# Patient Record
Sex: FEMALE | Race: WHITE | NOT HISPANIC OR LATINO | Employment: OTHER | URBAN - METROPOLITAN AREA
[De-identification: names, ages, dates, MRNs, and addresses within clinical notes are randomized per-mention and may not be internally consistent; named-entity substitution may affect disease eponyms.]

---

## 2017-01-03 ENCOUNTER — OFFICE VISIT (OUTPATIENT)
Dept: INTERNAL MEDICINE | Facility: CLINIC | Age: 82
End: 2017-01-03

## 2017-01-03 VITALS
HEIGHT: 60 IN | DIASTOLIC BLOOD PRESSURE: 60 MMHG | WEIGHT: 138 LBS | SYSTOLIC BLOOD PRESSURE: 110 MMHG | HEART RATE: 68 BPM | BODY MASS INDEX: 27.09 KG/M2

## 2017-01-03 DIAGNOSIS — E53.8 VITAMIN B12 DEFICIENCY: ICD-10-CM

## 2017-01-03 DIAGNOSIS — R25.1 TREMOR: ICD-10-CM

## 2017-01-03 DIAGNOSIS — E03.8 OTHER SPECIFIED HYPOTHYROIDISM: ICD-10-CM

## 2017-01-03 DIAGNOSIS — I10 ESSENTIAL HYPERTENSION: Primary | ICD-10-CM

## 2017-01-03 DIAGNOSIS — J30.1 SEASONAL ALLERGIC RHINITIS DUE TO POLLEN: ICD-10-CM

## 2017-01-03 DIAGNOSIS — E78.49 OTHER HYPERLIPIDEMIA: ICD-10-CM

## 2017-01-03 DIAGNOSIS — G62.9 NEUROPATHY: ICD-10-CM

## 2017-01-03 DIAGNOSIS — K30 NUD (NONULCER DYSPEPSIA): ICD-10-CM

## 2017-01-03 PROCEDURE — 99214 OFFICE O/P EST MOD 30 MIN: CPT | Performed by: INTERNAL MEDICINE

## 2017-01-03 NOTE — PROGRESS NOTES
Patient is a 83 y.o. female who is here for recent ER visit.  Chief Complaint   Patient presents with   • Diverticulitis         HPI:  Here for f/u.  Was recently in ER 12/26 for left abdominal pains,  Found to have diverticulitis.  Was rxd antibiotics. Some loose BM.  No fever or chills.  Abdominal pains are improved.  No BRBPR.      History:    Patient Active Problem List   Diagnosis   • Allergic rhinitis   • Hyperlipidemia   • Hypertension   • Hypocalcemia   • Hypothyroidism   • Neuropathy   • NUD (nonulcer dyspepsia)   • Painful knee   • Pernicious anemia   • SOB (shortness of breath)   • Tremor   • Warts   • Vitamin B12 deficiency   • Spondylolisthesis of cervical region   • Graves' ophthalmopathy   • Anemia   • Contact dermatitis       Past Medical History   Diagnosis Date   • Anemia    • Cataract    • Difficulty breathing      Chronic   • Graves' ophthalmopathy    • Hoarseness    • Spondylolisthesis of cervical region    • Vitamin B12 deficiency        Past Surgical History   Procedure Laterality Date   • Cholecystectomy     • Total knee arthroplasty Left 09/29/2014     Dr Colon   • Cervical laminectomy     • Other surgical history Right      Neuroplasty Median Nerve at Carpal Tunnel   • Thyroid surgery         Current Outpatient Prescriptions on File Prior to Visit   Medication Sig   • ciprofloxacin (CIPRO) 500 MG tablet 1 tablet po BID x 10 days   • dicyclomine (BENTYL) 10 MG capsule 1 po TID PRN abdominal pain   • esomeprazole (NexIUM) 40 MG capsule TAKE ONE CAPSULE BY MOUTH DAILY   • HYDROcodone-acetaminophen (NORCO) 7.5-325 MG per tablet    • levocetirizine (XYZAL) 5 MG tablet Take 1 tablet by mouth daily as needed.   • levothyroxine (SYNTHROID, LEVOTHROID) 75 MCG tablet Take 1 tablet by mouth Daily.   • lisinopril (PRINIVIL,ZESTRIL) 5 MG tablet TAKE ONE TABLET BY MOUTH DAILY AS DIRECTED   • LYRICA 200 MG capsule 3 (three) times a day.   • metroNIDAZOLE (FLAGYL) 500 MG tablet Take 1 tablet by  mouth 3 (Three) Times a Day.   • ondansetron (ZOFRAN) 4 MG tablet Take 1 tablet by mouth Every 8 (Eight) Hours As Needed for nausea or vomiting.   • propranolol (INDERAL) 40 MG tablet 2 (two) times a day.   • triamcinolone (KENALOG) 0.1 % cream Apply  topically 2 (two) times a day. Till healed     No current facility-administered medications on file prior to visit.        Family History   Problem Relation Age of Onset   • Hypertension Mother    • Other Father      cardiac disorder   • Diabetes Father    • Hypertension Sister        Social History     Social History   • Marital status:      Spouse name: N/A   • Number of children: N/A   • Years of education: N/A     Occupational History   • Not on file.     Social History Main Topics   • Smoking status: Never Smoker   • Smokeless tobacco: Not on file   • Alcohol use Not on file   • Drug use: No   • Sexual activity: Not on file     Other Topics Concern   • Not on file     Social History Narrative         ROS:    Review of Systems   Constitutional: Positive for fatigue. Negative for chills, diaphoresis, fever and unexpected weight change.   HENT: Negative for congestion, ear pain, hearing loss, nosebleeds, postnasal drip, sinus pressure and sore throat.    Eyes: Negative for pain, discharge and itching.   Respiratory: Negative for cough, chest tightness, shortness of breath and wheezing.    Cardiovascular: Negative for chest pain, palpitations and leg swelling.   Gastrointestinal: Positive for abdominal pain. Negative for abdominal distention, blood in stool, constipation, diarrhea, nausea and vomiting.   Endocrine: Negative for polydipsia and polyuria.   Genitourinary: Negative for difficulty urinating, dysuria, frequency and hematuria.   Musculoskeletal: Positive for arthralgias and back pain. Negative for gait problem, joint swelling and myalgias.   Skin: Positive for rash. Negative for wound.   Neurological: Positive for tremors. Negative for dizziness,  "syncope, weakness and headaches.   Psychiatric/Behavioral: Negative for dysphoric mood and sleep disturbance. The patient is not nervous/anxious.        Visit Vitals   • /60   • Pulse 68   • Ht 60\" (152.4 cm)   • Wt 138 lb (62.6 kg)   • BMI 26.95 kg/m2       Physical Exam:    Physical Exam   Constitutional: She is oriented to person, place, and time. She appears well-developed and well-nourished.   HENT:   Head: Normocephalic and atraumatic.   Right Ear: External ear normal.   Left Ear: External ear normal.   Mouth/Throat: Oropharynx is clear and moist.   Eyes: Conjunctivae and EOM are normal.   Neck: Normal range of motion. Neck supple.   Cardiovascular: Normal rate, regular rhythm and normal heart sounds.    Pulmonary/Chest: Effort normal and breath sounds normal.   Abdominal: Soft. Bowel sounds are normal.   Musculoskeletal: Normal range of motion. She exhibits edema (trace).   Lymphadenopathy:     She has no cervical adenopathy.   Neurological: She is alert and oriented to person, place, and time.   Skin: Skin is warm and dry.   Psychiatric: She has a normal mood and affect. Her behavior is normal. Thought content normal.       Procedure:      Discussion/Summary:  htn-decrease the lisinopril to 1/2  rhinitis-flonase and xyzal continuation  tremor-cont propranolol  hypothyroid- recheck on rtc  neuropathy-cont lyrica at bigger dose  djd-cont prn lortab  b12 def-cont b12 , recheck noted  hyperlipidemia-labs on rtc  Contact dermatitis-steroid rx prn  Diverticulitis-cont antibiotics, advised citrucel qd  high risk meds-labs noted      labs noted and dw patient, reviewed ER      Current Outpatient Prescriptions:   •  ciprofloxacin (CIPRO) 500 MG tablet, 1 tablet po BID x 10 days, Disp: 20 tablet, Rfl: 0  •  dicyclomine (BENTYL) 10 MG capsule, 1 po TID PRN abdominal pain, Disp: 21 capsule, Rfl: 0  •  esomeprazole (NexIUM) 40 MG capsule, TAKE ONE CAPSULE BY MOUTH DAILY, Disp: 30 capsule, Rfl: 3  •  " HYDROcodone-acetaminophen (NORCO) 7.5-325 MG per tablet, , Disp: , Rfl:   •  levocetirizine (XYZAL) 5 MG tablet, Take 1 tablet by mouth daily as needed., Disp: , Rfl:   •  levothyroxine (SYNTHROID, LEVOTHROID) 75 MCG tablet, Take 1 tablet by mouth Daily., Disp: 30 tablet, Rfl: 2  •  lisinopril (PRINIVIL,ZESTRIL) 5 MG tablet, TAKE ONE TABLET BY MOUTH DAILY AS DIRECTED, Disp: 90 tablet, Rfl: 1  •  LYRICA 200 MG capsule, 3 (three) times a day., Disp: , Rfl:   •  metroNIDAZOLE (FLAGYL) 500 MG tablet, Take 1 tablet by mouth 3 (Three) Times a Day., Disp: 30 tablet, Rfl: 0  •  ondansetron (ZOFRAN) 4 MG tablet, Take 1 tablet by mouth Every 8 (Eight) Hours As Needed for nausea or vomiting., Disp: 30 tablet, Rfl: 0  •  propranolol (INDERAL) 40 MG tablet, 2 (two) times a day., Disp: , Rfl:   •  triamcinolone (KENALOG) 0.1 % cream, Apply  topically 2 (two) times a day. Till healed, Disp: 45 g, Rfl: 1        Zoila was seen today for diverticulitis.    Diagnoses and all orders for this visit:    Essential hypertension    Seasonal allergic rhinitis due to pollen    NUD (nonulcer dyspepsia)    Vitamin B12 deficiency    Other specified hypothyroidism    Neuropathy    Other hyperlipidemia    Tremor

## 2017-01-03 NOTE — MR AVS SNAPSHOT
Zoila Nava   1/3/2017 2:00 PM   Office Visit    Dept Phone:  [phone]   Encounter #:  [id number]    Provider:  Arnoldo Oneil MD   Department:  [facility] PRIMARY [facility]                Your Full Care Plan              Your Updated Medication List          This list is accurate as of: 1/3/17  2:50 PM.  Always use your most recent med list.                ciprofloxacin 500 MG tablet   Commonly known as:  CIPRO   1 tablet po BID x 10 days       dicyclomine 10 MG capsule   Commonly known as:  BENTYL   1 po TID PRN abdominal pain       esomeprazole 40 MG capsule   Commonly known as:  nexIUM   TAKE ONE CAPSULE BY MOUTH DAILY       HYDROcodone-acetaminophen 7.5-325 MG per tablet   Commonly known as:  NORCO       levocetirizine 5 MG tablet   Commonly known as:  XYZAL       levothyroxine 75 MCG tablet   Commonly known as:  SYNTHROID, LEVOTHROID   Take 1 tablet by mouth Daily.       lisinopril 5 MG tablet   Commonly known as:  PRINIVIL,ZESTRIL   TAKE ONE TABLET BY MOUTH DAILY AS DIRECTED       LYRICA 200 MG capsule   Generic drug:  pregabalin       metroNIDAZOLE 500 MG tablet   Commonly known as:  FLAGYL   Take 1 tablet by mouth 3 (Three) Times a Day.       ondansetron 4 MG tablet   Commonly known as:  ZOFRAN   Take 1 tablet by mouth Every 8 (Eight) Hours As Needed for nausea or vomiting.       propranolol 40 MG tablet   Commonly known as:  INDERAL       triamcinolone 0.1 % cream   Commonly known as:  KENALOG   Apply  topically 2 (two) times a day. Till healed               You Were Diagnosed With        Codes Comments    Essential hypertension    -  Primary ICD-10-CM: I10  ICD-9-CM: 401.9     Seasonal allergic rhinitis due to pollen     ICD-10-CM: J30.1  ICD-9-CM: 477.0     NUD (nonulcer dyspepsia)     ICD-10-CM: K30  ICD-9-CM: 536.8     Vitamin B12 deficiency     ICD-10-CM: E53.8  ICD-9-CM: 266.2     Other specified hypothyroidism     ICD-10-CM: E03.8  ICD-9-CM: 244.8   "   Neuropathy     ICD-10-CM: G62.9  ICD-9-CM: 355.9     Other hyperlipidemia     ICD-10-CM: E78.4  ICD-9-CM: 272.4     Tremor     ICD-10-CM: R25.1  ICD-9-CM: 781.0       Instructions     None    Patient Instructions History      Upcoming Appointments     Visit Type Date Time Department    FOLLOW UP 1/3/2017  2:00 PM [facility]  [facility]    [facility]      MyChart Signup     Our records indicate that your [facility] [employer] account has been deactivated. If you would like to reactivate your account, please email [email or web address] or call [phone] to talk to our [employer] staff.             Other Info from Your Visit           Your Appointments     Mar 22, 2017  1:00 PM EDT   Follow Up with Arnoldo Oneil MD   [facility] (--)    [facility] [place] [address] [address] [phone]           Arrive 15 minutes prior to appointment.              Allergies     Penicillins        Reason for Visit     Diverticulitis           Vital Signs     Blood Pressure Pulse Height Weight Body Mass Index Smoking Status    110/60 68 60\" (152.4 cm) 138 lb (62.6 kg) 26.95 kg/m2 Never Smoker      Problems and Diagnoses Noted     Nasal inflammation due to allergen    High cholesterol or triglycerides    High blood pressure    Underactive thyroid    Neuropathy    NUD (nonulcer dyspepsia)    Tremor    Vitamin B12 deficiency        "

## 2017-03-21 DIAGNOSIS — E03.8 OTHER SPECIFIED HYPOTHYROIDISM: ICD-10-CM

## 2017-03-21 RX ORDER — LEVOTHYROXINE SODIUM 0.07 MG/1
TABLET ORAL
Qty: 30 TABLET | Refills: 5 | Status: SHIPPED | OUTPATIENT
Start: 2017-03-21 | End: 2017-09-14 | Stop reason: SDUPTHER

## 2017-03-22 ENCOUNTER — OFFICE VISIT (OUTPATIENT)
Dept: INTERNAL MEDICINE | Facility: CLINIC | Age: 82
End: 2017-03-22

## 2017-03-22 VITALS
WEIGHT: 137 LBS | SYSTOLIC BLOOD PRESSURE: 126 MMHG | HEIGHT: 60 IN | DIASTOLIC BLOOD PRESSURE: 70 MMHG | BODY MASS INDEX: 26.9 KG/M2 | HEART RATE: 64 BPM

## 2017-03-22 DIAGNOSIS — J30.1 SEASONAL ALLERGIC RHINITIS DUE TO POLLEN: ICD-10-CM

## 2017-03-22 DIAGNOSIS — E78.49 OTHER HYPERLIPIDEMIA: Primary | ICD-10-CM

## 2017-03-22 DIAGNOSIS — G62.9 NEUROPATHY: ICD-10-CM

## 2017-03-22 DIAGNOSIS — K30 NUD (NONULCER DYSPEPSIA): ICD-10-CM

## 2017-03-22 DIAGNOSIS — E53.8 VITAMIN B12 DEFICIENCY: ICD-10-CM

## 2017-03-22 DIAGNOSIS — R41.3 MEMORY IMPAIRMENT OF GRADUAL ONSET: ICD-10-CM

## 2017-03-22 DIAGNOSIS — E03.8 OTHER SPECIFIED HYPOTHYROIDISM: ICD-10-CM

## 2017-03-22 DIAGNOSIS — R25.1 TREMOR: ICD-10-CM

## 2017-03-22 DIAGNOSIS — I10 ESSENTIAL HYPERTENSION: ICD-10-CM

## 2017-03-22 DIAGNOSIS — D51.0 PERNICIOUS ANEMIA: ICD-10-CM

## 2017-03-22 PROCEDURE — 99214 OFFICE O/P EST MOD 30 MIN: CPT | Performed by: INTERNAL MEDICINE

## 2017-03-22 RX ORDER — DONEPEZIL HYDROCHLORIDE 5 MG/1
5 TABLET, FILM COATED ORAL NIGHTLY
Qty: 30 TABLET | Refills: 5 | Status: SHIPPED | OUTPATIENT
Start: 2017-03-22 | End: 2020-02-18 | Stop reason: SDUPTHER

## 2017-03-22 NOTE — PROGRESS NOTES
Patient is a 83 y.o. female who is here for a follow up of chronic conditions.  Chief Complaint   Patient presents with   • Hypertension   • Hyperlipidemia         HPI:  Here for f/u.  Having problems with short term memory.  Has had a lot of change in life within the past 2 mos.  Sleep is good.  Diverticulitis discomfort has improved.  No dizziness or lightheadedness.  Allergies are controlled.  Energy level is ok.     History:    Patient Active Problem List   Diagnosis   • Allergic rhinitis   • Hyperlipidemia   • Hypertension   • Hypocalcemia   • Hypothyroidism   • Neuropathy   • NUD (nonulcer dyspepsia)   • Painful knee   • Pernicious anemia   • Tremor   • Warts   • Vitamin B12 deficiency   • Spondylolisthesis of cervical region   • Graves' ophthalmopathy   • Anemia   • Memory impairment of gradual onset       Past Medical History:   Diagnosis Date   • Anemia    • Cataract    • Difficulty breathing     Chronic   • Graves' ophthalmopathy    • Hoarseness    • Spondylolisthesis of cervical region    • Vitamin B12 deficiency        Past Surgical History:   Procedure Laterality Date   • CERVICAL LAMINECTOMY     • CHOLECYSTECTOMY     • OTHER SURGICAL HISTORY Right     Neuroplasty Median Nerve at Carpal Tunnel   • THYROID SURGERY     • TOTAL KNEE ARTHROPLASTY Left 09/29/2014    Dr Colon       Current Outpatient Prescriptions on File Prior to Visit   Medication Sig   • dicyclomine (BENTYL) 10 MG capsule 1 po TID PRN abdominal pain   • esomeprazole (nexIUM) 40 MG capsule TAKE ONE CAPSULE BY MOUTH DAILY   • HYDROcodone-acetaminophen (NORCO) 7.5-325 MG per tablet    • levocetirizine (XYZAL) 5 MG tablet Take 1 tablet by mouth daily as needed.   • levothyroxine (SYNTHROID, LEVOTHROID) 75 MCG tablet TAKE ONE TABLET BY MOUTH DAILY   • lisinopril (PRINIVIL,ZESTRIL) 5 MG tablet TAKE ONE TABLET BY MOUTH DAILY AS DIRECTED   • LYRICA 200 MG capsule 3 (three) times a day.   • ondansetron (ZOFRAN) 4 MG tablet Take 1 tablet by  mouth Every 8 (Eight) Hours As Needed for nausea or vomiting.   • propranolol (INDERAL) 40 MG tablet 2 (two) times a day.   • triamcinolone (KENALOG) 0.1 % cream Apply  topically 2 (two) times a day. Till healed   • [DISCONTINUED] ciprofloxacin (CIPRO) 500 MG tablet 1 tablet po BID x 10 days   • [DISCONTINUED] metroNIDAZOLE (FLAGYL) 500 MG tablet Take 1 tablet by mouth 3 (Three) Times a Day.     No current facility-administered medications on file prior to visit.        Family History   Problem Relation Age of Onset   • Hypertension Mother    • Other Father      cardiac disorder   • Diabetes Father    • Hypertension Sister        Social History     Social History   • Marital status:      Spouse name: N/A   • Number of children: N/A   • Years of education: N/A     Occupational History   • Not on file.     Social History Main Topics   • Smoking status: Never Smoker   • Smokeless tobacco: Not on file   • Alcohol use Not on file   • Drug use: No   • Sexual activity: Not on file     Other Topics Concern   • Not on file     Social History Narrative         ROS:    Review of Systems   Constitutional: Positive for fatigue. Negative for chills, diaphoresis, fever and unexpected weight change.   HENT: Negative for congestion, ear pain, hearing loss, nosebleeds, postnasal drip, sinus pressure and sore throat.    Eyes: Negative for pain, discharge and itching.   Respiratory: Negative for cough, chest tightness, shortness of breath and wheezing.    Cardiovascular: Negative for chest pain, palpitations and leg swelling.   Gastrointestinal: Negative for abdominal distention, abdominal pain, blood in stool, constipation, diarrhea, nausea and vomiting.   Endocrine: Negative for polydipsia and polyuria.   Genitourinary: Negative for difficulty urinating, dysuria, frequency and hematuria.   Musculoskeletal: Positive for arthralgias and back pain. Negative for gait problem, joint swelling and myalgias.   Skin: Negative for rash  "and wound.   Neurological: Positive for tremors. Negative for dizziness, syncope, weakness and headaches.   Psychiatric/Behavioral: Positive for decreased concentration. Negative for dysphoric mood and sleep disturbance. The patient is not nervous/anxious.        /70  Pulse 64  Ht 60\" (152.4 cm)  Wt 137 lb (62.1 kg)  BMI 26.76 kg/m2    Physical Exam:    Physical Exam   Constitutional: She is oriented to person, place, and time. She appears well-developed and well-nourished.   HENT:   Head: Normocephalic and atraumatic.   Right Ear: External ear normal.   Left Ear: External ear normal.   Mouth/Throat: Oropharynx is clear and moist.   Eyes: Conjunctivae and EOM are normal.   Neck: Normal range of motion. Neck supple.   Cardiovascular: Normal rate, regular rhythm and normal heart sounds.    Pulmonary/Chest: Effort normal and breath sounds normal.   Abdominal: Soft. Bowel sounds are normal.   Musculoskeletal: Normal range of motion. She exhibits edema (trace).   Lymphadenopathy:     She has no cervical adenopathy.   Neurological: She is alert and oriented to person, place, and time.   MMSE 29/30   Skin: Skin is warm and dry.   Psychiatric: She has a normal mood and affect. Her behavior is normal. Thought content normal.       Procedure:      Discussion/Summary:  htn-stabel  rhinitis-flonase and xyzal continuation  tremor-cont propranolol  hypothyroid- recheck on rtc  neuropathy-cont lyrica at bigger dose  djd-cont prn lortab  b12 def-cont b12 , recheck noted  hyperlipidemia-labs on rtc  Contact dermatitis-steroid rx prn  Diverticulitis-s/p antibiotics, advised citrucel qd  Memory impairment-trial of aricept  high risk meds-labs noted      labs noted and dw patient      Current Outpatient Prescriptions:   •  dicyclomine (BENTYL) 10 MG capsule, 1 po TID PRN abdominal pain, Disp: 21 capsule, Rfl: 0  •  esomeprazole (nexIUM) 40 MG capsule, TAKE ONE CAPSULE BY MOUTH DAILY, Disp: 30 capsule, Rfl: 2  •  " HYDROcodone-acetaminophen (NORCO) 7.5-325 MG per tablet, , Disp: , Rfl:   •  levocetirizine (XYZAL) 5 MG tablet, Take 1 tablet by mouth daily as needed., Disp: , Rfl:   •  levothyroxine (SYNTHROID, LEVOTHROID) 75 MCG tablet, TAKE ONE TABLET BY MOUTH DAILY, Disp: 30 tablet, Rfl: 5  •  lisinopril (PRINIVIL,ZESTRIL) 5 MG tablet, TAKE ONE TABLET BY MOUTH DAILY AS DIRECTED, Disp: 90 tablet, Rfl: 1  •  LYRICA 200 MG capsule, 3 (three) times a day., Disp: , Rfl:   •  ondansetron (ZOFRAN) 4 MG tablet, Take 1 tablet by mouth Every 8 (Eight) Hours As Needed for nausea or vomiting., Disp: 30 tablet, Rfl: 0  •  propranolol (INDERAL) 40 MG tablet, 2 (two) times a day., Disp: , Rfl:   •  triamcinolone (KENALOG) 0.1 % cream, Apply  topically 2 (two) times a day. Till healed, Disp: 45 g, Rfl: 1  •  donepezil (ARICEPT) 5 MG tablet, Take 1 tablet by mouth Every Night., Disp: 30 tablet, Rfl: 5        Zoila was seen today for hypertension and hyperlipidemia.    Diagnoses and all orders for this visit:    Other hyperlipidemia    Essential hypertension    Seasonal allergic rhinitis due to pollen    NUD (nonulcer dyspepsia)    Vitamin B12 deficiency    Other specified hypothyroidism    Neuropathy    Pernicious anemia    Tremor    Memory impairment of gradual onset  -     donepezil (ARICEPT) 5 MG tablet; Take 1 tablet by mouth Every Night.

## 2017-05-25 RX ORDER — ESOMEPRAZOLE MAGNESIUM 40 MG/1
CAPSULE, DELAYED RELEASE ORAL
Qty: 30 CAPSULE | Refills: 1 | Status: SHIPPED | OUTPATIENT
Start: 2017-05-25 | End: 2017-05-30 | Stop reason: SDUPTHER

## 2017-05-30 RX ORDER — ESOMEPRAZOLE MAGNESIUM 40 MG/1
40 CAPSULE, DELAYED RELEASE ORAL DAILY
Qty: 30 CAPSULE | Refills: 1 | Status: SHIPPED | OUTPATIENT
Start: 2017-05-30 | End: 2017-05-30 | Stop reason: SDUPTHER

## 2017-05-30 RX ORDER — ESOMEPRAZOLE MAGNESIUM 40 MG/1
CAPSULE, DELAYED RELEASE ORAL
Qty: 30 CAPSULE | Refills: 1 | Status: SHIPPED | OUTPATIENT
Start: 2017-05-30 | End: 2018-04-11

## 2017-06-30 ENCOUNTER — OFFICE VISIT (OUTPATIENT)
Dept: INTERNAL MEDICINE | Facility: CLINIC | Age: 82
End: 2017-06-30

## 2017-06-30 VITALS
SYSTOLIC BLOOD PRESSURE: 124 MMHG | DIASTOLIC BLOOD PRESSURE: 60 MMHG | WEIGHT: 135 LBS | HEIGHT: 60 IN | HEART RATE: 64 BPM | BODY MASS INDEX: 26.5 KG/M2

## 2017-06-30 DIAGNOSIS — K30 NUD (NONULCER DYSPEPSIA): ICD-10-CM

## 2017-06-30 DIAGNOSIS — E03.8 OTHER SPECIFIED HYPOTHYROIDISM: ICD-10-CM

## 2017-06-30 DIAGNOSIS — D51.0 PERNICIOUS ANEMIA: ICD-10-CM

## 2017-06-30 DIAGNOSIS — R25.1 TREMOR: ICD-10-CM

## 2017-06-30 DIAGNOSIS — D64.9 ANEMIA, UNSPECIFIED TYPE: ICD-10-CM

## 2017-06-30 DIAGNOSIS — I10 ESSENTIAL HYPERTENSION: ICD-10-CM

## 2017-06-30 DIAGNOSIS — E05.00 GRAVES' OPHTHALMOPATHY: ICD-10-CM

## 2017-06-30 DIAGNOSIS — J30.1 SEASONAL ALLERGIC RHINITIS DUE TO POLLEN: ICD-10-CM

## 2017-06-30 DIAGNOSIS — E53.8 VITAMIN B12 DEFICIENCY: ICD-10-CM

## 2017-06-30 DIAGNOSIS — E78.49 OTHER HYPERLIPIDEMIA: Primary | ICD-10-CM

## 2017-06-30 DIAGNOSIS — R41.3 MEMORY IMPAIRMENT OF GRADUAL ONSET: ICD-10-CM

## 2017-06-30 DIAGNOSIS — G62.9 NEUROPATHY: ICD-10-CM

## 2017-06-30 PROCEDURE — G0439 PPPS, SUBSEQ VISIT: HCPCS | Performed by: INTERNAL MEDICINE

## 2017-06-30 NOTE — PROGRESS NOTES
QUICK REFERENCE INFORMATION:  The ABCs of the Annual Wellness Visit    Initial Medicare Wellness Visit    HEALTH RISK ASSESSMENT    7/13/1933    Recent Hospitalizations:  No hospitalization(s) within the last year..        Current Medical Providers:  Patient Care Team:  Arnoldo Oneil MD as PCP - General (Internal Medicine)  Isaac Escudero MD as PCP - Claims Attributed        Smoking Status:  History   Smoking Status   • Never Smoker   Smokeless Tobacco   • Not on file       Alcohol Consumption:  History   Alcohol use Not on file       Depression Screen:   PHQ-9 Depression Screening 6/30/2017   Little interest or pleasure in doing things 0   Feeling down, depressed, or hopeless 0   Trouble falling or staying asleep, or sleeping too much 0   Feeling tired or having little energy 0   Poor appetite or overeating 0   Feeling bad about yourself - or that you are a failure or have let yourself or your family down 0   Trouble concentrating on things, such as reading the newspaper or watching television 0   Moving or speaking so slowly that other people could have noticed. Or the opposite - being so fidgety or restless that you have been moving around a lot more than usual 0   Thoughts that you would be better off dead, or of hurting yourself in some way 0   PHQ-9 Total Score 0       Health Habits and Functional and Cognitive Screening:  Functional & Cognitive Status 6/30/2017   Do you have difficulty preparing food and eating? No   Do you have difficulty bathing yourself? No   Do you have difficulty getting dressed? No   Do you have difficulty using the toilet? No   Do you have difficulty moving around from place to place? No   In the past year have you fallen or experienced a near fall? Yes   Do you need help using the phone?  No   Are you deaf or do you have serious difficulty hearing?  No   Do you need help with transportation? Yes   Do you need help shopping? No   Do you need help preparing meals?  Yes   Do you need  help with housework?  No   Do you need help with laundry? No   Do you need help taking your medications? No   Do you need help managing money? No   Do you have difficulty concentrating, remembering or making decisions? No       Health Habits  Current Diet: Well Balanced Diet  Dental Exam: Not up to date  Eye Exam: Not up to date  Exercise (times per week): 2 times per week  Current Exercise Activities Include: Walking          Does the patient have evidence of cognitive impairment? No    Asiprin use counseling: Taking ASA appropriately as indicated      Recent Lab Results:    Visual Acuity:  No exam data present    Age-appropriate Screening Schedule:  Refer to the list below for future screening recommendations based on patient's age, sex and/or medical conditions. Orders for these recommended tests are listed in the plan section. The patient has been provided with a written plan.    Health Maintenance   Topic Date Due   • TDAP/TD VACCINES (1 - Tdap) 07/13/1952   • PNEUMOCOCCAL VACCINES (65+ LOW/MEDIUM RISK) (1 of 2 - PCV13) 07/13/1998   • ZOSTER VACCINE  08/25/2016   • LIPID PANEL  04/04/2017   • INFLUENZA VACCINE  08/01/2017        Subjective   History of Present Illness    Zoila Nava is a 83 y.o. female who presents for an Annual Wellness Visit.    The following portions of the patient's history were reviewed and updated as appropriate: current medications, past family history, past medical history, past social history, past surgical history and problem list.    Outpatient Medications Prior to Visit   Medication Sig Dispense Refill   • dicyclomine (BENTYL) 10 MG capsule 1 po TID PRN abdominal pain 21 capsule 0   • esomeprazole (nexIUM) 40 MG capsule TAKE ONE CAPSULE BY MOUTH DAILY 30 capsule 1   • HYDROcodone-acetaminophen (NORCO) 7.5-325 MG per tablet      • levocetirizine (XYZAL) 5 MG tablet Take 1 tablet by mouth daily as needed.     • levothyroxine (SYNTHROID, LEVOTHROID) 75 MCG tablet TAKE ONE TABLET  BY MOUTH DAILY 30 tablet 5   • lisinopril (PRINIVIL,ZESTRIL) 5 MG tablet TAKE ONE TABLET BY MOUTH DAILY AS DIRECTED 90 tablet 1   • LYRICA 200 MG capsule 3 (three) times a day.     • ondansetron (ZOFRAN) 4 MG tablet Take 1 tablet by mouth Every 8 (Eight) Hours As Needed for nausea or vomiting. 30 tablet 0   • propranolol (INDERAL) 40 MG tablet 2 (two) times a day.     • triamcinolone (KENALOG) 0.1 % cream Apply  topically 2 (two) times a day. Till healed 45 g 1   • donepezil (ARICEPT) 5 MG tablet Take 1 tablet by mouth Every Night. 30 tablet 5     No facility-administered medications prior to visit.        Patient Active Problem List   Diagnosis   • Allergic rhinitis   • Hyperlipidemia   • Hypertension   • Hypocalcemia   • Hypothyroidism   • Neuropathy   • NUD (nonulcer dyspepsia)   • Painful knee   • Pernicious anemia   • Tremor   • Warts   • Vitamin B12 deficiency   • Spondylolisthesis of cervical region   • Graves' ophthalmopathy   • Anemia   • Memory impairment of gradual onset       Advance Care Planning:  has an advance directive - a copy HAS NOT been provided. Have asked the patient to send this to us to add to record.    Identification of Risk Factors:  Risk factors include: cardiovascular risk, inactivity, increased fall risk, chronic pain and cognitive impairment.    Review of Systems   Constitutional: Positive for fatigue. Negative for chills, diaphoresis, fever and unexpected weight change.   HENT: Negative for congestion, ear pain, hearing loss, nosebleeds, postnasal drip, sinus pressure and sore throat.    Eyes: Negative for pain, discharge and itching.   Respiratory: Negative for cough, chest tightness, shortness of breath and wheezing.    Cardiovascular: Negative for chest pain, palpitations and leg swelling.   Gastrointestinal: Negative for abdominal distention, abdominal pain, blood in stool, constipation, diarrhea, nausea and vomiting.   Endocrine: Negative for polydipsia and polyuria.  "  Genitourinary: Negative for difficulty urinating, dysuria, frequency and hematuria.   Musculoskeletal: Positive for arthralgias and back pain. Negative for gait problem, joint swelling and myalgias.   Skin: Negative for rash and wound.   Neurological: Positive for tremors. Negative for dizziness, syncope, weakness and headaches.   Psychiatric/Behavioral: Positive for decreased concentration. Negative for dysphoric mood and sleep disturbance. The patient is not nervous/anxious.        Compared to one year ago, the patient feels her physical health is the same.  Compared to one year ago, the patient feels her mental health is the same.    Objective     Physical Exam   Constitutional: She is oriented to person, place, and time. She appears well-developed and well-nourished.   HENT:   Head: Normocephalic and atraumatic.   Right Ear: External ear normal.   Left Ear: External ear normal.   Mouth/Throat: Oropharynx is clear and moist.   Eyes: Conjunctivae and EOM are normal.   Neck: Normal range of motion. Neck supple.   Cardiovascular: Normal rate, regular rhythm and normal heart sounds.    Pulmonary/Chest: Effort normal and breath sounds normal.   Abdominal: Soft. Bowel sounds are normal.   Musculoskeletal: Normal range of motion. She exhibits edema (trace).   Lymphadenopathy:     She has no cervical adenopathy.   Neurological: She is alert and oriented to person, place, and time.   Skin: Skin is warm and dry.   Psychiatric: She has a normal mood and affect. Her behavior is normal. Thought content normal.       Vitals:    06/30/17 1017   BP: 124/60   BP Location: Right arm   Patient Position: Sitting   Pulse: 64   Weight: 135 lb (61.2 kg)   Height: 60\" (152.4 cm)   PainSc:   4       Body mass index is 26.37 kg/(m^2).  Discussed the patient's BMI with her. The BMI is above average; no BMI management plan is appropriate..    Assessment/Plan   Patient Self-Management and Personalized Health Advice  The patient has been " provided with information about: diet, exercise, weight management and fall prevention and preventive services including:   · Fall Risk plan of care done, Nutrition counseling provided.    Visit Diagnoses:    ICD-10-CM ICD-9-CM   1. Other hyperlipidemia E78.4 272.4   2. Essential hypertension I10 401.9   3. Seasonal allergic rhinitis due to pollen J30.1 477.0   4. NUD (nonulcer dyspepsia) K30 536.8   5. Vitamin B12 deficiency E53.8 266.2   6. Graves' ophthalmopathy E05.00 242.00     376.21   7. Other specified hypothyroidism E03.8 244.8   8. Neuropathy G62.9 355.9   9. Anemia, unspecified type D64.9 285.9   10. Pernicious anemia D51.0 281.0   11. Memory impairment of gradual onset R41.3 780.93   12. Tremor R25.1 781.0       No orders of the defined types were placed in this encounter.      Outpatient Encounter Prescriptions as of 6/30/2017   Medication Sig Dispense Refill   • dicyclomine (BENTYL) 10 MG capsule 1 po TID PRN abdominal pain 21 capsule 0   • esomeprazole (nexIUM) 40 MG capsule TAKE ONE CAPSULE BY MOUTH DAILY 30 capsule 1   • HYDROcodone-acetaminophen (NORCO) 7.5-325 MG per tablet      • levocetirizine (XYZAL) 5 MG tablet Take 1 tablet by mouth daily as needed.     • levothyroxine (SYNTHROID, LEVOTHROID) 75 MCG tablet TAKE ONE TABLET BY MOUTH DAILY 30 tablet 5   • lisinopril (PRINIVIL,ZESTRIL) 5 MG tablet TAKE ONE TABLET BY MOUTH DAILY AS DIRECTED 90 tablet 1   • LYRICA 200 MG capsule 3 (three) times a day.     • ondansetron (ZOFRAN) 4 MG tablet Take 1 tablet by mouth Every 8 (Eight) Hours As Needed for nausea or vomiting. 30 tablet 0   • propranolol (INDERAL) 40 MG tablet 2 (two) times a day.     • triamcinolone (KENALOG) 0.1 % cream Apply  topically 2 (two) times a day. Till healed 45 g 1   • donepezil (ARICEPT) 5 MG tablet Take 1 tablet by mouth Every Night. 30 tablet 5     No facility-administered encounter medications on file as of 6/30/2017.        Reviewed use of high risk medication in the  elderly: yes  Reviewed for potential of harmful drug interactions in the elderly: yes    Follow Up:  Return in about 3 months (around 9/30/2017) for Recheck and labs.     An After Visit Summary and PPPS with all of these plans were given to the patient.        htn-stable  rhinitis-flonase and xyzal continuation  tremor-cont propranolol  hypothyroid- recheck on rtc  neuropathy-cont lyrica at bigger dose  djd-cont prn lortab  b12 def-cont b12 , recheck noted  hyperlipidemia-labs on rtc  Contact dermatitis-steroid rx prn  Diverticulitis-s/p antibiotics, advised citrucel qd  Memory impairment-change to aricept qohs and titrate up to qhs  high risk meds-labs noted      labs noted and dw patient

## 2017-08-10 ENCOUNTER — TELEPHONE (OUTPATIENT)
Dept: INTERNAL MEDICINE | Facility: CLINIC | Age: 82
End: 2017-08-10

## 2017-08-10 RX ORDER — DICYCLOMINE HYDROCHLORIDE 10 MG/1
CAPSULE ORAL
Qty: 21 CAPSULE | Refills: 1 | Status: ON HOLD | OUTPATIENT
Start: 2017-08-10 | End: 2021-10-12

## 2017-08-23 ENCOUNTER — HOSPITAL ENCOUNTER (EMERGENCY)
Facility: HOSPITAL | Age: 82
Discharge: HOME | End: 2017-08-24
Attending: EMERGENCY MEDICINE | Admitting: EMERGENCY MEDICINE

## 2017-08-23 VITALS
SYSTOLIC BLOOD PRESSURE: 188 MMHG | WEIGHT: 137 LBS | BODY MASS INDEX: 26.9 KG/M2 | TEMPERATURE: 97.4 F | HEART RATE: 56 BPM | HEIGHT: 60 IN | DIASTOLIC BLOOD PRESSURE: 79 MMHG | OXYGEN SATURATION: 96 % | RESPIRATION RATE: 18 BRPM

## 2017-08-23 DIAGNOSIS — S09.90XA HEAD INJURY, INITIAL ENCOUNTER: ICD-10-CM
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
## 2017-08-23 DIAGNOSIS — S61.411A HAND LACERATION, RIGHT, INITIAL ENCOUNTER: Primary | ICD-10-CM

## 2017-08-23 PROCEDURE — 99283 EMERGENCY DEPT VISIT LOW MDM: CPT

## 2017-08-24 ENCOUNTER — APPOINTMENT (OUTPATIENT)
Dept: CT IMAGING | Facility: HOSPITAL | Age: 82
End: 2017-08-24

## 2017-08-24 ENCOUNTER — APPOINTMENT (OUTPATIENT)
Dept: GENERAL RADIOLOGY | Facility: HOSPITAL | Age: 82
End: 2017-08-24

## 2017-08-24 PROCEDURE — 73110 X-RAY EXAM OF WRIST: CPT

## 2017-08-24 PROCEDURE — 90471 IMMUNIZATION ADMIN: CPT

## 2017-08-24 PROCEDURE — 73130 X-RAY EXAM OF HAND: CPT

## 2017-08-24 PROCEDURE — 90714 TD VACC NO PRESV 7 YRS+ IM: CPT | Performed by: NURSE PRACTITIONER

## 2017-08-24 PROCEDURE — 25010000002 TETANUS-DIPHTHERIA TOXOIDS (ADULT) PER 0.5 ML: Performed by: NURSE PRACTITIONER

## 2017-08-24 PROCEDURE — 70450 CT HEAD/BRAIN W/O DYE: CPT

## 2017-08-24 RX ADMIN — CLOSTRIDIUM TETANI TOXOID ANTIGEN (FORMALDEHYDE INACTIVATED) AND CORYNEBACTERIUM DIPHTHERIAE TOXOID ANTIGEN (FORMALDEHYDE INACTIVATED) 0.5 ML: 5; 2 INJECTION, SUSPENSION INTRAMUSCULAR at 00:41

## 2017-08-24 NOTE — ED PROVIDER NOTES
Subjective   HPI Comments: Pt is an 84-year-old white female that presents emergency department following a fall.  Patient explains that her feet became tangled in the rug and she fell forward.  He attempted to break her fall with her right hand.  Patient reports that she did hit her head on the bed.  Patient denies any loss of consciousness.  Patient denies any neck pain or back pain. Pt has skin tear to the dorsal aspect of her rt hand. Pt has full ROM of extremities.  She denies any numbness, tingling.  Patient was able to get up off the floor.  Patient is able to ambulate without difficulty.  Patient does take a baby aspirin daily.    Patient is a 84 y.o. female presenting with fall.   History provided by:  Patient  Fall   Mechanism of injury: fall    Injury location: Right hand, head.  Incident location:  Home  Fall:     Fall occurred:  Standing    Height of fall:  GLF    Impact surface:  Carpet    Point of impact: rt hand, Head.  Prior to arrival data:     Bystander interventions:  None    Orientation at scene:  Person, place, situation and time    Loss of consciousness: no    Associated symptoms: no abdominal pain, no back pain, no chest pain, no loss of consciousness, no neck pain and no vomiting        Review of Systems   Cardiovascular: Negative for chest pain.   Gastrointestinal: Negative for abdominal pain and vomiting.   Musculoskeletal: Negative for back pain and neck pain.   Skin: Positive for wound (skin tear to rt hand).   Neurological: Negative for loss of consciousness.   All other systems reviewed and are negative.      Past Medical History:   Diagnosis Date   • Anemia    • Cataract    • Difficulty breathing     Chronic   • Graves' ophthalmopathy    • Hoarseness    • Spondylolisthesis of cervical region    • Vitamin B12 deficiency        Allergies   Allergen Reactions   • Penicillins        Past Surgical History:   Procedure Laterality Date   • CERVICAL LAMINECTOMY     • CHOLECYSTECTOMY     •  OTHER SURGICAL HISTORY Right     Neuroplasty Median Nerve at Carpal Tunnel   • THYROID SURGERY     • TOTAL KNEE ARTHROPLASTY Left 09/29/2014    Dr Colon       Family History   Problem Relation Age of Onset   • Hypertension Mother    • Other Father      cardiac disorder   • Diabetes Father    • Hypertension Sister        Social History     Social History   • Marital status:      Spouse name: N/A   • Number of children: N/A   • Years of education: N/A     Social History Main Topics   • Smoking status: Never Smoker   • Smokeless tobacco: None   • Alcohol use None   • Drug use: No   • Sexual activity: Not Asked     Other Topics Concern   • None     Social History Narrative           Objective   Physical Exam   Constitutional: She is oriented to person, place, and time. She appears well-developed and well-nourished. No distress.   HENT:   Head: Normocephalic and atraumatic.   Right Ear: External ear normal.   Left Ear: External ear normal.   Eyes: EOM are normal. Pupils are equal, round, and reactive to light.   Neck: Normal range of motion. Neck supple. No spinous process tenderness and no muscular tenderness present. Normal range of motion present.   Cardiovascular: Normal rate, regular rhythm, normal heart sounds and intact distal pulses.    Pulmonary/Chest: Effort normal and breath sounds normal. No respiratory distress.   Abdominal: Soft. Bowel sounds are normal.   Musculoskeletal:        Cervical back: Normal. She exhibits normal range of motion, no tenderness and no bony tenderness.        Thoracic back: Normal. She exhibits normal range of motion, no tenderness and no bony tenderness.        Lumbar back: Normal. She exhibits normal range of motion, no tenderness and no bony tenderness.        Right hand: She exhibits tenderness and laceration (4 cm flap laceration to the dorsal aspect of rt hand). She exhibits normal range of motion and no deformity.   Neurological: She is alert and oriented to  person, place, and time.   Skin: Skin is warm and dry. Laceration (4cm flap laceration to the dorsal aspect of the rt hand) noted.       Laceration Repair  Date/Time: 8/24/2017 4:34 AM  Performed by: DONELL VARELA  Authorized by: STEVAN MCMANUS   Consent: Verbal consent obtained.  Risks and benefits: risks, benefits and alternatives were discussed  Consent given by: patient  Patient understanding: patient states understanding of the procedure being performed  Patient identity confirmed: verbally with patient  Body area: upper extremity  Location details: right hand  Laceration length: 4 cm  Irrigation solution: saline  Irrigation method: syringe  Amount of cleaning: standard  Skin closure: Steri-Strips  Approximation difficulty: simple  Patient tolerance: Patient tolerated the procedure well with no immediate complications               ED Course  ED Course   Comment By Time   0145  patient is advised of results at this time.  Patient will be discharged home.  Patient to follow-up with PCP as discussed.  Patient agrees and verbalizes understanding. Donell Varela, APRN 08/24 0430        No results found for this or any previous visit (from the past 24 hour(s)).  Note: In addition to lab results from this visit, the labs listed above may include labs taken at another facility or during a different encounter within the last 24 hours. Please correlate lab times with ED admission and discharge times for further clarification of the services performed during this visit.    XR Wrist 3+ View Right   Final Result   Abnormal     Normal right wrist x-rays.      THIS DOCUMENT HAS BEEN ELECTRONICALLY SIGNED BY LESLEY GARCIA MD      XR Hand 3+ View Right   Final Result   Abnormal     No acute findings.      THIS DOCUMENT HAS BEEN ELECTRONICALLY SIGNED BY LESLEY GARCIA MD      CT Head Without Contrast   Final Result   Abnormal     Chronic atrophic and ischemic changes.         THIS DOCUMENT HAS BEEN ELECTRONICALLY  "SIGNED BY LESLEY GARCIA MD        Vitals:    08/23/17 2158   BP: (!) 188/79   Pulse: 56   Resp: 18   Temp: 97.4 °F (36.3 °C)   SpO2: 96%   Weight: 137 lb (62.1 kg)   Height: 60\" (152.4 cm)     Medications   tetanus-diphtheria toxoids (DECAVAC 5-2) injection 0.5 mL (0.5 mL Intramuscular Given 8/24/17 0041)     ECG/EMG Results (last 24 hours)     ** No results found for the last 24 hours. **                  [facility]    Final diagnoses:   Hand laceration, right, initial encounter   Fall, initial encounter   Head injury, initial encounter            Marti Feng, APRN  08/24/17 9805    "

## 2017-09-14 DIAGNOSIS — E03.8 OTHER SPECIFIED HYPOTHYROIDISM: ICD-10-CM

## 2017-09-14 RX ORDER — LEVOTHYROXINE SODIUM 0.07 MG/1
TABLET ORAL
Qty: 30 TABLET | Refills: 4 | Status: SHIPPED | OUTPATIENT
Start: 2017-09-14 | End: 2018-02-13 | Stop reason: SDUPTHER

## 2017-10-02 ENCOUNTER — OFFICE VISIT (OUTPATIENT)
Dept: INTERNAL MEDICINE | Facility: CLINIC | Age: 82
End: 2017-10-02

## 2017-10-02 VITALS
HEART RATE: 64 BPM | SYSTOLIC BLOOD PRESSURE: 120 MMHG | WEIGHT: 134 LBS | HEIGHT: 60 IN | BODY MASS INDEX: 26.31 KG/M2 | DIASTOLIC BLOOD PRESSURE: 64 MMHG

## 2017-10-02 DIAGNOSIS — E53.8 VITAMIN B12 DEFICIENCY: ICD-10-CM

## 2017-10-02 DIAGNOSIS — R25.1 TREMOR: ICD-10-CM

## 2017-10-02 DIAGNOSIS — D51.0 PERNICIOUS ANEMIA: ICD-10-CM

## 2017-10-02 DIAGNOSIS — G62.9 NEUROPATHY: ICD-10-CM

## 2017-10-02 DIAGNOSIS — K30 NUD (NONULCER DYSPEPSIA): ICD-10-CM

## 2017-10-02 DIAGNOSIS — J30.1 CHRONIC SEASONAL ALLERGIC RHINITIS DUE TO POLLEN: ICD-10-CM

## 2017-10-02 DIAGNOSIS — E78.49 OTHER HYPERLIPIDEMIA: Primary | ICD-10-CM

## 2017-10-02 DIAGNOSIS — I10 ESSENTIAL HYPERTENSION: ICD-10-CM

## 2017-10-02 DIAGNOSIS — E05.00 GRAVES' OPHTHALMOPATHY: ICD-10-CM

## 2017-10-02 DIAGNOSIS — Z23 NEED FOR IMMUNIZATION AGAINST INFLUENZA: ICD-10-CM

## 2017-10-02 DIAGNOSIS — R41.3 MEMORY IMPAIRMENT OF GRADUAL ONSET: ICD-10-CM

## 2017-10-02 DIAGNOSIS — D64.9 ANEMIA, UNSPECIFIED TYPE: ICD-10-CM

## 2017-10-02 DIAGNOSIS — E83.51 HYPOCALCEMIA: ICD-10-CM

## 2017-10-02 DIAGNOSIS — M43.12 SPONDYLOLISTHESIS OF CERVICAL REGION: ICD-10-CM

## 2017-10-02 DIAGNOSIS — E03.8 OTHER SPECIFIED HYPOTHYROIDISM: ICD-10-CM

## 2017-10-02 LAB
ALBUMIN SERPL-MCNC: 3.9 G/DL (ref 3.2–4.8)
ALBUMIN/GLOB SERPL: 1.7 G/DL (ref 1.5–2.5)
ALP SERPL-CCNC: 75 U/L (ref 25–100)
ALT SERPL W P-5'-P-CCNC: 14 U/L (ref 7–40)
ANION GAP SERPL CALCULATED.3IONS-SCNC: 10 MMOL/L (ref 3–11)
AST SERPL-CCNC: 19 U/L (ref 0–33)
BILIRUB SERPL-MCNC: 0.4 MG/DL (ref 0.3–1.2)
BUN BLD-MCNC: 19 MG/DL (ref 9–23)
BUN/CREAT SERPL: 15.8 (ref 7–25)
CALCIUM SPEC-SCNC: 8.7 MG/DL (ref 8.7–10.4)
CHLORIDE SERPL-SCNC: 104 MMOL/L (ref 99–109)
CO2 SERPL-SCNC: 28 MMOL/L (ref 20–31)
CREAT BLD-MCNC: 1.2 MG/DL (ref 0.6–1.3)
DEPRECATED RDW RBC AUTO: 47.8 FL (ref 37–54)
ERYTHROCYTE [DISTWIDTH] IN BLOOD BY AUTOMATED COUNT: 14.1 % (ref 11.3–14.5)
GFR SERPL CREATININE-BSD FRML MDRD: 43 ML/MIN/1.73
GLOBULIN UR ELPH-MCNC: 2.3 GM/DL
GLUCOSE BLD-MCNC: 76 MG/DL (ref 70–100)
HCT VFR BLD AUTO: 34.3 % (ref 34.5–44)
HGB BLD-MCNC: 11.1 G/DL (ref 11.5–15.5)
MCH RBC QN AUTO: 29.8 PG (ref 27–31)
MCHC RBC AUTO-ENTMCNC: 32.4 G/DL (ref 32–36)
MCV RBC AUTO: 92 FL (ref 80–99)
PLATELET # BLD AUTO: 189 10*3/MM3 (ref 150–450)
PMV BLD AUTO: 12.7 FL (ref 6–12)
POTASSIUM BLD-SCNC: 4.2 MMOL/L (ref 3.5–5.5)
PROT SERPL-MCNC: 6.2 G/DL (ref 5.7–8.2)
RBC # BLD AUTO: 3.73 10*6/MM3 (ref 3.89–5.14)
SODIUM BLD-SCNC: 142 MMOL/L (ref 132–146)
TSH SERPL DL<=0.05 MIU/L-ACNC: 1.78 MIU/ML (ref 0.35–5.35)
WBC NRBC COR # BLD: 6.94 10*3/MM3 (ref 3.5–10.8)

## 2017-10-02 PROCEDURE — 82607 VITAMIN B-12: CPT | Performed by: INTERNAL MEDICINE

## 2017-10-02 PROCEDURE — 85027 COMPLETE CBC AUTOMATED: CPT | Performed by: INTERNAL MEDICINE

## 2017-10-02 PROCEDURE — 99214 OFFICE O/P EST MOD 30 MIN: CPT | Performed by: INTERNAL MEDICINE

## 2017-10-02 PROCEDURE — 82746 ASSAY OF FOLIC ACID SERUM: CPT | Performed by: INTERNAL MEDICINE

## 2017-10-02 PROCEDURE — 80053 COMPREHEN METABOLIC PANEL: CPT | Performed by: INTERNAL MEDICINE

## 2017-10-02 PROCEDURE — 83540 ASSAY OF IRON: CPT | Performed by: INTERNAL MEDICINE

## 2017-10-02 PROCEDURE — 84443 ASSAY THYROID STIM HORMONE: CPT | Performed by: INTERNAL MEDICINE

## 2017-10-02 PROCEDURE — 90662 IIV NO PRSV INCREASED AG IM: CPT | Performed by: INTERNAL MEDICINE

## 2017-10-02 PROCEDURE — G0008 ADMIN INFLUENZA VIRUS VAC: HCPCS | Performed by: INTERNAL MEDICINE

## 2017-10-02 NOTE — PROGRESS NOTES
Patient is a 84 y.o. female who is here for a follow up of chronic conditions.  Chief Complaint   Patient presents with   • Hypertension   • Hyperlipidemia   • Hypothyroidism         HPI:  Here for f/u.  Family concerned about her memory.  Could not tolerate the aricept.  BP has been good.  Occasional dizziness.  Energy level is good.  No abdominal pains.  No edema.     History:    Patient Active Problem List   Diagnosis   • Allergic rhinitis   • Hyperlipidemia   • Hypertension   • Hypocalcemia   • Hypothyroidism   • Neuropathy   • NUD (nonulcer dyspepsia)   • Painful knee   • Pernicious anemia   • Tremor   • Warts   • Vitamin B12 deficiency   • Spondylolisthesis of cervical region   • Graves' ophthalmopathy   • Anemia   • Memory impairment of gradual onset       Past Medical History:   Diagnosis Date   • Anemia    • Cataract    • Difficulty breathing     Chronic   • Graves' ophthalmopathy    • Hoarseness    • Spondylolisthesis of cervical region    • Vitamin B12 deficiency        Past Surgical History:   Procedure Laterality Date   • CERVICAL LAMINECTOMY     • CHOLECYSTECTOMY     • OTHER SURGICAL HISTORY Right     Neuroplasty Median Nerve at Carpal Tunnel   • THYROID SURGERY     • TOTAL KNEE ARTHROPLASTY Left 09/29/2014    Dr Colon       Current Outpatient Prescriptions on File Prior to Visit   Medication Sig   • dicyclomine (BENTYL) 10 MG capsule 1 po TID PRN abdominal pain   • esomeprazole (nexIUM) 40 MG capsule TAKE ONE CAPSULE BY MOUTH DAILY   • levocetirizine (XYZAL) 5 MG tablet Take 1 tablet by mouth daily as needed.   • levothyroxine (SYNTHROID, LEVOTHROID) 75 MCG tablet TAKE ONE TABLET BY MOUTH DAILY   • LYRICA 200 MG capsule 3 (three) times a day.   • ondansetron (ZOFRAN) 4 MG tablet Take 1 tablet by mouth Every 8 (Eight) Hours As Needed for nausea or vomiting.   • propranolol (INDERAL) 40 MG tablet 2 (two) times a day.   • triamcinolone (KENALOG) 0.1 % cream Apply  topically 2 (two) times a day.  Till healed   • donepezil (ARICEPT) 5 MG tablet Take 1 tablet by mouth Every Night.     No current facility-administered medications on file prior to visit.        Family History   Problem Relation Age of Onset   • Hypertension Mother    • Other Father      cardiac disorder   • Diabetes Father    • Hypertension Sister        Social History     Social History   • Marital status:      Spouse name: N/A   • Number of children: N/A   • Years of education: N/A     Occupational History   • Not on file.     Social History Main Topics   • Smoking status: Never Smoker   • Smokeless tobacco: Not on file   • Alcohol use Not on file   • Drug use: No   • Sexual activity: Not on file     Other Topics Concern   • Not on file     Social History Narrative         ROS:    Review of Systems   Constitutional: Positive for fatigue. Negative for chills, diaphoresis, fever and unexpected weight change.   HENT: Negative for congestion, ear pain, hearing loss, nosebleeds, postnasal drip, sinus pressure and sore throat.    Eyes: Negative for pain, discharge and itching.   Respiratory: Negative for cough, chest tightness, shortness of breath and wheezing.    Cardiovascular: Negative for chest pain, palpitations and leg swelling.   Gastrointestinal: Negative for abdominal distention, abdominal pain, blood in stool, constipation, diarrhea, nausea and vomiting.   Endocrine: Negative for polydipsia and polyuria.   Genitourinary: Negative for difficulty urinating, dysuria, frequency and hematuria.   Musculoskeletal: Positive for arthralgias and back pain. Negative for gait problem, joint swelling and myalgias.   Skin: Negative for rash and wound.   Neurological: Positive for tremors. Negative for dizziness, syncope, weakness and headaches.   Psychiatric/Behavioral: Positive for decreased concentration. Negative for dysphoric mood and sleep disturbance. The patient is not nervous/anxious.        /64 (BP Location: Left arm, Patient  "Position: Sitting)  Pulse 64  Ht 60\" (152.4 cm)  Wt 134 lb (60.8 kg)  BMI 26.17 kg/m2    Physical Exam:    Physical Exam   Constitutional: She is oriented to person, place, and time. She appears well-developed and well-nourished.   HENT:   Head: Normocephalic and atraumatic.   Right Ear: External ear normal.   Left Ear: External ear normal.   Mouth/Throat: Oropharynx is clear and moist.   Eyes: Conjunctivae and EOM are normal.   Neck: Normal range of motion. Neck supple.   Cardiovascular: Normal rate, regular rhythm and normal heart sounds.    Pulmonary/Chest: Effort normal and breath sounds normal.   Abdominal: Soft. Bowel sounds are normal.   Musculoskeletal: Normal range of motion. She exhibits edema (trace).   Lymphadenopathy:     She has no cervical adenopathy.   Neurological: She is alert and oriented to person, place, and time.   10/17 MMSE 29/30   Skin: Skin is warm and dry.   Psychiatric: She has a normal mood and affect. Her behavior is normal. Thought content normal.       Procedure:      Discussion/Summary:  htn-stable  rhinitis-flonase and xyzal continuation  tremor-cont propranolol  hypothyroid- recheck today  neuropathy-cont lyrica at bigger dose  djd-cont prn lortab  b12 def-cont b12 , recheck noted  hyperlipidemia-labs on rtc  Contact dermatitis-steroid rx prn  Diverticulitis-s/p antibiotics, advised citrucel qd  Memory impairment-change to aricept 5 mg qhs  high risk meds-labs today      labs noted and dw patient, flu shot today  Advised B12 500 mcg      Current Outpatient Prescriptions:   •  dicyclomine (BENTYL) 10 MG capsule, 1 po TID PRN abdominal pain, Disp: 21 capsule, Rfl: 1  •  esomeprazole (nexIUM) 40 MG capsule, TAKE ONE CAPSULE BY MOUTH DAILY, Disp: 30 capsule, Rfl: 1  •  levocetirizine (XYZAL) 5 MG tablet, Take 1 tablet by mouth daily as needed., Disp: , Rfl:   •  levothyroxine (SYNTHROID, LEVOTHROID) 75 MCG tablet, TAKE ONE TABLET BY MOUTH DAILY, Disp: 30 tablet, Rfl: 4  •  LYRICA " 200 MG capsule, 3 (three) times a day., Disp: , Rfl:   •  ondansetron (ZOFRAN) 4 MG tablet, Take 1 tablet by mouth Every 8 (Eight) Hours As Needed for nausea or vomiting., Disp: 30 tablet, Rfl: 0  •  propranolol (INDERAL) 40 MG tablet, 2 (two) times a day., Disp: , Rfl:   •  triamcinolone (KENALOG) 0.1 % cream, Apply  topically 2 (two) times a day. Till healed, Disp: 45 g, Rfl: 1  •  donepezil (ARICEPT) 5 MG tablet, Take 1 tablet by mouth Every Night., Disp: 30 tablet, Rfl: 5        Zoila was seen today for hypertension, hyperlipidemia and hypothyroidism.    Diagnoses and all orders for this visit:    Other hyperlipidemia    Essential hypertension  -     CBC (No Diff)  -     Comprehensive Metabolic Panel    Chronic seasonal allergic rhinitis due to pollen    NUD (nonulcer dyspepsia)    Vitamin B12 deficiency    Graves' ophthalmopathy    Other specified hypothyroidism  -     TSH    Neuropathy    Spondylolisthesis of cervical region    Anemia, unspecified type  -     Iron  -     Vitamin B12  -     Folate    Pernicious anemia    Hypocalcemia    Memory impairment of gradual onset    Tremor    Need for immunization against influenza  -     Flu Vaccine High Dose PF 65YR+

## 2017-10-03 LAB
FOLATE SERPL-MCNC: >24 NG/ML (ref 3.2–20)
IRON 24H UR-MRATE: 76 MCG/DL (ref 50–175)
VIT B12 BLD-MCNC: 378 PG/ML (ref 211–911)

## 2017-10-04 ENCOUNTER — TELEPHONE (OUTPATIENT)
Dept: INTERNAL MEDICINE | Facility: CLINIC | Age: 82
End: 2017-10-04

## 2017-10-04 NOTE — TELEPHONE ENCOUNTER
PATIENT TOOK MEDICINE FOR DEMENTIA LAST NIGHT AND IS NOW SICK AND MR. FORREST HIDALGO WAS WONGERING IF YOU COULD PRESCRIBE HER SOMETHING DIFFERENT. PLEASE GIVE PATIENT A CALL.

## 2018-01-03 ENCOUNTER — OFFICE VISIT (OUTPATIENT)
Dept: INTERNAL MEDICINE | Facility: CLINIC | Age: 83
End: 2018-01-03

## 2018-01-03 VITALS
DIASTOLIC BLOOD PRESSURE: 70 MMHG | SYSTOLIC BLOOD PRESSURE: 120 MMHG | HEART RATE: 60 BPM | HEIGHT: 60 IN | WEIGHT: 140.6 LBS | BODY MASS INDEX: 27.61 KG/M2

## 2018-01-03 DIAGNOSIS — E03.8 OTHER SPECIFIED HYPOTHYROIDISM: ICD-10-CM

## 2018-01-03 DIAGNOSIS — J30.1 CHRONIC SEASONAL ALLERGIC RHINITIS DUE TO POLLEN: ICD-10-CM

## 2018-01-03 DIAGNOSIS — D64.9 ANEMIA, UNSPECIFIED TYPE: ICD-10-CM

## 2018-01-03 DIAGNOSIS — R41.3 MEMORY IMPAIRMENT OF GRADUAL ONSET: ICD-10-CM

## 2018-01-03 DIAGNOSIS — E53.8 VITAMIN B12 DEFICIENCY: ICD-10-CM

## 2018-01-03 DIAGNOSIS — R25.1 TREMOR: ICD-10-CM

## 2018-01-03 DIAGNOSIS — E78.49 OTHER HYPERLIPIDEMIA: Primary | ICD-10-CM

## 2018-01-03 DIAGNOSIS — G62.9 NEUROPATHY: ICD-10-CM

## 2018-01-03 DIAGNOSIS — I10 ESSENTIAL HYPERTENSION: ICD-10-CM

## 2018-01-03 DIAGNOSIS — D51.0 PERNICIOUS ANEMIA: ICD-10-CM

## 2018-01-03 DIAGNOSIS — K30 NUD (NONULCER DYSPEPSIA): ICD-10-CM

## 2018-01-03 PROCEDURE — 99214 OFFICE O/P EST MOD 30 MIN: CPT | Performed by: INTERNAL MEDICINE

## 2018-01-03 NOTE — PROGRESS NOTES
Patient is a 84 y.o. female who is here for a follow up of chronic conditions.  Chief Complaint   Patient presents with   • Hyperlipidemia   • Hypertension         HPI:    Here for f/u.  Doing good.  No dizziness or lightheadedness.  Energy level is good.  Memory is overall stable.  Sleeping well.  No abdominal pains.  No edema.  No fever or chills.  Occasional cold intolerance.   History:    Patient Active Problem List   Diagnosis   • Allergic rhinitis   • Hyperlipidemia   • Hypertension   • Hypocalcemia   • Hypothyroidism   • Neuropathy   • NUD (nonulcer dyspepsia)   • Painful knee   • Pernicious anemia   • Tremor   • Warts   • Vitamin B12 deficiency   • Spondylolisthesis of cervical region   • Graves' ophthalmopathy   • Anemia   • Memory impairment of gradual onset       Past Medical History:   Diagnosis Date   • Anemia    • Cataract    • Difficulty breathing     Chronic   • Graves' ophthalmopathy    • Hoarseness    • Spondylolisthesis of cervical region    • Vitamin B12 deficiency        Past Surgical History:   Procedure Laterality Date   • CERVICAL LAMINECTOMY     • CHOLECYSTECTOMY     • OTHER SURGICAL HISTORY Right     Neuroplasty Median Nerve at Carpal Tunnel   • THYROID SURGERY     • TOTAL KNEE ARTHROPLASTY Left 09/29/2014    Dr Colon       Current Outpatient Prescriptions on File Prior to Visit   Medication Sig   • dicyclomine (BENTYL) 10 MG capsule 1 po TID PRN abdominal pain   • donepezil (ARICEPT) 5 MG tablet Take 1 tablet by mouth Every Night.   • esomeprazole (nexIUM) 40 MG capsule TAKE ONE CAPSULE BY MOUTH DAILY   • levocetirizine (XYZAL) 5 MG tablet Take 1 tablet by mouth daily as needed.   • levothyroxine (SYNTHROID, LEVOTHROID) 75 MCG tablet TAKE ONE TABLET BY MOUTH DAILY   • LYRICA 200 MG capsule 3 (three) times a day.   • ondansetron (ZOFRAN) 4 MG tablet Take 1 tablet by mouth Every 8 (Eight) Hours As Needed for nausea or vomiting.   • propranolol (INDERAL) 40 MG tablet 2 (two) times a  day.   • triamcinolone (KENALOG) 0.1 % cream Apply  topically 2 (two) times a day. Till healed     No current facility-administered medications on file prior to visit.        Family History   Problem Relation Age of Onset   • Hypertension Mother    • Other Father      cardiac disorder   • Diabetes Father    • Hypertension Sister        Social History     Social History   • Marital status:      Spouse name: N/A   • Number of children: N/A   • Years of education: N/A     Occupational History   • Not on file.     Social History Main Topics   • Smoking status: Never Smoker   • Smokeless tobacco: Not on file   • Alcohol use Not on file   • Drug use: No   • Sexual activity: Not on file     Other Topics Concern   • Not on file     Social History Narrative         ROS:    Review of Systems   Constitutional: Positive for fatigue. Negative for chills, diaphoresis, fever and unexpected weight change.   HENT: Negative for congestion, ear pain, hearing loss, nosebleeds, postnasal drip, sinus pressure and sore throat.    Eyes: Negative for pain, discharge and itching.   Respiratory: Negative for cough, chest tightness, shortness of breath and wheezing.    Cardiovascular: Negative for chest pain, palpitations and leg swelling.   Gastrointestinal: Negative for abdominal distention, abdominal pain, blood in stool, constipation, diarrhea, nausea and vomiting.   Endocrine: Negative for polydipsia and polyuria.   Genitourinary: Negative for difficulty urinating, dysuria, frequency and hematuria.   Musculoskeletal: Positive for arthralgias and back pain. Negative for gait problem, joint swelling and myalgias.   Skin: Negative for rash and wound.   Neurological: Positive for tremors. Negative for dizziness, syncope, weakness and headaches.   Psychiatric/Behavioral: Positive for decreased concentration. Negative for dysphoric mood and sleep disturbance. The patient is not nervous/anxious.        /70  Pulse 60  Ht 152.4 cm  "(60\")  Wt 63.8 kg (140 lb 9.6 oz)  BMI 27.46 kg/m2    Physical Exam:    Physical Exam   Constitutional: She is oriented to person, place, and time. She appears well-developed and well-nourished.   HENT:   Head: Normocephalic and atraumatic.   Right Ear: External ear normal.   Left Ear: External ear normal.   Mouth/Throat: Oropharynx is clear and moist.   Eyes: Conjunctivae and EOM are normal.   Neck: Normal range of motion. Neck supple.   Cardiovascular: Normal rate, regular rhythm and normal heart sounds.    Pulmonary/Chest: Effort normal and breath sounds normal.   Abdominal: Soft. Bowel sounds are normal.   Musculoskeletal: Normal range of motion. She exhibits edema (trace).   Lymphadenopathy:     She has no cervical adenopathy.   Neurological: She is alert and oriented to person, place, and time.   10/17 MMSE 29/30   Skin: Skin is warm and dry.   Psychiatric: She has a normal mood and affect. Her behavior is normal. Thought content normal.       Procedure:      Discussion/Summary:    htn-stable  rhinitis-flonase and xyzal continuation  tremor-cont propranolol  hypothyroid- recheck at goal  neuropathy-cont lyrica at bigger dose  djd-cont prn lortab  b12 def-cont b12 , recheck noted  hyperlipidemia-labs on rtc  Contact dermatitis-steroid rx prn  Diverticulitis-s/p antibiotics, advised citrucel qd  Memory impairment-cont aricept 5 mg qhs  high risk meds-labs today      labs noted and dw patient, flu shot last visit  Advised B12 500 mcg      Current Outpatient Prescriptions:   •  dicyclomine (BENTYL) 10 MG capsule, 1 po TID PRN abdominal pain, Disp: 21 capsule, Rfl: 1  •  donepezil (ARICEPT) 5 MG tablet, Take 1 tablet by mouth Every Night., Disp: 30 tablet, Rfl: 5  •  esomeprazole (nexIUM) 40 MG capsule, TAKE ONE CAPSULE BY MOUTH DAILY, Disp: 30 capsule, Rfl: 1  •  levocetirizine (XYZAL) 5 MG tablet, Take 1 tablet by mouth daily as needed., Disp: , Rfl:   •  levothyroxine (SYNTHROID, LEVOTHROID) 75 MCG tablet, " TAKE ONE TABLET BY MOUTH DAILY, Disp: 30 tablet, Rfl: 4  •  LYRICA 200 MG capsule, 3 (three) times a day., Disp: , Rfl:   •  ondansetron (ZOFRAN) 4 MG tablet, Take 1 tablet by mouth Every 8 (Eight) Hours As Needed for nausea or vomiting., Disp: 30 tablet, Rfl: 0  •  propranolol (INDERAL) 40 MG tablet, 2 (two) times a day., Disp: , Rfl:   •  triamcinolone (KENALOG) 0.1 % cream, Apply  topically 2 (two) times a day. Till healed, Disp: 45 g, Rfl: 1        Zoila was seen today for hyperlipidemia and hypertension.    Diagnoses and all orders for this visit:    Other hyperlipidemia    Essential hypertension    Chronic seasonal allergic rhinitis due to pollen    NUD (nonulcer dyspepsia)    Vitamin B12 deficiency    Other specified hypothyroidism    Neuropathy    Anemia, unspecified type    Pernicious anemia    Memory impairment of gradual onset    Tremor

## 2018-02-13 DIAGNOSIS — E03.8 OTHER SPECIFIED HYPOTHYROIDISM: ICD-10-CM

## 2018-02-13 RX ORDER — LEVOTHYROXINE SODIUM 0.07 MG/1
75 TABLET ORAL DAILY
Qty: 30 TABLET | Refills: 4 | Status: SHIPPED | OUTPATIENT
Start: 2018-02-13 | End: 2018-03-19 | Stop reason: SDUPTHER

## 2018-03-19 DIAGNOSIS — E03.8 OTHER SPECIFIED HYPOTHYROIDISM: ICD-10-CM

## 2018-03-19 RX ORDER — LEVOTHYROXINE SODIUM 0.07 MG/1
TABLET ORAL
Qty: 30 TABLET | Refills: 3 | Status: SHIPPED | OUTPATIENT
Start: 2018-03-19 | End: 2018-07-12 | Stop reason: SDUPTHER

## 2018-03-19 RX ORDER — LEVOTHYROXINE SODIUM 0.07 MG/1
75 TABLET ORAL DAILY
Qty: 30 TABLET | Refills: 4 | Status: SHIPPED | OUTPATIENT
Start: 2018-03-19 | End: 2018-03-19 | Stop reason: SDUPTHER

## 2018-04-11 ENCOUNTER — OFFICE VISIT (OUTPATIENT)
Dept: INTERNAL MEDICINE | Facility: CLINIC | Age: 83
End: 2018-04-11

## 2018-04-11 VITALS
BODY MASS INDEX: 27.29 KG/M2 | WEIGHT: 139 LBS | HEIGHT: 60 IN | HEART RATE: 64 BPM | SYSTOLIC BLOOD PRESSURE: 130 MMHG | DIASTOLIC BLOOD PRESSURE: 80 MMHG

## 2018-04-11 DIAGNOSIS — Z23 NEED FOR PROPHYLACTIC VACCINATION AGAINST STREPTOCOCCUS PNEUMONIAE (PNEUMOCOCCUS): ICD-10-CM

## 2018-04-11 DIAGNOSIS — E03.8 OTHER SPECIFIED HYPOTHYROIDISM: ICD-10-CM

## 2018-04-11 DIAGNOSIS — E53.8 VITAMIN B12 DEFICIENCY: ICD-10-CM

## 2018-04-11 DIAGNOSIS — K30 NUD (NONULCER DYSPEPSIA): ICD-10-CM

## 2018-04-11 DIAGNOSIS — I10 ESSENTIAL HYPERTENSION: ICD-10-CM

## 2018-04-11 DIAGNOSIS — J30.1 CHRONIC SEASONAL ALLERGIC RHINITIS DUE TO POLLEN: ICD-10-CM

## 2018-04-11 DIAGNOSIS — R41.3 MEMORY IMPAIRMENT OF GRADUAL ONSET: ICD-10-CM

## 2018-04-11 DIAGNOSIS — E78.49 OTHER HYPERLIPIDEMIA: Primary | ICD-10-CM

## 2018-04-11 DIAGNOSIS — D51.0 PERNICIOUS ANEMIA: ICD-10-CM

## 2018-04-11 DIAGNOSIS — D64.9 ANEMIA, UNSPECIFIED TYPE: ICD-10-CM

## 2018-04-11 DIAGNOSIS — R25.1 TREMOR: ICD-10-CM

## 2018-04-11 DIAGNOSIS — G62.9 NEUROPATHY: ICD-10-CM

## 2018-04-11 PROCEDURE — 99214 OFFICE O/P EST MOD 30 MIN: CPT | Performed by: INTERNAL MEDICINE

## 2018-04-11 PROCEDURE — G0009 ADMIN PNEUMOCOCCAL VACCINE: HCPCS | Performed by: INTERNAL MEDICINE

## 2018-04-11 PROCEDURE — 90670 PCV13 VACCINE IM: CPT | Performed by: INTERNAL MEDICINE

## 2018-04-11 RX ORDER — FAMOTIDINE 40 MG/1
40 TABLET, FILM COATED ORAL EVERY MORNING
Qty: 30 TABLET | Refills: 5 | Status: SHIPPED | OUTPATIENT
Start: 2018-04-11 | End: 2018-10-11 | Stop reason: SDUPTHER

## 2018-04-11 RX ORDER — ESTRADIOL 0.1 MG/G
CREAM VAGINAL
COMMUNITY
Start: 2018-03-30 | End: 2020-11-17 | Stop reason: SDUPTHER

## 2018-04-11 NOTE — PROGRESS NOTES
Patient is a 84 y.o. female who is here for a follow up of chronic conditions.  Chief Complaint   Patient presents with   • Hypertension   • Hyperlipidemia   • Hypothyroidism         HPI:    Here for f/u.  Doing well.  No dizziness or lightheadedness.  Occasional GERD.  Stopped taking the PPI and using OTC zantac.  Energy level is good.  No recent falls.  Sleeping good.    History:    Patient Active Problem List   Diagnosis   • Allergic rhinitis   • Hyperlipidemia   • Hypertension   • Hypocalcemia   • Hypothyroidism   • Neuropathy   • NUD (nonulcer dyspepsia)   • Painful knee   • Pernicious anemia   • Tremor   • Warts   • Vitamin B12 deficiency   • Spondylolisthesis of cervical region   • Graves' ophthalmopathy   • Anemia   • Memory impairment of gradual onset       Past Medical History:   Diagnosis Date   • Anemia    • Cataract    • Difficulty breathing     Chronic   • Graves' ophthalmopathy    • Hoarseness    • Spondylolisthesis of cervical region    • Vitamin B12 deficiency        Past Surgical History:   Procedure Laterality Date   • CERVICAL LAMINECTOMY     • CHOLECYSTECTOMY     • OTHER SURGICAL HISTORY Right     Neuroplasty Median Nerve at Carpal Tunnel   • THYROID SURGERY     • TOTAL KNEE ARTHROPLASTY Left 09/29/2014    Dr Colon       Current Outpatient Prescriptions on File Prior to Visit   Medication Sig   • dicyclomine (BENTYL) 10 MG capsule 1 po TID PRN abdominal pain   • donepezil (ARICEPT) 5 MG tablet Take 1 tablet by mouth Every Night.   • levocetirizine (XYZAL) 5 MG tablet Take 1 tablet by mouth daily as needed.   • levothyroxine (SYNTHROID, LEVOTHROID) 75 MCG tablet TAKE ONE TABLET BY MOUTH DAILY   • LYRICA 200 MG capsule 3 (three) times a day.   • ondansetron (ZOFRAN) 4 MG tablet Take 1 tablet by mouth Every 8 (Eight) Hours As Needed for nausea or vomiting.   • propranolol (INDERAL) 40 MG tablet 2 (two) times a day.   • triamcinolone (KENALOG) 0.1 % cream Apply  topically 2 (two) times a day.  Till healed   • [DISCONTINUED] esomeprazole (nexIUM) 40 MG capsule TAKE ONE CAPSULE BY MOUTH DAILY     No current facility-administered medications on file prior to visit.        Family History   Problem Relation Age of Onset   • Hypertension Mother    • Other Father      cardiac disorder   • Diabetes Father    • Hypertension Sister        Social History     Social History   • Marital status:      Spouse name: N/A   • Number of children: N/A   • Years of education: N/A     Occupational History   • Not on file.     Social History Main Topics   • Smoking status: Never Smoker   • Smokeless tobacco: Not on file   • Alcohol use Not on file   • Drug use: No   • Sexual activity: Not on file     Other Topics Concern   • Not on file     Social History Narrative   • No narrative on file         ROS:    Review of Systems   Constitutional: Positive for fatigue. Negative for chills, diaphoresis, fever and unexpected weight change.   HENT: Negative for congestion, ear pain, hearing loss, nosebleeds, postnasal drip, sinus pressure and sore throat.    Eyes: Negative for pain, discharge and itching.   Respiratory: Negative for cough, chest tightness, shortness of breath and wheezing.    Cardiovascular: Negative for chest pain, palpitations and leg swelling.   Gastrointestinal: Negative for abdominal distention, abdominal pain, blood in stool, constipation, diarrhea, nausea and vomiting.   Endocrine: Negative for polydipsia and polyuria.   Genitourinary: Negative for difficulty urinating, dysuria, frequency and hematuria.   Musculoskeletal: Positive for arthralgias and back pain. Negative for gait problem, joint swelling and myalgias.   Skin: Negative for rash and wound.   Neurological: Positive for tremors. Negative for dizziness, syncope, weakness and headaches.   Psychiatric/Behavioral: Positive for decreased concentration. Negative for dysphoric mood and sleep disturbance. The patient is not nervous/anxious.        BP  "130/80   Pulse 64   Ht 152.4 cm (60\")   Wt 63 kg (139 lb)   BMI 27.15 kg/m²     Physical Exam:    Physical Exam   Constitutional: She is oriented to person, place, and time. She appears well-developed and well-nourished.   HENT:   Head: Normocephalic and atraumatic.   Right Ear: External ear normal.   Left Ear: External ear normal.   Mouth/Throat: Oropharynx is clear and moist.   Eyes: Conjunctivae and EOM are normal.   Neck: Normal range of motion. Neck supple.   Cardiovascular: Normal rate, regular rhythm and normal heart sounds.    Pulmonary/Chest: Effort normal and breath sounds normal.   Abdominal: Soft. Bowel sounds are normal.   Musculoskeletal: Normal range of motion. She exhibits edema (trace).   Lymphadenopathy:     She has no cervical adenopathy.   Neurological: She is alert and oriented to person, place, and time.   10/17 MMSE 29/30   Skin: Skin is warm and dry.   Psychiatric: She has a normal mood and affect. Her behavior is normal. Thought content normal.       Procedure:      Discussion/Summary:    htn-stable  rhinitis-flonase and xyzal continuation  tremor-cont propranolol  hypothyroid- recheck at goal  neuropathy-cont lyrica, but see if can reduce dose  djd-cont prn lortab  b12 def-cont b12 , recheck noted  hyperlipidemia-labs on rtc  Contact dermatitis-steroid rx prn  Diverticulitis-s/p antibiotics, advised citrucel qd  Memory impairment-cont aricept 5 mg qhs  high risk meds-labs noted      labs noted and dw patient, flu shot already given  Advised B12 500 mcg    prevnar 13 today       Current Outpatient Prescriptions:   •  dicyclomine (BENTYL) 10 MG capsule, 1 po TID PRN abdominal pain, Disp: 21 capsule, Rfl: 1  •  donepezil (ARICEPT) 5 MG tablet, Take 1 tablet by mouth Every Night., Disp: 30 tablet, Rfl: 5  •  estradiol (ESTRACE) 0.1 MG/GM vaginal cream, , Disp: , Rfl:   •  levocetirizine (XYZAL) 5 MG tablet, Take 1 tablet by mouth daily as needed., Disp: , Rfl:   •  levothyroxine (SYNTHROID, " LEVOTHROID) 75 MCG tablet, TAKE ONE TABLET BY MOUTH DAILY, Disp: 30 tablet, Rfl: 3  •  LYRICA 200 MG capsule, 3 (three) times a day., Disp: , Rfl:   •  ondansetron (ZOFRAN) 4 MG tablet, Take 1 tablet by mouth Every 8 (Eight) Hours As Needed for nausea or vomiting., Disp: 30 tablet, Rfl: 0  •  propranolol (INDERAL) 40 MG tablet, 2 (two) times a day., Disp: , Rfl:   •  triamcinolone (KENALOG) 0.1 % cream, Apply  topically 2 (two) times a day. Till healed, Disp: 45 g, Rfl: 1  •  famotidine (PEPCID) 40 MG tablet, Take 1 tablet by mouth Every Morning., Disp: 30 tablet, Rfl: 5        Zoila was seen today for hypertension, hyperlipidemia and hypothyroidism.    Diagnoses and all orders for this visit:    Other hyperlipidemia    Essential hypertension    Chronic seasonal allergic rhinitis due to pollen    NUD (nonulcer dyspepsia)  -     famotidine (PEPCID) 40 MG tablet; Take 1 tablet by mouth Every Morning.    Vitamin B12 deficiency    Other specified hypothyroidism    Neuropathy    Anemia, unspecified type    Pernicious anemia    Memory impairment of gradual onset    Tremor

## 2018-07-12 DIAGNOSIS — E03.8 OTHER SPECIFIED HYPOTHYROIDISM: ICD-10-CM

## 2018-07-12 RX ORDER — LEVOTHYROXINE SODIUM 0.07 MG/1
TABLET ORAL
Qty: 30 TABLET | Refills: 2 | Status: SHIPPED | OUTPATIENT
Start: 2018-07-12 | End: 2018-10-11 | Stop reason: SDUPTHER

## 2018-07-17 ENCOUNTER — OFFICE VISIT (OUTPATIENT)
Dept: INTERNAL MEDICINE | Facility: CLINIC | Age: 83
End: 2018-07-17

## 2018-07-17 VITALS
HEART RATE: 68 BPM | BODY MASS INDEX: 27.68 KG/M2 | WEIGHT: 141 LBS | DIASTOLIC BLOOD PRESSURE: 70 MMHG | SYSTOLIC BLOOD PRESSURE: 120 MMHG | HEIGHT: 60 IN

## 2018-07-17 DIAGNOSIS — J30.1 CHRONIC SEASONAL ALLERGIC RHINITIS DUE TO POLLEN: ICD-10-CM

## 2018-07-17 DIAGNOSIS — K30 NUD (NONULCER DYSPEPSIA): ICD-10-CM

## 2018-07-17 DIAGNOSIS — E78.49 OTHER HYPERLIPIDEMIA: Primary | ICD-10-CM

## 2018-07-17 DIAGNOSIS — G62.9 NEUROPATHY: ICD-10-CM

## 2018-07-17 DIAGNOSIS — D64.9 ANEMIA, UNSPECIFIED TYPE: ICD-10-CM

## 2018-07-17 DIAGNOSIS — R41.3 MEMORY IMPAIRMENT OF GRADUAL ONSET: ICD-10-CM

## 2018-07-17 DIAGNOSIS — I10 ESSENTIAL HYPERTENSION: ICD-10-CM

## 2018-07-17 DIAGNOSIS — E53.8 VITAMIN B12 DEFICIENCY: ICD-10-CM

## 2018-07-17 DIAGNOSIS — R25.1 TREMOR: ICD-10-CM

## 2018-07-17 DIAGNOSIS — M43.12 SPONDYLOLISTHESIS OF CERVICAL REGION: ICD-10-CM

## 2018-07-17 DIAGNOSIS — D51.0 PERNICIOUS ANEMIA: ICD-10-CM

## 2018-07-17 DIAGNOSIS — E03.8 OTHER SPECIFIED HYPOTHYROIDISM: ICD-10-CM

## 2018-07-17 LAB
ALBUMIN SERPL-MCNC: 3.9 G/DL (ref 3.2–4.8)
ALBUMIN/GLOB SERPL: 1.9 G/DL (ref 1.5–2.5)
ALP SERPL-CCNC: 64 U/L (ref 25–100)
ALT SERPL W P-5'-P-CCNC: 17 U/L (ref 7–40)
ANION GAP SERPL CALCULATED.3IONS-SCNC: 8 MMOL/L (ref 3–11)
AST SERPL-CCNC: 23 U/L (ref 0–33)
BILIRUB SERPL-MCNC: 0.4 MG/DL (ref 0.3–1.2)
BUN BLD-MCNC: 17 MG/DL (ref 9–23)
BUN/CREAT SERPL: 12.9 (ref 7–25)
CALCIUM SPEC-SCNC: 8.3 MG/DL (ref 8.7–10.4)
CHLORIDE SERPL-SCNC: 104 MMOL/L (ref 99–109)
CO2 SERPL-SCNC: 29 MMOL/L (ref 20–31)
CREAT BLD-MCNC: 1.32 MG/DL (ref 0.6–1.3)
GFR SERPL CREATININE-BSD FRML MDRD: 38 ML/MIN/1.73
GLOBULIN UR ELPH-MCNC: 2.1 GM/DL
GLUCOSE BLD-MCNC: 59 MG/DL (ref 70–100)
POTASSIUM BLD-SCNC: 4.9 MMOL/L (ref 3.5–5.5)
PROT SERPL-MCNC: 6 G/DL (ref 5.7–8.2)
SODIUM BLD-SCNC: 141 MMOL/L (ref 132–146)
TSH SERPL DL<=0.05 MIU/L-ACNC: 6.2 MIU/ML (ref 0.35–5.35)
VIT B12 BLD-MCNC: 1097 PG/ML (ref 211–911)

## 2018-07-17 PROCEDURE — 84443 ASSAY THYROID STIM HORMONE: CPT | Performed by: INTERNAL MEDICINE

## 2018-07-17 PROCEDURE — 99214 OFFICE O/P EST MOD 30 MIN: CPT | Performed by: INTERNAL MEDICINE

## 2018-07-17 PROCEDURE — 82607 VITAMIN B-12: CPT | Performed by: INTERNAL MEDICINE

## 2018-07-17 PROCEDURE — 80053 COMPREHEN METABOLIC PANEL: CPT | Performed by: INTERNAL MEDICINE

## 2018-07-17 NOTE — PROGRESS NOTES
Patient is a 85 y.o. female who is here for a follow up of chronic conditoins.  Chief Complaint   Patient presents with   • Hypertension   • Hypothyroidism   • Hyperlipidemia         HPI:    Here for f/u.  Doing great all things considered.  Her memory is not the best.  No abdominal pains.  Her neuropathy is getting worst.  No HA's.  No nausea or emesis.  No recent falls.  No edema.  No CP.  No palpitations.   History:    Patient Active Problem List   Diagnosis   • Allergic rhinitis   • Hyperlipidemia   • Hypertension   • Hypocalcemia   • Hypothyroidism   • Neuropathy   • NUD (nonulcer dyspepsia)   • Painful knee   • Pernicious anemia   • Tremor   • Warts   • Vitamin B12 deficiency   • Spondylolisthesis of cervical region   • Graves' ophthalmopathy   • Anemia   • Memory impairment of gradual onset       Past Medical History:   Diagnosis Date   • Anemia    • Cataract    • Difficulty breathing     Chronic   • Graves' ophthalmopathy    • Hoarseness    • Spondylolisthesis of cervical region    • Vitamin B12 deficiency        Past Surgical History:   Procedure Laterality Date   • CERVICAL LAMINECTOMY     • CHOLECYSTECTOMY     • OTHER SURGICAL HISTORY Right     Neuroplasty Median Nerve at Carpal Tunnel   • THYROID SURGERY     • TOTAL KNEE ARTHROPLASTY Left 09/29/2014    Dr Colon       Current Outpatient Prescriptions on File Prior to Visit   Medication Sig   • dicyclomine (BENTYL) 10 MG capsule 1 po TID PRN abdominal pain   • donepezil (ARICEPT) 5 MG tablet Take 1 tablet by mouth Every Night.   • estradiol (ESTRACE) 0.1 MG/GM vaginal cream    • famotidine (PEPCID) 40 MG tablet Take 1 tablet by mouth Every Morning.   • levocetirizine (XYZAL) 5 MG tablet Take 1 tablet by mouth daily as needed.   • levothyroxine (SYNTHROID, LEVOTHROID) 75 MCG tablet TAKE ONE TABLET BY MOUTH DAILY   • LYRICA 200 MG capsule 3 (three) times a day.   • ondansetron (ZOFRAN) 4 MG tablet Take 1 tablet by mouth Every 8 (Eight) Hours As Needed  for nausea or vomiting.   • propranolol (INDERAL) 40 MG tablet 2 (two) times a day.   • triamcinolone (KENALOG) 0.1 % cream Apply  topically 2 (two) times a day. Till healed     No current facility-administered medications on file prior to visit.        Family History   Problem Relation Age of Onset   • Hypertension Mother    • Other Father         cardiac disorder   • Diabetes Father    • Hypertension Sister        Social History     Social History   • Marital status:      Spouse name: N/A   • Number of children: N/A   • Years of education: N/A     Occupational History   • Not on file.     Social History Main Topics   • Smoking status: Never Smoker   • Smokeless tobacco: Not on file   • Alcohol use Not on file   • Drug use: No   • Sexual activity: Not on file     Other Topics Concern   • Not on file     Social History Narrative   • No narrative on file         Review of Systems   Constitutional: Positive for fatigue. Negative for chills, diaphoresis, fever and unexpected weight change.   HENT: Negative for congestion, ear pain, hearing loss, nosebleeds, postnasal drip, sinus pressure and sore throat.    Eyes: Negative for pain, discharge and itching.   Respiratory: Negative for cough, chest tightness, shortness of breath and wheezing.    Cardiovascular: Negative for chest pain, palpitations and leg swelling.   Gastrointestinal: Negative for abdominal distention, abdominal pain, blood in stool, constipation, diarrhea, nausea and vomiting.   Endocrine: Negative for polydipsia and polyuria.   Genitourinary: Negative for difficulty urinating, dysuria, frequency and hematuria.   Musculoskeletal: Positive for arthralgias and back pain. Negative for gait problem, joint swelling and myalgias.   Skin: Negative for rash and wound.   Neurological: Positive for tremors and numbness. Negative for dizziness, syncope, weakness and headaches.   Psychiatric/Behavioral: Positive for decreased concentration. Negative for  "dysphoric mood and sleep disturbance. The patient is not nervous/anxious.        /70   Pulse 68   Ht 152.4 cm (60\")   Wt 64 kg (141 lb)   BMI 27.54 kg/m²       Physical Exam   Constitutional: She is oriented to person, place, and time. She appears well-developed and well-nourished.   HENT:   Head: Normocephalic and atraumatic.   Right Ear: External ear normal.   Left Ear: External ear normal.   Mouth/Throat: Oropharynx is clear and moist.   Eyes: Conjunctivae and EOM are normal.   Neck: Normal range of motion. Neck supple.   Cardiovascular: Normal rate, regular rhythm and normal heart sounds.    Pulmonary/Chest: Effort normal and breath sounds normal.   Abdominal: Soft. Bowel sounds are normal.   Musculoskeletal: Normal range of motion. She exhibits edema (trace).   Lymphadenopathy:     She has no cervical adenopathy.   Neurological: She is alert and oriented to person, place, and time.   10/17 MMSE 29/30   Skin: Skin is warm and dry.   Psychiatric: She has a normal mood and affect. Her behavior is normal. Thought content normal.       Procedure:      Discussion/Summary:    htn-stable  rhinitis-flonase and xyzal continuation  tremor-cont propranolol  hypothyroid- recheck today  neuropathy-cont lyrica, but see if can reduce dose, consider cymbalta  djd-cont prn lortab  b12 def-cont b12 , recheck today  hyperlipidemia-labs on rtc  Contact dermatitis-steroid rx prn  Diverticulitis-s/p antibiotics, advised citrucel qd  Memory impairment-cont aricept 5 mg qhs  high risk meds-labs today    Pneumovax 23 in 4/19      labs noted and dw patient  Current Outpatient Prescriptions:   •  dicyclomine (BENTYL) 10 MG capsule, 1 po TID PRN abdominal pain, Disp: 21 capsule, Rfl: 1  •  donepezil (ARICEPT) 5 MG tablet, Take 1 tablet by mouth Every Night., Disp: 30 tablet, Rfl: 5  •  estradiol (ESTRACE) 0.1 MG/GM vaginal cream, , Disp: , Rfl:   •  famotidine (PEPCID) 40 MG tablet, Take 1 tablet by mouth Every Morning., Disp: 30 " tablet, Rfl: 5  •  levocetirizine (XYZAL) 5 MG tablet, Take 1 tablet by mouth daily as needed., Disp: , Rfl:   •  levothyroxine (SYNTHROID, LEVOTHROID) 75 MCG tablet, TAKE ONE TABLET BY MOUTH DAILY, Disp: 30 tablet, Rfl: 2  •  LYRICA 200 MG capsule, 3 (three) times a day., Disp: , Rfl:   •  ondansetron (ZOFRAN) 4 MG tablet, Take 1 tablet by mouth Every 8 (Eight) Hours As Needed for nausea or vomiting., Disp: 30 tablet, Rfl: 0  •  propranolol (INDERAL) 40 MG tablet, 2 (two) times a day., Disp: , Rfl:   •  triamcinolone (KENALOG) 0.1 % cream, Apply  topically 2 (two) times a day. Till healed, Disp: 45 g, Rfl: 1        Zoila was seen today for hypertension, hypothyroidism and hyperlipidemia.    Diagnoses and all orders for this visit:    Other hyperlipidemia  -     Comprehensive Metabolic Panel    Essential hypertension    Chronic seasonal allergic rhinitis due to pollen    NUD (nonulcer dyspepsia)    Vitamin B12 deficiency    Other specified hypothyroidism  -     TSH    Neuropathy    Spondylolisthesis of cervical region    Anemia, unspecified type    Pernicious anemia  -     Vitamin B12    Memory impairment of gradual onset    Tremor

## 2018-10-11 DIAGNOSIS — E03.8 OTHER SPECIFIED HYPOTHYROIDISM: ICD-10-CM

## 2018-10-11 DIAGNOSIS — K30 NUD (NONULCER DYSPEPSIA): ICD-10-CM

## 2018-10-11 RX ORDER — LEVOTHYROXINE SODIUM 0.07 MG/1
TABLET ORAL
Qty: 30 TABLET | Refills: 1 | Status: SHIPPED | OUTPATIENT
Start: 2018-10-11 | End: 2018-12-13 | Stop reason: SDUPTHER

## 2018-10-11 RX ORDER — FAMOTIDINE 40 MG/1
TABLET, FILM COATED ORAL
Qty: 30 TABLET | Refills: 4 | Status: SHIPPED | OUTPATIENT
Start: 2018-10-11 | End: 2019-03-13 | Stop reason: SDUPTHER

## 2018-12-13 DIAGNOSIS — E03.8 OTHER SPECIFIED HYPOTHYROIDISM: ICD-10-CM

## 2018-12-13 RX ORDER — LEVOTHYROXINE SODIUM 0.07 MG/1
TABLET ORAL
Qty: 30 TABLET | Refills: 0 | Status: SHIPPED | OUTPATIENT
Start: 2018-12-13 | End: 2019-01-11 | Stop reason: SDUPTHER

## 2019-01-11 ENCOUNTER — TELEPHONE (OUTPATIENT)
Dept: INTERNAL MEDICINE | Facility: CLINIC | Age: 84
End: 2019-01-11

## 2019-01-11 DIAGNOSIS — E03.8 OTHER SPECIFIED HYPOTHYROIDISM: ICD-10-CM

## 2019-01-11 RX ORDER — LEVOTHYROXINE SODIUM 0.07 MG/1
75 TABLET ORAL DAILY
Qty: 30 TABLET | Refills: 2 | Status: SHIPPED | OUTPATIENT
Start: 2019-01-11 | End: 2019-02-19 | Stop reason: SDUPTHER

## 2019-01-11 NOTE — TELEPHONE ENCOUNTER
DRISS AND BENJI ARE IN [place] AND DRISS NEEDS HER 1. LEVOTHYROXINE 75.MCG  [facility] IN [place] [phone]  [phone]

## 2019-02-07 ENCOUNTER — APPOINTMENT (OUTPATIENT)
Dept: GENERAL RADIOLOGY | Facility: HOSPITAL | Age: 84
End: 2019-02-07

## 2019-02-07 ENCOUNTER — HOSPITAL ENCOUNTER (EMERGENCY)
Facility: HOSPITAL | Age: 84
Discharge: HOME | End: 2019-02-07
Attending: EMERGENCY MEDICINE | Admitting: EMERGENCY MEDICINE

## 2019-02-07 ENCOUNTER — APPOINTMENT (OUTPATIENT)
Dept: CT IMAGING | Facility: HOSPITAL | Age: 84
End: 2019-02-07

## 2019-02-07 VITALS
DIASTOLIC BLOOD PRESSURE: 87 MMHG | BODY MASS INDEX: 27.48 KG/M2 | WEIGHT: 140 LBS | HEART RATE: 72 BPM | RESPIRATION RATE: 18 BRPM | HEIGHT: 60 IN | SYSTOLIC BLOOD PRESSURE: 160 MMHG | TEMPERATURE: 98.5 F | OXYGEN SATURATION: 96 %

## 2019-02-07 DIAGNOSIS — J10.1 INFLUENZA A: ICD-10-CM

## 2019-02-07 DIAGNOSIS — R06.02 SHORTNESS OF BREATH: ICD-10-CM

## 2019-02-07 DIAGNOSIS — R53.1 GENERALIZED WEAKNESS: Primary | ICD-10-CM

## 2019-02-07 DIAGNOSIS — I71.21 ASCENDING AORTIC ANEURYSM (HCC): ICD-10-CM

## 2019-02-07 LAB
ALBUMIN SERPL-MCNC: 4.12 G/DL (ref 3.2–4.8)
ALBUMIN/GLOB SERPL: 2.1 G/DL (ref 1.5–2.5)
ALP SERPL-CCNC: 59 U/L (ref 25–100)
ALT SERPL W P-5'-P-CCNC: 20 U/L (ref 7–40)
ANION GAP SERPL CALCULATED.3IONS-SCNC: 9 MMOL/L (ref 3–11)
AST SERPL-CCNC: 29 U/L (ref 0–33)
BASOPHILS # BLD AUTO: 0.04 10*3/MM3 (ref 0–0.2)
BASOPHILS NFR BLD AUTO: 0.3 % (ref 0–1)
BILIRUB SERPL-MCNC: 0.5 MG/DL (ref 0.3–1.2)
BNP SERPL-MCNC: 136 PG/ML (ref 0–100)
BUN BLD-MCNC: 14 MG/DL (ref 9–23)
BUN/CREAT SERPL: 10.7 (ref 7–25)
CALCIUM SPEC-SCNC: 8.4 MG/DL (ref 8.7–10.4)
CHLORIDE SERPL-SCNC: 99 MMOL/L (ref 99–109)
CO2 SERPL-SCNC: 27 MMOL/L (ref 20–31)
CREAT BLD-MCNC: 1.31 MG/DL (ref 0.6–1.3)
DEPRECATED RDW RBC AUTO: 45.1 FL (ref 37–54)
EOSINOPHIL # BLD AUTO: 0.01 10*3/MM3 (ref 0–0.3)
EOSINOPHIL NFR BLD AUTO: 0.1 % (ref 0–3)
ERYTHROCYTE [DISTWIDTH] IN BLOOD BY AUTOMATED COUNT: 13.6 % (ref 11.3–14.5)
GFR SERPL CREATININE-BSD FRML MDRD: 39 ML/MIN/1.73
GLOBULIN UR ELPH-MCNC: 2 GM/DL
GLUCOSE BLD-MCNC: 122 MG/DL (ref 70–100)
HCT VFR BLD AUTO: 37.4 % (ref 34.5–44)
HGB BLD-MCNC: 12.8 G/DL (ref 11.5–15.5)
IMM GRANULOCYTES # BLD AUTO: 0.09 10*3/MM3 (ref 0–0.03)
IMM GRANULOCYTES NFR BLD AUTO: 0.8 % (ref 0–0.6)
LYMPHOCYTES # BLD AUTO: 0.52 10*3/MM3 (ref 0.6–4.8)
LYMPHOCYTES NFR BLD AUTO: 4.4 % (ref 24–44)
MCH RBC QN AUTO: 31 PG (ref 27–31)
MCHC RBC AUTO-ENTMCNC: 34.2 G/DL (ref 32–36)
MCV RBC AUTO: 90.6 FL (ref 80–99)
MONOCYTES # BLD AUTO: 0.91 10*3/MM3 (ref 0–1)
MONOCYTES NFR BLD AUTO: 7.7 % (ref 0–12)
NEUTROPHILS # BLD AUTO: 10.32 10*3/MM3 (ref 1.5–8.3)
NEUTROPHILS NFR BLD AUTO: 86.7 % (ref 41–71)
PLATELET # BLD AUTO: 159 10*3/MM3 (ref 150–450)
PMV BLD AUTO: 11.8 FL (ref 6–12)
POTASSIUM BLD-SCNC: 3.9 MMOL/L (ref 3.5–5.5)
PROT SERPL-MCNC: 6.1 G/DL (ref 5.7–8.2)
RBC # BLD AUTO: 4.13 10*6/MM3 (ref 3.89–5.14)
SODIUM BLD-SCNC: 135 MMOL/L (ref 132–146)
TROPONIN I SERPL-MCNC: <0.006 NG/ML
WBC NRBC COR # BLD: 11.89 10*3/MM3 (ref 3.5–10.8)

## 2019-02-07 PROCEDURE — 96374 THER/PROPH/DIAG INJ IV PUSH: CPT

## 2019-02-07 PROCEDURE — 80053 COMPREHEN METABOLIC PANEL: CPT | Performed by: EMERGENCY MEDICINE

## 2019-02-07 PROCEDURE — 83880 ASSAY OF NATRIURETIC PEPTIDE: CPT | Performed by: EMERGENCY MEDICINE

## 2019-02-07 PROCEDURE — 96361 HYDRATE IV INFUSION ADD-ON: CPT

## 2019-02-07 PROCEDURE — 71250 CT THORAX DX C-: CPT

## 2019-02-07 PROCEDURE — 25010000002 KETOROLAC TROMETHAMINE PER 15 MG: Performed by: EMERGENCY MEDICINE

## 2019-02-07 PROCEDURE — 84484 ASSAY OF TROPONIN QUANT: CPT | Performed by: EMERGENCY MEDICINE

## 2019-02-07 PROCEDURE — 93005 ELECTROCARDIOGRAM TRACING: CPT | Performed by: EMERGENCY MEDICINE

## 2019-02-07 PROCEDURE — 99284 EMERGENCY DEPT VISIT MOD MDM: CPT

## 2019-02-07 PROCEDURE — 71045 X-RAY EXAM CHEST 1 VIEW: CPT

## 2019-02-07 PROCEDURE — 85025 COMPLETE CBC W/AUTO DIFF WBC: CPT | Performed by: EMERGENCY MEDICINE

## 2019-02-07 PROCEDURE — 71046 X-RAY EXAM CHEST 2 VIEWS: CPT

## 2019-02-07 RX ORDER — KETOROLAC TROMETHAMINE 15 MG/ML
7.5 INJECTION, SOLUTION INTRAMUSCULAR; INTRAVENOUS ONCE
Status: COMPLETED | OUTPATIENT
Start: 2019-02-07 | End: 2019-02-07

## 2019-02-07 RX ORDER — ACETAMINOPHEN 325 MG/1
650 TABLET ORAL ONCE
Status: COMPLETED | OUTPATIENT
Start: 2019-02-07 | End: 2019-02-07

## 2019-02-07 RX ORDER — GUAIFENESIN 600 MG/1
600 TABLET, EXTENDED RELEASE ORAL ONCE
Status: COMPLETED | OUTPATIENT
Start: 2019-02-07 | End: 2019-02-07

## 2019-02-07 RX ADMIN — GUAIFENESIN 600 MG: 600 TABLET, EXTENDED RELEASE ORAL at 14:50

## 2019-02-07 RX ADMIN — KETOROLAC TROMETHAMINE 7.5 MG: 15 INJECTION, SOLUTION INTRAMUSCULAR; INTRAVENOUS at 14:51

## 2019-02-07 RX ADMIN — ACETAMINOPHEN 650 MG: 325 TABLET ORAL at 16:55

## 2019-02-07 RX ADMIN — SODIUM CHLORIDE 1000 ML: 9 INJECTION, SOLUTION INTRAVENOUS at 14:49

## 2019-02-08 ENCOUNTER — PATIENT OUTREACH (OUTPATIENT)
Dept: CASE MANAGEMENT | Facility: OTHER | Age: 84
End: 2019-02-08

## 2019-02-08 ENCOUNTER — EPISODE CHANGES (OUTPATIENT)
Dept: CASE MANAGEMENT | Facility: OTHER | Age: 84
End: 2019-02-08

## 2019-02-08 NOTE — OUTREACH NOTE
Care Management Plan 2/8/2019   Lifestyle Goals Routine follow-up with doctor(s)   Barriers Disease education;Other (See Comment)   Barriers Neuropathy from shoulders to hands.    Self Management Medication Adherence   Annual Wellness Visit:  (No Data)   Annual Wellness Visit:  [facility] assist with MWV   Care Gaps Addressed Flu Shot;Pneumonia Vaccine   Care Gaps Addressed Patient has completed flu and pneumonia vaccine   Specific Disease Process Teaching Hypertension   Does patient have depression diagnosis? No   Advanced Directives: Patient Has   Ed Visits past 12 months: 1   Hospitalizations past 12 months None     The main concerns and/or symptoms the patient would like to address are: Talked with patient. Discussed 2/7/19 ED visit regarding influenza and weakness . Patient states to be compliant with ED recommendations; taking Tamiflu ( recommendation) and states symptoms have improved. She states to continue with nonproductive cough and episodes of SOB;  decreased appetite and no fever or nausea.  She is tolerating fluids. Patient has PCP appointment on 2/18/19. Patient lives with spouse; independent with ADL's;receiving assistance with meal preparation and  transportation from spouse. Patient reports neuropathy from top of arms to hands and may  drop objects and may need assistance.She states to have adapted to this and uses 2 hands for activities such as drinking from a cup. Patient ambulates without assistive device; is compliant with medications; and medical appointments. She reports no difficulty with chest pain; or sleeping.     Education/instruction provided by Care Coordinator: Reviewed with patient benefits of hydration; education regarding flu; 24/7 Nurse Line Telephone number; [place] contact information; Advance Directives(completed) : My Chart(declines) ; gaps in care(flu and pneumonia vaccine completed); MWV([facility] assist/PCP/[employer])  and Care Advising program. Patient verbalized understanding. No  further questions or concerns voiced at this time.     Follow Up Outreach Due: Follow up as needed.     Nola Hilario RN

## 2019-02-18 ENCOUNTER — OFFICE VISIT (OUTPATIENT)
Dept: INTERNAL MEDICINE | Facility: CLINIC | Age: 84
End: 2019-02-18

## 2019-02-18 VITALS
WEIGHT: 140 LBS | HEIGHT: 60 IN | SYSTOLIC BLOOD PRESSURE: 120 MMHG | DIASTOLIC BLOOD PRESSURE: 60 MMHG | BODY MASS INDEX: 27.48 KG/M2 | HEART RATE: 64 BPM

## 2019-02-18 DIAGNOSIS — D51.0 PERNICIOUS ANEMIA: ICD-10-CM

## 2019-02-18 DIAGNOSIS — G62.9 NEUROPATHY: ICD-10-CM

## 2019-02-18 DIAGNOSIS — E83.51 HYPOCALCEMIA: ICD-10-CM

## 2019-02-18 DIAGNOSIS — M43.12 SPONDYLOLISTHESIS OF CERVICAL REGION: ICD-10-CM

## 2019-02-18 DIAGNOSIS — E03.8 OTHER SPECIFIED HYPOTHYROIDISM: ICD-10-CM

## 2019-02-18 DIAGNOSIS — I71.21 ASCENDING AORTIC ANEURYSM (HCC): ICD-10-CM

## 2019-02-18 DIAGNOSIS — E53.8 VITAMIN B12 DEFICIENCY: ICD-10-CM

## 2019-02-18 DIAGNOSIS — D64.9 ANEMIA, UNSPECIFIED TYPE: ICD-10-CM

## 2019-02-18 DIAGNOSIS — R25.1 TREMOR: ICD-10-CM

## 2019-02-18 DIAGNOSIS — K30 NUD (NONULCER DYSPEPSIA): ICD-10-CM

## 2019-02-18 DIAGNOSIS — I10 ESSENTIAL HYPERTENSION: Primary | ICD-10-CM

## 2019-02-18 DIAGNOSIS — E05.00 GRAVES' OPHTHALMOPATHY: ICD-10-CM

## 2019-02-18 DIAGNOSIS — E78.49 OTHER HYPERLIPIDEMIA: ICD-10-CM

## 2019-02-18 DIAGNOSIS — J30.1 SEASONAL ALLERGIC RHINITIS DUE TO POLLEN: ICD-10-CM

## 2019-02-18 DIAGNOSIS — R41.3 MEMORY IMPAIRMENT OF GRADUAL ONSET: ICD-10-CM

## 2019-02-18 LAB
TSH SERPL DL<=0.05 MIU/L-ACNC: 10.1 MIU/ML (ref 0.35–5.35)
VIT B12 BLD-MCNC: >2000 PG/ML (ref 211–911)

## 2019-02-18 PROCEDURE — 82607 VITAMIN B-12: CPT | Performed by: INTERNAL MEDICINE

## 2019-02-18 PROCEDURE — 84443 ASSAY THYROID STIM HORMONE: CPT | Performed by: INTERNAL MEDICINE

## 2019-02-18 PROCEDURE — 99214 OFFICE O/P EST MOD 30 MIN: CPT | Performed by: INTERNAL MEDICINE

## 2019-02-18 NOTE — PROGRESS NOTES
Patient is a 85 y.o. female who is here for a follow up of chronic conditions.  Chief Complaint   Patient presents with   • Hyperlipidemia   • Hypertension         HPI:    Here for f/u.  Recently in ED for flu.  BP has been good.  No dizziness or lightheadedness.  No HA's.  Feels good overall.  Appetite is good.  No falls.  No fever or chills.   No CP.  Allergies are under fair control.    History:    Patient Active Problem List   Diagnosis   • Allergic rhinitis   • Hyperlipidemia   • Hypertension   • Hypocalcemia   • Hypothyroidism   • Neuropathy   • NUD (nonulcer dyspepsia)   • Painful knee   • Pernicious anemia   • Tremor   • Warts   • Vitamin B12 deficiency   • Spondylolisthesis of cervical region   • Graves' ophthalmopathy   • Anemia   • Memory impairment of gradual onset   • Ascending aortic aneurysm (CMS/HCC)       Past Medical History:   Diagnosis Date   • Anemia    • Cataract    • Difficulty breathing     Chronic   • Graves' ophthalmopathy    • Hoarseness    • Spondylolisthesis of cervical region    • Vitamin B12 deficiency        Past Surgical History:   Procedure Laterality Date   • CERVICAL LAMINECTOMY     • CHOLECYSTECTOMY     • OTHER SURGICAL HISTORY Right     Neuroplasty Median Nerve at Carpal Tunnel   • THYROID SURGERY     • TOTAL KNEE ARTHROPLASTY Left 09/29/2014    Dr Colon       Current Outpatient Medications on File Prior to Visit   Medication Sig   • dicyclomine (BENTYL) 10 MG capsule 1 po TID PRN abdominal pain   • donepezil (ARICEPT) 5 MG tablet Take 1 tablet by mouth Every Night.   • estradiol (ESTRACE) 0.1 MG/GM vaginal cream    • famotidine (PEPCID) 40 MG tablet TAKE ONE TABLET BY MOUTH EVERY MORNING   • levocetirizine (XYZAL) 5 MG tablet Take 1 tablet by mouth daily as needed.   • LYRICA 200 MG capsule 3 (three) times a day.   • ondansetron (ZOFRAN) 4 MG tablet Take 1 tablet by mouth Every 8 (Eight) Hours As Needed for nausea or vomiting.   • propranolol (INDERAL) 40 MG tablet 2  (two) times a day.   • triamcinolone (KENALOG) 0.1 % cream Apply  topically 2 (two) times a day. Till healed   • [DISCONTINUED] levothyroxine (SYNTHROID, LEVOTHROID) 75 MCG tablet Take 1 tablet by mouth Daily.     No current facility-administered medications on file prior to visit.        Family History   Problem Relation Age of Onset   • Hypertension Mother    • Other Father         cardiac disorder   • Diabetes Father    • Hypertension Sister        Social History     Socioeconomic History   • Marital status:      Spouse name: Not on file   • Number of children: Not on file   • Years of education: Not on file   • Highest education level: Not on file   Social Needs   • Financial resource strain: Not on file   • Food insecurity - worry: Not on file   • Food insecurity - inability: Not on file   • Transportation needs - medical: Not on file   • Transportation needs - non-medical: Not on file   Occupational History   • Not on file   Tobacco Use   • Smoking status: Never Smoker   Substance and Sexual Activity   • Alcohol use: Not on file   • Drug use: No   • Sexual activity: Not on file   Other Topics Concern   • Not on file   Social History Narrative   • Not on file         Review of Systems   Constitutional: Positive for fatigue. Negative for chills, diaphoresis, fever and unexpected weight change.   HENT: Negative for congestion, ear pain, hearing loss, nosebleeds, postnasal drip, sinus pressure and sore throat.    Eyes: Negative for pain, discharge and itching.   Respiratory: Negative for cough, chest tightness, shortness of breath and wheezing.    Cardiovascular: Negative for chest pain, palpitations and leg swelling.   Gastrointestinal: Negative for abdominal distention, abdominal pain, blood in stool, constipation, diarrhea, nausea and vomiting.   Endocrine: Negative for polydipsia and polyuria.   Genitourinary: Negative for difficulty urinating, dysuria, frequency and hematuria.   Musculoskeletal:  "Positive for arthralgias and back pain. Negative for gait problem, joint swelling and myalgias.   Skin: Negative for rash and wound.   Neurological: Positive for tremors and numbness. Negative for dizziness, syncope, weakness and headaches.   Psychiatric/Behavioral: Positive for decreased concentration. Negative for dysphoric mood and sleep disturbance. The patient is not nervous/anxious.        /60   Pulse 64   Ht 152.4 cm (60\")   Wt 63.5 kg (140 lb)   LMP  (LMP Unknown)   BMI 27.34 kg/m²       Physical Exam   Constitutional: She is oriented to person, place, and time. She appears well-developed and well-nourished.   HENT:   Head: Normocephalic and atraumatic.   Right Ear: External ear normal.   Left Ear: External ear normal.   Mouth/Throat: Oropharynx is clear and moist.   Eyes: Conjunctivae and EOM are normal.   Neck: Normal range of motion. Neck supple.   Cardiovascular: Normal rate, regular rhythm and normal heart sounds.   Pulmonary/Chest: Effort normal and breath sounds normal.   Abdominal: Soft. Bowel sounds are normal.   Musculoskeletal: Normal range of motion. She exhibits edema (trace).   Lymphadenopathy:     She has no cervical adenopathy.   Neurological: She is alert and oriented to person, place, and time.   10/17 MMSE 29/30   Skin: Skin is warm and dry.   Psychiatric: She has a normal mood and affect. Her behavior is normal. Thought content normal.       Procedure:      Discussion/Summary:    htn-stable  rhinitis-flonase and xyzal continuation  tremor-cont propranolol  hypothyroid- recheck on rtc  neuropathy-cont lyrica, but see if can reduce dose, consider cymbalta  djd-cont prn lortab  b12 def-cont b12 , recheck on rtc  hyperlipidemia-labs on rtc  Contact dermatitis-steroid rx prn  Diverticulitis-s/p antibiotics, advised citrucel qd  Memory impairment-cont aricept 5 mg qhs  high risk meds-labs on rtc     Pneumovax 23 in 4/19      labs noted and dw patient, will increase synthroid 88 mcg   "   qd        Current Outpatient Medications:   •  dicyclomine (BENTYL) 10 MG capsule, 1 po TID PRN abdominal pain, Disp: 21 capsule, Rfl: 1  •  donepezil (ARICEPT) 5 MG tablet, Take 1 tablet by mouth Every Night., Disp: 30 tablet, Rfl: 5  •  estradiol (ESTRACE) 0.1 MG/GM vaginal cream, , Disp: , Rfl:   •  famotidine (PEPCID) 40 MG tablet, TAKE ONE TABLET BY MOUTH EVERY MORNING, Disp: 30 tablet, Rfl: 4  •  levocetirizine (XYZAL) 5 MG tablet, Take 1 tablet by mouth daily as needed., Disp: , Rfl:   •  levothyroxine (SYNTHROID, LEVOTHROID) 88 MCG tablet, Take 1 tablet by mouth Every Morning., Disp: 30 tablet, Rfl: 5  •  LYRICA 200 MG capsule, 3 (three) times a day., Disp: , Rfl:   •  ondansetron (ZOFRAN) 4 MG tablet, Take 1 tablet by mouth Every 8 (Eight) Hours As Needed for nausea or vomiting., Disp: 30 tablet, Rfl: 0  •  propranolol (INDERAL) 40 MG tablet, 2 (two) times a day., Disp: , Rfl:   •  triamcinolone (KENALOG) 0.1 % cream, Apply  topically 2 (two) times a day. Till healed, Disp: 45 g, Rfl: 1        Zoila was seen today for hyperlipidemia and hypertension.    Diagnoses and all orders for this visit:    Essential hypertension    Other hyperlipidemia    Ascending aortic aneurysm (CMS/HCC)    Seasonal allergic rhinitis due to pollen    Vitamin B12 deficiency  -     Vitamin B12; Future  -     Vitamin B12    NUD (nonulcer dyspepsia)    Other specified hypothyroidism  -     TSH; Future  -     TSH  -     levothyroxine (SYNTHROID, LEVOTHROID) 88 MCG tablet; Take 1 tablet by mouth Every Morning.    Graves' ophthalmopathy    Neuropathy    Spondylolisthesis of cervical region    Pernicious anemia    Anemia, unspecified type    Tremor    Memory impairment of gradual onset    Hypocalcemia

## 2019-02-19 RX ORDER — LEVOTHYROXINE SODIUM 88 UG/1
88 TABLET ORAL EVERY MORNING
Qty: 30 TABLET | Refills: 5 | Status: SHIPPED | OUTPATIENT
Start: 2019-02-19 | End: 2019-06-03

## 2019-02-22 ENCOUNTER — TELEPHONE (OUTPATIENT)
Dept: INTERNAL MEDICINE | Facility: CLINIC | Age: 84
End: 2019-02-22

## 2019-02-22 ENCOUNTER — PATIENT OUTREACH (OUTPATIENT)
Dept: CASE MANAGEMENT | Facility: OTHER | Age: 84
End: 2019-02-22

## 2019-02-22 NOTE — TELEPHONE ENCOUNTER
Nola from  wants to know if we can change 5/1 appt to medicare wellness.    Please call her at [phone] if you have any questions or concerns.

## 2019-02-22 NOTE — OUTREACH NOTE
Care Management Plan 2/22/2019   Lifestyle Goals -   Barriers -   Barriers -   Self Management -   Annual Wellness Visit:  Care Coordinator Will Schedule   Annual Wellness Visit:  CA talked with Ines/ Staff at PCP office regarding MWV schedulling for 5/31/19   Care Gaps Addressed -   Care Gaps Addressed -   Specific Disease Process Teaching -   Does patient have depression diagnosis? -   Advanced Directives: -   Ed Visits past 12 months: -   Hospitalizations past 12 months -     The main concerns and/or symptoms the patient would like to address are: Talked with patient. Patient states to be in [place] and will be returning in 2 weeks. Patient states flu symptoms have improved; no longer having cough; SOB or decreased appetite. She states to be compliant with medications; medical appointments and recommendations. Patient reports no difficulties with chest pain; appetite or sleeping.  She states to continue with neuropathy to both arms and hands and careful holding objects. She walks with steady gait without assistive device.     Education/instruction provided by Care Coordinator: Reviewed with patient  gaps in care (flu and pneumonia vaccine completed) and  MWV (attempt to schedule for 5/31/19).Patient verbalized understanding. No further questions or concerns voiced at this time.     Follow Up Outreach Due: Follow up as needed.     Nola Hilario RN

## 2019-02-22 NOTE — OUTREACH NOTE
Talked with Ines/ Staff at Dr. Oneil office [phone] regarding scheduling of Medicare Annual Wellness Visit.

## 2019-02-22 NOTE — OUTREACH NOTE
Unable to reach patient / attempt x 1 for Care Advising follow up. Left voicemail with CA contact information.

## 2019-03-13 DIAGNOSIS — K30 NUD (NONULCER DYSPEPSIA): ICD-10-CM

## 2019-03-13 RX ORDER — FAMOTIDINE 40 MG/1
TABLET, FILM COATED ORAL
Qty: 30 TABLET | Refills: 0 | Status: SHIPPED | OUTPATIENT
Start: 2019-03-13 | End: 2019-04-12 | Stop reason: SDUPTHER

## 2019-04-05 ENCOUNTER — PATIENT OUTREACH (OUTPATIENT)
Dept: CASE MANAGEMENT | Facility: OTHER | Age: 84
End: 2019-04-05

## 2019-04-05 NOTE — PATIENT INSTRUCTIONS
Talked with patient. Patient states to be compliant with medications; medical appointments and recommendations. Patient reports no difficulties with chest pain; SOB; appetite or sleeping.Reviewed with patient physician follow up; 24/7 Nurse Line Telephone number; CA contact information; Advance Directives(completed); My Chart (declined); gaps in care (addressed);  MWV (scheduled 5/ and Care Advising program. Patient verbalized understanding. No further questions or concerns voiced at this time. Patient has met care plan goals; exhibits strong sense of health self-management and adequate support system. Patient states to have appreciated phone calls. No further questions or concerns voiced at this time.

## 2019-04-05 NOTE — OUTREACH NOTE
Talked with patient. Patient states to have returned from her trip to [place]. She states to be doing well but  continues with episodes of neuropathy to arms and hands and states Lyrica helps with this. Patient states to be compliant with medications; medical appointments; monitoring of blood pressure and recommendations.She states blood pressure WNL's.  She  reports no difficulties with chest pain; SOB; appetite or sleeping.Reviewed with patient medication adherence; physician contact regarding questions;  24/7 Nurse Line Telephone number; CA contact information; Advance Directives (completed);  My Chart (declined); gaps in care(addressed); and MWV(scheduled 5/31/19) Patient verbalized understanding.  Patient has met care plan goals; exhibits strong sense of health self-management and adequate support system. Patient states to have appreciated phone calls. No further questions or concerns voiced at this time

## 2019-04-09 ENCOUNTER — EPISODE CHANGES (OUTPATIENT)
Dept: CASE MANAGEMENT | Facility: OTHER | Age: 84
End: 2019-04-09

## 2019-04-12 DIAGNOSIS — K30 NUD (NONULCER DYSPEPSIA): ICD-10-CM

## 2019-04-12 RX ORDER — FAMOTIDINE 40 MG/1
TABLET, FILM COATED ORAL
Qty: 30 TABLET | Refills: 5 | Status: SHIPPED | OUTPATIENT
Start: 2019-04-12 | End: 2019-10-20 | Stop reason: SDUPTHER

## 2019-04-12 RX ORDER — LEVOTHYROXINE SODIUM 0.07 MG/1
TABLET ORAL
Qty: 30 TABLET | Refills: 5 | Status: SHIPPED | OUTPATIENT
Start: 2019-04-12 | End: 2019-06-03

## 2019-05-31 ENCOUNTER — OFFICE VISIT (OUTPATIENT)
Dept: INTERNAL MEDICINE | Facility: CLINIC | Age: 84
End: 2019-05-31

## 2019-05-31 VITALS
HEART RATE: 68 BPM | BODY MASS INDEX: 28.23 KG/M2 | HEIGHT: 60 IN | SYSTOLIC BLOOD PRESSURE: 124 MMHG | WEIGHT: 143.8 LBS

## 2019-05-31 DIAGNOSIS — E03.8 OTHER SPECIFIED HYPOTHYROIDISM: ICD-10-CM

## 2019-05-31 DIAGNOSIS — R41.3 MEMORY IMPAIRMENT OF GRADUAL ONSET: ICD-10-CM

## 2019-05-31 DIAGNOSIS — R25.1 TREMOR: ICD-10-CM

## 2019-05-31 DIAGNOSIS — K30 NUD (NONULCER DYSPEPSIA): ICD-10-CM

## 2019-05-31 DIAGNOSIS — G62.9 NEUROPATHY: ICD-10-CM

## 2019-05-31 DIAGNOSIS — E53.8 VITAMIN B12 DEFICIENCY: ICD-10-CM

## 2019-05-31 DIAGNOSIS — I10 ESSENTIAL HYPERTENSION: Primary | ICD-10-CM

## 2019-05-31 DIAGNOSIS — E78.49 OTHER HYPERLIPIDEMIA: ICD-10-CM

## 2019-05-31 DIAGNOSIS — D51.0 PERNICIOUS ANEMIA: ICD-10-CM

## 2019-05-31 DIAGNOSIS — M43.12 SPONDYLOLISTHESIS OF CERVICAL REGION: ICD-10-CM

## 2019-05-31 DIAGNOSIS — I71.21 ASCENDING AORTIC ANEURYSM (HCC): ICD-10-CM

## 2019-05-31 DIAGNOSIS — Z23 NEED FOR PROPHYLACTIC VACCINATION AGAINST STREPTOCOCCUS PNEUMONIAE (PNEUMOCOCCUS): ICD-10-CM

## 2019-05-31 DIAGNOSIS — J30.1 SEASONAL ALLERGIC RHINITIS DUE TO POLLEN: ICD-10-CM

## 2019-05-31 LAB
ANION GAP SERPL CALCULATED.3IONS-SCNC: 11.8 MMOL/L
BUN BLD-MCNC: 16 MG/DL (ref 8–23)
BUN/CREAT SERPL: 12.5 (ref 7–25)
CALCIUM SPEC-SCNC: 8.7 MG/DL (ref 8.6–10.5)
CHLORIDE SERPL-SCNC: 105 MMOL/L (ref 98–107)
CO2 SERPL-SCNC: 26.2 MMOL/L (ref 22–29)
CREAT BLD-MCNC: 1.28 MG/DL (ref 0.57–1)
DEPRECATED RDW RBC AUTO: 50.6 FL (ref 37–54)
ERYTHROCYTE [DISTWIDTH] IN BLOOD BY AUTOMATED COUNT: 14.4 % (ref 12.3–15.4)
GFR SERPL CREATININE-BSD FRML MDRD: 40 ML/MIN/1.73
GLUCOSE BLD-MCNC: 98 MG/DL (ref 65–99)
HCT VFR BLD AUTO: 37.7 % (ref 34–46.6)
HGB BLD-MCNC: 11.8 G/DL (ref 12–15.9)
MCH RBC QN AUTO: 30.2 PG (ref 26.6–33)
MCHC RBC AUTO-ENTMCNC: 31.3 G/DL (ref 31.5–35.7)
MCV RBC AUTO: 96.4 FL (ref 79–97)
PLATELET # BLD AUTO: 203 10*3/MM3 (ref 140–450)
PMV BLD AUTO: 12.8 FL (ref 6–12)
POTASSIUM BLD-SCNC: 4.1 MMOL/L (ref 3.5–5.2)
RBC # BLD AUTO: 3.91 10*6/MM3 (ref 3.77–5.28)
SODIUM BLD-SCNC: 143 MMOL/L (ref 136–145)
TSH SERPL DL<=0.05 MIU/L-ACNC: 12.5 MIU/ML (ref 0.27–4.2)
VIT B12 BLD-MCNC: 1765 PG/ML (ref 211–946)
WBC NRBC COR # BLD: 7.79 10*3/MM3 (ref 3.4–10.8)

## 2019-05-31 PROCEDURE — 85027 COMPLETE CBC AUTOMATED: CPT | Performed by: INTERNAL MEDICINE

## 2019-05-31 PROCEDURE — 90732 PPSV23 VACC 2 YRS+ SUBQ/IM: CPT | Performed by: INTERNAL MEDICINE

## 2019-05-31 PROCEDURE — G0439 PPPS, SUBSEQ VISIT: HCPCS | Performed by: INTERNAL MEDICINE

## 2019-05-31 PROCEDURE — 80048 BASIC METABOLIC PNL TOTAL CA: CPT | Performed by: INTERNAL MEDICINE

## 2019-05-31 PROCEDURE — 84443 ASSAY THYROID STIM HORMONE: CPT | Performed by: INTERNAL MEDICINE

## 2019-05-31 PROCEDURE — G0009 ADMIN PNEUMOCOCCAL VACCINE: HCPCS | Performed by: INTERNAL MEDICINE

## 2019-05-31 PROCEDURE — 82607 VITAMIN B-12: CPT | Performed by: INTERNAL MEDICINE

## 2019-06-03 RX ORDER — LEVOTHYROXINE SODIUM 88 UG/1
88 TABLET ORAL EVERY MORNING
Qty: 30 TABLET | Refills: 5 | Status: SHIPPED | OUTPATIENT
Start: 2019-06-03 | End: 2020-03-10

## 2019-09-30 ENCOUNTER — OFFICE VISIT (OUTPATIENT)
Dept: INTERNAL MEDICINE | Facility: CLINIC | Age: 84
End: 2019-09-30

## 2019-09-30 VITALS
DIASTOLIC BLOOD PRESSURE: 60 MMHG | HEART RATE: 64 BPM | BODY MASS INDEX: 27.64 KG/M2 | SYSTOLIC BLOOD PRESSURE: 110 MMHG | HEIGHT: 60 IN | WEIGHT: 140.8 LBS

## 2019-09-30 DIAGNOSIS — R41.3 MEMORY IMPAIRMENT OF GRADUAL ONSET: ICD-10-CM

## 2019-09-30 DIAGNOSIS — E03.8 OTHER SPECIFIED HYPOTHYROIDISM: ICD-10-CM

## 2019-09-30 DIAGNOSIS — G62.9 NEUROPATHY: ICD-10-CM

## 2019-09-30 DIAGNOSIS — D51.0 PERNICIOUS ANEMIA: ICD-10-CM

## 2019-09-30 DIAGNOSIS — J30.1 SEASONAL ALLERGIC RHINITIS DUE TO POLLEN: ICD-10-CM

## 2019-09-30 DIAGNOSIS — E53.8 VITAMIN B12 DEFICIENCY: ICD-10-CM

## 2019-09-30 DIAGNOSIS — K30 NUD (NONULCER DYSPEPSIA): ICD-10-CM

## 2019-09-30 DIAGNOSIS — I10 ESSENTIAL HYPERTENSION: Primary | ICD-10-CM

## 2019-09-30 DIAGNOSIS — E78.49 OTHER HYPERLIPIDEMIA: ICD-10-CM

## 2019-09-30 PROCEDURE — 82607 VITAMIN B-12: CPT | Performed by: INTERNAL MEDICINE

## 2019-09-30 PROCEDURE — 99214 OFFICE O/P EST MOD 30 MIN: CPT | Performed by: INTERNAL MEDICINE

## 2019-09-30 PROCEDURE — 84443 ASSAY THYROID STIM HORMONE: CPT | Performed by: INTERNAL MEDICINE

## 2019-09-30 PROCEDURE — 80048 BASIC METABOLIC PNL TOTAL CA: CPT | Performed by: INTERNAL MEDICINE

## 2019-09-30 NOTE — PROGRESS NOTES
"Patient is a 86 y.o. female who is here for a follow up of hyperlipidemia,hypertension and hypothyroidism.  Chief Complaint   Patient presents with   • Hyperlipidemia   • Hypertension   • Hypothyroidism         HPI:    Here for mgmt of HTN and hypothyroid.  Onset years.  No dizziness or lightheadedness.  No HAs.  No CP.  No increase in baseline edema.  No palpitations.  Energy level is good.  No hair loss.  Some cold intolerance.  No abdominal pains.  No nausea or emesis.  Memory is \"fine\".     History:     Patient Active Problem List   Diagnosis   • Allergic rhinitis   • Hyperlipidemia   • Hypertension   • Hypocalcemia   • Hypothyroidism   • Neuropathy   • NUD (nonulcer dyspepsia)   • Painful knee   • Pernicious anemia   • Tremor   • Warts   • Vitamin B12 deficiency   • Spondylolisthesis of cervical region   • Graves' ophthalmopathy   • Anemia   • Memory impairment of gradual onset   • Ascending aortic aneurysm (CMS/HCC)       Past Medical History:   Diagnosis Date   • Anemia    • Cataract    • Difficulty breathing     Chronic   • Graves' ophthalmopathy    • Hoarseness    • Spondylolisthesis of cervical region    • Vitamin B12 deficiency        Past Surgical History:   Procedure Laterality Date   • CERVICAL LAMINECTOMY     • CHOLECYSTECTOMY     • OTHER SURGICAL HISTORY Right     Neuroplasty Median Nerve at Carpal Tunnel   • THYROID SURGERY     • TOTAL KNEE ARTHROPLASTY Left 09/29/2014    Dr Colon       Current Outpatient Medications on File Prior to Visit   Medication Sig   • dicyclomine (BENTYL) 10 MG capsule 1 po TID PRN abdominal pain   • donepezil (ARICEPT) 5 MG tablet Take 1 tablet by mouth Every Night.   • estradiol (ESTRACE) 0.1 MG/GM vaginal cream    • famotidine (PEPCID) 40 MG tablet TAKE ONE TABLET BY MOUTH EVERY MORNING   • levocetirizine (XYZAL) 5 MG tablet Take 1 tablet by mouth daily as needed.   • levothyroxine (SYNTHROID, LEVOTHROID) 88 MCG tablet Take 1 tablet by mouth Every Morning.   • " LYRICA 200 MG capsule 3 (three) times a day.   • ondansetron (ZOFRAN) 4 MG tablet Take 1 tablet by mouth Every 8 (Eight) Hours As Needed for nausea or vomiting.   • propranolol (INDERAL) 40 MG tablet 2 (two) times a day.   • triamcinolone (KENALOG) 0.1 % cream Apply  topically 2 (two) times a day. Till healed     No current facility-administered medications on file prior to visit.        Family History   Problem Relation Age of Onset   • Hypertension Mother    • Other Father         cardiac disorder   • Diabetes Father    • Hypertension Sister        Social History     Socioeconomic History   • Marital status:      Spouse name: Not on file   • Number of children: Not on file   • Years of education: Not on file   • Highest education level: Not on file   Tobacco Use   • Smoking status: Never Smoker   Substance and Sexual Activity   • Drug use: No         Review of Systems   Constitutional: Negative for chills, diaphoresis, fever and unexpected weight change.   HENT: Positive for hearing loss. Negative for congestion, ear pain, nosebleeds, postnasal drip, sinus pressure and sore throat.    Eyes: Negative for pain, discharge and itching.   Respiratory: Negative for cough, chest tightness, shortness of breath and wheezing.    Cardiovascular: Negative for chest pain, palpitations and leg swelling.   Gastrointestinal: Negative for abdominal distention, abdominal pain, blood in stool, constipation, diarrhea, nausea and vomiting.   Endocrine: Negative for polydipsia and polyuria.   Genitourinary: Negative for difficulty urinating, dysuria, frequency and hematuria.   Musculoskeletal: Positive for arthralgias and back pain. Negative for gait problem, joint swelling and myalgias.   Skin: Negative for rash and wound.   Neurological: Positive for tremors and numbness. Negative for dizziness, syncope, weakness and headaches.   Psychiatric/Behavioral: Positive for decreased concentration (better). Negative for dysphoric mood  "and sleep disturbance. The patient is not nervous/anxious.        /60   Pulse 64   Ht 152.4 cm (60\")   Wt 63.9 kg (140 lb 12.8 oz)   LMP  (LMP Unknown)   BMI 27.50 kg/m²       Physical Exam   Constitutional: She is oriented to person, place, and time. She appears well-developed and well-nourished.   HENT:   Head: Normocephalic and atraumatic.   Right Ear: External ear normal.   Left Ear: External ear normal.   Mouth/Throat: Oropharynx is clear and moist.   Mild cerumen on right   Eyes: Conjunctivae and EOM are normal.   Neck: Normal range of motion. Neck supple.   Cardiovascular: Normal rate, regular rhythm and normal heart sounds.   Pulmonary/Chest: Effort normal and breath sounds normal.   Abdominal: Soft. Bowel sounds are normal.   Musculoskeletal: Normal range of motion. She exhibits edema (trace).   Lymphadenopathy:     She has no cervical adenopathy.   Neurological: She is alert and oriented to person, place, and time.   10/17 MMSE 29/30   Skin: Skin is warm and dry.   Psychiatric: She has a normal mood and affect. Her behavior is normal. Thought content normal.       Procedure:      Discussion/Summary:    htn-stable on BB  rhinitis-flonase and xyzal continuation, stable  tremor-cont propranolol, stable  hypothyroid- recheck today , no change  neuropathy-cont lyrica, but see if can reduce dose, consider cymbalta, stable  djd-cont prn lortab  b12 def-cont b12 but change to qod , recheck   hyperlipidemia-labs on rtc  Diverticulitis-advised citrucel qd  Memory impairment-cont aricept 5 mg qhs, stable        9/30 labs noted and dw patient, has already had flu shot    Current Outpatient Medications:   •  dicyclomine (BENTYL) 10 MG capsule, 1 po TID PRN abdominal pain, Disp: 21 capsule, Rfl: 1  •  donepezil (ARICEPT) 5 MG tablet, Take 1 tablet by mouth Every Night., Disp: 30 tablet, Rfl: 5  •  estradiol (ESTRACE) 0.1 MG/GM vaginal cream, , Disp: , Rfl:   •  famotidine (PEPCID) 40 MG tablet, TAKE ONE " TABLET BY MOUTH EVERY MORNING, Disp: 30 tablet, Rfl: 5  •  levocetirizine (XYZAL) 5 MG tablet, Take 1 tablet by mouth daily as needed., Disp: , Rfl:   •  levothyroxine (SYNTHROID, LEVOTHROID) 88 MCG tablet, Take 1 tablet by mouth Every Morning., Disp: 30 tablet, Rfl: 5  •  LYRICA 200 MG capsule, 3 (three) times a day., Disp: , Rfl:   •  ondansetron (ZOFRAN) 4 MG tablet, Take 1 tablet by mouth Every 8 (Eight) Hours As Needed for nausea or vomiting., Disp: 30 tablet, Rfl: 0  •  propranolol (INDERAL) 40 MG tablet, 2 (two) times a day., Disp: , Rfl:   •  triamcinolone (KENALOG) 0.1 % cream, Apply  topically 2 (two) times a day. Till healed, Disp: 45 g, Rfl: 1        Zoila was seen today for hyperlipidemia, hypertension and hypothyroidism.    Diagnoses and all orders for this visit:    Essential hypertension  -     Basic Metabolic Panel    Other hyperlipidemia    Seasonal allergic rhinitis due to pollen    Vitamin B12 deficiency  -     Vitamin B12    NUD (nonulcer dyspepsia)    Other specified hypothyroidism  -     TSH    Neuropathy    Pernicious anemia    Memory impairment of gradual onset

## 2019-10-01 LAB
ANION GAP SERPL CALCULATED.3IONS-SCNC: 12.2 MMOL/L (ref 5–15)
BUN BLD-MCNC: 18 MG/DL (ref 8–23)
BUN/CREAT SERPL: 13.8 (ref 7–25)
CALCIUM SPEC-SCNC: 8.5 MG/DL (ref 8.6–10.5)
CHLORIDE SERPL-SCNC: 102 MMOL/L (ref 98–107)
CO2 SERPL-SCNC: 25.8 MMOL/L (ref 22–29)
CREAT BLD-MCNC: 1.3 MG/DL (ref 0.57–1)
GFR SERPL CREATININE-BSD FRML MDRD: 39 ML/MIN/1.73
GLUCOSE BLD-MCNC: 97 MG/DL (ref 65–99)
POTASSIUM BLD-SCNC: 4.2 MMOL/L (ref 3.5–5.2)
SODIUM BLD-SCNC: 140 MMOL/L (ref 136–145)
TSH SERPL DL<=0.05 MIU/L-ACNC: 7.01 UIU/ML (ref 0.27–4.2)
VIT B12 BLD-MCNC: 1925 PG/ML (ref 211–946)

## 2019-10-17 ENCOUNTER — APPOINTMENT (OUTPATIENT)
Dept: GENERAL RADIOLOGY | Facility: HOSPITAL | Age: 84
End: 2019-10-17

## 2019-10-17 ENCOUNTER — HOSPITAL ENCOUNTER (OUTPATIENT)
Facility: HOSPITAL | Age: 84
Setting detail: OBSERVATION
Discharge: HOME | End: 2019-10-19
Attending: EMERGENCY MEDICINE | Admitting: INTERNAL MEDICINE

## 2019-10-17 ENCOUNTER — APPOINTMENT (OUTPATIENT)
Dept: MRI IMAGING | Facility: HOSPITAL | Age: 84
End: 2019-10-17

## 2019-10-17 DIAGNOSIS — R27.0 ATAXIA: ICD-10-CM

## 2019-10-17 DIAGNOSIS — N39.0 ACUTE UTI: Primary | ICD-10-CM

## 2019-10-17 DIAGNOSIS — R53.1 GENERALIZED WEAKNESS: ICD-10-CM

## 2019-10-17 LAB
ALBUMIN SERPL-MCNC: 4.1 G/DL (ref 3.5–5.2)
ALBUMIN/GLOB SERPL: 1.4 G/DL
ALP SERPL-CCNC: 87 U/L (ref 39–117)
ALT SERPL W P-5'-P-CCNC: 19 U/L (ref 1–33)
ANION GAP SERPL CALCULATED.3IONS-SCNC: 11 MMOL/L (ref 5–15)
AST SERPL-CCNC: 23 U/L (ref 1–32)
BASOPHILS # BLD AUTO: 0.15 10*3/MM3 (ref 0–0.2)
BASOPHILS NFR BLD AUTO: 1.4 % (ref 0–1.5)
BILIRUB SERPL-MCNC: 0.2 MG/DL (ref 0.2–1.2)
BUN BLD-MCNC: 20 MG/DL (ref 8–23)
BUN/CREAT SERPL: 14.9 (ref 7–25)
CALCIUM SPEC-SCNC: 9 MG/DL (ref 8.6–10.5)
CHLORIDE SERPL-SCNC: 103 MMOL/L (ref 98–107)
CO2 SERPL-SCNC: 29 MMOL/L (ref 22–29)
CREAT BLD-MCNC: 1.34 MG/DL (ref 0.57–1)
DEPRECATED RDW RBC AUTO: 48.2 FL (ref 37–54)
EOSINOPHIL # BLD AUTO: 0.35 10*3/MM3 (ref 0–0.4)
EOSINOPHIL NFR BLD AUTO: 3.2 % (ref 0.3–6.2)
ERYTHROCYTE [DISTWIDTH] IN BLOOD BY AUTOMATED COUNT: 13.9 % (ref 12.3–15.4)
GFR SERPL CREATININE-BSD FRML MDRD: 38 ML/MIN/1.73
GLOBULIN UR ELPH-MCNC: 2.9 GM/DL
GLUCOSE BLD-MCNC: 106 MG/DL (ref 65–99)
HCT VFR BLD AUTO: 37.6 % (ref 34–46.6)
HGB BLD-MCNC: 12 G/DL (ref 12–15.9)
HOLD SPECIMEN: NORMAL
HOLD SPECIMEN: NORMAL
IMM GRANULOCYTES # BLD AUTO: 0.13 10*3/MM3 (ref 0–0.05)
IMM GRANULOCYTES NFR BLD AUTO: 1.2 % (ref 0–0.5)
LYMPHOCYTES # BLD AUTO: 1.01 10*3/MM3 (ref 0.7–3.1)
LYMPHOCYTES NFR BLD AUTO: 9.2 % (ref 19.6–45.3)
MAGNESIUM SERPL-MCNC: 1.9 MG/DL (ref 1.6–2.4)
MCH RBC QN AUTO: 30.2 PG (ref 26.6–33)
MCHC RBC AUTO-ENTMCNC: 31.9 G/DL (ref 31.5–35.7)
MCV RBC AUTO: 94.5 FL (ref 79–97)
MONOCYTES # BLD AUTO: 0.57 10*3/MM3 (ref 0.1–0.9)
MONOCYTES NFR BLD AUTO: 5.2 % (ref 5–12)
NEUTROPHILS # BLD AUTO: 8.71 10*3/MM3 (ref 1.7–7)
NEUTROPHILS NFR BLD AUTO: 79.8 % (ref 42.7–76)
NRBC BLD AUTO-RTO: 0 /100 WBC (ref 0–0.2)
PLATELET # BLD AUTO: 200 10*3/MM3 (ref 140–450)
PMV BLD AUTO: 11.6 FL (ref 6–12)
POTASSIUM BLD-SCNC: 4.6 MMOL/L (ref 3.5–5.2)
PROT SERPL-MCNC: 7 G/DL (ref 6–8.5)
RBC # BLD AUTO: 3.98 10*6/MM3 (ref 3.77–5.28)
SODIUM BLD-SCNC: 143 MMOL/L (ref 136–145)
TROPONIN T SERPL-MCNC: <0.01 NG/ML (ref 0–0.03)
WBC NRBC COR # BLD: 10.92 10*3/MM3 (ref 3.4–10.8)
WHOLE BLOOD HOLD SPECIMEN: NORMAL
WHOLE BLOOD HOLD SPECIMEN: NORMAL

## 2019-10-17 PROCEDURE — 99285 EMERGENCY DEPT VISIT HI MDM: CPT

## 2019-10-17 PROCEDURE — 81001 URINALYSIS AUTO W/SCOPE: CPT | Performed by: EMERGENCY MEDICINE

## 2019-10-17 PROCEDURE — 70551 MRI BRAIN STEM W/O DYE: CPT

## 2019-10-17 PROCEDURE — 93005 ELECTROCARDIOGRAM TRACING: CPT | Performed by: EMERGENCY MEDICINE

## 2019-10-17 PROCEDURE — 25010000002 ONDANSETRON PER 1 MG: Performed by: EMERGENCY MEDICINE

## 2019-10-17 PROCEDURE — 83735 ASSAY OF MAGNESIUM: CPT | Performed by: EMERGENCY MEDICINE

## 2019-10-17 PROCEDURE — 96375 TX/PRO/DX INJ NEW DRUG ADDON: CPT

## 2019-10-17 PROCEDURE — 84484 ASSAY OF TROPONIN QUANT: CPT | Performed by: EMERGENCY MEDICINE

## 2019-10-17 PROCEDURE — 85025 COMPLETE CBC W/AUTO DIFF WBC: CPT

## 2019-10-17 PROCEDURE — 71045 X-RAY EXAM CHEST 1 VIEW: CPT

## 2019-10-17 PROCEDURE — 80053 COMPREHEN METABOLIC PANEL: CPT | Performed by: EMERGENCY MEDICINE

## 2019-10-17 RX ORDER — ONDANSETRON 2 MG/ML
4 INJECTION INTRAMUSCULAR; INTRAVENOUS ONCE
Status: COMPLETED | OUTPATIENT
Start: 2019-10-17 | End: 2019-10-17

## 2019-10-17 RX ORDER — MECLIZINE HYDROCHLORIDE 25 MG/1
25 TABLET ORAL ONCE
Status: COMPLETED | OUTPATIENT
Start: 2019-10-17 | End: 2019-10-17

## 2019-10-17 RX ORDER — SODIUM CHLORIDE 0.9 % (FLUSH) 0.9 %
10 SYRINGE (ML) INJECTION AS NEEDED
Status: DISCONTINUED | OUTPATIENT
Start: 2019-10-17 | End: 2019-10-19 | Stop reason: HOSPADM

## 2019-10-17 RX ADMIN — ONDANSETRON 4 MG: 2 INJECTION INTRAMUSCULAR; INTRAVENOUS at 23:33

## 2019-10-17 RX ADMIN — MECLIZINE HYDROCHLORIDE 25 MG: 25 TABLET ORAL at 23:27

## 2019-10-18 ENCOUNTER — APPOINTMENT (OUTPATIENT)
Dept: CT IMAGING | Facility: HOSPITAL | Age: 84
End: 2019-10-18

## 2019-10-18 PROBLEM — N39.0 UTI (URINARY TRACT INFECTION): Status: ACTIVE | Noted: 2019-10-18

## 2019-10-18 PROBLEM — N39.0 ACUTE UTI: Status: ACTIVE | Noted: 2019-10-18

## 2019-10-18 PROBLEM — I16.0 HYPERTENSIVE URGENCY: Status: ACTIVE | Noted: 2019-10-18

## 2019-10-18 PROBLEM — R42 DIZZINESS: Status: ACTIVE | Noted: 2019-10-18

## 2019-10-18 LAB
ANION GAP SERPL CALCULATED.3IONS-SCNC: 10 MMOL/L (ref 5–15)
BACTERIA UR QL AUTO: ABNORMAL /HPF
BASOPHILS # BLD AUTO: 0.09 10*3/MM3 (ref 0–0.2)
BASOPHILS NFR BLD AUTO: 1.2 % (ref 0–1.5)
BILIRUB UR QL STRIP: NEGATIVE
BILIRUB UR QL STRIP: NEGATIVE
BUN BLD-MCNC: 16 MG/DL (ref 8–23)
BUN/CREAT SERPL: 13.8 (ref 7–25)
CALCIUM SPEC-SCNC: 8.7 MG/DL (ref 8.6–10.5)
CHLORIDE SERPL-SCNC: 105 MMOL/L (ref 98–107)
CLARITY UR: ABNORMAL
CLARITY UR: CLEAR
CO2 SERPL-SCNC: 30 MMOL/L (ref 22–29)
COLOR UR: YELLOW
COLOR UR: YELLOW
CREAT BLD-MCNC: 1.16 MG/DL (ref 0.57–1)
DEPRECATED RDW RBC AUTO: 47.1 FL (ref 37–54)
EOSINOPHIL # BLD AUTO: 0.21 10*3/MM3 (ref 0–0.4)
EOSINOPHIL NFR BLD AUTO: 2.9 % (ref 0.3–6.2)
ERYTHROCYTE [DISTWIDTH] IN BLOOD BY AUTOMATED COUNT: 13.8 % (ref 12.3–15.4)
GFR SERPL CREATININE-BSD FRML MDRD: 44 ML/MIN/1.73
GLUCOSE BLD-MCNC: 94 MG/DL (ref 65–99)
GLUCOSE UR STRIP-MCNC: NEGATIVE MG/DL
GLUCOSE UR STRIP-MCNC: NEGATIVE MG/DL
HCT VFR BLD AUTO: 36 % (ref 34–46.6)
HGB BLD-MCNC: 11.5 G/DL (ref 12–15.9)
HGB UR QL STRIP.AUTO: NEGATIVE
HGB UR QL STRIP.AUTO: NEGATIVE
IMM GRANULOCYTES # BLD AUTO: 0.07 10*3/MM3 (ref 0–0.05)
IMM GRANULOCYTES NFR BLD AUTO: 1 % (ref 0–0.5)
KETONES UR QL STRIP: NEGATIVE
KETONES UR QL STRIP: NEGATIVE
LEUKOCYTE ESTERASE UR QL STRIP.AUTO: ABNORMAL
LEUKOCYTE ESTERASE UR QL STRIP.AUTO: NEGATIVE
LYMPHOCYTES # BLD AUTO: 0.99 10*3/MM3 (ref 0.7–3.1)
LYMPHOCYTES NFR BLD AUTO: 13.6 % (ref 19.6–45.3)
MCH RBC QN AUTO: 29.9 PG (ref 26.6–33)
MCHC RBC AUTO-ENTMCNC: 31.9 G/DL (ref 31.5–35.7)
MCV RBC AUTO: 93.5 FL (ref 79–97)
MONOCYTES # BLD AUTO: 0.64 10*3/MM3 (ref 0.1–0.9)
MONOCYTES NFR BLD AUTO: 8.8 % (ref 5–12)
NEUTROPHILS # BLD AUTO: 5.3 10*3/MM3 (ref 1.7–7)
NEUTROPHILS NFR BLD AUTO: 72.5 % (ref 42.7–76)
NITRITE UR QL STRIP: NEGATIVE
NITRITE UR QL STRIP: NEGATIVE
NRBC BLD AUTO-RTO: 0 /100 WBC (ref 0–0.2)
PH UR STRIP.AUTO: 6.5 [PH] (ref 5–8)
PH UR STRIP.AUTO: 7 [PH] (ref 5–8)
PLATELET # BLD AUTO: 177 10*3/MM3 (ref 140–450)
PMV BLD AUTO: 11.9 FL (ref 6–12)
POTASSIUM BLD-SCNC: 4.2 MMOL/L (ref 3.5–5.2)
PROT UR QL STRIP: NEGATIVE
PROT UR QL STRIP: NEGATIVE
RBC # BLD AUTO: 3.85 10*6/MM3 (ref 3.77–5.28)
RBC # UR: ABNORMAL /HPF
REF LAB TEST METHOD: ABNORMAL
SODIUM BLD-SCNC: 145 MMOL/L (ref 136–145)
SP GR UR STRIP: 1.01 (ref 1–1.03)
SP GR UR STRIP: 1.01 (ref 1–1.03)
SQUAMOUS #/AREA URNS HPF: ABNORMAL /HPF
TSH SERPL DL<=0.05 MIU/L-ACNC: 7.58 UIU/ML (ref 0.27–4.2)
UROBILINOGEN UR QL STRIP: ABNORMAL
UROBILINOGEN UR QL STRIP: NORMAL
WBC NRBC COR # BLD: 7.3 10*3/MM3 (ref 3.4–10.8)
WBC UR QL AUTO: ABNORMAL /HPF

## 2019-10-18 PROCEDURE — 25010000002 HEPARIN (PORCINE) PER 1000 UNITS: Performed by: NURSE PRACTITIONER

## 2019-10-18 PROCEDURE — 63710000001 CETIRIZINE 10 MG TABLET: Performed by: NURSE PRACTITIONER

## 2019-10-18 PROCEDURE — A9270 NON-COVERED ITEM OR SERVICE: HCPCS | Performed by: NURSE PRACTITIONER

## 2019-10-18 PROCEDURE — 63710000001 LEVOTHYROXINE 88 MCG TABLET: Performed by: NURSE PRACTITIONER

## 2019-10-18 PROCEDURE — 96366 THER/PROPH/DIAG IV INF ADDON: CPT

## 2019-10-18 PROCEDURE — G0378 HOSPITAL OBSERVATION PER HR: HCPCS

## 2019-10-18 PROCEDURE — 81003 URINALYSIS AUTO W/O SCOPE: CPT | Performed by: NURSE PRACTITIONER

## 2019-10-18 PROCEDURE — 99204 OFFICE O/P NEW MOD 45 MIN: CPT | Performed by: PSYCHIATRY & NEUROLOGY

## 2019-10-18 PROCEDURE — 96365 THER/PROPH/DIAG IV INF INIT: CPT

## 2019-10-18 PROCEDURE — 25010000002 CEFTRIAXONE PER 250 MG: Performed by: EMERGENCY MEDICINE

## 2019-10-18 PROCEDURE — 63710000001 MECLIZINE 25 MG TABLET: Performed by: NURSE PRACTITIONER

## 2019-10-18 PROCEDURE — 96372 THER/PROPH/DIAG INJ SC/IM: CPT

## 2019-10-18 PROCEDURE — 80048 BASIC METABOLIC PNL TOTAL CA: CPT | Performed by: NURSE PRACTITIONER

## 2019-10-18 PROCEDURE — 63710000001 FAMOTIDINE 20 MG TABLET: Performed by: NURSE PRACTITIONER

## 2019-10-18 PROCEDURE — 74176 CT ABD & PELVIS W/O CONTRAST: CPT

## 2019-10-18 PROCEDURE — 25010000002 CEFTRIAXONE PER 250 MG: Performed by: NURSE PRACTITIONER

## 2019-10-18 PROCEDURE — 63710000001 PREGABALIN 100 MG CAPSULE: Performed by: NURSE PRACTITIONER

## 2019-10-18 PROCEDURE — 84443 ASSAY THYROID STIM HORMONE: CPT | Performed by: NURSE PRACTITIONER

## 2019-10-18 PROCEDURE — 63710000001 PROPRANOLOL 20 MG TABLET: Performed by: NURSE PRACTITIONER

## 2019-10-18 PROCEDURE — 85025 COMPLETE CBC W/AUTO DIFF WBC: CPT | Performed by: NURSE PRACTITIONER

## 2019-10-18 RX ORDER — FAMOTIDINE 20 MG/1
40 TABLET, FILM COATED ORAL DAILY
Status: DISCONTINUED | OUTPATIENT
Start: 2019-10-18 | End: 2019-10-19 | Stop reason: HOSPADM

## 2019-10-18 RX ORDER — TRIAMCINOLONE ACETONIDE 1 MG/G
CREAM TOPICAL EVERY 12 HOURS SCHEDULED
Status: DISCONTINUED | OUTPATIENT
Start: 2019-10-18 | End: 2019-10-19 | Stop reason: HOSPADM

## 2019-10-18 RX ORDER — DONEPEZIL HYDROCHLORIDE 5 MG/1
5 TABLET, FILM COATED ORAL NIGHTLY
Status: DISCONTINUED | OUTPATIENT
Start: 2019-10-18 | End: 2019-10-19 | Stop reason: HOSPADM

## 2019-10-18 RX ORDER — SODIUM CHLORIDE 9 MG/ML
75 INJECTION, SOLUTION INTRAVENOUS ONCE
Status: DISCONTINUED | OUTPATIENT
Start: 2019-10-18 | End: 2019-10-19 | Stop reason: HOSPADM

## 2019-10-18 RX ORDER — PROPRANOLOL HYDROCHLORIDE 20 MG/1
40 TABLET ORAL EVERY 12 HOURS SCHEDULED
Status: DISCONTINUED | OUTPATIENT
Start: 2019-10-18 | End: 2019-10-19 | Stop reason: HOSPADM

## 2019-10-18 RX ORDER — LEVOTHYROXINE SODIUM 88 UG/1
88 TABLET ORAL
Status: DISCONTINUED | OUTPATIENT
Start: 2019-10-18 | End: 2019-10-19 | Stop reason: HOSPADM

## 2019-10-18 RX ORDER — MECLIZINE HYDROCHLORIDE 25 MG/1
25 TABLET ORAL EVERY 8 HOURS SCHEDULED
Status: DISCONTINUED | OUTPATIENT
Start: 2019-10-18 | End: 2019-10-19 | Stop reason: HOSPADM

## 2019-10-18 RX ORDER — HEPARIN SODIUM 5000 [USP'U]/ML
5000 INJECTION, SOLUTION INTRAVENOUS; SUBCUTANEOUS EVERY 12 HOURS SCHEDULED
Status: DISCONTINUED | OUTPATIENT
Start: 2019-10-18 | End: 2019-10-19 | Stop reason: HOSPADM

## 2019-10-18 RX ORDER — CETIRIZINE HYDROCHLORIDE 10 MG/1
5 TABLET ORAL DAILY
Status: DISCONTINUED | OUTPATIENT
Start: 2019-10-18 | End: 2019-10-19 | Stop reason: HOSPADM

## 2019-10-18 RX ORDER — PREGABALIN 100 MG/1
200 CAPSULE ORAL EVERY 8 HOURS SCHEDULED
Status: DISCONTINUED | OUTPATIENT
Start: 2019-10-18 | End: 2019-10-19 | Stop reason: HOSPADM

## 2019-10-18 RX ORDER — ACETAMINOPHEN 650 MG/1
650 SUPPOSITORY RECTAL EVERY 4 HOURS PRN
Status: DISCONTINUED | OUTPATIENT
Start: 2019-10-18 | End: 2019-10-19 | Stop reason: HOSPADM

## 2019-10-18 RX ORDER — ACETAMINOPHEN 325 MG/1
650 TABLET ORAL EVERY 4 HOURS PRN
Status: DISCONTINUED | OUTPATIENT
Start: 2019-10-18 | End: 2019-10-19 | Stop reason: HOSPADM

## 2019-10-18 RX ORDER — ACETAMINOPHEN 160 MG/5ML
650 SOLUTION ORAL EVERY 4 HOURS PRN
Status: DISCONTINUED | OUTPATIENT
Start: 2019-10-18 | End: 2019-10-19 | Stop reason: HOSPADM

## 2019-10-18 RX ADMIN — LEVOTHYROXINE SODIUM 88 MCG: 88 TABLET ORAL at 10:26

## 2019-10-18 RX ADMIN — DONEPEZIL HYDROCHLORIDE 5 MG: 5 TABLET, FILM COATED ORAL at 20:36

## 2019-10-18 RX ADMIN — HEPARIN SODIUM 5000 UNITS: 5000 INJECTION INTRAVENOUS; SUBCUTANEOUS at 10:29

## 2019-10-18 RX ADMIN — PROPRANOLOL HYDROCHLORIDE 40 MG: 20 TABLET ORAL at 20:35

## 2019-10-18 RX ADMIN — CEFTRIAXONE 1 G: 1 INJECTION, POWDER, FOR SOLUTION INTRAMUSCULAR; INTRAVENOUS at 01:55

## 2019-10-18 RX ADMIN — CETIRIZINE HYDROCHLORIDE 5 MG: 10 TABLET, FILM COATED ORAL at 10:27

## 2019-10-18 RX ADMIN — MECLIZINE HYDROCHLORIDE 25 MG: 25 TABLET ORAL at 10:28

## 2019-10-18 RX ADMIN — FAMOTIDINE 40 MG: 20 TABLET ORAL at 10:27

## 2019-10-18 RX ADMIN — PREGABALIN 200 MG: 100 CAPSULE ORAL at 21:25

## 2019-10-18 RX ADMIN — TRIAMCINOLONE ACETONIDE: 1 CREAM TOPICAL at 20:34

## 2019-10-18 RX ADMIN — HEPARIN SODIUM 5000 UNITS: 5000 INJECTION INTRAVENOUS; SUBCUTANEOUS at 20:35

## 2019-10-18 RX ADMIN — SODIUM CHLORIDE, PRESERVATIVE FREE 10 ML: 5 INJECTION INTRAVENOUS at 10:30

## 2019-10-18 RX ADMIN — MECLIZINE HYDROCHLORIDE 25 MG: 25 TABLET ORAL at 21:25

## 2019-10-18 RX ADMIN — PROPRANOLOL HYDROCHLORIDE 40 MG: 20 TABLET ORAL at 10:28

## 2019-10-18 RX ADMIN — PREGABALIN 200 MG: 100 CAPSULE ORAL at 10:28

## 2019-10-18 RX ADMIN — CEFTRIAXONE 1 G: 1 INJECTION, POWDER, FOR SOLUTION INTRAMUSCULAR; INTRAVENOUS at 20:35

## 2019-10-18 NOTE — PLAN OF CARE
Problem: Patient Care Overview  Goal: Plan of Care Review  Outcome: Ongoing (interventions implemented as appropriate)   10/18/19 0700   Coping/Psychosocial   Plan of Care Reviewed With patient   Plan of Care Review   Progress no change   OTHER   Outcome Summary Patient arrived from ED @ 0630. BP elevated. Fall precautions in place.

## 2019-10-18 NOTE — CONSULTS
"Subjective     CC: dizziness    History of Present Illness   Zoila Nava is a 86 y.o. female is seen today in consultation with dizziness, nausea and emesis. She describes these symptoms coming in waves over the last few days, without clearly associated symptoms or modifying factors. She reports feeling OK this morning.    She does endorse a history of mild memory impairment.    I have reviewed and confirmed the past family, social and medical history as accurate on 10/18/19.     Review of Systems   Constitutional: Negative.    Respiratory: Negative.    Cardiovascular: Negative.    Gastrointestinal: Negative.    Genitourinary: Negative.    All other systems reviewed and are negative.      Objective   General appearance today is normal.   Peripheral pulses were present and symmetric.   The ophthalmoscopic exam today is unremarkable. This discs and posterior elements are unremarkable.    BP (!) 190/95 (BP Location: Right arm, Patient Position: Lying)   Pulse 70   Temp 98.6 °F (37 °C) (Oral)   Resp 18   Ht 152.4 cm (60\")   Wt 65.1 kg (143 lb 8 oz)   LMP  (LMP Unknown)   SpO2 96%   BMI 28.03 kg/m²     Physical Exam   Constitutional: She is oriented to person, place, and time.   Neurological: She is oriented to person, place, and time. She has normal strength. She has a normal Finger-Nose-Finger Test. Gait normal.   Reflex Scores:       Tricep reflexes are 1+ on the right side and 1+ on the left side.       Bicep reflexes are 1+ on the right side and 1+ on the left side.       Brachioradialis reflexes are 1+ on the right side and 1+ on the left side.       Patellar reflexes are 1+ on the right side and 1+ on the left side.       Achilles reflexes are 1+ on the right side and 1+ on the left side.  Psychiatric: Her speech is normal.        Neurologic Exam     Mental Status   Oriented to person, place, and time.   Registration: recalls 3 of 3 objects. Recall at 5 minutes: recalls 1 of 3 objects.   Attention: " normal.   Speech: speech is normal   Level of consciousness: alert  Knowledge: good.   Able to name object. Able to repeat. Normal comprehension.     Cranial Nerves   Cranial nerves II through XII intact.     Motor Exam   Muscle bulk: normal  Overall muscle tone: normal    Strength   Strength 5/5 throughout.     Sensory Exam   Light touch normal.     Gait, Coordination, and Reflexes     Gait  Gait: normal    Coordination   Finger to nose coordination: normal    Reflexes   Right brachioradialis: 1+  Left brachioradialis: 1+  Right biceps: 1+  Left biceps: 1+  Right triceps: 1+  Left triceps: 1+  Right patellar: 1+  Left patellar: 1+  Right achilles: 1+  Left achilles: 1+      Laboratory and radiological testing: CMP/CBC-ok; UA-2+bacteria, 13-20 WBC; MRI-no acute changes;       Assessment/Plan     Zoila Nava is seen with dizziness, N/V. Her neurologic examination and neuroimaging are reassuring. I suspect that this is related to UTI, and possibly hypertensive urgency. I don't find evidence for stroke. I therefore don't have additional recommendations beyond her current management at this time.    As part of this visit I reviewed prior lab results, reviewed radiology results and reviewed records from the current hospitalization which is incorporated in the HPI. Please see above for details.

## 2019-10-18 NOTE — ED PROVIDER NOTES
Subjective   Ms. Zoila Nava is an 86 y.o female presenting to the ED with c/o dizziness. She states she ate something suspicious Saturday night, 5 nights ago, and began vomiting the next day. Her vomiting has improved since but has not resolved entirely. Her dizziness, which is described as room-spinning, began today. She confirms nausea and has had a decreased appetite since Saturday. She denies abdominal pain. 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 There are no other acute symptoms at this time.         History provided by:  Patient  Dizziness   Quality:  Room spinning  Severity:  Moderate  Onset quality:  Sudden  Duration:  1 day  Timing:  Constant  Progression:  Worsening  Chronicity:  New  Relieved by:  None tried  Worsened by:  Nothing  Ineffective treatments:  None tried  Associated symptoms: nausea and vomiting        Review of Systems   Constitutional: Positive for appetite change (decreased).   Gastrointestinal: Positive for nausea and vomiting. Negative for abdominal pain.   Neurological: Positive for dizziness.   All other systems reviewed and are negative.      Past Medical History:   Diagnosis Date   • Anemia    • Cataract    • Difficulty breathing     Chronic   • Graves' ophthalmopathy    • Hoarseness    • Spondylolisthesis of cervical region    • Vitamin B12 deficiency        Allergies   Allergen Reactions   • Penicillins        Past Surgical History:   Procedure Laterality Date   • CERVICAL LAMINECTOMY     • CHOLECYSTECTOMY     • OTHER SURGICAL HISTORY Right     Neuroplasty Median Nerve at Carpal Tunnel   • THYROID SURGERY     • TOTAL KNEE ARTHROPLASTY Left 09/29/2014    Dr Colon       Family History   Problem Relation Age of Onset   • Hypertension Mother    • Other Father         cardiac disorder   • Diabetes Father    • Hypertension Sister        Social History     Socioeconomic History   • Marital status:       Spouse name: Not on file   • Number of children: Not on file   • Years of education: Not on file   • Highest education level: Not on file   Tobacco Use   • Smoking status: Never Smoker   • Smokeless tobacco: Never Used   Substance and Sexual Activity   • Drug use: No         Objective   Physical Exam   Constitutional: She is oriented to person, place, and time. She appears well-developed and well-nourished. No distress.   Patient is very pleasant and appears comfortable.    HENT:   Head: Normocephalic and atraumatic.   Nose: Nose normal.   Eyes: Conjunctivae and EOM are normal. No scleral icterus.   Neck: Normal range of motion. Neck supple.   Cardiovascular: Normal rate, regular rhythm and normal heart sounds.   No murmur heard.  Pulmonary/Chest: Effort normal and breath sounds normal. No respiratory distress. She has no wheezes.   Abdominal: Soft. There is no tenderness.   Musculoskeletal: Normal range of motion. She exhibits no edema or tenderness.   Neurological: She is alert and oriented to person, place, and time.   No focal deficits are appreciated. Finger-to-nose testing was normal.   Skin: Skin is warm and dry. She is not diaphoretic.   Psychiatric: She has a normal mood and affect. Her behavior is normal.   Nursing note and vitals reviewed.      Procedures         ED Course  ED Course as of Oct 18 2157   Fri Oct 18, 2019   0127 I discussed the results with the patient and her spouse.  Spouse states that he is not comfortable with the patient going home tonight given her significant weakness and ataxia.  He states that this morning she started with needing very minimal assistance this worsened in the afternoon, and then tonight he states that she was falling over and he was having to almost fully support her frequently.  He does not feel she safe at home at this time.  I have started the patient on Rocephin and discussed the case with Dr. Phoenix, internal medicine.  She will admit for further evaluation and  management.  [CP]      ED Course User Index  [CP] Patrice Isaac DO     Recent Results (from the past 24 hour(s))   Comprehensive Metabolic Panel    Collection Time: 10/17/19  9:27 PM   Result Value Ref Range    Glucose 106 (H) 65 - 99 mg/dL    BUN 20 8 - 23 mg/dL    Creatinine 1.34 (H) 0.57 - 1.00 mg/dL    Sodium 143 136 - 145 mmol/L    Potassium 4.6 3.5 - 5.2 mmol/L    Chloride 103 98 - 107 mmol/L    CO2 29.0 22.0 - 29.0 mmol/L    Calcium 9.0 8.6 - 10.5 mg/dL    Total Protein 7.0 6.0 - 8.5 g/dL    Albumin 4.10 3.50 - 5.20 g/dL    ALT (SGPT) 19 1 - 33 U/L    AST (SGOT) 23 1 - 32 U/L    Alkaline Phosphatase 87 39 - 117 U/L    Total Bilirubin 0.2 0.2 - 1.2 mg/dL    eGFR Non African Amer 38 (L) >60 mL/min/1.73    Globulin 2.9 gm/dL    A/G Ratio 1.4 g/dL    BUN/Creatinine Ratio 14.9 7.0 - 25.0    Anion Gap 11.0 5.0 - 15.0 mmol/L   Troponin    Collection Time: 10/17/19  9:27 PM   Result Value Ref Range    Troponin T <0.010 0.000 - 0.030 ng/mL   Magnesium    Collection Time: 10/17/19  9:27 PM   Result Value Ref Range    Magnesium 1.9 1.6 - 2.4 mg/dL   Light Blue Top    Collection Time: 10/17/19  9:27 PM   Result Value Ref Range    Extra Tube hold for add-on    Green Top (Gel)    Collection Time: 10/17/19  9:27 PM   Result Value Ref Range    Extra Tube Hold for add-ons.    Lavender Top    Collection Time: 10/17/19  9:27 PM   Result Value Ref Range    Extra Tube hold for add-on    Gold Top - SST    Collection Time: 10/17/19  9:27 PM   Result Value Ref Range    Extra Tube Hold for add-ons.    CBC Auto Differential    Collection Time: 10/17/19  9:27 PM   Result Value Ref Range    WBC 10.92 (H) 3.40 - 10.80 10*3/mm3    RBC 3.98 3.77 - 5.28 10*6/mm3    Hemoglobin 12.0 12.0 - 15.9 g/dL    Hematocrit 37.6 34.0 - 46.6 %    MCV 94.5 79.0 - 97.0 fL    MCH 30.2 26.6 - 33.0 pg    MCHC 31.9 31.5 - 35.7 g/dL    RDW 13.9 12.3 - 15.4 %    RDW-SD 48.2 37.0 - 54.0 fl    MPV 11.6 6.0 - 12.0 fL    Platelets 200 140 - 450 10*3/mm3    Neutrophil  "% 79.8 (H) 42.7 - 76.0 %    Lymphocyte % 9.2 (L) 19.6 - 45.3 %    Monocyte % 5.2 5.0 - 12.0 %    Eosinophil % 3.2 0.3 - 6.2 %    Basophil % 1.4 0.0 - 1.5 %    Immature Grans % 1.2 (H) 0.0 - 0.5 %    Neutrophils, Absolute 8.71 (H) 1.70 - 7.00 10*3/mm3    Lymphocytes, Absolute 1.01 0.70 - 3.10 10*3/mm3    Monocytes, Absolute 0.57 0.10 - 0.90 10*3/mm3    Eosinophils, Absolute 0.35 0.00 - 0.40 10*3/mm3    Basophils, Absolute 0.15 0.00 - 0.20 10*3/mm3    Immature Grans, Absolute 0.13 (H) 0.00 - 0.05 10*3/mm3    nRBC 0.0 0.0 - 0.2 /100 WBC     Note: In addition to lab results from this visit, the labs listed above may include labs taken at another facility or during a different encounter within the last 24 hours. Please correlate lab times with ED admission and discharge times for further clarification of the services performed during this visit.    XR Chest 1 View   Final Result   No clearly acute radiographic findings. No significant change from prior study.      Signer Name: Micah Cohen MD    Signed: 10/17/2019 10:59 PM    Workstation Name: [id number]     [facility]      MRI Brain Without Contrast    (Results Pending)     Vitals:    10/17/19 2116 10/17/19 2247   BP: (!) 202/91 (!) 193/89   BP Location: Left arm    Patient Position: Sitting    Pulse: 59 61   Resp: 18 16   Temp: 97.8 °F (36.6 °C)    TempSrc: Oral    SpO2: 98% 95%   Weight: 63.5 kg (140 lb)    Height: 162.6 cm (64\")      Medications   sodium chloride 0.9 % flush 10 mL (not administered)   ondansetron (ZOFRAN) injection 4 mg (4 mg Intravenous Given 10/17/19 8223)   meclizine (ANTIVERT) tablet 25 mg (25 mg Oral Given 10/17/19 5057)     ECG/EMG Results (last 24 hours)     Procedure Component Value Units Date/Time    ECG 12 Lead [[id number]] Collected:  10/17/19 2126     Updated:  10/17/19 2126        ECG 12 Lead                           MDM    Final diagnoses:   Acute UTI   Generalized weakness   Ataxia       Documentation " assistance provided by yoselyn Ruiz.  Information recorded by the scribe was done at my direction and has been verified and validated by me.     Milagro Ruiz  10/17/19 2292       Patrice Isaac DO  10/22/19 1023

## 2019-10-18 NOTE — PROGRESS NOTES
[facility]  ADMISSION FOLLOW-UP NOTE          Patient admitted after midnight, H&P by my partner performed earlier on today's date reviewed.  Interim findings, labs, and charting also reviewed.        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         ADDITIONAL PLAN:  - detailed assessment and plan form admission reviewed  - Patient admitted with dizziness with difficulty walking, UTI and uncontrolled hypertension. Discussed plan with  at bedside. Continue abx. Imaging reviewed. Neurology to see.      Electronically signed by Milagro Lebron DO, 10/18/19, 9:56 AM.

## 2019-10-18 NOTE — PROGRESS NOTES
Discharge Planning Assessment  [facility]     Patient Name: Zoila Nava  MRN: [id number]  Today's Date: 10/18/2019    Admit Date: 10/17/2019    Discharge Needs Assessment     Row Name 10/18/19 1426       Living Environment    Lives With  spouse    Name(s) of Who Lives With Patient  Matt George    Current Living Arrangements  home/apartment/condo    Primary Care Provided by  self    Provides Primary Care For  no one    Family Caregiver if Needed  spouse    Family Caregiver Names  Spouse, Matt Nava    Quality of Family Relationships  supportive;involved;helpful    Able to Return to Prior Arrangements  yes       Resource/Environmental Concerns    Resource/Environmental Concerns  none    Transportation Concerns  car, none       Transition Planning    Patient/Family Anticipates Transition to  home with family    Patient/Family Anticipated Services at Transition  none    Transportation Anticipated  family or friend will provide       Discharge Needs Assessment    Readmission Within the Last 30 Days  no previous admission in last 30 days    Concerns to be Addressed  no discharge needs identified    Equipment Currently Used at Home  none    Anticipated Changes Related to Illness  none    Equipment Needed After Discharge  none        Discharge Plan     Row Name 10/18/19 1427       Plan    Plan  Plan is home at discharge    Patient/Family in Agreement with Plan  yes    Plan Comments  Met with patient in the room for initial discharge planning.  Lives in [place] with spouse in house with laundry in basement.  She and spouse just returned from [place] where they attended a wedding and stayed in their RV.  Started experiencing dizziness on return trip.  She is independent with ADL, but spouse prepares meals as she has no feeling in her right hand and is afraid of burning self.  PCP is Arnoldo Oneil.  She has no DME or in home services.  Plan is home at discharge.  States will not need rehab.   Following for needs.     Final Discharge Disposition Code  01 - home or self-care        Destination      No service coordination in this encounter.      Durable Medical Equipment      No service coordination in this encounter.      Dialysis/Infusion      No service coordination in this encounter.      Home Medical Care      No service coordination in this encounter.      Therapy      No service coordination in this encounter.      Community Resources      No service coordination in this encounter.          Demographic Summary     Row Name 10/18/19 1424       General Information    Admission Type  observation    Arrived From  emergency department    Referral Source  admission list    Reason for Consult  discharge planning    Preferred Language  English        Functional Status     Row Name 10/18/19 1425       Functional Status    Usual Activity Tolerance  good    Current Activity Tolerance  good       Functional Status, IADL    Medications  independent    Meal Preparation  assistive person Spouse does the cooking    Housekeeping  independent    Laundry  independent    Shopping  assistive person Patient and spouse shop together        Psychosocial    No documentation.       Abuse/Neglect    No documentation.       Legal    No documentation.       Substance Abuse    No documentation.       Patient Forms    No documentation.           Massiel Carlson, RN

## 2019-10-18 NOTE — H&P
"[facility] Medicine Services  HISTORY AND PHYSICAL    Patient Name: Zoila Nava  : 1933  MRN: [id number]  Primary Care Physician: Arnoldo Oneil MD    Subjective   Subjective     Chief Complaint:  Dizziness    HPI:  Zoila Nava is a 86 y.o. female with a past medical history significant for hyperlipidemia, essential hypertension, hypothyroidism, memory impairment and anemia presents to the ED with complaints of dizziness, nausea and vomiting.  Patient reports traveling back from [place] today via  and feeling very dizzy.  This past Friday while at a wedding patient reports eating something and shortly afterwards feeling nausea and began vomiting the next day.  She reports her nausea and vomiting mostly resolved up until today while traveling back to [place].  She reports with any movement worsening dizziness in which she reports feeling as if \"I was going to pass out.\"  She reports one episode of nausea and vomiting today but denies any fever, chills, abdominal pain, diarrhea, melena, dysuria, hematuria or urinary frequency.  MRI obtained in the ED shows no acute intracranial findings.  Patient reports having dizziness in the past but never this severe. Currently she reports her dizziness is subsided when she is lying still but with any movement her dizziness returns.  Patient will be admitted to [facility] under the care of the Hospitalist for further evaluation and treatment.     Review of Systems   Constitutional: Positive for activity change. Negative for appetite change, chills, diaphoresis, fatigue, fever and unexpected weight change.   HENT: Negative.    Eyes: Negative for photophobia and visual disturbance.   Respiratory: Negative for cough and shortness of breath.    Cardiovascular: Negative for chest pain, palpitations and leg swelling.   Gastrointestinal: Positive for constipation, nausea and vomiting. Negative for abdominal distention, abdominal pain and diarrhea. "   Genitourinary: Negative for difficulty urinating, dysuria, flank pain and frequency.   Musculoskeletal: Negative for arthralgias, neck pain and neck stiffness.   Skin: Negative.    Neurological: Positive for dizziness and light-headedness. Negative for tremors, seizures, syncope, facial asymmetry, speech difficulty, weakness, numbness and headaches.   Psychiatric/Behavioral: Negative.         Otherwise 10-system ROS reviewed and is negative except as mentioned in the HPI.    Personal History     Past Medical History:   Diagnosis Date   • Anemia    • Cataract    • Difficulty breathing     Chronic   • Graves' ophthalmopathy    • Hoarseness    • Spondylolisthesis of cervical region    • Vitamin B12 deficiency        Past Surgical History:   Procedure Laterality Date   • CERVICAL LAMINECTOMY     • CHOLECYSTECTOMY     • OTHER SURGICAL HISTORY Right     Neuroplasty Median Nerve at Carpal Tunnel   • THYROID SURGERY     • TOTAL KNEE ARTHROPLASTY Left 09/29/2014    Dr Colon       Family History: family history includes Diabetes in her father; Hypertension in her mother and sister; Other in her father.     Social History:  reports that she has never smoked. She has never used smokeless tobacco. She reports that she does not drink alcohol or use drugs.    Medications:  No current facility-administered medications on file prior to encounter.      Current Outpatient Medications on File Prior to Encounter   Medication Sig Dispense Refill   • dicyclomine (BENTYL) 10 MG capsule 1 po TID PRN abdominal pain 21 capsule 1   • donepezil (ARICEPT) 5 MG tablet Take 1 tablet by mouth Every Night. 30 tablet 5   • estradiol (ESTRACE) 0.1 MG/GM vaginal cream      • famotidine (PEPCID) 40 MG tablet TAKE ONE TABLET BY MOUTH EVERY MORNING 30 tablet 5   • levocetirizine (XYZAL) 5 MG tablet Take 1 tablet by mouth daily as needed.     • levothyroxine (SYNTHROID, LEVOTHROID) 88 MCG tablet Take 1 tablet by mouth Every Morning. 30 tablet 5    • LYRICA 200 MG capsule 3 (three) times a day.     • ondansetron (ZOFRAN) 4 MG tablet Take 1 tablet by mouth Every 8 (Eight) Hours As Needed for nausea or vomiting. 30 tablet 0   • propranolol (INDERAL) 40 MG tablet 2 (two) times a day.     • triamcinolone (KENALOG) 0.1 % cream Apply  topically 2 (two) times a day. Till healed 45 g 1         Allergies   Allergen Reactions   • Penicillins        Objective   Objective     Vital Signs:   Temp:  [97.8 °F (36.6 °C)] 97.8 °F (36.6 °C)  Heart Rate:  [56-61] 57  Resp:  [16-18] 16  BP: (162-202)/(75-92) 174/75        Physical Exam   Constitutional: She is oriented to person, place, and time. She appears well-developed and well-nourished. No distress.   Alert and oriented x4   HENT:   Head: Normocephalic and atraumatic.   Eyes: Conjunctivae and EOM are normal. Pupils are equal, round, and reactive to light. Right eye exhibits no discharge. Left eye exhibits no discharge. No scleral icterus.   Neck: Normal range of motion. Neck supple. No JVD present. No tracheal deviation present. No thyromegaly present.   Cardiovascular: Normal rate, regular rhythm, normal heart sounds and intact distal pulses. Exam reveals no gallop and no friction rub.   No murmur heard.  Pulmonary/Chest: Effort normal and breath sounds normal. No stridor. No respiratory distress. She has no wheezes. She has no rales. She exhibits no tenderness.   Abdominal: Soft. Bowel sounds are normal. She exhibits no distension and no mass. There is no tenderness. There is no rebound and no guarding. No hernia.   Musculoskeletal: Normal range of motion. She exhibits no edema, tenderness or deformity.   Lymphadenopathy:     She has no cervical adenopathy.   Neurological: She is alert and oriented to person, place, and time.   Nystagmus to left eye with lateral gaze, speech clear,  equal , sensation intact, no facial droop, no pronator drift.    Skin: Skin is warm and dry. No rash noted. She is not diaphoretic. No  erythema. No pallor.   Psychiatric: She has a normal mood and affect. Her behavior is normal. Judgment and thought content normal.   Nursing note and vitals reviewed.       Results Reviewed:  I have personally reviewed current lab, radiology, and data and agree.    Results from last 7 days   Lab Units 10/17/19  2127   WBC 10*3/mm3 10.92*   HEMOGLOBIN g/dL 12.0   HEMATOCRIT % 37.6   PLATELETS 10*3/mm3 200     Results from last 7 days   Lab Units 10/17/19  2127   SODIUM mmol/L 143   POTASSIUM mmol/L 4.6   CHLORIDE mmol/L 103   CO2 mmol/L 29.0   BUN mg/dL 20   CREATININE mg/dL 1.34*   GLUCOSE mg/dL 106*   CALCIUM mg/dL 9.0   ALT (SGPT) U/L 19   AST (SGOT) U/L 23     No results found for: BNP  No results found for: PHART, PCO2  Imaging Results (last 24 hours)     Procedure Component Value Units Date/Time    MRI Brain Without Contrast [[id number]] Collected:  10/18/19 0053     Updated:  10/18/19 0055    Narrative:           MRI Brain WO     Clinical history: One day history of dizziness    Comparison: No prior brain MR study    Technique: Sagittal, axial and coronal images of the head were obtained using the standard protocol.    Findings:  The diffusion sequence shows no evidence of restricted diffusion to indicate acute infarction. The FLAIR sequence shows the ventricles to be generous in size. Cortical sulci are correspondingly prominent. No extra-axial fluid collections. No significant  shift of midline structures. There are minimal FLAIR hyperintensities identified in the white matter of the cerebral hemispheres likely representing mild microvascular disease.    The brainstem and cerebellum are of normal signal intensity. The cervicomedullary junction is normal. The pituitary is within normal limits.    Trace mastoid effusion on the right side. Visualized paranasal sinuses are clear. No acute orbital findings.      Impression:       Impression:  1.  No acute intracranial findings.  2.  Age appropriate changes of  cerebral atrophy and minimal white matter microvascular disease.        Signer Name: Micah Cohen MD   Signed: 10/18/2019 12:53 AM   Workstation Name: [facility]    [facility]    XR Chest 1 View [[id number]] Collected:  10/17/19 2259     Updated:  10/17/19 2302    Narrative:       CR Chest 1 Vw    INDICATION:   One day history of dizziness     COMPARISON:    February 7, 2019    FINDINGS:  Single portable AP view(s) of the chest.  The heart size is enlarged. Mediastinal structures are midline. The aorta is calcified and ectatic.    No acute infiltrates. No pleural fluid. The lungs are mildly hyperinflated. Mild prominence of the bronchovascular interstitium is the sequelae of prior inflammation/edema.      Impression:       No clearly acute radiographic findings. No significant change from prior study.    Signer Name: Micah Cohen MD   Signed: 10/17/2019 10:59 PM   Workstation Name: [facility]    [facility]             Assessment/Plan   Assessment / Plan     Active Hospital Problems    Diagnosis   • **Dizziness   • Hypertensive urgency   • UTI (urinary tract infection)   • Memory impairment of gradual onset   • Hyperlipidemia   • Hypertension   • Hypothyroidism   • Pernicious anemia         Assessment & Plan:  86 year old female presents to the ED with complaints of dizziness that is worse with movement.     1) Dizziness  -? Vertigo vs BP related  -MRI of head was unremarkable for acute abnormalities  -continue meclizine initiated in the ED  -BP on arrival 202/91, will add prn IV hydralazine, currently /75  -IVF  -consult neurology in am  -up with assistance  -fall precautions  -telemetry monitoring  -check orthostatic BP    2) Hypertensive urgency  -BP on arrival 202/91, currently 174/75  -continue home propanolol   -add prn hydralazine for SBP >170    3) UTI  -UA shows 2+ leuks with TNTC RBC, 2+ bacteria  -UA did show 13-20 squamous cells, will repeat  UA  -urine cultures pending  -continue rocephin for now    4) Hypothyroidism  -continue home synthroid  -check TSH    5) memory impairment  -on aricept    6) GERD  -continue pepcid       DVT prophylaxis:scds    CODE STATUS:  Full code, lovenox   Code Status and Medical Interventions:   Ordered at: 10/18/19 0315     Level Of Support Discussed With:    Patient     Code Status:    CPR     Medical Interventions (Level of Support Prior to Arrest):    Full       Admission Status:  I believe this patient meets OBSERVATION status, however if further evaluation or treatment plans warrant, status may change.  Based upon current information, I predict patient's care encounter to be less than or equal to 2 midnights.    TONIE Mckeon   10/18/19   3:09 AM        Brief Attending Admission Attestation     I have seen and examined the patient, performing an independent face-to-face diagnostic evaluation with plan of care reviewed and developed with the advanced practice clinician (APC).      Brief Summary Statement:   Zoila Nava is a 86 y.o. female with past medical history of hyperlipidemia, hypertension, hypothyroidism and anemia.  Patient presented to [facility] ED due to severe dizziness.  Patient states that she had been in [place] at a wedding.  She and her  stayed in their RV in [place] on Tuesday and Thursday and then yesterday drove 13 hours back to [place].  She states that during stops during her trip home she noted she had worsening dizziness.  She states she also felt some dizziness while in [place].  But it became much more severe on the ride home.  She states that she required more assistance from her  to ambulate to the restroom.  She has some mild vision changes but denies any headache.  She does state that approximately 2 months ago her Lyrica was changed to a generic Lyrica.  Otherwise she has had no changes in medications.  She had associated nausea and vomiting.  Patient will be  admitted to telemetry and we will further evaluate the cause of her dizziness.  We will consult neurology in the a.m.      Remainder of detailed HPI is as noted above and has been reviewed and/or edited by me for completeness.      Attending Physical Exam:  Constitutional: No acute distress, awake, alert  HENT: NCAT, mucous membranes moist  Respiratory: Clear to auscultation bilaterally, respiratory effort normal   Cardiovascular: RRR, no murmurs, rubs, or gallops, palpable pedal pulses bilaterally  Gastrointestinal: Positive bowel sounds, soft, nontender, nondistended  Musculoskeletal: No bilateral ankle edema  Psychiatric: Appropriate affect, cooperative  Neurologic: Oriented x 3, strength symmetric in all extremities, Cranial Nerves grossly intact to confrontation, speech clear  Skin: No rashes        Brief Assessment/Plan :  See above for further detailed assessment and plan developed with APC which I have reviewed and/or edited for completeness.      Electronically signed by Shaina Phoenix DO, 10/18/19, 6:03 AM.

## 2019-10-18 NOTE — PLAN OF CARE
Problem: Hypertensive Disease/Crisis (Arterial) (Adult)  Intervention: Gradually Decrease Blood Pressure   10/18/19 4150   Safety Management   Medication Review/Management medications reviewed

## 2019-10-19 VITALS
RESPIRATION RATE: 16 BRPM | DIASTOLIC BLOOD PRESSURE: 87 MMHG | BODY MASS INDEX: 27.11 KG/M2 | TEMPERATURE: 98.1 F | SYSTOLIC BLOOD PRESSURE: 147 MMHG | OXYGEN SATURATION: 96 % | WEIGHT: 138.1 LBS | HEART RATE: 62 BPM | HEIGHT: 60 IN

## 2019-10-19 PROBLEM — R42 DIZZINESS: Status: RESOLVED | Noted: 2019-10-18 | Resolved: 2019-10-19

## 2019-10-19 PROCEDURE — 99236 HOSP IP/OBS SAME DATE HI 85: CPT | Performed by: INTERNAL MEDICINE

## 2019-10-19 PROCEDURE — G0378 HOSPITAL OBSERVATION PER HR: HCPCS

## 2019-10-19 PROCEDURE — 25010000002 HEPARIN (PORCINE) PER 1000 UNITS: Performed by: NURSE PRACTITIONER

## 2019-10-19 PROCEDURE — 96372 THER/PROPH/DIAG INJ SC/IM: CPT

## 2019-10-19 RX ORDER — MECLIZINE HYDROCHLORIDE 25 MG/1
25 TABLET ORAL 3 TIMES DAILY PRN
Qty: 15 TABLET | Refills: 0 | Status: ON HOLD | OUTPATIENT
Start: 2019-10-19 | End: 2021-10-12

## 2019-10-19 RX ORDER — AMLODIPINE BESYLATE 5 MG/1
5 TABLET ORAL
Qty: 30 TABLET | Refills: 0 | Status: SHIPPED | OUTPATIENT
Start: 2019-10-20 | End: 2020-02-18 | Stop reason: SDUPTHER

## 2019-10-19 RX ORDER — CEFUROXIME AXETIL 250 MG/1
250 TABLET ORAL 2 TIMES DAILY
Qty: 2 TABLET | Refills: 0 | Status: SHIPPED | OUTPATIENT
Start: 2019-10-19 | End: 2019-10-20

## 2019-10-19 RX ORDER — AMLODIPINE BESYLATE 10 MG/1
5 TABLET ORAL
Status: DISCONTINUED | OUTPATIENT
Start: 2019-10-19 | End: 2019-10-19 | Stop reason: HOSPADM

## 2019-10-19 RX ADMIN — AMLODIPINE BESYLATE 5 MG: 10 TABLET ORAL at 09:16

## 2019-10-19 RX ADMIN — MECLIZINE HYDROCHLORIDE 25 MG: 25 TABLET ORAL at 05:34

## 2019-10-19 RX ADMIN — PREGABALIN 200 MG: 100 CAPSULE ORAL at 05:33

## 2019-10-19 RX ADMIN — HEPARIN SODIUM 5000 UNITS: 5000 INJECTION INTRAVENOUS; SUBCUTANEOUS at 09:13

## 2019-10-19 RX ADMIN — LEVOTHYROXINE SODIUM 88 MCG: 88 TABLET ORAL at 05:33

## 2019-10-19 RX ADMIN — PROPRANOLOL HYDROCHLORIDE 40 MG: 20 TABLET ORAL at 09:12

## 2019-10-19 RX ADMIN — FAMOTIDINE 40 MG: 20 TABLET ORAL at 11:40

## 2019-10-19 RX ADMIN — CETIRIZINE HYDROCHLORIDE 5 MG: 10 TABLET, FILM COATED ORAL at 09:12

## 2019-10-19 NOTE — DISCHARGE SUMMARY
[facility] Medicine Services  DISCHARGE SUMMARY    Patient Name: Zoila Nava  : 1933  MRN: [id number]    Date of Admission: 10/17/2019  Date of Discharge:  10/19/2019  Primary Care Physician: Arnoldo Oneil MD    Consults     Date and Time Order Name Status Description    10/18/2019 0630 Inpatient Neurology Consult General Completed           Hospital Course     Presenting Problem:   Acute UTI [N39.0]    Active Hospital Problems    Diagnosis  POA   • Hypertensive urgency [I16.0]  Yes   • Acute UTI [N39.0]  Yes   • Memory impairment of gradual onset [R41.3]  Yes   • Hyperlipidemia [E78.5]  Yes   • Hypertension [I10]  Yes   • Hypothyroidism [E03.9]  Yes   • Pernicious anemia [D51.0]  Yes      Resolved Hospital Problems    Diagnosis Date Resolved POA   • **Dizziness [R42] 10/19/2019 Yes          Hospital Course:  Zoila Nava is a 86 y.o. female with a past medical history significant for hyperlipidemia, essential hypertension, hypothyroidism, memory impairment and anemia presents to the ED with complaints of dizziness, nausea and vomiting after an episode of nausea/vomiting several days ago.  She was found to have elevated blood pressure in the ED.  MRI brain showed no acute findings.  Dizziness was severe and worse with any movement so she was admitted for further evaluation and management of intractable symptoms.  Initial UA was suggestive of possible UTI vs. Contamination and she was started on antibiotics, however repeat UA (after antibiotics) was negative.  Neurology evaluated and felt that her symptoms may be related to UTI and hypertensive urgency.  She improved with blood pressure management, scheduled meclizine and treatment of possible UTI.  Amlodipine 5mg daily will be added at discharge due to persistently elevated blood pressure (SBP ~170) and she will follow up with her PCP within 1 week.  Unclear if she truly has a UTI but will complete one additional  day of antibiotics for possible UTI with ceftin.  Meclizine was changed to PRN at discharge to take if dizziness recurs.    Her TSH was elevated but medication had been recently increased by her PCP so will allow her to discuss further changes at her follow up visit.      Discharge Follow Up Recommendations for labs/diagnostics:  Follow up with PCP within 1 week    Day of Discharge     HPI:   Feels much better today and would like to go home.  Has walked to bathroom and in hallway without dizziness.    Review of Systems  Gen- No fevers, chills  CV- No chest pain, palpitations  Resp- No cough, dyspnea  GI- No N/V/D, abd pain    Otherwise ROS is negative except as mentioned in the HPI.    Vital Signs:   Temp:  [97.6 °F (36.4 °C)-98.8 °F (37.1 °C)] 97.9 °F (36.6 °C)  Heart Rate:  [59-92] 65  Resp:  [14-18] 14  BP: (125-170)/(74-93) 170/93     Physical Exam:  Constitutional: No acute distress, awake, alert, sitting up in bed  HENT: NCAT, mucous membranes moist  Respiratory: Clear to auscultation bilaterally, respiratory effort normal   Cardiovascular: RRR, no murmurs, rubs, or gallops  Gastrointestinal: Positive bowel sounds, soft, nontender, nondistended  Musculoskeletal: No bilateral ankle edema  Psychiatric: Appropriate affect, cooperative  Neurologic: Cranial Nerves grossly intact to confrontation, speech clear  Skin: No rashes    Pertinent  and/or Most Recent Results     Results from last 7 days   Lab Units 10/18/19  0931 10/17/19  2127   WBC 10*3/mm3 7.30 10.92*   HEMOGLOBIN g/dL 11.5* 12.0   HEMATOCRIT % 36.0 37.6   PLATELETS 10*3/mm3 177 200   SODIUM mmol/L 145 143   POTASSIUM mmol/L 4.2 4.6   CHLORIDE mmol/L 105 103   CO2 mmol/L 30.0* 29.0   BUN mg/dL 16 20   CREATININE mg/dL 1.16* 1.34*   GLUCOSE mg/dL 94 106*   CALCIUM mg/dL 8.7 9.0     Results from last 7 days   Lab Units 10/17/19  2127   BILIRUBIN mg/dL 0.2   ALK PHOS U/L 87   ALT (SGPT) U/L 19   AST (SGOT) U/L 23           Invalid input(s): TG, LDLCALC,  LDLREALC  Results from last 7 days   Lab Units 10/18/19  0931 10/17/19  2127   TSH uIU/mL 7.580*  --    TROPONIN T ng/mL  --  <0.010       Brief Urine Lab Results  (Last result in the past 365 days)      Color   Clarity   Blood   Leuk Est   Nitrite   Protein   CREAT   Urine HCG        10/18/19 1220 Yellow Clear Negative Negative Negative Negative               Microbiology Results Abnormal     None          Imaging Results (all)     Procedure Component Value Units Date/Time    CT Abdomen Pelvis Without Contrast [[id number]] Collected:  10/18/19 0437     Updated:  10/18/19 0440    Narrative:       CT Abdomen Pelvis WO    INDICATION:   Low back pain and urinary tract infection    TECHNIQUE:   CT of the abdomen and pelvis without contrast. Coronal and sagittal reconstructions were obtained.  Radiation dose reduction techniques included automated exposure control or exposure modulation based on body size. Radiation audit for number of CT and  nuclear cardiology exams performed in the last year: 1.      COMPARISON:   12/26/2016    FINDINGS:  Included lung bases are clear.    Abdomen: 1.8 cm focal area of low attenuation in the left lobe which may represent a small hepatic hemangioma. The gallbladder is surgically absent. The spleen and pancreas are within normal limits.    The kidneys show no evidence of renal or ureteral calculi. No hydronephrosis. No perinephric stranding.    The aorta is calcified without evidence of aneurysmal dilatation.    Pelvis: The bowel pattern is nonobstructive. No focal inflammatory changes are seen. Diverticulosis of the distal colon without CT evidence of acute diverticulitis. The bladder outline is smooth.    Regional osseous structures are diffusely demineralized. No acute or aggressive bone lesions.      Impression:         No clearly acute radiographic findings demonstrated.    Diverticulosis of the distal colon without CT evidence of acute diverticulitis.    1.8 cm area of low  attenuation in the left lobe which may represent a small hepatic hemangioma.          Signer Name: Micah Cohen MD   Signed: 10/18/2019 4:37 AM   Workstation Name: [facility]    [facility]    MRI Brain Without Contrast [[id number]] Collected:  10/18/19 0053     Updated:  10/18/19 0055    Narrative:           MRI Brain WO     Clinical history: One day history of dizziness    Comparison: No prior brain MR study    Technique: Sagittal, axial and coronal images of the head were obtained using the standard protocol.    Findings:  The diffusion sequence shows no evidence of restricted diffusion to indicate acute infarction. The FLAIR sequence shows the ventricles to be generous in size. Cortical sulci are correspondingly prominent. No extra-axial fluid collections. No significant  shift of midline structures. There are minimal FLAIR hyperintensities identified in the white matter of the cerebral hemispheres likely representing mild microvascular disease.    The brainstem and cerebellum are of normal signal intensity. The cervicomedullary junction is normal. The pituitary is within normal limits.    Trace mastoid effusion on the right side. Visualized paranasal sinuses are clear. No acute orbital findings.      Impression:       Impression:  1.  No acute intracranial findings.  2.  Age appropriate changes of cerebral atrophy and minimal white matter microvascular disease.        Signer Name: Micah Cohen MD   Signed: 10/18/2019 12:53 AM   Workstation Name: [facility]    [facility]    XR Chest 1 View [[id number]] Collected:  10/17/19 2259     Updated:  10/17/19 2302    Narrative:       CR Chest 1 Vw    INDICATION:   One day history of dizziness     COMPARISON:    February 7, 2019    FINDINGS:  Single portable AP view(s) of the chest.  The heart size is enlarged. Mediastinal structures are midline. The aorta is calcified and ectatic.    No acute infiltrates. No pleural  fluid. The lungs are mildly hyperinflated. Mild prominence of the bronchovascular interstitium is the sequelae of prior inflammation/edema.      Impression:       No clearly acute radiographic findings. No significant change from prior study.    Signer Name: Micah Cohen MD   Signed: 10/17/2019 10:59 PM   Workstation Name: [id number]    [facility]                           Discharge Details        Discharge Medications      New Medications      Instructions Start Date   amLODIPine 5 MG tablet  Commonly known as:  NORVASC   5 mg, Oral, Every 24 Hours Scheduled   Start Date:  10/20/2019     cefuroxime 250 MG tablet  Commonly known as:  CEFTIN   250 mg, Oral, 2 Times Daily      meclizine 25 MG tablet  Commonly known as:  ANTIVERT   25 mg, Oral, 3 Times Daily PRN         Continue These Medications      Instructions Start Date   dicyclomine 10 MG capsule  Commonly known as:  BENTYL   1 po TID PRN abdominal pain      donepezil 5 MG tablet  Commonly known as:  ARICEPT   5 mg, Oral, Nightly      estradiol 0.1 MG/GM vaginal cream  Commonly known as:  ESTRACE   No dose, route, or frequency recorded.      famotidine 40 MG tablet  Commonly known as:  PEPCID   TAKE ONE TABLET BY MOUTH EVERY MORNING      levocetirizine 5 MG tablet  Commonly known as:  XYZAL   1 tablet, Oral, Daily PRN      levothyroxine 88 MCG tablet  Commonly known as:  SYNTHROID, LEVOTHROID   88 mcg, Oral, Every Morning      LYRICA 200 MG capsule  Generic drug:  pregabalin   3 Times Daily      ondansetron 4 MG tablet  Commonly known as:  ZOFRAN   4 mg, Oral, Every 8 Hours PRN      propranolol 40 MG tablet  Commonly known as:  INDERAL   2 Times Daily      triamcinolone 0.1 % cream  Commonly known as:  KENALOG   Topical, 2 Times Daily, Till healed             Allergies   Allergen Reactions   • Penicillins          Discharge Disposition:  Home or Self Care    Diet:  Hospital:  Diet Order   Procedures   • Diet Regular; Cardiac          CODE STATUS:    Code Status and Medical Interventions:   Ordered at: 10/18/19 0315     Level Of Support Discussed With:    Patient     Code Status:    CPR     Medical Interventions (Level of Support Prior to Arrest):    Full         Future Appointments   Date Time Provider Department Center   2/6/2020  1:15 PM Arnoldo Oneil MD [facility] None       Additional Instructions for the Follow-ups that You Need to Schedule     Discharge Follow-up with PCP   As directed       Currently Documented PCP:    Arnoldo Oneil MD    PCP Phone Number:    [phone]     Follow Up Details:  Within 1 week               Time Spent on Discharge:  32 minutes    Electronically signed by Zoila Brandon MD, 10/19/19, 11:04 AM.

## 2019-10-19 NOTE — PLAN OF CARE
Problem: Patient Care Overview  Goal: Plan of Care Review  Outcome: Ongoing (interventions implemented as appropriate)   10/19/19 0234   Coping/Psychosocial   Plan of Care Reviewed With patient;spouse   Plan of Care Review   Progress improving   OTHER   Outcome Summary Patient has had a decent shift, sleeping on and off tonight. VSS, and patient has been alert and oriented times four. No complaints of pain tonight. Nursing staff will continue to monitor and assess the patient.     Goal: Individualization and Mutuality  Outcome: Ongoing (interventions implemented as appropriate)   10/19/19 0234   Individualization   Patient Specific Interventions Monitor and assess for pain q2hrs and prn       Problem: Fall Risk (Adult)  Goal: Identify Related Risk Factors and Signs and Symptoms  Outcome: Ongoing (interventions implemented as appropriate)   10/19/19 0234   Fall Risk (Adult)   Related Risk Factors (Fall Risk) age-related changes;environment unfamiliar   Signs and Symptoms (Fall Risk) presence of risk factors     Goal: Absence of Fall  Outcome: Ongoing (interventions implemented as appropriate)   10/19/19 0234   Fall Risk (Adult)   Absence of Fall making progress toward outcome       Problem: Pain, Chronic (Adult)  Goal: Identify Related Risk Factors and Signs and Symptoms  Outcome: Ongoing (interventions implemented as appropriate)   10/19/19 0234   Pain, Chronic (Adult)   Related Risk Factors (Chronic Pain) disease process   Signs and Symptoms (Chronic Pain) verbalization of pain descriptors;verbalization of pain/discomfort for a prolonged time period     Goal: Acceptable Pain/Comfort Level and Functional Ability  Outcome: Ongoing (interventions implemented as appropriate)   10/19/19 0234   Pain, Chronic (Adult)   Acceptable Pain/Comfort Level and Functional Ability making progress toward outcome       Problem: Hypertensive Disease/Crisis (Arterial) (Adult)  Goal: Signs and Symptoms of Listed Potential Problems Will  be Absent, Minimized or Managed (Hypertensive Disease/Crisis)  Outcome: Ongoing (interventions implemented as appropriate)   10/19/19 0234   Goal/Outcome Evaluation   Problems Assessed (Hypertensive Disease/Crisis (Arterial)) all   Problems Present (Hypertensive Disease) severe hypertension/hypertensive crisis

## 2019-10-20 DIAGNOSIS — K30 NUD (NONULCER DYSPEPSIA): ICD-10-CM

## 2019-10-21 ENCOUNTER — TRANSITIONAL CARE MANAGEMENT TELEPHONE ENCOUNTER (OUTPATIENT)
Dept: INTERNAL MEDICINE | Facility: CLINIC | Age: 84
End: 2019-10-21

## 2019-10-21 DIAGNOSIS — K30 NUD (NONULCER DYSPEPSIA): ICD-10-CM

## 2019-10-21 RX ORDER — FAMOTIDINE 40 MG/1
TABLET, FILM COATED ORAL
Qty: 30 TABLET | Refills: 4 | Status: SHIPPED | OUTPATIENT
Start: 2019-10-21 | End: 2020-02-18

## 2019-10-21 RX ORDER — FAMOTIDINE 40 MG/1
TABLET, FILM COATED ORAL
Qty: 30 TABLET | Refills: 4 | Status: SHIPPED | OUTPATIENT
Start: 2019-10-21 | End: 2020-07-14 | Stop reason: DRUGHIGH

## 2019-10-28 ENCOUNTER — OFFICE VISIT (OUTPATIENT)
Dept: INTERNAL MEDICINE | Facility: CLINIC | Age: 84
End: 2019-10-28

## 2019-10-28 VITALS
HEIGHT: 60 IN | WEIGHT: 143 LBS | BODY MASS INDEX: 28.07 KG/M2 | DIASTOLIC BLOOD PRESSURE: 60 MMHG | SYSTOLIC BLOOD PRESSURE: 124 MMHG | HEART RATE: 60 BPM

## 2019-10-28 DIAGNOSIS — E78.49 OTHER HYPERLIPIDEMIA: ICD-10-CM

## 2019-10-28 DIAGNOSIS — R41.3 MEMORY IMPAIRMENT OF GRADUAL ONSET: ICD-10-CM

## 2019-10-28 DIAGNOSIS — G62.9 NEUROPATHY: ICD-10-CM

## 2019-10-28 DIAGNOSIS — E53.8 VITAMIN B12 DEFICIENCY: ICD-10-CM

## 2019-10-28 DIAGNOSIS — I10 ESSENTIAL HYPERTENSION: Primary | ICD-10-CM

## 2019-10-28 DIAGNOSIS — D51.0 PERNICIOUS ANEMIA: ICD-10-CM

## 2019-10-28 DIAGNOSIS — E03.8 OTHER SPECIFIED HYPOTHYROIDISM: ICD-10-CM

## 2019-10-28 DIAGNOSIS — J30.1 SEASONAL ALLERGIC RHINITIS DUE TO POLLEN: ICD-10-CM

## 2019-10-28 PROCEDURE — 99495 TRANSJ CARE MGMT MOD F2F 14D: CPT | Performed by: INTERNAL MEDICINE

## 2019-10-28 NOTE — PROGRESS NOTES
Transitional Care Follow Up Visit  Subjective     Zoila GARCIA Brandy is a 86 y.o. female who presents for a transitional care management visit.    Within 48 business hours after discharge our office contacted her via telephone to coordinate her care and needs.      I reviewed and discussed the details of that call along with the discharge summary, hospital problems, inpatient lab results, inpatient diagnostic studies, and consultation reports with Zoila.     Current outpatient and discharge medications have been reconciled for the patient.  Reviewed by: Arnoldo Oneil MD      Date of TCM Phone Call 10/21/2019   [facility]   Date of Admission 10/17/2019   Date of Discharge 10/19/2019   Discharge Disposition Home or Self Care     Risk for Readmission (LACE) Score: 3 (10/19/2019  6:00 AM)      History of Present Illness   Course During Hospital Stay:  Here for f/u after recent hospitalization for dizziness.  Evaluated and felt sec to elevated BP and UTI.  Treated with amlodipine but when she took her BP readings it was too high.  Energy level is good.  No dizziness or lightheadedness.  No dysuria.  Appetite is good.  No hematuria.  No HAs.  Feels back to normal.     The following portions of the patient's history were reviewed and updated as appropriate: allergies, current medications, past family history, past medical history, past social history, past surgical history and problem list.    Review of Systems   Constitutional: Negative for chills, diaphoresis, fever and unexpected weight change.   HENT: Positive for hearing loss. Negative for congestion, ear pain, nosebleeds, postnasal drip, sinus pressure and sore throat.    Eyes: Negative for pain, discharge and itching.   Respiratory: Negative for cough, chest tightness, shortness of breath and wheezing.    Cardiovascular: Negative for chest pain, palpitations and leg swelling.   Gastrointestinal: Negative for abdominal distention, abdominal  pain, blood in stool, constipation, diarrhea, nausea and vomiting.   Endocrine: Negative for polydipsia and polyuria.   Genitourinary: Negative for difficulty urinating, dysuria, frequency and hematuria.   Musculoskeletal: Positive for arthralgias and back pain. Negative for gait problem, joint swelling and myalgias.   Skin: Negative for rash and wound.   Neurological: Positive for dizziness, tremors and numbness. Negative for syncope, weakness and headaches. Facial asymmetry: improved.   Psychiatric/Behavioral: Positive for decreased concentration (better). Negative for dysphoric mood and sleep disturbance. The patient is not nervous/anxious.        Objective   Physical Exam   Constitutional: She is oriented to person, place, and time. She appears well-developed and well-nourished.   HENT:   Head: Normocephalic and atraumatic.   Right Ear: External ear normal.   Left Ear: External ear normal.   Mouth/Throat: Oropharynx is clear and moist.   Mild cerumen on right   Eyes: Conjunctivae and EOM are normal.   Neck: Normal range of motion. Neck supple.   Cardiovascular: Normal rate, regular rhythm and normal heart sounds.   Pulmonary/Chest: Effort normal and breath sounds normal.   Abdominal: Soft. Bowel sounds are normal.   Musculoskeletal: Normal range of motion. She exhibits edema (trace).   Lymphadenopathy:     She has no cervical adenopathy.   Neurological: She is alert and oriented to person, place, and time.   10/17 MMSE 29/30   Skin: Skin is warm and dry.   Psychiatric: She has a normal mood and affect. Her behavior is normal. Thought content normal.       Assessment/Plan   Zoila was seen today for follow-up.    Diagnoses and all orders for this visit:    Essential hypertension    Other hyperlipidemia    Seasonal allergic rhinitis due to pollen    Vitamin B12 deficiency    Other specified hypothyroidism    Neuropathy    Pernicious anemia    Memory impairment of gradual onset              htn-stable on  BB  rhinitis-flonase and xyzal continuation, stable  tremor-cont propranolol, stable  hypothyroid- recheck today , no change  neuropathy-cont lyrica, stable  djd-cont prn lortab, rare use  b12 def-cont b12 but change to qod , recheck on rtc  hyperlipidemia-labs on rtc  Diverticulosis-advised citrucel qd  Memory impairment-cont aricept 5 mg qhs, stable         Hospital labs noted and dw patient, has already had flu shot

## 2020-01-16 RX ORDER — FAMOTIDINE 40 MG/1
TABLET, FILM COATED ORAL
Qty: 90 TABLET | Refills: 3 | Status: SHIPPED | OUTPATIENT
Start: 2020-01-16 | End: 2020-06-18

## 2020-02-18 ENCOUNTER — OFFICE VISIT (OUTPATIENT)
Dept: INTERNAL MEDICINE | Facility: CLINIC | Age: 85
End: 2020-02-18

## 2020-02-18 VITALS
BODY MASS INDEX: 28.27 KG/M2 | DIASTOLIC BLOOD PRESSURE: 60 MMHG | HEIGHT: 60 IN | WEIGHT: 144 LBS | SYSTOLIC BLOOD PRESSURE: 110 MMHG | HEART RATE: 68 BPM

## 2020-02-18 DIAGNOSIS — E53.8 VITAMIN B12 DEFICIENCY: ICD-10-CM

## 2020-02-18 DIAGNOSIS — E03.8 OTHER SPECIFIED HYPOTHYROIDISM: ICD-10-CM

## 2020-02-18 DIAGNOSIS — G62.9 NEUROPATHY: ICD-10-CM

## 2020-02-18 DIAGNOSIS — D51.0 PERNICIOUS ANEMIA: ICD-10-CM

## 2020-02-18 DIAGNOSIS — K30 NUD (NONULCER DYSPEPSIA): ICD-10-CM

## 2020-02-18 DIAGNOSIS — D64.9 ANEMIA, UNSPECIFIED TYPE: ICD-10-CM

## 2020-02-18 DIAGNOSIS — J30.1 SEASONAL ALLERGIC RHINITIS DUE TO POLLEN: ICD-10-CM

## 2020-02-18 DIAGNOSIS — R25.1 TREMOR: ICD-10-CM

## 2020-02-18 DIAGNOSIS — E78.49 OTHER HYPERLIPIDEMIA: ICD-10-CM

## 2020-02-18 DIAGNOSIS — R41.3 MEMORY IMPAIRMENT OF GRADUAL ONSET: ICD-10-CM

## 2020-02-18 DIAGNOSIS — I10 ESSENTIAL HYPERTENSION: Primary | ICD-10-CM

## 2020-02-18 PROBLEM — I16.0 HYPERTENSIVE URGENCY: Status: RESOLVED | Noted: 2019-10-18 | Resolved: 2020-02-18

## 2020-02-18 PROCEDURE — 99214 OFFICE O/P EST MOD 30 MIN: CPT | Performed by: INTERNAL MEDICINE

## 2020-02-18 RX ORDER — DONEPEZIL HYDROCHLORIDE 10 MG/1
10 TABLET, FILM COATED ORAL NIGHTLY
Qty: 90 TABLET | Refills: 3 | Status: SHIPPED | OUTPATIENT
Start: 2020-02-18 | End: 2021-01-14

## 2020-02-18 RX ORDER — AMLODIPINE BESYLATE 10 MG/1
10 TABLET ORAL NIGHTLY
Qty: 90 TABLET | Refills: 3 | Status: SHIPPED | OUTPATIENT
Start: 2020-02-18 | End: 2020-02-18 | Stop reason: SDUPTHER

## 2020-02-18 RX ORDER — AMLODIPINE BESYLATE 5 MG/1
5 TABLET ORAL EVERY MORNING
Qty: 90 TABLET | Refills: 3 | Status: SHIPPED | OUTPATIENT
Start: 2020-02-18 | End: 2021-10-28 | Stop reason: SDUPTHER

## 2020-02-18 NOTE — PROGRESS NOTES
"Patient is a 86 y.o. female who is here for a follow up of hyperlipidemia,hypertension,hypothyroidism and diabetes.  Chief Complaint   Patient presents with   • Hyperlipidemia   • Hypertension   • Hypothyroidism   • Diabetes         HPI:    Here for mgmt of HTN/hypothyroid and hyperlipidemia.  Onset years.  Compliant with meds.  BP has been fine.  No dizziness or lightheadedness.  Energy level is good for \"an old lady\".  Forgets things occasionally.  No abdominal pains.  Some cold intolerance.  Trying to watch her diet. No significant weight changes.      History:     Patient Active Problem List   Diagnosis   • Allergic rhinitis   • Hyperlipidemia   • Hypertension   • Hypocalcemia   • Hypothyroidism   • Neuropathy   • NUD (nonulcer dyspepsia)   • Painful knee   • Pernicious anemia   • Tremor   • Vitamin B12 deficiency   • Spondylolisthesis of cervical region   • Graves' ophthalmopathy   • Anemia   • Memory impairment of gradual onset   • Ascending aortic aneurysm (CMS/HCC)   • Acute UTI       Past Medical History:   Diagnosis Date   • Anemia    • Arthritis    • Asthma    • Cataract    • Difficulty breathing     Chronic   • GERD (gastroesophageal reflux disease)    • Graves' ophthalmopathy    • Hoarseness    • Hypertension    • Spondylolisthesis of cervical region    • Vitamin B12 deficiency        Past Surgical History:   Procedure Laterality Date   • CERVICAL LAMINECTOMY     • CHOLECYSTECTOMY     • OTHER SURGICAL HISTORY Right     Neuroplasty Median Nerve at Carpal Tunnel   • THYROID SURGERY     • TOTAL KNEE ARTHROPLASTY Left 09/29/2014    Dr Colon       Current Outpatient Medications on File Prior to Visit   Medication Sig   • dicyclomine (BENTYL) 10 MG capsule 1 po TID PRN abdominal pain   • estradiol (ESTRACE) 0.1 MG/GM vaginal cream    • famotidine (PEPCID) 40 MG tablet TAKE ONE TABLET BY MOUTH EVERY MORNING   • famotidine (PEPCID) 40 MG tablet TAKE 1 TABLET BY MOUTH EVERY MORNING   • levocetirizine " (XYZAL) 5 MG tablet Take 1 tablet by mouth daily as needed.   • levothyroxine (SYNTHROID, LEVOTHROID) 88 MCG tablet Take 1 tablet by mouth Every Morning.   • LYRICA 200 MG capsule 3 (three) times a day.   • meclizine (ANTIVERT) 25 MG tablet Take 1 tablet by mouth 3 (Three) Times a Day As Needed for dizziness.   • ondansetron (ZOFRAN) 4 MG tablet Take 1 tablet by mouth Every 8 (Eight) Hours As Needed for nausea or vomiting.   • propranolol (INDERAL) 40 MG tablet 2 (two) times a day.   • triamcinolone (KENALOG) 0.1 % cream Apply  topically 2 (two) times a day. Till healed   • [DISCONTINUED] amLODIPine (NORVASC) 5 MG tablet Take 1 tablet by mouth Daily.   • [DISCONTINUED] donepezil (ARICEPT) 5 MG tablet Take 1 tablet by mouth Every Night.   • [DISCONTINUED] famotidine (PEPCID) 40 MG tablet TAKE ONE TABLET BY MOUTH EVERY MORNING     No current facility-administered medications on file prior to visit.        Family History   Problem Relation Age of Onset   • Hypertension Mother    • Other Father         cardiac disorder   • Diabetes Father    • Hypertension Sister        Social History     Socioeconomic History   • Marital status:      Spouse name: Not on file   • Number of children: Not on file   • Years of education: Not on file   • Highest education level: Not on file   Tobacco Use   • Smoking status: Never Smoker   • Smokeless tobacco: Never Used   Substance and Sexual Activity   • Alcohol use: No     Frequency: Never   • Drug use: No   • Sexual activity: Defer         Review of Systems   Constitutional: Negative for chills, diaphoresis, fever and unexpected weight change.   HENT: Positive for hearing loss. Negative for congestion, ear pain, nosebleeds, postnasal drip, sinus pressure and sore throat.    Eyes: Negative for pain, discharge and itching.   Respiratory: Negative for cough, chest tightness, shortness of breath and wheezing.    Cardiovascular: Negative for chest pain, palpitations and leg swelling.  "  Gastrointestinal: Negative for abdominal distention, abdominal pain, blood in stool, constipation, diarrhea, nausea and vomiting.   Endocrine: Positive for cold intolerance. Negative for polydipsia and polyuria.   Genitourinary: Negative for difficulty urinating, dysuria, frequency and hematuria.   Musculoskeletal: Positive for arthralgias and back pain. Negative for gait problem, joint swelling and myalgias.   Skin: Negative for rash and wound.   Neurological: Positive for tremors and numbness. Negative for syncope, weakness and headaches. Facial asymmetry: improved.   Psychiatric/Behavioral: Positive for decreased concentration (slow progression). Negative for dysphoric mood and sleep disturbance. The patient is not nervous/anxious.        /60   Pulse 68   Ht 152.4 cm (60\")   Wt 65.3 kg (144 lb)   LMP  (LMP Unknown)   BMI 28.12 kg/m²       Physical Exam   Constitutional: She is oriented to person, place, and time. She appears well-developed and well-nourished.   HENT:   Head: Normocephalic and atraumatic.   Right Ear: External ear normal.   Left Ear: External ear normal.   Mouth/Throat: Oropharynx is clear and moist.   Eyes: Conjunctivae and EOM are normal.   Neck: Normal range of motion. Neck supple.   Cardiovascular: Normal rate, regular rhythm and normal heart sounds.   Pulmonary/Chest: Effort normal and breath sounds normal.   Abdominal: Soft. Bowel sounds are normal.   Musculoskeletal: Normal range of motion. She exhibits edema (trace).   Lymphadenopathy:     She has no cervical adenopathy.   Neurological: She is alert and oriented to person, place, and time.   10/17 MMSE 29/30   Skin: Skin is warm and dry.   Psychiatric: She has a normal mood and affect. Her behavior is normal. Thought content normal.       Procedure:      Discussion/Summary:    htn-stable on amlodipine and BB  rhinitis-flonase and xyzal continuation, stable  tremor-cont propranolol, stable  hypothyroid- recheck soon , no " change  neuropathy-cont lyrica, stable  djd-cont prn lortab, rare use  b12 def-cont b12 but change to qod , recheck on rtc  hyperlipidemia-labs on rtc, cont diet control  Diverticulosis-advised citrucel qd, stable  Memory impairment-increase the aricept to 10 mg         labs noted and dw patient, has already had flu shot    Current Outpatient Medications:   •  amLODIPine (NORVASC) 5 MG tablet, Take 1 tablet by mouth Every Morning., Disp: 90 tablet, Rfl: 3  •  dicyclomine (BENTYL) 10 MG capsule, 1 po TID PRN abdominal pain, Disp: 21 capsule, Rfl: 1  •  donepezil (ARICEPT) 10 MG tablet, Take 1 tablet by mouth Every Night., Disp: 90 tablet, Rfl: 3  •  estradiol (ESTRACE) 0.1 MG/GM vaginal cream, , Disp: , Rfl:   •  famotidine (PEPCID) 40 MG tablet, TAKE ONE TABLET BY MOUTH EVERY MORNING, Disp: 30 tablet, Rfl: 4  •  famotidine (PEPCID) 40 MG tablet, TAKE 1 TABLET BY MOUTH EVERY MORNING, Disp: 90 tablet, Rfl: 3  •  levocetirizine (XYZAL) 5 MG tablet, Take 1 tablet by mouth daily as needed., Disp: , Rfl:   •  levothyroxine (SYNTHROID, LEVOTHROID) 88 MCG tablet, Take 1 tablet by mouth Every Morning., Disp: 30 tablet, Rfl: 5  •  LYRICA 200 MG capsule, 3 (three) times a day., Disp: , Rfl:   •  meclizine (ANTIVERT) 25 MG tablet, Take 1 tablet by mouth 3 (Three) Times a Day As Needed for dizziness., Disp: 15 tablet, Rfl: 0  •  ondansetron (ZOFRAN) 4 MG tablet, Take 1 tablet by mouth Every 8 (Eight) Hours As Needed for nausea or vomiting., Disp: 30 tablet, Rfl: 0  •  propranolol (INDERAL) 40 MG tablet, 2 (two) times a day., Disp: , Rfl:   •  triamcinolone (KENALOG) 0.1 % cream, Apply  topically 2 (two) times a day. Till healed, Disp: 45 g, Rfl: 1        Zoila was seen today for hyperlipidemia, hypertension, hypothyroidism and diabetes.    Diagnoses and all orders for this visit:    Essential hypertension  -     amLODIPine (NORVASC) 5 MG tablet; Take 1 tablet by mouth Every Morning.    Other hyperlipidemia    Seasonal allergic  rhinitis due to pollen    Vitamin B12 deficiency    NUD (nonulcer dyspepsia)    Other specified hypothyroidism    Neuropathy    Pernicious anemia    Anemia, unspecified type    Tremor    Memory impairment of gradual onset  -     Discontinue: amLODIPine (NORVASC) 10 MG tablet; Take 1 tablet by mouth Every Night.  -     donepezil (ARICEPT) 10 MG tablet; Take 1 tablet by mouth Every Night.

## 2020-03-06 ENCOUNTER — TELEPHONE (OUTPATIENT)
Dept: INTERNAL MEDICINE | Facility: CLINIC | Age: 85
End: 2020-03-06

## 2020-03-10 DIAGNOSIS — E03.8 OTHER SPECIFIED HYPOTHYROIDISM: ICD-10-CM

## 2020-03-10 RX ORDER — LEVOTHYROXINE SODIUM 88 UG/1
TABLET ORAL
Qty: 30 TABLET | Refills: 4 | Status: SHIPPED | OUTPATIENT
Start: 2020-03-10 | End: 2020-06-19 | Stop reason: SDUPTHER

## 2020-06-18 ENCOUNTER — LAB (OUTPATIENT)
Dept: LAB | Facility: HOSPITAL | Age: 85
End: 2020-06-18

## 2020-06-18 ENCOUNTER — OFFICE VISIT (OUTPATIENT)
Dept: INTERNAL MEDICINE | Facility: CLINIC | Age: 85
End: 2020-06-18

## 2020-06-18 VITALS
BODY MASS INDEX: 28.47 KG/M2 | HEIGHT: 60 IN | WEIGHT: 145 LBS | OXYGEN SATURATION: 98 % | TEMPERATURE: 98.2 F | DIASTOLIC BLOOD PRESSURE: 60 MMHG | HEART RATE: 68 BPM | SYSTOLIC BLOOD PRESSURE: 100 MMHG

## 2020-06-18 DIAGNOSIS — E03.8 OTHER SPECIFIED HYPOTHYROIDISM: ICD-10-CM

## 2020-06-18 DIAGNOSIS — J30.1 SEASONAL ALLERGIC RHINITIS DUE TO POLLEN: ICD-10-CM

## 2020-06-18 DIAGNOSIS — R41.3 MEMORY IMPAIRMENT OF GRADUAL ONSET: ICD-10-CM

## 2020-06-18 DIAGNOSIS — R25.1 TREMOR: ICD-10-CM

## 2020-06-18 DIAGNOSIS — I10 ESSENTIAL HYPERTENSION: Primary | ICD-10-CM

## 2020-06-18 DIAGNOSIS — K30 NUD (NONULCER DYSPEPSIA): ICD-10-CM

## 2020-06-18 DIAGNOSIS — I71.21 ASCENDING AORTIC ANEURYSM (HCC): ICD-10-CM

## 2020-06-18 DIAGNOSIS — G62.9 NEUROPATHY: ICD-10-CM

## 2020-06-18 DIAGNOSIS — E78.49 OTHER HYPERLIPIDEMIA: ICD-10-CM

## 2020-06-18 DIAGNOSIS — E53.8 VITAMIN B12 DEFICIENCY: ICD-10-CM

## 2020-06-18 DIAGNOSIS — D51.0 PERNICIOUS ANEMIA: ICD-10-CM

## 2020-06-18 LAB
ALBUMIN SERPL-MCNC: 4.1 G/DL (ref 3.5–5.2)
ALBUMIN/GLOB SERPL: 1.8 G/DL
ALP SERPL-CCNC: 71 U/L (ref 39–117)
ALT SERPL W P-5'-P-CCNC: 19 U/L (ref 1–33)
ANION GAP SERPL CALCULATED.3IONS-SCNC: 12.7 MMOL/L (ref 5–15)
AST SERPL-CCNC: 24 U/L (ref 1–32)
BILIRUB SERPL-MCNC: 0.2 MG/DL (ref 0.2–1.2)
BUN BLD-MCNC: 19 MG/DL (ref 8–23)
BUN/CREAT SERPL: 11.9 (ref 7–25)
CALCIUM SPEC-SCNC: 8.6 MG/DL (ref 8.6–10.5)
CHLORIDE SERPL-SCNC: 101 MMOL/L (ref 98–107)
CO2 SERPL-SCNC: 25.3 MMOL/L (ref 22–29)
CREAT BLD-MCNC: 1.59 MG/DL (ref 0.57–1)
DEPRECATED RDW RBC AUTO: 45.5 FL (ref 37–54)
ERYTHROCYTE [DISTWIDTH] IN BLOOD BY AUTOMATED COUNT: 13.2 % (ref 12.3–15.4)
GFR SERPL CREATININE-BSD FRML MDRD: 31 ML/MIN/1.73
GLOBULIN UR ELPH-MCNC: 2.3 GM/DL
GLUCOSE BLD-MCNC: 82 MG/DL (ref 65–99)
HCT VFR BLD AUTO: 35 % (ref 34–46.6)
HGB BLD-MCNC: 11.5 G/DL (ref 12–15.9)
MCH RBC QN AUTO: 31.1 PG (ref 26.6–33)
MCHC RBC AUTO-ENTMCNC: 32.9 G/DL (ref 31.5–35.7)
MCV RBC AUTO: 94.6 FL (ref 79–97)
PLATELET # BLD AUTO: 201 10*3/MM3 (ref 140–450)
PMV BLD AUTO: 12.7 FL (ref 6–12)
POTASSIUM BLD-SCNC: 4.9 MMOL/L (ref 3.5–5.2)
PROT SERPL-MCNC: 6.4 G/DL (ref 6–8.5)
RBC # BLD AUTO: 3.7 10*6/MM3 (ref 3.77–5.28)
SODIUM BLD-SCNC: 139 MMOL/L (ref 136–145)
WBC NRBC COR # BLD: 6.78 10*3/MM3 (ref 3.4–10.8)

## 2020-06-18 PROCEDURE — 85027 COMPLETE CBC AUTOMATED: CPT | Performed by: INTERNAL MEDICINE

## 2020-06-18 PROCEDURE — 80053 COMPREHEN METABOLIC PANEL: CPT | Performed by: INTERNAL MEDICINE

## 2020-06-18 PROCEDURE — 99214 OFFICE O/P EST MOD 30 MIN: CPT | Performed by: INTERNAL MEDICINE

## 2020-06-18 PROCEDURE — 84443 ASSAY THYROID STIM HORMONE: CPT | Performed by: INTERNAL MEDICINE

## 2020-06-18 PROCEDURE — 82607 VITAMIN B-12: CPT | Performed by: INTERNAL MEDICINE

## 2020-06-18 NOTE — PROGRESS NOTES
"Patient is a 86 y.o. female who is here for a follow up of hyperlipidemia and hypertension  Chief Complaint   Patient presents with   • Hyperlipidemia   • Hypertension         HPI:    Here for mgmt HTN and hypothyroid and memory impairment.  Doing well for the most part.  No dizziness or lightheadedness.  No falls.  Energy level is pretty good.  Appetite is soso.  Sleeping well.  No abdominal pains.  No HAs.  No edema.  BP has been \"alright\".    History:     Patient Active Problem List   Diagnosis   • Allergic rhinitis   • Hyperlipidemia   • Hypertension   • Hypocalcemia   • Hypothyroidism   • Neuropathy   • NUD (nonulcer dyspepsia)   • Painful knee   • Pernicious anemia   • Tremor   • Vitamin B12 deficiency   • Spondylolisthesis of cervical region   • Graves' ophthalmopathy   • Anemia   • Memory impairment of gradual onset   • Ascending aortic aneurysm (CMS/HCC)   • Acute UTI       Past Medical History:   Diagnosis Date   • Anemia    • Arthritis    • Asthma    • Cataract    • Difficulty breathing     Chronic   • GERD (gastroesophageal reflux disease)    • Graves' ophthalmopathy    • Hoarseness    • Hypertension    • Spondylolisthesis of cervical region    • Vitamin B12 deficiency        Past Surgical History:   Procedure Laterality Date   • CERVICAL LAMINECTOMY     • CHOLECYSTECTOMY     • OTHER SURGICAL HISTORY Right     Neuroplasty Median Nerve at Carpal Tunnel   • THYROID SURGERY     • TOTAL KNEE ARTHROPLASTY Left 09/29/2014    Dr Colon       Current Outpatient Medications on File Prior to Visit   Medication Sig   • amLODIPine (NORVASC) 5 MG tablet Take 1 tablet by mouth Every Morning.   • dicyclomine (BENTYL) 10 MG capsule 1 po TID PRN abdominal pain   • donepezil (ARICEPT) 10 MG tablet Take 1 tablet by mouth Every Night.   • estradiol (ESTRACE) 0.1 MG/GM vaginal cream    • famotidine (PEPCID) 40 MG tablet TAKE ONE TABLET BY MOUTH EVERY MORNING   • levocetirizine (XYZAL) 5 MG tablet Take 1 tablet by mouth " daily as needed.   • LYRICA 200 MG capsule 3 (three) times a day.   • meclizine (ANTIVERT) 25 MG tablet Take 1 tablet by mouth 3 (Three) Times a Day As Needed for dizziness.   • ondansetron (ZOFRAN) 4 MG tablet Take 1 tablet by mouth Every 8 (Eight) Hours As Needed for nausea or vomiting.   • propranolol (INDERAL) 40 MG tablet 2 (two) times a day.   • triamcinolone (KENALOG) 0.1 % cream Apply  topically 2 (two) times a day. Till healed   • [DISCONTINUED] levothyroxine (SYNTHROID, LEVOTHROID) 88 MCG tablet TAKE ONE TABLET BY MOUTH EVERY MORNING     No current facility-administered medications on file prior to visit.        Family History   Problem Relation Age of Onset   • Hypertension Mother    • Other Father         cardiac disorder   • Diabetes Father    • Hypertension Sister        Social History     Socioeconomic History   • Marital status:      Spouse name: Not on file   • Number of children: Not on file   • Years of education: Not on file   • Highest education level: Not on file   Tobacco Use   • Smoking status: Never Smoker   • Smokeless tobacco: Never Used   Substance and Sexual Activity   • Alcohol use: No     Frequency: Never   • Drug use: No   • Sexual activity: Defer         Review of Systems   Constitutional: Negative for chills, diaphoresis, fever and unexpected weight change.   HENT: Positive for hearing loss. Negative for congestion, ear pain, nosebleeds, postnasal drip, sinus pressure and sore throat.    Eyes: Negative for pain, discharge and itching.   Respiratory: Negative for cough, chest tightness, shortness of breath and wheezing.    Cardiovascular: Negative for chest pain, palpitations and leg swelling.   Gastrointestinal: Negative for abdominal distention, abdominal pain, blood in stool, constipation, diarrhea, nausea and vomiting.   Endocrine: Positive for cold intolerance. Negative for polydipsia and polyuria.   Genitourinary: Negative for difficulty urinating, dysuria, frequency and  "hematuria.   Musculoskeletal: Positive for arthralgias and back pain. Negative for gait problem, joint swelling and myalgias.   Skin: Negative for rash and wound.   Neurological: Positive for tremors and numbness. Negative for syncope, weakness and headaches. Facial asymmetry: improved.   Psychiatric/Behavioral: Positive for decreased concentration. Negative for dysphoric mood and sleep disturbance. The patient is not nervous/anxious.        /60   Pulse 68   Temp 98.2 °F (36.8 °C) (Temporal)   Ht 152.4 cm (60\")   Wt 65.8 kg (145 lb)   LMP  (LMP Unknown)   SpO2 98%   BMI 28.32 kg/m²       Physical Exam   Constitutional: She is oriented to person, place, and time. She appears well-developed and well-nourished.   HENT:   Head: Normocephalic and atraumatic.   Right Ear: External ear normal.   Left Ear: External ear normal.   Mouth/Throat: Oropharynx is clear and moist.   Eyes: Conjunctivae and EOM are normal.   Neck: Normal range of motion. Neck supple.   Cardiovascular: Normal rate, regular rhythm and normal heart sounds.   Pulmonary/Chest: Effort normal and breath sounds normal.   Abdominal: Soft. Bowel sounds are normal.   Musculoskeletal: Normal range of motion. She exhibits edema (trace).   Lymphadenopathy:     She has no cervical adenopathy.   Neurological: She is alert and oriented to person, place, and time.   10/17 MMSE 29/30   Skin: Skin is warm and dry.   Psychiatric: She has a normal mood and affect. Her behavior is normal. Thought content normal.       Procedure:      Discussion/Summary:    htn-stable on amlodipine and propranolol  rhinitis-flonase and xyzal continuation, controlled  tremor-cont propranolol, controlled  hypothyroid- recheck, and will increase the dose  neuropathy-cont lyrica, controlled  djd-cont prn lortab, rare use, stable  b12 def-cont b12, level today  hyperlipidemia-labs on rtc, cont diet control  Diverticulosis-advised citrucel qd, asymptomatic  Memory impairment-cont " aricept  Ascending Aortic aneurysm-recheck         6/18 labs noted and dw patient, advised NSAIDs avoidance and     increase fluids    Current Outpatient Medications:   •  amLODIPine (NORVASC) 5 MG tablet, Take 1 tablet by mouth Every Morning., Disp: 90 tablet, Rfl: 3  •  dicyclomine (BENTYL) 10 MG capsule, 1 po TID PRN abdominal pain, Disp: 21 capsule, Rfl: 1  •  donepezil (ARICEPT) 10 MG tablet, Take 1 tablet by mouth Every Night., Disp: 90 tablet, Rfl: 3  •  estradiol (ESTRACE) 0.1 MG/GM vaginal cream, , Disp: , Rfl:   •  famotidine (PEPCID) 40 MG tablet, TAKE ONE TABLET BY MOUTH EVERY MORNING, Disp: 30 tablet, Rfl: 4  •  levocetirizine (XYZAL) 5 MG tablet, Take 1 tablet by mouth daily as needed., Disp: , Rfl:   •  levothyroxine (SYNTHROID, LEVOTHROID) 100 MCG tablet, Take 1 tablet by mouth Every Morning., Disp: 90 tablet, Rfl: 1  •  LYRICA 200 MG capsule, 3 (three) times a day., Disp: , Rfl:   •  meclizine (ANTIVERT) 25 MG tablet, Take 1 tablet by mouth 3 (Three) Times a Day As Needed for dizziness., Disp: 15 tablet, Rfl: 0  •  ondansetron (ZOFRAN) 4 MG tablet, Take 1 tablet by mouth Every 8 (Eight) Hours As Needed for nausea or vomiting., Disp: 30 tablet, Rfl: 0  •  propranolol (INDERAL) 40 MG tablet, 2 (two) times a day., Disp: , Rfl:   •  triamcinolone (KENALOG) 0.1 % cream, Apply  topically 2 (two) times a day. Till healed, Disp: 45 g, Rfl: 1        Zoila was seen today for hyperlipidemia and hypertension.    Diagnoses and all orders for this visit:    Essential hypertension  -     CBC (No Diff)  -     Comprehensive Metabolic Panel    Other hyperlipidemia    Seasonal allergic rhinitis due to pollen    Vitamin B12 deficiency  -     Vitamin B12    NUD (nonulcer dyspepsia)    Other specified hypothyroidism  -     TSH  -     levothyroxine (SYNTHROID, LEVOTHROID) 100 MCG tablet; Take 1 tablet by mouth Every Morning.    Neuropathy    Pernicious anemia    Memory impairment of gradual onset    Tremor    Ascending  aortic aneurysm (CMS/HCC)  -     CT Chest With Contrast

## 2020-06-19 LAB
TSH SERPL DL<=0.05 MIU/L-ACNC: 11.9 UIU/ML (ref 0.27–4.2)
VIT B12 BLD-MCNC: 1065 PG/ML (ref 211–946)

## 2020-06-19 RX ORDER — LEVOTHYROXINE SODIUM 0.1 MG/1
100 TABLET ORAL EVERY MORNING
Qty: 90 TABLET | Refills: 1 | Status: SHIPPED | OUTPATIENT
Start: 2020-06-19 | End: 2021-01-12 | Stop reason: SDUPTHER

## 2020-07-02 ENCOUNTER — HOSPITAL ENCOUNTER (OUTPATIENT)
Dept: CT IMAGING | Facility: HOSPITAL | Age: 85
Discharge: HOME | End: 2020-07-02
Admitting: INTERNAL MEDICINE

## 2020-07-02 PROCEDURE — 25010000002 IOPAMIDOL 61 % SOLUTION: Performed by: INTERNAL MEDICINE

## 2020-07-02 PROCEDURE — 71260 CT THORAX DX C+: CPT

## 2020-07-02 RX ADMIN — IOPAMIDOL 50 ML: 612 INJECTION, SOLUTION INTRAVENOUS at 13:42

## 2020-07-14 ENCOUNTER — TELEPHONE (OUTPATIENT)
Dept: INTERNAL MEDICINE | Facility: CLINIC | Age: 85
End: 2020-07-14

## 2020-07-14 RX ORDER — FAMOTIDINE 20 MG/1
20 TABLET, FILM COATED ORAL 2 TIMES DAILY
Qty: 180 TABLET | Refills: 1 | Status: SHIPPED | OUTPATIENT
Start: 2020-07-14 | End: 2021-06-14

## 2020-07-14 NOTE — TELEPHONE ENCOUNTER
PATIENT  CALLED AND SAYS THAT THE PHARMACY STATES THE 40MG famotidine (PEPCID) 40 MG tablet IS ON BACK ORDER FOR THE PAST TWO WEEKS AND UNABLE TO GET IT.     PHARMACY WANTS TO KNOW IF THE DOCTOR WOULD LIKE TO CHANGE THE PRESCRIPTION TO 2-20MG TO REPLACE THE 40MG.     PATIENT HUSB WANTS TO KNOW IF WIFE WOULD TAKE  2 -20MG PILLS AT ONE TIME (BREAKFAST) LIKE SHE HAS BEEN TAKING THE 40MG OR WOULD SHE TAKE ONE OF THE 20MG IN MORNING ANDF THEN ONE OF THE 20MG AT DINNER (6PM).     PATIENT  BENJI SAYS TO PLEASE LET HIM KNOW.     BENJI HIDALGO CALLBACK   [phone]    [id number] LOCATED  [facility]

## 2020-08-12 ENCOUNTER — HOSPITAL ENCOUNTER (EMERGENCY)
Facility: HOSPITAL | Age: 85
Discharge: HOME | End: 2020-08-12
Attending: EMERGENCY MEDICINE | Admitting: EMERGENCY MEDICINE

## 2020-08-12 ENCOUNTER — APPOINTMENT (OUTPATIENT)
Dept: GENERAL RADIOLOGY | Facility: HOSPITAL | Age: 85
End: 2020-08-12

## 2020-08-12 VITALS
TEMPERATURE: 98 F | HEIGHT: 60 IN | DIASTOLIC BLOOD PRESSURE: 94 MMHG | BODY MASS INDEX: 27.48 KG/M2 | SYSTOLIC BLOOD PRESSURE: 172 MMHG | HEART RATE: 65 BPM | WEIGHT: 140 LBS | OXYGEN SATURATION: 99 % | RESPIRATION RATE: 18 BRPM

## 2020-08-12 DIAGNOSIS — I95.9 HYPOTENSIVE EPISODE: Primary | ICD-10-CM

## 2020-08-12 DIAGNOSIS — I71.21 ASCENDING AORTIC ANEURYSM (HCC): ICD-10-CM

## 2020-08-12 DIAGNOSIS — I10 ESSENTIAL HYPERTENSION: ICD-10-CM

## 2020-08-12 LAB
ALBUMIN SERPL-MCNC: 4 G/DL (ref 3.5–5.2)
ALBUMIN/GLOB SERPL: 1.7 G/DL
ALP SERPL-CCNC: 80 U/L (ref 39–117)
ALT SERPL W P-5'-P-CCNC: 17 U/L (ref 1–33)
ANION GAP SERPL CALCULATED.3IONS-SCNC: 10 MMOL/L (ref 5–15)
AST SERPL-CCNC: 28 U/L (ref 1–32)
BACTERIA UR QL AUTO: NORMAL /HPF
BASOPHILS # BLD AUTO: 0.12 10*3/MM3 (ref 0–0.2)
BASOPHILS NFR BLD AUTO: 1.9 % (ref 0–1.5)
BILIRUB SERPL-MCNC: 0.2 MG/DL (ref 0–1.2)
BILIRUB UR QL STRIP: NEGATIVE
BUN SERPL-MCNC: 18 MG/DL (ref 8–23)
BUN/CREAT SERPL: 13.4 (ref 7–25)
CALCIUM SPEC-SCNC: 8.7 MG/DL (ref 8.6–10.5)
CHLORIDE SERPL-SCNC: 104 MMOL/L (ref 98–107)
CLARITY UR: CLEAR
CO2 SERPL-SCNC: 25 MMOL/L (ref 22–29)
COLOR UR: YELLOW
CREAT SERPL-MCNC: 1.34 MG/DL (ref 0.57–1)
D-LACTATE SERPL-SCNC: 0.7 MMOL/L (ref 0.5–2)
DEPRECATED RDW RBC AUTO: 47.9 FL (ref 37–54)
EOSINOPHIL # BLD AUTO: 0.26 10*3/MM3 (ref 0–0.4)
EOSINOPHIL NFR BLD AUTO: 4.2 % (ref 0.3–6.2)
ERYTHROCYTE [DISTWIDTH] IN BLOOD BY AUTOMATED COUNT: 14.1 % (ref 12.3–15.4)
GFR SERPL CREATININE-BSD FRML MDRD: 37 ML/MIN/1.73
GLOBULIN UR ELPH-MCNC: 2.4 GM/DL
GLUCOSE SERPL-MCNC: 143 MG/DL (ref 65–99)
GLUCOSE UR STRIP-MCNC: NEGATIVE MG/DL
HCT VFR BLD AUTO: 35.2 % (ref 34–46.6)
HGB BLD-MCNC: 11.2 G/DL (ref 12–15.9)
HGB UR QL STRIP.AUTO: NEGATIVE
HOLD SPECIMEN: NORMAL
HOLD SPECIMEN: NORMAL
HYALINE CASTS UR QL AUTO: NORMAL /LPF
IMM GRANULOCYTES # BLD AUTO: 0.05 10*3/MM3 (ref 0–0.05)
IMM GRANULOCYTES NFR BLD AUTO: 0.8 % (ref 0–0.5)
KETONES UR QL STRIP: NEGATIVE
LEUKOCYTE ESTERASE UR QL STRIP.AUTO: ABNORMAL
LYMPHOCYTES # BLD AUTO: 1.26 10*3/MM3 (ref 0.7–3.1)
LYMPHOCYTES NFR BLD AUTO: 20.2 % (ref 19.6–45.3)
MCH RBC QN AUTO: 29.9 PG (ref 26.6–33)
MCHC RBC AUTO-ENTMCNC: 31.8 G/DL (ref 31.5–35.7)
MCV RBC AUTO: 93.9 FL (ref 79–97)
MONOCYTES # BLD AUTO: 0.52 10*3/MM3 (ref 0.1–0.9)
MONOCYTES NFR BLD AUTO: 8.3 % (ref 5–12)
NEUTROPHILS NFR BLD AUTO: 4.03 10*3/MM3 (ref 1.7–7)
NEUTROPHILS NFR BLD AUTO: 64.6 % (ref 42.7–76)
NITRITE UR QL STRIP: NEGATIVE
NRBC BLD AUTO-RTO: 0 /100 WBC (ref 0–0.2)
NT-PROBNP SERPL-MCNC: 636.5 PG/ML (ref 0–1800)
PH UR STRIP.AUTO: <=5 [PH] (ref 5–8)
PLATELET # BLD AUTO: 182 10*3/MM3 (ref 140–450)
PMV BLD AUTO: 11.7 FL (ref 6–12)
POTASSIUM SERPL-SCNC: 4.3 MMOL/L (ref 3.5–5.2)
PROCALCITONIN SERPL-MCNC: 0.06 NG/ML (ref 0–0.25)
PROT SERPL-MCNC: 6.4 G/DL (ref 6–8.5)
PROT UR QL STRIP: NEGATIVE
RBC # BLD AUTO: 3.75 10*6/MM3 (ref 3.77–5.28)
RBC # UR: NORMAL /HPF
REF LAB TEST METHOD: NORMAL
SODIUM SERPL-SCNC: 139 MMOL/L (ref 136–145)
SP GR UR STRIP: 1.01 (ref 1–1.03)
SQUAMOUS #/AREA URNS HPF: NORMAL /HPF
TROPONIN T SERPL-MCNC: <0.01 NG/ML (ref 0–0.03)
TROPONIN T SERPL-MCNC: <0.01 NG/ML (ref 0–0.03)
UROBILINOGEN UR QL STRIP: ABNORMAL
WBC # BLD AUTO: 6.24 10*3/MM3 (ref 3.4–10.8)
WBC UR QL AUTO: NORMAL /HPF
WHOLE BLOOD HOLD SPECIMEN: NORMAL
WHOLE BLOOD HOLD SPECIMEN: NORMAL

## 2020-08-12 PROCEDURE — 71046 X-RAY EXAM CHEST 2 VIEWS: CPT

## 2020-08-12 PROCEDURE — 99284 EMERGENCY DEPT VISIT MOD MDM: CPT

## 2020-08-12 PROCEDURE — 87040 BLOOD CULTURE FOR BACTERIA: CPT | Performed by: EMERGENCY MEDICINE

## 2020-08-12 PROCEDURE — 81001 URINALYSIS AUTO W/SCOPE: CPT | Performed by: EMERGENCY MEDICINE

## 2020-08-12 PROCEDURE — 80053 COMPREHEN METABOLIC PANEL: CPT

## 2020-08-12 PROCEDURE — 93005 ELECTROCARDIOGRAM TRACING: CPT | Performed by: PHYSICIAN ASSISTANT

## 2020-08-12 PROCEDURE — 83880 ASSAY OF NATRIURETIC PEPTIDE: CPT | Performed by: EMERGENCY MEDICINE

## 2020-08-12 PROCEDURE — 84145 PROCALCITONIN (PCT): CPT | Performed by: EMERGENCY MEDICINE

## 2020-08-12 PROCEDURE — 84484 ASSAY OF TROPONIN QUANT: CPT | Performed by: EMERGENCY MEDICINE

## 2020-08-12 PROCEDURE — 84484 ASSAY OF TROPONIN QUANT: CPT | Performed by: PHYSICIAN ASSISTANT

## 2020-08-12 PROCEDURE — 85025 COMPLETE CBC W/AUTO DIFF WBC: CPT

## 2020-08-12 PROCEDURE — 83605 ASSAY OF LACTIC ACID: CPT | Performed by: EMERGENCY MEDICINE

## 2020-08-12 RX ORDER — PROPRANOLOL HYDROCHLORIDE 20 MG/1
40 TABLET ORAL ONCE
Status: COMPLETED | OUTPATIENT
Start: 2020-08-12 | End: 2020-08-12

## 2020-08-12 RX ORDER — PROPRANOLOL HYDROCHLORIDE 20 MG/1
40 TABLET ORAL EVERY 12 HOURS
Status: DISCONTINUED | OUTPATIENT
Start: 2020-08-12 | End: 2020-08-12

## 2020-08-12 RX ADMIN — PROPRANOLOL HYDROCHLORIDE 40 MG: 20 TABLET ORAL at 20:46

## 2020-08-12 RX ADMIN — SODIUM CHLORIDE 1000 ML: 9 INJECTION, SOLUTION INTRAVENOUS at 18:32

## 2020-08-12 NOTE — DISCHARGE INSTRUCTIONS
Follow-up with Dr. Oneil tomorrow morning to discuss your medication regimen, as some of your medications may be contributing to your hypertensive episode today.  Use caution when arising from a lying or seated position, as rising too rapidly can create a relative drop in your blood pressure that could cause you to fall or pass out.  Be sure to increase your fluid intake and stay well-hydrated.  Be sure to maintain adequate diet as well.  Return to the emergency department immediately if any change or worsening of symptoms.

## 2020-08-12 NOTE — ED PROVIDER NOTES
EMERGENCY DEPARTMENT ENCOUNTER    Room Number:  27/27  Date of encounter:  8/12/2020  PCP: Arnoldo Oneil MD  Historian: Patient      HPI:  Chief Complaint: Hypotensive episode at home    A complete HPI/ROS/PMH/PSH/SH/FH are unobtainable due to: NA    Context: Zoila Nava is a 87 y.o. female who presents to the ED c/o low blood pressure episode around 1130 this morning.  Patient states she ate breakfast around 7:30 in the morning and then took her normal medication which includes propanolol 40mg, Lyrica, hydrocodone.  Around 1130, she was feeling woozy and her  took her blood pressure which was 87/57 she called her primary care provider's office with no call back, they decided to come to the emergency department for evaluation.  She denies chest pain arm neck jaw shoulder pain palpitations or irregular heartbeat.  She denies headache vision changes photophobia thunderclap headache, unilateral weakness paresis paresthesias.  No chest or back pain, no abdominal pain.  She does have a history of 4 cm ascending aortic aneurysm, last imaged in June and it was stable.  She denies other symptoms or injuries at this time.      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   • Ascending aortic aneurysm (CMS/HCC) 02/18/2019   • Acute UTI 10/18/2019     Resolved Ambulatory Problems     Diagnosis Date Noted   • SOB (shortness of breath) 08/25/2016   • Warts 08/25/2016   • Contact dermatitis 09/13/2016   • Dizziness 10/18/2019   •  Hypertensive urgency 10/18/2019     Past Medical History:   Diagnosis Date   • Arthritis    • Asthma    • Cataract    • Difficulty breathing    • GERD (gastroesophageal reflux disease)    • Hoarseness          PAST SURGICAL HISTORY  Past Surgical History:   Procedure Laterality Date   • CERVICAL LAMINECTOMY     • CHOLECYSTECTOMY     • OTHER SURGICAL HISTORY Right     Neuroplasty Median Nerve at Carpal Tunnel   • THYROID SURGERY     • TOTAL KNEE ARTHROPLASTY Left 09/29/2014    Dr Colon         FAMILY HISTORY  Family History   Problem Relation Age of Onset   • Hypertension Mother    • Other Father         cardiac disorder   • Diabetes Father    • Hypertension Sister          SOCIAL HISTORY  Social History     Socioeconomic History   • Marital status:      Spouse name: Not on file   • Number of children: Not on file   • Years of education: Not on file   • Highest education level: Not on file   Tobacco Use   • Smoking status: Never Smoker   • Smokeless tobacco: Never Used   Substance and Sexual Activity   • Alcohol use: No     Frequency: Never   • Drug use: No   • Sexual activity: Defer         ALLERGIES  Penicillins        REVIEW OF SYSTEMS  Review of Systems     All systems reviewed and negative except for those discussed in HPI.       PHYSICAL EXAM    I have reviewed the triage vital signs and nursing notes.    ED Triage Vitals   Temp Heart Rate Resp BP SpO2   08/12/20 1358 08/12/20 1358 08/12/20 1358 08/12/20 1358 08/12/20 1358   98.2 °F (36.8 °C) 70 16 139/78 96 %      Temp src Heart Rate Source Patient Position BP Location FiO2 (%)   -- -- 08/12/20 1824 -- --     Lying         Physical Exam  GENERAL: Pleasant awake alert and oriented, not toxic appearing.  Hemodynamically stable.  Well developed.  Appears in no acute distress.  HENT: Nares patent  EYES: No scleral icterus  CV: Regular rhythm, regular rate  RESPIRATORY: Normal effort.  No audible wheezes, rales or rhonchi  ABDOMEN: Soft,  nontender  MUSCULOSKELETAL: No deformities.   NEURO: Alert, moves all extremities, follows commands  SKIN: Warm, dry, no rash visualized        LAB RESULTS  Recent Results (from the past 24 hour(s))   Comprehensive Metabolic Panel    Collection Time: 08/12/20  2:25 PM   Result Value Ref Range    Glucose 143 (H) 65 - 99 mg/dL    BUN 18 8 - 23 mg/dL    Creatinine 1.34 (H) 0.57 - 1.00 mg/dL    Sodium 139 136 - 145 mmol/L    Potassium 4.3 3.5 - 5.2 mmol/L    Chloride 104 98 - 107 mmol/L    CO2 25.0 22.0 - 29.0 mmol/L    Calcium 8.7 8.6 - 10.5 mg/dL    Total Protein 6.4 6.0 - 8.5 g/dL    Albumin 4.00 3.50 - 5.20 g/dL    ALT (SGPT) 17 1 - 33 U/L    AST (SGOT) 28 1 - 32 U/L    Alkaline Phosphatase 80 39 - 117 U/L    Total Bilirubin 0.2 0.0 - 1.2 mg/dL    eGFR Non African Amer 37 (L) >60 mL/min/1.73    Globulin 2.4 gm/dL    A/G Ratio 1.7 g/dL    BUN/Creatinine Ratio 13.4 7.0 - 25.0    Anion Gap 10.0 5.0 - 15.0 mmol/L   Light Blue Top    Collection Time: 08/12/20  2:25 PM   Result Value Ref Range    Extra Tube hold for add-on    Green Top (Gel)    Collection Time: 08/12/20  2:25 PM   Result Value Ref Range    Extra Tube Hold for add-ons.    Lavender Top    Collection Time: 08/12/20  2:25 PM   Result Value Ref Range    Extra Tube hold for add-on    Gold Top - SST    Collection Time: 08/12/20  2:25 PM   Result Value Ref Range    Extra Tube Hold for add-ons.    CBC Auto Differential    Collection Time: 08/12/20  2:25 PM   Result Value Ref Range    WBC 6.24 3.40 - 10.80 10*3/mm3    RBC 3.75 (L) 3.77 - 5.28 10*6/mm3    Hemoglobin 11.2 (L) 12.0 - 15.9 g/dL    Hematocrit 35.2 34.0 - 46.6 %    MCV 93.9 79.0 - 97.0 fL    MCH 29.9 26.6 - 33.0 pg    MCHC 31.8 31.5 - 35.7 g/dL    RDW 14.1 12.3 - 15.4 %    RDW-SD 47.9 37.0 - 54.0 fl    MPV 11.7 6.0 - 12.0 fL    Platelets 182 140 - 450 10*3/mm3    Neutrophil % 64.6 42.7 - 76.0 %    Lymphocyte % 20.2 19.6 - 45.3 %    Monocyte % 8.3 5.0 - 12.0 %    Eosinophil % 4.2 0.3 - 6.2 %    Basophil %  1.9 (H) 0.0 - 1.5 %    Immature Grans % 0.8 (H) 0.0 - 0.5 %    Neutrophils, Absolute 4.03 1.70 - 7.00 10*3/mm3    Lymphocytes, Absolute 1.26 0.70 - 3.10 10*3/mm3    Monocytes, Absolute 0.52 0.10 - 0.90 10*3/mm3    Eosinophils, Absolute 0.26 0.00 - 0.40 10*3/mm3    Basophils, Absolute 0.12 0.00 - 0.20 10*3/mm3    Immature Grans, Absolute 0.05 0.00 - 0.05 10*3/mm3    nRBC 0.0 0.0 - 0.2 /100 WBC   Procalcitonin    Collection Time: 08/12/20  2:25 PM   Result Value Ref Range    Procalcitonin 0.06 0.00 - 0.25 ng/mL   Troponin    Collection Time: 08/12/20  2:25 PM   Result Value Ref Range    Troponin T <0.010 0.000 - 0.030 ng/mL   BNP    Collection Time: 08/12/20  2:25 PM   Result Value Ref Range    proBNP 636.5 0.0-1,800.0 pg/mL   Lactic Acid, Plasma    Collection Time: 08/12/20  5:15 PM   Result Value Ref Range    Lactate 0.7 0.5 - 2.0 mmol/L   Troponin    Collection Time: 08/12/20  6:24 PM   Result Value Ref Range    Troponin T <0.010 0.000 - 0.030 ng/mL   Urinalysis With Microscopic If Indicated (No Culture) - Urine, Clean Catch    Collection Time: 08/12/20  7:23 PM   Result Value Ref Range    Color, UA Yellow Yellow, Straw    Appearance, UA Clear Clear    pH, UA <=5.0 5.0 - 8.0    Specific Gravity, UA 1.007 1.001 - 1.030    Glucose, UA Negative Negative    Ketones, UA Negative Negative    Bilirubin, UA Negative Negative    Blood, UA Negative Negative    Protein, UA Negative Negative    Leuk Esterase, UA Trace (A) Negative    Nitrite, UA Negative Negative    Urobilinogen, UA 0.2 E.U./dL 0.2 - 1.0 E.U./dL   Urinalysis, Microscopic Only - Urine, Clean Catch    Collection Time: 08/12/20  7:23 PM   Result Value Ref Range    RBC, UA 0-2 None Seen, 0-2 /HPF    WBC, UA 0-2 None Seen, 0-2 /HPF    Bacteria, UA None Seen None Seen, Trace /HPF    Squamous Epithelial Cells, UA None Seen None Seen, 0-2 /HPF    Hyaline Casts, UA 0-6 0 - 6 /LPF    Methodology Automated Microscopy        Ordered the above labs and independently  reviewed the results.        RADIOLOGY  Xr Chest Pa & Lateral    Result Date: 8/12/2020  CR Chest 2 Vws INDICATION:  Hypotension this morning. History of thoracic aortic aneurysm COMPARISON:  10/17/2019 FINDINGS: PA and lateral views of the chest.  Heart and mediastinal contours are normal. The lungs are clear. No pneumothorax or pleural effusion.      No acute cardiopulmonary findings. Signer Name: Ton Dalal MD  Signed: 8/12/2020 8:09 PM  Workstation Name: [facility]  [facility]          PROCEDURES    Procedures      MEDICATIONS GIVEN IN ER    Medications   propranolol (INDERAL) tablet 40 mg (has no administration in time range)   sodium chloride 0.9 % bolus 1,000 mL (1,000 mL Intravenous New Bag 8/12/20 5462)         PROGRESS, DATA ANALYSIS, CONSULTS, AND MEDICAL DECISION MAKING    All labs have been independently reviewed by me.  All radiology studies have been reviewed by me and the radiologist dictating the report.   EKG's have been independently viewed and interpreted by me.               Patient made stable throughout course of stay in emergency department.  No further episodes of hypotension.  In fact her blood pressure steadily raised throughout her stay.  It is time for her to take her evening dose of propanolol which we will give here.  She will be discharged home, follow-up with primary care provider tomorrow to discuss medication regimen.  Use caution when arising from a lying or seated position, and return immediately for any change or worsening symptoms.  She verbalized understanding is agreeable to plan    AS OF 20:40 VITALS:    BP - (!) 182/121  HR - 68  TEMP - 98.2 °F (36.8 °C)  O2 SATS - 98%        DIAGNOSIS  Final diagnoses:   Hypotensive episode   Ascending aortic aneurysm (CMS/HCC)   Essential hypertension         DISPOSITION  DISCHARGE    Patient discharged in stable condition.    Reviewed implications of results, diagnosis, meds, responsibility to follow up, warning  signs and symptoms of possible worsening, potential complications and reasons to return to ER.    Patient/Family voiced understanding of above instructions.    Discussed plan for discharge, as there is no emergent indication for admission.  Pt/family is agreeable and understands need for follow up and possible repeat testing.  Pt/family is aware that discharge does not mean that nothing is wrong but that it indicates no emergency is currently present that requires admission and they must continue care with follow-up as given below or with a physician of their choice.     FOLLOW-UP  Arnoldo Oneil MD  [id number]  [address] [address] [phone]    Schedule an appointment as soon as possible for a visit   Call tomorrow morning to discuss medication regimen and your recent episode of low blood pressure.         Medication List      No changes were made to your prescriptions during this visit.                Pieter Bueno PA-C  08/12/20 8921

## 2020-08-13 ENCOUNTER — OFFICE VISIT (OUTPATIENT)
Dept: INTERNAL MEDICINE | Facility: CLINIC | Age: 85
End: 2020-08-13

## 2020-08-13 VITALS
BODY MASS INDEX: 28.07 KG/M2 | HEART RATE: 55 BPM | SYSTOLIC BLOOD PRESSURE: 120 MMHG | WEIGHT: 143 LBS | DIASTOLIC BLOOD PRESSURE: 70 MMHG | HEIGHT: 60 IN | OXYGEN SATURATION: 94 % | TEMPERATURE: 96.8 F

## 2020-08-13 DIAGNOSIS — G62.9 NEUROPATHY: ICD-10-CM

## 2020-08-13 DIAGNOSIS — I10 ESSENTIAL HYPERTENSION: Primary | ICD-10-CM

## 2020-08-13 DIAGNOSIS — K30 NUD (NONULCER DYSPEPSIA): ICD-10-CM

## 2020-08-13 DIAGNOSIS — E53.8 VITAMIN B12 DEFICIENCY: ICD-10-CM

## 2020-08-13 DIAGNOSIS — E03.8 OTHER SPECIFIED HYPOTHYROIDISM: ICD-10-CM

## 2020-08-13 DIAGNOSIS — E78.49 OTHER HYPERLIPIDEMIA: ICD-10-CM

## 2020-08-13 DIAGNOSIS — I71.21 ASCENDING AORTIC ANEURYSM (HCC): ICD-10-CM

## 2020-08-13 DIAGNOSIS — D51.0 PERNICIOUS ANEMIA: ICD-10-CM

## 2020-08-13 DIAGNOSIS — R25.1 TREMOR: ICD-10-CM

## 2020-08-13 DIAGNOSIS — R41.3 MEMORY IMPAIRMENT OF GRADUAL ONSET: ICD-10-CM

## 2020-08-13 PROBLEM — N39.0 ACUTE UTI: Status: RESOLVED | Noted: 2019-10-18 | Resolved: 2020-08-13

## 2020-08-13 PROCEDURE — 99214 OFFICE O/P EST MOD 30 MIN: CPT | Performed by: INTERNAL MEDICINE

## 2020-08-14 ENCOUNTER — PATIENT OUTREACH (OUTPATIENT)
Dept: CASE MANAGEMENT | Facility: OTHER | Age: 85
End: 2020-08-14

## 2020-08-14 DIAGNOSIS — K30 NUD (NONULCER DYSPEPSIA): ICD-10-CM

## 2020-08-17 LAB
BACTERIA SPEC AEROBE CULT: NORMAL
BACTERIA SPEC AEROBE CULT: NORMAL

## 2020-08-17 RX ORDER — FAMOTIDINE 40 MG/1
TABLET, FILM COATED ORAL
Qty: 30 TABLET | Refills: 3 | OUTPATIENT
Start: 2020-08-17

## 2020-08-26 ENCOUNTER — EPISODE CHANGES (OUTPATIENT)
Dept: CASE MANAGEMENT | Facility: OTHER | Age: 85
End: 2020-08-26

## 2020-08-26 NOTE — OUTREACH NOTE
"Patient Outreach Note    Returned call from message left on voicemail.  States bp is \"doing fine.\"  States has not been checking as often as she should and states she will get  to start checking more.   She was at Pain Management's office getting ready for visit, so asked if I could call her back.  Will call her back in 5-7 days.  She voiced her appreciation for the call.     Taylor Armando RN  Ambulatory     8/26/2020, 10:23      "

## 2020-09-07 ENCOUNTER — EPISODE CHANGES (OUTPATIENT)
Dept: CASE MANAGEMENT | Facility: OTHER | Age: 85
End: 2020-09-07

## 2020-09-07 ENCOUNTER — PATIENT OUTREACH (OUTPATIENT)
Dept: CASE MANAGEMENT | Facility: OTHER | Age: 85
End: 2020-09-07

## 2020-09-07 NOTE — OUTREACH NOTE
Care Plan Note      Responses   Annual Wellness Visit:   Patient Will Schedule   Care Gaps Addressed  Flu Shot, Pneumonia Vaccine   Flu Shot Status  Up to Date   Flu Shot Completion at [facility] or Other  Other   Pneumonia Vaccine Status  Up to Date   Specific Disease Process Teaching  Hypertension   Other Patient Education/Resources   24/7 [facility] Healthcare Nurse Call Line, [facility]   24/7 Nurse Call Line Education Method  Verbal   [facility] Education Method  Verbal   Does patient have depression diagnosis?  No   Advanced Directives:  Patient Has   Ed Visits past 12 months:  1   Hospitalizations past 12 months  1        The main concerns and/or symptoms the patient would like to address are: Follow up call from ED visit 8/12/2020. Had spoken briefly to spouse on 8/14 and to pt 8/26.   answered phone today and states she is doing fine.    He checks her bp regularly and thinks he actually took incorrectly day that he reported her bp low.  Says Dr. Oneil showed him correct way to take and has been getting stable bp since.  Did discuss orthostatic precautions though.  States she is up and about, ambulates without assistive device, however he is her  due to neuropathy.  States they try to eat healthy.  States they have gotten their flu vaccines already at their pharmacy.  Will be getting [place] vaccines soon.  Reports that she does have Living Will and [facility]  He did OK that RN-ACM could make appt notation that Medicare AWV is due.  He denies any needs at present, but voiced his appreciation for the call and the information.     Education/instruction provided by Care Coordinator:  Role of  explained and contact number given.  [facility] 24/7 Nurse line explained and contact number provided.  [facility] help desk number explained and provided.  Discussed, HTN, monitoring bp, and diet.      Follow Up Outreach Due:  As needed     Taylor Armando, ZACH  Ambulatory      9/7/2020, 12:23

## 2020-09-07 NOTE — OUTREACH NOTE
Care Coordination Assessment    Documented/Reviewed By:  Taylor Armando RN Date/time:  9/7/2020 12:17 PM   Assessment completed with:  patient  Enrolled in care management program:  Yes  Living arrangement:  spouse  Support system:  family, spouse  Type of residence:  private residence  Equipment used at home:   (Comment: Patient has blood pressure cuff)  Bed or wheelchair confined:  No  Medication adherence problem:  No  Difficulty keeping appointments:  No  Family aware of the patient's advance care planning wishes:  Yes (Comment: States has Living Will )

## 2020-10-22 ENCOUNTER — OFFICE VISIT (OUTPATIENT)
Dept: INTERNAL MEDICINE | Facility: CLINIC | Age: 85
End: 2020-10-22

## 2020-10-22 ENCOUNTER — TELEPHONE (OUTPATIENT)
Dept: INTERNAL MEDICINE | Facility: CLINIC | Age: 85
End: 2020-10-22

## 2020-10-22 VITALS
WEIGHT: 145 LBS | HEART RATE: 68 BPM | SYSTOLIC BLOOD PRESSURE: 126 MMHG | DIASTOLIC BLOOD PRESSURE: 70 MMHG | TEMPERATURE: 97.5 F | BODY MASS INDEX: 28.47 KG/M2 | HEIGHT: 60 IN

## 2020-10-22 DIAGNOSIS — I10 ESSENTIAL HYPERTENSION: Primary | ICD-10-CM

## 2020-10-22 DIAGNOSIS — R41.3 MEMORY IMPAIRMENT OF GRADUAL ONSET: ICD-10-CM

## 2020-10-22 DIAGNOSIS — G62.9 NEUROPATHY: ICD-10-CM

## 2020-10-22 DIAGNOSIS — E53.8 VITAMIN B12 DEFICIENCY: ICD-10-CM

## 2020-10-22 DIAGNOSIS — J30.1 SEASONAL ALLERGIC RHINITIS DUE TO POLLEN: ICD-10-CM

## 2020-10-22 DIAGNOSIS — D51.0 PERNICIOUS ANEMIA: ICD-10-CM

## 2020-10-22 DIAGNOSIS — E03.8 OTHER SPECIFIED HYPOTHYROIDISM: ICD-10-CM

## 2020-10-22 DIAGNOSIS — K30 NUD (NONULCER DYSPEPSIA): ICD-10-CM

## 2020-10-22 DIAGNOSIS — R25.1 TREMOR: ICD-10-CM

## 2020-10-22 DIAGNOSIS — E78.49 OTHER HYPERLIPIDEMIA: ICD-10-CM

## 2020-10-22 DIAGNOSIS — I71.21 ASCENDING AORTIC ANEURYSM (HCC): ICD-10-CM

## 2020-10-22 PROCEDURE — 99214 OFFICE O/P EST MOD 30 MIN: CPT | Performed by: INTERNAL MEDICINE

## 2020-10-22 NOTE — PROGRESS NOTES
Patient is a 87 y.o. female who is here for a follow up of hyperlipidemia.  Chief Complaint   Patient presents with   • Hyperlipidemia         HPI:    Here for mgmt of HTN and hypothyroid and hyperlipidemia.  Onset years.  BP has been good.  No dizziness or lightheadedness.  No abdominal pain.  No fever or chills.  No HAs.  Sleeping good.  No falls.  Arthritis is controlled.  No difficulty swallowing.    History:     Patient Active Problem List   Diagnosis   • Allergic rhinitis   • Hyperlipidemia   • Hypertension   • Hypocalcemia   • Hypothyroidism   • Neuropathy   • NUD (nonulcer dyspepsia)   • Painful knee   • Pernicious anemia   • Tremor   • Vitamin B12 deficiency   • Spondylolisthesis of cervical region   • Graves' ophthalmopathy   • Anemia   • Memory impairment of gradual onset   • Ascending aortic aneurysm (CMS/HCC)       Past Medical History:   Diagnosis Date   • Anemia    • Arthritis    • Asthma    • Cataract    • Difficulty breathing     Chronic   • GERD (gastroesophageal reflux disease)    • Graves' ophthalmopathy    • Hoarseness    • Hypertension    • Spondylolisthesis of cervical region    • Vitamin B12 deficiency        Past Surgical History:   Procedure Laterality Date   • CERVICAL LAMINECTOMY     • CHOLECYSTECTOMY     • OTHER SURGICAL HISTORY Right     Neuroplasty Median Nerve at Carpal Tunnel   • THYROID SURGERY     • TOTAL KNEE ARTHROPLASTY Left 09/29/2014    Dr Colon       Current Outpatient Medications on File Prior to Visit   Medication Sig   • amLODIPine (NORVASC) 5 MG tablet Take 1 tablet by mouth Every Morning.   • dicyclomine (BENTYL) 10 MG capsule 1 po TID PRN abdominal pain   • donepezil (ARICEPT) 10 MG tablet Take 1 tablet by mouth Every Night.   • estradiol (ESTRACE) 0.1 MG/GM vaginal cream    • famotidine (Pepcid) 20 MG tablet Take 1 tablet by mouth 2 (Two) Times a Day.   • levocetirizine (XYZAL) 5 MG tablet Take 1 tablet by mouth daily as needed.   • levothyroxine (SYNTHROID,  LEVOTHROID) 100 MCG tablet Take 1 tablet by mouth Every Morning.   • LYRICA 200 MG capsule 3 (three) times a day.   • meclizine (ANTIVERT) 25 MG tablet Take 1 tablet by mouth 3 (Three) Times a Day As Needed for dizziness.   • ondansetron (ZOFRAN) 4 MG tablet Take 1 tablet by mouth Every 8 (Eight) Hours As Needed for nausea or vomiting.   • propranolol (INDERAL) 40 MG tablet 2 (two) times a day.   • triamcinolone (KENALOG) 0.1 % cream Apply  topically 2 (two) times a day. Till healed     No current facility-administered medications on file prior to visit.        Family History   Problem Relation Age of Onset   • Hypertension Mother    • Other Father         cardiac disorder   • Diabetes Father    • Hypertension Sister        Social History     Socioeconomic History   • Marital status:      Spouse name: Not on file   • Number of children: Not on file   • Years of education: Not on file   • Highest education level: Not on file   Tobacco Use   • Smoking status: Never Smoker   • Smokeless tobacco: Never Used   Substance and Sexual Activity   • Alcohol use: No     Frequency: Never   • Drug use: No   • Sexual activity: Defer         Review of Systems   Constitutional: Negative for chills, diaphoresis, fever and unexpected weight change.   HENT: Positive for hearing loss. Negative for congestion, ear pain, nosebleeds, postnasal drip, sinus pressure and sore throat.    Eyes: Negative for pain, discharge and itching.   Respiratory: Negative for cough, chest tightness, shortness of breath and wheezing.    Cardiovascular: Negative for chest pain, palpitations and leg swelling.   Gastrointestinal: Negative for abdominal distention, abdominal pain, blood in stool, constipation, diarrhea, nausea and vomiting.   Endocrine: Positive for cold intolerance. Negative for polydipsia and polyuria.   Genitourinary: Negative for difficulty urinating, dysuria, frequency and hematuria.   Musculoskeletal: Positive for arthralgias and  "back pain. Negative for gait problem, joint swelling and myalgias.   Skin: Negative for rash and wound.   Neurological: Positive for tremors and numbness. Negative for syncope, weakness and headaches.   Psychiatric/Behavioral: Positive for decreased concentration. Negative for dysphoric mood and sleep disturbance. The patient is not nervous/anxious.        /70   Pulse 68   Temp 97.5 °F (36.4 °C) (Infrared)   Ht 152.4 cm (60\")   Wt 65.8 kg (145 lb)   LMP  (LMP Unknown)   BMI 28.32 kg/m²       Physical Exam  Constitutional:       Appearance: Normal appearance. She is well-developed.   HENT:      Head: Normocephalic and atraumatic.      Right Ear: External ear normal.      Left Ear: External ear normal.      Nose: Nose normal.      Mouth/Throat:      Mouth: Mucous membranes are moist.      Pharynx: Oropharynx is clear.   Eyes:      Extraocular Movements: Extraocular movements intact.      Conjunctiva/sclera: Conjunctivae normal.      Pupils: Pupils are equal, round, and reactive to light.   Neck:      Musculoskeletal: Normal range of motion and neck supple.   Cardiovascular:      Rate and Rhythm: Normal rate and regular rhythm.      Heart sounds: Normal heart sounds.   Pulmonary:      Effort: Pulmonary effort is normal.      Breath sounds: Normal breath sounds.   Abdominal:      General: Bowel sounds are normal.      Palpations: Abdomen is soft.   Musculoskeletal: Normal range of motion.   Lymphadenopathy:      Cervical: No cervical adenopathy.   Skin:     General: Skin is warm and dry.   Neurological:      General: No focal deficit present.      Mental Status: She is alert and oriented to person, place, and time.      Comments: Head tremor   Psychiatric:         Mood and Affect: Mood normal.         Behavior: Behavior normal.         Thought Content: Thought content normal.         Procedure:      Discussion/Summary:    htn-controlled on amlodipine and propranolol  rhinitis-flonase and xyzal continuation, " controlled  tremor-controlled propranolol  hypothyroid- recheck on rtc  neuropathy-cont lyrica, stable  djd-cont prn lortab, rare use  b12 def-cont b12, level at goal per 6/18  hyperlipidemia-labs on rtc, cont diet control  Diverticulosis-advised citrucel qd, asymptomatic  Memory impairment-cont aricept, no better or worst  Ascending Aortic aneurysm-recheck 7/2 noted         Hospital labs noted and dw patient, advised NSAIDs avoidance and     increase fluids, has already had the flu shot    Current Outpatient Medications:   •  amLODIPine (NORVASC) 5 MG tablet, Take 1 tablet by mouth Every Morning., Disp: 90 tablet, Rfl: 3  •  dicyclomine (BENTYL) 10 MG capsule, 1 po TID PRN abdominal pain, Disp: 21 capsule, Rfl: 1  •  donepezil (ARICEPT) 10 MG tablet, Take 1 tablet by mouth Every Night., Disp: 90 tablet, Rfl: 3  •  estradiol (ESTRACE) 0.1 MG/GM vaginal cream, , Disp: , Rfl:   •  famotidine (Pepcid) 20 MG tablet, Take 1 tablet by mouth 2 (Two) Times a Day., Disp: 180 tablet, Rfl: 1  •  levocetirizine (XYZAL) 5 MG tablet, Take 1 tablet by mouth daily as needed., Disp: , Rfl:   •  levothyroxine (SYNTHROID, LEVOTHROID) 100 MCG tablet, Take 1 tablet by mouth Every Morning., Disp: 90 tablet, Rfl: 1  •  LYRICA 200 MG capsule, 3 (three) times a day., Disp: , Rfl:   •  meclizine (ANTIVERT) 25 MG tablet, Take 1 tablet by mouth 3 (Three) Times a Day As Needed for dizziness., Disp: 15 tablet, Rfl: 0  •  ondansetron (ZOFRAN) 4 MG tablet, Take 1 tablet by mouth Every 8 (Eight) Hours As Needed for nausea or vomiting., Disp: 30 tablet, Rfl: 0  •  propranolol (INDERAL) 40 MG tablet, 2 (two) times a day., Disp: , Rfl:   •  triamcinolone (KENALOG) 0.1 % cream, Apply  topically 2 (two) times a day. Till healed, Disp: 45 g, Rfl: 1        Diagnoses and all orders for this visit:    1. Essential hypertension (Primary)    2. Other hyperlipidemia    3. Ascending aortic aneurysm (CMS/HCC)    4. Seasonal allergic rhinitis due to pollen    5.  Vitamin B12 deficiency    6. NUD (nonulcer dyspepsia)    7. Other specified hypothyroidism    8. Neuropathy    9. Pernicious anemia    10. Tremor    11. Memory impairment of gradual onset

## 2020-11-17 RX ORDER — ESTRADIOL 0.1 MG/G
2 CREAM VAGINAL DAILY
Qty: 42.5 G | Refills: 3 | Status: SHIPPED | OUTPATIENT
Start: 2020-11-17 | End: 2020-12-17

## 2021-01-11 DIAGNOSIS — E03.8 OTHER SPECIFIED HYPOTHYROIDISM: ICD-10-CM

## 2021-01-11 RX ORDER — LEVOTHYROXINE SODIUM 88 UG/1
TABLET ORAL
Qty: 30 TABLET | Refills: 3 | OUTPATIENT
Start: 2021-01-11

## 2021-01-12 DIAGNOSIS — E03.8 OTHER SPECIFIED HYPOTHYROIDISM: ICD-10-CM

## 2021-01-12 RX ORDER — LEVOTHYROXINE SODIUM 0.1 MG/1
100 TABLET ORAL EVERY MORNING
Qty: 90 TABLET | Refills: 1 | Status: SHIPPED | OUTPATIENT
Start: 2021-01-12 | End: 2021-05-20 | Stop reason: SDUPTHER

## 2021-01-12 NOTE — TELEPHONE ENCOUNTER
PATIENT'S  CALLED AND STATED THAT DR. PAREDES DENIED HER THYROID MEDICATION AND SHE WOULD LIKE TO KNOW WHY. PLEASE GIVE PATIENT CALL BACK.            CALL BACK NUMBER: [phone]

## 2021-01-14 ENCOUNTER — OFFICE VISIT (OUTPATIENT)
Dept: NEUROSURGERY | Facility: CLINIC | Age: 86
End: 2021-01-14

## 2021-01-14 VITALS
WEIGHT: 149.4 LBS | HEIGHT: 60 IN | BODY MASS INDEX: 29.33 KG/M2 | SYSTOLIC BLOOD PRESSURE: 136 MMHG | DIASTOLIC BLOOD PRESSURE: 78 MMHG | TEMPERATURE: 97.4 F

## 2021-01-14 DIAGNOSIS — M43.12 SPONDYLOLISTHESIS OF CERVICAL REGION: Primary | ICD-10-CM

## 2021-01-14 PROCEDURE — 99203 OFFICE O/P NEW LOW 30 MIN: CPT | Performed by: NEUROLOGICAL SURGERY

## 2021-01-14 RX ORDER — LEVOTHYROXINE SODIUM 88 UG/1
TABLET ORAL
COMMUNITY
Start: 2020-12-09 | End: 2021-01-14 | Stop reason: SDUPTHER

## 2021-01-14 RX ORDER — ESTRADIOL 0.1 MG/G
1 CREAM VAGINAL DAILY
COMMUNITY
Start: 2020-12-30 | End: 2023-04-04

## 2021-01-14 RX ORDER — ASPIRIN 81 MG/1
81 TABLET ORAL DAILY
Status: ON HOLD | COMMUNITY

## 2021-03-02 ENCOUNTER — LAB (OUTPATIENT)
Dept: LAB | Facility: HOSPITAL | Age: 86
End: 2021-03-02

## 2021-03-02 ENCOUNTER — OFFICE VISIT (OUTPATIENT)
Dept: INTERNAL MEDICINE | Facility: CLINIC | Age: 86
End: 2021-03-02

## 2021-03-02 VITALS
HEART RATE: 68 BPM | WEIGHT: 147 LBS | DIASTOLIC BLOOD PRESSURE: 64 MMHG | BODY MASS INDEX: 28.86 KG/M2 | SYSTOLIC BLOOD PRESSURE: 130 MMHG | HEIGHT: 60 IN | TEMPERATURE: 96.6 F

## 2021-03-02 DIAGNOSIS — E53.8 VITAMIN B12 DEFICIENCY: ICD-10-CM

## 2021-03-02 DIAGNOSIS — I10 ESSENTIAL HYPERTENSION: Primary | ICD-10-CM

## 2021-03-02 DIAGNOSIS — D51.0 PERNICIOUS ANEMIA: ICD-10-CM

## 2021-03-02 DIAGNOSIS — K30 NUD (NONULCER DYSPEPSIA): ICD-10-CM

## 2021-03-02 DIAGNOSIS — E78.49 OTHER HYPERLIPIDEMIA: ICD-10-CM

## 2021-03-02 DIAGNOSIS — R25.1 TREMOR: ICD-10-CM

## 2021-03-02 DIAGNOSIS — E03.8 OTHER SPECIFIED HYPOTHYROIDISM: ICD-10-CM

## 2021-03-02 DIAGNOSIS — G62.9 NEUROPATHY: ICD-10-CM

## 2021-03-02 DIAGNOSIS — R41.3 MEMORY IMPAIRMENT OF GRADUAL ONSET: ICD-10-CM

## 2021-03-02 PROCEDURE — G0439 PPPS, SUBSEQ VISIT: HCPCS | Performed by: INTERNAL MEDICINE

## 2021-03-02 NOTE — PROGRESS NOTES
The ABCs of the Annual Wellness Visit  Subsequent Medicare Wellness Visit    Chief Complaint   Patient presents with   • Annual Exam       Subjective   History of Present Illness:  Zoila Nava is a 87 y.o. female who presents for a Subsequent Medicare Wellness Visit.    HEALTH RISK ASSESSMENT    Recent Hospitalizations:  No hospitalization(s) within the last year.    Current Medical Providers:  Patient Care Team:  Arnoldo Oneil MD as PCP - General (Internal Medicine)  Sourav Montes MD as Referring Physician (Neurology)    Smoking Status:  Social History     Tobacco Use   Smoking Status Never Smoker   Smokeless Tobacco Never Used       Alcohol Consumption:  Social History     Substance and Sexual Activity   Alcohol Use No   • Frequency: Never       Depression Screen:   PHQ-2/PHQ-9 Depression Screening 3/2/2021   Little interest or pleasure in doing things 1   Feeling down, depressed, or hopeless 0   Trouble falling or staying asleep, or sleeping too much 1   Feeling tired or having little energy 1   Poor appetite or overeating 1   Feeling bad about yourself - or that you are a failure or have let yourself or your family down 0   Trouble concentrating on things, such as reading the newspaper or watching television 0   Moving or speaking so slowly that other people could have noticed. Or the opposite - being so fidgety or restless that you have been moving around a lot more than usual 0   Thoughts that you would be better off dead, or of hurting yourself in some way 0   Total Score 4   If you checked off any problems, how difficult have these problems made it for you to do your work, take care of things at home, or get along with other people? Somewhat difficult       Fall Risk Screen:  STEADI Fall Risk Assessment was completed, and patient is at MODERATE risk for falls. Assessment completed on:3/2/2021    Health Habits and Functional and Cognitive Screening:  Functional & Cognitive Status 3/2/2021   Do  you have difficulty preparing food and eating? Yes   Do you have difficulty bathing yourself, getting dressed or grooming yourself? No   Do you have difficulty using the toilet? No   Do you have difficulty moving around from place to place? No   Do you have trouble with steps or getting out of a bed or a chair? No   Current Diet Unhealthy Diet   Dental Exam Not up to date   Eye Exam Not up to date   Exercise (times per week) 0 times per week   Current Exercise Activities Include -   Do you need help using the phone?  No   Are you deaf or do you have serious difficulty hearing?  No   Do you need help with transportation? Yes   Do you need help shopping? Yes   Do you need help preparing meals?  Yes   Do you need help with housework?  Yes   Do you need help with laundry? No   Do you need help taking your medications? No   Do you need help managing money? No   Do you ever drive or ride in a car without wearing a seat belt? No   Have you felt unusual stress, anger or loneliness in the last month? No   Who do you live with? Spouse   If you need help, do you have trouble finding someone available to you? No   Have you been bothered in the last four weeks by sexual problems? No   Do you have difficulty concentrating, remembering or making decisions? Yes         Does the patient have evidence of cognitive impairment? No    Asprin use counseling:Taking ASA appropriately as indicated    Age-appropriate Screening Schedule:  Refer to the list below for future screening recommendations based on patient's age, sex and/or medical conditions. Orders for these recommended tests are listed in the plan section. The patient has been provided with a written plan.    Health Maintenance   Topic Date Due   • DXA SCAN  07/13/1933   • LIPID PANEL  04/04/2017   • INFLUENZA VACCINE  08/01/2020   • ZOSTER VACCINE (2 of 3) 02/25/2021   • TDAP/TD VACCINES (2 - Tdap) 08/24/2027              Fall Risk Assessment was completed, and patient is at  moderate risk for falls.;;      The following portions of the patient's history were reviewed and updated as appropriate: current medications, past family history, past medical history, past social history, past surgical history and problem list.    Outpatient Medications Prior to Visit   Medication Sig Dispense Refill   • amLODIPine (NORVASC) 5 MG tablet Take 1 tablet by mouth Every Morning. 90 tablet 3   • aspirin (aspirin) 81 MG EC tablet Take 1 tablet every day by oral route.     • dicyclomine (BENTYL) 10 MG capsule 1 po TID PRN abdominal pain 21 capsule 1   • estradiol (ESTRACE) 0.1 MG/GM vaginal cream      • famotidine (Pepcid) 20 MG tablet Take 1 tablet by mouth 2 (Two) Times a Day. 180 tablet 1   • levocetirizine (XYZAL) 5 MG tablet Take 1 tablet by mouth daily as needed.     • levothyroxine (SYNTHROID, LEVOTHROID) 100 MCG tablet Take 1 tablet by mouth Every Morning. 90 tablet 1   • LYRICA 200 MG capsule 3 (three) times a day.     • meclizine (ANTIVERT) 25 MG tablet Take 1 tablet by mouth 3 (Three) Times a Day As Needed for dizziness. 15 tablet 0   • ondansetron (ZOFRAN) 4 MG tablet Take 1 tablet by mouth Every 8 (Eight) Hours As Needed for nausea or vomiting. 30 tablet 0   • propranolol (INDERAL) 40 MG tablet 2 (two) times a day.     • triamcinolone (KENALOG) 0.1 % cream Apply  topically 2 (two) times a day. Till healed 45 g 1     No facility-administered medications prior to visit.        Patient Active Problem List   Diagnosis   • Allergic rhinitis   • Hyperlipidemia   • Hypertension   • Hypocalcemia   • Hypothyroidism   • Neuropathy   • NUD (nonulcer dyspepsia)   • Painful knee   • Pernicious anemia   • Tremor   • Vitamin B12 deficiency   • Spondylolisthesis of cervical region   • Graves' ophthalmopathy   • Anemia   • Memory impairment of gradual onset   • Ascending aortic aneurysm (CMS/HCC)       Advanced Care Planning:  ACP discussion was held with the patient during this visit. Patient does not have  "an advance directive, information provided.    Review of Systems   Constitutional: Negative for chills, diaphoresis, fever and unexpected weight change.   HENT: Positive for hearing loss. Negative for congestion, ear pain, nosebleeds, postnasal drip, sinus pressure and sore throat.    Eyes: Negative for pain, discharge and itching.   Respiratory: Negative for cough, chest tightness, shortness of breath and wheezing.    Cardiovascular: Negative for chest pain, palpitations and leg swelling.   Gastrointestinal: Negative for abdominal distention, abdominal pain, blood in stool, constipation, diarrhea, nausea and vomiting.   Endocrine: Positive for cold intolerance. Negative for polydipsia and polyuria.   Genitourinary: Negative for difficulty urinating, dysuria, frequency and hematuria.   Musculoskeletal: Positive for arthralgias and back pain. Negative for gait problem, joint swelling and myalgias.   Skin: Negative for rash and wound.   Neurological: Positive for tremors and numbness. Negative for syncope, weakness and headaches.   Psychiatric/Behavioral: Positive for decreased concentration. Negative for dysphoric mood and sleep disturbance. The patient is not nervous/anxious.        Compared to one year ago, the patient feels her physical health is worse.  Compared to one year ago, the patient feels her mental health is the same.    Reviewed chart for potential of high risk medication in the elderly: yes  Reviewed chart for potential of harmful drug interactions in the elderly:yes    Objective         Vitals:    03/02/21 1103   BP: 128/72   BP Location: Left arm   Patient Position: Sitting   Pulse: 68   Temp: 96.6 °F (35.9 °C)   TempSrc: Infrared   Weight: 66.7 kg (147 lb)   Height: 152.4 cm (60\")   PainSc:   6       Body mass index is 28.71 kg/m².  Discussed the patient's BMI with her. The BMI is above average; BMI management plan is completed.    Physical Exam  Constitutional:       Appearance: Normal appearance. " She is well-developed.   HENT:      Head: Normocephalic and atraumatic.      Right Ear: External ear normal.      Left Ear: External ear normal.      Nose: Nose normal.      Mouth/Throat:      Mouth: Mucous membranes are moist.      Pharynx: Oropharynx is clear.   Eyes:      Extraocular Movements: Extraocular movements intact.      Conjunctiva/sclera: Conjunctivae normal.      Pupils: Pupils are equal, round, and reactive to light.   Neck:      Musculoskeletal: Normal range of motion and neck supple.   Cardiovascular:      Rate and Rhythm: Normal rate and regular rhythm.      Heart sounds: Normal heart sounds.   Pulmonary:      Effort: Pulmonary effort is normal.      Breath sounds: Normal breath sounds.   Abdominal:      General: Bowel sounds are normal.      Palpations: Abdomen is soft.   Musculoskeletal: Normal range of motion.   Lymphadenopathy:      Cervical: No cervical adenopathy.   Skin:     General: Skin is warm and dry.   Neurological:      General: No focal deficit present.      Mental Status: She is alert and oriented to person, place, and time.      Comments: Head tremor   Psychiatric:         Mood and Affect: Mood normal.         Behavior: Behavior normal.         Thought Content: Thought content normal.               Assessment/Plan   Medicare Risks and Personalized Health Plan  CMS Preventative Services Quick Reference  Breast Cancer/Mammogram Screening  Colon Cancer Screening  Dementia/Memory   Hearing Problem  Immunizations Discussed/Encouraged (specific immunizations; immunizations up to date )  Inactivity/Sedentary  Osteoprorosis Risk  Polypharmacy    The above risks/problems have been discussed with the patient.  Pertinent information has been shared with the patient in the After Visit Summary.  Follow up plans and orders are seen below in the Assessment/Plan Section.    There are no diagnoses linked to this encounter.  Follow Up:  No follow-ups on file.     An After Visit Summary and PPPS were  given to the patient.     htn-controlled on amlodipine and propranolol  rhinitis-flonase and xyzal continuation, stable  tremor-controlled propranolol  hypothyroid- recheck   Dyspepsia-cont pepcid  neuropathy-cont lyrica, stable  djd-cont prn lortab, rare use  b12 def-cont b12, level at goal per 6/18  hyperlipidemia-labs on rtc, cont diet control  Diverticulosis-advised citrucel qd, asymptomatic  Memory impairment-cont aricept, no better or worst  Ascending Aortic aneurysm-recheck 7/2 noted         3/2 labs noted and dw patient, advised NSAIDs avoidance and     increase fluids, has already had the flu shot

## 2021-05-18 ENCOUNTER — HOSPITAL ENCOUNTER (EMERGENCY)
Facility: HOSPITAL | Age: 86
Discharge: HOME | End: 2021-05-19
Attending: EMERGENCY MEDICINE | Admitting: EMERGENCY MEDICINE

## 2021-05-18 ENCOUNTER — APPOINTMENT (OUTPATIENT)
Dept: GENERAL RADIOLOGY | Facility: HOSPITAL | Age: 86
End: 2021-05-18

## 2021-05-18 DIAGNOSIS — R79.89 ELEVATED TSH: ICD-10-CM

## 2021-05-18 DIAGNOSIS — R53.1 GENERALIZED WEAKNESS: Primary | ICD-10-CM

## 2021-05-18 LAB
ALBUMIN SERPL-MCNC: 3.9 G/DL (ref 3.5–5.2)
ALBUMIN/GLOB SERPL: 1.7 G/DL
ALP SERPL-CCNC: 107 U/L (ref 39–117)
ALT SERPL W P-5'-P-CCNC: 19 U/L (ref 1–33)
ANION GAP SERPL CALCULATED.3IONS-SCNC: 11 MMOL/L (ref 5–15)
AST SERPL-CCNC: 27 U/L (ref 1–32)
BACTERIA UR QL AUTO: ABNORMAL /HPF
BASOPHILS # BLD AUTO: 0.14 10*3/MM3 (ref 0–0.2)
BASOPHILS NFR BLD AUTO: 2.2 % (ref 0–1.5)
BILIRUB SERPL-MCNC: 0.2 MG/DL (ref 0–1.2)
BILIRUB UR QL STRIP: NEGATIVE
BUN SERPL-MCNC: 22 MG/DL (ref 8–23)
BUN/CREAT SERPL: 14.4 (ref 7–25)
CALCIUM SPEC-SCNC: 8.3 MG/DL (ref 8.6–10.5)
CHLORIDE SERPL-SCNC: 105 MMOL/L (ref 98–107)
CLARITY UR: CLEAR
CO2 SERPL-SCNC: 25 MMOL/L (ref 22–29)
COLOR UR: YELLOW
CREAT SERPL-MCNC: 1.53 MG/DL (ref 0.57–1)
DEPRECATED RDW RBC AUTO: 50.2 FL (ref 37–54)
EOSINOPHIL # BLD AUTO: 0.5 10*3/MM3 (ref 0–0.4)
EOSINOPHIL NFR BLD AUTO: 7.9 % (ref 0.3–6.2)
ERYTHROCYTE [DISTWIDTH] IN BLOOD BY AUTOMATED COUNT: 14.6 % (ref 12.3–15.4)
GFR SERPL CREATININE-BSD FRML MDRD: 32 ML/MIN/1.73
GLOBULIN UR ELPH-MCNC: 2.3 GM/DL
GLUCOSE SERPL-MCNC: 114 MG/DL (ref 65–99)
GLUCOSE UR STRIP-MCNC: NEGATIVE MG/DL
HCT VFR BLD AUTO: 35.4 % (ref 34–46.6)
HGB BLD-MCNC: 11.3 G/DL (ref 12–15.9)
HGB UR QL STRIP.AUTO: NEGATIVE
HOLD SPECIMEN: NORMAL
HOLD SPECIMEN: NORMAL
HYALINE CASTS UR QL AUTO: ABNORMAL /LPF
IMM GRANULOCYTES # BLD AUTO: 0.07 10*3/MM3 (ref 0–0.05)
IMM GRANULOCYTES NFR BLD AUTO: 1.1 % (ref 0–0.5)
KETONES UR QL STRIP: NEGATIVE
LEUKOCYTE ESTERASE UR QL STRIP.AUTO: ABNORMAL
LYMPHOCYTES # BLD AUTO: 1.26 10*3/MM3 (ref 0.7–3.1)
LYMPHOCYTES NFR BLD AUTO: 19.8 % (ref 19.6–45.3)
MCH RBC QN AUTO: 30.2 PG (ref 26.6–33)
MCHC RBC AUTO-ENTMCNC: 31.9 G/DL (ref 31.5–35.7)
MCV RBC AUTO: 94.7 FL (ref 79–97)
MONOCYTES # BLD AUTO: 0.62 10*3/MM3 (ref 0.1–0.9)
MONOCYTES NFR BLD AUTO: 9.7 % (ref 5–12)
NEUTROPHILS NFR BLD AUTO: 3.77 10*3/MM3 (ref 1.7–7)
NEUTROPHILS NFR BLD AUTO: 59.3 % (ref 42.7–76)
NITRITE UR QL STRIP: NEGATIVE
NRBC BLD AUTO-RTO: 0 /100 WBC (ref 0–0.2)
PH UR STRIP.AUTO: <=5 [PH] (ref 5–8)
PLATELET # BLD AUTO: 198 10*3/MM3 (ref 140–450)
PMV BLD AUTO: 11.7 FL (ref 6–12)
POTASSIUM SERPL-SCNC: 4.5 MMOL/L (ref 3.5–5.2)
PROT SERPL-MCNC: 6.2 G/DL (ref 6–8.5)
PROT UR QL STRIP: NEGATIVE
RBC # BLD AUTO: 3.74 10*6/MM3 (ref 3.77–5.28)
RBC # UR: ABNORMAL /HPF
REF LAB TEST METHOD: ABNORMAL
SODIUM SERPL-SCNC: 141 MMOL/L (ref 136–145)
SP GR UR STRIP: 1.01 (ref 1–1.03)
SQUAMOUS #/AREA URNS HPF: ABNORMAL /HPF
UROBILINOGEN UR QL STRIP: ABNORMAL
WBC # BLD AUTO: 6.36 10*3/MM3 (ref 3.4–10.8)
WBC UR QL AUTO: ABNORMAL /HPF
WHOLE BLOOD HOLD SPECIMEN: NORMAL
WHOLE BLOOD HOLD SPECIMEN: NORMAL

## 2021-05-18 PROCEDURE — 81001 URINALYSIS AUTO W/SCOPE: CPT

## 2021-05-18 PROCEDURE — 80053 COMPREHEN METABOLIC PANEL: CPT

## 2021-05-18 PROCEDURE — 99283 EMERGENCY DEPT VISIT LOW MDM: CPT

## 2021-05-18 PROCEDURE — 83880 ASSAY OF NATRIURETIC PEPTIDE: CPT | Performed by: EMERGENCY MEDICINE

## 2021-05-18 PROCEDURE — 85025 COMPLETE CBC W/AUTO DIFF WBC: CPT

## 2021-05-18 PROCEDURE — 84484 ASSAY OF TROPONIN QUANT: CPT | Performed by: EMERGENCY MEDICINE

## 2021-05-18 PROCEDURE — 87636 SARSCOV2 & INF A&B AMP PRB: CPT | Performed by: EMERGENCY MEDICINE

## 2021-05-18 PROCEDURE — 84439 ASSAY OF FREE THYROXINE: CPT | Performed by: EMERGENCY MEDICINE

## 2021-05-18 PROCEDURE — 84443 ASSAY THYROID STIM HORMONE: CPT | Performed by: EMERGENCY MEDICINE

## 2021-05-18 PROCEDURE — 71045 X-RAY EXAM CHEST 1 VIEW: CPT

## 2021-05-18 PROCEDURE — 93005 ELECTROCARDIOGRAM TRACING: CPT | Performed by: EMERGENCY MEDICINE

## 2021-05-18 RX ORDER — SODIUM CHLORIDE 0.9 % (FLUSH) 0.9 %
10 SYRINGE (ML) INJECTION AS NEEDED
Status: DISCONTINUED | OUTPATIENT
Start: 2021-05-18 | End: 2021-05-19 | Stop reason: HOSPADM

## 2021-05-19 VITALS
BODY MASS INDEX: 29.45 KG/M2 | RESPIRATION RATE: 16 BRPM | WEIGHT: 150 LBS | SYSTOLIC BLOOD PRESSURE: 163 MMHG | HEART RATE: 65 BPM | TEMPERATURE: 98.3 F | OXYGEN SATURATION: 97 % | HEIGHT: 60 IN | DIASTOLIC BLOOD PRESSURE: 92 MMHG

## 2021-05-19 LAB
FLUAV RNA RESP QL NAA+PROBE: NOT DETECTED
FLUBV RNA RESP QL NAA+PROBE: NOT DETECTED
HOLD SPECIMEN: NORMAL
NT-PROBNP SERPL-MCNC: 343.9 PG/ML (ref 0–1800)
QT INTERVAL: 408 MS
QTC INTERVAL: 410 MS
SARS-COV-2 RNA RESP QL NAA+PROBE: NOT DETECTED
T4 FREE SERPL-MCNC: 1.46 NG/DL (ref 0.93–1.7)
TROPONIN T SERPL-MCNC: <0.01 NG/ML (ref 0–0.03)
TSH SERPL DL<=0.05 MIU/L-ACNC: 9.96 UIU/ML (ref 0.27–4.2)

## 2021-05-19 RX ADMIN — SODIUM CHLORIDE 500 ML: 9 INJECTION, SOLUTION INTRAVENOUS at 00:18

## 2021-05-20 ENCOUNTER — TELEPHONE (OUTPATIENT)
Dept: INTERNAL MEDICINE | Facility: CLINIC | Age: 86
End: 2021-05-20

## 2021-05-20 ENCOUNTER — PATIENT OUTREACH (OUTPATIENT)
Dept: CASE MANAGEMENT | Facility: OTHER | Age: 86
End: 2021-05-20

## 2021-05-20 DIAGNOSIS — E03.8 OTHER SPECIFIED HYPOTHYROIDISM: ICD-10-CM

## 2021-05-20 RX ORDER — LEVOTHYROXINE SODIUM 112 UG/1
112 TABLET ORAL EVERY MORNING
Qty: 90 TABLET | Refills: 1 | Status: SHIPPED | OUTPATIENT
Start: 2021-05-20 | End: 2021-11-30 | Stop reason: SDUPTHER

## 2021-05-20 NOTE — TELEPHONE ENCOUNTER
Caller: Matt Nava    Relationship: Emergency Contact    Best call back number: [phone]    What is the best time to reach you: ANYTIME    Who are you requesting to speak with (clinical staff, provider,  specific staff member): CLINICAL STAFF OR PROVIDER    What was the call regarding:  STATES THAT PATIENT WAS SEEN IN ER FOR LOW THYROID AND HE IS WAITING ON A CALL FROM THE OFFICE TO BE ADVISED ON WHAT PATIENT SHOULD DO.  STATES THAT THEY WILL BE LEAVING TOWN SOON    Do you require a callback: YES

## 2021-05-20 NOTE — OUTREACH NOTE
Care Coordination Assessment    Documented/Reviewed By: Taylor Armando RN Date/time: 5/20/2021 10:51 AM   Assessment completed with: patient  Living arrangement: spouse  Support system: family, spouse, children  Type of residence: private residence  Equipment used at home:  (Comment: Patient has blood pressure cuff)  Bed or wheelchair confined: No  Medication adherence problem: No  Experiencing side effects from current medications: No  Difficulty keeping appointments: No  Family aware of the patient's advance care planning wishes: Yes (Comment: States has Living Will )

## 2021-05-20 NOTE — OUTREACH NOTE
"Care Plan Note      Responses   Annual Wellness Visit:   Patient Has Completed   Care Gaps Addressed  Flu Shot, Pneumonia Vaccine   Flu Shot Status  Up to Date   Flu Shot Comments  States gets yearly   Pneumonia Vaccine Status  Up to Date   Care Gap Comments  covid 19 vaccines utd   Other Patient Education/Resources   24/7 [facility] Nurse Call Line, Tod   24/7 Nurse Call Line Education Method  Verbal   MyChart Education Method  Verbal   Does patient have depression diagnosis?  No   Advanced Directives:  Patient Has   Ed Visits past 12 months:  2 or 3   Hospitalizations past 12 months  None   Health Literacy  Good        Pt contacted regarding ED visit 5/18/21 with chief c/o listed as fatigue and to assess for any case management needs.  Pt states, \"doing fine.\"  States she is \"slowing getting strength back.\"  She is up and about and ambulates without assistive device.  She was aware that thyroid was low and waiting on recommendation from her PCP office. States she had part of thyroid removed.  They are planning to drive to [place] (hopefully leaving tomorrow) for a [employer] trip for housing   She monitors her bp at home and states it has been good.  They do try to eat healthy.  Admits to a lot of frozen dinners, but states they opt for the low sodium ones.  She does not drive anymore.  Her  is her transportation.  She is independent in all other aspects and manages her own medicines.  Care gaps reviewed). States she tries to stay on top of everything. She does have Living Will.  She declines MyChart.  She denies any needs and voiced her appreciation for the call.     Education/instruction provided by Care Coordinator:  Role of  explained and contact number given.  [facility] 24/7 Nurse line and Covid 19 hotline explained and states she does have the AVS from ED with the phone numbers on this.      Follow Up Outreach Due: as needed     Taylor Armando RN  Ambulatory Case " Manager    5/20/2021, 10:59 EDT

## 2021-06-14 RX ORDER — FAMOTIDINE 20 MG/1
TABLET, FILM COATED ORAL
Qty: 180 TABLET | Refills: 3 | Status: SHIPPED | OUTPATIENT
Start: 2021-06-14 | End: 2022-06-30

## 2021-06-14 NOTE — TELEPHONE ENCOUNTER
Last Office Visit: 10/22/2020  Next Office Visit: 07/085/2021    Labs completed in past 6 months? yes  Labs completed in past year? yes    Last Refill Date: 07/14/2020  Quantity: 180  Refills: 1     Pharmacy: [facility] [address] [place] [address] - [address] AT [facility] [address] - [phone]  - [phone] FX   [address] [place] [address], [place] [address]   Phone:  [phone]  Fax:  [phone]    Please review pended refill request for any changes needed on refills or quantities. Thank you!

## 2021-06-17 NOTE — OUTREACH NOTE
"TCM call completed.  See TCM flowsheet for details.  Does have upcoming hospital follow up appt with Dr. Oneil 10/28/19.  \" I am feeling great.\"  Up and about and has had no dizziness today.  Eating and drinking and having BMs however slightly loose.  She will watch.  No trouble with urination and urine is clear and light in color.  Denies any fever, chills, SOB, heart racing/palpitation, chest or flank pain, nausea/vomiting.  Denies any needs at time of call, confirmed hospital f/u appt and appreciated the call.   "
[GC Chlamydia Culture] : GC Chlamydia Culture

## 2021-07-08 ENCOUNTER — LAB (OUTPATIENT)
Dept: LAB | Facility: HOSPITAL | Age: 86
End: 2021-07-08

## 2021-07-08 ENCOUNTER — OFFICE VISIT (OUTPATIENT)
Dept: INTERNAL MEDICINE | Facility: CLINIC | Age: 86
End: 2021-07-08

## 2021-07-08 VITALS
WEIGHT: 150.4 LBS | HEIGHT: 60 IN | TEMPERATURE: 96.9 F | SYSTOLIC BLOOD PRESSURE: 130 MMHG | BODY MASS INDEX: 29.53 KG/M2 | OXYGEN SATURATION: 95 % | HEART RATE: 60 BPM | DIASTOLIC BLOOD PRESSURE: 70 MMHG

## 2021-07-08 DIAGNOSIS — D51.0 PERNICIOUS ANEMIA: ICD-10-CM

## 2021-07-08 DIAGNOSIS — R41.3 MEMORY IMPAIRMENT OF GRADUAL ONSET: ICD-10-CM

## 2021-07-08 DIAGNOSIS — I10 ESSENTIAL HYPERTENSION: Primary | ICD-10-CM

## 2021-07-08 DIAGNOSIS — E78.49 OTHER HYPERLIPIDEMIA: ICD-10-CM

## 2021-07-08 DIAGNOSIS — E03.8 OTHER SPECIFIED HYPOTHYROIDISM: ICD-10-CM

## 2021-07-08 PROCEDURE — 82607 VITAMIN B-12: CPT | Performed by: INTERNAL MEDICINE

## 2021-07-08 PROCEDURE — 80048 BASIC METABOLIC PNL TOTAL CA: CPT | Performed by: INTERNAL MEDICINE

## 2021-07-08 PROCEDURE — 84443 ASSAY THYROID STIM HORMONE: CPT | Performed by: INTERNAL MEDICINE

## 2021-07-08 PROCEDURE — 99214 OFFICE O/P EST MOD 30 MIN: CPT | Performed by: INTERNAL MEDICINE

## 2021-07-08 NOTE — PROGRESS NOTES
Patient is a 87 y.o. female who is here for a follow up of hyperlipidemia,hypertension,and hypothyroidism.  Chief Complaint   Patient presents with   • Hyperlipidemia   • Hypertension   • Hypothyroidism         HPI:    Here for mgmt of HTN and hypothyroid and tremor.  Her gait is not the best.  Has not tried PT yet.  No dizziness or lightheadedness.  Energy level is ok.  Sleeping well.  Memory is stable.  No fever or chills.  No CP or palpitations.      History:     Patient Active Problem List   Diagnosis   • Allergic rhinitis   • Hyperlipidemia   • Hypertension   • Hypocalcemia   • Hypothyroidism   • Neuropathy   • NUD (nonulcer dyspepsia)   • Painful knee   • Pernicious anemia   • Tremor   • Vitamin B12 deficiency   • Spondylolisthesis of cervical region   • Graves' ophthalmopathy   • Anemia   • Memory impairment of gradual onset   • Ascending aortic aneurysm (CMS/HCC)       Past Medical History:   Diagnosis Date   • Anemia    • Arthritis    • Asthma    • Cataract    • Difficulty breathing     Chronic   • GERD (gastroesophageal reflux disease)    • Graves' ophthalmopathy    • Hoarseness    • Hypertension    • Spondylolisthesis of cervical region    • Vitamin B12 deficiency        Past Surgical History:   Procedure Laterality Date   • CERVICAL LAMINECTOMY     • CHOLECYSTECTOMY     • OTHER SURGICAL HISTORY Right     Neuroplasty Median Nerve at Carpal Tunnel   • THYROID SURGERY     • TOTAL KNEE ARTHROPLASTY Left 09/29/2014    Dr Colon       Current Outpatient Medications on File Prior to Visit   Medication Sig   • amLODIPine (NORVASC) 5 MG tablet Take 1 tablet by mouth Every Morning.   • aspirin (aspirin) 81 MG EC tablet Take 1 tablet every day by oral route.   • dicyclomine (BENTYL) 10 MG capsule 1 po TID PRN abdominal pain   • estradiol (ESTRACE) 0.1 MG/GM vaginal cream    • famotidine (PEPCID) 20 MG tablet TAKE ONE TABLET BY MOUTH TWICE A DAY   • HYDROcodone-acetaminophen (NORCO)  MG per tablet Take 1  tablet by mouth Every 6 (Six) Hours As Needed for Moderate Pain .   • levocetirizine (XYZAL) 5 MG tablet Take 1 tablet by mouth daily as needed.   • levothyroxine (SYNTHROID, LEVOTHROID) 112 MCG tablet Take 1 tablet by mouth Every Morning.   • LYRICA 200 MG capsule 3 (three) times a day.   • meclizine (ANTIVERT) 25 MG tablet Take 1 tablet by mouth 3 (Three) Times a Day As Needed for dizziness.   • ondansetron (ZOFRAN) 4 MG tablet Take 1 tablet by mouth Every 8 (Eight) Hours As Needed for nausea or vomiting.   • propranolol (INDERAL) 40 MG tablet 3 (Three) Times a Day.   • triamcinolone (KENALOG) 0.1 % cream Apply  topically 2 (two) times a day. Till healed     No current facility-administered medications on file prior to visit.       Family History   Problem Relation Age of Onset   • Hypertension Mother    • Other Father         cardiac disorder   • Diabetes Father    • Hypertension Sister        Social History     Socioeconomic History   • Marital status:      Spouse name: Not on file   • Number of children: Not on file   • Years of education: Not on file   • Highest education level: Not on file   Tobacco Use   • Smoking status: Never Smoker   • Smokeless tobacco: Never Used   Substance and Sexual Activity   • Alcohol use: No   • Drug use: No   • Sexual activity: Defer         Review of Systems   Constitutional: Negative for chills, diaphoresis, fever and unexpected weight change.   HENT: Positive for hearing loss. Negative for congestion, ear pain, nosebleeds, postnasal drip, sinus pressure and sore throat.    Eyes: Negative for pain, discharge and itching.   Respiratory: Negative for cough, chest tightness, shortness of breath and wheezing.    Cardiovascular: Negative for chest pain, palpitations and leg swelling.   Gastrointestinal: Negative for abdominal distention, abdominal pain, blood in stool, constipation, diarrhea, nausea and vomiting.   Endocrine: Positive for cold intolerance. Negative for  "polydipsia and polyuria.   Genitourinary: Negative for difficulty urinating, dysuria, frequency and hematuria.   Musculoskeletal: Positive for arthralgias, back pain and gait problem. Negative for joint swelling and myalgias.   Skin: Negative for rash and wound.   Neurological: Positive for tremors and numbness. Negative for syncope, weakness and headaches.   Psychiatric/Behavioral: Positive for decreased concentration. Negative for dysphoric mood and sleep disturbance. The patient is not nervous/anxious.        /70   Pulse 60   Temp 96.9 °F (36.1 °C) (Infrared)   Ht 152.4 cm (60\")   Wt 68.2 kg (150 lb 6.4 oz)   LMP  (LMP Unknown)   SpO2 95%   BMI 29.37 kg/m²       Physical Exam  Constitutional:       Appearance: Normal appearance. She is well-developed.   HENT:      Head: Normocephalic and atraumatic.      Right Ear: External ear normal.      Left Ear: External ear normal.      Nose: Nose normal.      Mouth/Throat:      Mouth: Mucous membranes are moist.      Pharynx: Oropharynx is clear.   Eyes:      Extraocular Movements: Extraocular movements intact.      Conjunctiva/sclera: Conjunctivae normal.      Pupils: Pupils are equal, round, and reactive to light.   Cardiovascular:      Rate and Rhythm: Normal rate and regular rhythm.      Heart sounds: Normal heart sounds.   Pulmonary:      Effort: Pulmonary effort is normal.      Breath sounds: Normal breath sounds.   Abdominal:      General: Bowel sounds are normal.      Palpations: Abdomen is soft.   Musculoskeletal:         General: Normal range of motion.      Cervical back: Normal range of motion and neck supple.   Lymphadenopathy:      Cervical: No cervical adenopathy.   Skin:     General: Skin is warm and dry.   Neurological:      General: No focal deficit present.      Mental Status: She is alert and oriented to person, place, and time.      Comments: Head tremor   Psychiatric:         Mood and Affect: Mood normal.         Behavior: Behavior " normal.         Thought Content: Thought content normal.         Procedure:      Discussion/Summary:    htn-stable on amlodipine and propranolol  rhinitis-flonase and xyzal continuation, stable  tremor-stable on propranolol  hypothyroid- recheck today improved  Dyspepsia-cont pepcid  neuropathy-cont lyrica, stable  djd-cont prn lortab, rare use  b12 def-cont b12, level today at goal  hyperlipidemia-labs on rtc, cont diet control  Diverticulosis-advised citrucel qd, asymptomatic  Memory impairment-cont aricept, no better or worst  Ascending Aortic aneurysm-recheck 7/2 noted         7/8 labs noted and dw patient, advised NSAIDs avoidance and increase fluids    Current Outpatient Medications:   •  amLODIPine (NORVASC) 5 MG tablet, Take 1 tablet by mouth Every Morning., Disp: 90 tablet, Rfl: 3  •  aspirin (aspirin) 81 MG EC tablet, Take 1 tablet every day by oral route., Disp: , Rfl:   •  dicyclomine (BENTYL) 10 MG capsule, 1 po TID PRN abdominal pain, Disp: 21 capsule, Rfl: 1  •  estradiol (ESTRACE) 0.1 MG/GM vaginal cream, , Disp: , Rfl:   •  famotidine (PEPCID) 20 MG tablet, TAKE ONE TABLET BY MOUTH TWICE A DAY, Disp: 180 tablet, Rfl: 3  •  HYDROcodone-acetaminophen (NORCO)  MG per tablet, Take 1 tablet by mouth Every 6 (Six) Hours As Needed for Moderate Pain ., Disp: , Rfl:   •  levocetirizine (XYZAL) 5 MG tablet, Take 1 tablet by mouth daily as needed., Disp: , Rfl:   •  levothyroxine (SYNTHROID, LEVOTHROID) 112 MCG tablet, Take 1 tablet by mouth Every Morning., Disp: 90 tablet, Rfl: 1  •  LYRICA 200 MG capsule, 3 (three) times a day., Disp: , Rfl:   •  meclizine (ANTIVERT) 25 MG tablet, Take 1 tablet by mouth 3 (Three) Times a Day As Needed for dizziness., Disp: 15 tablet, Rfl: 0  •  ondansetron (ZOFRAN) 4 MG tablet, Take 1 tablet by mouth Every 8 (Eight) Hours As Needed for nausea or vomiting., Disp: 30 tablet, Rfl: 0  •  propranolol (INDERAL) 40 MG tablet, 3 (Three) Times a Day., Disp: , Rfl:   •   triamcinolone (KENALOG) 0.1 % cream, Apply  topically 2 (two) times a day. Till healed, Disp: 45 g, Rfl: 1        Diagnoses and all orders for this visit:    1. Essential hypertension (Primary)  -     Basic Metabolic Panel    2. Other hyperlipidemia    3. Other specified hypothyroidism  -     TSH    4. Pernicious anemia  -     Vitamin B12    5. Memory impairment of gradual onset

## 2021-07-09 LAB
ANION GAP SERPL CALCULATED.3IONS-SCNC: 8.9 MMOL/L (ref 5–15)
BUN SERPL-MCNC: 24 MG/DL (ref 8–23)
BUN/CREAT SERPL: 18.3 (ref 7–25)
CALCIUM SPEC-SCNC: 8.4 MG/DL (ref 8.6–10.5)
CHLORIDE SERPL-SCNC: 105 MMOL/L (ref 98–107)
CO2 SERPL-SCNC: 24.1 MMOL/L (ref 22–29)
CREAT SERPL-MCNC: 1.31 MG/DL (ref 0.57–1)
GFR SERPL CREATININE-BSD FRML MDRD: 38 ML/MIN/1.73
GLUCOSE SERPL-MCNC: 85 MG/DL (ref 65–99)
POTASSIUM SERPL-SCNC: 4.8 MMOL/L (ref 3.5–5.2)
SODIUM SERPL-SCNC: 138 MMOL/L (ref 136–145)
TSH SERPL DL<=0.05 MIU/L-ACNC: 5.6 UIU/ML (ref 0.27–4.2)
VIT B12 BLD-MCNC: >2000 PG/ML (ref 211–946)

## 2021-10-10 ENCOUNTER — APPOINTMENT (OUTPATIENT)
Dept: GENERAL RADIOLOGY | Facility: HOSPITAL | Age: 86
End: 2021-10-10

## 2021-10-10 ENCOUNTER — APPOINTMENT (OUTPATIENT)
Dept: CT IMAGING | Facility: HOSPITAL | Age: 86
End: 2021-10-10

## 2021-10-10 ENCOUNTER — HOSPITAL ENCOUNTER (OUTPATIENT)
Facility: HOSPITAL | Age: 86
Setting detail: OBSERVATION
Discharge: HOME | End: 2021-10-12
Attending: EMERGENCY MEDICINE | Admitting: HOSPITALIST

## 2021-10-10 DIAGNOSIS — I10 UNCONTROLLED HYPERTENSION: Primary | ICD-10-CM

## 2021-10-10 DIAGNOSIS — I50.32 CHRONIC DIASTOLIC HEART FAILURE (HCC): ICD-10-CM

## 2021-10-10 DIAGNOSIS — I50.21 ACUTE SYSTOLIC CONGESTIVE HEART FAILURE (HCC): ICD-10-CM

## 2021-10-10 DIAGNOSIS — I50.33 ACUTE ON CHRONIC DIASTOLIC CHF (CONGESTIVE HEART FAILURE) (HCC): ICD-10-CM

## 2021-10-10 DIAGNOSIS — N18.32 STAGE 3B CHRONIC KIDNEY DISEASE (HCC): ICD-10-CM

## 2021-10-10 DIAGNOSIS — I11.0 HYPERTENSIVE HEART DISEASE WITH ACUTE ON CHRONIC DIASTOLIC CONGESTIVE HEART FAILURE (HCC): ICD-10-CM

## 2021-10-10 DIAGNOSIS — I50.33 HYPERTENSIVE HEART DISEASE WITH ACUTE ON CHRONIC DIASTOLIC CONGESTIVE HEART FAILURE (HCC): ICD-10-CM

## 2021-10-10 DIAGNOSIS — I16.1 HYPERTENSIVE EMERGENCY: ICD-10-CM

## 2021-10-10 DIAGNOSIS — J96.01 ACUTE RESPIRATORY FAILURE WITH HYPOXIA (HCC): ICD-10-CM

## 2021-10-10 DIAGNOSIS — I27.20 PULMONARY HYPERTENSION (HCC): ICD-10-CM

## 2021-10-10 LAB
ALBUMIN SERPL-MCNC: 4 G/DL (ref 3.5–5.2)
ALBUMIN/GLOB SERPL: 1.8 G/DL
ALP SERPL-CCNC: 79 U/L (ref 39–117)
ALT SERPL W P-5'-P-CCNC: 22 U/L (ref 1–33)
ANION GAP SERPL CALCULATED.3IONS-SCNC: 9 MMOL/L (ref 5–15)
AST SERPL-CCNC: 25 U/L (ref 1–32)
BASOPHILS # BLD AUTO: 0.08 10*3/MM3 (ref 0–0.2)
BASOPHILS NFR BLD AUTO: 1.3 % (ref 0–1.5)
BILIRUB SERPL-MCNC: 0.3 MG/DL (ref 0–1.2)
BUN SERPL-MCNC: 16 MG/DL (ref 8–23)
BUN/CREAT SERPL: 13 (ref 7–25)
CALCIUM SPEC-SCNC: 8.5 MG/DL (ref 8.6–10.5)
CHLORIDE SERPL-SCNC: 102 MMOL/L (ref 98–107)
CO2 SERPL-SCNC: 25 MMOL/L (ref 22–29)
CREAT SERPL-MCNC: 1.23 MG/DL (ref 0.57–1)
DEPRECATED RDW RBC AUTO: 49.2 FL (ref 37–54)
EOSINOPHIL # BLD AUTO: 0.25 10*3/MM3 (ref 0–0.4)
EOSINOPHIL NFR BLD AUTO: 4.2 % (ref 0.3–6.2)
ERYTHROCYTE [DISTWIDTH] IN BLOOD BY AUTOMATED COUNT: 14.6 % (ref 12.3–15.4)
FLUAV RNA RESP QL NAA+PROBE: NOT DETECTED
FLUBV RNA RESP QL NAA+PROBE: NOT DETECTED
GFR SERPL CREATININE-BSD FRML MDRD: 41 ML/MIN/1.73
GLOBULIN UR ELPH-MCNC: 2.2 GM/DL
GLUCOSE SERPL-MCNC: 96 MG/DL (ref 65–99)
HCT VFR BLD AUTO: 33.3 % (ref 34–46.6)
HGB BLD-MCNC: 10.9 G/DL (ref 12–15.9)
HOLD SPECIMEN: NORMAL
IMM GRANULOCYTES # BLD AUTO: 0.07 10*3/MM3 (ref 0–0.05)
IMM GRANULOCYTES NFR BLD AUTO: 1.2 % (ref 0–0.5)
LYMPHOCYTES # BLD AUTO: 0.96 10*3/MM3 (ref 0.7–3.1)
LYMPHOCYTES NFR BLD AUTO: 16 % (ref 19.6–45.3)
MCH RBC QN AUTO: 30.1 PG (ref 26.6–33)
MCHC RBC AUTO-ENTMCNC: 32.7 G/DL (ref 31.5–35.7)
MCV RBC AUTO: 92 FL (ref 79–97)
MONOCYTES # BLD AUTO: 0.5 10*3/MM3 (ref 0.1–0.9)
MONOCYTES NFR BLD AUTO: 8.3 % (ref 5–12)
NEUTROPHILS NFR BLD AUTO: 4.13 10*3/MM3 (ref 1.7–7)
NEUTROPHILS NFR BLD AUTO: 69 % (ref 42.7–76)
NRBC BLD AUTO-RTO: 0 /100 WBC (ref 0–0.2)
NT-PROBNP SERPL-MCNC: 278 PG/ML (ref 0–1800)
PLATELET # BLD AUTO: 193 10*3/MM3 (ref 140–450)
PMV BLD AUTO: 11.1 FL (ref 6–12)
POTASSIUM SERPL-SCNC: 4.4 MMOL/L (ref 3.5–5.2)
PROT SERPL-MCNC: 6.2 G/DL (ref 6–8.5)
RBC # BLD AUTO: 3.62 10*6/MM3 (ref 3.77–5.28)
SARS-COV-2 RNA RESP QL NAA+PROBE: NOT DETECTED
SODIUM SERPL-SCNC: 136 MMOL/L (ref 136–145)
TROPONIN T SERPL-MCNC: 0.02 NG/ML (ref 0–0.03)
WBC # BLD AUTO: 5.99 10*3/MM3 (ref 3.4–10.8)
WHOLE BLOOD HOLD SPECIMEN: NORMAL
WHOLE BLOOD HOLD SPECIMEN: NORMAL

## 2021-10-10 PROCEDURE — 96375 TX/PRO/DX INJ NEW DRUG ADDON: CPT

## 2021-10-10 PROCEDURE — C9803 HOPD COVID-19 SPEC COLLECT: HCPCS

## 2021-10-10 PROCEDURE — 87636 SARSCOV2 & INF A&B AMP PRB: CPT | Performed by: EMERGENCY MEDICINE

## 2021-10-10 PROCEDURE — 84484 ASSAY OF TROPONIN QUANT: CPT

## 2021-10-10 PROCEDURE — 71275 CT ANGIOGRAPHY CHEST: CPT

## 2021-10-10 PROCEDURE — 0 IOPAMIDOL PER 1 ML: Performed by: INTERNAL MEDICINE

## 2021-10-10 PROCEDURE — 83880 ASSAY OF NATRIURETIC PEPTIDE: CPT

## 2021-10-10 PROCEDURE — G0378 HOSPITAL OBSERVATION PER HR: HCPCS

## 2021-10-10 PROCEDURE — 85025 COMPLETE CBC W/AUTO DIFF WBC: CPT

## 2021-10-10 PROCEDURE — 25010000002 FUROSEMIDE PER 20 MG: Performed by: EMERGENCY MEDICINE

## 2021-10-10 PROCEDURE — 99284 EMERGENCY DEPT VISIT MOD MDM: CPT

## 2021-10-10 PROCEDURE — 71045 X-RAY EXAM CHEST 1 VIEW: CPT

## 2021-10-10 PROCEDURE — 99220 PR INITIAL OBSERVATION CARE/DAY 70 MINUTES: CPT | Performed by: INTERNAL MEDICINE

## 2021-10-10 PROCEDURE — 96374 THER/PROPH/DIAG INJ IV PUSH: CPT

## 2021-10-10 PROCEDURE — 84484 ASSAY OF TROPONIN QUANT: CPT | Performed by: INTERNAL MEDICINE

## 2021-10-10 PROCEDURE — 25010000002 HYDRALAZINE PER 20 MG: Performed by: EMERGENCY MEDICINE

## 2021-10-10 PROCEDURE — 80053 COMPREHEN METABOLIC PANEL: CPT

## 2021-10-10 PROCEDURE — 93005 ELECTROCARDIOGRAM TRACING: CPT | Performed by: EMERGENCY MEDICINE

## 2021-10-10 RX ORDER — HYDROCODONE BITARTRATE AND ACETAMINOPHEN 7.5; 325 MG/1; MG/1
1 TABLET ORAL EVERY 8 HOURS PRN
Status: DISCONTINUED | OUTPATIENT
Start: 2021-10-10 | End: 2021-10-12 | Stop reason: HOSPADM

## 2021-10-10 RX ORDER — FUROSEMIDE 10 MG/ML
40 INJECTION INTRAMUSCULAR; INTRAVENOUS ONCE
Status: COMPLETED | OUTPATIENT
Start: 2021-10-10 | End: 2021-10-10

## 2021-10-10 RX ORDER — FAMOTIDINE 20 MG/1
20 TABLET, FILM COATED ORAL 2 TIMES DAILY
Status: DISCONTINUED | OUTPATIENT
Start: 2021-10-10 | End: 2021-10-12

## 2021-10-10 RX ORDER — PREGABALIN 100 MG/1
200 CAPSULE ORAL 3 TIMES DAILY
Status: DISCONTINUED | OUTPATIENT
Start: 2021-10-10 | End: 2021-10-11

## 2021-10-10 RX ORDER — ASPIRIN 81 MG/1
81 TABLET ORAL DAILY
Status: DISCONTINUED | OUTPATIENT
Start: 2021-10-11 | End: 2021-10-12 | Stop reason: HOSPADM

## 2021-10-10 RX ORDER — AMLODIPINE BESYLATE 5 MG/1
5 TABLET ORAL DAILY
Status: DISCONTINUED | OUTPATIENT
Start: 2021-10-11 | End: 2021-10-12 | Stop reason: HOSPADM

## 2021-10-10 RX ORDER — ACETAMINOPHEN 160 MG/5ML
650 SOLUTION ORAL EVERY 4 HOURS PRN
Status: DISCONTINUED | OUTPATIENT
Start: 2021-10-10 | End: 2021-10-12 | Stop reason: HOSPADM

## 2021-10-10 RX ORDER — HYDRALAZINE HYDROCHLORIDE 20 MG/ML
20 INJECTION INTRAMUSCULAR; INTRAVENOUS ONCE
Status: COMPLETED | OUTPATIENT
Start: 2021-10-10 | End: 2021-10-10

## 2021-10-10 RX ORDER — SODIUM CHLORIDE 0.9 % (FLUSH) 0.9 %
10 SYRINGE (ML) INJECTION AS NEEDED
Status: DISCONTINUED | OUTPATIENT
Start: 2021-10-10 | End: 2021-10-12 | Stop reason: HOSPADM

## 2021-10-10 RX ORDER — ACETAMINOPHEN 650 MG/1
650 SUPPOSITORY RECTAL EVERY 4 HOURS PRN
Status: DISCONTINUED | OUTPATIENT
Start: 2021-10-10 | End: 2021-10-12 | Stop reason: HOSPADM

## 2021-10-10 RX ORDER — SODIUM CHLORIDE 0.9 % (FLUSH) 0.9 %
10 SYRINGE (ML) INJECTION EVERY 12 HOURS SCHEDULED
Status: DISCONTINUED | OUTPATIENT
Start: 2021-10-10 | End: 2021-10-12 | Stop reason: HOSPADM

## 2021-10-10 RX ORDER — ACETAMINOPHEN 325 MG/1
650 TABLET ORAL EVERY 4 HOURS PRN
Status: DISCONTINUED | OUTPATIENT
Start: 2021-10-10 | End: 2021-10-12 | Stop reason: HOSPADM

## 2021-10-10 RX ORDER — LEVOTHYROXINE SODIUM 112 UG/1
112 TABLET ORAL
Status: DISCONTINUED | OUTPATIENT
Start: 2021-10-11 | End: 2021-10-12 | Stop reason: HOSPADM

## 2021-10-10 RX ADMIN — FUROSEMIDE 40 MG: 10 INJECTION, SOLUTION INTRAMUSCULAR; INTRAVENOUS at 13:55

## 2021-10-10 RX ADMIN — PREGABALIN 200 MG: 100 CAPSULE ORAL at 21:13

## 2021-10-10 RX ADMIN — HYDRALAZINE HYDROCHLORIDE 20 MG: 20 INJECTION INTRAMUSCULAR; INTRAVENOUS at 15:40

## 2021-10-10 RX ADMIN — FAMOTIDINE 20 MG: 20 TABLET, FILM COATED ORAL at 21:13

## 2021-10-10 RX ADMIN — IOPAMIDOL 100 ML: 755 INJECTION, SOLUTION INTRAVENOUS at 17:49

## 2021-10-10 RX ADMIN — SODIUM CHLORIDE, PRESERVATIVE FREE 10 ML: 5 INJECTION INTRAVENOUS at 21:31

## 2021-10-10 RX ADMIN — HYDROCODONE BITARTRATE AND ACETAMINOPHEN 1 TABLET: 7.5; 325 TABLET ORAL at 21:30

## 2021-10-10 NOTE — ED PROVIDER NOTES
Subjective   Mrs. Nava presents with several weeks of worsening shortness of breath.  She notes that over the last 3 nights she has had orthopnea.  She has had to sleep sitting upright.  She notes new onset pedal edema over the last couple of weeks as well.  She denies fevers or chills.  She denies any pain in her chest.  She tells me she does get a funny feeling across her chest when she lays flat for a little while.  She has never had this happen before.      History provided by:  Patient and medical records  Shortness of Breath  Severity:  Moderate  Onset quality:  Gradual  Timing:  Constant  Progression:  Worsening  Chronicity:  New  Context comment:  Accompanied by pedal edema of new onset  Relieved by:  Sitting up  Exacerbated by: Supine position.  Associated symptoms: no abdominal pain, no chest pain, no cough, no diaphoresis, no fever, no headaches, no rash, no sore throat, no vomiting and no wheezing        Review of Systems   Constitutional: Negative for chills, diaphoresis and fever.   HENT: Negative for congestion, rhinorrhea and sore throat.    Eyes: Negative for visual disturbance.   Respiratory: Positive for shortness of breath. Negative for cough and wheezing.    Cardiovascular: Positive for leg swelling. Negative for chest pain.   Gastrointestinal: Negative for abdominal pain, diarrhea, nausea and vomiting.   Genitourinary: Negative for difficulty urinating and dysuria.   Skin: Negative for rash.   Neurological: Negative for dizziness, weakness, numbness and headaches.   Psychiatric/Behavioral: Negative for confusion.   All other systems reviewed and are negative.      Past Medical History:   Diagnosis Date   • Anemia    • Arthritis    • Asthma    • Cataract    • Difficulty breathing     Chronic   • GERD (gastroesophageal reflux disease)    • Graves' ophthalmopathy    • Hoarseness    • Hypertension    • Spondylolisthesis of cervical region    • Vitamin B12 deficiency        No Known  Allergies    Past Surgical History:   Procedure Laterality Date   • CERVICAL LAMINECTOMY     • CHOLECYSTECTOMY     • OTHER SURGICAL HISTORY Right     Neuroplasty Median Nerve at Carpal Tunnel   • THYROID SURGERY     • TOTAL KNEE ARTHROPLASTY Left 09/29/2014    Dr Colon       Family History   Problem Relation Age of Onset   • Hypertension Mother    • Other Father         cardiac disorder   • Diabetes Father    • Heart disease Father    • Hypertension Sister        Social History     Socioeconomic History   • Marital status:    Tobacco Use   • Smoking status: Never Smoker   • Smokeless tobacco: Never Used   Substance and Sexual Activity   • Alcohol use: No   • Drug use: No   • Sexual activity: Defer           Objective   Physical Exam  Constitutional:       General: She is not in acute distress.     Appearance: Normal appearance. She is well-developed.   HENT:      Head: Normocephalic and atraumatic.      Nose: Nose normal. No congestion or rhinorrhea.   Eyes:      General: No scleral icterus.     Conjunctiva/sclera: Conjunctivae normal.   Neck:      Vascular: JVD present.      Trachea: No tracheal deviation.   Cardiovascular:      Rate and Rhythm: Normal rate and regular rhythm.      Heart sounds: Normal heart sounds. No murmur heard.  No friction rub. No gallop.    Pulmonary:      Effort: Pulmonary effort is normal. No respiratory distress.      Breath sounds: No stridor. Examination of the right-lower field reveals rales. Examination of the left-lower field reveals rales. Rales present. No wheezing or rhonchi.   Chest:      Chest wall: No tenderness.   Abdominal:      General: Bowel sounds are normal. There is no distension.      Palpations: Abdomen is soft.      Tenderness: There is no abdominal tenderness. There is no guarding or rebound.   Musculoskeletal:         General: No tenderness or deformity. Normal range of motion.      Cervical back: Normal range of motion and neck supple.      Right  lower leg: Edema present.      Left lower leg: Edema present.   Skin:     General: Skin is warm and dry.      Findings: No erythema or rash.   Neurological:      Mental Status: She is alert and oriented to person, place, and time.      Motor: No weakness.      Coordination: Coordination normal.   Psychiatric:         Mood and Affect: Mood normal.         Behavior: Behavior normal.         Thought Content: Thought content normal.         Procedures           ED Course  ED Course as of 10/10/21 2056   Sun Oct 10, 2021   1527 She has had good urinary output. Her blood pressure however has risen significantly. Readings over the last hour are anywhere from 190 up to 213 with diastolics around 100  to 110. She thinks that she is breathing a little bit better. We rechecked her pulse oximeter and she was 88% after getting up and getting on the bedside commode. I examined her just after she got in bed and saturations were 91%. After she sat there a few minutes and rested it came up to about 97% on room air. She has no prior history congestive heart failure. Due to the uncontrolled blood pressures and desaturations will seek admission. [DT]   1973 I spoke with Zoila Brandon who will admit.  She asked that I order a CT angiogram to evaluate her ascending aortic aneurysm. [DT]      ED Course User Index  [DT] Owen Hein MD                                           MDM  Number of Diagnoses or Management Options  Acute respiratory failure with hypoxia (HCC): new and requires workup  Acute systolic congestive heart failure (HCC): new and requires workup  Uncontrolled hypertension: new and requires workup     Amount and/or Complexity of Data Reviewed  Clinical lab tests: reviewed and ordered  Tests in the radiology section of CPT®: ordered and reviewed  Decide to obtain previous medical records or to obtain history from someone other than the patient: yes  Review and summarize past medical records: yes  Discuss the patient with  other providers: yes  Independent visualization of images, tracings, or specimens: yes    Patient Progress  Patient progress: improved      Final diagnoses:   Uncontrolled hypertension   Acute systolic congestive heart failure (HCC)   Acute respiratory failure with hypoxia (HCC)       ED Disposition  ED Disposition     ED Disposition Condition Comment    Decision to Admit  Level of Care: Telemetry [5]   Diagnosis: Hypertensive emergency [511317]   Bed Request Comments: CDU            No follow-up provider specified.       Medication List      No changes were made to your prescriptions during this visit.          Owen Hein MD  10/10/21 8068

## 2021-10-10 NOTE — H&P
[facility] Medicine Services  HISTORY AND PHYSICAL    Patient Name: Zoila Nava  : 1933  MRN: [id number]  Primary Care Physician: Arnoldo Oneil MD  Date of admission: 10/10/2021      Subjective   Subjective     Chief Complaint:  Dyspnea, chest tightness     HPI:  Zoila Nava is a 88 y.o. female with HTN, history of ascending thoracic aortic aneurysm who presented to the ED due to three days of shortness of breath and chest tightness, worse with laying flat.  She has also noticed some swelling in her legs but she is unsure how long this has been present.  She denies any fevers or chills.  She says she has had to sleep sitting upright due to her symptoms.  She went to urgent care today and was found to be bradycardic with a murmur on exam and severely hypertensive so she was directed to the ED.  Blood pressure was as high as 218/137.  She was given IV hydralazine and IV lasix with improvement in her blood pressure.  She says she feels a little better after this.  She denies any known history of CHF.  Admission was requested for further evaluation.      COVID Details:    Symptoms:    [] NONE [] Fever []  Cough [x] Shortness of breath [] Change in taste/smell      The patient has a COVID HM Topic on their chart, and they are fully vaccinated.      Review of Systems   Gen- No fevers, chills  CV- + chest tightness  Resp- As above  GI- No N/V/D, abd pain    All other systems reviewed and are negative.     Personal History     Past Medical History:   Diagnosis Date   • Anemia    • Arthritis    • Asthma    • Cataract    • Difficulty breathing     Chronic   • GERD (gastroesophageal reflux disease)    • Graves' ophthalmopathy    • Hoarseness    • Hypertension    • Spondylolisthesis of cervical region    • Vitamin B12 deficiency        Past Surgical History:   Procedure Laterality Date   • CERVICAL LAMINECTOMY     • CHOLECYSTECTOMY     • OTHER SURGICAL HISTORY Right      Neuroplasty Median Nerve at Carpal Tunnel   • THYROID SURGERY     • TOTAL KNEE ARTHROPLASTY Left 09/29/2014    Dr Colon       Family History:  family history includes Diabetes in her father; Heart disease in her father; Hypertension in her mother and sister; Other in her father. Otherwise pertinent FHx was reviewed and unremarkable.     Social History:  reports that she has never smoked. She has never used smokeless tobacco. She reports that she does not drink alcohol and does not use drugs.  Social History     Social History Narrative   • Not on file       Medications:  Available home medication information reviewed.  (Not in a hospital admission)      No Known Allergies    Objective   Objective     Vital Signs:   Temp:  [97.4 °F (36.3 °C)-97.5 °F (36.4 °C)] 97.5 °F (36.4 °C)  Heart Rate:  [] 54  Resp:  [15-16] 16  BP: (129-218)/() 194/94       Physical Exam   Constitutional: Awake, alert, sitting up on bedside commode  Eyes: Sclerae anicteric, no conjunctival injection  HENT: NCAT, mucous membranes moist  Neck: Supple, trachea midline  Respiratory: Clear to auscultation bilaterally, nonlabored respirations on room air  Cardiovascular: RRR, 2/6 late systolic murmur at left upper sternal border  Gastrointestinal: Positive bowel sounds, soft, nontender, nondistended  Musculoskeletal: 1+ bilateral ankle edema, no clubbing or cyanosis to extremities  Psychiatric: Appropriate affect, cooperative  Neurologic: Cranial Nerves grossly intact to confrontation, speech clear  Skin: No rashes on exposed surfaces    Result Review:  I have personally reviewed the results from the time of this admission to 10/10/2021 16:26 EDT and agree with these findings:  [x]  Laboratory  []  Microbiology  [x]  Radiology  []  EKG/Telemetry   []  Cardiology/Vascular   []  Pathology  []  Old records  []  Other:  Most notable findings include:       LAB RESULTS:      Lab 10/10/21  1237   WBC 5.99   HEMOGLOBIN 10.9*   HEMATOCRIT  33.3*   PLATELETS 193   NEUTROS ABS 4.13   IMMATURE GRANS (ABS) 0.07*   LYMPHS ABS 0.96   MONOS ABS 0.50   EOS ABS 0.25   MCV 92.0         Lab 10/10/21  1237   SODIUM 136   POTASSIUM 4.4   CHLORIDE 102   CO2 25.0   ANION GAP 9.0   BUN 16   CREATININE 1.23*   GLUCOSE 96   CALCIUM 8.5*         Lab 10/10/21  1237   TOTAL PROTEIN 6.2   ALBUMIN 4.00   GLOBULIN 2.2   ALT (SGPT) 22   AST (SGOT) 25   BILIRUBIN 0.3   ALK PHOS 79         Lab 10/10/21  1237   PROBNP 278.0   TROPONIN T 0.024                 UA    Urinalysis 5/18/21 5/18/21 2026 2026   Squamous Epithelial Cells, UA  0-2   Specific Gravity, UA 1.014    Ketones, UA Negative    Blood, UA Negative    Leukocytes, UA Trace (A)    Nitrite, UA Negative    RBC, UA  0-2   WBC, UA  3-5 (A)   Bacteria, UA  None Seen   (A) Abnormal value              Microbiology Results (last 10 days)     Procedure Component Value - Date/Time    COVID PRE-OP / PRE-PROCEDURE SCREENING ORDER (NO ISOLATION) - Swab, Nasopharynx [[id number]]  (Normal) Collected: 10/10/21 1354    Lab Status: Final result Specimen: Swab from Nasopharynx Updated: 10/10/21 1430    Narrative:      The following orders were created for panel order COVID PRE-OP / PRE-PROCEDURE SCREENING ORDER (NO ISOLATION) - Swab, Nasopharynx.  Procedure                               Abnormality         Status                     ---------                               -----------         ------                     COVID-19 and FLU A/B PCR...[[id number]]  Normal              Final result                 Please view results for these tests on the individual orders.    COVID-19 and FLU A/B PCR - Swab, Nasopharynx [[id number]]  (Normal) Collected: 10/10/21 1354    Lab Status: Final result Specimen: Swab from Nasopharynx Updated: 10/10/21 1430     COVID19 Not Detected     Influenza A PCR Not Detected     Influenza B PCR Not Detected    Narrative:      Fact sheet for providers: https://www.fda.gov/media/986110/download    Fact sheet for  patients: https://www.[place].gov/media/060862/download    Test performed by PCR.          XR Chest 1 View    Result Date: 10/10/2021   EXAMINATION: XR CHEST 1 VW-  INDICATION: SOA triage protocol  COMPARISON: Chest x-ray 05/18/2021  FINDINGS: Cardiac size enlarged. Mild interstitial prominence of lung markings similar to prior likely chronic involvement without new consolidation or effusion         Impression: Mild interstitial prominence of lung markings similar to prior likely chronic involvement without new consolidation or effusion             Assessment/Plan   Assessment & Plan     Active Hospital Problems    Diagnosis  POA   • **Hypertensive emergency [I16.1]  Yes   • Stage 3b chronic kidney disease (HCC) [N18.32]  Yes   • Ascending aortic aneurysm (HCC) [I71.2]  Yes     4.4 cm on 2/19 scan       Hypertensive emergency  Orthopnea  Chest discomfort  History of ascending aortic aneurysm  -CTA chest pending  -Cardene drip as needed if BP becomes elevated again  -Obtain echocardiogram  -Trend troponin x 3  -S/P Lasix x 1 in ED.  Hold further pending additional test results  -Resume home amlodipine in am and would also restart propranolol or replace with a different beta blocker as heart rate tolerates given aortic aneurysm     CKD III  -BMP in am    DVT prophylaxis:  SCDs      CODE STATUS:    Code Status and Medical Interventions:   Ordered at: 10/10/21 1616     Level Of Support Discussed With:    Patient     Code Status:    CPR     Medical Interventions (Level of Support Prior to Arrest):    Full       Admission Status:  I believe this patient meets OBSERVATION status, however if further evaluation or treatment plans warrant, status may change.  Based upon current information, I predict patient's care encounter to be less than or equal to 2 midnights.    Zoila Brandon MD  10/10/21

## 2021-10-11 ENCOUNTER — APPOINTMENT (OUTPATIENT)
Dept: CARDIOLOGY | Facility: HOSPITAL | Age: 86
End: 2021-10-11

## 2021-10-11 PROBLEM — I27.20 PULMONARY HYPERTENSION: Status: ACTIVE | Noted: 2021-10-11

## 2021-10-11 PROBLEM — I50.33 ACUTE ON CHRONIC DIASTOLIC CHF (CONGESTIVE HEART FAILURE): Status: ACTIVE | Noted: 2021-10-11

## 2021-10-11 PROBLEM — I11.0 HYPERTENSIVE HEART DISEASE WITH ACUTE ON CHRONIC DIASTOLIC CONGESTIVE HEART FAILURE: Status: ACTIVE | Noted: 2021-10-11

## 2021-10-11 PROBLEM — I50.33 HYPERTENSIVE HEART DISEASE WITH ACUTE ON CHRONIC DIASTOLIC CONGESTIVE HEART FAILURE: Status: ACTIVE | Noted: 2021-10-11

## 2021-10-11 LAB
ANION GAP SERPL CALCULATED.3IONS-SCNC: 13 MMOL/L (ref 5–15)
BH CV ECHO MEAS - AI DEC SLOPE: 355.5 CM/SEC^2
BH CV ECHO MEAS - AI MAX PG: 69.6 MMHG
BH CV ECHO MEAS - AI MAX VEL: 417 CM/SEC
BH CV ECHO MEAS - AI P1/2T: 343.6 MSEC
BH CV ECHO MEAS - AO MAX PG (FULL): 15 MMHG
BH CV ECHO MEAS - AO MAX PG: 24 MMHG
BH CV ECHO MEAS - AO MEAN PG (FULL): 8.8 MMHG
BH CV ECHO MEAS - AO MEAN PG: 14.3 MMHG
BH CV ECHO MEAS - AO ROOT AREA (BSA CORRECTED): 1.6
BH CV ECHO MEAS - AO ROOT AREA: 5.7 CM^2
BH CV ECHO MEAS - AO ROOT DIAM: 2.7 CM
BH CV ECHO MEAS - AO V2 MAX: 242.8 CM/SEC
BH CV ECHO MEAS - AO V2 MEAN: 175.5 CM/SEC
BH CV ECHO MEAS - AO V2 VTI: 50.6 CM
BH CV ECHO MEAS - AVA(I,A): 1.9 CM^2
BH CV ECHO MEAS - AVA(I,D): 1.9 CM^2
BH CV ECHO MEAS - AVA(V,A): 1.8 CM^2
BH CV ECHO MEAS - AVA(V,D): 1.8 CM^2
BH CV ECHO MEAS - BSA(HAYCOCK): 1.7 M^2
BH CV ECHO MEAS - BSA: 1.7 M^2
BH CV ECHO MEAS - BZI_BMI: 29.3 KILOGRAMS/M^2
BH CV ECHO MEAS - BZI_METRIC_HEIGHT: 152.4 CM
BH CV ECHO MEAS - BZI_METRIC_WEIGHT: 68 KG
BH CV ECHO MEAS - EDV(CUBED): 79.5 ML
BH CV ECHO MEAS - EDV(MOD-SP2): 93.9 ML
BH CV ECHO MEAS - EDV(MOD-SP4): 97.8 ML
BH CV ECHO MEAS - EDV(TEICH): 83.1 ML
BH CV ECHO MEAS - EF(CUBED): 72.4 %
BH CV ECHO MEAS - EF(MOD-BP): 69 %
BH CV ECHO MEAS - EF(MOD-SP2): 63.3 %
BH CV ECHO MEAS - EF(MOD-SP4): 73.1 %
BH CV ECHO MEAS - EF(TEICH): 64.4 %
BH CV ECHO MEAS - ESV(CUBED): 22 ML
BH CV ECHO MEAS - ESV(MOD-SP2): 34.5 ML
BH CV ECHO MEAS - ESV(MOD-SP4): 26.3 ML
BH CV ECHO MEAS - ESV(TEICH): 29.6 ML
BH CV ECHO MEAS - FS: 34.9 %
BH CV ECHO MEAS - IVS/LVPW: 1
BH CV ECHO MEAS - IVSD: 0.8 CM
BH CV ECHO MEAS - LA DIMENSION: 3.3 CM
BH CV ECHO MEAS - LA/AO: 1.2
BH CV ECHO MEAS - LAD MAJOR: 5.3 CM
BH CV ECHO MEAS - LAT PEAK E' VEL: 8.1 CM/SEC
BH CV ECHO MEAS - LATERAL E/E' RATIO: 8.1
BH CV ECHO MEAS - LV DIASTOLIC VOL/BSA (35-75): 59.2 ML/M^2
BH CV ECHO MEAS - LV MASS(C)D: 105.3 GRAMS
BH CV ECHO MEAS - LV MASS(C)DI: 63.8 GRAMS/M^2
BH CV ECHO MEAS - LV MAX PG: 9 MMHG
BH CV ECHO MEAS - LV MEAN PG: 5.5 MMHG
BH CV ECHO MEAS - LV SYSTOLIC VOL/BSA (12-30): 15.9 ML/M^2
BH CV ECHO MEAS - LV V1 MAX: 150 CM/SEC
BH CV ECHO MEAS - LV V1 MEAN: 109.5 CM/SEC
BH CV ECHO MEAS - LV V1 VTI: 34.8 CM
BH CV ECHO MEAS - LVIDD: 4.3 CM
BH CV ECHO MEAS - LVIDS: 2.8 CM
BH CV ECHO MEAS - LVLD AP2: 7.6 CM
BH CV ECHO MEAS - LVLD AP4: 8 CM
BH CV ECHO MEAS - LVLS AP2: 5.8 CM
BH CV ECHO MEAS - LVLS AP4: 6 CM
BH CV ECHO MEAS - LVOT AREA (M): 2.8 CM^2
BH CV ECHO MEAS - LVOT AREA: 2.8 CM^2
BH CV ECHO MEAS - LVOT DIAM: 1.9 CM
BH CV ECHO MEAS - LVPWD: 0.8 CM
BH CV ECHO MEAS - MED PEAK E' VEL: 7.5 CM/SEC
BH CV ECHO MEAS - MEDIAL E/E' RATIO: 8.7
BH CV ECHO MEAS - MV A MAX VEL: 102 CM/SEC
BH CV ECHO MEAS - MV DEC TIME: 0.3 SEC
BH CV ECHO MEAS - MV E MAX VEL: 65.1 CM/SEC
BH CV ECHO MEAS - MV E/A: 0.64
BH CV ECHO MEAS - MV MAX PG: 6.2 MMHG
BH CV ECHO MEAS - MV MEAN PG: 3 MMHG
BH CV ECHO MEAS - MV V2 MAX: 124 CM/SEC
BH CV ECHO MEAS - MV V2 MEAN: 86.9 CM/SEC
BH CV ECHO MEAS - MV V2 VTI: 35.8 CM
BH CV ECHO MEAS - MVA(VTI): 2.8 CM^2
BH CV ECHO MEAS - RAP SYSTOLE: 3 MMHG
BH CV ECHO MEAS - RVSP: 54 MMHG
BH CV ECHO MEAS - SI(AO): 175.5 ML/M^2
BH CV ECHO MEAS - SI(CUBED): 34.8 ML/M^2
BH CV ECHO MEAS - SI(LVOT): 59.7 ML/M^2
BH CV ECHO MEAS - SI(MOD-SP2): 36 ML/M^2
BH CV ECHO MEAS - SI(MOD-SP4): 43.3 ML/M^2
BH CV ECHO MEAS - SI(TEICH): 32.4 ML/M^2
BH CV ECHO MEAS - SV(AO): 289.9 ML
BH CV ECHO MEAS - SV(CUBED): 57.6 ML
BH CV ECHO MEAS - SV(LVOT): 98.7 ML
BH CV ECHO MEAS - SV(MOD-SP2): 59.4 ML
BH CV ECHO MEAS - SV(MOD-SP4): 71.5 ML
BH CV ECHO MEAS - SV(TEICH): 53.5 ML
BH CV ECHO MEAS - TAPSE (>1.6): 3 CM
BH CV ECHO MEAS - TR MAX PG: 51 MMHG
BH CV ECHO MEAS - TR MAX VEL: 356 CM/SEC
BH CV ECHO MEASUREMENTS AVERAGE E/E' RATIO: 8.35
BH CV XLRA - RV BASE: 3.9 CM
BH CV XLRA - RV LENGTH: 6.5 CM
BH CV XLRA - RV MID: 2.6 CM
BH CV XLRA - TDI S': 16 CM/SEC
BUN SERPL-MCNC: 16 MG/DL (ref 8–23)
BUN/CREAT SERPL: 12.8 (ref 7–25)
CALCIUM SPEC-SCNC: 8.9 MG/DL (ref 8.6–10.5)
CHLORIDE SERPL-SCNC: 100 MMOL/L (ref 98–107)
CO2 SERPL-SCNC: 25 MMOL/L (ref 22–29)
CREAT SERPL-MCNC: 1.25 MG/DL (ref 0.57–1)
DEPRECATED RDW RBC AUTO: 45.7 FL (ref 37–54)
ERYTHROCYTE [DISTWIDTH] IN BLOOD BY AUTOMATED COUNT: 14.5 % (ref 12.3–15.4)
GFR SERPL CREATININE-BSD FRML MDRD: 40 ML/MIN/1.73
GLUCOSE SERPL-MCNC: 104 MG/DL (ref 65–99)
HCT VFR BLD AUTO: 35 % (ref 34–46.6)
HGB BLD-MCNC: 12 G/DL (ref 12–15.9)
LEFT ATRIUM VOLUME INDEX: 27 ML/M2
LV EF 2D ECHO EST: 72 %
MAXIMAL PREDICTED HEART RATE: 132 BPM
MCH RBC QN AUTO: 30.1 PG (ref 26.6–33)
MCHC RBC AUTO-ENTMCNC: 34.3 G/DL (ref 31.5–35.7)
MCV RBC AUTO: 87.7 FL (ref 79–97)
PLATELET # BLD AUTO: 214 10*3/MM3 (ref 140–450)
PMV BLD AUTO: 11.4 FL (ref 6–12)
POTASSIUM SERPL-SCNC: 3.7 MMOL/L (ref 3.5–5.2)
RBC # BLD AUTO: 3.99 10*6/MM3 (ref 3.77–5.28)
SODIUM SERPL-SCNC: 138 MMOL/L (ref 136–145)
STRESS TARGET HR: 112 BPM
WBC # BLD AUTO: 7.2 10*3/MM3 (ref 3.4–10.8)

## 2021-10-11 PROCEDURE — 93306 TTE W/DOPPLER COMPLETE: CPT | Performed by: INTERNAL MEDICINE

## 2021-10-11 PROCEDURE — 97161 PT EVAL LOW COMPLEX 20 MIN: CPT

## 2021-10-11 PROCEDURE — 80048 BASIC METABOLIC PNL TOTAL CA: CPT | Performed by: INTERNAL MEDICINE

## 2021-10-11 PROCEDURE — G0378 HOSPITAL OBSERVATION PER HR: HCPCS

## 2021-10-11 PROCEDURE — 85027 COMPLETE CBC AUTOMATED: CPT | Performed by: INTERNAL MEDICINE

## 2021-10-11 PROCEDURE — 93306 TTE W/DOPPLER COMPLETE: CPT

## 2021-10-11 PROCEDURE — 99225 PR SBSQ OBSERVATION CARE/DAY 25 MINUTES: CPT | Performed by: FAMILY MEDICINE

## 2021-10-11 PROCEDURE — 97116 GAIT TRAINING THERAPY: CPT

## 2021-10-11 RX ORDER — PREGABALIN 150 MG/1
150 CAPSULE ORAL 3 TIMES DAILY
Status: DISCONTINUED | OUTPATIENT
Start: 2021-10-11 | End: 2021-10-12 | Stop reason: HOSPADM

## 2021-10-11 RX ORDER — METOPROLOL SUCCINATE 25 MG/1
12.5 TABLET, EXTENDED RELEASE ORAL
Status: DISCONTINUED | OUTPATIENT
Start: 2021-10-11 | End: 2021-10-12 | Stop reason: HOSPADM

## 2021-10-11 RX ADMIN — SODIUM CHLORIDE, PRESERVATIVE FREE 10 ML: 5 INJECTION INTRAVENOUS at 20:41

## 2021-10-11 RX ADMIN — PREGABALIN 150 MG: 150 CAPSULE ORAL at 20:41

## 2021-10-11 RX ADMIN — METOPROLOL SUCCINATE 12.5 MG: 25 TABLET, EXTENDED RELEASE ORAL at 09:08

## 2021-10-11 RX ADMIN — FAMOTIDINE 20 MG: 20 TABLET, FILM COATED ORAL at 20:41

## 2021-10-11 RX ADMIN — LEVOTHYROXINE SODIUM 112 MCG: 112 TABLET ORAL at 05:20

## 2021-10-11 RX ADMIN — SODIUM CHLORIDE, PRESERVATIVE FREE 10 ML: 5 INJECTION INTRAVENOUS at 09:08

## 2021-10-11 RX ADMIN — FAMOTIDINE 20 MG: 20 TABLET, FILM COATED ORAL at 09:08

## 2021-10-11 RX ADMIN — PREGABALIN 200 MG: 100 CAPSULE ORAL at 09:09

## 2021-10-11 RX ADMIN — AMLODIPINE BESYLATE 5 MG: 5 TABLET ORAL at 09:08

## 2021-10-11 RX ADMIN — ASPIRIN 81 MG: 81 TABLET, COATED ORAL at 09:08

## 2021-10-11 NOTE — PLAN OF CARE
Problem: Adult Inpatient Plan of Care  Goal: Plan of Care Review  Recent Flowsheet Documentation  Taken 10/11/2021 1436 by Yessy Escalera, PT  Progress: no change  Plan of Care Reviewed With:  • patient  • daughter  Outcome Summary: Pt presents w/ diffuse LE weakness and balance impairments. SBA in all bed mob. Neuropathy in UEs - tremor/tends to drop things. Currently going to OP PT to work on balance/coordination/strength. Pt amb 250ft w/FWW CGA - VCs in turns and transfers. Recommend d/c home w/ assist and OP PT.

## 2021-10-11 NOTE — CASE MANAGEMENT/SOCIAL WORK
Discharge Planning Assessment  [facility]     Patient Name: Zoila Nava  MRN: [id number]  Today's Date: 10/11/2021    Admit Date: 10/10/2021     Discharge Needs Assessment     Row Name 10/11/21 0938       Living Environment    Lives With spouse    Name(s) of Who Lives With Patient Matt    Current Living Arrangements home/apartment/condo    Primary Care Provided by self    Family Caregiver if Needed spouse; child(hemalatha), adult    Able to Return to Prior Arrangements yes       Transition Planning    Patient/Family Anticipates Transition to home    Transportation Anticipated family or friend will provide       Discharge Needs Assessment    Readmission Within the Last 30 Days no previous admission in last 30 days    Equipment Currently Used at Home walker, rolling    Concerns to be Addressed denies needs/concerns at this time; no discharge needs identified    Anticipated Changes Related to Illness none    Equipment Needed After Discharge none               Discharge Plan     Row Name 10/11/21 0938       Plan    Plan home    Patient/Family in Agreement with Plan yes    Plan Comments I met with Mrs. Nava and her daughter Nidia at the bedside. Mrs. N[place] lives in [place] with her  Matt. At home, she ambulates independently and is independent with activities of daily living. She manages her medications independently and is independent with most household chores. Her family transports her by car when she leaves the home. She anticipates returning home after this admission. She has a rolling walker to use at home if needed. Her spouse and children can assist her if needed. She denies having any discharge needs at this time.    Final Discharge Disposition Code 01 - home or self-care              Continued Care and Services - Admitted Since 10/10/2021    Coordination has not been started for this encounter.          Demographic Summary     Row Name 10/11/21 0937       General Information     General Information Comments I confirmed that Arnoldo Oneil is Mrs. Nava's PCP. Medicare is her primary insurer. She has secondary coverage as well as RX coverage.               Functional Status     Row Name 10/11/21 0937       Functional Status, IADL    Medications independent    Meal Preparation assistive person    Housekeeping assistive person    Laundry independent    Shopping assistive person               Psychosocial    No documentation.                Abuse/Neglect    No documentation.                Legal    No documentation.                Substance Abuse    No documentation.                Patient Forms    No documentation.                   Geovanni Mcdonough RN

## 2021-10-11 NOTE — PROGRESS NOTES
[facility] Medicine Services  PROGRESS NOTE    Patient Name: Zoila Nava  : 1933  MRN: [id number]    Date of Admission: 10/10/2021  Primary Care Physician: Arnoldo Oneil MD    Subjective   Subjective     CC:  SOA, orthopnea    HPI:  SOa seems much improved.  PAtient asleep in bed laying nearly flat at time of eval.  Long d/w daughter about HTN heart disease and adjusting meds for best sx control.     ROS:  Gen- No fevers, chills, HA, uncontrolled pain  CV- No chest pain, palpitations, new edema  Resp- SOA resolved, mild GENTILE  GI- No N/V/D, abd pain, constipation  Skin - No rash    Objective   Objective     Vital Signs:   Temp:  [98.2 °F (36.8 °C)-99.1 °F (37.3 °C)] 99.1 °F (37.3 °C)  Heart Rate:  [] 102  Resp:  [18] 18  BP: ()/(56-87) 125/82     Physical Exam:  Constitutional: No acute distress, awake, alert, nontoxic, thin elderly body habitus  Respiratory: Clear to auscultation bilaterally with dull bases due to poor effort, no crackles, nonlabored respirations   Cardiovascular: RRR  Musculoskeletal: No pitting peripheral edema, normal muscle tone for age      Results Reviewed:  LAB RESULTS:      Lab 10/11/21  0643 10/10/21  1237   WBC 7.20 5.99   HEMOGLOBIN 12.0 10.9*   HEMATOCRIT 35.0 33.3*   PLATELETS 214 193   NEUTROS ABS  --  4.13   IMMATURE GRANS (ABS)  --  0.07*   LYMPHS ABS  --  0.96   MONOS ABS  --  0.50   EOS ABS  --  0.25   MCV 87.7 92.0         Lab 10/11/21  0643 10/10/21  1237   SODIUM 138 136   POTASSIUM 3.7 4.4   CHLORIDE 100 102   CO2 25.0 25.0   ANION GAP 13.0 9.0   BUN 16 16   CREATININE 1.25* 1.23*   GLUCOSE 104* 96   CALCIUM 8.9 8.5*         Lab 10/10/21  1237   TOTAL PROTEIN 6.2   ALBUMIN 4.00   GLOBULIN 2.2   ALT (SGPT) 22   AST (SGOT) 25   BILIRUBIN 0.3   ALK PHOS 79         Lab 10/10/21  2001 10/10/21  1807 10/10/21  1237   PROBNP  --   --  278.0   TROPONIN T 0.019 0.017 0.024                 Brief Urine Lab Results  (Last result  in the past 365 days)      Color   Clarity   Blood   Leuk Est   Nitrite   Protein   CREAT   Urine HCG        05/18/21 2026 Yellow   Clear   Negative   Trace   Negative   Negative                 Microbiology Results Abnormal     Procedure Component Value - Date/Time    COVID PRE-OP / PRE-PROCEDURE SCREENING ORDER (NO ISOLATION) - Swab, Nasopharynx [[id number]]  (Normal) Collected: 10/10/21 1354    Lab Status: Final result Specimen: Swab from Nasopharynx Updated: 10/10/21 1430    Narrative:      The following orders were created for panel order COVID PRE-OP / PRE-PROCEDURE SCREENING ORDER (NO ISOLATION) - Swab, Nasopharynx.  Procedure                               Abnormality         Status                     ---------                               -----------         ------                     COVID-19 and FLU A/B PCR...[[id number]]  Normal              Final result                 Please view results for these tests on the individual orders.    COVID-19 and FLU A/B PCR - Swab, Nasopharynx [[id number]]  (Normal) Collected: 10/10/21 1354    Lab Status: Final result Specimen: Swab from Nasopharynx Updated: 10/10/21 1430     COVID19 Not Detected     Influenza A PCR Not Detected     Influenza B PCR Not Detected    Narrative:      Fact sheet for providers: https://www.fda.gov/media/278049/download    Fact sheet for patients: https://www.fda.gov/media/939864/download    Test performed by PCR.          Adult Transthoracic Echo Complete W/ Cont if Necessary Per Protocol    Result Date: 10/11/2021  · Moderate aortic valve regurgitation is present. · Mild aortic valve stenosis is present. · Estimated right ventricular systolic pressure from tricuspid regurgitation is moderately elevated (45-55 mmHg). · Moderate tricuspid valve regurgitation is present. · Estimated left ventricular EF = 72% Estimated left ventricular EF was in agreement with the calculated left ventricular EF. Left ventricular ejection fraction appears to be  greater than 70%. Left ventricular systolic function is hyperdynamic (EF > 70%). · Left ventricular diastolic function is consistent with (grade I) impaired relaxation. · Moderate pulmonary hypertension is present. · Normal right ventricular cavity size, wall thickness, systolic function and septal motion noted. · There is no evidence of pericardial effusion.      XR Chest 1 View    Result Date: 10/11/2021  EXAMINATION: XR CHEST 1 VW- 10/10/2021  INDICATION: SOA triage protocol  COMPARISON: Chest x-ray 05/18/2021  FINDINGS: Cardiac size enlarged. Mild interstitial prominence of lung markings similar to prior likely chronic involvement without new consolidation or effusion.      Impression: Mild interstitial prominence of lung markings similar to prior likely chronic involvement without new consolidation or effusion.  D: 10/10/2021 E: 10/11/2021  This report was finalized on 10/11/2021 10:06 AM by Dr. Camron Rendon.      CT Angiogram Chest    Result Date: 10/11/2021  EXAMINATION: CT ANGIOGRAM CHEST- 10/10/2021  INDICATION: Hypertension, history of ascending aortic aneurysm; I10-Essential (primary) hypertension; I50.21-Acute systolic (congestive) heart failure; J96.01-Acute respiratory failure with hypoxia  TECHNIQUE: CT angiogram chest with intravenous contrast administration. 2-D and 3-D reconstructions performed.  The radiation dose reduction device was turned on for each scan per the ALARA (As Low as Reasonably Achievable) protocol.  COMPARISON: CT chest 07/02/2020  FINDINGS: Thyroid homogeneous. No bulky mediastinal adenopathy. Central airways are patent. Esophagus in normal course with small hiatal hernia. Atherosclerotic aneurysmal thoracic aorta with maximum transaxial diameter of the supravalvular ascending thoracic aorta 4.3 cm unchanged from prior comparison 2020 without dissection or periaortic inflammation with patent great vessel origins noted. Central pulmonary arteries patent with prominence of  pulmonary arterial hypertension involvement. No filling defect. Cardiac size borderline enlarged without pericardial effusion. Extended lung windows reveal minimal biapical pleural-parenchymal scarring. No consolidation or pleural effusion. Degenerative changes of the thoracic spine without aggressive osseous lesion. No soft tissue body wall findings of the chest or abnormality of acute findings in the visualized upper abdomen with hepatic steatosis.      Impression: Atherosclerotic aneurysmal thoracic aorta with maximum transaxial diameter of the supravalvular ascending thoracic aorta 4.3 cm unchanged from prior comparison 2020 without dissection or periaortic inflammation with patent great vessel origins noted. No acute intrathoracic process.  D: 10/10/2021 E: 10/11/2021   This report was finalized on 10/11/2021 5:45 PM by Dr. Camron Rendon.        Results for orders placed during the hospital encounter of 10/10/21    Adult Transthoracic Echo Complete W/ Cont if Necessary Per Protocol    Interpretation Summary  · Moderate aortic valve regurgitation is present.  · Mild aortic valve stenosis is present.  · Estimated right ventricular systolic pressure from tricuspid regurgitation is moderately elevated (45-55 mmHg).  · Moderate tricuspid valve regurgitation is present.  · Estimated left ventricular EF = 72% Estimated left ventricular EF was in agreement with the calculated left ventricular EF. Left ventricular ejection fraction appears to be greater than 70%. Left ventricular systolic function is hyperdynamic (EF > 70%).  · Left ventricular diastolic function is consistent with (grade I) impaired relaxation.  · Moderate pulmonary hypertension is present.  · Normal right ventricular cavity size, wall thickness, systolic function and septal motion noted.  · There is no evidence of pericardial effusion.      I have reviewed the medications:  Scheduled Meds:amLODIPine, 5 mg, Oral, Daily  aspirin, 81 mg, Oral,  Daily  famotidine, 20 mg, Oral, BID  levothyroxine, 112 mcg, Oral, Q AM  metoprolol succinate XL, 12.5 mg, Oral, Q24H  pregabalin, 150 mg, Oral, TID  sodium chloride, 10 mL, Intravenous, Q12H      Continuous Infusions:   PRN Meds:.•  acetaminophen **OR** acetaminophen **OR** acetaminophen  •  HYDROcodone-acetaminophen  •  sodium chloride  •  sodium chloride    Assessment/Plan   Assessment & Plan     Active Hospital Problems    Diagnosis  POA   • **Hypertensive heart disease with acute on chronic diastolic congestive heart failure (HCC) [I11.0, I50.33]  Yes   • Acute on chronic diastolic CHF (congestive heart failure) (HCC) [I50.33]  Yes   • Pulmonary hypertension (HCC) [I27.20]  Yes   • Hypertensive emergency [I16.1]  Yes   • Stage 3b chronic kidney disease (HCC) [N18.32]  Yes   • Ascending aortic aneurysm (HCC) [I71.2]  Yes   • 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        Brief Hospital Course to date:  Zoila Nava is a 88 y.o. female  with HTN, history of ascending thoracic aortic aneurysm, and mild dementia who presented to the ED due to three days of shortness of breath and chest tightness, worse with laying flat, some swelling in her legs but she is unsure how long this has been present.  She went to [facility] with SOA complaint and found to be severely hypertensive as high as 218/137 and sent to [facility] ED.  She was given IV hydralazine and IV lasix with improvement.    Symptomatic HTN urgency  Hypertrensive heart disease  A/C diastolic CHF and mpderate pulmonary HTN (new dx's)  - BP's much improved on home Norvasc only... ?compliance with her pills, patient has dementia  - added low dose metoprolol XL for added BP benefit and secondary AAA prevention  - ECHO with diastolic CHF and pulm HTN presumably related to hypertensive heart disease, may benefit from low dose HCTZ or triamterene if needs additional BP control  - suggested  considering outpt sleep study, daughter concerned patient would not be compliant with mask due to early dementia.  They will discuss further with PCP at follow up.     AAA  - CTA = aneurysmal thoracic aorta with maximum transaxial diameter of the supravalvular ascending thoracic aorta 4.3 cm unchanged from prior comparison 2020      DVT prophylaxis:  Mechanical DVT prophylaxis orders are present.       AM-PAC 6 Clicks Score (PT): 19 (10/11/21 1436)    Disposition: I expect the patient to be discharged tomorrow with close f/u with her long-standing PCP () to further adjust/add meds for best control.    CODE STATUS:   Code Status and Medical Interventions:   Ordered at: 10/10/21 1616     Level Of Support Discussed With:    Patient     Code Status:    CPR     Medical Interventions (Level of Support Prior to Arrest):    Full       Chuyita Kim MD  10/11/21

## 2021-10-11 NOTE — PLAN OF CARE
Goal Outcome Evaluation:  Plan of Care Reviewed With: patient        Progress: no change  Outcome Summary: Pt alert and oriented x 4. C/o pain in bilat arms earlier, which was relieved with norco. Sinus to STach. NPO.

## 2021-10-11 NOTE — THERAPY EVALUATION
Patient Name: Zoila Nava  : 1933    MRN: [id number]                              Today's Date: 10/11/2021       Admit Date: 10/10/2021    Visit Dx:     ICD-10-CM ICD-9-CM   1. Uncontrolled hypertension  I10 401.9   2. Acute systolic congestive heart failure (HCC)  I50.21 428.21     428.0   3. Acute respiratory failure with hypoxia (HCC)  J96.01 518.81     Patient Active Problem List   Diagnosis   • Allergic rhinitis   • Hyperlipidemia   • Hypertension   • Hypocalcemia   • Hypothyroidism   • Neuropathy   • NUD (nonulcer dyspepsia)   • Painful knee   • Pernicious anemia   • Tremor   • Vitamin B12 deficiency   • Spondylolisthesis of cervical region   • Graves' ophthalmopathy   • Anemia   • Memory impairment of gradual onset   • Ascending aortic aneurysm (HCC)   • Hypertensive emergency   • Stage 3b chronic kidney disease (HCC)     Past Medical History:   Diagnosis Date   • Anemia    • Arthritis    • Asthma    • Cataract    • Difficulty breathing     Chronic   • GERD (gastroesophageal reflux disease)    • Graves' ophthalmopathy    • Hoarseness    • Hypertension    • Spondylolisthesis of cervical region    • Vitamin B12 deficiency      Past Surgical History:   Procedure Laterality Date   • CERVICAL LAMINECTOMY     • CHOLECYSTECTOMY     • OTHER SURGICAL HISTORY Right     Neuroplasty Median Nerve at Carpal Tunnel   • THYROID SURGERY     • TOTAL KNEE ARTHROPLASTY Left 2014    Dr Colon      General Information     Row Name 10/11/21 1436          Physical Therapy Time and Intention    Document Type evaluation  -KT     Mode of Treatment physical therapy  -KT     Row Name 10/11/21 1436          General Information    Prior Level of Function independent:; all household mobility; min assist:; community mobility  All pt needs are on first floor of home - stairs inside home but does not use. Pt holds onto someone when walking outside the home.  -KT     Existing Precautions/Restrictions no known  precautions/restrictions  no hx of falls in last 6 mo  -KT     Barriers to Rehab previous functional deficit  Receiving OP PT for functional deficit in balance. No hx of falls.  -KT     Row Name 10/11/21 1436          Living Environment    Lives With spouse  -KT     Row Name 10/11/21 1436          Home Main Entrance    Number of Stairs, Main Entrance two  -KT     Stair Railings, Main Entrance railings safe and in good condition; railing on right side (ascending)  -KT     Row Name 10/11/21 1436          Stairs Within Home, Primary    Stairs, Within Home, Primary 2 story home - all needs are met on first floor. Stairs present to 2nd level and basement.  -KT     Number of Stairs, Within Home, Primary one  -KT     Stair Railings, Within Home, Primary railings safe and in good condition; railing on right side (ascending)  -KT     Landing, Stairs, Within Home, Primary adequate turning radius  -KT     Row Name 10/11/21 1436          Cognition    Orientation Status (Cognition) oriented x 4  -KT     Row Name 10/11/21 1436          Safety Issues, Functional Mobility    Safety Issues Affecting Function (Mobility) safety precaution awareness; awareness of need for assistance; safety precautions follow-through/compliance  -KT     Impairments Affecting Function (Mobility) balance; coordination; endurance/activity tolerance; grasp; strength  -KT     Row Name 10/11/21 1436          Relationship/Environment    Name(s) of Who Lives With Patient Matt ()  -KT           User Key  (r) = Recorded By, (t) = Taken By, (c) = Cosigned By    Initials Name Provider Type    KT Yessy Escalera, PT Physical Therapist               Mobility     Row Name 10/11/21 1436          Bed Mobility    Bed Mobility bed mobility (all) activities  -KT     All Activities, [place] (Bed Mobility) modified independence  -KT     Assistive Device (Bed Mobility) bed rails; head of bed elevated  -KT     Comment (Bed Mobility) Mod I for all bed mobility   -KT     Row Name 10/11/21 1436          Transfers    Comment (Transfers) Mod I w/ all bed mobility. sit/stand transfer CGA  -KT     Row Name 10/11/21 1436          Bed-Chair Transfer    Bed-Chair [place] (Transfers) contact guard; verbal cues  -KT     Assistive Device (Bed-Chair Transfers) walker, front-wheeled  -KT     Row Name 10/11/21 1436          Sit-Stand Transfer    Sit-Stand [place] (Transfers) contact guard; verbal cues  -KT     Assistive Device (Sit-Stand Transfers) walker, front-wheeled  -KT     Row Name 10/11/21 1436          Gait/Stairs (Locomotion)    [place] Level (Gait) contact guard; verbal cues; nonverbal cues (demo/gesture)  -KT     Assistive Device (Gait) walker, front-wheeled  -KT     Distance in Feet (Gait) 250  -KT     Deviations/Abnormal Patterns (Gait) bilateral deviations; base of support, narrow; festinating/shuffling; gait speed decreased; stride length decreased  -KT     [place] Level (Stairs) not tested  -KT     Comment (Gait/Stairs) Pt able to to amb CGA w/ FWW 250ft. Balance impairments in turning. VCs on walker mgmt and keeping feet inside. Attempted 10ft w/o FWW w/ CGA - pt much more unsteady. Gait limited by strength, balance, and endurance.  -KT           User Key  (r) = Recorded By, (t) = Taken By, (c) = Cosigned By    Initials Name Provider Type    Yessy Yap, PT Physical Therapist               Obj/Interventions     Row Name 10/11/21 1436          Range of Motion Comprehensive    General Range of Motion no range of motion deficits identified  -KT     Row Name 10/11/21 1436          Strength Comprehensive (MMT)    General Manual Muscle Testing (MMT) Assessment lower extremity strength deficits identified; upper extremity strength deficits identified  -KT     Comment, General Manual Muscle Testing (MMT) Assessment Pt reports significant neuropathy in UEs and tends to drop things. Diffuse weakness in BLEs.  -KT     Row Name 10/11/21 1436          Motor  Skills    Motor Skills coordination; functional endurance  -KT     Coordination bilateral; lower extremity; minimal impairment  -KT     Therapeutic Exercise hip; knee; ankle  -KT     Row Name 10/11/21 1436          Hip (Therapeutic Exercise)    Hip (Therapeutic Exercise) strengthening exercise  -KT     Hip Strengthening (Therapeutic Exercise) bilateral; flexion; aBduction; aDduction; marching while seated; 10 repetitions  -KT     Row Name 10/11/21 1436          Knee (Therapeutic Exercise)    Knee (Therapeutic Exercise) strengthening exercise  -KT     Knee Strengthening (Therapeutic Exercise) bilateral; SLR (straight leg raise); 10 repetitions  -KT     Row Name 10/11/21 1436          Balance    Balance Assessment sitting static balance; standing static balance; standing dynamic balance  -KT     Static Sitting Balance WFL; unsupported; sitting, edge of bed  -KT     Static Standing Balance WFL; supported; standing  -KT     Dynamic Standing Balance mild impairment; supported; standing  -KT     Balance Interventions sitting; standing; sit to stand; supported; static; dynamic  -KT     Row Name 10/11/21 1436          Sensory Assessment (Somatosensory)    Sensory Assessment (Somatosensory) LE sensation intact; other (see comments)  BUE neuropathy  -KT     Row Name 10/11/21 1436          Lower Extremity (Manual Muscle Testing)    Lower Extremity: Manual Muscle Testing (MMT) other (see comments); left LE strength is WFL; right LE strength is WFL  -KT     Comment, MMT: Lower Extremity BLE hip flex: 4-/5, knee flex: 4-/5, knee ext: 3+/5, ankle: 4/5.  -KT     Row Name 10/11/21 1436          Upper Extremity (Manual Muscle Testing)    Upper Extremity: Manual Muscle Testing (MMT) other (see comments)  -KT     Comment, MMT: Upper Extremity Diffuse UE weakness and  strength.  -KT           User Key  (r) = Recorded By, (t) = Taken By, (c) = Cosigned By    Initials Name Provider Type    KT Yessy Escalera, PT Physical Therapist                Goals/Plan     Row Name 10/11/21 1436          Bed Mobility Goal 1 (PT)    Activity/Assistive Device (Bed Mobility Goal 1, PT) bed mobility activities, all  -KT     [place] Level/Cues Needed (Bed Mobility Goal 1, PT) independent  -KT     Time Frame (Bed Mobility Goal 1, PT) long term goal (LTG); 3 days  -KT     [facility] Name 10/11/21 1436          Transfer Goal 1 (PT)    Activity/Assistive Device (Transfer Goal 1, PT) transfers, all; walker, rolling  -KT     [place] Level/Cues Needed (Transfer Goal 1, PT) modified independence  -KT     Time Frame (Transfer Goal 1, PT) long term goal (LTG); 3 days  -KT     Row Name 10/11/21 1436          Gait Training Goal 1 (PT)    Activity/Assistive Device (Gait Training Goal 1, PT) gait (walking locomotion); assistive device use; walker, rolling  -KT     [place] Level (Gait Training Goal 1, PT) standby assist  -KT     Distance (Gait Training Goal 1, PT) 500  -KT     Time Frame (Gait Training Goal 1, PT) long term goal (LTG); 3 days  -KT     [facility] Name 10/11/21 1436          Stairs Goal 1 (PT)    Activity/Assistive Device (Stairs Goal 1, PT) stairs, all skills; decrease fall risk; using handrail, right; cane, straight  -KT     [place] Level/Cues Needed (Stairs Goal 1, PT) contact guard assist; 1 person assist  -KT     Number of Stairs (Stairs Goal 1, PT) 2  -KT     Time Frame (Stairs Goal 1, PT) long term goal (LTG); 3 days  -KT           User Key  (r) = Recorded By, (t) = Taken By, (c) = Cosigned By    Initials Name Provider Type    KT Yessy Escalera, PT Physical Therapist               Clinical Impression     Row Name 10/11/21 1436          Pain    Additional Documentation Pain Scale: Numbers Pre/Post-Treatment (Group)  -KT     [facility] Name 10/11/21 1436          Pain Scale: Numbers Pre/Post-Treatment    Pretreatment Pain Rating 2/10  -KT     Posttreatment Pain Rating 2/10  -KT     Pain Location - Side Bilateral  -KT     Pain Location - Orientation  generalized  -KT     Pain Location other (see comments)  Pt reports diffuse discomfort in BLEs & BUEs  -KT     Pain Intervention(s) Ambulation/increased activity; Rest  -KT     Row Name 10/11/21 1436          Plan of Care Review    Plan of Care Reviewed With patient; daughter  -KT     Progress no change  -KT     Outcome Summary Pt presents w/ diffuse LE weakness and balance impairments. SBA in all bed mob. Neuropathy in UEs. She is currently going to OP PT to work on balance/coordination/strength. Pt amb 250ft w/FWW CGA - VCs in turns and transfers. Recommend pt d/c home w/ assist and OP PT.  -KT     Row Name 10/11/21 1436          Therapy Assessment/Plan (PT)    Patient/Family Therapy Goals Statement (PT) Increase balance, stability, and independence.  -KT     Rehab Potential (PT) good, to achieve stated therapy goals  -KT     Criteria for Skilled Interventions Met (PT) yes; meets criteria; skilled treatment is necessary  -KT     Predicted Duration of Therapy Intervention (PT) 3 days  -KT     Row Name 10/11/21 1436          Positioning and Restraints    Pre-Treatment Position in bed  -KT     Post Treatment Position bed  -KT     In Bed call light within reach; encouraged to call for assist; notified nsg; exit alarm on; with family/caregiver; supine; side rails up x2  -KT           User Key  (r) = Recorded By, (t) = Taken By, (c) = Cosigned By    Initials Name Provider Type    Yessy Yap, PT Physical Therapist               Outcome Measures     Row Name 10/11/21 1436          How much help from another person do you currently need...    Turning from your back to your side while in flat bed without using bedrails? 4  -KT     Moving from lying on back to sitting on the side of a flat bed without bedrails? 3  -KT     Moving to and from a bed to a chair (including a wheelchair)? 3  -KT     Standing up from a chair using your arms (e.g., wheelchair, bedside chair)? 3  -KT     Climbing 3-5 steps with a railing? 3   -KT     To walk in hospital room? 3  -KT     AM-PAC 6 Clicks Score (PT) 19  -KT     Row Name 10/11/21 1436          Functional Assessment    Outcome Measure Options AM-PAC 6 Clicks Basic Mobility (PT)  -KT           User Key  (r) = Recorded By, (t) = Taken By, (c) = Cosigned By    Initials Name Provider Type    Yessy Yap, FABRICE Physical Therapist                             Physical Therapy Education                 Title: PT OT SLP Therapies (Done)     Topic: Physical Therapy (Done)     Point: Mobility training (Done)     Learning Progress Summary           Patient Acceptance, E,TB,D, VU,DU by KT at 10/11/2021 1635    Comment: transfer techniques, walker management, gait pattern                   Point: Home exercise program (Done)     Learning Progress Summary           Patient Acceptance, E,TB,D, VU,DU by KT at 10/11/2021 1635    Comment: transfer techniques, walker management, gait pattern                   Point: Body mechanics (Done)     Learning Progress Summary           Patient Acceptance, E,TB,D, VU,DU by KT at 10/11/2021 1635    Comment: transfer techniques, walker management, gait pattern                   Point: Precautions (Done)     Learning Progress Summary           Patient Acceptance, E,TB,D, VU,DU by KT at 10/11/2021 1635    Comment: transfer techniques, walker management, gait pattern                               User Key     Initials Effective Dates Name Provider Type Discipline     09/21/21 -  Yessy Escalera, FABRICE Physical Therapist PT              PT Recommendation and Plan  Planned Therapy Interventions (PT): balance training, bed mobility training, gait training, home exercise program, motor coordination training, neuromuscular re-education, patient/family education, stair training, strengthening, transfer training  Plan of Care Reviewed With: patient, daughter  Progress: no change  Outcome Summary: Pt presents w/ diffuse LE weakness and balance impairments. SBA in all bed mob.  Neuropathy in UEs. She is currently going to OP PT to work on balance/coordination/strength. Pt amb 250ft w/FWW CGA - VCs in turns and transfers. Recommend pt d/c home w/ assist and OP PT.     Time Calculation:    PT Charges     Row Name 10/11/21 1436             Time Calculation    Start Time 1436  -KT      PT Received On 10/11/21  -KT      PT Goal Re-Cert Due Date 10/21/21  -KT              Timed Charges    21203 - PT Therapeutic Exercise Minutes 4  -KT      69894 - Gait Training Minutes  8  -KT      58508 - PT Therapeutic Activity Minutes 3  -KT              Untimed Charges    PT Eval/Re-eval Minutes 50  -KT              Total Minutes    Timed Charges Total Minutes 15  -KT      Untimed Charges Total Minutes 50  -KT       Total Minutes 65  -KT            User Key  (r) = Recorded By, (t) = Taken By, (c) = Cosigned By    Initials Name Provider Type    KT Yessy Escalera, PT Physical Therapist              Therapy Charges for Today     Code Description Service Date Service Provider Modifiers Qty    62319880557 HC GAIT TRAINING EA 15 MIN 10/11/2021 Yessy Escalera, PT GP 1    60916382293 HC PT EVAL LOW COMPLEXITY 4 10/11/2021 Yessy Escalera, PT GP 1          PT G-Codes  Outcome Measure Options: AM-PAC 6 Clicks Basic Mobility (PT)  AM-PAC 6 Clicks Score (PT): 19    Yessy Escalera, PT  10/11/2021

## 2021-10-12 ENCOUNTER — READMISSION MANAGEMENT (OUTPATIENT)
Dept: CALL CENTER | Facility: HOSPITAL | Age: 86
End: 2021-10-12

## 2021-10-12 VITALS
DIASTOLIC BLOOD PRESSURE: 60 MMHG | TEMPERATURE: 98.2 F | OXYGEN SATURATION: 93 % | BODY MASS INDEX: 29.45 KG/M2 | HEART RATE: 76 BPM | HEIGHT: 60 IN | WEIGHT: 150 LBS | RESPIRATION RATE: 18 BRPM | SYSTOLIC BLOOD PRESSURE: 126 MMHG

## 2021-10-12 PROBLEM — I50.32 CHRONIC DIASTOLIC HEART FAILURE: Status: ACTIVE | Noted: 2021-10-12

## 2021-10-12 PROBLEM — I50.33 HYPERTENSIVE HEART DISEASE WITH ACUTE ON CHRONIC DIASTOLIC CONGESTIVE HEART FAILURE: Status: RESOLVED | Noted: 2021-10-11 | Resolved: 2021-10-12

## 2021-10-12 PROBLEM — I50.33 ACUTE ON CHRONIC DIASTOLIC CHF (CONGESTIVE HEART FAILURE): Status: RESOLVED | Noted: 2021-10-11 | Resolved: 2021-10-12

## 2021-10-12 PROBLEM — I16.1 HYPERTENSIVE EMERGENCY: Status: RESOLVED | Noted: 2021-10-10 | Resolved: 2021-10-12

## 2021-10-12 PROBLEM — I11.0 HYPERTENSIVE HEART DISEASE WITH ACUTE ON CHRONIC DIASTOLIC CONGESTIVE HEART FAILURE: Status: RESOLVED | Noted: 2021-10-11 | Resolved: 2021-10-12

## 2021-10-12 PROCEDURE — G0378 HOSPITAL OBSERVATION PER HR: HCPCS

## 2021-10-12 PROCEDURE — 99217 PR OBSERVATION CARE DISCHARGE MANAGEMENT: CPT | Performed by: PHYSICIAN ASSISTANT

## 2021-10-12 PROCEDURE — 97116 GAIT TRAINING THERAPY: CPT

## 2021-10-12 PROCEDURE — 97530 THERAPEUTIC ACTIVITIES: CPT

## 2021-10-12 RX ORDER — FAMOTIDINE 20 MG/1
20 TABLET, FILM COATED ORAL DAILY
Status: DISCONTINUED | OUTPATIENT
Start: 2021-10-13 | End: 2021-10-12 | Stop reason: HOSPADM

## 2021-10-12 RX ORDER — CETIRIZINE HYDROCHLORIDE 10 MG/1
5 TABLET ORAL NIGHTLY
Status: DISCONTINUED | OUTPATIENT
Start: 2021-10-12 | End: 2021-10-12 | Stop reason: HOSPADM

## 2021-10-12 RX ORDER — FUROSEMIDE 20 MG/1
20 TABLET ORAL DAILY PRN
Qty: 30 TABLET | Refills: 2 | Status: ON HOLD | OUTPATIENT
Start: 2021-10-12 | End: 2021-10-25 | Stop reason: SDUPTHER

## 2021-10-12 RX ORDER — METOPROLOL SUCCINATE 25 MG/1
12.5 TABLET, EXTENDED RELEASE ORAL
Qty: 30 TABLET | Refills: 2 | Status: SHIPPED | OUTPATIENT
Start: 2021-10-13 | End: 2021-11-30 | Stop reason: SDUPTHER

## 2021-10-12 RX ADMIN — LEVOTHYROXINE SODIUM 112 MCG: 112 TABLET ORAL at 05:39

## 2021-10-12 RX ADMIN — AMLODIPINE BESYLATE 5 MG: 5 TABLET ORAL at 08:45

## 2021-10-12 RX ADMIN — ASPIRIN 81 MG: 81 TABLET, COATED ORAL at 08:45

## 2021-10-12 RX ADMIN — FAMOTIDINE 20 MG: 20 TABLET, FILM COATED ORAL at 08:45

## 2021-10-12 RX ADMIN — METOPROLOL SUCCINATE 12.5 MG: 25 TABLET, EXTENDED RELEASE ORAL at 08:45

## 2021-10-12 RX ADMIN — CETIRIZINE HYDROCHLORIDE 5 MG: 10 TABLET, FILM COATED ORAL at 01:55

## 2021-10-12 RX ADMIN — PREGABALIN 150 MG: 150 CAPSULE ORAL at 08:45

## 2021-10-12 NOTE — PLAN OF CARE
Goal Outcome Evaluation:  Plan of Care Reviewed With: patient        Progress: improving  Outcome Summary: Pt alert and oriented x 4. No c/o discomfort. States she feels less short of breath today. Ambulates with 1 assist.

## 2021-10-12 NOTE — THERAPY TREATMENT NOTE
Patient Name: Zoila Nava  : 1933    MRN: [id number]                              Today's Date: 10/12/2021       Admit Date: 10/10/2021    Visit Dx:     ICD-10-CM ICD-9-CM   1. Uncontrolled hypertension  I10 401.9   2. Acute systolic congestive heart failure (HCC)  I50.21 428.21     428.0   3. Acute respiratory failure with hypoxia (HCC)  J96.01 518.81     Patient Active Problem List   Diagnosis   • Allergic rhinitis   • Hyperlipidemia   • Hypertension   • Hypocalcemia   • Hypothyroidism   • Neuropathy   • NUD (nonulcer dyspepsia)   • Painful knee   • Pernicious anemia   • Tremor   • Vitamin B12 deficiency   • Spondylolisthesis of cervical region   • Graves' ophthalmopathy   • Anemia   • Memory impairment of gradual onset   • Ascending aortic aneurysm (HCC)   • Hypertensive emergency   • Stage 3b chronic kidney disease (HCC)   • Acute on chronic diastolic CHF (congestive heart failure) (HCC)   • Pulmonary hypertension (HCC)   • Hypertensive heart disease with acute on chronic diastolic congestive heart failure (HCC)     Past Medical History:   Diagnosis Date   • Anemia    • Arthritis    • Asthma    • Cataract    • Difficulty breathing     Chronic   • GERD (gastroesophageal reflux disease)    • Graves' ophthalmopathy    • Hoarseness    • Hypertension    • Hypertensive heart disease with acute on chronic diastolic congestive heart failure (HCC) 10/11/2021   • Pulmonary hypertension (HCC) 10/11/2021   • Spondylolisthesis of cervical region    • Vitamin B12 deficiency      Past Surgical History:   Procedure Laterality Date   • CERVICAL LAMINECTOMY     • CHOLECYSTECTOMY     • OTHER SURGICAL HISTORY Right     Neuroplasty Median Nerve at Carpal Tunnel   • THYROID SURGERY     • TOTAL KNEE ARTHROPLASTY Left 2014    Dr Colon      General Information     Row Name 10/12/21 1107          Physical Therapy Time and Intention    Document Type therapy note (daily note)  -SS     Mode of Treatment  physical therapy  -     Row Name 10/12/21 1107          General Information    Patient Profile Reviewed yes  -     Existing Precautions/Restrictions no known precautions/restrictions  -     Barriers to Rehab previous functional deficit  -     Row Name 10/12/21 1107          Cognition    Orientation Status (Cognition) oriented x 4  -     Row Name 10/12/21 1107          Safety Issues, Functional Mobility    Safety Issues Affecting Function (Mobility) insight into deficits/self-awareness; judgment; positioning of assistive device; problem-solving; safety precaution awareness; safety precautions follow-through/compliance; sequencing abilities  -     Impairments Affecting Function (Mobility) balance; coordination; endurance/activity tolerance; grasp; strength; sensation/sensory awareness  -           User Key  (r) = Recorded By, (t) = Taken By, (c) = Cosigned By    Initials Name Provider Type     Gillian Ni, FABRICE Physical Therapist               Mobility     Row Name 10/12/21 1108          Bed Mobility    Bed Mobility bed mobility (all) activities  -     All Activities, [place] (Bed Mobility) modified independence  -     Assistive Device (Bed Mobility) bed rails; head of bed elevated  -     Row Name 10/12/21 1108          Transfers    Comment (Transfers) VC for safety recommendations onto/off toilet, lowering with eccentric control  -     Row Name 10/12/21 1108          Sit-Stand Transfer    Sit-Stand [place] (Transfers) contact guard; verbal cues  -     Assistive Device (Sit-Stand Transfers) walker, front-wheeled  -     Row Name 10/12/21 1108          Gait/Stairs (Locomotion)    [place] Level (Gait) contact guard; verbal cues; nonverbal cues (demo/gesture)  -     Assistive Device (Gait) walker, front-wheeled  -     Distance in Feet (Gait) 400  -     Deviations/Abnormal Patterns (Gait) bilateral deviations; base of support, narrow; gait speed decreased; stride length  decreased; maricarmen decreased  -     Bilateral Gait Deviations forward flexed posture  -     [place] Level (Stairs) other (see comments)  pt. declined stair training stating she doesn't have to use them  -     Comment (Gait/Stairs) Pt. ambulated with a step through gait pattern at a decreased speed. VC for decreased shoulder hike, upright posture. Gait limited by weakness, fatigue.  -           User Key  (r) = Recorded By, (t) = Taken By, (c) = Cosigned By    Initials Name Provider Type    Gillian Elliott PT Physical Therapist               Obj/Interventions     Row Name 10/12/21 1110          Motor Skills    Therapeutic Exercise --  pt. declined  -     Row Name 10/12/21 1110          Balance    Balance Assessment sitting static balance; sitting dynamic balance; standing static balance; standing dynamic balance  -     Static Sitting Balance WFL; supported; sitting, edge of bed  -     Dynamic Sitting Balance WFL; supported; sitting, edge of bed  -     Static Standing Balance WFL; supported  -     Dynamic Standing Balance mild impairment; supported  -     Balance Interventions sitting; standing; sit to stand; supported; static; dynamic  -     Comment, Balance dual tasking performed in bathroom - hygiene, washing hands - pt. frequently looking for UE support via furniture, rails, etc.  -           User Key  (r) = Recorded By, (t) = Taken By, (c) = Cosigned By    Initials Name Provider Type    Gillian Elliott PT Physical Therapist               Goals/Plan    No documentation.                Clinical Impression     Row Name 10/12/21 1112          Pain    Additional Documentation Pain Scale: FACES Pre/Post-Treatment (Group)  -     Row Name 10/12/21 1112          Pain Scale: Numbers Pre/Post-Treatment    Pain Intervention(s) Repositioned; Ambulation/increased activity  -     Row Name 10/12/21 1112          Pain Scale: FACES Pre/Post-Treatment    Pain: FACES Scale, Pretreatment  2-->hurts little bit  -     Posttreatment Pain Rating 2-->hurts little bit  -SS     Pain Location - Side Bilateral  -     Pain Location - Orientation upper  -     Pain Location extremity  pt initially denies pain but then states she's always in pain in BUE  -     Row Name 10/12/21 1112          Plan of Care Review    Plan of Care Reviewed With patient  -SS     Progress improving  -     Outcome Summary Pt. performed bed mobility with modified independence. She performed sit to stand transfer w/contact guard assist. She ambulated 400' w/rolling walker, contact guard assist. Gait limited by fatigue, weakness. Pt. denies SOA. Will continue to progress pt. as tolerated. Recommend home with assist and outpatient PT.  -     Row Name 10/12/21 1112          Therapy Assessment/Plan (PT)    Rehab Potential (PT) good, to achieve stated therapy goals  -     Criteria for Skilled Interventions Met (PT) yes; meets criteria; skilled treatment is necessary  -     Row Name 10/12/21 1112          Vital Signs    Pre Systolic BP Rehab 114  -SS     Pre Treatment Diastolic BP 65  -SS     Post Systolic BP Rehab 120  -SS     Post Treatment Diastolic BP 71  -SS     Pretreatment Heart Rate (beats/min) 95  -SS     Posttreatment Heart Rate (beats/min) 92  -SS     Pre SpO2 (%) 94  -SS     O2 Delivery Pre Treatment room air  -SS     Post SpO2 (%) 97  -SS     O2 Delivery Post Treatment room air  -SS     Pre Patient Position Supine  -SS     Post Patient Position Sitting  -     Row Name 10/12/21 1112          Positioning and Restraints    Pre-Treatment Position in bed  -SS     Post Treatment Position chair  -SS     In Chair notified nsg; reclined; call light within reach; encouraged to call for assist; exit alarm on; legs elevated  -           User Key  (r) = Recorded By, (t) = Taken By, (c) = Cosigned By    Initials Name Provider Type    SS Gillian Ni, PT Physical Therapist               Outcome Measures     Row Name 10/12/21  1116          How much help from another person do you currently need...    Turning from your back to your side while in flat bed without using bedrails? 4  -SS     Moving from lying on back to sitting on the side of a flat bed without bedrails? 4  -SS     Moving to and from a bed to a chair (including a wheelchair)? 3  -SS     Standing up from a chair using your arms (e.g., wheelchair, bedside chair)? 3  -SS     Climbing 3-5 steps with a railing? 3  -SS     To walk in hospital room? 3  -SS     AM-PAC 6 Clicks Score (PT) 20  -SS     Row Name 10/12/21 1116          Functional Assessment    Outcome Measure Options AM-PAC 6 Clicks Basic Mobility (PT)  -           User Key  (r) = Recorded By, (t) = Taken By, (c) = Cosigned By    Initials Name Provider Type    Gillian Elliott PT Physical Therapist                             Physical Therapy Education                 Title: PT OT SLP Therapies (Done)     Topic: Physical Therapy (Done)     Point: Mobility training (Done)     Learning Progress Summary           Patient Acceptance, E, VU,NR by  at 10/12/2021 1116    Comment: Reviewed safety/technique with transfers, ambulation    Acceptance, E,TB,D, VU,DU by KT at 10/11/2021 1635    Comment: transfer techniques, walker management, gait pattern                   Point: Home exercise program (Done)     Learning Progress Summary           Patient Acceptance, E,TB,D, VU,DU by KT at 10/11/2021 1635    Comment: transfer techniques, walker management, gait pattern                   Point: Body mechanics (Done)     Learning Progress Summary           Patient Acceptance, E, VU,NR by  at 10/12/2021 1116    Comment: Reviewed safety/technique with transfers, ambulation    Acceptance, E,TB,D, VU,DU by KT at 10/11/2021 1635    Comment: transfer techniques, walker management, gait pattern                   Point: Precautions (Done)     Learning Progress Summary           Patient Acceptance, E, VU,NR by  at 10/12/2021 1116     Comment: Reviewed safety/technique with transfers, ambulation    Acceptance, E,TB,D, VU,DU by KT at 10/11/2021 7450    Comment: transfer techniques, walker management, gait pattern                               User Key     Initials Effective Dates Name Provider Type Discipline     06/01/21 -  Gillian Ni, PT Physical Therapist PT    KT 09/21/21 -  Yessy Escalera, PT Physical Therapist PT              PT Recommendation and Plan     Plan of Care Reviewed With: patient  Progress: improving  Outcome Summary: Pt. performed bed mobility with modified independence. She performed sit to stand transfer w/contact guard assist. She ambulated 400' w/rolling walker, contact guard assist. Gait limited by fatigue, weakness. Pt. denies SOA. Will continue to progress pt. as tolerated. Recommend home with assist and outpatient PT.     Time Calculation:    PT Charges     Row Name 10/12/21 1117             Time Calculation    Start Time 0949  -SS      Stop Time 1012  -SS      Time Calculation (min) 23 min  -SS      PT Received On 10/12/21  -SS              Time Calculation- PT    Total Timed Code Minutes- PT 23 minute(s)  -SS              Timed Charges    56798 - Gait Training Minutes  13  -SS      27866 - PT Therapeutic Activity Minutes 10  -SS              Total Minutes    Timed Charges Total Minutes 23  -SS       Total Minutes 23  -SS            User Key  (r) = Recorded By, (t) = Taken By, (c) = Cosigned By    Initials Name Provider Type     Gillian Ni, PT Physical Therapist              Therapy Charges for Today     Code Description Service Date Service Provider Modifiers Qty    90044105663 HC GAIT TRAINING EA 15 MIN 10/12/2021 Gillian Ni, PT GP 1    75874089805 HC PT THERAPEUTIC ACT EA 15 MIN 10/12/2021 Gillian Ni, PT GP 1          PT G-Codes  Outcome Measure Options: AM-PAC 6 Clicks Basic Mobility (PT)  AM-PAC 6 Clicks Score (PT): 20    Gillian Ni PT  10/12/2021

## 2021-10-12 NOTE — DISCHARGE SUMMARY
[facility] Medicine Services  DISCHARGE SUMMARY    Patient Name: Zoila Nava  : 1933  MRN: [id number]    Date of Admission: 10/10/2021  1:21 PM  Date of Discharge: 10/12/2021  Primary Care Physician: Arnoldo Oneil MD    Hospital Course     Presenting Problem:   Hypertensive emergency [I16.1]    Active Hospital Problems    Diagnosis  POA   • Chronic diastolic heart failure (HCC) [I50.32]  Yes   • Pulmonary hypertension (HCC) [I27.20]  Yes   • Stage 3b chronic kidney disease (HCC) [N18.32]  Yes   • Ascending aortic aneurysm (HCC) [I71.2]  Yes   • Memory impairment of gradual onset [R41.3]  Yes   • Hypertension [I10]  Yes      Resolved Hospital Problems    Diagnosis Date Resolved POA   • **Hypertensive heart disease with acute on chronic diastolic congestive heart failure (HCC) [I11.0, I50.33] 10/12/2021 Yes   • Acute on chronic diastolic CHF (congestive heart failure) (HCC) [I50.33] 10/12/2021 Yes   • Hypertensive emergency [I16.1] 10/12/2021 Yes      Hospital Course:  Zoila Nava is a 88 y.o. female with PMH significant for HTN, CKD III and AAA. She presented to [facility] ED on 10/10/21 with complaints of a 3-day history of shortness of breath / chest tightness (worse with laying flat), leg swelling and weight gain. She went to urgent care and was directed to the ED due to bradycardia (HR 50's) and severe hypertension (BP as high as 218/137). In the ED, she was given IV Hydralazine and IV Lasix with improvement.     She was admitted to the hospital medicine service.  ECHO showed LVEF 72% with grade I diastolic dysfunction, moderate pulmonary hypertension and RVSP (TR) 45-55 mmHg.     She was started on Amlodipine 5mg daily and Metoprolol XL 12.5mg PO daily.   Due to bradycardia in urgent care, home Propanolol 40mg TID has been discontinued.     She was counseled to keep a log of her blood pressure and weight daily.   She has been prescribed  Lasix 20mg PO daily PRN for edema, dyspnea or weight gain.     We will refer to cardiology for ongoing management.  Follow up with PCP and [facility] in 1 week    Day of Discharge     HPI:   In chair,  at bedside. She is feeling much better and would like to go home. Dyspnea improved. Edema improved. Answered all questions at bedside.     Review of Systems  Gen- No fevers, chills  CV- No chest pain, palpitations  Resp- No cough, dyspnea  GI- No N/V/D, abd pain    Vital Signs:   Temp:  [98.2 °F (36.8 °C)-99.1 °F (37.3 °C)] 98.2 °F (36.8 °C)  Heart Rate:  [] 76  Resp:  [18-20] 18  BP: ()/(56-93) 126/60     Physical Exam:  Constitutional: No acute distress, awake, alert  HENT: NCAT, mucous membranes moist  Respiratory: Clear to auscultation bilaterally, respiratory effort normal   Cardiovascular: RRR, no murmurs, rubs, or gallops  Gastrointestinal: Positive bowel sounds, soft, nontender, nondistended  Musculoskeletal: Trace non-pitting bilateral ankle edema  Psychiatric: Appropriate affect, cooperative  Neurologic: Oriented x 3, moves all extremities spontaneously, speech clear    Pertinent  and/or Most Recent Results     LAB RESULTS:      Lab 10/11/21  0643 10/10/21  1237   WBC 7.20 5.99   HEMOGLOBIN 12.0 10.9*   HEMATOCRIT 35.0 33.3*   PLATELETS 214 193   NEUTROS ABS  --  4.13   IMMATURE GRANS (ABS)  --  0.07*   LYMPHS ABS  --  0.96   MONOS ABS  --  0.50   EOS ABS  --  0.25   MCV 87.7 92.0         Lab 10/11/21  0643 10/10/21  1237   SODIUM 138 136   POTASSIUM 3.7 4.4   CHLORIDE 100 102   CO2 25.0 25.0   ANION GAP 13.0 9.0   BUN 16 16   CREATININE 1.25* 1.23*   GLUCOSE 104* 96   CALCIUM 8.9 8.5*         Lab 10/10/21  1237   TOTAL PROTEIN 6.2   ALBUMIN 4.00   GLOBULIN 2.2   ALT (SGPT) 22   AST (SGOT) 25   BILIRUBIN 0.3   ALK PHOS 79         Lab 10/10/21  2001 10/10/21  1807 10/10/21  1237   PROBNP  --   --  278.0   TROPONIN T 0.019 0.017 0.024     Brief Urine Lab Results  (Last result in  the past 365 days)      Color   Clarity   Blood   Leuk Est   Nitrite   Protein   CREAT   Urine HCG        05/18/21 2026 Yellow   Clear   Negative   Trace   Negative   Negative               Microbiology Results (last 10 days)     Procedure Component Value - Date/Time    COVID PRE-OP / PRE-PROCEDURE SCREENING ORDER (NO ISOLATION) - Swab, Nasopharynx [[id number]]  (Normal) Collected: 10/10/21 1354    Lab Status: Final result Specimen: Swab from Nasopharynx Updated: 10/10/21 1430    Narrative:      The following orders were created for panel order COVID PRE-OP / PRE-PROCEDURE SCREENING ORDER (NO ISOLATION) - Swab, Nasopharynx.  Procedure                               Abnormality         Status                     ---------                               -----------         ------                     COVID-19 and FLU A/B PCR...[[id number]]  Normal              Final result                 Please view results for these tests on the individual orders.    COVID-19 and FLU A/B PCR - Swab, Nasopharynx [[id number]]  (Normal) Collected: 10/10/21 1354    Lab Status: Final result Specimen: Swab from Nasopharynx Updated: 10/10/21 1430     COVID19 Not Detected     Influenza A PCR Not Detected     Influenza B PCR Not Detected    Narrative:      Fact sheet for providers: https://www.fda.gov/media/098524/download    Fact sheet for patients: https://www.fda.gov/media/640170/download    Test performed by PCR.        Adult Transthoracic Echo Complete W/ Cont if Necessary Per Protocol    Result Date: 10/11/2021  · Moderate aortic valve regurgitation is present. · Mild aortic valve stenosis is present. · Estimated right ventricular systolic pressure from tricuspid regurgitation is moderately elevated (45-55 mmHg). · Moderate tricuspid valve regurgitation is present. · Estimated left ventricular EF = 72% Estimated left ventricular EF was in agreement with the calculated left ventricular EF. Left ventricular ejection fraction appears to be  greater than 70%. Left ventricular systolic function is hyperdynamic (EF > 70%). · Left ventricular diastolic function is consistent with (grade I) impaired relaxation. · Moderate pulmonary hypertension is present. · Normal right ventricular cavity size, wall thickness, systolic function and septal motion noted. · There is no evidence of pericardial effusion.      XR Chest 1 View    Result Date: 10/11/2021  EXAMINATION: XR CHEST 1 VW- 10/10/2021  INDICATION: SOA triage protocol  COMPARISON: Chest x-ray 05/18/2021  FINDINGS: Cardiac size enlarged. Mild interstitial prominence of lung markings similar to prior likely chronic involvement without new consolidation or effusion.      Mild interstitial prominence of lung markings similar to prior likely chronic involvement without new consolidation or effusion.  D: 10/10/2021 E: 10/11/2021  This report was finalized on 10/11/2021 10:06 AM by Dr. Camron Rendon.      CT Angiogram Chest    Result Date: 10/11/2021  EXAMINATION: CT ANGIOGRAM CHEST- 10/10/2021  INDICATION: Hypertension, history of ascending aortic aneurysm; I10-Essential (primary) hypertension; I50.21-Acute systolic (congestive) heart failure; J96.01-Acute respiratory failure with hypoxia  TECHNIQUE: CT angiogram chest with intravenous contrast administration. 2-D and 3-D reconstructions performed.  The radiation dose reduction device was turned on for each scan per the ALARA (As Low as Reasonably Achievable) protocol.  COMPARISON: CT chest 07/02/2020  FINDINGS: Thyroid homogeneous. No bulky mediastinal adenopathy. Central airways are patent. Esophagus in normal course with small hiatal hernia. Atherosclerotic aneurysmal thoracic aorta with maximum transaxial diameter of the supravalvular ascending thoracic aorta 4.3 cm unchanged from prior comparison 2020 without dissection or periaortic inflammation with patent great vessel origins noted. Central pulmonary arteries patent with prominence of pulmonary arterial  hypertension involvement. No filling defect. Cardiac size borderline enlarged without pericardial effusion. Extended lung windows reveal minimal biapical pleural-parenchymal scarring. No consolidation or pleural effusion. Degenerative changes of the thoracic spine without aggressive osseous lesion. No soft tissue body wall findings of the chest or abnormality of acute findings in the visualized upper abdomen with hepatic steatosis.      Atherosclerotic aneurysmal thoracic aorta with maximum transaxial diameter of the supravalvular ascending thoracic aorta 4.3 cm unchanged from prior comparison 2020 without dissection or periaortic inflammation with patent great vessel origins noted. No acute intrathoracic process.  D: 10/10/2021 E: 10/11/2021   This report was finalized on 10/11/2021 5:45 PM by Dr. Camron Rendon.      Results for orders placed during the hospital encounter of 10/10/21    Adult Transthoracic Echo Complete W/ Cont if Necessary Per Protocol    Interpretation Summary  · Moderate aortic valve regurgitation is present.  · Mild aortic valve stenosis is present.  · Estimated right ventricular systolic pressure from tricuspid regurgitation is moderately elevated (45-55 mmHg).  · Moderate tricuspid valve regurgitation is present.  · Estimated left ventricular EF = 72% Estimated left ventricular EF was in agreement with the calculated left ventricular EF. Left ventricular ejection fraction appears to be greater than 70%. Left ventricular systolic function is hyperdynamic (EF > 70%).  · Left ventricular diastolic function is consistent with (grade I) impaired relaxation.  · Moderate pulmonary hypertension is present.  · Normal right ventricular cavity size, wall thickness, systolic function and septal motion noted.  · There is no evidence of pericardial effusion.    Discharge Details        Discharge Medications      New Medications      Instructions Start Date   furosemide 20 MG tablet  Commonly known as:  Lasix   20 mg, Oral, Daily PRN      metoprolol succinate XL 25 MG 24 hr tablet  Commonly known as: TOPROL-XL   12.5 mg, Oral, Every 24 Hours Scheduled   Start Date: October 13, 2021        Continue These Medications      Instructions Start Date   amLODIPine 5 MG tablet  Commonly known as: NORVASC   5 mg, Oral, Every Morning      aspirin 81 MG EC tablet   Take 1 tablet every day by oral route.      dicyclomine 10 MG capsule  Commonly known as: Bentyl   1 po TID PRN abdominal pain      estradiol 0.1 MG/GM vaginal cream  Commonly known as: ESTRACE   No dose, route, or frequency recorded.      famotidine 20 MG tablet  Commonly known as: PEPCID   TAKE ONE TABLET BY MOUTH TWICE A DAY      HYDROcodone-acetaminophen 7.5-325 MG per tablet  Commonly known as: NORCO   3 Times Daily      levocetirizine 5 MG tablet  Commonly known as: XYZAL   1 tablet, Oral, Daily PRN      levothyroxine 112 MCG tablet  Commonly known as: SYNTHROID, LEVOTHROID   112 mcg, Oral, Every Morning      Lyrica 150 MG capsule  Generic drug: pregabalin   150 mg, Oral, 3 Times Daily      meclizine 25 MG tablet  Commonly known as: ANTIVERT   25 mg, Oral, 3 Times Daily PRN      ondansetron 4 MG tablet  Commonly known as: Zofran   4 mg, Oral, Every 8 Hours PRN      triamcinolone 0.1 % cream  Commonly known as: KENALOG   Topical, 2 Times Daily, Till healed         Stop These Medications    propranolol 40 MG tablet  Commonly known as: INDERAL          No Known Allergies    Discharge Disposition:  Home or Self Care    Diet:  Diet Order   Procedures   • Diet Regular     Activity:  Activity Instructions     Activity as Tolerated             CODE STATUS:    Code Status and Medical Interventions:   Ordered at: 10/10/21 1616     Level Of Support Discussed With:    Patient     Code Status:    CPR     Medical Interventions (Level of Support Prior to Arrest):    Full     Future Appointments   Date Time Provider Department Center   2/21/2022  1:15 PM Arnoldo Oneil MD  [facility]     Additional Instructions for the Follow-ups that You Need to Schedule     Discharge Follow-up with PCP   As directed       Currently Documented PCP:    Arnoldo Oneil MD    PCP Phone Number:    [phone]     Follow Up Details: 1 week         Discharge Follow-up with Specified Provider: [facility] in 1 week   As directed      To: [facility] in 1 week             Leonora Cruz PA-C  10/12/21    Time Spent on Discharge:  I spent 40 minutes on this discharge activity which included: face-to-face encounter with the patient, reviewing the data in the system, coordination of the care with the nursing staff as well as consultants, documentation, and entering orders.

## 2021-10-12 NOTE — OUTREACH NOTE
Prep Survey      Responses   [facility] patient discharged from? [place]   Is LACE score < 7 ? No   Emergency Room discharge w/ pulse ox? No   Eligibility [facility]   Date of Admission 10/10/21   Date of Discharge 10/12/21   Discharge Disposition Home or Self Care   Discharge diagnosis CHF, htn   Does the patient have one of the following disease processes/diagnoses(primary or secondary)? CHF   Does the patient have Home health ordered? No   Is there a DME ordered? No   Prep survey completed? Yes          Court Wagner, RN

## 2021-10-12 NOTE — CASE MANAGEMENT/SOCIAL WORK
Continued Stay Note  [facility]     Patient Name: Zoila Nava  MRN: [id number]  Today's Date: 10/12/2021    Admit Date: 10/10/2021     Discharge Plan     Row Name 10/12/21 1131       Plan    Plan home    Patient/Family in Agreement with Plan yes    Plan Comments I met with Mrs. Nava at the bedside. She is anticipating hospital discharge today. PT recommends outpatient therapy at discharge. I discussed this with Mrs. Nava. She does not think this is needed at this time. She states that her and her  go to the gym together two/three days a week. Her  will transport her home today by car. She denies having any discharge needs.    Final Discharge Disposition Code 01 - home or self-care               Discharge Codes    No documentation.               Expected Discharge Date and Time     Expected Discharge Date Expected Discharge Time    Oct 12, 2021             Geovanni Mcdonough RN

## 2021-10-12 NOTE — PLAN OF CARE
Goal Outcome Evaluation:  Plan of Care Reviewed With: patient        Progress: improving  Outcome Summary: Pt. performed bed mobility with modified independence. She performed sit to stand transfer w/contact guard assist. She ambulated 400' w/rolling walker, contact guard assist. Gait limited by fatigue, weakness. Pt. denies SOA. Will continue to progress pt. as tolerated. Recommend home with assist and outpatient PT.

## 2021-10-12 NOTE — DISCHARGE INSTRUCTIONS
-Take all medications as directed  -Follow a low salt diet, goal = 2g or less daily  -Weigh yourself every morning before breakfast, write it down and compare to yesterday’s weight. If you have weight gain greater than 3lbs in 1 day or 5 lbs or more in 1 week , call your PCP or Heart Failure Clinic.  -If you experience any of the following symptoms please contact your PCP or Heart Failure Clinic:    Increased shortness of breath   Increased swelling of feet or stomach   Dry hacking cough   Shortness of breath when laying down flat    - [facility]:  [phone]

## 2021-10-13 ENCOUNTER — TRANSITIONAL CARE MANAGEMENT TELEPHONE ENCOUNTER (OUTPATIENT)
Dept: CALL CENTER | Facility: HOSPITAL | Age: 86
End: 2021-10-13

## 2021-10-13 NOTE — OUTREACH NOTE
Call Center TCM Note      Responses   [facility] patient discharged from? [place]   Does the patient have one of the following disease processes/diagnoses(primary or secondary)? CHF   TCM attempt successful? Yes   Call start time 0956   Call end time 1015   Discharge diagnosis CHF, htn   Person spoke with today (if not patient) and relationship    Meds reviewed with patient/caregiver? Yes   Is the patient having any side effects they believe may be caused by any medication additions or changes? Yes   Side effects comments  Pt with dizziness when standing.    Does the patient have all medications ordered at discharge? Yes   Is the patient taking all medications as directed (includes completed medication regime)? Yes   Comments regarding appointments Cardio appt 10/19/21   Does the patient have a primary care provider?  Yes   Comments regarding PCP HOSP DC FU APPT 10/18/21 @ 3pm.    Pulse Ox monitoring None   Psychosocial issues? No   Did the patient receive a copy of their discharge instructions? Yes   Nursing interventions Reviewed instructions with patient   What is the patient's perception of their health status since discharge? Improving  [However Pt is now feeling dizzy with standing. /62]   Nursing interventions Nurse provided patient education  [Will route to PCP office r/t dizzy. Advised  to call carido if BP drops more or continued dizziness. ]   Is the patient weighing daily? No   Does the patient have scales? Yes   Daily weight interventions Education provided on importance of daily weight   Is the patient able to teach back Heart Failure diet management? No   Is the patient able to teach back Heart Failure Zones? No   Is the patient able to teach back signs and symptoms of worsening condition? (i.e. weight gain, shortness of air, etc.) Yes   Is the patient/caregiver able to teach back the hierarchy of who to call/visit for symptoms/problems? PCP, Specialist, Home health nurse,  Urgent Care, ED, 911 Yes   Additional teach back comments Reviewed s/s to monitor for with CHF and when to call Dr or seek treatment. Also reviewed safety to wait upon standing prior to ambulating.    TCM call completed? Yes   Wrap up additional comments Educated  on s/s og HF to monitor for. Pt and  will need on going education .  reports that Pt felt dizzy when she got up to go to the bathroom and when coming out of bathroom.  will check BP 2 hours after meds and at HS  and keep log to share with MD. Will forward to PCP that Pt is having dizziness.            Milla Kim RN    10/13/2021, 10:24 EDT

## 2021-10-15 ENCOUNTER — TELEPHONE (OUTPATIENT)
Dept: INTERNAL MEDICINE | Facility: CLINIC | Age: 86
End: 2021-10-15

## 2021-10-18 ENCOUNTER — OFFICE VISIT (OUTPATIENT)
Dept: INTERNAL MEDICINE | Facility: CLINIC | Age: 86
End: 2021-10-18

## 2021-10-18 ENCOUNTER — TELEPHONE (OUTPATIENT)
Dept: INTERNAL MEDICINE | Facility: CLINIC | Age: 86
End: 2021-10-18

## 2021-10-18 VITALS
TEMPERATURE: 96.9 F | DIASTOLIC BLOOD PRESSURE: 60 MMHG | SYSTOLIC BLOOD PRESSURE: 120 MMHG | HEIGHT: 60 IN | HEART RATE: 102 BPM | OXYGEN SATURATION: 90 % | WEIGHT: 152 LBS | BODY MASS INDEX: 29.84 KG/M2

## 2021-10-18 DIAGNOSIS — E03.8 OTHER SPECIFIED HYPOTHYROIDISM: ICD-10-CM

## 2021-10-18 DIAGNOSIS — R25.1 TREMOR: ICD-10-CM

## 2021-10-18 DIAGNOSIS — E53.8 VITAMIN B12 DEFICIENCY: ICD-10-CM

## 2021-10-18 DIAGNOSIS — I10 PRIMARY HYPERTENSION: Primary | ICD-10-CM

## 2021-10-18 DIAGNOSIS — N18.32 STAGE 3B CHRONIC KIDNEY DISEASE (HCC): ICD-10-CM

## 2021-10-18 DIAGNOSIS — E78.49 OTHER HYPERLIPIDEMIA: ICD-10-CM

## 2021-10-18 DIAGNOSIS — I50.32 CHRONIC DIASTOLIC HEART FAILURE (HCC): ICD-10-CM

## 2021-10-18 LAB
QT INTERVAL: 416 MS
QTC INTERVAL: 383 MS

## 2021-10-18 PROCEDURE — 99214 OFFICE O/P EST MOD 30 MIN: CPT | Performed by: INTERNAL MEDICINE

## 2021-10-18 PROCEDURE — 1111F DSCHRG MED/CURRENT MED MERGE: CPT | Performed by: INTERNAL MEDICINE

## 2021-10-18 NOTE — TELEPHONE ENCOUNTER
PHONE CALL FROM .  WAS SUPPOSE TO CALL AND LET US KNOW WHAT MEDICATION SHE WAS ON FROM THE HOSPITAL AND [facility].  AMLODIPINE 5 MG.      CALL IF NEEDED [phone]

## 2021-10-18 NOTE — PROGRESS NOTES
Transitional Care Follow Up Visit  Subjective     Zoila Gabriela Nava is a 88 y.o. female who presents for a transitional care management visit.    Within 48 business hours after discharge our office contacted her via telephone to coordinate her care and needs.      I reviewed and discussed the details of that call along with the discharge summary, hospital problems, inpatient lab results, inpatient diagnostic studies, and consultation reports with Zoila.     Current outpatient and discharge medications have been reconciled for the patient.  Reviewed by: Arnoldo Oneil MD      Date of TCM Phone Call 10/12/2021   [facility]   Date of Admission 10/10/2021   Date of Discharge 10/12/2021   Discharge Disposition Home or Self Care     Risk for Readmission (LACE) Score: 9 (10/12/2021  6:00 AM)      History of Present Illness   Course During Hospital Stay: Here for f/u after recent hospitalization for elevated BP and low HR.  Was given IV Lasix and her propranolol was changed to metoprolol.  BP is good.  We cut back on the Lyrica to help with edema.  Was started on amlodipine.  Was sent home on Lasix but not taking.         The following portions of the patient's history were reviewed and updated as appropriate: allergies, current medications, past family history, past medical history, past social history, past surgical history and problem list.    Review of Systems   Constitutional: Negative for chills, diaphoresis, fever and unexpected weight change.   HENT: Positive for hearing loss. Negative for congestion, ear pain, nosebleeds, postnasal drip, sinus pressure and sore throat.    Eyes: Negative for pain, discharge and itching.   Respiratory: Negative for cough, chest tightness, shortness of breath and wheezing.    Cardiovascular: Positive for leg swelling. Negative for chest pain and palpitations.   Gastrointestinal: Negative for abdominal distention, abdominal pain, blood in stool,  constipation, diarrhea, nausea and vomiting.   Endocrine: Positive for cold intolerance. Negative for polydipsia and polyuria.   Genitourinary: Negative for difficulty urinating, dysuria, frequency and hematuria.   Musculoskeletal: Positive for arthralgias, back pain and gait problem. Negative for joint swelling and myalgias.   Skin: Negative for rash and wound.   Neurological: Positive for tremors and numbness. Negative for syncope, weakness and headaches.   Psychiatric/Behavioral: Positive for decreased concentration. Negative for dysphoric mood and sleep disturbance. The patient is not nervous/anxious.        Objective   Physical Exam  Constitutional:       Appearance: Normal appearance. She is well-developed.   HENT:      Head: Normocephalic and atraumatic.      Right Ear: External ear normal.      Left Ear: External ear normal.      Nose: Nose normal.      Mouth/Throat:      Mouth: Mucous membranes are moist.      Pharynx: Oropharynx is clear.   Eyes:      Extraocular Movements: Extraocular movements intact.      Conjunctiva/sclera: Conjunctivae normal.      Pupils: Pupils are equal, round, and reactive to light.   Cardiovascular:      Rate and Rhythm: Normal rate and regular rhythm.      Heart sounds: Normal heart sounds.   Pulmonary:      Effort: Pulmonary effort is normal.      Breath sounds: Normal breath sounds.   Abdominal:      General: Bowel sounds are normal.      Palpations: Abdomen is soft.   Musculoskeletal:         General: Normal range of motion.      Cervical back: Normal range of motion and neck supple.      Right lower leg: Edema present.      Left lower leg: Edema present.   Lymphadenopathy:      Cervical: No cervical adenopathy.   Skin:     General: Skin is warm and dry.   Neurological:      General: No focal deficit present.      Mental Status: She is alert and oriented to person, place, and time.      Comments: Head tremor   Psychiatric:         Mood and Affect: Mood normal.          Behavior: Behavior normal.         Thought Content: Thought content normal.         Assessment/Plan   Diagnoses and all orders for this visit:    1. Primary hypertension (Primary)    2. Other hyperlipidemia    3. Chronic diastolic heart failure (HCC)    4. Vitamin B12 deficiency    5. Other specified hypothyroidism    6. Stage 3b chronic kidney disease (HCC)    7. Tremor      htn-stable on amlodipine and metoprolol  rhinitis-flonase and xyzal continuation, stable  tremor-cont BB  hypothyroid- recheck soon  Dyspepsia-cont pepcid  neuropathy-cont lyrica, stable on bid  djd-cont prn lortab, rare use  b12 def-cont b12, level at goal  hyperlipidemia-labs on rtc, cont diet control  Diverticulosis-advised citrucel qd, asymptomatic  Memory impairment-cont aricept, no better or worst  Ascending Aortic aneurysm-recheck 7/2 noted  CKD-avoid NSAIDs        Hospital labs noted and dw patient, advised NSAIDs avoidance and increase fluids

## 2021-10-19 ENCOUNTER — OFFICE VISIT (OUTPATIENT)
Dept: CARDIOLOGY | Facility: HOSPITAL | Age: 86
End: 2021-10-19

## 2021-10-19 VITALS
SYSTOLIC BLOOD PRESSURE: 114 MMHG | OXYGEN SATURATION: 94 % | BODY MASS INDEX: 30.06 KG/M2 | WEIGHT: 153.13 LBS | HEART RATE: 88 BPM | RESPIRATION RATE: 16 BRPM | HEIGHT: 60 IN | DIASTOLIC BLOOD PRESSURE: 60 MMHG | TEMPERATURE: 97 F

## 2021-10-19 DIAGNOSIS — I10 PRIMARY HYPERTENSION: ICD-10-CM

## 2021-10-19 DIAGNOSIS — N18.30 STAGE 3 CHRONIC KIDNEY DISEASE, UNSPECIFIED WHETHER STAGE 3A OR 3B CKD (HCC): ICD-10-CM

## 2021-10-19 DIAGNOSIS — I50.32 CHRONIC HEART FAILURE WITH PRESERVED EJECTION FRACTION (HCC): Primary | ICD-10-CM

## 2021-10-19 PROCEDURE — 99204 OFFICE O/P NEW MOD 45 MIN: CPT | Performed by: NURSE PRACTITIONER

## 2021-10-19 NOTE — PATIENT INSTRUCTIONS
Please take furosemide (lasix) 20 mg on Wednesday, and Friday.    We will then use Lasix 20 mg as needed for 3-5 lb weight gain.

## 2021-10-19 NOTE — PROGRESS NOTES
"[facility]    Chief Complaint  Congestive Heart Failure (hospital f/u)    Subjective    History of Present Illness {CC  Problem List  Visit  Diagnosis   Encounters  Notes  Medications  Labs  Result Review Imaging  Media :23}     Zoila Nava presents to [facility] CARDIOLOGY for   History of Present Illness     88-year-old female presented to [facility] on 10/10/2021 with hypertensive emergency.  She has a history of hypertension, chronic kidney disease stage III, ascending aortic aneurysm, pulmonary hypertension, heart failure with preserved EF, memory impairment.    Patient presented with worsening dyspnea, chest tightness when lying flat, leg swelling, weight gain.  Patient's blood pressure was 218/137.    Echocardiogram completed 10/11/2021, showing EF 72%, grade 1 diastolic dysfunction, moderate pulmonary hypertension with RVSP 45 to 55 mmHg.    Amlodipine and metoprolol XL was initiated.  Home propranolol was discontinued.  She was noted to have bradycardia during hospital visit.  She was discharged with Lasix 20 mg as needed for edema, dyspnea, weight gain.    No CP or pressure, dizziness, syncope, no falls, dyspnea. Mild edema but not worsening since d/c.  Spouse reports 6 lb weight gain in 1 week.       Objective     Vital Signs:   Vitals:    10/19/21 0930 10/19/21 0932 10/19/21 0933   BP: 110/58 129/65 114/60   BP Location: Right arm Left arm Left arm   Patient Position: Sitting Standing Sitting   Cuff Size: Adult Adult Adult   Pulse: 85 96 88   Resp:   16   Temp:  97 °F (36.1 °C) 97 °F (36.1 °C)   TempSrc:  Temporal Temporal   SpO2: 93% 93% 94%   Weight:   69.5 kg (153 lb 2 oz)   Height:   152.4 cm (60\")     Body mass index is 29.91 kg/m².  Physical Exam  Vitals reviewed.   Constitutional:       General: She is not in acute distress.     Appearance: Normal appearance.   Cardiovascular:      Rate and Rhythm: " Normal rate and regular rhythm.      Pulses:           Radial pulses are 2+ on the right side.        Dorsalis pedis pulses are 2+ on the right side.        Posterior tibial pulses are 2+ on the right side.      Heart sounds: Normal heart sounds.   Pulmonary:      Effort: Pulmonary effort is normal.      Breath sounds: Normal breath sounds.   Abdominal:      Palpations: Abdomen is soft.      Tenderness: There is no abdominal tenderness.   Musculoskeletal:      Right lower leg: Edema present.      Left lower leg: Edema present.   Skin:     General: Skin is warm and dry.   Neurological:      Mental Status: She is alert.   Psychiatric:         Mood and Affect: Mood normal.         Behavior: Behavior is cooperative.              Result Review  Data Reviewed:{ Labs  Result Review  Imaging  Med Tab  Media :23}     Lab Results   Component Value Date    WBC 7.20 10/11/2021    HGB 12.0 10/11/2021    HCT 35.0 10/11/2021    MCV 87.7 10/11/2021     10/11/2021     Lab Results   Component Value Date    GLUCOSE 104 (H) 10/11/2021    CALCIUM 8.9 10/11/2021     10/11/2021    K 3.7 10/11/2021    CO2 25.0 10/11/2021     10/11/2021    BUN 16 10/11/2021    CREATININE 1.25 (H) 10/11/2021    EGFRIFNONA 40 (L) 10/11/2021    BCR 12.8 10/11/2021    ANIONGAP 13.0 10/11/2021     Lab Results   Component Value Date    TSH 5.600 (H) 07/08/2021     Lab Results   Component Value Date    CHLPL 170 04/04/2016     Lab Results   Component Value Date    TRIG 51 04/04/2016     Lab Results   Component Value Date    HDL 72 (H) 04/04/2016     Lab Results   Component Value Date    LDL 92 04/04/2016       Assessment and Plan {CC Problem List  Visit Diagnosis  ROS  Review (Popup)  Health Maintenance  Quality  BestPractice  Medications  SmartSets  SnapShot Encounters  Media :23}   1. Chronic heart failure with preserved ejection fraction (HCC)  Continue metoprolol     Lasix 20 mg PRN for weight gain 3-5 lbs    Pt to take Lasix  on Wednesday and Friday, then use as needed for weight gain.    Heart failure education discussed: What is heart failure, causes, signs and symptoms, medication management, daily weight monitoring, low-sodium diet of less than 2 g per day, daily exercise, role the heart failure center.    2. Primary hypertension  Well-controlled today without any signs and symptoms of hypotension.    Continue metoprolol and amlodipine.    3. Stage 3 chronic kidney disease, unspecified whether stage 3a or 3b CKD (HCC)  Creatinine at discharge 1.25.  Continue to monitor.              Follow Up {Instructions Charge Capture  Follow-up Communications :23}   Return in about 3 months (around 1/19/2022) for HF, Office visit.    Patient was given instructions and counseling regarding her condition or for health maintenance advice. Please see specific information pulled into the AVS if appropriate.  Patient was instructed to call the [facility] with any questions, concerns, or worsening symptoms.    *Please note that portions of this note were completed with a voice recognition program. Efforts were made to edit the dictations, but occasionally words are mistranscribed.

## 2021-10-22 ENCOUNTER — READMISSION MANAGEMENT (OUTPATIENT)
Dept: CALL CENTER | Facility: HOSPITAL | Age: 86
End: 2021-10-22

## 2021-10-22 NOTE — OUTREACH NOTE
CHF Week 2 Survey      Responses   [facility] patient discharged from? [place]   Does the patient have one of the following disease processes/diagnoses(primary or secondary)? CHF   Week 2 attempt successful? Yes   Call start time 1336   Call end time 1351   Discharge diagnosis CHF, htn   Person spoke with today (if not patient) and relationship    Meds reviewed with patient/caregiver? Yes   Is the patient having any side effects they believe may be caused by any medication additions or changes? No   Does the patient have all medications ordered at discharge? Yes   Is the patient taking all medications as directed (includes completed medication regime)? Yes   Comments regarding appointments Cardio appt 11/11/21   Does the patient have a primary care provider?  Yes   Has the patient kept scheduled appointments due by today? Yes   Pulse Ox monitoring None   Psychosocial issues? No   Comments O2 sats in 90's   Did the patient receive a copy of their discharge instructions? Yes   Nursing interventions Reviewed instructions with patient   What is the patient's perception of their health status since discharge? Improving  [Denies dizziness--BP improved to 120's/60s with pulse 60-70s.  Pt with some edema to ankles--gained 4 # in 6 days but MD not concerrned at this time.  to continue to monitor for associated s/s of CHF]   Nursing interventions Nurse provided patient education   Is the patient weighing daily? Yes  [ had been weighing day/night--education provided regarding early morning is most accurate after voiding and in the afternoon many factors would cause fluctuations in weight so not necessary to weigh in afternoon. ]   Does the patient have scales? Yes   Daily weight interventions Education provided on importance of daily weight  [Reviewed weight gain parameters of 3# day/5 # week require MD notification-encouraged to weigh daily and record to take to MD appts for review---v/u]   Is the  patient able to teach back Heart Failure diet management? No   Is the patient able to teach back Heart Failure Zones? No   Is the patient able to teach back signs and symptoms of worsening condition? (i.e. weight gain, shortness of air, etc.) Yes   If the patient is a current smoker, are they able to teach back resources for cessation? Not a smoker   Is the patient/caregiver able to teach back the hierarchy of who to call/visit for symptoms/problems? PCP, Specialist, Home health nurse, Urgent Care, ED, 911 Yes   Additional teach back comments  does an excellent job of monitoring and caring for his week---   CHF Week 2 call completed? Yes          Sherly Leavitt, RN

## 2021-10-23 ENCOUNTER — APPOINTMENT (OUTPATIENT)
Dept: GENERAL RADIOLOGY | Facility: HOSPITAL | Age: 86
End: 2021-10-23

## 2021-10-23 ENCOUNTER — HOSPITAL ENCOUNTER (INPATIENT)
Facility: HOSPITAL | Age: 86
LOS: 1 days | Discharge: HOME | End: 2021-10-25
Attending: STUDENT IN AN ORGANIZED HEALTH CARE EDUCATION/TRAINING PROGRAM | Admitting: INTERNAL MEDICINE

## 2021-10-23 DIAGNOSIS — I50.33 CHF (CONGESTIVE HEART FAILURE), NYHA CLASS I, ACUTE ON CHRONIC, DIASTOLIC (HCC): ICD-10-CM

## 2021-10-23 DIAGNOSIS — D64.9 ANEMIA, UNSPECIFIED TYPE: ICD-10-CM

## 2021-10-23 DIAGNOSIS — R79.89 CREATININE ELEVATION: ICD-10-CM

## 2021-10-23 DIAGNOSIS — I50.22 CHRONIC SYSTOLIC HEART FAILURE (HCC): Primary | ICD-10-CM

## 2021-10-23 DIAGNOSIS — I27.20 PULMONARY HYPERTENSION (HCC): ICD-10-CM

## 2021-10-23 LAB
BASOPHILS # BLD AUTO: 0.08 10*3/MM3 (ref 0–0.2)
BASOPHILS NFR BLD AUTO: 1.2 % (ref 0–1.5)
DEPRECATED RDW RBC AUTO: 50.1 FL (ref 37–54)
EOSINOPHIL # BLD AUTO: 0.43 10*3/MM3 (ref 0–0.4)
EOSINOPHIL NFR BLD AUTO: 6.5 % (ref 0.3–6.2)
ERYTHROCYTE [DISTWIDTH] IN BLOOD BY AUTOMATED COUNT: 14.8 % (ref 12.3–15.4)
HCT VFR BLD AUTO: 33.2 % (ref 34–46.6)
HGB BLD-MCNC: 10.8 G/DL (ref 12–15.9)
IMM GRANULOCYTES # BLD AUTO: 0.05 10*3/MM3 (ref 0–0.05)
IMM GRANULOCYTES NFR BLD AUTO: 0.8 % (ref 0–0.5)
LYMPHOCYTES # BLD AUTO: 1.37 10*3/MM3 (ref 0.7–3.1)
LYMPHOCYTES NFR BLD AUTO: 20.8 % (ref 19.6–45.3)
MCH RBC QN AUTO: 30.1 PG (ref 26.6–33)
MCHC RBC AUTO-ENTMCNC: 32.5 G/DL (ref 31.5–35.7)
MCV RBC AUTO: 92.5 FL (ref 79–97)
MONOCYTES # BLD AUTO: 0.8 10*3/MM3 (ref 0.1–0.9)
MONOCYTES NFR BLD AUTO: 12.2 % (ref 5–12)
NEUTROPHILS NFR BLD AUTO: 3.85 10*3/MM3 (ref 1.7–7)
NEUTROPHILS NFR BLD AUTO: 58.5 % (ref 42.7–76)
NRBC BLD AUTO-RTO: 0 /100 WBC (ref 0–0.2)
PLATELET # BLD AUTO: 204 10*3/MM3 (ref 140–450)
PMV BLD AUTO: 11 FL (ref 6–12)
RBC # BLD AUTO: 3.59 10*6/MM3 (ref 3.77–5.28)
WBC # BLD AUTO: 6.58 10*3/MM3 (ref 3.4–10.8)

## 2021-10-23 PROCEDURE — 71045 X-RAY EXAM CHEST 1 VIEW: CPT

## 2021-10-23 PROCEDURE — 80053 COMPREHEN METABOLIC PANEL: CPT | Performed by: STUDENT IN AN ORGANIZED HEALTH CARE EDUCATION/TRAINING PROGRAM

## 2021-10-23 PROCEDURE — 85025 COMPLETE CBC W/AUTO DIFF WBC: CPT | Performed by: STUDENT IN AN ORGANIZED HEALTH CARE EDUCATION/TRAINING PROGRAM

## 2021-10-23 PROCEDURE — 99284 EMERGENCY DEPT VISIT MOD MDM: CPT

## 2021-10-23 PROCEDURE — 84484 ASSAY OF TROPONIN QUANT: CPT | Performed by: STUDENT IN AN ORGANIZED HEALTH CARE EDUCATION/TRAINING PROGRAM

## 2021-10-23 PROCEDURE — 93005 ELECTROCARDIOGRAM TRACING: CPT | Performed by: STUDENT IN AN ORGANIZED HEALTH CARE EDUCATION/TRAINING PROGRAM

## 2021-10-23 PROCEDURE — 83880 ASSAY OF NATRIURETIC PEPTIDE: CPT | Performed by: STUDENT IN AN ORGANIZED HEALTH CARE EDUCATION/TRAINING PROGRAM

## 2021-10-23 RX ORDER — SODIUM CHLORIDE 0.9 % (FLUSH) 0.9 %
10 SYRINGE (ML) INJECTION AS NEEDED
Status: DISCONTINUED | OUTPATIENT
Start: 2021-10-23 | End: 2021-10-25 | Stop reason: HOSPADM

## 2021-10-24 ENCOUNTER — READMISSION MANAGEMENT (OUTPATIENT)
Dept: CALL CENTER | Facility: HOSPITAL | Age: 86
End: 2021-10-24

## 2021-10-24 PROBLEM — I50.33 CHF (CONGESTIVE HEART FAILURE), NYHA CLASS I, ACUTE ON CHRONIC, DIASTOLIC: Status: ACTIVE | Noted: 2021-10-24

## 2021-10-24 PROBLEM — I50.9 CHF (CONGESTIVE HEART FAILURE) (HCC): Status: ACTIVE | Noted: 2021-10-24

## 2021-10-24 LAB
ALBUMIN SERPL-MCNC: 3.9 G/DL (ref 3.5–5.2)
ALBUMIN SERPL-MCNC: 3.9 G/DL (ref 3.5–5.2)
ALBUMIN/GLOB SERPL: 1.7 G/DL
ALBUMIN/GLOB SERPL: 1.7 G/DL
ALP SERPL-CCNC: 82 U/L (ref 39–117)
ALP SERPL-CCNC: 91 U/L (ref 39–117)
ALT SERPL W P-5'-P-CCNC: 20 U/L (ref 1–33)
ALT SERPL W P-5'-P-CCNC: 21 U/L (ref 1–33)
ANION GAP SERPL CALCULATED.3IONS-SCNC: 10 MMOL/L (ref 5–15)
ANION GAP SERPL CALCULATED.3IONS-SCNC: 12 MMOL/L (ref 5–15)
AST SERPL-CCNC: 23 U/L (ref 1–32)
AST SERPL-CCNC: 27 U/L (ref 1–32)
BASOPHILS # BLD AUTO: 0.06 10*3/MM3 (ref 0–0.2)
BASOPHILS NFR BLD AUTO: 1.1 % (ref 0–1.5)
BILIRUB SERPL-MCNC: 0.2 MG/DL (ref 0–1.2)
BILIRUB SERPL-MCNC: 0.4 MG/DL (ref 0–1.2)
BUN SERPL-MCNC: 26 MG/DL (ref 8–23)
BUN SERPL-MCNC: 30 MG/DL (ref 8–23)
BUN/CREAT SERPL: 19.4 (ref 7–25)
BUN/CREAT SERPL: 19.9 (ref 7–25)
CALCIUM SPEC-SCNC: 8.2 MG/DL (ref 8.6–10.5)
CALCIUM SPEC-SCNC: 8.4 MG/DL (ref 8.6–10.5)
CHLORIDE SERPL-SCNC: 102 MMOL/L (ref 98–107)
CHLORIDE SERPL-SCNC: 103 MMOL/L (ref 98–107)
CO2 SERPL-SCNC: 29 MMOL/L (ref 22–29)
CO2 SERPL-SCNC: 29 MMOL/L (ref 22–29)
CREAT SERPL-MCNC: 1.34 MG/DL (ref 0.57–1)
CREAT SERPL-MCNC: 1.51 MG/DL (ref 0.57–1)
DEPRECATED RDW RBC AUTO: 50.3 FL (ref 37–54)
EOSINOPHIL # BLD AUTO: 0.35 10*3/MM3 (ref 0–0.4)
EOSINOPHIL NFR BLD AUTO: 6.2 % (ref 0.3–6.2)
ERYTHROCYTE [DISTWIDTH] IN BLOOD BY AUTOMATED COUNT: 14.8 % (ref 12.3–15.4)
FERRITIN SERPL-MCNC: 72.72 NG/ML (ref 13–150)
FOLATE SERPL-MCNC: 16.7 NG/ML (ref 4.78–24.2)
GFR SERPL CREATININE-BSD FRML MDRD: 33 ML/MIN/1.73
GFR SERPL CREATININE-BSD FRML MDRD: 37 ML/MIN/1.73
GLOBULIN UR ELPH-MCNC: 2.3 GM/DL
GLOBULIN UR ELPH-MCNC: 2.3 GM/DL
GLUCOSE SERPL-MCNC: 81 MG/DL (ref 65–99)
GLUCOSE SERPL-MCNC: 93 MG/DL (ref 65–99)
HCT VFR BLD AUTO: 34.3 % (ref 34–46.6)
HGB BLD-MCNC: 11 G/DL (ref 12–15.9)
HOLD SPECIMEN: NORMAL
IMM GRANULOCYTES # BLD AUTO: 0.06 10*3/MM3 (ref 0–0.05)
IMM GRANULOCYTES NFR BLD AUTO: 1.1 % (ref 0–0.5)
IRON 24H UR-MRATE: 80 MCG/DL (ref 37–145)
IRON SATN MFR SERPL: 26 % (ref 20–50)
LYMPHOCYTES # BLD AUTO: 0.88 10*3/MM3 (ref 0.7–3.1)
LYMPHOCYTES NFR BLD AUTO: 15.7 % (ref 19.6–45.3)
MCH RBC QN AUTO: 29.6 PG (ref 26.6–33)
MCHC RBC AUTO-ENTMCNC: 32.1 G/DL (ref 31.5–35.7)
MCV RBC AUTO: 92.5 FL (ref 79–97)
MONOCYTES # BLD AUTO: 0.57 10*3/MM3 (ref 0.1–0.9)
MONOCYTES NFR BLD AUTO: 10.2 % (ref 5–12)
NEUTROPHILS NFR BLD AUTO: 3.69 10*3/MM3 (ref 1.7–7)
NEUTROPHILS NFR BLD AUTO: 65.7 % (ref 42.7–76)
NRBC BLD AUTO-RTO: 0 /100 WBC (ref 0–0.2)
NT-PROBNP SERPL-MCNC: 147.9 PG/ML (ref 0–1800)
PLATELET # BLD AUTO: 208 10*3/MM3 (ref 140–450)
PMV BLD AUTO: 11.5 FL (ref 6–12)
POTASSIUM SERPL-SCNC: 3.7 MMOL/L (ref 3.5–5.2)
POTASSIUM SERPL-SCNC: 3.8 MMOL/L (ref 3.5–5.2)
PROT SERPL-MCNC: 6.2 G/DL (ref 6–8.5)
PROT SERPL-MCNC: 6.2 G/DL (ref 6–8.5)
RBC # BLD AUTO: 3.71 10*6/MM3 (ref 3.77–5.28)
SARS-COV-2 RNA RESP QL NAA+PROBE: NOT DETECTED
SODIUM SERPL-SCNC: 141 MMOL/L (ref 136–145)
SODIUM SERPL-SCNC: 144 MMOL/L (ref 136–145)
TIBC SERPL-MCNC: 304 MCG/DL (ref 298–536)
TRANSFERRIN SERPL-MCNC: 204 MG/DL (ref 200–360)
TROPONIN T SERPL-MCNC: 0.03 NG/ML (ref 0–0.03)
TROPONIN T SERPL-MCNC: 0.03 NG/ML (ref 0–0.03)
TROPONIN T SERPL-MCNC: 0.04 NG/ML (ref 0–0.03)
VIT B12 BLD-MCNC: 1008 PG/ML (ref 211–946)
WBC # BLD AUTO: 5.61 10*3/MM3 (ref 3.4–10.8)
WHOLE BLOOD HOLD SPECIMEN: NORMAL
WHOLE BLOOD HOLD SPECIMEN: NORMAL

## 2021-10-24 PROCEDURE — 25010000002 HEPARIN (PORCINE) PER 1000 UNITS: Performed by: INTERNAL MEDICINE

## 2021-10-24 PROCEDURE — 25010000002 FUROSEMIDE PER 20 MG: Performed by: INTERNAL MEDICINE

## 2021-10-24 PROCEDURE — 82607 VITAMIN B-12: CPT | Performed by: INTERNAL MEDICINE

## 2021-10-24 PROCEDURE — 82728 ASSAY OF FERRITIN: CPT | Performed by: INTERNAL MEDICINE

## 2021-10-24 PROCEDURE — U0005 INFEC AGEN DETEC AMPLI PROBE: HCPCS | Performed by: INTERNAL MEDICINE

## 2021-10-24 PROCEDURE — 84484 ASSAY OF TROPONIN QUANT: CPT | Performed by: STUDENT IN AN ORGANIZED HEALTH CARE EDUCATION/TRAINING PROGRAM

## 2021-10-24 PROCEDURE — 99223 1ST HOSP IP/OBS HIGH 75: CPT | Performed by: INTERNAL MEDICINE

## 2021-10-24 PROCEDURE — 82746 ASSAY OF FOLIC ACID SERUM: CPT | Performed by: INTERNAL MEDICINE

## 2021-10-24 PROCEDURE — 84466 ASSAY OF TRANSFERRIN: CPT | Performed by: INTERNAL MEDICINE

## 2021-10-24 PROCEDURE — 85025 COMPLETE CBC W/AUTO DIFF WBC: CPT | Performed by: INTERNAL MEDICINE

## 2021-10-24 PROCEDURE — U0003 INFECTIOUS AGENT DETECTION BY NUCLEIC ACID (DNA OR RNA); SEVERE ACUTE RESPIRATORY SYNDROME CORONAVIRUS 2 (SARS-COV-2) (CORONAVIRUS DISEASE [COVID-19]), AMPLIFIED PROBE TECHNIQUE, MAKING USE OF HIGH THROUGHPUT TECHNOLOGIES AS DESCRIBED BY CMS-2020-01-R: HCPCS | Performed by: INTERNAL MEDICINE

## 2021-10-24 PROCEDURE — 80053 COMPREHEN METABOLIC PANEL: CPT | Performed by: INTERNAL MEDICINE

## 2021-10-24 PROCEDURE — 83540 ASSAY OF IRON: CPT | Performed by: INTERNAL MEDICINE

## 2021-10-24 PROCEDURE — 97161 PT EVAL LOW COMPLEX 20 MIN: CPT

## 2021-10-24 PROCEDURE — 84484 ASSAY OF TROPONIN QUANT: CPT | Performed by: INTERNAL MEDICINE

## 2021-10-24 RX ORDER — HYDROCODONE BITARTRATE AND ACETAMINOPHEN 7.5; 325 MG/1; MG/1
1 TABLET ORAL EVERY 8 HOURS
Status: DISCONTINUED | OUTPATIENT
Start: 2021-10-24 | End: 2021-10-25 | Stop reason: HOSPADM

## 2021-10-24 RX ORDER — AMOXICILLIN 250 MG
2 CAPSULE ORAL 2 TIMES DAILY
Status: DISCONTINUED | OUTPATIENT
Start: 2021-10-24 | End: 2021-10-25 | Stop reason: HOSPADM

## 2021-10-24 RX ORDER — ONDANSETRON 4 MG/1
4 TABLET, FILM COATED ORAL EVERY 6 HOURS PRN
Status: DISCONTINUED | OUTPATIENT
Start: 2021-10-24 | End: 2021-10-25 | Stop reason: HOSPADM

## 2021-10-24 RX ORDER — POLYETHYLENE GLYCOL 3350 17 G/17G
17 POWDER, FOR SOLUTION ORAL DAILY PRN
Status: DISCONTINUED | OUTPATIENT
Start: 2021-10-24 | End: 2021-10-25 | Stop reason: HOSPADM

## 2021-10-24 RX ORDER — FAMOTIDINE 20 MG/1
20 TABLET, FILM COATED ORAL 2 TIMES DAILY
Status: DISCONTINUED | OUTPATIENT
Start: 2021-10-24 | End: 2021-10-24

## 2021-10-24 RX ORDER — SODIUM CHLORIDE 0.9 % (FLUSH) 0.9 %
10 SYRINGE (ML) INJECTION EVERY 12 HOURS SCHEDULED
Status: DISCONTINUED | OUTPATIENT
Start: 2021-10-24 | End: 2021-10-25 | Stop reason: HOSPADM

## 2021-10-24 RX ORDER — ACETAMINOPHEN 325 MG/1
650 TABLET ORAL EVERY 4 HOURS PRN
Status: DISCONTINUED | OUTPATIENT
Start: 2021-10-24 | End: 2021-10-25 | Stop reason: HOSPADM

## 2021-10-24 RX ORDER — LEVOTHYROXINE SODIUM 112 UG/1
112 TABLET ORAL EVERY MORNING
Status: DISCONTINUED | OUTPATIENT
Start: 2021-10-24 | End: 2021-10-25 | Stop reason: HOSPADM

## 2021-10-24 RX ORDER — FUROSEMIDE 10 MG/ML
20 INJECTION INTRAMUSCULAR; INTRAVENOUS DAILY
Status: DISCONTINUED | OUTPATIENT
Start: 2021-10-24 | End: 2021-10-24

## 2021-10-24 RX ORDER — ASPIRIN 81 MG/1
81 TABLET ORAL DAILY
Status: DISCONTINUED | OUTPATIENT
Start: 2021-10-24 | End: 2021-10-25 | Stop reason: HOSPADM

## 2021-10-24 RX ORDER — BISACODYL 10 MG
10 SUPPOSITORY, RECTAL RECTAL DAILY PRN
Status: DISCONTINUED | OUTPATIENT
Start: 2021-10-24 | End: 2021-10-25 | Stop reason: HOSPADM

## 2021-10-24 RX ORDER — BISACODYL 5 MG/1
5 TABLET, DELAYED RELEASE ORAL DAILY PRN
Status: DISCONTINUED | OUTPATIENT
Start: 2021-10-24 | End: 2021-10-25 | Stop reason: HOSPADM

## 2021-10-24 RX ORDER — PREGABALIN 75 MG/1
150 CAPSULE ORAL DAILY
Status: DISCONTINUED | OUTPATIENT
Start: 2021-10-24 | End: 2021-10-25 | Stop reason: HOSPADM

## 2021-10-24 RX ORDER — SODIUM CHLORIDE 0.9 % (FLUSH) 0.9 %
1-10 SYRINGE (ML) INJECTION AS NEEDED
Status: DISCONTINUED | OUTPATIENT
Start: 2021-10-24 | End: 2021-10-25 | Stop reason: HOSPADM

## 2021-10-24 RX ORDER — METOPROLOL SUCCINATE 25 MG/1
12.5 TABLET, EXTENDED RELEASE ORAL
Status: DISCONTINUED | OUTPATIENT
Start: 2021-10-24 | End: 2021-10-25 | Stop reason: HOSPADM

## 2021-10-24 RX ORDER — ACETAMINOPHEN 160 MG/5ML
650 SOLUTION ORAL EVERY 4 HOURS PRN
Status: DISCONTINUED | OUTPATIENT
Start: 2021-10-24 | End: 2021-10-25 | Stop reason: HOSPADM

## 2021-10-24 RX ORDER — ACETAMINOPHEN 650 MG/1
650 SUPPOSITORY RECTAL EVERY 4 HOURS PRN
Status: DISCONTINUED | OUTPATIENT
Start: 2021-10-24 | End: 2021-10-25 | Stop reason: HOSPADM

## 2021-10-24 RX ORDER — HEPARIN SODIUM 5000 [USP'U]/ML
5000 INJECTION, SOLUTION INTRAVENOUS; SUBCUTANEOUS EVERY 12 HOURS SCHEDULED
Status: DISCONTINUED | OUTPATIENT
Start: 2021-10-24 | End: 2021-10-25 | Stop reason: HOSPADM

## 2021-10-24 RX ORDER — ONDANSETRON 2 MG/ML
4 INJECTION INTRAMUSCULAR; INTRAVENOUS EVERY 6 HOURS PRN
Status: DISCONTINUED | OUTPATIENT
Start: 2021-10-24 | End: 2021-10-25 | Stop reason: HOSPADM

## 2021-10-24 RX ORDER — FAMOTIDINE 20 MG/1
20 TABLET, FILM COATED ORAL DAILY
Status: DISCONTINUED | OUTPATIENT
Start: 2021-10-24 | End: 2021-10-25 | Stop reason: HOSPADM

## 2021-10-24 RX ORDER — ASPIRIN 81 MG/1
324 TABLET, CHEWABLE ORAL ONCE
Status: COMPLETED | OUTPATIENT
Start: 2021-10-24 | End: 2021-10-24

## 2021-10-24 RX ORDER — FUROSEMIDE 10 MG/ML
40 INJECTION INTRAMUSCULAR; INTRAVENOUS EVERY 4 HOURS
Status: COMPLETED | OUTPATIENT
Start: 2021-10-24 | End: 2021-10-24

## 2021-10-24 RX ORDER — FAMOTIDINE 20 MG/1
40 TABLET, FILM COATED ORAL DAILY
Status: CANCELLED | OUTPATIENT
Start: 2021-10-24

## 2021-10-24 RX ADMIN — METOPROLOL SUCCINATE 12.5 MG: 25 TABLET, EXTENDED RELEASE ORAL at 08:07

## 2021-10-24 RX ADMIN — HYDROCODONE BITARTRATE AND ACETAMINOPHEN 1 TABLET: 7.5; 325 TABLET ORAL at 08:07

## 2021-10-24 RX ADMIN — FUROSEMIDE 40 MG: 10 INJECTION, SOLUTION INTRAMUSCULAR; INTRAVENOUS at 17:58

## 2021-10-24 RX ADMIN — PREGABALIN 150 MG: 75 CAPSULE ORAL at 08:07

## 2021-10-24 RX ADMIN — HYDROCODONE BITARTRATE AND ACETAMINOPHEN 1 TABLET: 7.5; 325 TABLET ORAL at 16:21

## 2021-10-24 RX ADMIN — FUROSEMIDE 20 MG: 10 INJECTION INTRAMUSCULAR; INTRAVENOUS at 08:07

## 2021-10-24 RX ADMIN — FUROSEMIDE 40 MG: 10 INJECTION, SOLUTION INTRAMUSCULAR; INTRAVENOUS at 14:05

## 2021-10-24 RX ADMIN — ASPIRIN 324 MG: 81 TABLET, CHEWABLE ORAL at 00:54

## 2021-10-24 RX ADMIN — LEVOTHYROXINE SODIUM 112 MCG: 112 TABLET ORAL at 06:41

## 2021-10-24 RX ADMIN — SODIUM CHLORIDE, PRESERVATIVE FREE 10 ML: 5 INJECTION INTRAVENOUS at 08:10

## 2021-10-24 RX ADMIN — DOCUSATE SODIUM 50MG AND SENNOSIDES 8.6MG 2 TABLET: 8.6; 5 TABLET, FILM COATED ORAL at 20:09

## 2021-10-24 RX ADMIN — HEPARIN SODIUM 5000 UNITS: 5000 INJECTION, SOLUTION INTRAVENOUS; SUBCUTANEOUS at 20:09

## 2021-10-24 RX ADMIN — HEPARIN SODIUM 5000 UNITS: 5000 INJECTION, SOLUTION INTRAVENOUS; SUBCUTANEOUS at 08:07

## 2021-10-24 RX ADMIN — SODIUM CHLORIDE, PRESERVATIVE FREE 10 ML: 5 INJECTION INTRAVENOUS at 20:10

## 2021-10-24 RX ADMIN — ASPIRIN 81 MG: 81 TABLET, COATED ORAL at 08:07

## 2021-10-24 RX ADMIN — FAMOTIDINE 20 MG: 20 TABLET, FILM COATED ORAL at 08:07

## 2021-10-24 RX ADMIN — DOCUSATE SODIUM 50MG AND SENNOSIDES 8.6MG 2 TABLET: 8.6; 5 TABLET, FILM COATED ORAL at 08:07

## 2021-10-24 NOTE — OUTREACH NOTE
CHF Week 3 Survey      Responses   [facility] patient discharged from? [place]   Does the patient have one of the following disease processes/diagnoses(primary or secondary)? CHF   Week 3 attempt successful? No   Revoke Readmitted          Ruthie Fountain RN

## 2021-10-25 ENCOUNTER — READMISSION MANAGEMENT (OUTPATIENT)
Dept: CALL CENTER | Facility: HOSPITAL | Age: 86
End: 2021-10-25

## 2021-10-25 VITALS
RESPIRATION RATE: 18 BRPM | BODY MASS INDEX: 29.96 KG/M2 | SYSTOLIC BLOOD PRESSURE: 122 MMHG | DIASTOLIC BLOOD PRESSURE: 70 MMHG | HEART RATE: 70 BPM | TEMPERATURE: 98.2 F | WEIGHT: 152.6 LBS | OXYGEN SATURATION: 94 % | HEIGHT: 60 IN

## 2021-10-25 LAB
ANION GAP SERPL CALCULATED.3IONS-SCNC: 10 MMOL/L (ref 5–15)
BASOPHILS # BLD AUTO: 0.07 10*3/MM3 (ref 0–0.2)
BASOPHILS NFR BLD AUTO: 1.3 % (ref 0–1.5)
BUN SERPL-MCNC: 28 MG/DL (ref 8–23)
BUN/CREAT SERPL: 17.7 (ref 7–25)
CALCIUM SPEC-SCNC: 8.1 MG/DL (ref 8.6–10.5)
CHLORIDE SERPL-SCNC: 99 MMOL/L (ref 98–107)
CO2 SERPL-SCNC: 32 MMOL/L (ref 22–29)
CREAT SERPL-MCNC: 1.58 MG/DL (ref 0.57–1)
DEPRECATED RDW RBC AUTO: 48.6 FL (ref 37–54)
EOSINOPHIL # BLD AUTO: 0.38 10*3/MM3 (ref 0–0.4)
EOSINOPHIL NFR BLD AUTO: 7.2 % (ref 0.3–6.2)
ERYTHROCYTE [DISTWIDTH] IN BLOOD BY AUTOMATED COUNT: 14.5 % (ref 12.3–15.4)
GFR SERPL CREATININE-BSD FRML MDRD: 31 ML/MIN/1.73
GLUCOSE SERPL-MCNC: 105 MG/DL (ref 65–99)
HCT VFR BLD AUTO: 32.5 % (ref 34–46.6)
HGB BLD-MCNC: 10.6 G/DL (ref 12–15.9)
IMM GRANULOCYTES # BLD AUTO: 0.12 10*3/MM3 (ref 0–0.05)
IMM GRANULOCYTES NFR BLD AUTO: 2.3 % (ref 0–0.5)
LYMPHOCYTES # BLD AUTO: 1.16 10*3/MM3 (ref 0.7–3.1)
LYMPHOCYTES NFR BLD AUTO: 22.1 % (ref 19.6–45.3)
MAGNESIUM SERPL-MCNC: 1.6 MG/DL (ref 1.6–2.4)
MCH RBC QN AUTO: 29.8 PG (ref 26.6–33)
MCHC RBC AUTO-ENTMCNC: 32.6 G/DL (ref 31.5–35.7)
MCV RBC AUTO: 91.3 FL (ref 79–97)
MONOCYTES # BLD AUTO: 0.82 10*3/MM3 (ref 0.1–0.9)
MONOCYTES NFR BLD AUTO: 15.6 % (ref 5–12)
NEUTROPHILS NFR BLD AUTO: 2.7 10*3/MM3 (ref 1.7–7)
NEUTROPHILS NFR BLD AUTO: 51.5 % (ref 42.7–76)
NRBC BLD AUTO-RTO: 0 /100 WBC (ref 0–0.2)
PHOSPHATE SERPL-MCNC: 4.3 MG/DL (ref 2.5–4.5)
PLATELET # BLD AUTO: 181 10*3/MM3 (ref 140–450)
PMV BLD AUTO: 12.1 FL (ref 6–12)
POTASSIUM SERPL-SCNC: 3.1 MMOL/L (ref 3.5–5.2)
QT INTERVAL: 394 MS
QTC INTERVAL: 403 MS
RBC # BLD AUTO: 3.56 10*6/MM3 (ref 3.77–5.28)
SODIUM SERPL-SCNC: 141 MMOL/L (ref 136–145)
TSH SERPL DL<=0.05 MIU/L-ACNC: 0.95 UIU/ML (ref 0.27–4.2)
WBC # BLD AUTO: 5.25 10*3/MM3 (ref 3.4–10.8)

## 2021-10-25 PROCEDURE — 25010000002 HEPARIN (PORCINE) PER 1000 UNITS: Performed by: INTERNAL MEDICINE

## 2021-10-25 PROCEDURE — 83735 ASSAY OF MAGNESIUM: CPT | Performed by: INTERNAL MEDICINE

## 2021-10-25 PROCEDURE — 85025 COMPLETE CBC W/AUTO DIFF WBC: CPT | Performed by: INTERNAL MEDICINE

## 2021-10-25 PROCEDURE — 25010000002 MAGNESIUM SULFATE 2 GM/50ML SOLUTION: Performed by: INTERNAL MEDICINE

## 2021-10-25 PROCEDURE — 97165 OT EVAL LOW COMPLEX 30 MIN: CPT

## 2021-10-25 PROCEDURE — 84443 ASSAY THYROID STIM HORMONE: CPT | Performed by: INTERNAL MEDICINE

## 2021-10-25 PROCEDURE — 84100 ASSAY OF PHOSPHORUS: CPT | Performed by: INTERNAL MEDICINE

## 2021-10-25 PROCEDURE — 99239 HOSP IP/OBS DSCHRG MGMT >30: CPT | Performed by: INTERNAL MEDICINE

## 2021-10-25 PROCEDURE — 80048 BASIC METABOLIC PNL TOTAL CA: CPT | Performed by: INTERNAL MEDICINE

## 2021-10-25 RX ORDER — POTASSIUM CHLORIDE 1.5 G/1.77G
40 POWDER, FOR SOLUTION ORAL AS NEEDED
Status: DISCONTINUED | OUTPATIENT
Start: 2021-10-25 | End: 2021-10-25 | Stop reason: HOSPADM

## 2021-10-25 RX ORDER — FUROSEMIDE 20 MG/1
20 TABLET ORAL DAILY
Qty: 30 TABLET | Refills: 2 | Status: SHIPPED | OUTPATIENT
Start: 2021-10-25 | End: 2021-11-05 | Stop reason: SDUPTHER

## 2021-10-25 RX ORDER — POTASSIUM CHLORIDE 7.45 MG/ML
10 INJECTION INTRAVENOUS
Status: DISCONTINUED | OUTPATIENT
Start: 2021-10-25 | End: 2021-10-25 | Stop reason: HOSPADM

## 2021-10-25 RX ORDER — MAGNESIUM SULFATE HEPTAHYDRATE 40 MG/ML
2 INJECTION, SOLUTION INTRAVENOUS AS NEEDED
Status: DISCONTINUED | OUTPATIENT
Start: 2021-10-25 | End: 2021-10-25 | Stop reason: HOSPADM

## 2021-10-25 RX ORDER — POTASSIUM CHLORIDE 750 MG/1
40 CAPSULE, EXTENDED RELEASE ORAL AS NEEDED
Status: DISCONTINUED | OUTPATIENT
Start: 2021-10-25 | End: 2021-10-25 | Stop reason: HOSPADM

## 2021-10-25 RX ORDER — MAGNESIUM SULFATE HEPTAHYDRATE 40 MG/ML
2 INJECTION, SOLUTION INTRAVENOUS AS NEEDED
Status: DISCONTINUED | OUTPATIENT
Start: 2021-10-25 | End: 2021-10-25 | Stop reason: DRUGHIGH

## 2021-10-25 RX ORDER — MAGNESIUM SULFATE 1 G/100ML
1 INJECTION INTRAVENOUS AS NEEDED
Status: DISCONTINUED | OUTPATIENT
Start: 2021-10-25 | End: 2021-10-25 | Stop reason: HOSPADM

## 2021-10-25 RX ORDER — MAGNESIUM SULFATE HEPTAHYDRATE 40 MG/ML
4 INJECTION, SOLUTION INTRAVENOUS AS NEEDED
Status: DISCONTINUED | OUTPATIENT
Start: 2021-10-25 | End: 2021-10-25 | Stop reason: HOSPADM

## 2021-10-25 RX ORDER — MAGNESIUM SULFATE HEPTAHYDRATE 40 MG/ML
4 INJECTION, SOLUTION INTRAVENOUS AS NEEDED
Status: DISCONTINUED | OUTPATIENT
Start: 2021-10-25 | End: 2021-10-25 | Stop reason: DRUGHIGH

## 2021-10-25 RX ADMIN — METOPROLOL SUCCINATE 12.5 MG: 25 TABLET, EXTENDED RELEASE ORAL at 08:52

## 2021-10-25 RX ADMIN — HYDROCODONE BITARTRATE AND ACETAMINOPHEN 1 TABLET: 7.5; 325 TABLET ORAL at 08:52

## 2021-10-25 RX ADMIN — DOCUSATE SODIUM 50MG AND SENNOSIDES 8.6MG 2 TABLET: 8.6; 5 TABLET, FILM COATED ORAL at 08:51

## 2021-10-25 RX ADMIN — FAMOTIDINE 20 MG: 20 TABLET, FILM COATED ORAL at 08:52

## 2021-10-25 RX ADMIN — PREGABALIN 150 MG: 75 CAPSULE ORAL at 08:52

## 2021-10-25 RX ADMIN — SODIUM CHLORIDE, PRESERVATIVE FREE 10 ML: 5 INJECTION INTRAVENOUS at 08:52

## 2021-10-25 RX ADMIN — MAGNESIUM SULFATE HEPTAHYDRATE 2 G: 2 INJECTION, SOLUTION INTRAVENOUS at 11:09

## 2021-10-25 RX ADMIN — LEVOTHYROXINE SODIUM 112 MCG: 112 TABLET ORAL at 06:09

## 2021-10-25 RX ADMIN — POTASSIUM CHLORIDE 40 MEQ: 750 CAPSULE, EXTENDED RELEASE ORAL at 11:03

## 2021-10-25 RX ADMIN — ASPIRIN 81 MG: 81 TABLET, COATED ORAL at 08:52

## 2021-10-25 RX ADMIN — HEPARIN SODIUM 5000 UNITS: 5000 INJECTION, SOLUTION INTRAVENOUS; SUBCUTANEOUS at 08:52

## 2021-10-25 RX ADMIN — POTASSIUM CHLORIDE 40 MEQ: 750 CAPSULE, EXTENDED RELEASE ORAL at 14:30

## 2021-10-25 RX ADMIN — HYDROCODONE BITARTRATE AND ACETAMINOPHEN 1 TABLET: 7.5; 325 TABLET ORAL at 00:03

## 2021-10-25 NOTE — OUTREACH NOTE
Prep Survey      Responses   [facility] [place]   Is LACE score < 7 ? No   Emergency Room discharge w/ pulse ox? No   Eligibility [facility]   Date of Admission 10/23/21   Date of Discharge 10/25/21   Discharge Disposition Home or Self Care   Discharge diagnosis CHF   Does the patient have one of the following disease processes/diagnoses(primary or secondary)? CHF   Does the patient have Home health ordered? No   Is there a DME ordered? Yes   What DME was ordered? [employer] for home O2   Prep survey completed? Yes          Theresa Beltran RN

## 2021-10-26 ENCOUNTER — TRANSITIONAL CARE MANAGEMENT TELEPHONE ENCOUNTER (OUTPATIENT)
Dept: CALL CENTER | Facility: HOSPITAL | Age: 86
End: 2021-10-26

## 2021-10-26 NOTE — OUTREACH NOTE
Call Center TCM Note      Responses   [facility] patient discharged from? [place]   Does the patient have one of the following disease processes/diagnoses(primary or secondary)? CHF   TCM attempt successful? Yes   Call start time 1502   Call end time 1508   Discharge diagnosis CHF   Is patient permission given to speak with other caregiver? Yes   List who call center can speak with Spouse Matt    Person spoke with today (if not patient) and relationship Spouse Matt    Meds reviewed with patient/caregiver? Yes   Does the patient have a primary care provider?  Yes   Does the patient have an appointment with their PCP within 7 days of discharge? Greater than 7 days   Comments regarding PCP Hosp dc fu apt on 11/2/21 with PCP    What is preventing the patient from scheduling follow up appointments within 7 days of discharge? Earlier appointment not available   Nursing Interventions Verified appointment date/time/provider   What DME was ordered? [employer] for home O2   Has all DME been delivered? No  [ called and will deliver 10/27/21]   Pulse Ox monitoring Intermittent   Pulse Ox device source Patient   O2 Sat comments 92-96% RA    O2 Sat: education provided Sat levels,  Monitoring frequency,  When to seek care   Psychosocial issues? No   Did the patient receive a copy of their discharge instructions? Yes   Nursing interventions Reviewed instructions with patient   What is the patient's perception of their health status since discharge? Improving  [ reports a little brain fog at times ]   Is the patient weighing daily? Yes   Does the patient have scales? Yes   Is the patient able to teach back signs and symptoms of worsening condition? (i.e. weight gain, shortness of air, etc.) Yes   If the patient is a current smoker, are they able to teach back resources for cessation? Not a smoker   Is the patient/caregiver able to teach back the hierarchy of who to call/visit for symptoms/problems? PCP,  Specialist, Home health nurse, Urgent Care, ED, 911 Yes   TCM call completed? Yes           Iman Golden RN    10/26/2021, 15:08 EDT

## 2021-10-28 ENCOUNTER — OFFICE VISIT (OUTPATIENT)
Dept: CARDIOLOGY | Facility: HOSPITAL | Age: 86
End: 2021-10-28

## 2021-10-28 VITALS
RESPIRATION RATE: 20 BRPM | TEMPERATURE: 98.1 F | DIASTOLIC BLOOD PRESSURE: 50 MMHG | SYSTOLIC BLOOD PRESSURE: 95 MMHG | BODY MASS INDEX: 30.15 KG/M2 | OXYGEN SATURATION: 92 % | WEIGHT: 153.56 LBS | HEART RATE: 90 BPM | HEIGHT: 60 IN

## 2021-10-28 DIAGNOSIS — N18.30 STAGE 3 CHRONIC KIDNEY DISEASE, UNSPECIFIED WHETHER STAGE 3A OR 3B CKD (HCC): ICD-10-CM

## 2021-10-28 DIAGNOSIS — I10 ESSENTIAL HYPERTENSION: ICD-10-CM

## 2021-10-28 DIAGNOSIS — I50.32 CHRONIC HEART FAILURE WITH PRESERVED EJECTION FRACTION (HCC): Primary | ICD-10-CM

## 2021-10-28 LAB
ANION GAP SERPL CALCULATED.3IONS-SCNC: 7.8 MMOL/L (ref 5–15)
BUN SERPL-MCNC: 27 MG/DL (ref 8–23)
BUN/CREAT SERPL: 17.2 (ref 7–25)
CALCIUM SPEC-SCNC: 8.7 MG/DL (ref 8.6–10.5)
CHLORIDE SERPL-SCNC: 103 MMOL/L (ref 98–107)
CO2 SERPL-SCNC: 27.2 MMOL/L (ref 22–29)
CREAT SERPL-MCNC: 1.57 MG/DL (ref 0.57–1)
GFR SERPL CREATININE-BSD FRML MDRD: 31 ML/MIN/1.73
GLUCOSE SERPL-MCNC: 116 MG/DL (ref 65–99)
NT-PROBNP SERPL-MCNC: 118 PG/ML (ref 0–1800)
POTASSIUM SERPL-SCNC: 3.9 MMOL/L (ref 3.5–5.2)
SODIUM SERPL-SCNC: 138 MMOL/L (ref 136–145)

## 2021-10-28 PROCEDURE — 83880 ASSAY OF NATRIURETIC PEPTIDE: CPT | Performed by: NURSE PRACTITIONER

## 2021-10-28 PROCEDURE — 99214 OFFICE O/P EST MOD 30 MIN: CPT | Performed by: NURSE PRACTITIONER

## 2021-10-28 PROCEDURE — 80048 BASIC METABOLIC PNL TOTAL CA: CPT | Performed by: NURSE PRACTITIONER

## 2021-10-28 RX ORDER — AMLODIPINE BESYLATE 5 MG/1
2.5 TABLET ORAL EVERY MORNING
Qty: 90 TABLET | Refills: 3 | Status: SHIPPED | OUTPATIENT
Start: 2021-10-28 | End: 2021-11-11

## 2021-10-28 NOTE — PATIENT INSTRUCTIONS
Decrease amlodipine 5 mg, 1/2 tab daily    Take extra lasix 20 mg today.      We will follow up with lab results and continued plan.

## 2021-10-28 NOTE — PROGRESS NOTES
"[facility], [facility] Heart and Vascular    Chief Complaint  Congestive Heart Failure and Edema    Subjective    History of Present Illness {CC  Problem List  Visit  Diagnosis   Encounters  Notes  Medications  Labs  Result Review Imaging  Media :23}     Zoila Nava presents to [facility] CARDIOLOGY for   History of Present Illness     88-year-old female with history of hypertension, chronic kidney disease stage III, ascending aortic aneurysm, pulmonary hypertension, heart failure with preserved EF, memory impairment. Echocardiogram completed 10/11/2021, showing EF 72%, grade 1 diastolic dysfunction, moderate pulmonary hypertension with RVSP 45 to 55 mmHg.     Presented to [facility] on 10/23/2021 with acute on chronic heart failure.  Previously admitted to [facility] on 10/10/2021 with hypertension and heart failure.  Patient was treated with IV Lasix.  Creatinine increased to 1.6.  Baseline unclear.  Discharged home on Lasix 20 mg daily.    Pts BP has decreased over the last few days (90's).  NO dizziness, syncope.  Pt noted weight gain of 5 lbs in 3 days, worsening edema.  NO CP, pressure, near syncope, syncope.        Objective     Vital Signs:   Vitals:    10/28/21 1101   BP: 95/50   BP Location: Left arm   Patient Position: Sitting   Cuff Size: Adult   Pulse: 90   Resp: 20   Temp: 98.1 °F (36.7 °C)   TempSrc: Temporal   SpO2: 92%   Weight: 69.7 kg (153 lb 9 oz)   Height: 152.4 cm (60\")     Body mass index is 29.99 kg/m².  Physical Exam  Constitutional:       General: She is not in acute distress.     Appearance: Normal appearance.   Cardiovascular:      Rate and Rhythm: Normal rate and regular rhythm.      Pulses:           Radial pulses are 2+ on the right side.        Dorsalis pedis pulses are 2+ on the right side.        Posterior tibial pulses are 2+ on the right side.      Heart sounds: Normal heart sounds.   Pulmonary:      Effort: " Pulmonary effort is normal.      Breath sounds: Normal breath sounds.   Abdominal:      Palpations: Abdomen is soft.      Tenderness: There is no abdominal tenderness.   Musculoskeletal:      Right lower leg: Edema (bilateral edema 1+) present.      Left lower leg: Edema present.   Skin:     General: Skin is warm and dry.   Neurological:      Mental Status: She is alert.   Psychiatric:         Mood and Affect: Mood normal.         Behavior: Behavior is cooperative.              Result Review  Data Reviewed:{ Labs  Result Review  Imaging  Med Tab  Media :23}     Lab Results   Component Value Date    WBC 5.25 10/25/2021    HGB 10.6 (L) 10/25/2021    HCT 32.5 (L) 10/25/2021    MCV 91.3 10/25/2021     10/25/2021     Lab Results   Component Value Date    GLUCOSE 105 (H) 10/25/2021    CALCIUM 8.1 (L) 10/25/2021     10/25/2021    K 3.1 (L) 10/25/2021    CO2 32.0 (H) 10/25/2021    CL 99 10/25/2021    BUN 28 (H) 10/25/2021    CREATININE 1.58 (H) 10/25/2021    EGFRIFNONA 31 (L) 10/25/2021    BCR 17.7 10/25/2021    ANIONGAP 10.0 10/25/2021     Lab Results   Component Value Date    TSH 0.947 10/25/2021       Assessment and Plan {CC Problem List  Visit Diagnosis  ROS  Review (Popup)  Health Maintenance  Quality  BestPractice  Medications  SmartSets  SnapShot Encounters  Media :23}   1. Chronic heart failure with preserved ejection fraction (HCC)  Worsening edema, 5 pound weight gain.    Take extra dose of Lasix today    Labs to be completed today.  Follow-up with continue plan of care.  - Basic Metabolic Panel  - proBNP    2. Stage 3 chronic kidney disease, unspecified whether stage 3a or 3b CKD (HCC)    - Basic Metabolic Panel    3. Essential hypertension  With low blood pressures in the 90s.  Decrease amlodipine to 2.5 mg daily  - amLODIPine (NORVASC) 5 MG tablet; Take 0.5 tablets by mouth Every Morning.  Dispense: 90 tablet; Refill: 3    Follow-up with cardiology in 2 weeks as scheduled.   Follow-up in the heart valve center in 6 weeks or sooner if needed.  We will follow-up with lab results and plan of care.    Reviewed signs and symptoms of heart failure worsening, continue home blood pressure and weight loss.  Call with 3 to 5 pound weight gain, change in condition, any concerns.        Follow Up {Instructions Charge Capture  Follow-up Communications :23}   Return in about 6 weeks (around 12/9/2021).    Patient was given instructions and counseling regarding her condition or for health maintenance advice. Please see specific information pulled into the AVS if appropriate.  Patient was instructed to call the Heart and Valve Center with any questions, concerns, or worsening symptoms.    *Please note that portions of this note were completed with a voice recognition program. Efforts were made to edit the dictations, but occasionally words are mistranscribed.

## 2021-10-29 ENCOUNTER — TELEPHONE (OUTPATIENT)
Dept: CARDIOLOGY | Facility: HOSPITAL | Age: 86
End: 2021-10-29

## 2021-10-29 NOTE — TELEPHONE ENCOUNTER
----- Message from Gaviota Johnson sent at 10/29/2021  9:13 AM EDT -----  Regarding: breathing  Patient  called mention its urgent said his wife cant breathe her oxygen is 94 wants to know what she should do thanks!!

## 2021-10-29 NOTE — TELEPHONE ENCOUNTER
Spoke with spouse.  Pt has dyspnea after lying down last night.  Improved with getting out of bed.  Resolved.  No further dyspnea.  She as lost 1 lb today.      BNP normal.  Creatinine stable.     States 94% on RA, /61, HR 86.     No worsening edema.     Pt will take Lasix 40 mg tomorrow then 20 mg daily.    F/u as scheduled.

## 2021-11-02 ENCOUNTER — LAB (OUTPATIENT)
Dept: LAB | Facility: HOSPITAL | Age: 86
End: 2021-11-02

## 2021-11-02 ENCOUNTER — OFFICE VISIT (OUTPATIENT)
Dept: INTERNAL MEDICINE | Facility: CLINIC | Age: 86
End: 2021-11-02

## 2021-11-02 VITALS
RESPIRATION RATE: 18 BRPM | WEIGHT: 158 LBS | OXYGEN SATURATION: 92 % | HEART RATE: 67 BPM | BODY MASS INDEX: 31.02 KG/M2 | TEMPERATURE: 97.1 F | SYSTOLIC BLOOD PRESSURE: 102 MMHG | HEIGHT: 60 IN | DIASTOLIC BLOOD PRESSURE: 68 MMHG

## 2021-11-02 DIAGNOSIS — I50.33 ACUTE ON CHRONIC DIASTOLIC CHF (CONGESTIVE HEART FAILURE) (HCC): ICD-10-CM

## 2021-11-02 DIAGNOSIS — R13.10 DYSPHAGIA, UNSPECIFIED TYPE: ICD-10-CM

## 2021-11-02 DIAGNOSIS — I50.33 ACUTE ON CHRONIC DIASTOLIC CHF (CONGESTIVE HEART FAILURE) (HCC): Primary | ICD-10-CM

## 2021-11-02 LAB
ALBUMIN SERPL-MCNC: 3.8 G/DL (ref 3.5–5.2)
ALBUMIN/GLOB SERPL: 1.7 G/DL
ALP SERPL-CCNC: 88 U/L (ref 39–117)
ALT SERPL W P-5'-P-CCNC: 27 U/L (ref 1–33)
ANION GAP SERPL CALCULATED.3IONS-SCNC: 7.1 MMOL/L (ref 5–15)
AST SERPL-CCNC: 25 U/L (ref 1–32)
BASOPHILS # BLD AUTO: 0.1 10*3/MM3 (ref 0–0.2)
BASOPHILS NFR BLD AUTO: 1.6 % (ref 0–1.5)
BILIRUB SERPL-MCNC: 0.2 MG/DL (ref 0–1.2)
BUN SERPL-MCNC: 20 MG/DL (ref 8–23)
BUN/CREAT SERPL: 13.3 (ref 7–25)
CALCIUM SPEC-SCNC: 8.5 MG/DL (ref 8.6–10.5)
CHLORIDE SERPL-SCNC: 102 MMOL/L (ref 98–107)
CO2 SERPL-SCNC: 28.9 MMOL/L (ref 22–29)
CREAT SERPL-MCNC: 1.5 MG/DL (ref 0.57–1)
DEPRECATED RDW RBC AUTO: 42.8 FL (ref 37–54)
EOSINOPHIL # BLD AUTO: 0.38 10*3/MM3 (ref 0–0.4)
EOSINOPHIL NFR BLD AUTO: 6.2 % (ref 0.3–6.2)
ERYTHROCYTE [DISTWIDTH] IN BLOOD BY AUTOMATED COUNT: 13 % (ref 12.3–15.4)
GFR SERPL CREATININE-BSD FRML MDRD: 33 ML/MIN/1.73
GLOBULIN UR ELPH-MCNC: 2.3 GM/DL
GLUCOSE SERPL-MCNC: 107 MG/DL (ref 65–99)
HCT VFR BLD AUTO: 32.3 % (ref 34–46.6)
HGB BLD-MCNC: 10.5 G/DL (ref 12–15.9)
IMM GRANULOCYTES # BLD AUTO: 0.12 10*3/MM3 (ref 0–0.05)
IMM GRANULOCYTES NFR BLD AUTO: 2 % (ref 0–0.5)
LYMPHOCYTES # BLD AUTO: 0.98 10*3/MM3 (ref 0.7–3.1)
LYMPHOCYTES NFR BLD AUTO: 16 % (ref 19.6–45.3)
MCH RBC QN AUTO: 29.7 PG (ref 26.6–33)
MCHC RBC AUTO-ENTMCNC: 32.5 G/DL (ref 31.5–35.7)
MCV RBC AUTO: 91.5 FL (ref 79–97)
MONOCYTES # BLD AUTO: 0.8 10*3/MM3 (ref 0.1–0.9)
MONOCYTES NFR BLD AUTO: 13.1 % (ref 5–12)
NEUTROPHILS NFR BLD AUTO: 3.73 10*3/MM3 (ref 1.7–7)
NEUTROPHILS NFR BLD AUTO: 61.1 % (ref 42.7–76)
NRBC BLD AUTO-RTO: 0 /100 WBC (ref 0–0.2)
PLATELET # BLD AUTO: 207 10*3/MM3 (ref 140–450)
PMV BLD AUTO: 12.7 FL (ref 6–12)
POTASSIUM SERPL-SCNC: 4.1 MMOL/L (ref 3.5–5.2)
PROT SERPL-MCNC: 6.1 G/DL (ref 6–8.5)
RBC # BLD AUTO: 3.53 10*6/MM3 (ref 3.77–5.28)
SODIUM SERPL-SCNC: 138 MMOL/L (ref 136–145)
WBC # BLD AUTO: 6.11 10*3/MM3 (ref 3.4–10.8)

## 2021-11-02 PROCEDURE — 85025 COMPLETE CBC W/AUTO DIFF WBC: CPT

## 2021-11-02 PROCEDURE — 36415 COLL VENOUS BLD VENIPUNCTURE: CPT

## 2021-11-02 PROCEDURE — 80053 COMPREHEN METABOLIC PANEL: CPT | Performed by: PHYSICIAN ASSISTANT

## 2021-11-02 PROCEDURE — 99213 OFFICE O/P EST LOW 20 MIN: CPT | Performed by: PHYSICIAN ASSISTANT

## 2021-11-02 NOTE — PROGRESS NOTES
[facility]  [address]  [place] [address]  Dept: [phone]  Dept Fax: [phone]  Loc: [phone]  Loc Fax: [phone]    Zoila Nava  7/13/1933    Follow Up Office Visit Note    History of Present Illness:  Patient is an 88-year-old female in today for hospital discharge follow-up for acute on chronic congestive heart failure.  Patient taking 40 mg Lasix daily.  Patient taking less amlodipine now per heart failure clinic.  Patient taking medications as directed without any problems or side effects and reports overall feeling much better now.  Patient having some difficulty swallowing from time to time but would like referred to GI for further evaluation of this.      The following portions of the patient's history were reviewed and updated as appropriate: allergies, current medications, past family history, past medical history, past social history, past surgical history, and problem list.    Medications:    Current Outpatient Medications:   •  amLODIPine (NORVASC) 5 MG tablet, Take 0.5 tablets by mouth Every Morning., Disp: 90 tablet, Rfl: 3  •  aspirin (aspirin) 81 MG EC tablet, Take 81 mg by mouth Daily., Disp: , Rfl:   •  estradiol (ESTRACE) 0.1 MG/GM vaginal cream, Insert 1 applicator into the vagina Daily., Disp: , Rfl:   •  famotidine (PEPCID) 20 MG tablet, TAKE ONE TABLET BY MOUTH TWICE A DAY (Patient taking differently: Take 20 mg by mouth 2 (Two) Times a Day.), Disp: 180 tablet, Rfl: 3  •  furosemide (Lasix) 20 MG tablet, Take 1 tablet by mouth Daily., Disp: 30 tablet, Rfl: 2  •  HYDROcodone-acetaminophen (NORCO) 7.5-325 MG per tablet, Take 1 tablet by mouth Every 8 (Eight) Hours., Disp: , Rfl:   •  levocetirizine (XYZAL) 5 MG tablet, Take 1 tablet by mouth Daily., Disp: , Rfl:   •  levothyroxine (SYNTHROID, LEVOTHROID) 112 MCG tablet, Take 1 tablet by mouth Every Morning., Disp: 90 tablet, Rfl: 1  •  Lyrica 150 MG  capsule, Take 150 mg by mouth 3 (Three) Times a Day., Disp: , Rfl:   •  metoprolol succinate XL (TOPROL-XL) 25 MG 24 hr tablet, Take 0.5 tablets by mouth Daily., Disp: 30 tablet, Rfl: 2  •  ondansetron (ZOFRAN) 4 MG tablet, Take 1 tablet by mouth Every 8 (Eight) Hours As Needed for nausea or vomiting., Disp: 30 tablet, Rfl: 0    Subjective  Allergies   Allergen Reactions   • Penicillins Other (See Comments)     CHILDHOOD ALLERGY          Past Medical History:   Diagnosis Date   • Anemia    • Arthritis    • Asthma    • Cataract    • Difficulty breathing     Chronic   • GERD (gastroesophageal reflux disease)    • Graves' ophthalmopathy    • Hoarseness    • Hypertension    • Hypertensive heart disease with acute on chronic diastolic congestive heart failure (HCC) 10/11/2021   • Pulmonary hypertension (HCC) 10/11/2021   • Spondylolisthesis of cervical region    • Vitamin B12 deficiency        Past Surgical History:   Procedure Laterality Date   • CERVICAL LAMINECTOMY     • CHOLECYSTECTOMY     • OTHER SURGICAL HISTORY Right     Neuroplasty Median Nerve at Carpal Tunnel   • THYROID SURGERY     • TOTAL KNEE ARTHROPLASTY Left 09/29/2014    Dr Colon       Family History   Problem Relation Age of Onset   • Hypertension Mother    • Other Father         cardiac disorder   • Diabetes Father    • Heart disease Father    • Hypertension Sister         Social History     Socioeconomic History   • Marital status:    Tobacco Use   • Smoking status: Never Smoker   • Smokeless tobacco: Never Used   Substance and Sexual Activity   • Alcohol use: No   • Drug use: No   • Sexual activity: Defer       Review of Systems   Constitutional: Negative for activity change, chills, fatigue, fever and unexpected weight change.   HENT: Positive for trouble swallowing. Negative for congestion, ear pain, postnasal drip, sinus pressure and sore throat.    Eyes: Negative for pain, discharge and redness.   Respiratory: Negative for cough,  "shortness of breath and wheezing.    Cardiovascular: Positive for leg swelling. Negative for chest pain and palpitations.   Gastrointestinal: Negative for diarrhea, nausea and vomiting.   Endocrine: Negative for cold intolerance and heat intolerance.   Genitourinary: Negative for decreased urine volume and dysuria.   Musculoskeletal: Negative for arthralgias and myalgias.   Skin: Negative for rash and wound.   Neurological: Negative for dizziness, light-headedness and headaches.   Hematological: Does not bruise/bleed easily.   Psychiatric/Behavioral: Negative for confusion, dysphoric mood and sleep disturbance. The patient is not nervous/anxious.          Objective  Vitals:    11/02/21 1337   BP: 102/68   Pulse: 67   Resp: 18   Temp: 97.1 °F (36.2 °C)   SpO2: 92%   Weight: 71.7 kg (158 lb)   Height: 152.4 cm (60\")     Body mass index is 30.86 kg/m².     Physical Exam  Physical Exam  Vitals and nursing note reviewed.   Constitutional:       General: She is not in acute distress.     Appearance: She is not ill-appearing.   HENT:      Head: Normocephalic.      Right Ear: Tympanic membrane, ear canal and external ear normal. There is no impacted cerumen.      Left Ear: Tympanic membrane, ear canal and external ear normal. There is no impacted cerumen.      Nose: No congestion or rhinorrhea.      Mouth/Throat:      Mouth: Mucous membranes are moist.      Pharynx: Oropharynx is clear. No oropharyngeal exudate or posterior oropharyngeal erythema.   Eyes:      General:         Right eye: No discharge.         Left eye: No discharge.      Extraocular Movements: Extraocular movements intact.      Conjunctiva/sclera: Conjunctivae normal.      Pupils: Pupils are equal, round, and reactive to light.   Cardiovascular:      Rate and Rhythm: Normal rate and regular rhythm.      Heart sounds: Normal heart sounds. No murmur heard.  No friction rub. No gallop.    Pulmonary:      Effort: Pulmonary effort is normal. No respiratory " distress.      Breath sounds: Normal breath sounds. No wheezing.   Abdominal:      General: Bowel sounds are normal. There is no distension.      Palpations: Abdomen is soft. There is no mass.      Tenderness: There is no abdominal tenderness.   Musculoskeletal:         General: Swelling (1+BiLE) present. Normal range of motion.      Cervical back: Normal range of motion. No tenderness.      Right lower leg: No edema.      Left lower leg: No edema.   Lymphadenopathy:      Cervical: No cervical adenopathy.   Skin:     Findings: No bruising, erythema or rash.   Neurological:      Mental Status: She is oriented to person, place, and time.      Gait: Gait normal.   Psychiatric:         Mood and Affect: Mood normal.         Behavior: Behavior normal.         Thought Content: Thought content normal.         Judgment: Judgment normal.         Diagnostic Data  Procedures    Assessment  Diagnoses and all orders for this visit:    1. Acute on chronic diastolic CHF (congestive heart failure) (HCC) (Primary)  -     CBC w AUTO Differential; Future  -     Comprehensive Metabolic Panel    2. Dysphagia, unspecified type  -     Ambulatory Referral to Gastroenterology        Plan    1. Acute on chronic diastolic CHF (congestive heart failure) (HCC) (Primary)- overall much better now.  Advised if symptoms/condition does not continue to improve or worsens to go back to the ER.  Will repeat CBC and CMP.    2. Dysphagia, unspecified type- referred to GI.      Return in about 4 weeks (around 11/30/2021) for Next scheduled follow up.    Charanjit Neal PA-C  11/02/2021

## 2021-11-02 NOTE — PATIENT INSTRUCTIONS
Heart Failure, Diagnosis    Heart failure is a condition in which the heart has trouble pumping blood because it has become weak or stiff. This means that the heart does not pump blood well enough for the body to stay healthy. For some people with heart failure, fluid may back up into the lungs. There may also be swelling (edema) in the lower legs. Heart failure is usually a long-term (chronic) condition. It is important for you to take good care of yourself and follow the treatment plan from your health care provider.  What are the causes?  This condition may be caused by:  · High blood pressure (hypertension). Hypertension causes the heart muscle to work harder than normal. This makes the heart stiff or weak.  · Coronary artery disease, or CAD. CAD is the buildup of cholesterol and fat (plaque) in the arteries of the heart.  · Heart attack, also called myocardial infarction. This injures the heart muscle, making it hard for the heart to pump blood.  · Abnormal heart valves. The valves do not open and close properly, forcing the heart to pump harder to keep the blood flowing.  · Heart muscle disease (cardiomyopathy or myocarditis). This is damage to the heart muscle. It can increase the risk of heart failure.  · Lung disease. The heart works harder when the lungs are not healthy.  · Abnormal heart rhythms. These can lead to heart failure.  What increases the risk?  The risk of heart failure increases as a person ages. This condition is also more likely to develop in people who:  · Are overweight.  · Are male.  · Smoke or chew tobacco.  · Abuse alcohol or illegal drugs.  · Have taken medicines that can damage the heart, such as chemotherapy drugs.  · Have diabetes.  · Have abnormal heart rhythms.  · Have thyroid problems.  · Have low blood counts (anemia).  What are the signs or symptoms?  Symptoms of this condition include:  · Shortness of breath with activity, such as when climbing stairs.  · A cough that does not  go away.  · Swelling of the feet, ankles, legs, or abdomen.  · Losing weight for no reason.  · Trouble breathing when lying flat (orthopnea).  · Waking from sleep because of the need to sit up and get more air.  · Rapid heartbeat.  · Tiredness (fatigue) and loss of energy.  · Feeling light-headed, dizzy, or close to fainting.  · Loss of appetite.  · Nausea.  · Waking up more often during the night to urinate (nocturia).  · Confusion.  How is this diagnosed?  This condition is diagnosed based on:  · Your medical history, symptoms, and a physical exam.  · Diagnostic tests, which may include:  ? Echocardiogram.  ? Electrocardiogram (ECG).  ? Chest X-ray.  ? Blood tests.  ? Exercise stress test.  ? Radionuclide scans.  ? Cardiac catheterization and angiogram.  How is this treated?  Treatment for this condition is aimed at managing the symptoms of heart failure.  Medicines  Treatment may include medicines that:  · Help lower blood pressure by relaxing (dilating) the blood vessels. These medicines are called ACE inhibitors (angiotensin-converting enzyme) and ARBs (angiotensin receptor blockers).  · Cause the kidneys to remove salt and water from the blood through urination (diuretics).  · Improve heart muscle strength and prevent the heart from beating too fast (beta blockers).  · Increase the force of the heartbeat (digoxin).  Healthy behavior changes         Treatment may also include making healthy lifestyle changes, such as:  · Reaching and staying at a healthy weight.  · Quitting smoking or chewing tobacco.  · Eating heart-healthy foods.  · Limiting or avoiding alcohol.  · Stopping the use of illegal drugs.  · Being physically active.    Other treatments  Other treatments may include:  · Procedures to open blocked arteries or repair damaged valves.  · Placing a pacemaker to improve heart function (cardiac resynchronization therapy).  · Placing a device to treat serious abnormal heart rhythms (implantable cardioverter  defibrillator, or ICD).  · Placing a device to improve the pumping ability of the heart (left ventricular assist device, or LVAD).  · Receiving a healthy heart from a donor (heart transplant). This is done when other treatments have not helped.  Follow these instructions at home:  · Manage other health conditions as told by your health care provider. These may include hypertension, diabetes, thyroid disease, or abnormal heart rhythms.  · Get ongoing education and support as needed. Learn as much as you can about heart failure.  · Keep all follow-up visits as told by your health care provider. This is important.  Summary  · Heart failure is a condition in which the heart has trouble pumping blood because it has become weak or stiff.  · This condition is caused by high blood pressure and other diseases of the heart and lungs.  · Symptoms of this condition include shortness of breath, tiredness (fatigue), nausea, and swelling of the feet, ankles, legs, or abdomen.  · Treatments for this condition may include medicines, lifestyle changes, and surgery.  · Manage other health conditions as told by your health care provider.  This information is not intended to replace advice given to you by your health care provider. Make sure you discuss any questions you have with your health care provider.  Document Revised: 03/06/2020 Document Reviewed: 03/06/2020  [employer] Patient Education © 2021 [employer]

## 2021-11-03 ENCOUNTER — READMISSION MANAGEMENT (OUTPATIENT)
Dept: CALL CENTER | Facility: HOSPITAL | Age: 86
End: 2021-11-03

## 2021-11-03 NOTE — OUTREACH NOTE
CHF Week 2 Survey      Responses   [facility] patient discharged from? [facility]   Does the patient have one of the following disease processes/diagnoses(primary or secondary)? CHF   Week 2 attempt successful? Yes   Call start time 1717   Call end time 1724   Discharge diagnosis CHF   Person spoke with today (if not patient) and relationship Spouse Matt Caballero reviewed with patient/caregiver? Yes   Is the patient having any side effects they believe may be caused by any medication additions or changes? No   Does the patient have all medications ordered at discharge? Yes   Is the patient taking all medications as directed (includes completed medication regime)? Yes   Does the patient have a primary care provider?  Yes   Has the patient kept scheduled appointments due by today? Yes   What DME was ordered? [employer] for home O2   Has all DME been delivered? Yes   Pulse Ox monitoring Intermittent   Pulse Ox device source Patient   O2 Sat comments She is using 2L of oxygen with sats at 92-99%   O2 Sat: education provided Sat levels,  Monitoring frequency,  When to seek care   Psychosocial issues? Yes   What is the patient's perception of their health status since discharge? Improving   Is the patient weighing daily? Yes   Does the patient have scales? Yes   Is the patient able to teach back Heart Failure diet management? Yes   Is the patient able to teach back Heart Failure Zones? Yes   Is the patient able to teach back signs and symptoms of worsening condition? (i.e. weight gain, shortness of air, etc.) Yes   If the patient is a current smoker, are they able to teach back resources for cessation? Not a smoker   Is the patient/caregiver able to teach back the hierarchy of who to call/visit for symptoms/problems? PCP, Specialist, Home health nurse, Urgent Care, ED, 911 Yes   Additional teach back comments States she has been to follow ups and they have made some adjustments to her medications.  States she seems  to be doing better since the changes.     CHF Week 2 call completed? Yes   Wrap up additional comments Denies questions or needs at this time.  If he has any questions, he will call the Nurse Call Center          Natalie Vines LPN

## 2021-11-04 ENCOUNTER — APPOINTMENT (OUTPATIENT)
Dept: GENERAL RADIOLOGY | Facility: HOSPITAL | Age: 86
End: 2021-11-04

## 2021-11-04 ENCOUNTER — APPOINTMENT (OUTPATIENT)
Dept: CT IMAGING | Facility: HOSPITAL | Age: 86
End: 2021-11-04

## 2021-11-04 ENCOUNTER — HOSPITAL ENCOUNTER (EMERGENCY)
Facility: HOSPITAL | Age: 86
Discharge: HOME | End: 2021-11-04
Attending: EMERGENCY MEDICINE | Admitting: EMERGENCY MEDICINE

## 2021-11-04 VITALS
BODY MASS INDEX: 30.63 KG/M2 | WEIGHT: 156 LBS | TEMPERATURE: 98.2 F | HEIGHT: 60 IN | DIASTOLIC BLOOD PRESSURE: 64 MMHG | RESPIRATION RATE: 18 BRPM | OXYGEN SATURATION: 98 % | SYSTOLIC BLOOD PRESSURE: 108 MMHG | HEART RATE: 63 BPM

## 2021-11-04 DIAGNOSIS — R60.9 PERIPHERAL EDEMA: ICD-10-CM

## 2021-11-04 DIAGNOSIS — R79.89 D-DIMER, ELEVATED: ICD-10-CM

## 2021-11-04 DIAGNOSIS — R06.00 DYSPNEA, UNSPECIFIED TYPE: Primary | ICD-10-CM

## 2021-11-04 DIAGNOSIS — E87.6 HYPOKALEMIA: ICD-10-CM

## 2021-11-04 DIAGNOSIS — I27.20 PULMONARY HYPERTENSION (HCC): ICD-10-CM

## 2021-11-04 DIAGNOSIS — D64.9 CHRONIC ANEMIA: ICD-10-CM

## 2021-11-04 DIAGNOSIS — R06.83 SNORING: ICD-10-CM

## 2021-11-04 LAB
ALBUMIN SERPL-MCNC: 4 G/DL (ref 3.5–5.2)
ALBUMIN/GLOB SERPL: 1.5 G/DL
ALP SERPL-CCNC: 97 U/L (ref 39–117)
ALT SERPL W P-5'-P-CCNC: 25 U/L (ref 1–33)
ANION GAP SERPL CALCULATED.3IONS-SCNC: 13 MMOL/L (ref 5–15)
AST SERPL-CCNC: 27 U/L (ref 1–32)
BASOPHILS # BLD AUTO: 0.12 10*3/MM3 (ref 0–0.2)
BASOPHILS NFR BLD AUTO: 1.9 % (ref 0–1.5)
BILIRUB SERPL-MCNC: 0.3 MG/DL (ref 0–1.2)
BUN SERPL-MCNC: 19 MG/DL (ref 8–23)
BUN/CREAT SERPL: 12.7 (ref 7–25)
CALCIUM SPEC-SCNC: 8.4 MG/DL (ref 8.6–10.5)
CHLORIDE SERPL-SCNC: 99 MMOL/L (ref 98–107)
CO2 SERPL-SCNC: 28 MMOL/L (ref 22–29)
CREAT SERPL-MCNC: 1.5 MG/DL (ref 0.57–1)
D DIMER PPP FEU-MCNC: 2.07 MCGFEU/ML (ref 0–0.56)
DEPRECATED RDW RBC AUTO: 48.4 FL (ref 37–54)
EOSINOPHIL # BLD AUTO: 0.51 10*3/MM3 (ref 0–0.4)
EOSINOPHIL NFR BLD AUTO: 8.1 % (ref 0.3–6.2)
ERYTHROCYTE [DISTWIDTH] IN BLOOD BY AUTOMATED COUNT: 14.4 % (ref 12.3–15.4)
GFR SERPL CREATININE-BSD FRML MDRD: 33 ML/MIN/1.73
GLOBULIN UR ELPH-MCNC: 2.7 GM/DL
GLUCOSE SERPL-MCNC: 87 MG/DL (ref 65–99)
HCT VFR BLD AUTO: 32 % (ref 34–46.6)
HGB BLD-MCNC: 10.4 G/DL (ref 12–15.9)
HOLD SPECIMEN: NORMAL
IMM GRANULOCYTES # BLD AUTO: 0.04 10*3/MM3 (ref 0–0.05)
IMM GRANULOCYTES NFR BLD AUTO: 0.6 % (ref 0–0.5)
LYMPHOCYTES # BLD AUTO: 1.48 10*3/MM3 (ref 0.7–3.1)
LYMPHOCYTES NFR BLD AUTO: 23.5 % (ref 19.6–45.3)
MCH RBC QN AUTO: 29.9 PG (ref 26.6–33)
MCHC RBC AUTO-ENTMCNC: 32.5 G/DL (ref 31.5–35.7)
MCV RBC AUTO: 92 FL (ref 79–97)
MONOCYTES # BLD AUTO: 0.9 10*3/MM3 (ref 0.1–0.9)
MONOCYTES NFR BLD AUTO: 14.3 % (ref 5–12)
NEUTROPHILS NFR BLD AUTO: 3.26 10*3/MM3 (ref 1.7–7)
NEUTROPHILS NFR BLD AUTO: 51.6 % (ref 42.7–76)
NRBC BLD AUTO-RTO: 0 /100 WBC (ref 0–0.2)
NT-PROBNP SERPL-MCNC: 238.2 PG/ML (ref 0–1800)
PLATELET # BLD AUTO: 211 10*3/MM3 (ref 140–450)
PMV BLD AUTO: 11.8 FL (ref 6–12)
POTASSIUM SERPL-SCNC: 3.3 MMOL/L (ref 3.5–5.2)
PROT SERPL-MCNC: 6.7 G/DL (ref 6–8.5)
QT INTERVAL: 382 MS
QT INTERVAL: 396 MS
QTC INTERVAL: 424 MS
QTC INTERVAL: 430 MS
RBC # BLD AUTO: 3.48 10*6/MM3 (ref 3.77–5.28)
SODIUM SERPL-SCNC: 140 MMOL/L (ref 136–145)
TROPONIN T SERPL-MCNC: 0.02 NG/ML (ref 0–0.03)
WBC # BLD AUTO: 6.31 10*3/MM3 (ref 3.4–10.8)
WHOLE BLOOD HOLD SPECIMEN: NORMAL
WHOLE BLOOD HOLD SPECIMEN: NORMAL

## 2021-11-04 PROCEDURE — 99284 EMERGENCY DEPT VISIT MOD MDM: CPT

## 2021-11-04 PROCEDURE — 93005 ELECTROCARDIOGRAM TRACING: CPT

## 2021-11-04 PROCEDURE — 80053 COMPREHEN METABOLIC PANEL: CPT | Performed by: EMERGENCY MEDICINE

## 2021-11-04 PROCEDURE — 93005 ELECTROCARDIOGRAM TRACING: CPT | Performed by: EMERGENCY MEDICINE

## 2021-11-04 PROCEDURE — 84484 ASSAY OF TROPONIN QUANT: CPT | Performed by: EMERGENCY MEDICINE

## 2021-11-04 PROCEDURE — 85379 FIBRIN DEGRADATION QUANT: CPT | Performed by: EMERGENCY MEDICINE

## 2021-11-04 PROCEDURE — 85025 COMPLETE CBC W/AUTO DIFF WBC: CPT | Performed by: EMERGENCY MEDICINE

## 2021-11-04 PROCEDURE — 82565 ASSAY OF CREATININE: CPT

## 2021-11-04 PROCEDURE — 96374 THER/PROPH/DIAG INJ IV PUSH: CPT

## 2021-11-04 PROCEDURE — 0 IOPAMIDOL PER 1 ML: Performed by: EMERGENCY MEDICINE

## 2021-11-04 PROCEDURE — 71045 X-RAY EXAM CHEST 1 VIEW: CPT

## 2021-11-04 PROCEDURE — 83880 ASSAY OF NATRIURETIC PEPTIDE: CPT | Performed by: EMERGENCY MEDICINE

## 2021-11-04 PROCEDURE — 25010000002 FUROSEMIDE PER 20 MG: Performed by: EMERGENCY MEDICINE

## 2021-11-04 PROCEDURE — 71275 CT ANGIOGRAPHY CHEST: CPT

## 2021-11-04 RX ORDER — POTASSIUM CHLORIDE 1.5 G/1.77G
20 POWDER, FOR SOLUTION ORAL ONCE
Status: COMPLETED | OUTPATIENT
Start: 2021-11-04 | End: 2021-11-04

## 2021-11-04 RX ORDER — SODIUM CHLORIDE 0.9 % (FLUSH) 0.9 %
10 SYRINGE (ML) INJECTION AS NEEDED
Status: DISCONTINUED | OUTPATIENT
Start: 2021-11-04 | End: 2021-11-04 | Stop reason: HOSPADM

## 2021-11-04 RX ORDER — FUROSEMIDE 10 MG/ML
40 INJECTION INTRAMUSCULAR; INTRAVENOUS ONCE
Status: COMPLETED | OUTPATIENT
Start: 2021-11-04 | End: 2021-11-04

## 2021-11-04 RX ORDER — POTASSIUM CHLORIDE 750 MG/1
10 TABLET, FILM COATED, EXTENDED RELEASE ORAL DAILY
Qty: 15 TABLET | Refills: 0 | Status: SHIPPED | OUTPATIENT
Start: 2021-11-04 | End: 2021-11-18 | Stop reason: SDUPTHER

## 2021-11-04 RX ADMIN — IOPAMIDOL 63 ML: 755 INJECTION, SOLUTION INTRAVENOUS at 09:54

## 2021-11-04 RX ADMIN — FUROSEMIDE 40 MG: 10 INJECTION, SOLUTION INTRAMUSCULAR; INTRAVENOUS at 06:59

## 2021-11-04 RX ADMIN — POTASSIUM CHLORIDE 20 MEQ: 1.5 POWDER, FOR SOLUTION ORAL at 08:27

## 2021-11-04 NOTE — ED PROVIDER NOTES
Subjective   This is a pleasant 88-year-old female who is  accompanied by her  of 5 years.  They live independently but the patient has had a decline in her health especially in the past month and really since Covid hit.  The last time she could reliably go to a store walk up and down the [place].  It has been 4 months since she has been able to go up and down the stairs in her house and now room to room in the house is difficult for her because she gets short of breath.    She has had a couple hospital admissions in the past month for increasing shortness of breath and heart failure.  Review of her echo shows her EF is preserved but she has diastolic dysfunction and she has pulmonary hypertension with the right ventricular systolic pressure of 54.    She has been on oxygen for about a week at home since her last discharge after her medications were adjusted and its helping some.  She is never been evaluated for sleep apnea though she does snore and has some daytime sleepiness.    She just feels much more short of breath since last night.  Her  is kept a detailed review of her weight which has been increasing due to fluid in her legs are much more swollen now than usual and this happened pretty much over the past 24 hours.  Her blood pressures have been anything a little bit on the low side at home her diastolics reliably below 120.    She reports no fevers or chills.  She has had occasional runny nose she attributes to allergies she has had no cough.  She has had no fevers or chills.  Bowel movements and urine been normal and she has not passed blood per any orifice.        All other systems reviewed and are negative except as noted above.          Review of Systems   All other systems reviewed and are negative.      Past Medical History:   Diagnosis Date   • Anemia    • Arthritis    • Asthma    • Cataract    • Difficulty breathing     Chronic   • GERD (gastroesophageal reflux disease)    •  Graves' ophthalmopathy    • Hoarseness    • Hypertension    • Hypertensive heart disease with acute on chronic diastolic congestive heart failure (HCC) 10/11/2021   • Pulmonary hypertension (HCC) 10/11/2021   • Spondylolisthesis of cervical region    • Vitamin B12 deficiency      Reading physician: Jefry Hobbs MD Ordering physician: Zoila Brandon MD Study date: 10/11/21       Patient Information    Patient Name   Zoila Nava MRN   [id number] Legal Sex   Female  (Age)   1933 (88 y.o.)     PACS Images     Show images for Adult Transthoracic Echo Complete W/ Cont if Necessary Per Protocol   Sedation Narrator Report    Sedation Narrator Report        Interpretation Summary    · Moderate aortic valve regurgitation is present.  · Mild aortic valve stenosis is present.  · Estimated right ventricular systolic pressure from tricuspid regurgitation is moderately elevated (45-55 mmHg).  · Moderate tricuspid valve regurgitation is present.  · Estimated left ventricular EF = 72% Estimated left ventricular EF was in agreement with the calculated left ventricular EF. Left ventricular ejection fraction appears to be greater than 70%. Left ventricular systolic function is hyperdynamic (EF > 70%).  · Left ventricular diastolic function is consistent with (grade I) impaired relaxation.  · Moderate pulmonary hypertension is present.  · Normal right ventricular cavity size, wall thickness, systolic function and septal motion noted.  · There is no evidence of pericardial effusion.       RVSP(TR) 54 mmHg      RAP systole 3 mmHg              Allergies   Allergen Reactions   • Penicillins Other (See Comments)     CHILDHOOD ALLERGY         Past Surgical History:   Procedure Laterality Date   • CERVICAL LAMINECTOMY     • CHOLECYSTECTOMY     • OTHER SURGICAL HISTORY Right     Neuroplasty Median Nerve at Carpal Tunnel   • THYROID SURGERY     • TOTAL KNEE ARTHROPLASTY Left 2014    Dr Colon       Family History    Problem Relation Age of Onset   • Hypertension Mother    • Other Father         cardiac disorder   • Diabetes Father    • Heart disease Father    • Hypertension Sister        Social History     Socioeconomic History   • Marital status:    Tobacco Use   • Smoking status: Never Smoker   • Smokeless tobacco: Never Used   Substance and Sexual Activity   • Alcohol use: No   • Drug use: No   • Sexual activity: Defer           Objective   Physical Exam  Vitals and nursing note reviewed.   Constitutional:       Comments: This is a pleasant 88-year-old female no acute distress.  At rest her oxygen saturations about 90 to 91% on room air when she starts to talk it drops down to 87 or 88.   HENT:      Head: Normocephalic and atraumatic.      Right Ear: External ear normal.      Left Ear: External ear normal.      Nose: Nose normal.      Mouth/Throat:      Mouth: Mucous membranes are moist.      Pharynx: Oropharynx is clear.   Eyes:      Extraocular Movements: Extraocular movements intact.      Conjunctiva/sclera: Conjunctivae normal.      Pupils: Pupils are equal, round, and reactive to light.   Cardiovascular:      Rate and Rhythm: Normal rate and regular rhythm.      Pulses: Normal pulses.      Heart sounds: Normal heart sounds.   Pulmonary:      Comments: She is a few crackles in both bases bilaterally.  Abdominal:      Comments: BMI 30 soft nontender no organomegaly, masses, or guarding.   Musculoskeletal:         General: Normal range of motion.      Cervical back: Normal range of motion and neck supple.      Comments: She has 3+ pitting edema up past her knees and this is acute on chronic for there is no weeping as of yet she has good pulses in her feet 2/4.   Skin:     General: Skin is warm and dry.      Capillary Refill: Capillary refill takes less than 2 seconds.   Neurological:      Mental Status: She is alert.      Comments: A symmetric voice strong tongue midline.  Vision, hearing, and speech preserved.   She has moderate generalized weakness without focality.         Procedures           ED Course            Recent Results (from the past 24 hour(s))   ECG 12 Lead    Collection Time: 11/04/21  5:49 AM   Result Value Ref Range    QT Interval 382 ms    QTC Interval 424 ms   Comprehensive Metabolic Panel    Collection Time: 11/04/21  5:55 AM    Specimen: Blood   Result Value Ref Range    Glucose 87 65 - 99 mg/dL    BUN 19 8 - 23 mg/dL    Creatinine 1.50 (H) 0.57 - 1.00 mg/dL    Sodium 140 136 - 145 mmol/L    Potassium 3.3 (L) 3.5 - 5.2 mmol/L    Chloride 99 98 - 107 mmol/L    CO2 28.0 22.0 - 29.0 mmol/L    Calcium 8.4 (L) 8.6 - 10.5 mg/dL    Total Protein 6.7 6.0 - 8.5 g/dL    Albumin 4.00 3.50 - 5.20 g/dL    ALT (SGPT) 25 1 - 33 U/L    AST (SGOT) 27 1 - 32 U/L    Alkaline Phosphatase 97 39 - 117 U/L    Total Bilirubin 0.3 0.0 - 1.2 mg/dL    eGFR Non African Amer 33 (L) >60 mL/min/1.73    Globulin 2.7 gm/dL    A/G Ratio 1.5 g/dL    BUN/Creatinine Ratio 12.7 7.0 - 25.0    Anion Gap 13.0 5.0 - 15.0 mmol/L   BNP    Collection Time: 11/04/21  5:55 AM    Specimen: Blood   Result Value Ref Range    proBNP 238.2 0.0-1,800.0 pg/mL   Troponin    Collection Time: 11/04/21  5:55 AM    Specimen: Blood   Result Value Ref Range    Troponin T 0.022 0.000 - 0.030 ng/mL   Green Top (Gel)    Collection Time: 11/04/21  5:55 AM   Result Value Ref Range    Extra Tube Hold for add-ons.    Lavender Top    Collection Time: 11/04/21  5:55 AM   Result Value Ref Range    Extra Tube hold for add-on    Gold Top - SST    Collection Time: 11/04/21  5:55 AM   Result Value Ref Range    Extra Tube Hold for add-ons.    Gray Top    Collection Time: 11/04/21  5:55 AM   Result Value Ref Range    Extra Tube Hold for add-ons.    Light Blue Top    Collection Time: 11/04/21  5:55 AM   Result Value Ref Range    Extra Tube hold for add-on    CBC Auto Differential    Collection Time: 11/04/21  5:55 AM    Specimen: Blood   Result Value Ref Range    WBC 6.31 3.40 -  10.80 10*3/mm3    RBC 3.48 (L) 3.77 - 5.28 10*6/mm3    Hemoglobin 10.4 (L) 12.0 - 15.9 g/dL    Hematocrit 32.0 (L) 34.0 - 46.6 %    MCV 92.0 79.0 - 97.0 fL    MCH 29.9 26.6 - 33.0 pg    MCHC 32.5 31.5 - 35.7 g/dL    RDW 14.4 12.3 - 15.4 %    RDW-SD 48.4 37.0 - 54.0 fl    MPV 11.8 6.0 - 12.0 fL    Platelets 211 140 - 450 10*3/mm3    Neutrophil % 51.6 42.7 - 76.0 %    Lymphocyte % 23.5 19.6 - 45.3 %    Monocyte % 14.3 (H) 5.0 - 12.0 %    Eosinophil % 8.1 (H) 0.3 - 6.2 %    Basophil % 1.9 (H) 0.0 - 1.5 %    Immature Grans % 0.6 (H) 0.0 - 0.5 %    Neutrophils, Absolute 3.26 1.70 - 7.00 10*3/mm3    Lymphocytes, Absolute 1.48 0.70 - 3.10 10*3/mm3    Monocytes, Absolute 0.90 0.10 - 0.90 10*3/mm3    Eosinophils, Absolute 0.51 (H) 0.00 - 0.40 10*3/mm3    Basophils, Absolute 0.12 0.00 - 0.20 10*3/mm3    Immature Grans, Absolute 0.04 0.00 - 0.05 10*3/mm3    nRBC 0.0 0.0 - 0.2 /100 WBC   D-dimer, Quantitative    Collection Time: 11/04/21  5:55 AM    Specimen: Blood   Result Value Ref Range    D-Dimer, Quantitative 2.07 (H) 0.00 - 0.56 MCGFEU/mL   ECG 12 Lead    Collection Time: 11/04/21  7:43 AM   Result Value Ref Range    QT Interval 396 ms    QTC Interval 430 ms     Note: In addition to lab results from this visit, the labs listed above may include labs taken at another facility or during a different encounter within the last 24 hours. Please correlate lab times with ED admission and discharge times for further clarification of the services performed during this visit.    CT Angiogram Chest   Final Result       No evidence of pulmonary embolism.       No acute thoracic findings.       Stable ectasia of the ascending thoracic aorta.           This report was finalized on 11/4/2021 10:17 AM by Matt Chandler MD.          XR Chest 1 View   Final Result   Stable cardiomegaly without failure. Small amount right basilar subsegmental atelectasis with questionable small pleural effusion.      Signer Name: MAVIS Davalos MD     Signed: 11/4/2021 6:14 AM    Workstation Name: [id number]     [facility]        Vitals:    11/04/21 0714 11/04/21 0800 11/04/21 0945 11/04/21 1045   BP:  125/63 118/61 108/64   BP Location:       Patient Position:       Pulse: 85 76 67 63   Resp:  18 18 18   Temp:       TempSrc:       SpO2: 97% 98% 97% 98%   Weight:       Height:         Medications   furosemide (LASIX) injection 40 mg (40 mg Intravenous Given 11/4/21 0659)   potassium chloride (KLOR-CON) packet 20 mEq (20 mEq Oral Given 11/4/21 0827)   iopamidol (ISOVUE-370) 76 % injection 100 mL (63 mL Intravenous Given 11/4/21 0954)     ECG/EMG Results (last 24 hours)     Procedure Component Value Units Date/Time    ECG 12 Lead [[id number]] Collected: 11/04/21 0549     Updated: 11/04/21 0627     QT Interval 382 ms      QTC Interval 424 ms     Narrative:      Test Reason : SOA Protocol  Blood Pressure :   */*   mmHG  Vent. Rate :  74 BPM     Atrial Rate :  74 BPM     P-R Int : 194 ms          QRS Dur : 104 ms      QT Int : 382 ms       P-R-T Axes :  52  34  53 degrees     QTc Int : 424 ms    Normal sinus rhythm  Normal ECG  When compared with ECG of 23-OCT-2021 23:21,  No significant change was found  Confirmed by DALILA LOMAX MD ([id number]) on 11/4/2021 6:27:18 AM    Referred By: EDMD           Confirmed By: DALILA LOMAX MD        ECG 12 Lead   Final Result   Test Reason : RHYTHM CHANGE   Blood Pressure :   */*   mmHG   Vent. Rate :  71 BPM     Atrial Rate :  71 BPM      P-R Int : 192 ms          QRS Dur :  96 ms       QT Int : 396 ms       P-R-T Axes :  60  -9  17 degrees      QTc Int : 430 ms      Normal sinus rhythm   Normal ECG   When compared with ECG of 04-NOV-2021 05:49,   No significant change was found   Confirmed by DALILA LOMAX MD ([id number]) on 11/4/2021 3:21:44 PM      Referred By: KARIN           Confirmed By: DALILA LOMAX MD      ECG 12 Lead   Final Result   Test Reason : SOA Protocol   Blood Pressure :   */*   mmHG   Vent.  Rate :  74 BPM     Atrial Rate :  74 BPM      P-R Int : 194 ms          QRS Dur : 104 ms       QT Int : 382 ms       P-R-T Axes :  52  34  53 degrees      QTc Int : 424 ms      Normal sinus rhythm   Normal ECG   When compared with ECG of 23-OCT-2021 23:21,   No significant change was found   Confirmed by DALILA LOMAX MD ([id number]) on 11/4/2021 6:27:18 AM      Referred By: EDMD           Confirmed By: DALILA LOMAX MD                                          MDM  Number of Diagnoses or Management Options  Chronic anemia  D-dimer, elevated  Dyspnea, unspecified type  Hypokalemia  Peripheral edema  Pulmonary hypertension (HCC)  Snoring  Diagnosis management comments:       I reviewed all available studies at bedside with the patient and her .  Her labs actually were reassuring and her BNP was normal but her D-dimer was slightly elevated she has some chronic renal insufficiency.  She also has chronic anemia mild hypokalemia likely secondary from her diuresis.    Given her elevated D-dimer CTA was ordered and performed I reviewed it personally.  She has no evidence of PE really no pulmonary fibrosis just little atelectasis but her pulmonary arteries are plump I think this is the root of her problem and that she has pulmonary hypertension.  She does not have overt heart failure but is still dyspneic.  I think it is pulmonary hypertension driving all this.  Unfortunately with her pulmonary hypertension I will think there is any easy treatment for it.  She does snore some and I will refer her for sleep apnea evaluation to see if CPAP would be beneficial for outside that I know of no other intervention to help her.  Began discussions if she continues to have dyspnea might be something for palliative care to start treating her.  She has an appointment see . Jalyn this month have encouraged her to keep that.  I have also encouraged follow-up with her PCP and she will return to the ED if worse in any way.    All are  agreeable to plan       Amount and/or Complexity of Data Reviewed  Clinical lab tests: reviewed  Tests in the radiology section of CPT®: reviewed  Tests in the medicine section of CPT®: reviewed  Decide to obtain previous medical records or to obtain history from someone other than the patient: yes        Final diagnoses:   Dyspnea, unspecified type   Pulmonary hypertension (HCC)   Peripheral edema   Hypokalemia   D-dimer, elevated   Chronic anemia   Snoring       ED Disposition  ED Disposition     ED Disposition Condition Comment    Discharge Stable           Arnoldo Oneil MD  [address]  [address]  [place] [id number]  [phone]    Schedule an appointment as soon as possible for a visit       Basil Richter MD  [address] [facility]  [address]  [place] [address]  [phone]          [facility]  [address] [facility] [address]  [facility] [id number] [phone]             Medication List      New Prescriptions    potassium chloride 10 MEQ CR tablet  Take 1 tablet by mouth Daily.           Where to Get Your Medications      These medications were sent to [address] - [address] AT [facility] [address] - [phone] PH - [phone] FX  [address] [facility] [address]    Phone: [phone]   · potassium chloride 10 MEQ CR tablet          Sheng Olea MD  11/04/21 4532

## 2021-11-05 ENCOUNTER — OFFICE VISIT (OUTPATIENT)
Dept: CARDIOLOGY | Facility: HOSPITAL | Age: 86
End: 2021-11-05

## 2021-11-05 VITALS
SYSTOLIC BLOOD PRESSURE: 111 MMHG | HEART RATE: 79 BPM | BODY MASS INDEX: 29.1 KG/M2 | WEIGHT: 149 LBS | DIASTOLIC BLOOD PRESSURE: 65 MMHG

## 2021-11-05 DIAGNOSIS — I27.20 PULMONARY HYPERTENSION (HCC): ICD-10-CM

## 2021-11-05 DIAGNOSIS — I50.32 CHRONIC HEART FAILURE WITH PRESERVED EJECTION FRACTION (HCC): Primary | ICD-10-CM

## 2021-11-05 DIAGNOSIS — E87.6 HYPOKALEMIA: ICD-10-CM

## 2021-11-05 DIAGNOSIS — N18.30 STAGE 3 CHRONIC KIDNEY DISEASE, UNSPECIFIED WHETHER STAGE 3A OR 3B CKD (HCC): ICD-10-CM

## 2021-11-05 DIAGNOSIS — I10 ESSENTIAL HYPERTENSION: ICD-10-CM

## 2021-11-05 LAB — CREAT BLDA-MCNC: 1 MG/DL (ref 0.6–1.3)

## 2021-11-05 PROCEDURE — 99442 PR PHYS/QHP TELEPHONE EVALUATION 11-20 MIN: CPT | Performed by: NURSE PRACTITIONER

## 2021-11-05 RX ORDER — FUROSEMIDE 20 MG/1
20 TABLET ORAL 2 TIMES DAILY
Qty: 60 TABLET | Refills: 2 | Status: SHIPPED | OUTPATIENT
Start: 2021-11-05 | End: 2021-11-30 | Stop reason: SDUPTHER

## 2021-11-05 NOTE — PROGRESS NOTES
[facility]  This was an audio enabled telemedicine encounter.    You have chosen to receive care through the use of telemedicine. Telemedicine enables health care providers at different locations to provide safe, effective, and convenient care through the use of technology. As with any health care service, there are risks associated with the use of telemedicine, including equipment failure, poor connections, and  issues.    • Do you understand the risks and benefits of telemedicine as I have explained them to you? Yes  • Have your questions regarding telemedicine been answered? Yes  • Do you consent to the use of telemedicine in your medical care today? Yes      Chief Complaint  Congestive Heart Failure    Subjective    History of Present Illness {CC  Problem List  Visit  Diagnosis   Encounters  Notes  Medications  Labs  Result Review Imaging  Media :23}     Zoila Nava presents to [facility] CARDI[facility] for   History of Present Illness     88-year-old female with hypertension, chronic kidney disease stage III, ascending aortic aneurysm, pulmonary hypertension, valvular heart disease, heart failure with preserved EF, memory impairment.    Echocardiogram 10/11/2021: EF 72%, grade 1 diastolic dysfunction, moderate pulmonary hypertension with RVSP 45 to 55 mmHg, moderate AR, mild AS, moderate TR    Patient presented to [facility] ED on 11/4/2021 with worsening dyspnea.  Weight gain of 4 pounds.  Patient was prescribed Lasix 20 mg daily with taking extra 20 mg with 3 pound weight gain.  Patient and spouse report that she had been taking Lasix 20 mg, 3 tablets daily for several days with no improvement.      During the ED visit she was given 40 mg of IV Lasix.  Reports edema has improved, dyspnea has improved.  Down 5 lbs.      Patient's BMP was normal.  Chest x-ray with small pleural effusion versus  "atelectasis.  CT angiogram no PE.  Stable ectasia of the ascending thoracic aorta.    Patient received referral for sleep medicine    Home blood pressure has been well controlled.  In 1 month she had had a 10 pound weight gain, per home record.    Patient with chronic kidney disease.  Creatinine in the ER 1.5 on 11/4/2021.  Patient's creatinine had been 1.3-1.5 is her baseline.    Pt start KCL 10 meq daily.  Pt has not started yet.         Objective     Vital Signs:   Vitals:    11/05/21 1023   BP: 111/65   Pulse: 79   Weight: 67.6 kg (149 lb)     Body mass index is 29.1 kg/m².  Physical Exam     A/O, Engaged.  Able to teach back instructions.  Non-labored effort.   Spouse was \"busy on phone\"  Could not participate in visit.    Result Review  Data Reviewed:{ Labs  Result Review  Imaging  Med Tab  Media :23}   Admission on 11/04/2021, Discharged on 11/04/2021   Component Date Value Ref Range Status   • QT Interval 11/04/2021 382  ms Final   • QTC Interval 11/04/2021 424  ms Final   • Glucose 11/04/2021 87  65 - 99 mg/dL Final   • BUN 11/04/2021 19  8 - 23 mg/dL Final   • Creatinine 11/04/2021 1.50* 0.57 - 1.00 mg/dL Final   • Sodium 11/04/2021 140  136 - 145 mmol/L Final   • Potassium 11/04/2021 3.3* 3.5 - 5.2 mmol/L Final   • Chloride 11/04/2021 99  98 - 107 mmol/L Final   • CO2 11/04/2021 28.0  22.0 - 29.0 mmol/L Final   • Calcium 11/04/2021 8.4* 8.6 - 10.5 mg/dL Final   • Total Protein 11/04/2021 6.7  6.0 - 8.5 g/dL Final   • Albumin 11/04/2021 4.00  3.50 - 5.20 g/dL Final   • ALT (SGPT) 11/04/2021 25  1 - 33 U/L Final   • AST (SGOT) 11/04/2021 27  1 - 32 U/L Final   • Alkaline Phosphatase 11/04/2021 97  39 - 117 U/L Final   • Total Bilirubin 11/04/2021 0.3  0.0 - 1.2 mg/dL Final   • eGFR Non African Amer 11/04/2021 33* >60 mL/min/1.73 Final   • Globulin 11/04/2021 2.7  gm/dL Final    Calculated Result   • A/G Ratio 11/04/2021 1.5  g/dL Final   • BUN/Creatinine Ratio 11/04/2021 12.7  7.0 - 25.0 Final   • Anion " Gap 11/04/2021 13.0  5.0 - 15.0 mmol/L Final   • proBNP 11/04/2021 238.2  0.0-1,800.0 pg/mL Final   • Troponin T 11/04/2021 0.022  0.000 - 0.030 ng/mL Final   • Extra Tube 11/04/2021 Hold for add-ons.   Final    Auto resulted.   • Extra Tube 11/04/2021 hold for add-on   Final    Auto resulted   • Extra Tube 11/04/2021 Hold for add-ons.   Final    Auto resulted.   • Extra Tube 11/04/2021 Hold for add-ons.   Final    Auto resulted.   • Extra Tube 11/04/2021 hold for add-on   Final    Auto resulted   • WBC 11/04/2021 6.31  3.40 - 10.80 10*3/mm3 Final   • RBC 11/04/2021 3.48* 3.77 - 5.28 10*6/mm3 Final   • Hemoglobin 11/04/2021 10.4* 12.0 - 15.9 g/dL Final   • Hematocrit 11/04/2021 32.0* 34.0 - 46.6 % Final   • MCV 11/04/2021 92.0  79.0 - 97.0 fL Final   • MCH 11/04/2021 29.9  26.6 - 33.0 pg Final   • MCHC 11/04/2021 32.5  31.5 - 35.7 g/dL Final   • RDW 11/04/2021 14.4  12.3 - 15.4 % Final   • RDW-SD 11/04/2021 48.4  37.0 - 54.0 fl Final   • MPV 11/04/2021 11.8  6.0 - 12.0 fL Final   • Platelets 11/04/2021 211  140 - 450 10*3/mm3 Final   • Neutrophil % 11/04/2021 51.6  42.7 - 76.0 % Final   • Lymphocyte % 11/04/2021 23.5  19.6 - 45.3 % Final   • Monocyte % 11/04/2021 14.3* 5.0 - 12.0 % Final   • Eosinophil % 11/04/2021 8.1* 0.3 - 6.2 % Final   • Basophil % 11/04/2021 1.9* 0.0 - 1.5 % Final   • Immature Grans % 11/04/2021 0.6* 0.0 - 0.5 % Final   • Neutrophils, Absolute 11/04/2021 3.26  1.70 - 7.00 10*3/mm3 Final   • Lymphocytes, Absolute 11/04/2021 1.48  0.70 - 3.10 10*3/mm3 Final   • Monocytes, Absolute 11/04/2021 0.90  0.10 - 0.90 10*3/mm3 Final   • Eosinophils, Absolute 11/04/2021 0.51* 0.00 - 0.40 10*3/mm3 Final   • Basophils, Absolute 11/04/2021 0.12  0.00 - 0.20 10*3/mm3 Final   • Immature Grans, Absolute 11/04/2021 0.04  0.00 - 0.05 10*3/mm3 Final   • nRBC 11/04/2021 0.0  0.0 - 0.2 /100 WBC Final   • D-Dimer, Quantitative 11/04/2021 2.07* 0.00 - 0.56 MCGFEU/mL Final   • QT Interval 11/04/2021 396  ms Final   •  QTC Interval 11/04/2021 430  ms Final       Assessment and Plan {CC Problem List  Visit Diagnosis  ROS  Review (Popup)  Health Maintenance  Quality  BestPractice  Medications  SmartSets  SnapShot Encounters  Media :23}   1. Chronic heart failure with preserved ejection fraction (HCC)  Patient will increase Lasix to 20 mg twice a day    We will take an extra 20 mg as needed for 3 to 5 pound weight gain      - furosemide (Lasix) 20 MG tablet; Take 1 tablet by mouth 2 (Two) Times a Day.  Dispense: 60 tablet; Refill: 2      2. Pulmonary hypertension (HCC)  Patient with pulmonary hypertension.  Only wearing home O2 at night.  Encourage patient to wear home O2 during the day with activities and when feeling short of breath.    Referral for sleep medicine completed during ED visit.    3. Essential hypertension  Blood pressure has been well controlled.    4. Stage 3 chronic kidney disease, unspecified whether stage 3a or 3b CKD (HCC)  BMP in 1 week    5. Hypokalemia  Potassium chloride was initiated after ED visit.  BMP in 1 week to follow-up.    Patient will see [facility] in 1 week.  Follow-up in the heart valve center telehealth in 3 weeks.    I spent 15 minutes caring for Zoila on this date of service. This time includes time spent by me in the following activities:preparing for the visit, reviewing tests, performing a medically appropriate examination and/or evaluation , counseling and educating the patient/family/caregiver, ordering medications, tests, or procedures and documenting information in the medical record    Follow Up {Instructions Charge Capture  Follow-up Communications :23}   Return in about 3 weeks (around 11/26/2021) for Video visit, HF.    Patient was given instructions and counseling regarding her condition or for health maintenance advice. Please see specific information pulled into the AVS if appropriate.  Patient was instructed to call the [facility] with any questions,  concerns, or worsening symptoms.

## 2021-11-10 ENCOUNTER — READMISSION MANAGEMENT (OUTPATIENT)
Dept: CALL CENTER | Facility: HOSPITAL | Age: 86
End: 2021-11-10

## 2021-11-10 NOTE — OUTREACH NOTE
CHF Week 3 Survey      Responses   [facility] patient discharged from? [facility]   Does the patient have one of the following disease processes/diagnoses(primary or secondary)? CHF   Week 3 attempt successful? No   Unsuccessful attempts Attempt 1          Ines Green RN

## 2021-11-11 ENCOUNTER — OFFICE VISIT (OUTPATIENT)
Dept: CARDIOLOGY | Facility: CLINIC | Age: 86
End: 2021-11-11

## 2021-11-11 VITALS
WEIGHT: 157 LBS | HEART RATE: 76 BPM | SYSTOLIC BLOOD PRESSURE: 106 MMHG | DIASTOLIC BLOOD PRESSURE: 68 MMHG | BODY MASS INDEX: 30.82 KG/M2 | OXYGEN SATURATION: 98 % | HEIGHT: 60 IN

## 2021-11-11 DIAGNOSIS — I50.32 CHRONIC DIASTOLIC HEART FAILURE (HCC): Primary | ICD-10-CM

## 2021-11-11 DIAGNOSIS — I27.20 PULMONARY HYPERTENSION (HCC): ICD-10-CM

## 2021-11-11 DIAGNOSIS — I35.0 NON-RHEUMATIC AORTIC STENOSIS: ICD-10-CM

## 2021-11-11 DIAGNOSIS — N18.30 STAGE 3 CHRONIC KIDNEY DISEASE, UNSPECIFIED WHETHER STAGE 3A OR 3B CKD (HCC): ICD-10-CM

## 2021-11-11 DIAGNOSIS — I50.32 CHRONIC HEART FAILURE WITH PRESERVED EJECTION FRACTION (HCC): ICD-10-CM

## 2021-11-11 DIAGNOSIS — I35.1 NON-RHEUMATIC AORTIC REGURGITATION: ICD-10-CM

## 2021-11-11 PROCEDURE — 99204 OFFICE O/P NEW MOD 45 MIN: CPT | Performed by: INTERNAL MEDICINE

## 2021-11-11 RX ORDER — METOLAZONE 2.5 MG/1
2.5 TABLET ORAL
Qty: 90 TABLET | Refills: 0 | Status: SHIPPED | OUTPATIENT
Start: 2021-11-11 | End: 2022-06-22

## 2021-11-11 NOTE — PROGRESS NOTES
[facility] Cardiology  [address], [address]  [place], [place], [address]    [phone]  WWW.[facility]           OUTPATIENT CLINIC CONSULTATION NOTE    Patient care team:  Patient Care Team:  Arnoldo Oneil MD as PCP - General (Internal Medicine)  Sourav Montes MD as Referring Physician (Neurology)    Requesting Provider and Reason for consultation: The patient is being seen today at the request of JOSE Jimenez* for heart failure preserved EF.     Subjective:   Chief complaint:   Chief Complaint   Patient presents with   • Chronic heart failure with preserved ejection fraction       HPI:    Zoila Nava is a 88 y.o. female.  Partial problem list, including cardiac problems:  1. Heart failure preserved EF  a. Dry weight appears to be around 145 pounds at home  2. Pulmonary hypertension  3. Valvular heart disease  a. Mild aortic stenosis, moderate aortic regurgitation  4. CKD  5. Ascending aortic aneurysm  6. Hypertension    The patient presents for an evaluation of ongoing volume overload.    Has had intermittent difficulty with volume overload, dyspnea, swelling.  Today, patient is clinically stable.  Has mild to moderate shortness of air with exertion.  Moderate lower extremity swelling.  Denies chest pain or palpitations    Review of Systems:  Positive for fatigue, dyspnea, swelling  All other systems are reviewed and are negative    PFSH:  Patient Active Problem List   Diagnosis   • Allergic rhinitis   • Hyperlipidemia   • Hypertension   • Hypocalcemia   • Hypothyroidism   • Neuropathy   • NUD (nonulcer dyspepsia)   • Painful knee   • Pernicious anemia   • Tremor   • Vitamin B12 deficiency   • Spondylolisthesis of cervical region   • Graves' ophthalmopathy   • Anemia   • Memory impairment of gradual onset   • Ascending aortic aneurysm (HCC)   • Stage 3 chronic kidney disease (HCC)   • Pulmonary hypertension (HCC)   • Chronic heart failure with  preserved ejection fraction (HCC)   • CHF (congestive heart failure), NYHA class I, acute on chronic, diastolic (HCC)   • CHF (congestive heart failure) (HCC)   • Non-rheumatic aortic stenosis   • Non-rheumatic aortic regurgitation         Current Outpatient Medications:   •  aspirin (aspirin) 81 MG EC tablet, Take 81 mg by mouth Daily., Disp: , Rfl:   •  estradiol (ESTRACE) 0.1 MG/GM vaginal cream, Insert 1 applicator into the vagina Daily., Disp: , Rfl:   •  famotidine (PEPCID) 20 MG tablet, TAKE ONE TABLET BY MOUTH TWICE A DAY (Patient taking differently: Take 20 mg by mouth 2 (Two) Times a Day.), Disp: 180 tablet, Rfl: 3  •  furosemide (Lasix) 20 MG tablet, Take 1 tablet by mouth 2 (Two) Times a Day., Disp: 60 tablet, Rfl: 2  •  HYDROcodone-acetaminophen (NORCO) 7.5-325 MG per tablet, Take 1 tablet by mouth Every 8 (Eight) Hours., Disp: , Rfl:   •  levocetirizine (XYZAL) 5 MG tablet, Take 1 tablet by mouth Daily., Disp: , Rfl:   •  levothyroxine (SYNTHROID, LEVOTHROID) 112 MCG tablet, Take 1 tablet by mouth Every Morning., Disp: 90 tablet, Rfl: 1  •  Lyrica 150 MG capsule, Take 150 mg by mouth 3 (Three) Times a Day., Disp: , Rfl:   •  metoprolol succinate XL (TOPROL-XL) 25 MG 24 hr tablet, Take 0.5 tablets by mouth Daily., Disp: 30 tablet, Rfl: 2  •  ondansetron (ZOFRAN) 4 MG tablet, Take 1 tablet by mouth Every 8 (Eight) Hours As Needed for nausea or vomiting., Disp: 30 tablet, Rfl: 0  •  potassium chloride 10 MEQ CR tablet, Take 1 tablet by mouth Daily., Disp: 15 tablet, Rfl: 0  •  metOLazone (ZAROXOLYN) 2.5 MG tablet, Take 1 tablet by mouth 3 (Three) Times a Week if Needed (weight above 148 lbs.)., Disp: 90 tablet, Rfl: 0    Allergies   Allergen Reactions   • Penicillins Other (See Comments)     CHILDHOOD ALLERGY         Social History     Socioeconomic History   • Marital status:    Tobacco Use   • Smoking status: Never Smoker   • Smokeless tobacco: Never Used   Vaping Use   • Vaping Use: Never used  "  Substance and Sexual Activity   • Alcohol use: No   • Drug use: No   • Sexual activity: Defer     Family History   Problem Relation Age of Onset   • Hypertension Mother    • Other Father         cardiac disorder   • Diabetes Father    • Heart disease Father    • Hypertension Sister          Objective:   Physical Exam:  /68 (BP Location: Right arm, Patient Position: Sitting, Cuff Size: Adult)   Pulse 76   Ht 152.4 cm (60\")   Wt 71.2 kg (157 lb)   LMP  (LMP Unknown)   SpO2 98%   BMI 30.66 kg/m²   CONSTITUTIONAL: Well-nourished. In no acute distress.   SKIN: Warm and dry. No rashes noted  HEENT: Head is normocephalic and atraumatic.   NECK: Supple without masses or thyromegaly.   LUNGS: Normal effort. Clear to auscultation bilaterally without wheezing, rhonchi, or rales noted.   CARDIOVASCULAR: Regular rate and rhythm with a normal S1 and S2. JYOTI at RUSB. There is no gallop, rub, or click appreciated. Carotid upstrokes are 2+ and symmetrical without bruits.  2+ radial pulses bilaterally.  There is significant peripheral edema.   ABDOMEN: Normal bowel sounds.    MUSCULOSKELETAL:  No digital cyanosis  NEUROLOGICAL: Nonfocal.  PSYCHIATRIC: Alert, orientated, appropriate affect and mood      Labs:  BUN   Date Value Ref Range Status   11/04/2021 19 8 - 23 mg/dL Final     Creatinine   Date Value Ref Range Status   11/04/2021 1.00 0.60 - 1.30 mg/dL Final     Comment:     Serial Number: [id number]:  [id number]   11/04/2021 1.50 (H) 0.57 - 1.00 mg/dL Final     Potassium   Date Value Ref Range Status   11/04/2021 3.3 (L) 3.5 - 5.2 mmol/L Final     ALT (SGPT)   Date Value Ref Range Status   11/04/2021 25 1 - 33 U/L Final     AST (SGOT)   Date Value Ref Range Status   11/04/2021 27 1 - 32 U/L Final     WBC   Date Value Ref Range Status   11/04/2021 6.31 3.40 - 10.80 10*3/mm3 Final     Hemoglobin   Date Value Ref Range Status   11/04/2021 10.4 (L) 12.0 - 15.9 g/dL Final     Hematocrit   Date Value Ref Range Status "   11/04/2021 32.0 (L) 34.0 - 46.6 % Final     Platelets   Date Value Ref Range Status   11/04/2021 211 140 - 450 10*3/mm3 Final       No results found for: CHOL  Lab Results   Component Value Date    TRIG 51 04/04/2016     Lab Results   Component Value Date    HDL 72 (H) 04/04/2016     Lab Results   Component Value Date    LDL 92 04/04/2016     No components found for: LDLDIRECTC    Diagnostic Data:    Procedures    Results for orders placed during the hospital encounter of 10/10/21    Adult Transthoracic Echo Complete W/ Cont if Necessary Per Protocol    Interpretation Summary  · Moderate aortic valve regurgitation is present.  · Mild aortic valve stenosis is present.  · Estimated right ventricular systolic pressure from tricuspid regurgitation is moderately elevated (45-55 mmHg).  · Moderate tricuspid valve regurgitation is present.  · Estimated left ventricular EF = 72% Estimated left ventricular EF was in agreement with the calculated left ventricular EF. Left ventricular ejection fraction appears to be greater than 70%. Left ventricular systolic function is hyperdynamic (EF > 70%).  · Left ventricular diastolic function is consistent with (grade I) impaired relaxation.  · Moderate pulmonary hypertension is present.  · Normal right ventricular cavity size, wall thickness, systolic function and septal motion noted.  · There is no evidence of pericardial effusion.      Assessment and Plan:     Chronic diastolic heart failure   Chronic heart failure with preserved ejection fraction   Non-rheumatic aortic regurgitation  Non-rheumatic aortic stenosis  Pulmonary hypertension   Stage 3 chronic kidney disease, unspecified whether stage 3a or 3b CKD   -Exercise as tolerated  -Compression stockings, raising legs when seated  -Continue Lasix 20 mg twice daily  -Continue potassium supplementation  -Adding metolazone 2.5 mg 3 times weekly as needed when her weight is 3 pounds above her baseline of approximately 145 pounds at  home  -Keep follow-up in the heart and valve clinic in 1 month.  Depending on metolazone usage at that time, recommend repeat BMP  -Monitoring for angina    - Return in about 6 months (around 5/11/2022) for Next scheduled follow up, or sooner if needed.    Electronically signed by Basil Richter MD, 11/11/21, 8:44 AM EST.

## 2021-11-12 ENCOUNTER — READMISSION MANAGEMENT (OUTPATIENT)
Dept: CALL CENTER | Facility: HOSPITAL | Age: 86
End: 2021-11-12

## 2021-11-12 NOTE — OUTREACH NOTE
CHF Week 3 Survey      Responses   [facility] patient discharged from? [place]   Does the patient have one of the following disease processes/diagnoses(primary or secondary)? CHF   Week 3 attempt successful? Yes   Call start time 1244   Call end time 1248   Discharge diagnosis CHF   Person spoke with today (if not patient) and relationship Spouse Matt Caballero reviewed with patient/caregiver? Yes   Is the patient taking all medications as directed (includes completed medication regime)? Yes   Comments regarding appointments Cardio appt 11/11/21- Completed   Does the patient have a primary care provider?  Yes   Has the patient kept scheduled appointments due by today? Yes   Comments ordered compression socks from Cardio    What DME was ordered? [employer] for home O2   Pulse Ox monitoring Intermittent   Pulse Ox device source Patient   O2 Sat comments 94-99% 2l    O2 Sat: education provided Sat levels,  Monitoring frequency   Did the patient receive a copy of their discharge instructions? Yes   Nursing interventions Reviewed instructions with patient   What is the patient's perception of their health status since discharge? Improving   Nursing interventions Nurse provided patient education   Is the patient weighing daily? Yes   Does the patient have scales? Yes   Daily weight interventions Education provided on importance of daily weight   Is the patient able to teach back Heart Failure diet management? Yes   Is the patient able to teach back Heart Failure Zones? Yes   Is the patient able to teach back signs and symptoms of worsening condition? (i.e. weight gain, shortness of air, etc.) Yes   Is the patient/caregiver able to teach back the hierarchy of who to call/visit for symptoms/problems? PCP, Specialist, Home health nurse, Urgent Care, ED, 911 Yes   CHF Week 3 call completed? Yes   Wrap up additional comments pT HAS BEEN TO FU APPTS.           Milla Kim RN

## 2021-11-18 RX ORDER — POTASSIUM CHLORIDE 750 MG/1
10 TABLET, FILM COATED, EXTENDED RELEASE ORAL DAILY
Qty: 90 TABLET | Refills: 0 | Status: ON HOLD | OUTPATIENT
Start: 2021-11-18 | End: 2021-11-20 | Stop reason: SDUPTHER

## 2021-11-19 ENCOUNTER — APPOINTMENT (OUTPATIENT)
Dept: GENERAL RADIOLOGY | Facility: HOSPITAL | Age: 86
End: 2021-11-19

## 2021-11-19 ENCOUNTER — HOSPITAL ENCOUNTER (OUTPATIENT)
Facility: HOSPITAL | Age: 86
Setting detail: OBSERVATION
LOS: 1 days | Discharge: HOME | End: 2021-11-20
Attending: EMERGENCY MEDICINE | Admitting: FAMILY MEDICINE

## 2021-11-19 ENCOUNTER — APPOINTMENT (OUTPATIENT)
Dept: CT IMAGING | Facility: HOSPITAL | Age: 86
End: 2021-11-19

## 2021-11-19 DIAGNOSIS — R06.02 SHORTNESS OF BREATH: Primary | ICD-10-CM

## 2021-11-19 DIAGNOSIS — Z86.79 HISTORY OF PULMONARY HYPERTENSION: ICD-10-CM

## 2021-11-19 DIAGNOSIS — Z86.79 HISTORY OF CHF (CONGESTIVE HEART FAILURE): ICD-10-CM

## 2021-11-19 DIAGNOSIS — M79.601 BILATERAL ARM PAIN: ICD-10-CM

## 2021-11-19 DIAGNOSIS — R77.8 ELEVATED TROPONIN: ICD-10-CM

## 2021-11-19 DIAGNOSIS — M79.602 BILATERAL ARM PAIN: ICD-10-CM

## 2021-11-19 PROBLEM — E87.6 HYPOKALEMIA: Status: ACTIVE | Noted: 2021-11-19

## 2021-11-19 LAB
ALBUMIN SERPL-MCNC: 4.3 G/DL (ref 3.5–5.2)
ALBUMIN/GLOB SERPL: 1.6 G/DL
ALP SERPL-CCNC: 107 U/L (ref 39–117)
ALT SERPL W P-5'-P-CCNC: 24 U/L (ref 1–33)
ANION GAP SERPL CALCULATED.3IONS-SCNC: 14 MMOL/L (ref 5–15)
AST SERPL-CCNC: 31 U/L (ref 1–32)
BASOPHILS # BLD AUTO: 0.11 10*3/MM3 (ref 0–0.2)
BASOPHILS NFR BLD AUTO: 1.4 % (ref 0–1.5)
BILIRUB SERPL-MCNC: 0.2 MG/DL (ref 0–1.2)
BUN SERPL-MCNC: 22 MG/DL (ref 8–23)
BUN/CREAT SERPL: 12.8 (ref 7–25)
CALCIUM SPEC-SCNC: 8.7 MG/DL (ref 8.6–10.5)
CHLORIDE SERPL-SCNC: 93 MMOL/L (ref 98–107)
CO2 SERPL-SCNC: 28 MMOL/L (ref 22–29)
CREAT SERPL-MCNC: 1.72 MG/DL (ref 0.57–1)
D DIMER PPP FEU-MCNC: 2.26 MCGFEU/ML (ref 0–0.56)
DEPRECATED RDW RBC AUTO: 45.1 FL (ref 37–54)
EOSINOPHIL # BLD AUTO: 0.28 10*3/MM3 (ref 0–0.4)
EOSINOPHIL NFR BLD AUTO: 3.5 % (ref 0.3–6.2)
ERYTHROCYTE [DISTWIDTH] IN BLOOD BY AUTOMATED COUNT: 13.7 % (ref 12.3–15.4)
FLUAV SUBTYP SPEC NAA+PROBE: NOT DETECTED
FLUBV RNA ISLT QL NAA+PROBE: NOT DETECTED
GFR SERPL CREATININE-BSD FRML MDRD: 28 ML/MIN/1.73
GLOBULIN UR ELPH-MCNC: 2.7 GM/DL
GLUCOSE SERPL-MCNC: 107 MG/DL (ref 65–99)
HCT VFR BLD AUTO: 35.3 % (ref 34–46.6)
HGB BLD-MCNC: 11.8 G/DL (ref 12–15.9)
HOLD SPECIMEN: NORMAL
HOLD SPECIMEN: NORMAL
IMM GRANULOCYTES # BLD AUTO: 0.05 10*3/MM3 (ref 0–0.05)
IMM GRANULOCYTES NFR BLD AUTO: 0.6 % (ref 0–0.5)
LYMPHOCYTES # BLD AUTO: 1.55 10*3/MM3 (ref 0.7–3.1)
LYMPHOCYTES NFR BLD AUTO: 19.2 % (ref 19.6–45.3)
MCH RBC QN AUTO: 29.9 PG (ref 26.6–33)
MCHC RBC AUTO-ENTMCNC: 33.4 G/DL (ref 31.5–35.7)
MCV RBC AUTO: 89.4 FL (ref 79–97)
MONOCYTES # BLD AUTO: 0.83 10*3/MM3 (ref 0.1–0.9)
MONOCYTES NFR BLD AUTO: 10.3 % (ref 5–12)
NEUTROPHILS NFR BLD AUTO: 5.27 10*3/MM3 (ref 1.7–7)
NEUTROPHILS NFR BLD AUTO: 65 % (ref 42.7–76)
NRBC BLD AUTO-RTO: 0 /100 WBC (ref 0–0.2)
NT-PROBNP SERPL-MCNC: 147.6 PG/ML (ref 0–1800)
PLATELET # BLD AUTO: 265 10*3/MM3 (ref 140–450)
PMV BLD AUTO: 11.3 FL (ref 6–12)
POTASSIUM SERPL-SCNC: 3.3 MMOL/L (ref 3.5–5.2)
PROT SERPL-MCNC: 7 G/DL (ref 6–8.5)
QT INTERVAL: 408 MS
QTC INTERVAL: 420 MS
RBC # BLD AUTO: 3.95 10*6/MM3 (ref 3.77–5.28)
SARS-COV-2 RNA PNL SPEC NAA+PROBE: NOT DETECTED
SODIUM SERPL-SCNC: 135 MMOL/L (ref 136–145)
TROPONIN T SERPL-MCNC: 0.04 NG/ML (ref 0–0.03)
WBC NRBC COR # BLD: 8.09 10*3/MM3 (ref 3.4–10.8)
WHOLE BLOOD HOLD SPECIMEN: NORMAL
WHOLE BLOOD HOLD SPECIMEN: NORMAL

## 2021-11-19 PROCEDURE — 99283 EMERGENCY DEPT VISIT LOW MDM: CPT

## 2021-11-19 PROCEDURE — 71275 CT ANGIOGRAPHY CHEST: CPT

## 2021-11-19 PROCEDURE — 85025 COMPLETE CBC W/AUTO DIFF WBC: CPT

## 2021-11-19 PROCEDURE — 71045 X-RAY EXAM CHEST 1 VIEW: CPT

## 2021-11-19 PROCEDURE — C9803 HOPD COVID-19 SPEC COLLECT: HCPCS

## 2021-11-19 PROCEDURE — 84484 ASSAY OF TROPONIN QUANT: CPT

## 2021-11-19 PROCEDURE — 36415 COLL VENOUS BLD VENIPUNCTURE: CPT

## 2021-11-19 PROCEDURE — 85379 FIBRIN DEGRADATION QUANT: CPT | Performed by: EMERGENCY MEDICINE

## 2021-11-19 PROCEDURE — 99284 EMERGENCY DEPT VISIT MOD MDM: CPT

## 2021-11-19 PROCEDURE — 83880 ASSAY OF NATRIURETIC PEPTIDE: CPT

## 2021-11-19 PROCEDURE — 99220 PR INITIAL OBSERVATION CARE/DAY 70 MINUTES: CPT | Performed by: INTERNAL MEDICINE

## 2021-11-19 PROCEDURE — 0 IOPAMIDOL PER 1 ML: Performed by: EMERGENCY MEDICINE

## 2021-11-19 PROCEDURE — 80053 COMPREHEN METABOLIC PANEL: CPT

## 2021-11-19 PROCEDURE — 93005 ELECTROCARDIOGRAM TRACING: CPT

## 2021-11-19 PROCEDURE — 87636 SARSCOV2 & INF A&B AMP PRB: CPT | Performed by: EMERGENCY MEDICINE

## 2021-11-19 RX ORDER — SODIUM CHLORIDE 0.9 % (FLUSH) 0.9 %
10 SYRINGE (ML) INJECTION AS NEEDED
Status: DISCONTINUED | OUTPATIENT
Start: 2021-11-19 | End: 2021-11-20 | Stop reason: HOSPADM

## 2021-11-19 RX ORDER — ASPIRIN 81 MG/1
324 TABLET, CHEWABLE ORAL ONCE
Status: COMPLETED | OUTPATIENT
Start: 2021-11-19 | End: 2021-11-19

## 2021-11-19 RX ORDER — FLUTICASONE PROPIONATE 50 MCG
2 SPRAY, SUSPENSION (ML) NASAL DAILY
Status: DISCONTINUED | OUTPATIENT
Start: 2021-11-20 | End: 2021-11-20

## 2021-11-19 RX ADMIN — SODIUM CHLORIDE 500 ML: 9 INJECTION, SOLUTION INTRAVENOUS at 22:03

## 2021-11-19 RX ADMIN — ASPIRIN 324 MG: 81 TABLET, CHEWABLE ORAL at 22:52

## 2021-11-19 RX ADMIN — IOPAMIDOL 75 ML: 755 INJECTION, SOLUTION INTRAVENOUS at 22:31

## 2021-11-20 ENCOUNTER — READMISSION MANAGEMENT (OUTPATIENT)
Dept: CALL CENTER | Facility: HOSPITAL | Age: 86
End: 2021-11-20

## 2021-11-20 VITALS
HEIGHT: 60 IN | RESPIRATION RATE: 18 BRPM | DIASTOLIC BLOOD PRESSURE: 84 MMHG | OXYGEN SATURATION: 95 % | HEART RATE: 87 BPM | BODY MASS INDEX: 28.86 KG/M2 | WEIGHT: 147 LBS | SYSTOLIC BLOOD PRESSURE: 148 MMHG | TEMPERATURE: 98.6 F

## 2021-11-20 PROBLEM — E87.6 HYPOKALEMIA: Status: RESOLVED | Noted: 2021-11-19 | Resolved: 2021-11-20

## 2021-11-20 PROBLEM — R06.02 SHORTNESS OF BREATH: Status: RESOLVED | Noted: 2021-11-19 | Resolved: 2021-11-20

## 2021-11-20 LAB
ANION GAP SERPL CALCULATED.3IONS-SCNC: 15 MMOL/L (ref 5–15)
BASOPHILS # BLD AUTO: 0.11 10*3/MM3 (ref 0–0.2)
BASOPHILS NFR BLD AUTO: 1.3 % (ref 0–1.5)
BUN SERPL-MCNC: 22 MG/DL (ref 8–23)
BUN/CREAT SERPL: 14.8 (ref 7–25)
CALCIUM SPEC-SCNC: 8.9 MG/DL (ref 8.6–10.5)
CHLORIDE SERPL-SCNC: 97 MMOL/L (ref 98–107)
CO2 SERPL-SCNC: 25 MMOL/L (ref 22–29)
CREAT SERPL-MCNC: 1.49 MG/DL (ref 0.57–1)
DEPRECATED RDW RBC AUTO: 40.9 FL (ref 37–54)
EOSINOPHIL # BLD AUTO: 0.14 10*3/MM3 (ref 0–0.4)
EOSINOPHIL NFR BLD AUTO: 1.7 % (ref 0.3–6.2)
ERYTHROCYTE [DISTWIDTH] IN BLOOD BY AUTOMATED COUNT: 13.2 % (ref 12.3–15.4)
GFR SERPL CREATININE-BSD FRML MDRD: 33 ML/MIN/1.73
GLUCOSE SERPL-MCNC: 96 MG/DL (ref 65–99)
HCT VFR BLD AUTO: 32.5 % (ref 34–46.6)
HGB BLD-MCNC: 11.4 G/DL (ref 12–15.9)
HOLD SPECIMEN: NORMAL
IMM GRANULOCYTES # BLD AUTO: 0.07 10*3/MM3 (ref 0–0.05)
IMM GRANULOCYTES NFR BLD AUTO: 0.8 % (ref 0–0.5)
LYMPHOCYTES # BLD AUTO: 1.38 10*3/MM3 (ref 0.7–3.1)
LYMPHOCYTES NFR BLD AUTO: 16.5 % (ref 19.6–45.3)
MCH RBC QN AUTO: 29.8 PG (ref 26.6–33)
MCHC RBC AUTO-ENTMCNC: 35.1 G/DL (ref 31.5–35.7)
MCV RBC AUTO: 84.9 FL (ref 79–97)
MONOCYTES # BLD AUTO: 0.85 10*3/MM3 (ref 0.1–0.9)
MONOCYTES NFR BLD AUTO: 10.2 % (ref 5–12)
NEUTROPHILS NFR BLD AUTO: 5.79 10*3/MM3 (ref 1.7–7)
NEUTROPHILS NFR BLD AUTO: 69.5 % (ref 42.7–76)
NRBC BLD AUTO-RTO: 0 /100 WBC (ref 0–0.2)
PLATELET # BLD AUTO: 259 10*3/MM3 (ref 140–450)
PMV BLD AUTO: 11.3 FL (ref 6–12)
POTASSIUM SERPL-SCNC: 3.4 MMOL/L (ref 3.5–5.2)
POTASSIUM SERPL-SCNC: 4 MMOL/L (ref 3.5–5.2)
RBC # BLD AUTO: 3.83 10*6/MM3 (ref 3.77–5.28)
SODIUM SERPL-SCNC: 137 MMOL/L (ref 136–145)
TROPONIN T SERPL-MCNC: 0.03 NG/ML (ref 0–0.03)
WBC NRBC COR # BLD: 8.34 10*3/MM3 (ref 3.4–10.8)

## 2021-11-20 PROCEDURE — 84132 ASSAY OF SERUM POTASSIUM: CPT | Performed by: INTERNAL MEDICINE

## 2021-11-20 PROCEDURE — G0378 HOSPITAL OBSERVATION PER HR: HCPCS

## 2021-11-20 PROCEDURE — 99217 PR OBSERVATION CARE DISCHARGE MANAGEMENT: CPT | Performed by: FAMILY MEDICINE

## 2021-11-20 PROCEDURE — 96372 THER/PROPH/DIAG INJ SC/IM: CPT

## 2021-11-20 PROCEDURE — 85025 COMPLETE CBC W/AUTO DIFF WBC: CPT | Performed by: NURSE PRACTITIONER

## 2021-11-20 PROCEDURE — 25010000002 HEPARIN (PORCINE) PER 1000 UNITS: Performed by: NURSE PRACTITIONER

## 2021-11-20 PROCEDURE — 84484 ASSAY OF TROPONIN QUANT: CPT | Performed by: NURSE PRACTITIONER

## 2021-11-20 PROCEDURE — 80048 BASIC METABOLIC PNL TOTAL CA: CPT | Performed by: NURSE PRACTITIONER

## 2021-11-20 RX ORDER — ONDANSETRON 4 MG/1
4 TABLET, FILM COATED ORAL EVERY 6 HOURS PRN
Status: DISCONTINUED | OUTPATIENT
Start: 2021-11-20 | End: 2021-11-20 | Stop reason: HOSPADM

## 2021-11-20 RX ORDER — METOLAZONE 2.5 MG/1
2.5 TABLET ORAL
Status: DISCONTINUED | OUTPATIENT
Start: 2021-11-20 | End: 2021-11-20 | Stop reason: HOSPADM

## 2021-11-20 RX ORDER — CETIRIZINE HYDROCHLORIDE 10 MG/1
10 TABLET ORAL DAILY
Status: DISCONTINUED | OUTPATIENT
Start: 2021-11-20 | End: 2021-11-20 | Stop reason: HOSPADM

## 2021-11-20 RX ORDER — ACETAMINOPHEN 160 MG/5ML
650 SOLUTION ORAL EVERY 4 HOURS PRN
Status: DISCONTINUED | OUTPATIENT
Start: 2021-11-20 | End: 2021-11-20 | Stop reason: HOSPADM

## 2021-11-20 RX ORDER — ACETAMINOPHEN 650 MG/1
650 SUPPOSITORY RECTAL EVERY 4 HOURS PRN
Status: DISCONTINUED | OUTPATIENT
Start: 2021-11-20 | End: 2021-11-20 | Stop reason: HOSPADM

## 2021-11-20 RX ORDER — ECHINACEA PURPUREA EXTRACT 125 MG
2 TABLET ORAL AS NEEDED
Status: DISCONTINUED | OUTPATIENT
Start: 2021-11-20 | End: 2021-11-20 | Stop reason: HOSPADM

## 2021-11-20 RX ORDER — POTASSIUM CHLORIDE 1.5 G/1.77G
40 POWDER, FOR SOLUTION ORAL AS NEEDED
Status: DISCONTINUED | OUTPATIENT
Start: 2021-11-20 | End: 2021-11-20 | Stop reason: HOSPADM

## 2021-11-20 RX ORDER — FLUTICASONE PROPIONATE 50 MCG
2 SPRAY, SUSPENSION (ML) NASAL DAILY
Qty: 18.2 ML | Refills: 0 | Status: ON HOLD | OUTPATIENT
Start: 2021-11-21

## 2021-11-20 RX ORDER — ECHINACEA PURPUREA EXTRACT 125 MG
2 TABLET ORAL AS NEEDED
Qty: 60 ML | Refills: 0 | Status: ON HOLD | OUTPATIENT
Start: 2021-11-20

## 2021-11-20 RX ORDER — PREGABALIN 75 MG/1
150 CAPSULE ORAL 3 TIMES DAILY
Status: DISCONTINUED | OUTPATIENT
Start: 2021-11-20 | End: 2021-11-20 | Stop reason: HOSPADM

## 2021-11-20 RX ORDER — ASPIRIN 81 MG/1
81 TABLET ORAL DAILY
Status: DISCONTINUED | OUTPATIENT
Start: 2021-11-20 | End: 2021-11-20 | Stop reason: HOSPADM

## 2021-11-20 RX ORDER — ONDANSETRON 2 MG/ML
4 INJECTION INTRAMUSCULAR; INTRAVENOUS EVERY 6 HOURS PRN
Status: DISCONTINUED | OUTPATIENT
Start: 2021-11-20 | End: 2021-11-20 | Stop reason: HOSPADM

## 2021-11-20 RX ORDER — POTASSIUM CHLORIDE 750 MG/1
40 CAPSULE, EXTENDED RELEASE ORAL AS NEEDED
Status: DISCONTINUED | OUTPATIENT
Start: 2021-11-20 | End: 2021-11-20 | Stop reason: HOSPADM

## 2021-11-20 RX ORDER — SODIUM CHLORIDE 0.9 % (FLUSH) 0.9 %
10 SYRINGE (ML) INJECTION AS NEEDED
Status: DISCONTINUED | OUTPATIENT
Start: 2021-11-20 | End: 2021-11-20 | Stop reason: HOSPADM

## 2021-11-20 RX ORDER — ESTRADIOL 0.1 MG/G
1 CREAM VAGINAL DAILY
Status: DISCONTINUED | OUTPATIENT
Start: 2021-11-20 | End: 2021-11-20 | Stop reason: HOSPADM

## 2021-11-20 RX ORDER — METOPROLOL SUCCINATE 25 MG/1
12.5 TABLET, EXTENDED RELEASE ORAL
Status: DISCONTINUED | OUTPATIENT
Start: 2021-11-20 | End: 2021-11-20 | Stop reason: HOSPADM

## 2021-11-20 RX ORDER — POTASSIUM CHLORIDE 750 MG/1
10 CAPSULE, EXTENDED RELEASE ORAL DAILY
Status: DISCONTINUED | OUTPATIENT
Start: 2021-11-20 | End: 2021-11-20 | Stop reason: HOSPADM

## 2021-11-20 RX ORDER — FLUTICASONE PROPIONATE 50 MCG
2 SPRAY, SUSPENSION (ML) NASAL DAILY
Status: DISCONTINUED | OUTPATIENT
Start: 2021-11-20 | End: 2021-11-20 | Stop reason: HOSPADM

## 2021-11-20 RX ORDER — ACETAMINOPHEN 325 MG/1
650 TABLET ORAL EVERY 4 HOURS PRN
Status: DISCONTINUED | OUTPATIENT
Start: 2021-11-20 | End: 2021-11-20 | Stop reason: HOSPADM

## 2021-11-20 RX ORDER — HYDROCODONE BITARTRATE AND ACETAMINOPHEN 7.5; 325 MG/1; MG/1
1 TABLET ORAL EVERY 8 HOURS
Status: DISCONTINUED | OUTPATIENT
Start: 2021-11-20 | End: 2021-11-20 | Stop reason: HOSPADM

## 2021-11-20 RX ORDER — LEVOTHYROXINE SODIUM 112 UG/1
112 TABLET ORAL EVERY MORNING
Status: DISCONTINUED | OUTPATIENT
Start: 2021-11-20 | End: 2021-11-20 | Stop reason: HOSPADM

## 2021-11-20 RX ORDER — FUROSEMIDE 20 MG/1
20 TABLET ORAL 2 TIMES DAILY
Status: DISCONTINUED | OUTPATIENT
Start: 2021-11-20 | End: 2021-11-20 | Stop reason: HOSPADM

## 2021-11-20 RX ORDER — HEPARIN SODIUM 5000 [USP'U]/ML
5000 INJECTION, SOLUTION INTRAVENOUS; SUBCUTANEOUS EVERY 12 HOURS SCHEDULED
Status: DISCONTINUED | OUTPATIENT
Start: 2021-11-20 | End: 2021-11-20 | Stop reason: HOSPADM

## 2021-11-20 RX ORDER — FAMOTIDINE 20 MG/1
20 TABLET, FILM COATED ORAL 2 TIMES DAILY
Status: DISCONTINUED | OUTPATIENT
Start: 2021-11-20 | End: 2021-11-20 | Stop reason: HOSPADM

## 2021-11-20 RX ORDER — SODIUM CHLORIDE 0.9 % (FLUSH) 0.9 %
10 SYRINGE (ML) INJECTION EVERY 12 HOURS SCHEDULED
Status: DISCONTINUED | OUTPATIENT
Start: 2021-11-20 | End: 2021-11-20 | Stop reason: HOSPADM

## 2021-11-20 RX ORDER — IPRATROPIUM BROMIDE AND ALBUTEROL SULFATE 2.5; .5 MG/3ML; MG/3ML
3 SOLUTION RESPIRATORY (INHALATION) EVERY 4 HOURS PRN
Status: DISCONTINUED | OUTPATIENT
Start: 2021-11-20 | End: 2021-11-20 | Stop reason: HOSPADM

## 2021-11-20 RX ORDER — POTASSIUM CHLORIDE 750 MG/1
20 TABLET, FILM COATED, EXTENDED RELEASE ORAL DAILY
Qty: 60 TABLET | Refills: 0 | Status: SHIPPED | OUTPATIENT
Start: 2021-11-20 | End: 2021-11-30 | Stop reason: SDUPTHER

## 2021-11-20 RX ADMIN — POTASSIUM CHLORIDE 10 MEQ: 750 CAPSULE, EXTENDED RELEASE ORAL at 08:21

## 2021-11-20 RX ADMIN — SALINE NASAL SPRAY 2 SPRAY: 1.5 SOLUTION NASAL at 05:12

## 2021-11-20 RX ADMIN — PREGABALIN 150 MG: 75 CAPSULE ORAL at 05:12

## 2021-11-20 RX ADMIN — HYDROCODONE BITARTRATE AND ACETAMINOPHEN 1 TABLET: 7.5; 325 TABLET ORAL at 05:12

## 2021-11-20 RX ADMIN — CETIRIZINE HYDROCHLORIDE 10 MG: 10 TABLET, FILM COATED ORAL at 08:20

## 2021-11-20 RX ADMIN — LEVOTHYROXINE SODIUM 112 MCG: 112 TABLET ORAL at 05:12

## 2021-11-20 RX ADMIN — POTASSIUM CHLORIDE 40 MEQ: 750 CAPSULE, EXTENDED RELEASE ORAL at 01:17

## 2021-11-20 RX ADMIN — FAMOTIDINE 20 MG: 20 TABLET ORAL at 08:20

## 2021-11-20 RX ADMIN — HEPARIN SODIUM 5000 UNITS: 5000 INJECTION, SOLUTION INTRAVENOUS; SUBCUTANEOUS at 08:19

## 2021-11-20 RX ADMIN — ASPIRIN 81 MG: 81 TABLET, COATED ORAL at 08:21

## 2021-11-20 RX ADMIN — POTASSIUM CHLORIDE 40 MEQ: 750 CAPSULE, EXTENDED RELEASE ORAL at 05:12

## 2021-11-20 RX ADMIN — SODIUM CHLORIDE, PRESERVATIVE FREE 10 ML: 5 INJECTION INTRAVENOUS at 01:18

## 2021-11-20 RX ADMIN — METOPROLOL SUCCINATE 12.5 MG: 25 TABLET, EXTENDED RELEASE ORAL at 08:20

## 2021-11-20 RX ADMIN — FUROSEMIDE 20 MG: 20 TABLET ORAL at 08:20

## 2021-11-20 RX ADMIN — FLUTICASONE PROPIONATE 2 SPRAY: 50 SPRAY, METERED NASAL at 01:18

## 2021-11-20 NOTE — OUTREACH NOTE
CHF Week 4 Survey      Responses   [facility] patient discharged from? [place]   Does the patient have one of the following disease processes/diagnoses(primary or secondary)? CHF   Week 4 attempt successful? No   Revoke Readmitted          Ruthie Fountain RN

## 2021-11-20 NOTE — DISCHARGE SUMMARY
[facility] Medicine Services  DISCHARGE SUMMARY    Patient Name: Zoila Nava  : 1933  MRN: [id number]    Date of Admission: 2021  9:05 PM  Date of Discharge:  2021  Primary Care Physician: Arnoldo Oneil MD    Consults     No orders found for last 30 day(s).          Hospital Course     Presenting Problem:   Shortness of breath [R06.02]    Active Hospital Problems    Diagnosis  POA   • Chronic heart failure with preserved ejection fraction (HCC) [I50.32]  Yes   • Pulmonary hypertension (HCC) [I27.20]  Yes   • Stage 3 chronic kidney disease (HCC) [N18.30]  Yes   • Hyperlipidemia [E78.5]  Yes   • Hypertension [I10]  Yes   • Hypothyroidism [E03.9]  Yes      Resolved Hospital Problems    Diagnosis Date Resolved POA   • Hypokalemia [E87.6] 2021 Yes   • Shortness of breath [R06.02] 2021 Yes          Hospital Course:  Zoila Nava is a 88 y.o. female with a history of chronic diastolic heart failure, nonrheumatic aortic regurgitation, pulmonary hypertension, CKD, ascending aortic aneurysm, hypertension, hyperlipidemia, hypothyroidism, presents to the ED with complaints of shortness of breath.       Chronic systolic heart failure with preserved EF  Pulmonary hypertension  Elevated troponin  Elevated D-dimer  --CTA of the chest shows prominence of the main pulmonary artery suggesting underlying pulmonary arterial hypertension, stable ectasia of the ascending thoracic aorta, no acute pulmonary process  --Echo 10/11/2021: EF 72%, moderate pulmonary hypertension moderate aortic valve regurgitation, mild aortic valve stenosis, right ventricular systolic pressure from tricuspid regurgitation is moderately elevated  --Follows with Dr. Richter, was started on metolazone 2.5 mg 3 times weekly as needed for weight gain 3 pounds above baseline on 2021  --Takes Lasix 20 mg twice daily  --Continue aspirin daily  --Metoprolol with hold  parameters     Hypokalemia  --Increase daily replacement to 20mEQ     Hypertension  Hyperlipidemia  --Continue home medications      Hypothyroidism  --Continue levothyroxine     CKD  --baseline creatinine 1.0-1.5  --creatinine 1.49 today     Generalized weakness  Fall  --Fall precautions     Congestion  Seasonal allergies  --Flonase/nasal saline   --Continue home Xyzal    Discharge Follow Up Recommendations for outpatient labs/diagnostics:  -PCP 1 week  -Keep scheduled follow-up with heart and valve on 11/24    Day of Discharge     HPI:   Patient seen and examined. RN notes reviewed. No acute events overnight. Pt doing well, on her baseline 2L NC. Feels better, asking to go home.     Review of Systems  Gen- No fevers, chills  CV- No chest pain, palpitations  Resp- No cough, dyspnea  GI- No N/V/D, abd pain    Vital Signs:   Temp:  [98 °F (36.7 °C)-98.6 °F (37 °C)] 98.6 °F (37 °C)  Heart Rate:  [] 69  Resp:  [18-20] 18  BP: (128-144)/(76-97) 129/77     Physical Exam:  Constitutional: No acute distress, awake, alert  HENT: NCAT, mucous membranes moist  Respiratory: Clear to auscultation bilaterally, respiratory effort normal   Cardiovascular: RRR, no murmurs, rubs, or gallops  Gastrointestinal: Positive bowel sounds, soft, nontender, nondistended  Musculoskeletal: No bilateral ankle edema  Psychiatric: Appropriate affect, cooperative  Neurologic: Oriented x 3, speech clear  Skin: No rashes    Pertinent  and/or Most Recent Results     LAB RESULTS:      Lab 11/20/21 0313 11/19/21 2057   WBC 8.34 8.09   HEMOGLOBIN 11.4* 11.8*   HEMATOCRIT 32.5* 35.3   PLATELETS 259 265   NEUTROS ABS 5.79 5.27   IMMATURE GRANS (ABS) 0.07* 0.05   LYMPHS ABS 1.38 1.55   MONOS ABS 0.85 0.83   EOS ABS 0.14 0.28   MCV 84.9 89.4   D DIMER QUANT  --  2.26*         Lab 11/20/21 0313 11/19/21 2057   SODIUM 137 135*   POTASSIUM 3.4* 3.3*   CHLORIDE 97* 93*   CO2 25.0 28.0   ANION GAP 15.0 14.0   BUN 22 22   CREATININE 1.49* 1.72*   GLUCOSE  96 107*   CALCIUM 8.9 8.7         Lab 11/19/21 2057   TOTAL PROTEIN 7.0   ALBUMIN 4.30   GLOBULIN 2.7   ALT (SGPT) 24   AST (SGOT) 31   BILIRUBIN 0.2   ALK PHOS 107         Lab 11/20/21  0313 11/19/21 2057   PROBNP  --  147.6   TROPONIN T 0.028 0.036*                 Brief Urine Lab Results  (Last result in the past 365 days)      Color   Clarity   Blood   Leuk Est   Nitrite   Protein   CREAT   Urine HCG        05/18/21 2026 Yellow   Clear   Negative   Trace   Negative   Negative               Microbiology Results (last 10 days)     Procedure Component Value - Date/Time    COVID PRE-OP / PRE-PROCEDURE SCREENING ORDER (NO ISOLATION) - Swab, Nasopharynx [[id number]]  (Normal) Collected: 11/19/21 2120    Lab Status: Final result Specimen: Swab from Nasopharynx Updated: 11/19/21 2151    Narrative:      The following orders were created for panel order COVID PRE-OP / PRE-PROCEDURE SCREENING ORDER (NO ISOLATION) - Swab, Nasopharynx.  Procedure                               Abnormality         Status                     ---------                               -----------         ------                     COVID-19 and FLU A/B PCR...[[id number]]  Normal              Final result                 Please view results for these tests on the individual orders.    COVID-19 and FLU A/B PCR - Swab, Nasopharynx [[id number]]  (Normal) Collected: 11/19/21 2120    Lab Status: Final result Specimen: Swab from Nasopharynx Updated: 11/19/21 2151     COVID19 Not Detected     Influenza A PCR Not Detected     Influenza B PCR Not Detected    Narrative:      Fact sheet for providers: https://www.fda.gov/media/901148/download    Fact sheet for patients: https://www.fda.gov/media/486758/download    Test performed by PCR.          XR Chest 1 View    Result Date: 11/19/2021  CR Chest 1 Vw INDICATION: Shortness of breath COMPARISON:  11/4/2021 FINDINGS: Portable AP view(s) of the chest.  The heart and mediastinal contours are normal. Heart is  enlarged and there is chronic changes of COPD. This appears similar to prior.. No pneumothorax or pleural effusion.     No acute cardiopulmonary findings. Signer Name: Brianda Beltran MD  Signed: 11/19/2021 9:38 PM  Workstation Name: [id number]  [facility]    CT Angiogram Chest    Result Date: 11/19/2021  CTA Chest INDICATION: Shortness of air today. TECHNIQUE: CT angiogram of the chest with IV contrast. 3-D reconstructions were obtained and reviewed.   Radiation dose reduction techniques included automated exposure control or exposure modulation based on body size. Count of known CT and cardiac nuc med studies performed in previous 12 months: 2. COMPARISON: CTA chest 11/4/2021 FINDINGS: Adequate opacification of the pulmonary arteries with no filling defects. Mild prominence of the main pulmonary arteries may suggest underlying pulmonary arterial hypertension. 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 No thoracic aortic dissection. Heart size is normal. No pericardial effusion. No pleural effusion. No pneumothorax. No focal pulmonary infiltrates. Visualized upper abdomen is unremarkable. Cholecystectomy. No acute osseous abnormality.     1. Negative for pulmonary embolus. Mild prominence of the main pulmonary arteries suggesting underlying pulmonary arterial hypertension. 2. 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 No thoracic aortic dissection. 3. No acute pulmonary process. Signer Name: Kishan Cohen MD  Signed: 11/19/2021 10:42 PM  Workstation Name: [id number]  [facility]              Results for orders placed during the hospital encounter of 10/10/21    Adult Transthoracic Echo Complete W/ Cont if Necessary Per Protocol    Interpretation Summary  · Moderate aortic valve regurgitation is present.  · Mild aortic valve stenosis is present.  · Estimated right ventricular systolic pressure from tricuspid  regurgitation is moderately elevated (45-55 mmHg).  · Moderate tricuspid valve regurgitation is present.  · Estimated left ventricular EF = 72% Estimated left ventricular EF was in agreement with the calculated left ventricular EF. Left ventricular ejection fraction appears to be greater than 70%. Left ventricular systolic function is hyperdynamic (EF > 70%).  · Left ventricular diastolic function is consistent with (grade I) impaired relaxation.  · Moderate pulmonary hypertension is present.  · Normal right ventricular cavity size, wall thickness, systolic function and septal motion noted.  · There is no evidence of pericardial effusion.      Plan for Follow-up of Pending Labs/Results: Inbox   Pending Labs     Order Current Status    Potassium Collected (11/20/21 1560)        Discharge Details        Discharge Medications      New Medications      Instructions Start Date   fluticasone 50 MCG/ACT nasal spray  Commonly known as: FLONASE   2 sprays, Each Nare, Daily   Start Date: November 21, 2021     sodium chloride 0.65 % nasal spray   2 sprays, Nasal, As Needed         Changes to Medications      Instructions Start Date   potassium chloride 10 MEQ CR tablet  What changed: how much to take   20 mEq, Oral, Daily         Continue These Medications      Instructions Start Date   aspirin 81 MG EC tablet   81 mg, Oral, Daily      estradiol 0.1 MG/GM vaginal cream  Commonly known as: ESTRACE   1 applicator, Vaginal, Daily      famotidine 20 MG tablet  Commonly known as: PEPCID   TAKE ONE TABLET BY MOUTH TWICE A DAY      furosemide 20 MG tablet  Commonly known as: Lasix   20 mg, Oral, 2 Times Daily      HYDROcodone-acetaminophen 7.5-325 MG per tablet  Commonly known as: NORCO   1 tablet, Oral, Every 8 Hours      levocetirizine 5 MG tablet  Commonly known as: XYZAL   1 tablet, Oral, Daily      levothyroxine 112 MCG tablet  Commonly known as: SYNTHROID, LEVOTHROID   112 mcg, Oral, Every Morning      Lyrica 150 MG  capsule  Generic drug: pregabalin   150 mg, Oral, 3 Times Daily      metOLazone 2.5 MG tablet  Commonly known as: ZAROXOLYN   2.5 mg, Oral, 3 Times Weekly PRN      metoprolol succinate XL 25 MG 24 hr tablet  Commonly known as: TOPROL-XL   12.5 mg, Oral, Every 24 Hours Scheduled      ondansetron 4 MG tablet  Commonly known as: Zofran   4 mg, Oral, Every 8 Hours PRN             Allergies   Allergen Reactions   • Penicillins Other (See Comments)     CHILDHOOD ALLERGY           Discharge Disposition:  Home or Self Care    Diet:  Hospital:  Diet Order   Procedures   • Diet Regular       Activity:      Restrictions or Other Recommendations:       CODE STATUS:    Code Status and Medical Interventions:   Ordered at: 11/20/21 0007     Level Of Support Discussed With:    Patient     Code Status (Patient has no pulse and is not breathing):    CPR (Attempt to Resuscitate)     Medical Interventions (Patient has pulse or is breathing):    Full Support       Future Appointments   Date Time Provider Department Center   11/24/2021  9:45 AM Salvador Barnes APRN [facility]   11/30/2021  1:30 PM Charanjit Neal PA-C [facility]   12/9/2021 10:45 AM Salvador Barnes APRN [facility]   12/15/2021  3:00 PM Richard Beltran MD [facility]   2/21/2022  1:15 PM Arnoldo Oneil MD [facility]   2/22/2022 12:45 PM Salvador Barnes APRN [facility]       Additional Instructions for the Follow-ups that You Need to Schedule     Discharge Follow-up with PCP   As directed       Currently Documented PCP:    Arnoldo Oneil MD    PCP Phone Number:    [phone]     Follow Up Details: 1 week         Discharge Follow-up with Specified Provider: Keep scheduled follow-up with [facility] on 11/24   As directed      To: Keep scheduled follow-up with [facility] on 11/24                     Xena Tai DO  11/20/21      Time Spent on Discharge:  I spent  35  minutes on this  discharge activity which included: face-to-face encounter with the patient, reviewing the data in the system, coordination of the care with the nursing staff as well as consultants, documentation, and entering orders.

## 2021-11-20 NOTE — ED PROVIDER NOTES
[place]    EMERGENCY DEPARTMENT ENCOUNTER      Pt Name: Zoila Nava  MRN: [id number]  YOB: 1933  Date of evaluation: 11/19/2021  Provider: Da Davalos MD    CHIEF COMPLAINT       Chief Complaint   Patient presents with   • Shortness of Breath         HISTORY OF PRESENT ILLNESS  (Location/Symptom, Timing/Onset, Context/Setting, Quality, Duration, Modifying Factors, Severity.)   Zoila Nava is a 88 y.o. female who presents to the emergency department with progressively worsening shortness of breath over the course of the past week that is worsened with exertion but without any associated chest pain, vomiting, diaphoresis, abdominal pain.  She has experienced intermittent tingling in bilateral upper extremities.  She has a history of pulmonary hypertension and diastolic heart failure and endorses compliance with all of her medications.  She only wears oxygen at night but does not typically wear this during the day.  She has no prior history of VTE.      Nursing notes were reviewed.    REVIEW OF SYSTEMS    (2-9 systems for level 4, 10 or more for level 5)   ROS:  General:  No fevers, no chills, no weakness  Cardiovascular:  No chest pain, no palpitations  Respiratory: Shortness of breath  Gastrointestinal:  No pain, no nausea, no vomiting, no diarrhea  Musculoskeletal:  No muscle pain, no joint pain  Skin:  No rash  Neurologic:  No speech problems, no headache, no extremity numbness, no extremity tingling, no extremity weakness  Psychiatric:  No anxiety  Genitourinary:  No dysuria, no hematuria    Except as noted above the remainder of the review of systems was reviewed and negative.       PAST MEDICAL HISTORY     Past Medical History:   Diagnosis Date   • Anemia    • Arthritis    • Asthma    • Cataract    • Difficulty breathing     Chronic   • GERD (gastroesophageal reflux disease)    • Graves' ophthalmopathy    • Hoarseness    • Hypertension    • Hypertensive heart disease with  acute on chronic diastolic congestive heart failure (HCC) 10/11/2021   • Pulmonary hypertension (HCC) 10/11/2021   • Spondylolisthesis of cervical region    • Vitamin B12 deficiency          SURGICAL HISTORY       Past Surgical History:   Procedure Laterality Date   • CERVICAL LAMINECTOMY     • CHOLECYSTECTOMY     • OTHER SURGICAL HISTORY Right     Neuroplasty Median Nerve at Carpal Tunnel   • THYROID SURGERY     • TOTAL KNEE ARTHROPLASTY Left 09/29/2014    Dr Colon         CURRENT MEDICATIONS       Current Facility-Administered Medications:   •  sodium chloride 0.9 % flush 10 mL, 10 mL, Intravenous, PRN, Emergency, Triage Protocol, MD    Current Outpatient Medications:   •  aspirin (aspirin) 81 MG EC tablet, Take 81 mg by mouth Daily., Disp: , Rfl:   •  estradiol (ESTRACE) 0.1 MG/GM vaginal cream, Insert 1 applicator into the vagina Daily., Disp: , Rfl:   •  famotidine (PEPCID) 20 MG tablet, TAKE ONE TABLET BY MOUTH TWICE A DAY (Patient taking differently: Take 20 mg by mouth 2 (Two) Times a Day.), Disp: 180 tablet, Rfl: 3  •  furosemide (Lasix) 20 MG tablet, Take 1 tablet by mouth 2 (Two) Times a Day., Disp: 60 tablet, Rfl: 2  •  HYDROcodone-acetaminophen (NORCO) 7.5-325 MG per tablet, Take 1 tablet by mouth Every 8 (Eight) Hours., Disp: , Rfl:   •  levocetirizine (XYZAL) 5 MG tablet, Take 1 tablet by mouth Daily., Disp: , Rfl:   •  levothyroxine (SYNTHROID, LEVOTHROID) 112 MCG tablet, Take 1 tablet by mouth Every Morning., Disp: 90 tablet, Rfl: 1  •  Lyrica 150 MG capsule, Take 150 mg by mouth 3 (Three) Times a Day., Disp: , Rfl:   •  metOLazone (ZAROXOLYN) 2.5 MG tablet, Take 1 tablet by mouth 3 (Three) Times a Week if Needed (weight above 148 lbs.)., Disp: 90 tablet, Rfl: 0  •  metoprolol succinate XL (TOPROL-XL) 25 MG 24 hr tablet, Take 0.5 tablets by mouth Daily., Disp: 30 tablet, Rfl: 2  •  ondansetron (ZOFRAN) 4 MG tablet, Take 1 tablet by mouth Every 8 (Eight) Hours As Needed for nausea or  "vomiting., Disp: 30 tablet, Rfl: 0  •  potassium chloride 10 MEQ CR tablet, Take 1 tablet by mouth Daily., Disp: 90 tablet, Rfl: 0    ALLERGIES     Penicillins    FAMILY HISTORY       Family History   Problem Relation Age of Onset   • Hypertension Mother    • Other Father         cardiac disorder   • Diabetes Father    • Heart disease Father    • Hypertension Sister           SOCIAL HISTORY       Social History     Socioeconomic History   • Marital status:    Tobacco Use   • Smoking status: Never Smoker   • Smokeless tobacco: Never Used   Vaping Use   • Vaping Use: Never used   Substance and Sexual Activity   • Alcohol use: No   • Drug use: No   • Sexual activity: Defer         PHYSICAL EXAM    (up to 7 for level 4, 8 or more for level 5)     Vitals:    11/19/21 2047 11/19/21 2130 11/19/21 2200   BP: 134/90 135/97 128/79   BP Location: Right arm     Patient Position: Sitting     Pulse: 64 85 90   Resp: 20     Temp: 98 °F (36.7 °C)     TempSrc: Oral     SpO2: 99% 100% 98%   Weight: 65.8 kg (145 lb)     Height: 152.4 cm (60\")         Physical Exam  General: Awake, alert, no acute distress.  HEENT: Conjunctivae normal.  Neck: Trachea midline.  Cardiac: Heart regular rate, rhythm, no murmurs, rubs, or gallops  Lungs: Lungs are clear to auscultation, there is no wheezing, rhonchi, or rales. There is no use of accessory muscles.  Chest wall: There is no tenderness to palpation over the chest wall or over ribs  Abdomen: Abdomen is soft, nontender, nondistended. There are no firm or pulsatile masses, no rebound rigidity or guarding.   Musculoskeletal: Moderate peripheral edema  Neuro: Alert and oriented x 4.  Dermatology: Skin is warm and dry  Psych: Mentation is grossly normal, cognition is grossly normal. Affect is appropriate.        DIAGNOSTIC RESULTS     EKG: All EKGs are interpreted by the Emergency Department Physician who either signs or Co-signs this chart in the absence of a cardiologist.    ECG 12 Lead "   Final Result   Test Reason : SOA Protocol   Blood Pressure :   */*   mmHG   Vent. Rate :  64 BPM     Atrial Rate :  64 BPM      P-R Int : 178 ms          QRS Dur : 102 ms       QT Int : 408 ms       P-R-T Axes :  50   7  36 degrees      QTc Int : 420 ms      Normal sinus rhythm   Normal ECG   When compared with ECG of 04-NOV-2021 07:43,   No significant change was found   Confirmed by JUANY HOWARD ([id number]) on 11/19/2021 9:07:52 PM      Referred By:            Confirmed By: JUANY HOWARD          RADIOLOGY:   Non-plain film images such as CT, Ultrasound and MRI are read by the radiologist. Plain radiographic images are visualized and preliminarily interpreted by the emergency physician with the below findings:      [x] Radiologist's Report Reviewed:  CT Angiogram Chest   Final Result   1. Negative for pulmonary embolus. Mild prominence of the main pulmonary arteries suggesting underlying pulmonary arterial hypertension.   2. 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 No thoracic aortic dissection.   3. No acute pulmonary process.      Signer Name: Kishan Cohen MD    Signed: 11/19/2021 10:42 PM    Workstation Name: [id number]     [facility]      XR Chest 1 View   Final Result   No acute cardiopulmonary findings.      Signer Name: Brianda Beltran MD    Signed: 11/19/2021 9:38 PM    Workstation Name: [id number]     [facility]            ED BEDSIDE ULTRASOUND:   Performed by ED Physician - none    LABS:    I have reviewed and interpreted all of the currently available lab results from this visit (if applicable):  Results for orders placed or performed during the hospital encounter of 11/19/21   COVID-19 and FLU A/B PCR - Swab, Nasopharynx    Specimen: Nasopharynx; Swab   Result Value Ref Range    COVID19 Not Detected Not Detected - Ref. Range    Influenza A PCR Not Detected Not Detected    Influenza B PCR Not Detected Not Detected    Comprehensive Metabolic Panel    Specimen: Blood   Result Value Ref Range    Glucose 107 (H) 65 - 99 mg/dL    BUN 22 8 - 23 mg/dL    Creatinine 1.72 (H) 0.57 - 1.00 mg/dL    Sodium 135 (L) 136 - 145 mmol/L    Potassium 3.3 (L) 3.5 - 5.2 mmol/L    Chloride 93 (L) 98 - 107 mmol/L    CO2 28.0 22.0 - 29.0 mmol/L    Calcium 8.7 8.6 - 10.5 mg/dL    Total Protein 7.0 6.0 - 8.5 g/dL    Albumin 4.30 3.50 - 5.20 g/dL    ALT (SGPT) 24 1 - 33 U/L    AST (SGOT) 31 1 - 32 U/L    Alkaline Phosphatase 107 39 - 117 U/L    Total Bilirubin 0.2 0.0 - 1.2 mg/dL    eGFR Non African Amer 28 (L) >60 mL/min/1.73    Globulin 2.7 gm/dL    A/G Ratio 1.6 g/dL    BUN/Creatinine Ratio 12.8 7.0 - 25.0    Anion Gap 14.0 5.0 - 15.0 mmol/L   BNP    Specimen: Blood   Result Value Ref Range    proBNP 147.6 0.0-1,800.0 pg/mL   Troponin    Specimen: Blood   Result Value Ref Range    Troponin T 0.036 (C) 0.000 - 0.030 ng/mL   CBC Auto Differential    Specimen: Blood   Result Value Ref Range    WBC 8.09 3.40 - 10.80 10*3/mm3    RBC 3.95 3.77 - 5.28 10*6/mm3    Hemoglobin 11.8 (L) 12.0 - 15.9 g/dL    Hematocrit 35.3 34.0 - 46.6 %    MCV 89.4 79.0 - 97.0 fL    MCH 29.9 26.6 - 33.0 pg    MCHC 33.4 31.5 - 35.7 g/dL    RDW 13.7 12.3 - 15.4 %    RDW-SD 45.1 37.0 - 54.0 fl    MPV 11.3 6.0 - 12.0 fL    Platelets 265 140 - 450 10*3/mm3    Neutrophil % 65.0 42.7 - 76.0 %    Lymphocyte % 19.2 (L) 19.6 - 45.3 %    Monocyte % 10.3 5.0 - 12.0 %    Eosinophil % 3.5 0.3 - 6.2 %    Basophil % 1.4 0.0 - 1.5 %    Immature Grans % 0.6 (H) 0.0 - 0.5 %    Neutrophils, Absolute 5.27 1.70 - 7.00 10*3/mm3    Lymphocytes, Absolute 1.55 0.70 - 3.10 10*3/mm3    Monocytes, Absolute 0.83 0.10 - 0.90 10*3/mm3    Eosinophils, Absolute 0.28 0.00 - 0.40 10*3/mm3    Basophils, Absolute 0.11 0.00 - 0.20 10*3/mm3    Immature Grans, Absolute 0.05 0.00 - 0.05 10*3/mm3    nRBC 0.0 0.0 - 0.2 /100 WBC   D-dimer, Quantitative    Specimen: Blood   Result Value Ref Range    D-Dimer, Quantitative  "2.26 (H) 0.00 - 0.56 MCGFEU/mL   ECG 12 Lead   Result Value Ref Range    QT Interval 408 ms    QTC Interval 420 ms   Green Top (Gel)   Result Value Ref Range    Extra Tube Hold for add-ons.    Lavender Top   Result Value Ref Range    Extra Tube hold for add-on    Gold Top - SST   Result Value Ref Range    Extra Tube Hold for add-ons.    Light Blue Top   Result Value Ref Range    Extra Tube hold for add-on         All other labs were within normal range or not returned as of this dictation.      EMERGENCY DEPARTMENT COURSE and DIFFERENTIAL DIAGNOSIS/MDM:   Vitals:    Vitals:    11/19/21 2047 11/19/21 2130 11/19/21 2200   BP: 134/90 135/97 128/79   BP Location: Right arm     Patient Position: Sitting     Pulse: 64 85 90   Resp: 20     Temp: 98 °F (36.7 °C)     TempSrc: Oral     SpO2: 99% 100% 98%   Weight: 65.8 kg (145 lb)     Height: 152.4 cm (60\")         ED Course as of 11/19/21 2320 Fri Nov 19, 2021 2143 Given that no other etiology of the patient's severe shortness of breath can be identified at this moment and she has a significantly elevated D-dimer in addition to a slightly elevated troponin, I feel that the potential benefit outweighs the risk to performing CTA chest with IV contrast in the setting of a GFR of 28.  I feel that not identifying a PE in this patient could be potentially life-threatening.  We will proceed with CT scan at this time and provide IV fluids. [NS]   2306 Spoke w/ Dr. Zacarias who accepts the pt for admission. [NS]      ED Course User Index  [NS] Da Davalos MD       Etiology of patient's symptoms are unclear as work-up has been unremarkable so far.  CT scan chest demonstrates no evidence of PE, pericardial effusion, pneumothorax, or pneumonia.  Covid and influenza swabs are negative.  Labs otherwise demonstrate no significant anemia, electrolyte derangement, or other abnormality.  Patient's troponin is slightly elevated, however there appears to be no acute ischemic changes on " her ECG.  Given the tingling in her bilateral upper extremities and comminution with her exertional dyspnea, I do have some concern for an atypical presentation of angina.  Will admit the patient to the hospital service for further evaluation.      MEDICATIONS ADMINISTERED IN ED:  Medications   sodium chloride 0.9 % flush 10 mL (has no administration in time range)   sodium chloride 0.9 % bolus 500 mL (500 mL Intravenous New Bag 11/19/21 2203)   iopamidol (ISOVUE-370) 76 % injection 75 mL (75 mL Intravenous Given 11/19/21 2231)   aspirin chewable tablet 324 mg (324 mg Oral Given 11/19/21 2252)         FINAL IMPRESSION      1. Shortness of breath    2. Elevated troponin    3. Bilateral arm pain    4. History of pulmonary hypertension    5. History of CHF (congestive heart failure)          DISPOSITION/PLAN     ED Disposition     ED Disposition Condition Comment    Decision to Admit  Level of Care: Telemetry [5]   Diagnosis: Shortness of breath [786.05.ICD-9-CM]   Admitting Physician: GRETCHEN NORIEGA [[id number]]   Attending Physician: GRETCHEN NORIEGA [[id number]]   Bed Request Comments: vick Davalos MD  Attending Emergency Physician               Da Davalos MD  11/19/21 6352

## 2021-11-20 NOTE — OUTREACH NOTE
Prep Survey      Responses   Holiness facility patient discharged from? [facility]   Is LACE score < 7 ? No   Emergency Room discharge w/ pulse ox? No   Eligibility [facility]   Date of Admission 11/19/21   Date of Discharge 11/20/21   Discharge Disposition Home or Self Care   Discharge diagnosis CHF   Does the patient have one of the following disease processes/diagnoses(primary or secondary)? CHF   Does the patient have Home health ordered? No   Is there a DME ordered? No   Prep survey completed? Yes          Theresa Beltran RN

## 2021-11-20 NOTE — CASE MANAGEMENT/SOCIAL WORK
Discharge Planning Assessment  [facility]     Patient Name: Zoila Nava  MRN: [id number]  Today's Date: 11/20/2021    Admit Date: 11/19/2021     Discharge Needs Assessment     Row Name 11/20/21 1056       Living Environment    Lives With spouse    Name(s) of Who Lives With Patient Matt(spouse)    Current Living Arrangements home/apartment/condo    Primary Care Provided by self    Provides Primary Care For no one, unable/limited ability to care for self    Family Caregiver if Needed child(hemalatha), adult; spouse    Quality of Family Relationships helpful; involved; supportive    Able to Return to Prior Arrangements yes    Living Arrangement Comments CM spoke with pt and pt's spouse in room with permission regarding discharge plan. Pt resides in  [place] with spouse.       Resource/Environmental Concerns    Resource/Environmental Concerns none       Transition Planning    Patient/Family Anticipates Transition to home with family    Patient/Family Anticipated Services at Transition     Transportation Anticipated family or friend will provide       Discharge Needs Assessment    Equipment Currently Used at Home respiratory supplies; oxygen  Home O2 at 2 liters as needed with portable tanks for transporation -provided by [employer]    Concerns to be Addressed denies needs/concerns at this time    Equipment Needed After Discharge oxygen; respiratory supplies    Discharge Coordination/Progress Pt has Medicare A and B, and AARP and reports she has prescription coverage. Pt uses [facility] on [address] [facility] in [place]. Pt is independent of ADLs. Spouse provides transportation to/from appointments. Pt reports she came in to the hospital due to SOB.  Pt/denies discharge needs. Pt has home O2 and uses 2 liters as needed, provided by [email or web address]. Pt states she also has portable O2 tanks. Plan home with spouse, denies discharge needs.               Discharge Plan     Row Name 11/20/21 1101       Plan     Plan discharge plan    Plan Comments Plan home with spouse, denies discharge needs.    Final Discharge Disposition Code 01 - home or self-care              Continued Care and Services - Admitted Since 11/19/2021    Coordination has not been started for this encounter.     Selected Continued Care - Prior Encounters Includes selections from prior encounters from 8/21/2021 to 11/20/2021    Discharged on 10/25/2021 Admission date: 10/23/2021 - Discharge disposition: Home or Self Care    Durable Medical Equipment     Service Provider Selected Services Address Phone Fax Patient Preferred    [facility] [address] [place] [id number] [phone] --       Internal Comment last updated by Bridget Hi RN 10/25/2021 1559    Home O2                               Expected Discharge Date and Time     Expected Discharge Date Expected Discharge Time    Nov 20, 2021          Demographic Summary     Row Name 11/20/21 1052       General Information    General Information Comments PT's PCP is Arnoldo Oneil       Contact Information    Permission Granted to Share Info With     Contact Information Obtained for     Contact Information Comments Matt Nava(spouse) [phone]               Functional Status     Row Name 11/20/21 1055       Functional Status    Functional Status Comments Pt reports she is independent of ADLs               Psychosocial    No documentation.                Abuse/Neglect    No documentation.                Legal    No documentation.                Substance Abuse    No documentation.                Patient Forms    No documentation.                   Kathrine Grace, RN

## 2021-11-20 NOTE — H&P
[facility] Medicine Services  HISTORY AND PHYSICAL    Patient Name: Zoila Nava  : 1933  MRN: [id number]  Primary Care Physician: Arnoldo Oneil MD  Date of admission: 2021    Subjective   Subjective     Chief Complaint:  Shortness of breath    HPI:  Zoila Nava is a 88 y.o. female with a history of chronic diastolic heart failure, nonrheumatic aortic regurgitation, pulmonary hypertension, CKD, ascending aortic aneurysm, hypertension, hyperlipidemia, hypothyroidism, presents to the ED with complaints of shortness of breath.  Patient reports that for the last 3 days she has had increased shortness of air.  She has been requiring oxygen at night when she sleeps and today had to wear oxygen all day long.  She has been sleeping sitting up.  She also reports fatigue, weight loss of 10 pounds over the last week, congestion, postnasal drip, sinus pressure on the right, mild nonproductive cough, mild lower extremity edema that has improved, generalized weakness, and some dizziness.  She states tonight she developed numbness going down both of her arms which prompted her to come to the ED for evaluation.  Patient also reports that over the last 2 weeks she is experiencing a loss of balance and had 2 falls with no injuries.  No fevers, chills, chest pain, palpitation, nausea, vomiting, abdominal pain, diarrhea, diaphoresis, or any other complaints at this time.  She states that she has been taking her Lasix twice a day and her metolazone as needed through the week, and that her edema has improved significantly.  CTA of the chest is negative for PE, mild prominence of the mild pulmonary arteries suggesting underlying pulmonary arterial hypertension, stable ectasia of ascending thoracic aorta measuring 4.5 cm diameter.  No acute pulmonary process.  Labs reveal a troponin 0.036, proBNP 147.6, D-dimer 2.26, BUN 22, creatinine 1.72, potassium 3.3.  Patient was given  aspirin and 500 mils of saline in the ED.  Patient is being admitted to the hospital medicine service for further evaluation and management.      COVID Details:        Symptoms: [] NONE [] Fever [x]  Cough [x] Shortness of breath [] Change in taste or smell  The patient has a COVID HM Topic on their chart, and they are fully vaccinated.    Review of Systems   Constitutional: Positive for fatigue and unexpected weight change (10 POUNDS OVER THE LAST WEEK). Negative for appetite change, chills and fever.   HENT: Positive for congestion, postnasal drip and sinus pressure (RIGHT). Negative for sinus pain and sore throat.    Eyes: Negative.    Respiratory: Positive for cough and shortness of breath. Negative for wheezing.    Cardiovascular: Positive for leg swelling. Negative for chest pain and palpitations.   Gastrointestinal: Negative.    Endocrine: Negative.    Genitourinary: Negative.    Musculoskeletal: Negative.    Skin: Negative.    Allergic/Immunologic: Positive for environmental allergies.   Neurological: Positive for dizziness, tremors, weakness (GENERALIZED) and numbness (BUE). Negative for headaches.   Hematological: Negative.    Psychiatric/Behavioral: Negative.         All other systems reviewed and are negative.     Personal History     Past Medical History:   Diagnosis Date   • Anemia    • Arthritis    • Asthma    • Cataract    • Difficulty breathing     Chronic   • GERD (gastroesophageal reflux disease)    • Graves' ophthalmopathy    • Hoarseness    • Hypertension    • Hypertensive heart disease with acute on chronic diastolic congestive heart failure (HCC) 10/11/2021   • Pulmonary hypertension (HCC) 10/11/2021   • Spondylolisthesis of cervical region    • Vitamin B12 deficiency        Past Surgical History:   Procedure Laterality Date   • CERVICAL LAMINECTOMY     • CHOLECYSTECTOMY     • OTHER SURGICAL HISTORY Right     Neuroplasty Median Nerve at Carpal Tunnel   • THYROID SURGERY     • TOTAL KNEE  ARTHROPLASTY Left 09/29/2014    Dr Colon       Family History:  family history includes Diabetes in her father; Heart disease in her father; Hypertension in her mother and sister; Other in her father. Otherwise pertinent FHx was reviewed and unremarkable.     Social History:  reports that she has never smoked. She has never used smokeless tobacco. She reports that she does not drink alcohol and does not use drugs.  Social History     Social History Narrative    Caffeine: 2-3 coffee daily       Medications:  HYDROcodone-acetaminophen, aspirin, estradiol, famotidine, furosemide, levocetirizine, levothyroxine, metOLazone, metoprolol succinate XL, ondansetron, potassium chloride, and pregabalin    Allergies   Allergen Reactions   • Penicillins Other (See Comments)     CHILDHOOD ALLERGY         Objective   Objective     Vital Signs:   Temp:  [98 °F (36.7 °C)] 98 °F (36.7 °C)  Heart Rate:  [64-90] 90  Resp:  [20] 20  BP: (128-135)/(79-97) 128/79    Physical Exam   Constitutional: Awake, alert, resting in bed  Eyes: PERRLA, sclerae anicteric, no conjunctival injection  HENT: NCAT, mucous membranes moist  Neck: Supple, no thyromegaly, no lymphadenopathy, trachea midline  Respiratory: Clear to auscultation bilaterally, nonlabored respirations   Cardiovascular: RRR, no murmurs, rubs, or gallops, palpable pedal pulses bilaterally  Gastrointestinal: Positive bowel sounds, soft, nontender, nondistended  Musculoskeletal: Mild billateral lower extremity edema, no clubbing or cyanosis to extremities  Psychiatric: Appropriate affect, cooperative  Neurologic: Oriented x 3, strength symmetric in all extremities, Cranial Nerves grossly intact to confrontation, speech clear  Skin: No rashes       Result Review:  I have personally reviewed the results from the time of this admission to 11/19/21 10:58 PM EST and agree with these findings:  [x]  Laboratory  []  Microbiology  [x]  Radiology  [x]  EKG/Telemetry   []   Cardiology/Vascular   []  Pathology  [x]  Old records  []  Other:  Most notable findings include:     LAB RESULTS:      Lab 11/19/21 2057   WBC 8.09   HEMOGLOBIN 11.8*   HEMATOCRIT 35.3   PLATELETS 265   NEUTROS ABS 5.27   IMMATURE GRANS (ABS) 0.05   LYMPHS ABS 1.55   MONOS ABS 0.83   EOS ABS 0.28   MCV 89.4   D DIMER QUANT 2.26*         Lab 11/19/21 2057   SODIUM 135*   POTASSIUM 3.3*   CHLORIDE 93*   CO2 28.0   ANION GAP 14.0   BUN 22   CREATININE 1.72*   GLUCOSE 107*   CALCIUM 8.7         Lab 11/19/21 2057   TOTAL PROTEIN 7.0   ALBUMIN 4.30   GLOBULIN 2.7   ALT (SGPT) 24   AST (SGOT) 31   BILIRUBIN 0.2   ALK PHOS 107         Lab 11/19/21 2057   PROBNP 147.6   TROPONIN T 0.036*                 UA    Urinalysis 5/18/21 5/18/21 2026 2026   Squamous Epithelial Cells, UA  0-2   Specific Gravity, UA 1.014    Ketones, UA Negative    Blood, UA Negative    Leukocytes, UA Trace (A)    Nitrite, UA Negative    RBC, UA  0-2   WBC, UA  3-5 (A)   Bacteria, UA  None Seen   (A) Abnormal value            Microbiology Results (last 10 days)     Procedure Component Value - Date/Time    COVID PRE-OP / PRE-PROCEDURE SCREENING ORDER (NO ISOLATION) - Swab, Nasopharynx [[id number]]  (Normal) Collected: 11/19/21 2120    Lab Status: Final result Specimen: Swab from Nasopharynx Updated: 11/19/21 2151    Narrative:      The following orders were created for panel order COVID PRE-OP / PRE-PROCEDURE SCREENING ORDER (NO ISOLATION) - Swab, Nasopharynx.  Procedure                               Abnormality         Status                     ---------                               -----------         ------                     COVID-19 and FLU A/B PCR...[[id number]]  Normal              Final result                 Please view results for these tests on the individual orders.    COVID-19 and FLU A/B PCR - Swab, Nasopharynx [[id number]]  (Normal) Collected: 11/19/21 2120    Lab Status: Final result Specimen: Swab from Nasopharynx Updated:  11/19/21 2151     COVID19 Not Detected     Influenza A PCR Not Detected     Influenza B PCR Not Detected    Narrative:      Fact sheet for providers: https://www.fda.gov/media/012078/download    Fact sheet for patients: https://www.fda.gov/media/998107/download    Test performed by PCR.          XR Chest 1 View    Result Date: 11/19/2021  CR Chest 1 Vw INDICATION: Shortness of breath COMPARISON:  11/4/2021 FINDINGS: Portable AP view(s) of the chest.  The heart and mediastinal contours are normal. Heart is enlarged and there is chronic changes of COPD. This appears similar to prior.. No pneumothorax or pleural effusion.     Impression: No acute cardiopulmonary findings. Signer Name: Brianda Beltran MD  Signed: 11/19/2021 9:38 PM  Workstation Name: [id number]  [facility]    CT Angiogram Chest    Result Date: 11/19/2021  CTA Chest INDICATION: Shortness of air today. TECHNIQUE: CT angiogram of the chest with IV contrast. 3-D reconstructions were obtained and reviewed.   Radiation dose reduction techniques included automated exposure control or exposure modulation based on body size. Count of known CT and cardiac nuc med studies performed in previous 12 months: 2. COMPARISON: CTA chest 11/4/2021 FINDINGS: Adequate opacification of the pulmonary arteries with no filling defects. Mild prominence of the main pulmonary arteries may suggest underlying pulmonary arterial hypertension. 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 No thoracic aortic dissection. Heart size is normal. No pericardial effusion. No pleural effusion. No pneumothorax. No focal pulmonary infiltrates. Visualized upper abdomen is unremarkable. Cholecystectomy. No acute osseous abnormality.     Impression: 1. Negative for pulmonary embolus. Mild prominence of the main pulmonary arteries suggesting underlying pulmonary arterial hypertension. 2. Stable ectasia of ascending thoracic aorta, measuring 4.5 cm in  diameter. No thoracic aortic dissection. 3. No acute pulmonary process. Signer Name: Kishan Cohen MD  Signed: 11/19/2021 10:42 PM  Workstation Name: [id number]  [facility] [place]      Results for orders placed during the hospital encounter of 10/10/21    Adult Transthoracic Echo Complete W/ Cont if Necessary Per Protocol    Interpretation Summary  · Moderate aortic valve regurgitation is present.  · Mild aortic valve stenosis is present.  · Estimated right ventricular systolic pressure from tricuspid regurgitation is moderately elevated (45-55 mmHg).  · Moderate tricuspid valve regurgitation is present.  · Estimated left ventricular EF = 72% Estimated left ventricular EF was in agreement with the calculated left ventricular EF. Left ventricular ejection fraction appears to be greater than 70%. Left ventricular systolic function is hyperdynamic (EF > 70%).  · Left ventricular diastolic function is consistent with (grade I) impaired relaxation.  · Moderate pulmonary hypertension is present.  · Normal right ventricular cavity size, wall thickness, systolic function and septal motion noted.  · There is no evidence of pericardial effusion.      Assessment/Plan   Assessment & Plan       Hyperlipidemia    Hypertension    Hypothyroidism    Stage 3 chronic kidney disease (HCC)    Pulmonary hypertension (HCC)    Chronic heart failure with preserved ejection fraction (HCC)    Hypokalemia    Shortness of breath    Zoila Nava is a 88 y.o. female with a history of chronic diastolic heart failure, nonrheumatic aortic regurgitation, pulmonary hypertension, CKD, ascending aortic aneurysm, hypertension, hyperlipidemia, hypothyroidism, presents to the ED with complaints of shortness of breath.         Assessment and plan:    Chronic systolic heart failure with preserved EF  Pulmonary hypertension  Elevated troponin  Elevated D-dimer  --CTA of the chest shows prominence of the main pulmonary artery  suggesting underlying pulmonary arterial hypertension, stable ectasia of the ascending thoracic aorta, no acute pulmonary process  --Echo 10/11/2021: EF 72%, moderate pulmonary hypertension moderate aortic valve regurgitation, mild aortic valve stenosis, right ventricular systolic pressure from tricuspid regurgitation is moderately elevated  --Was given aspirin in the ED  --Follows with Dr. Richter, was started on metolazone 2.5 mg 3 times weekly as needed for weight gain 3 pounds above baseline on 11/11/2021  --trend troponin  --Takes Lasix 20 mg twice daily  --Daily weights  --Strict I's and O's  --Continue aspirin daily  --Metoprolol with hold parameters    Hypokalemia  --Sliding-scale replacement  --BMP in the a.m.    Hypertension  Hyperlipidemia  --Continue metoprolol  --FLP in a.m.    Hypothyroidism  --Continue levothyroxine    CKD  --baseline creatinine 1.0-1.5  --creatinine 1.72 today    Generalized weakness  Fall  --Fall precautions  --Consult case management  --PT consult    Congestion  Seasonal allergies  --Flonase  --Cetirizine      DVT prophylaxis:  heparin    CODE STATUS: Level Of Support Discussed With: Patient  Code Status (Patient has no pulse and is not breathing): CPR (Attempt to Resuscitate)  Medical Interventions (Patient has pulse or is breathing): Full Support      This note has been completed as part of a split-shared workflow.   Signature: Electronically signed by TONIE Alvarez, 11/20/21, 12:08 AM EST.         Brief Attending Admission Attestation     I have seen and examined the patient, performing an independent face-to-face diagnostic evaluation with plan of care reviewed and developed with the advanced practice clinician (APC).    Old records reviewed and summarized in PM hx.    Brief Summary Statement:  88-year-old female presented to ED with a chief complaint of feeling more short of breath, congested, needing to use her oxygen, oxygen sats never dropped less than 92 at  home.  Did not lost 10 pounds after addition of metolazone recently, also her pedal edema has been better, also exertional dyspnea has improved.  Denies any complaint of chest pain or abdominal pain.  Patient did complain of bilateral upper extremity tingling/numbness tonight, does have a history of chronic neck pain-otherwise no focal weakness.  Currently her symptoms have resolved completely.    In ED patient got aspirin 324 mg, normal saline 500 ml. remainder of detailed HPI is as noted above and has been reviewed and/or edited by me for completeness.    PM HX:  CHF-with preserved ejection fraction, echo-October 2021-moderate AR, EF 70%, RVSP 45-55 mmHg, moderate TR, moderate pulmonary hypertension,  -Aspirin 81 mg, Lasix 20 mg every 12, metolazone 2.5 mg 3 times a week as needed (recently added by Dr. Holliday on 11/11/2021  Pulmonary hypertension  CKD-stage III  AAA  Hypertension-metoprolol XL 12.5 mg hypothyroid-Synthroid  Chronic pain-Norco, Lyrica      Vitals:    11/19/21 2200   BP: 128/79   Pulse: 90   Resp:  20   Temp:  Afebrile   SpO2: 98% on 2 L nasal cannula, dry weight around 145 pounds       Attending Physical Exam:  RS- CTA-BL, ,  No wheezing , no crackles, good effort.  CVS- s1s2 regular, murmur.  ABD- soft, non tender, not distended, no organomegaly.  EXT-mild edema.  NEURO- AAO-3, no focal defecit.      LABS:  Troponin-0.036, proBNP 147,  BUN/creatinine 22/1.7 baseline around 1.5  Sodium-135, potassium 3.3, D-dimer 2.2  WBC of 8, hemoglobin of 12, neutrophils of 65, CT angiogram-negative for PE mild prominence of pulmonary arteries, stable enlargement of ascending thoracic aorta 4.5 cm.  EKG-poor baseline, sinus rhythm 64 bpm, , no acute ST-T wave changes.      Brief Assessment/Plan  Feeling short of breath especially at night while lying down with congestion-likely secondary to allergic rhinitis-patient has been using her Nasacort, will continue her Flonase, antihistaminic.  -We will continue  her routine IV diuretics-does not appear to be volume overloaded proBNP not elevated.  Chronic neck pain with radiculopathy continue her home Lyrica/Norco.    See above for further detailed assessment and plan developed with [facility] [place] which I have reviewed and/or edited for completeness.        Admission Status: I believe that this patient meets observation status.

## 2021-11-22 ENCOUNTER — TRANSITIONAL CARE MANAGEMENT TELEPHONE ENCOUNTER (OUTPATIENT)
Dept: CALL CENTER | Facility: HOSPITAL | Age: 86
End: 2021-11-22

## 2021-11-22 NOTE — OUTREACH NOTE
Call Center TCM Note      Responses   [facility] patient discharged from? [place]   Does the patient have one of the following disease processes/diagnoses(primary or secondary)? CHF   TCM attempt successful? Yes  [SPOUSE DAUGHTER]   Call start time [id number]   Call end time [phone]   Discharge diagnosis CHF   Meds reviewed with patient/caregiver? Yes   Is the patient having any side effects they believe may be caused by any medication additions or changes? No   Does the patient have all medications ordered at discharge? Yes   Is the patient taking all medications as directed (includes completed medication regime)? Yes   Does the patient have a primary care provider?  Yes   Does the patient have an appointment with their PCP within 7 days of discharge? Yes   Comments regarding PCP 11/24/21 at 9:45   Has the patient kept scheduled appointments due by today? N/A   Has home health visited the patient within 72 hours of discharge? N/A   Pulse Ox monitoring Intermittent   O2 Sat comments 97% on RA   O2 Sat: education provided Sat levels,  Monitoring frequency,  When to seek care   O2 Sat education comments Advised pt if O2 levels drop to 90% or below and does not rebound with deep breathing and rest to seek emergency medical attention.   Psychosocial issues? No   Did the patient receive a copy of their discharge instructions? Yes   Nursing interventions Reviewed instructions with patient   What is the patient's perception of their health status since discharge? Improving   Nursing interventions Nurse provided patient education   Is the patient weighing daily? Yes   Does the patient have scales? Yes   Daily weight interventions Education provided on importance of daily weight   Is the patient able to teach back Heart Failure diet management? Yes   Is the patient able to teach back Heart Failure Zones? Yes   Is the patient able to teach back signs and symptoms of worsening condition? (i.e. weight gain, shortness of air,  etc.) Yes   If the patient is a current smoker, are they able to teach back resources for cessation? Not a smoker   Is the patient/caregiver able to teach back the hierarchy of who to call/visit for symptoms/problems? PCP, Specialist, Home health nurse, Urgent Care, ED, 911 Yes   TCM call completed? Yes           Francoise Rogers RN    11/22/2021, 08:52 EST

## 2021-11-24 ENCOUNTER — TELEMEDICINE (OUTPATIENT)
Dept: CARDIOLOGY | Facility: HOSPITAL | Age: 86
End: 2021-11-24

## 2021-11-24 VITALS
WEIGHT: 145.2 LBS | SYSTOLIC BLOOD PRESSURE: 145 MMHG | OXYGEN SATURATION: 96 % | DIASTOLIC BLOOD PRESSURE: 70 MMHG | BODY MASS INDEX: 28.36 KG/M2 | HEART RATE: 61 BPM

## 2021-11-24 DIAGNOSIS — I10 ESSENTIAL HYPERTENSION: ICD-10-CM

## 2021-11-24 DIAGNOSIS — I27.20 PULMONARY HYPERTENSION (HCC): ICD-10-CM

## 2021-11-24 DIAGNOSIS — I50.32 CHRONIC HEART FAILURE WITH PRESERVED EJECTION FRACTION (HCC): Primary | ICD-10-CM

## 2021-11-24 DIAGNOSIS — N18.30 STAGE 3 CHRONIC KIDNEY DISEASE, UNSPECIFIED WHETHER STAGE 3A OR 3B CKD (HCC): ICD-10-CM

## 2021-11-24 PROCEDURE — 99213 OFFICE O/P EST LOW 20 MIN: CPT | Performed by: NURSE PRACTITIONER

## 2021-11-24 NOTE — PROGRESS NOTES
[facility], [facility]    This was an audio and video enabled telemedicine encounter.    You have chosen to receive care through the use of telemedicine. Telemedicine enables health care providers at different locations to provide safe, effective, and convenient care through the use of technology. As with any health care service, there are risks associated with the use of telemedicine, including equipment failure, poor connections, and  issues.    • Do you understand the risks and benefits of telemedicine as I have explained them to you? Yes  • Have your questions regarding telemedicine been answered? Yes  • Do you consent to the use of telemedicine in your medical care today? Yes    Chief Complaint  Congestive Heart Failure (follow up)    Subjective    History of Present Illness {CC  Problem List  Visit  Diagnosis   Encounters  Notes  Medications  Labs  Result Review Imaging  Media :23}     Zoila Nava presents to [facility] for   History of Present Illness     88-year-old female with hypertension, chronic kidney disease stage III, ascending aortic aneurysm, pulmonary hypertension, valvular heart disease, heart failure with preserved ejection fraction, memory impairment.     Echocardiogram 10/11/2021: EF 72%, grade 1 diastolic dysfunction, moderate pulmonary hypertension, RVSP 45 to 55 mmHg, moderate AR, mild AS, moderate TR    Patient presented to [facility] on 11/19/2021 with dyspnea.    Patient is taking Lasix 20 mg twice a day. Metolazone 2.5 mg three times a week as needed for weight gain greater than 3 pounds above baseline. Potassium was increased to 20 mEq daily. Creatinine 1.49. Baseline 1-1.    Weight on 11/19/2021 was 147 pounds    Weight has remained stable.  She continues on Lasix 20 mg twice a day.  Has not used metolazone since discharge.  Wearing compression stockings with  improvement of lower extremity edema.  Patient will have intermittent dyspnea worse with anxiety.  But overall dyspnea has improved.  She is not wearing her oxygen during the day.  Will wear when she is short of breath.  Denies chest pain, pressure, dizziness, near syncope, syncope.    Patient will only use metolazone for weight greater than 158 pounds 3 times a week as needed.        Objective     Vital Signs:   Vitals:    11/24/21 0959   BP: 145/70   Pulse: 61   SpO2: 96%   Weight: 65.9 kg (145 lb 3.2 oz)     Body mass index is 28.36 kg/m².  Physical Exam  Constitutional:       General: She is not in acute distress.     Appearance: Normal appearance.   Cardiovascular:      Rate and Rhythm: Normal rate and regular rhythm.      Pulses:           Radial pulses are 2+ on the right side.        Dorsalis pedis pulses are 2+ on the right side.        Posterior tibial pulses are 2+ on the right side.      Heart sounds: Normal heart sounds.   Pulmonary:      Effort: Pulmonary effort is normal.      Breath sounds: Normal breath sounds.   Abdominal:      Palpations: Abdomen is soft.      Tenderness: There is no abdominal tenderness.   Musculoskeletal:      Right lower leg: No edema.      Left lower leg: No edema.   Skin:     General: Skin is warm and dry.   Neurological:      Mental Status: She is alert.   Psychiatric:         Mood and Affect: Mood normal.         Behavior: Behavior is cooperative.              Result Review  Data Reviewed:{ Labs  Result Review  Imaging  Med Tab  Media :23}     Lab Results   Component Value Date    WBC 8.34 11/20/2021    HGB 11.4 (L) 11/20/2021    HCT 32.5 (L) 11/20/2021    MCV 84.9 11/20/2021     11/20/2021     Lab Results   Component Value Date    GLUCOSE 96 11/20/2021    CALCIUM 8.9 11/20/2021     11/20/2021    K 4.0 11/20/2021    CO2 25.0 11/20/2021    CL 97 (L) 11/20/2021    BUN 22 11/20/2021    CREATININE 1.49 (H) 11/20/2021    EGFRIFNONA 33 (L) 11/20/2021    BCR 14.8  11/20/2021    ANIONGAP 15.0 11/20/2021     Lab Results   Component Value Date    TSH 0.947 10/25/2021     Lab Results   Component Value Date    CHLPL 170 04/04/2016     Lab Results   Component Value Date    TRIG 51 04/04/2016     Lab Results   Component Value Date    HDL 72 (H) 04/04/2016     Lab Results   Component Value Date    LDL 92 04/04/2016       Assessment and Plan {CC Problem List  Visit Diagnosis  ROS  Review (Popup)  Health Maintenance  Quality  BestPractice  Medications  SmartSets  SnapShot Encounters  Media :23}   1. Chronic heart failure with preserved ejection fraction (HCC)  Blood pressure controlled.    Symptoms appear to be stable.    Anxiety associated with symptoms, staff also has anxiety associated with blood pressures, weight monitoring.    Encouragement given.  Discussed use of metolazone.    Use of oxygen.  Anxiety management    2. Pulmonary hypertension (HCC)  Blood pressure controlled  Edema has improved with compression stockings  Weight is down  3. Essential hypertension  Blood pressure and heart rate stable    4. Stage 3 chronic kidney disease, unspecified whether stage 3a or 3b CKD (HCC)  Kidney function had improved.    Follow-up as scheduled in 2 weeks.  Call with any questions or concerns.      I spent 20 minutes caring for Zoila on this date of service. This time includes time spent by me in the following activities:preparing for the visit, reviewing tests, performing a medically appropriate examination and/or evaluation , counseling and educating the patient/family/caregiver and documenting information in the medical record    Follow Up {Instructions Charge Capture  Follow-up Communications :23}   Return for Next scheduled follow up.    Patient was given instructions and counseling regarding her condition or for health maintenance advice. Please see specific information pulled into the AVS if appropriate.  Patient was instructed to call the [facility]  with any questions, concerns, or worsening symptoms.

## 2021-11-30 ENCOUNTER — OFFICE VISIT (OUTPATIENT)
Dept: INTERNAL MEDICINE | Facility: CLINIC | Age: 86
End: 2021-11-30

## 2021-11-30 ENCOUNTER — READMISSION MANAGEMENT (OUTPATIENT)
Dept: CALL CENTER | Facility: HOSPITAL | Age: 86
End: 2021-11-30

## 2021-11-30 VITALS
HEART RATE: 79 BPM | RESPIRATION RATE: 18 BRPM | WEIGHT: 148 LBS | TEMPERATURE: 97.3 F | HEIGHT: 60 IN | DIASTOLIC BLOOD PRESSURE: 70 MMHG | SYSTOLIC BLOOD PRESSURE: 118 MMHG | BODY MASS INDEX: 29.06 KG/M2 | OXYGEN SATURATION: 94 %

## 2021-11-30 DIAGNOSIS — I49.9 IRREGULAR HEART RATE: Primary | ICD-10-CM

## 2021-11-30 DIAGNOSIS — I50.32 CHRONIC HEART FAILURE WITH PRESERVED EJECTION FRACTION (HCC): ICD-10-CM

## 2021-11-30 PROCEDURE — 93000 ELECTROCARDIOGRAM COMPLETE: CPT | Performed by: PHYSICIAN ASSISTANT

## 2021-11-30 PROCEDURE — 99213 OFFICE O/P EST LOW 20 MIN: CPT | Performed by: PHYSICIAN ASSISTANT

## 2021-11-30 RX ORDER — POTASSIUM CHLORIDE 750 MG/1
20 TABLET, FILM COATED, EXTENDED RELEASE ORAL DAILY
Qty: 60 TABLET | Refills: 0 | Status: SHIPPED | OUTPATIENT
Start: 2021-11-30 | End: 2022-02-17

## 2021-11-30 RX ORDER — METOPROLOL SUCCINATE 25 MG/1
12.5 TABLET, EXTENDED RELEASE ORAL
Qty: 30 TABLET | Refills: 2 | Status: SHIPPED | OUTPATIENT
Start: 2021-11-30 | End: 2021-12-15 | Stop reason: SDUPTHER

## 2021-11-30 RX ORDER — FUROSEMIDE 20 MG/1
20 TABLET ORAL 2 TIMES DAILY
Qty: 60 TABLET | Refills: 2 | Status: ON HOLD | OUTPATIENT
Start: 2021-11-30 | End: 2022-08-01 | Stop reason: SDUPTHER

## 2021-11-30 RX ORDER — LEVOTHYROXINE SODIUM 112 UG/1
112 TABLET ORAL EVERY MORNING
Qty: 90 TABLET | Refills: 1 | Status: SHIPPED | OUTPATIENT
Start: 2021-11-30 | End: 2022-05-12 | Stop reason: SDUPTHER

## 2021-11-30 NOTE — PATIENT INSTRUCTIONS
Heart Failure, Diagnosis    Heart failure is a condition in which the heart has trouble pumping blood because it has become weak or stiff. This means that the heart does not pump blood well enough for the body to stay healthy. For some people with heart failure, fluid may back up into the lungs. There may also be swelling (edema) in the lower legs. Heart failure is usually a long-term (chronic) condition. It is important for you to take good care of yourself and follow the treatment plan from your health care provider.  What are the causes?  This condition may be caused by:  · High blood pressure (hypertension). Hypertension causes the heart muscle to work harder than normal. This makes the heart stiff or weak.  · Coronary artery disease, or CAD. CAD is the buildup of cholesterol and fat (plaque) in the arteries of the heart.  · Heart attack, also called myocardial infarction. This injures the heart muscle, making it hard for the heart to pump blood.  · Abnormal heart valves. The valves do not open and close properly, forcing the heart to pump harder to keep the blood flowing.  · Heart muscle disease (cardiomyopathy or myocarditis). This is damage to the heart muscle. It can increase the risk of heart failure.  · Lung disease. The heart works harder when the lungs are not healthy.  · Abnormal heart rhythms. These can lead to heart failure.  What increases the risk?  The risk of heart failure increases as a person ages. This condition is also more likely to develop in people who:  · Are overweight.  · Are male.  · Smoke or chew tobacco.  · Abuse alcohol or illegal drugs.  · Have taken medicines that can damage the heart, such as chemotherapy drugs.  · Have diabetes.  · Have abnormal heart rhythms.  · Have thyroid problems.  · Have low blood counts (anemia).  What are the signs or symptoms?  Symptoms of this condition include:  · Shortness of breath with activity, such as when climbing stairs.  · A cough that does not  go away.  · Swelling of the feet, ankles, legs, or abdomen.  · Losing weight for no reason.  · Trouble breathing when lying flat (orthopnea).  · Waking from sleep because of the need to sit up and get more air.  · Rapid heartbeat.  · Tiredness (fatigue) and loss of energy.  · Feeling light-headed, dizzy, or close to fainting.  · Loss of appetite.  · Nausea.  · Waking up more often during the night to urinate (nocturia).  · Confusion.  How is this diagnosed?  This condition is diagnosed based on:  · Your medical history, symptoms, and a physical exam.  · Diagnostic tests, which may include:  ? Echocardiogram.  ? Electrocardiogram (ECG).  ? Chest X-ray.  ? Blood tests.  ? Exercise stress test.  ? Radionuclide scans.  ? Cardiac catheterization and angiogram.  How is this treated?  Treatment for this condition is aimed at managing the symptoms of heart failure.  Medicines  Treatment may include medicines that:  · Help lower blood pressure by relaxing (dilating) the blood vessels. These medicines are called ACE inhibitors (angiotensin-converting enzyme) and ARBs (angiotensin receptor blockers).  · Cause the kidneys to remove salt and water from the blood through urination (diuretics).  · Improve heart muscle strength and prevent the heart from beating too fast (beta blockers).  · Increase the force of the heartbeat (digoxin).  Healthy behavior changes         Treatment may also include making healthy lifestyle changes, such as:  · Reaching and staying at a healthy weight.  · Quitting smoking or chewing tobacco.  · Eating heart-healthy foods.  · Limiting or avoiding alcohol.  · Stopping the use of illegal drugs.  · Being physically active.    Other treatments  Other treatments may include:  · Procedures to open blocked arteries or repair damaged valves.  · Placing a pacemaker to improve heart function (cardiac resynchronization therapy).  · Placing a device to treat serious abnormal heart rhythms (implantable cardioverter  defibrillator, or ICD).  · Placing a device to improve the pumping ability of the heart (left ventricular assist device, or LVAD).  · Receiving a healthy heart from a donor (heart transplant). This is done when other treatments have not helped.  Follow these instructions at home:  · Manage other health conditions as told by your health care provider. These may include hypertension, diabetes, thyroid disease, or abnormal heart rhythms.  · Get ongoing education and support as needed. Learn as much as you can about heart failure.  · Keep all follow-up visits as told by your health care provider. This is important.  Summary  · Heart failure is a condition in which the heart has trouble pumping blood because it has become weak or stiff.  · This condition is caused by high blood pressure and other diseases of the heart and lungs.  · Symptoms of this condition include shortness of breath, tiredness (fatigue), nausea, and swelling of the feet, ankles, legs, or abdomen.  · Treatments for this condition may include medicines, lifestyle changes, and surgery.  · Manage other health conditions as told by your health care provider.  This information is not intended to replace advice given to you by your health care provider. Make sure you discuss any questions you have with your health care provider.  Document Revised: 03/06/2020 Document Reviewed: 03/06/2020  [employer] Patient Education © 2021 [employer]

## 2021-11-30 NOTE — OUTREACH NOTE
CHF Week 2 Survey      Responses   [facility], [place] patient discharged from? [place]   Does the patient have one of the following disease processes/diagnoses(primary or secondary)? CHF   Week 2 attempt successful? Yes   Call start time 1800   Call end time 1803   Discharge diagnosis CHF   Is patient permission given to speak with other caregiver? Yes   List who call center can speak with spouse- Matt   Person spoke with today (if not patient) and relationship spouse   Is the patient taking all medications as directed (includes completed medication regime)? Yes   Does the patient have a primary care provider?  Yes   Comments regarding PCP saw her PCP today   Has the patient kept scheduled appointments due by today? Yes   Has home health visited the patient within 72 hours of discharge? N/A   Psychosocial issues? No   What is the patient's perception of their health status since discharge? Improving   Nursing interventions Nurse provided patient education   Is the patient weighing daily? Yes   Does the patient have scales? Yes   Daily weight interventions Education provided on importance of daily weight   Is the patient able to teach back signs and symptoms of worsening condition? (i.e. weight gain, shortness of air, etc.) Yes   Is the patient/caregiver able to teach back the hierarchy of who to call/visit for symptoms/problems? PCP, Specialist, Home health nurse, Urgent Care, ED, 911 Yes   CHF Week 2 call completed? Yes   Wrap up additional comments Per spouse, patient saw her PCP today and has possible AFIB, will do 24 hour AFIB monitoring to see, spouse says they are doing daily weights, no questions or concerns.          Opal Manuel RN

## 2021-11-30 NOTE — PROGRESS NOTES
[facility]  [address]  [place] [address]  Dept: [phone]  Dept Fax: [phone]  Loc: [phone]  Loc Fax: [phone]    Zoila Nava  7/13/1933    Follow Up Office Visit Note    History of Present Illness:  Patient is an 88-year-old female in today for follow-up for congestive heart failure.  Patient was hospitalized again since last time she was here for this.  Patient taking all medication as directed upon discharge and reports 12 pound weight loss and no more swelling.  Patient overall feeling good today.  When asked about heart rate being irregular on exam patient denies a history of this.  There is no history of A. fib in the patient's chart.  Patient denies any cardiac symptoms or shortness of breath.      The following portions of the patient's history were reviewed and updated as appropriate: allergies, current medications, past family history, past medical history, past social history, past surgical history, and problem list.    Medications:    Current Outpatient Medications:   •  aspirin (aspirin) 81 MG EC tablet, Take 81 mg by mouth Daily., Disp: , Rfl:   •  estradiol (ESTRACE) 0.1 MG/GM vaginal cream, Insert 1 applicator into the vagina Daily., Disp: , Rfl:   •  famotidine (PEPCID) 20 MG tablet, TAKE ONE TABLET BY MOUTH TWICE A DAY (Patient taking differently: Take 20 mg by mouth 2 (Two) Times a Day.), Disp: 180 tablet, Rfl: 3  •  fluticasone (FLONASE) 50 MCG/ACT nasal spray, 2 sprays by Each Nare route Daily., Disp: 18.2 mL, Rfl: 0  •  furosemide (Lasix) 20 MG tablet, Take 1 tablet by mouth 2 (Two) Times a Day., Disp: 60 tablet, Rfl: 2  •  HYDROcodone-acetaminophen (NORCO) 7.5-325 MG per tablet, Take 1 tablet by mouth Every 8 (Eight) Hours., Disp: , Rfl:   •  levocetirizine (XYZAL) 5 MG tablet, Take 1 tablet by mouth Daily., Disp: , Rfl:   •  levothyroxine (SYNTHROID, LEVOTHROID) 112 MCG tablet, Take 1 tablet by mouth  Every Morning., Disp: 90 tablet, Rfl: 1  •  Lyrica 150 MG capsule, Take 150 mg by mouth 3 (Three) Times a Day., Disp: , Rfl:   •  metOLazone (ZAROXOLYN) 2.5 MG tablet, Take 1 tablet by mouth 3 (Three) Times a Week if Needed (weight above 148 lbs.)., Disp: 90 tablet, Rfl: 0  •  metoprolol succinate XL (TOPROL-XL) 25 MG 24 hr tablet, Take 0.5 tablets by mouth Daily., Disp: 30 tablet, Rfl: 2  •  ondansetron (ZOFRAN) 4 MG tablet, Take 1 tablet by mouth Every 8 (Eight) Hours As Needed for nausea or vomiting., Disp: 30 tablet, Rfl: 0  •  potassium chloride 10 MEQ CR tablet, Take 2 tablets by mouth Daily., Disp: 60 tablet, Rfl: 0  •  sodium chloride 0.65 % nasal spray, 2 sprays into the nostril(s) as directed by provider As Needed for Congestion., Disp: 60 mL, Rfl: 0    Subjective  Allergies   Allergen Reactions   • Penicillins Other (See Comments)     CHILDHOOD ALLERGY          Past Medical History:   Diagnosis Date   • Anemia    • Arthritis    • Asthma    • Cataract    • Difficulty breathing     Chronic   • GERD (gastroesophageal reflux disease)    • Graves' ophthalmopathy    • Hoarseness    • Hypertension    • Hypertensive heart disease with acute on chronic diastolic congestive heart failure (HCC) 10/11/2021   • Pulmonary hypertension (HCC) 10/11/2021   • Spondylolisthesis of cervical region    • Vitamin B12 deficiency        Past Surgical History:   Procedure Laterality Date   • CERVICAL LAMINECTOMY     • CHOLECYSTECTOMY     • OTHER SURGICAL HISTORY Right     Neuroplasty Median Nerve at Carpal Tunnel   • THYROID SURGERY     • TOTAL KNEE ARTHROPLASTY Left 09/29/2014    Dr Colon       Family History   Problem Relation Age of Onset   • Hypertension Mother    • Other Father         cardiac disorder   • Diabetes Father    • Heart disease Father    • Hypertension Sister         Social History     Socioeconomic History   • Marital status:    Tobacco Use   • Smoking status: Never Smoker   • Smokeless tobacco:  "Never Used   Vaping Use   • Vaping Use: Never used   Substance and Sexual Activity   • Alcohol use: No   • Drug use: No   • Sexual activity: Defer       Review of Systems   Constitutional: Negative for activity change, chills, fatigue, fever and unexpected weight change.   HENT: Negative for congestion, ear pain, postnasal drip, sinus pressure and sore throat.    Eyes: Negative for pain, discharge and redness.   Respiratory: Negative for cough, shortness of breath and wheezing.    Cardiovascular: Negative for chest pain, palpitations and leg swelling.   Gastrointestinal: Negative for diarrhea, nausea and vomiting.   Endocrine: Negative for cold intolerance and heat intolerance.   Genitourinary: Negative for decreased urine volume and dysuria.   Musculoskeletal: Negative for arthralgias and myalgias.   Skin: Negative for rash and wound.   Neurological: Negative for dizziness, light-headedness and headaches.   Hematological: Does not bruise/bleed easily.   Psychiatric/Behavioral: Negative for confusion, dysphoric mood and sleep disturbance. The patient is not nervous/anxious.          Objective  Vitals:    11/30/21 1338   BP: 118/70   Pulse: 79   Resp: 18   Temp: 97.3 °F (36.3 °C)   TempSrc: Temporal   SpO2: 94%   Weight: 67.1 kg (148 lb)   Height: 152.4 cm (60\")     Body mass index is 28.9 kg/m².     Physical Exam  Physical Exam  Vitals and nursing note reviewed.   Constitutional:       General: She is not in acute distress.     Appearance: She is not ill-appearing.   HENT:      Head: Normocephalic.      Right Ear: Tympanic membrane, ear canal and external ear normal. There is no impacted cerumen.      Left Ear: Tympanic membrane, ear canal and external ear normal. There is no impacted cerumen.      Nose: No congestion or rhinorrhea.      Mouth/Throat:      Mouth: Mucous membranes are moist.      Pharynx: Oropharynx is clear. No oropharyngeal exudate or posterior oropharyngeal erythema.   Eyes:      General:         " Right eye: No discharge.         Left eye: No discharge.      Extraocular Movements: Extraocular movements intact.      Conjunctiva/sclera: Conjunctivae normal.      Pupils: Pupils are equal, round, and reactive to light.   Cardiovascular:      Rate and Rhythm: Normal rate. Rhythm irregular.      Heart sounds: Normal heart sounds. No murmur heard.  No friction rub. No gallop.    Pulmonary:      Effort: Pulmonary effort is normal. No respiratory distress.      Breath sounds: Normal breath sounds. No wheezing.   Abdominal:      General: Bowel sounds are normal. There is no distension.      Palpations: Abdomen is soft. There is no mass.      Tenderness: There is no abdominal tenderness.   Musculoskeletal:         General: No swelling. Normal range of motion.      Cervical back: Normal range of motion. No tenderness.      Right lower leg: No edema.      Left lower leg: No edema.   Lymphadenopathy:      Cervical: No cervical adenopathy.   Skin:     Findings: No bruising, erythema or rash.   Neurological:      Mental Status: She is oriented to person, place, and time.      Gait: Gait normal.   Psychiatric:         Mood and Affect: Mood normal.         Behavior: Behavior normal.         Thought Content: Thought content normal.         Judgment: Judgment normal.         Diagnostic Data    ECG 12 Lead    Date/Time: 11/30/2021 3:08 PM  Performed by: Charanjit Neal PA-C  Authorized by: Charanjit Neal PA-C   Rhythm: sinus rhythm  Rate: normal  QRS axis: normal    Clinical impression: normal ECG            Assessment  Diagnoses and all orders for this visit:    1. Irregular heart rate (Primary)    2. Chronic heart failure with preserved ejection fraction (HCC)  -     furosemide (Lasix) 20 MG tablet; Take 1 tablet by mouth 2 (Two) Times a Day.  Dispense: 60 tablet; Refill: 2    Other orders  -     levothyroxine (SYNTHROID, LEVOTHROID) 112 MCG tablet; Take 1 tablet by mouth Every Morning.  Dispense: 90 tablet;  Refill: 1  -     potassium chloride 10 MEQ CR tablet; Take 2 tablets by mouth Daily.  Dispense: 60 tablet; Refill: 0  -     metoprolol succinate XL (TOPROL-XL) 25 MG 24 hr tablet; Take 0.5 tablets by mouth Daily.  Dispense: 30 tablet; Refill: 2        Plan    1. Irregular heart rate (Primary)- EKG in office revealed normal sinus rhythm, will obtain Holter.    2. Chronic heart failure with preserved ejection fraction (HCC)- overall much better, refilled meds, keep same, follow up w/ cardiology.        Return in about 6 weeks (around 1/11/2022) for w/ Dr. Oneil.    Charanjit Neal PA-C  11/30/2021

## 2021-12-08 ENCOUNTER — READMISSION MANAGEMENT (OUTPATIENT)
Dept: CALL CENTER | Facility: HOSPITAL | Age: 86
End: 2021-12-08

## 2021-12-08 NOTE — PROGRESS NOTES
"[facility], [facility]    Chief Complaint  Follow-up and Congestive Heart Failure    Subjective    History of Present Illness {CC  Problem List  Visit  Diagnosis   Encounters  Notes  Medications  Labs  Result Review Imaging  Media :23}     Zoila Nava presents to [facility] CARDIOLOGY for   History of Present Illness     88-year-old female with hypertension, chronic kidney disease stage III, ascending aortic aneurysm, pulmonary hypertension, valvular heart disease, heart failure with preserved ejection fraction, memory impairment.    Echocardiogram 10/11/2021: EF 73%, grade 1 diastolic dysfunction, moderate pulmonary hypertension with RVSP 45 to 55 mmHg, moderate AR, mild AS, moderate TR.    Patient is taking Lasix 20 mg twice a day. Metolazone 2.5 mg 3 times a week as needed for weight gain greater than 158 pounds.  Home weight 144-146 lbs.    Patient has been wearing compression stockings routinely for lower extremity edema.    Patient reports only needing to take metolazone on 1 occasion since last office visit.    Patient has been more active.  Less anxious.  Enjoyed her holidays.  Denies chest pain, pressure, worsening dyspnea, dizziness, near syncope, syncope.  Weight has remained stable.  No PND or orthopnea.      Spouse reports that on his last visit with primary care provider an irregular heart rhythm was found.  EKG reviewed showing sinus rhythm.  Patient was placed on a 48-hour Holter, results pending    Objective     Vital Signs:   Vitals:    12/09/21 1028   BP: 108/59   BP Location: Left arm   Patient Position: Sitting   Cuff Size: Adult   Pulse: 93   Resp: 16   Temp: 97.6 °F (36.4 °C)   TempSrc: Temporal   SpO2: 97%   Weight: 67.3 kg (148 lb 7 oz)   Height: 152.4 cm (60\")     Body mass index is 28.99 kg/m².  Physical Exam  Vitals reviewed.   Constitutional:       General: She is not in acute distress.     Appearance: " Normal appearance.   Cardiovascular:      Rate and Rhythm: Normal rate and regular rhythm.      Pulses:           Radial pulses are 2+ on the right side.        Dorsalis pedis pulses are 2+ on the right side.        Posterior tibial pulses are 2+ on the right side.      Heart sounds: Normal heart sounds.   Pulmonary:      Effort: Pulmonary effort is normal.      Breath sounds: Normal breath sounds.   Abdominal:      Palpations: Abdomen is soft.      Tenderness: There is no abdominal tenderness.   Musculoskeletal:      Right lower leg: Edema ( mild lower edema, compressio stockings intact bilaterlly) present.      Left lower leg: Edema present.   Skin:     General: Skin is warm and dry.   Neurological:      Mental Status: She is alert.   Psychiatric:         Mood and Affect: Mood normal.         Behavior: Behavior is cooperative.              Result Review  Data Reviewed:{ Labs  Result Review  Imaging  Med Tab  Media :23}     Lab Results   Component Value Date    GLUCOSE 96 11/20/2021    CALCIUM 8.9 11/20/2021     11/20/2021    K 4.0 11/20/2021    CO2 25.0 11/20/2021    CL 97 (L) 11/20/2021    BUN 22 11/20/2021    CREATININE 1.49 (H) 11/20/2021    EGFRIFNONA 33 (L) 11/20/2021    BCR 14.8 11/20/2021    ANIONGAP 15.0 11/20/2021     EKG 12/2/21Sinus rhythm 62 bpm  Assessment and Plan {CC Problem List  Visit Diagnosis  ROS  Review (Popup)  Health Maintenance  Quality  BestPractice  Medications  SmartSets  SnapShot Encounters  Media :23}   1. Chronic heart failure with preserved ejection fraction (HCC)  Echocardiogram 10/11/2021: EF 73%  BP well controlled  Patient is taking Lasix 20 mg twice a day. Metolazone 2.5 mg 3 times a week as needed for weight gain greater than 158 pounds.      Has only used metolazone X1     Weight stable.  No worsening dyspnea.     2. Non-rheumatic aortic regurgitation  Moderate AR  stable    3. Pulmonary hypertension (HCC)  stable    4. Essential  hypertension  Stable.    5.  Irregular heart rhythm  Irregular heart rhythm noted with primary care provider during auscultation.  EKG showed sinus rhythm.  Placed in 48-hour monitor.  Results pending.  Did discuss the risk factors of atrial fibrillation including age, valvular heart disease, heart failure, pulmonary hypertension.  Continue to monitor at this time.      We will contact [facility] scheduling to address follow-up visit with Dr. Richter.  Patient will follow up in the heart valve center in 6 months or sooner if needed.      Follow Up {Instructions Charge Capture  Follow-up Communications :23}   Return in about 6 months (around 6/9/2022) for Office visit, HF.    Patient was given instructions and counseling regarding her condition or for health maintenance advice. Please see specific information pulled into the AVS if appropriate.  Patient was instructed to call the Heart and Valve Center with any questions, concerns, or worsening symptoms.

## 2021-12-08 NOTE — OUTREACH NOTE
CHF Week 3 Survey      Responses   [facility] patient discharged from? [facility]   Does the patient have one of the following disease processes/diagnoses(primary or secondary)? CHF   Week 3 attempt successful? Yes   Call start time 1712   Call end time 1714   Is patient permission given to speak with other caregiver? Yes   List who call center can speak with spouse- Matt   Person spoke with today (if not patient) and relationship spouse   Meds reviewed with patient/caregiver? Yes   Is the patient having any side effects they believe may be caused by any medication additions or changes? No   Does the patient have all medications ordered at discharge? Yes   Is the patient taking all medications as directed (includes completed medication regime)? Yes   Does the patient have a primary care provider?  Yes   Does the patient have an appointment with their PCP within 7 days of discharge? Yes   Comments regarding PCP saw her PCP today   Has the patient kept scheduled appointments due by today? Yes   Has home health visited the patient within 72 hours of discharge? N/A   What DME was ordered? [employer] for home O2   Has all DME been delivered? Yes   Pulse Ox monitoring Intermittent   Pulse Ox device source Patient   O2 Sat comments 97% on RA   O2 Sat: education provided Sat levels,  Monitoring frequency,  When to seek care   O2 Sat education comments Advised pt if O2 levels drop to 90% or below and does not rebound with deep breathing and rest to seek emergency medical attention.   Psychosocial issues? No   Did the patient receive a copy of their discharge instructions? Yes   Nursing interventions Reviewed instructions with patient   What is the patient's perception of their health status since discharge? Improving   Nursing interventions Nurse provided patient education   Is the patient weighing daily? Yes   Does the patient have scales? Yes   Daily weight interventions Education provided on importance of daily  weight   Is the patient able to teach back Heart Failure diet management? Yes   Is the patient able to teach back Heart Failure Zones? Yes   Is the patient able to teach back signs and symptoms of worsening condition? (i.e. weight gain, shortness of air, etc.) Yes   If the patient is a current smoker, are they able to teach back resources for cessation? Not a smoker   Is the patient/caregiver able to teach back the hierarchy of who to call/visit for symptoms/problems? PCP, Specialist, Home health nurse, Urgent Care, ED, 911 Yes   CHF Week 3 call completed? Yes          Robinson Faulkner RN

## 2021-12-09 ENCOUNTER — OFFICE VISIT (OUTPATIENT)
Dept: CARDIOLOGY | Facility: HOSPITAL | Age: 86
End: 2021-12-09

## 2021-12-09 VITALS
OXYGEN SATURATION: 97 % | DIASTOLIC BLOOD PRESSURE: 59 MMHG | SYSTOLIC BLOOD PRESSURE: 108 MMHG | RESPIRATION RATE: 16 BRPM | TEMPERATURE: 97.6 F | HEART RATE: 93 BPM | BODY MASS INDEX: 29.14 KG/M2 | HEIGHT: 60 IN | WEIGHT: 148.44 LBS

## 2021-12-09 DIAGNOSIS — I27.20 PULMONARY HYPERTENSION (HCC): ICD-10-CM

## 2021-12-09 DIAGNOSIS — I10 ESSENTIAL HYPERTENSION: ICD-10-CM

## 2021-12-09 DIAGNOSIS — I35.1 NON-RHEUMATIC AORTIC REGURGITATION: ICD-10-CM

## 2021-12-09 DIAGNOSIS — I50.32 CHRONIC HEART FAILURE WITH PRESERVED EJECTION FRACTION (HCC): Primary | ICD-10-CM

## 2021-12-09 PROCEDURE — 99214 OFFICE O/P EST MOD 30 MIN: CPT | Performed by: NURSE PRACTITIONER

## 2021-12-15 ENCOUNTER — OFFICE VISIT (OUTPATIENT)
Dept: GASTROENTEROLOGY | Facility: CLINIC | Age: 86
End: 2021-12-15

## 2021-12-15 ENCOUNTER — TELEPHONE (OUTPATIENT)
Dept: CARDIOLOGY | Facility: HOSPITAL | Age: 86
End: 2021-12-15

## 2021-12-15 VITALS
WEIGHT: 151.4 LBS | HEIGHT: 60 IN | BODY MASS INDEX: 29.72 KG/M2 | HEART RATE: 80 BPM | OXYGEN SATURATION: 98 % | TEMPERATURE: 97.1 F | DIASTOLIC BLOOD PRESSURE: 76 MMHG | SYSTOLIC BLOOD PRESSURE: 126 MMHG

## 2021-12-15 DIAGNOSIS — I27.20 PULMONARY HYPERTENSION (HCC): ICD-10-CM

## 2021-12-15 DIAGNOSIS — Z86.79: Primary | ICD-10-CM

## 2021-12-15 PROBLEM — E03.9 HYPOTHYROIDISM: Status: ACTIVE | Noted: 2021-12-15

## 2021-12-15 PROBLEM — K21.9 GASTROESOPHAGEAL REFLUX DISEASE: Status: ACTIVE | Noted: 2021-12-15

## 2021-12-15 PROBLEM — R11.2 NAUSEA AND VOMITING: Status: ACTIVE | Noted: 2021-12-15

## 2021-12-15 PROCEDURE — 99203 OFFICE O/P NEW LOW 30 MIN: CPT | Performed by: INTERNAL MEDICINE

## 2021-12-15 RX ORDER — METOPROLOL SUCCINATE 25 MG/1
25 TABLET, EXTENDED RELEASE ORAL
Qty: 30 TABLET | Refills: 2 | Status: SHIPPED | OUTPATIENT
Start: 2021-12-15 | End: 2022-02-22

## 2021-12-15 NOTE — TELEPHONE ENCOUNTER
Patient had a 72-hour Holter placed on 12/2/2021: Average heart rate 73 bpm, PACs 5.8%, PVCs less than 1%.,  Frequent SVT/PAT longest duration 30 beats.    I would like to increase patient's metoprolol succinate 25 mg daily.

## 2021-12-15 NOTE — TELEPHONE ENCOUNTER
Please call patient.  I have seen and reviewed her recent heart monitor results.  She is having some frequent rapid heart rate as well as frequent premature contractions.  These are not concerning at this time, but I would like to increase her metoprolol succinate to 25 mg 1 tablet daily (currently having her taking 1/2 tablet daily)    Encourage to keep a home blood pressure and heart rate log.  Call with any concerns.

## 2021-12-15 NOTE — TELEPHONE ENCOUNTER
Patient and her  notified of results. Patient will begin taking Metoprolol 25mg 1 tablet daily and keep a home bp and hr log.

## 2021-12-15 NOTE — PROGRESS NOTES
Patient Name: Zoila Nava  YOB: 1933   Medical Record number: [id number]     PCP: Arnoldo Oneil MD    Chief Complaint   Patient presents with   • Difficulty Swallowing       History of Present Illness:   HPI  I appreciate the consult for presumed swallowing problem.  Mrs. Nava is a 88-year-old with a history of chronic congestive heart failure, pulmonary hypertension and asthma.  She has been in the hospital multiple times over the last year for exacerbation with regard to congestive heart failure.  She denies any difficulty swallowing solid food or liquids.  There is no history of breakthrough heartburn.  The patient has a good appetite.  She has no unexplained abdominal weight.  Mrs. Nava denies any abdominal pain after meals.  She does experience significant shortness of breath and has to sleep at an angle on multiple pillows.  Mrs. Nava does take Pepcid.  She is unsure how long she has been on the medication.  Past Medical History:   Diagnosis Date   • Anemia    • Arthritis    • Asthma    • Cataract    • Difficulty breathing     Chronic   • GERD (gastroesophageal reflux disease)    • Graves' ophthalmopathy    • Hoarseness    • Hypertension    • Hypertensive heart disease with acute on chronic diastolic congestive heart failure (HCC) 10/11/2021   • Pulmonary hypertension (HCC) 10/11/2021   • Spondylolisthesis of cervical region    • Vitamin B12 deficiency        Past Surgical History:   Procedure Laterality Date   • CERVICAL LAMINECTOMY     • CHOLECYSTECTOMY     • OTHER SURGICAL HISTORY Right     Neuroplasty Median Nerve at Carpal Tunnel   • THYROID SURGERY     • TOTAL KNEE ARTHROPLASTY Left 09/29/2014    Dr Colon         Current Outpatient Medications:   •  aspirin (aspirin) 81 MG EC tablet, Take 81 mg by mouth Daily., Disp: , Rfl:   •  estradiol (ESTRACE) 0.1 MG/GM vaginal cream, Insert 1 applicator into the vagina Daily., Disp: , Rfl:   •  famotidine (PEPCID) 20 MG  tablet, TAKE ONE TABLET BY MOUTH TWICE A DAY (Patient taking differently: Take 20 mg by mouth 2 (Two) Times a Day.), Disp: 180 tablet, Rfl: 3  •  fluticasone (FLONASE) 50 MCG/ACT nasal spray, 2 sprays by Each Nare route Daily., Disp: 18.2 mL, Rfl: 0  •  furosemide (Lasix) 20 MG tablet, Take 1 tablet by mouth 2 (Two) Times a Day., Disp: 60 tablet, Rfl: 2  •  HYDROcodone-acetaminophen (NORCO) 7.5-325 MG per tablet, Take 1 tablet by mouth Every 8 (Eight) Hours., Disp: , Rfl:   •  levocetirizine (XYZAL) 5 MG tablet, Take 1 tablet by mouth Daily., Disp: , Rfl:   •  levothyroxine (SYNTHROID, LEVOTHROID) 112 MCG tablet, Take 1 tablet by mouth Every Morning., Disp: 90 tablet, Rfl: 1  •  Lyrica 150 MG capsule, Take 150 mg by mouth 3 (Three) Times a Day., Disp: , Rfl:   •  metOLazone (ZAROXOLYN) 2.5 MG tablet, Take 1 tablet by mouth 3 (Three) Times a Week if Needed (weight above 148 lbs.)., Disp: 90 tablet, Rfl: 0  •  metoprolol succinate XL (TOPROL-XL) 25 MG 24 hr tablet, Take 1 tablet by mouth Daily., Disp: 30 tablet, Rfl: 2  •  ondansetron (ZOFRAN) 4 MG tablet, Take 1 tablet by mouth Every 8 (Eight) Hours As Needed for nausea or vomiting., Disp: 30 tablet, Rfl: 0  •  potassium chloride 10 MEQ CR tablet, Take 2 tablets by mouth Daily., Disp: 60 tablet, Rfl: 0  •  sodium chloride 0.65 % nasal spray, 2 sprays into the nostril(s) as directed by provider As Needed for Congestion., Disp: 60 mL, Rfl: 0    Allergies   Allergen Reactions   • Penicillins Other (See Comments)     CHILDHOOD ALLERGY         Family History   Problem Relation Age of Onset   • Hypertension Mother    • Other Father         cardiac disorder   • Diabetes Father    • Heart disease Father    • Hypertension Sister    • Colon cancer Neg Hx    • Colon polyps Neg Hx    • Esophageal cancer Neg Hx        Social History     Socioeconomic History   • Marital status:    Tobacco Use   • Smoking status: Never Smoker   • Smokeless tobacco: Never Used   Vaping Use    • Vaping Use: Never used   Substance and Sexual Activity   • Alcohol use: No   • Drug use: No   • Sexual activity: Defer       Review of Systems   Constitutional: Negative for appetite change, fatigue, fever and unexpected weight change.   HENT: Negative for dental problem, mouth sores, postnasal drip, sneezing, trouble swallowing and voice change.    Eyes: Negative for pain, redness and itching.   Respiratory: Positive for shortness of breath. Negative for cough and wheezing.    Cardiovascular: Negative for chest pain, palpitations and leg swelling.   Gastrointestinal: Negative for abdominal distention, abdominal pain, anal bleeding, blood in stool, constipation, diarrhea, nausea, rectal pain and vomiting.        Burping   Endocrine: Negative for cold intolerance, heat intolerance, polydipsia and polyuria.   Genitourinary: Negative for dysuria, enuresis, flank pain, hematuria and urgency.   Musculoskeletal: Negative for arthralgias, back pain, joint swelling and myalgias.   Skin: Negative for color change, pallor and rash.   Allergic/Immunologic: Negative for environmental allergies, food allergies and immunocompromised state.   Neurological: Negative for dizziness, tremors, seizures, facial asymmetry, numbness and headaches.   Psychiatric/Behavioral: Negative for behavioral problems, dysphoric mood, hallucinations and self-injury.   All other systems reviewed and are negative.      Vitals:    12/15/21 1259   BP: 126/76   Pulse: 80   Temp: 97.1 °F (36.2 °C)   SpO2: 98%       Physical Exam  Vitals reviewed.   Constitutional:       Appearance: Normal appearance.   HENT:      Head: Normocephalic and atraumatic.      Nose: Nose normal.      Mouth/Throat:      Mouth: Mucous membranes are moist.      Pharynx: Oropharynx is clear.   Eyes:      General: No scleral icterus.     Extraocular Movements: Extraocular movements intact.   Cardiovascular:      Rate and Rhythm: Normal rate and regular rhythm.      Heart sounds:  Murmur heard.   No gallop.    Pulmonary:      Breath sounds: Normal breath sounds. No wheezing or rales.   Abdominal:      General: Bowel sounds are normal.      Palpations: Abdomen is soft.      Tenderness: There is no abdominal tenderness. There is no guarding.   Musculoskeletal:      Cervical back: Normal range of motion and neck supple.      Right lower leg: Edema present.      Left lower leg: Edema present.   Skin:     General: Skin is dry.      Coloration: Skin is not jaundiced.   Neurological:      Mental Status: She is alert and oriented to person, place, and time.   Psychiatric:         Mood and Affect: Mood normal.         Thought Content: Thought content normal.         Judgment: Judgment normal.         Diagnoses and all orders for this visit:    1. Hx of chronic heart failure (Primary)    2. Pulmonary hypertension (HCC)    The patient has a history of heart failure and pulmonary hypertension.  She does have edema on physical examination today.  There is no complaint of difficulty swallowing solids or liquids.  She describes an issue when swallowing her saliva due to dry mouth but there is no dysphagia.      Plan: The patient will follow up in the office on an as-needed basis.

## 2021-12-21 ENCOUNTER — TELEPHONE (OUTPATIENT)
Dept: CARDIOLOGY | Facility: HOSPITAL | Age: 86
End: 2021-12-21

## 2021-12-21 ENCOUNTER — READMISSION MANAGEMENT (OUTPATIENT)
Dept: CALL CENTER | Facility: HOSPITAL | Age: 86
End: 2021-12-21

## 2021-12-21 NOTE — TELEPHONE ENCOUNTER
Patient came by the office with coordinates of patient's blood pressure and pulse. Patient's  states that pulse was running between 49 to 110 yesterday however no symptoms associated with this data. Information given to Song for review since Salvador is out of office.

## 2021-12-21 NOTE — TELEPHONE ENCOUNTER
Patient's  left message on MA line stating that he is concerned about wife's blood pressure. He states that her blood pressure has dropped from 125 systolic to 105 systolic in the last 30 days. He is concerned that his wife's blood pressure is too low. He is also concerned about fluctuating Hrs. He states that her HR has been staying around 88.

## 2021-12-21 NOTE — OUTREACH NOTE
CHF Week 4 Survey      Responses   [facility] patient discharged from? [place]   Does the patient have one of the following disease processes/diagnoses(primary or secondary)? CHF   Week 4 attempt successful? Yes   Call start time 1038   Call end time 1051   Discharge diagnosis CHF   Person spoke with today (if not patient) and relationship spouse   Medication alerts for this patient Metoprolol now is 25mg daily.    Meds reviewed with patient/caregiver? Yes   Is the patient taking all medications as directed (includes completed medication regime)? Yes   Has the patient kept scheduled appointments due by today? Yes   Pulse Ox monitoring Intermittent   Pulse Ox device source Patient   O2 Sat comments 95-99% on 2L    Psychosocial issues? No   Psychosocial comments sister is MSN and helping managing her meds   Comments Pt wore 48hr telemetry monitor.  is concerned about the results.  reports the HR today is , irregular according to pulse ox.He is trying to get in touch with Cardiology. Pt has been gaining wt, he is going over to Cardiology office since having issue getting on phone. He reports when HR is high the BP is low, statistically per .    What is the patient's perception of their health status since discharge? Worsening   Is the patient weighing daily? Yes   Daily weight interventions Education provided on importance of daily weight   Is the patient able to teach back Heart Failure Zones? Yes   Is the patient able to teach back signs and symptoms of worsening condition? (i.e. weight gain, shortness of air, etc.) Yes   Week 4 Call Completed? Yes   Would the patient like one additional call? No   Graduated Yes   Is the patient interested in additional calls from an ambulatory ?  NOTE:  applies to high risk patients requiring additional follow-up. No   Did the patient feel the follow up calls were helpful during their recovery period? Yes   Was the number of calls  appropriate? Yes          Ebony Milligan RN

## 2021-12-23 NOTE — TELEPHONE ENCOUNTER
Attempted to contact patient/spouse.  Left voicemail and will attempt follow-up phone call during next clinic business day.

## 2021-12-29 ENCOUNTER — TELEPHONE (OUTPATIENT)
Dept: CARDIOLOGY | Facility: HOSPITAL | Age: 86
End: 2021-12-29

## 2021-12-29 NOTE — TELEPHONE ENCOUNTER
Called to f/u with patient.     She is eating well.  HR 70's, -120's    No CP, pressure, dizziness, syncope.  Weight still at baseline.      Has not needed metolazone.

## 2022-01-21 ENCOUNTER — OFFICE VISIT (OUTPATIENT)
Dept: INTERNAL MEDICINE | Facility: CLINIC | Age: 87
End: 2022-01-21

## 2022-01-21 ENCOUNTER — LAB (OUTPATIENT)
Dept: LAB | Facility: HOSPITAL | Age: 87
End: 2022-01-21

## 2022-01-21 VITALS
BODY MASS INDEX: 30.23 KG/M2 | HEART RATE: 68 BPM | DIASTOLIC BLOOD PRESSURE: 60 MMHG | SYSTOLIC BLOOD PRESSURE: 116 MMHG | HEIGHT: 60 IN | TEMPERATURE: 96.8 F | WEIGHT: 154 LBS

## 2022-01-21 DIAGNOSIS — E53.8 VITAMIN B12 DEFICIENCY: ICD-10-CM

## 2022-01-21 DIAGNOSIS — I10 PRIMARY HYPERTENSION: Primary | ICD-10-CM

## 2022-01-21 DIAGNOSIS — N18.30 STAGE 3 CHRONIC KIDNEY DISEASE, UNSPECIFIED WHETHER STAGE 3A OR 3B CKD: ICD-10-CM

## 2022-01-21 DIAGNOSIS — D51.0 PERNICIOUS ANEMIA: ICD-10-CM

## 2022-01-21 DIAGNOSIS — E03.8 OTHER SPECIFIED HYPOTHYROIDISM: ICD-10-CM

## 2022-01-21 DIAGNOSIS — G25.0 ESSENTIAL TREMOR: ICD-10-CM

## 2022-01-21 DIAGNOSIS — E78.49 OTHER HYPERLIPIDEMIA: ICD-10-CM

## 2022-01-21 LAB
ALBUMIN SERPL-MCNC: 4.2 G/DL (ref 3.5–5.2)
ALBUMIN/GLOB SERPL: 1.9 G/DL
ALP SERPL-CCNC: 97 U/L (ref 39–117)
ALT SERPL W P-5'-P-CCNC: 15 U/L (ref 1–33)
ANION GAP SERPL CALCULATED.3IONS-SCNC: 12 MMOL/L (ref 5–15)
AST SERPL-CCNC: 25 U/L (ref 1–32)
BILIRUB SERPL-MCNC: 0.2 MG/DL (ref 0–1.2)
BUN SERPL-MCNC: 19 MG/DL (ref 8–23)
BUN/CREAT SERPL: 15.1 (ref 7–25)
CALCIUM SPEC-SCNC: 8.7 MG/DL (ref 8.6–10.5)
CHLORIDE SERPL-SCNC: 96 MMOL/L (ref 98–107)
CO2 SERPL-SCNC: 30 MMOL/L (ref 22–29)
CREAT SERPL-MCNC: 1.26 MG/DL (ref 0.57–1)
DEPRECATED RDW RBC AUTO: 40.4 FL (ref 37–54)
ERYTHROCYTE [DISTWIDTH] IN BLOOD BY AUTOMATED COUNT: 12.6 % (ref 12.3–15.4)
GFR SERPL CREATININE-BSD FRML MDRD: 40 ML/MIN/1.73
GLOBULIN UR ELPH-MCNC: 2.2 GM/DL
GLUCOSE SERPL-MCNC: 94 MG/DL (ref 65–99)
HCT VFR BLD AUTO: 34.2 % (ref 34–46.6)
HGB BLD-MCNC: 11.2 G/DL (ref 12–15.9)
MCH RBC QN AUTO: 29.3 PG (ref 26.6–33)
MCHC RBC AUTO-ENTMCNC: 32.7 G/DL (ref 31.5–35.7)
MCV RBC AUTO: 89.5 FL (ref 79–97)
PLATELET # BLD AUTO: 242 10*3/MM3 (ref 140–450)
PMV BLD AUTO: 11.7 FL (ref 6–12)
POTASSIUM SERPL-SCNC: 3.7 MMOL/L (ref 3.5–5.2)
PROT SERPL-MCNC: 6.4 G/DL (ref 6–8.5)
RBC # BLD AUTO: 3.82 10*6/MM3 (ref 3.77–5.28)
SODIUM SERPL-SCNC: 138 MMOL/L (ref 136–145)
TSH SERPL DL<=0.05 MIU/L-ACNC: 4.82 UIU/ML (ref 0.27–4.2)
WBC NRBC COR # BLD: 6.4 10*3/MM3 (ref 3.4–10.8)

## 2022-01-21 PROCEDURE — 82607 VITAMIN B-12: CPT | Performed by: INTERNAL MEDICINE

## 2022-01-21 PROCEDURE — 99214 OFFICE O/P EST MOD 30 MIN: CPT | Performed by: INTERNAL MEDICINE

## 2022-01-21 PROCEDURE — 84443 ASSAY THYROID STIM HORMONE: CPT | Performed by: INTERNAL MEDICINE

## 2022-01-21 PROCEDURE — 80053 COMPREHEN METABOLIC PANEL: CPT | Performed by: INTERNAL MEDICINE

## 2022-01-21 PROCEDURE — 85027 COMPLETE CBC AUTOMATED: CPT | Performed by: INTERNAL MEDICINE

## 2022-01-21 NOTE — PROGRESS NOTES
Patient is a 88 y.o. female who is here for a follow up of hyperlipidemia and hypertension.  Chief Complaint   Patient presents with   • Hyperlipidemia   • Hypertension         HPI:    Here for mgmt of HTN and hypothyroid and CKD.  No further presyncope.  Energy is not the best.  Appetite is ok.  Trying to stay hydrated.  No abdominal pains.  No diarrhea.  No HAs.  No fever or chills.  No nausea or emesis.     History:     Patient Active Problem List   Diagnosis   • Allergic rhinitis   • Hyperlipidemia   • Hypertension   • Hypocalcemia   • Hypothyroidism   • Neuropathy   • NUD (nonulcer dyspepsia)   • Painful knee   • Pernicious anemia   • Tremor   • Vitamin B12 deficiency   • Spondylolisthesis of cervical region   • Graves' ophthalmopathy   • Anemia   • Memory impairment of gradual onset   • Ascending aortic aneurysm ([facility])   • Stage 3 chronic kidney disease ([facility])   • Pulmonary hypertension ([facility])   • Chronic heart failure with preserved ejection fraction ([facility])   • CHF (congestive heart failure), NYHA class I, acute on chronic, diastolic ([facility])   • CHF (congestive heart failure) ([facility])   • Non-rheumatic aortic stenosis   • Non-rheumatic aortic regurgitation   • Carpal tunnel syndrome   • Essential tremor   • Gastroesophageal reflux disease   • Nausea and vomiting   • Skin sensation disturbance   • Spinal cord disease ([facility])   • Urge incontinence of urine   • Urinary urgency   • Hypothyroidism       Past Medical History:   Diagnosis Date   • Anemia    • Arthritis    • Asthma    • Cataract    • Difficulty breathing     Chronic   • GERD (gastroesophageal reflux disease)    • Graves' ophthalmopathy    • Hoarseness    • Hypertension    • Hypertensive heart disease with acute on chronic diastolic congestive heart failure ([facility]) 10/11/2021   • Pulmonary hypertension ([facility]) 10/11/2021   • Spondylolisthesis of cervical region    • Vitamin B12 deficiency        Past Surgical History:   Procedure Laterality Date   • CERVICAL  LAMINECTOMY     • CHOLECYSTECTOMY     • OTHER SURGICAL HISTORY Right     Neuroplasty Median Nerve at Carpal Tunnel   • THYROID SURGERY     • TOTAL KNEE ARTHROPLASTY Left 09/29/2014    Dr Colon       Current Outpatient Medications on File Prior to Visit   Medication Sig   • aspirin (aspirin) 81 MG EC tablet Take 81 mg by mouth Daily.   • estradiol (ESTRACE) 0.1 MG/GM vaginal cream Insert 1 applicator into the vagina Daily.   • famotidine (PEPCID) 20 MG tablet TAKE ONE TABLET BY MOUTH TWICE A DAY (Patient taking differently: Take 20 mg by mouth 2 (Two) Times a Day.)   • fluticasone (FLONASE) 50 MCG/ACT nasal spray 2 sprays by Each Nare route Daily.   • furosemide (Lasix) 20 MG tablet Take 1 tablet by mouth 2 (Two) Times a Day.   • HYDROcodone-acetaminophen (NORCO) 7.5-325 MG per tablet Take 1 tablet by mouth Every 8 (Eight) Hours.   • levocetirizine (XYZAL) 5 MG tablet Take 1 tablet by mouth Daily.   • levothyroxine (SYNTHROID, LEVOTHROID) 112 MCG tablet Take 1 tablet by mouth Every Morning.   • Lyrica 150 MG capsule Take 150 mg by mouth 3 (Three) Times a Day.   • metOLazone (ZAROXOLYN) 2.5 MG tablet Take 1 tablet by mouth 3 (Three) Times a Week if Needed (weight above 148 lbs.).   • metoprolol succinate XL (TOPROL-XL) 25 MG 24 hr tablet Take 1 tablet by mouth Daily.   • ondansetron (ZOFRAN) 4 MG tablet Take 1 tablet by mouth Every 8 (Eight) Hours As Needed for nausea or vomiting.   • potassium chloride 10 MEQ CR tablet Take 2 tablets by mouth Daily.   • sodium chloride 0.65 % nasal spray 2 sprays into the nostril(s) as directed by provider As Needed for Congestion.     No current facility-administered medications on file prior to visit.       Family History   Problem Relation Age of Onset   • Hypertension Mother    • Other Father         cardiac disorder   • Diabetes Father    • Heart disease Father    • Hypertension Sister    • Colon cancer Neg Hx    • Colon polyps Neg Hx    • Esophageal cancer Neg Hx   "      Social History     Socioeconomic History   • Marital status:    Tobacco Use   • Smoking status: Never Smoker   • Smokeless tobacco: Never Used   Vaping Use   • Vaping Use: Never used   Substance and Sexual Activity   • Alcohol use: No   • Drug use: No   • Sexual activity: Defer         Review of Systems   Constitutional: Positive for fatigue. Negative for chills, diaphoresis, fever and unexpected weight change.   HENT: Positive for hearing loss. Negative for congestion, ear pain, nosebleeds, postnasal drip, sinus pressure and sore throat.    Eyes: Negative for pain, discharge and itching.   Respiratory: Negative for cough, chest tightness, shortness of breath and wheezing.    Cardiovascular: Positive for leg swelling. Negative for chest pain and palpitations.   Gastrointestinal: Negative for abdominal distention, abdominal pain, blood in stool, constipation, diarrhea, nausea and vomiting.   Endocrine: Positive for cold intolerance. Negative for polydipsia and polyuria.   Genitourinary: Negative for difficulty urinating, dysuria, frequency and hematuria.   Musculoskeletal: Positive for arthralgias, back pain and gait problem. Negative for joint swelling and myalgias.   Skin: Negative for rash and wound.   Neurological: Positive for tremors, weakness and numbness. Negative for syncope and headaches.   Psychiatric/Behavioral: Positive for decreased concentration. Negative for dysphoric mood and sleep disturbance. The patient is not nervous/anxious.        /60   Pulse 68   Temp 96.8 °F (36 °C) (Infrared)   Ht 152.4 cm (60\")   Wt 69.9 kg (154 lb)   LMP  (LMP Unknown)   BMI 30.08 kg/m²       Physical Exam  Constitutional:       Appearance: Normal appearance. She is well-developed.   HENT:      Head: Normocephalic and atraumatic.      Right Ear: External ear normal.      Left Ear: External ear normal.      Nose: Nose normal.      Mouth/Throat:      Mouth: Mucous membranes are moist.      Pharynx: " Oropharynx is clear.   Eyes:      Extraocular Movements: Extraocular movements intact.      Conjunctiva/sclera: Conjunctivae normal.      Pupils: Pupils are equal, round, and reactive to light.   Cardiovascular:      Rate and Rhythm: Normal rate and regular rhythm.      Heart sounds: Normal heart sounds.   Pulmonary:      Effort: Pulmonary effort is normal.      Breath sounds: Normal breath sounds.   Abdominal:      General: Bowel sounds are normal.      Palpations: Abdomen is soft.   Musculoskeletal:         General: Normal range of motion.      Cervical back: Normal range of motion and neck supple.      Right lower leg: Edema present.      Left lower leg: Edema present.   Lymphadenopathy:      Cervical: No cervical adenopathy.   Skin:     General: Skin is warm and dry.   Neurological:      General: No focal deficit present.      Mental Status: She is alert and oriented to person, place, and time.      Comments: Head tremor   Psychiatric:         Mood and Affect: Mood normal.         Behavior: Behavior normal.         Thought Content: Thought content normal.         Procedure:      Discussion/Summary:    htn-stable on metoprolol  rhinitis-flonase and xyzal continuation, stable  tremor-cont BB  hypothyroid- recheck today at goal for 88  Dyspepsia-cont pepcid  neuropathy-cont lyrica, stable on bid  djd-cont prn lortab, rare use  b12 def-cont b12, level today  hyperlipidemia-labs on rtc, cont diet control  Diverticulosis-advised citrucel qd, asymptomatic  Memory impairment-cont aricept, no better or worst  Ascending Aortic aneurysm-recheck 7/2 noted  CKD-avoid NSAIDs, recheck improved         1/21 labs noted and dw patient, advised NSAIDs avoidance and increase fluids       Current Outpatient Medications:   •  aspirin (aspirin) 81 MG EC tablet, Take 81 mg by mouth Daily., Disp: , Rfl:   •  estradiol (ESTRACE) 0.1 MG/GM vaginal cream, Insert 1 applicator into the vagina Daily., Disp: , Rfl:   •  famotidine (PEPCID) 20  MG tablet, TAKE ONE TABLET BY MOUTH TWICE A DAY (Patient taking differently: Take 20 mg by mouth 2 (Two) Times a Day.), Disp: 180 tablet, Rfl: 3  •  fluticasone (FLONASE) 50 MCG/ACT nasal spray, 2 sprays by Each Nare route Daily., Disp: 18.2 mL, Rfl: 0  •  furosemide (Lasix) 20 MG tablet, Take 1 tablet by mouth 2 (Two) Times a Day., Disp: 60 tablet, Rfl: 2  •  HYDROcodone-acetaminophen (NORCO) 7.5-325 MG per tablet, Take 1 tablet by mouth Every 8 (Eight) Hours., Disp: , Rfl:   •  levocetirizine (XYZAL) 5 MG tablet, Take 1 tablet by mouth Daily., Disp: , Rfl:   •  levothyroxine (SYNTHROID, LEVOTHROID) 112 MCG tablet, Take 1 tablet by mouth Every Morning., Disp: 90 tablet, Rfl: 1  •  Lyrica 150 MG capsule, Take 150 mg by mouth 3 (Three) Times a Day., Disp: , Rfl:   •  metOLazone (ZAROXOLYN) 2.5 MG tablet, Take 1 tablet by mouth 3 (Three) Times a Week if Needed (weight above 148 lbs.)., Disp: 90 tablet, Rfl: 0  •  metoprolol succinate XL (TOPROL-XL) 25 MG 24 hr tablet, Take 1 tablet by mouth Daily., Disp: 30 tablet, Rfl: 2  •  ondansetron (ZOFRAN) 4 MG tablet, Take 1 tablet by mouth Every 8 (Eight) Hours As Needed for nausea or vomiting., Disp: 30 tablet, Rfl: 0  •  potassium chloride 10 MEQ CR tablet, Take 2 tablets by mouth Daily., Disp: 60 tablet, Rfl: 0  •  sodium chloride 0.65 % nasal spray, 2 sprays into the nostril(s) as directed by provider As Needed for Congestion., Disp: 60 mL, Rfl: 0        Diagnoses and all orders for this visit:    1. Primary hypertension (Primary)  -     Comprehensive Metabolic Panel    2. Other hyperlipidemia    3. Other specified hypothyroidism  -     TSH    4. Vitamin B12 deficiency  -     Vitamin B12    5. Stage 3 chronic kidney disease, unspecified whether stage 3a or 3b CKD (HCC)    6. Pernicious anemia  -     CBC (No Diff)    7. Essential tremor

## 2022-01-22 LAB — VIT B12 BLD-MCNC: 867 PG/ML (ref 211–946)

## 2022-02-17 RX ORDER — POTASSIUM CHLORIDE 750 MG/1
TABLET, FILM COATED, EXTENDED RELEASE ORAL
Qty: 90 TABLET | Refills: 3 | Status: ON HOLD | OUTPATIENT
Start: 2022-02-17 | End: 2022-08-01 | Stop reason: SDUPTHER

## 2022-02-22 ENCOUNTER — TELEPHONE (OUTPATIENT)
Dept: CARDIOLOGY | Facility: HOSPITAL | Age: 87
End: 2022-02-22

## 2022-02-22 ENCOUNTER — OFFICE VISIT (OUTPATIENT)
Dept: CARDIOLOGY | Facility: HOSPITAL | Age: 87
End: 2022-02-22

## 2022-02-22 VITALS
OXYGEN SATURATION: 96 % | DIASTOLIC BLOOD PRESSURE: 58 MMHG | WEIGHT: 150.13 LBS | RESPIRATION RATE: 15 BRPM | HEART RATE: 64 BPM | HEIGHT: 60 IN | SYSTOLIC BLOOD PRESSURE: 118 MMHG | TEMPERATURE: 97.1 F | BODY MASS INDEX: 29.48 KG/M2

## 2022-02-22 DIAGNOSIS — I49.9 IRREGULAR HEART RATE: ICD-10-CM

## 2022-02-22 DIAGNOSIS — I10 ESSENTIAL HYPERTENSION: ICD-10-CM

## 2022-02-22 DIAGNOSIS — I35.1 NON-RHEUMATIC AORTIC REGURGITATION: ICD-10-CM

## 2022-02-22 DIAGNOSIS — I27.20 PULMONARY HYPERTENSION: Primary | ICD-10-CM

## 2022-02-22 DIAGNOSIS — I27.20 PULMONARY HYPERTENSION: ICD-10-CM

## 2022-02-22 DIAGNOSIS — N18.30 STAGE 3 CHRONIC KIDNEY DISEASE, UNSPECIFIED WHETHER STAGE 3A OR 3B CKD: ICD-10-CM

## 2022-02-22 DIAGNOSIS — I50.32 CHRONIC HEART FAILURE WITH PRESERVED EJECTION FRACTION: Primary | ICD-10-CM

## 2022-02-22 PROCEDURE — 99214 OFFICE O/P EST MOD 30 MIN: CPT | Performed by: NURSE PRACTITIONER

## 2022-02-22 RX ORDER — PROPRANOLOL HYDROCHLORIDE 40 MG/1
40 TABLET ORAL 2 TIMES DAILY
COMMUNITY
Start: 2022-01-06 | End: 2022-02-23

## 2022-02-22 NOTE — PROGRESS NOTES
"[facility]    Chief Complaint  Congestive Heart Failure and Follow-up    Subjective    History of Present Illness {CC  Problem List  Visit  Diagnosis   Encounters  Notes  Medications  Labs  Result Review Imaging  Media :23}     Zoila Nava presents to [facility] CARDIOLOGY for   History of Present Illness     88-year-old female with hypertension, chronic kidney disease stage III, ascending aortic aneurysm, pulmonary hypertension, valvular heart disease, heart failure with preserved EF, memory impairment.    Echocardiogram 10/11/2021: EF 73%, grade 1 diastolic dysfunction, moderate pulmonary hypertension with RVSP 45 to 55 mmHg, moderate AR, mild AS, moderate TR.    Patient is taking Lasix 20 mg twice a day. Metolazone 2.5 mg 3 times a week as needed for weight gain greater than 158 pounds.    Pt reports HR 50-60's.  Recently started on propranolol and is taking metoprolol.  Denies CP, pressure, palpitations.  Dizziness today with HR 50's. Patient following a low-sodium diet. Weights have remained fairly stable.      Objective     Vital Signs:   Vitals:    02/22/22 1255   BP: 118/58   BP Location: Left arm   Patient Position: Sitting   Cuff Size: Adult   Pulse: 64   Resp: 15   Temp: 97.1 °F (36.2 °C)   TempSrc: Temporal   SpO2: 96%   Weight: 68.1 kg (150 lb 2 oz)   Height: 152.4 cm (60\")     Body mass index is 29.32 kg/m².  Physical Exam         Result Review  Data Reviewed:{ Labs  Result Review  Imaging  Med Tab  Media :23}   Echocardiogram 10/11/2021: EF 73%, grade 1 diastolic dysfunction, moderate pulmonary hypertension with RVSP 45 to 55 mmHg, moderate AR, mild AS, moderate TR.    EKG 2/16/2022: 72-hour Holter 12/2/21: Average heart rate 73 bpm, PACs 5.8%, PVCs less than 1%.  Frequent SVT/PAT up to 30 beats.  No correlation with patient events.  Lab Results   Component Value Date    WBC 6.40 01/21/2022    HGB 11.2 " (L) 01/21/2022    HCT 34.2 01/21/2022    MCV 89.5 01/21/2022     01/21/2022     Lab Results   Component Value Date    GLUCOSE 94 01/21/2022    CALCIUM 8.7 01/21/2022     01/21/2022    K 3.7 01/21/2022    CO2 30.0 (H) 01/21/2022    CL 96 (L) 01/21/2022    BUN 19 01/21/2022    CREATININE 1.26 (H) 01/21/2022    EGFRIFNONA 40 (L) 01/21/2022    BCR 15.1 01/21/2022    ANIONGAP 12.0 01/21/2022     Lab Results   Component Value Date    TSH 4.820 (H) 01/21/2022     Lab Results   Component Value Date    CHLPL 170 04/04/2016     Lab Results   Component Value Date    TRIG 51 04/04/2016     Lab Results   Component Value Date    HDL 72 (H) 04/04/2016     Lab Results   Component Value Date    LDL 92 04/04/2016       Assessment and Plan {CC Problem List  Visit Diagnosis  ROS  Review (Popup)  Health Maintenance  Quality  BestPractice  Medications  SmartSets  SnapShot Encounters  Media :23}   1. Chronic heart failure with preserved ejection fraction (HCC)  Lasix twice a day. Metoprolol as needed for weight gain. Typically takes three times a day    2. Non-rheumatic aortic regurgitation  Stable    3. Pulmonary hypertension (HCC)  Stable    4. Essential hypertension  Controlled    5. Irregular heart rate  Patient currently on beta-blocker. We will continue propranolol as prescribed by neurologist. Stop metoprolol today. Continue to monitor heart rate.    6. Stage 3 chronic kidney disease, unspecified whether stage 3a or 3b CKD (HCC)  Has been stable. Continue to monitor.          Follow Up {Instructions Charge Capture  Follow-up Communications :23}   Return for Next scheduled follow up.    Patient was given instructions and counseling regarding her condition or for health maintenance advice. Please see specific information pulled into the AVS if appropriate.  Patient was instructed to call the [facility] with any questions, concerns, or worsening symptoms.

## 2022-02-22 NOTE — TELEPHONE ENCOUNTER
Patient's  called stating that his wife was not taking two beta blockers and was only taking Metoprolol and has not been on Pantoprazole. He is concerned with the drop in Hrs.

## 2022-02-23 ENCOUNTER — NURSE TRIAGE (OUTPATIENT)
Dept: CALL CENTER | Facility: HOSPITAL | Age: 87
End: 2022-02-23

## 2022-02-23 ENCOUNTER — TELEPHONE (OUTPATIENT)
Dept: CARDIOLOGY | Facility: HOSPITAL | Age: 87
End: 2022-02-23

## 2022-02-23 DIAGNOSIS — R25.1 TREMORS OF NERVOUS SYSTEM: ICD-10-CM

## 2022-02-23 DIAGNOSIS — I49.9 IRREGULAR HEART RATE: Primary | ICD-10-CM

## 2022-02-23 RX ORDER — METOPROLOL SUCCINATE 25 MG/1
25 TABLET, EXTENDED RELEASE ORAL
Qty: 30 TABLET | Refills: 2
Start: 2022-02-23 | End: 2022-02-23

## 2022-02-23 RX ORDER — PROPRANOLOL HYDROCHLORIDE 40 MG/1
40 TABLET ORAL 2 TIMES DAILY
Qty: 60 TABLET | Refills: 3
Start: 2022-02-23 | End: 2022-08-01 | Stop reason: HOSPADM

## 2022-02-23 NOTE — TELEPHONE ENCOUNTER
Pt's  has medication question. Pt having tremors and wants to know if she needs to be taking propanolol.

## 2022-02-23 NOTE — TELEPHONE ENCOUNTER
"Concerned about hr  Of 77, was instructed by [id number], to call if got above 75,  Suggested to call the office/ message thru my chart and sat in recliner to elevate feet.     Reason for Disposition  • [1] Systolic BP  AND [2] taking blood pressure medications AND [3] dizzy, lightheaded or weak    Additional Information  • Negative: Started suddenly after an allergic medicine, an allergic food, or bee sting  • Negative: Shock suspected (e.g., cold/pale/clammy skin, too weak to stand, low BP, rapid pulse)  • Negative: Difficult to awaken or acting confused (e.g., disoriented, slurred speech)  • Negative: Fainted  • Negative: [1] Systolic BP < 90 AND [2] dizzy, lightheaded, or weak  • Negative: Chest pain  • Negative: Bleeding (e.g., vomiting blood, rectal bleeding or tarry stools, severe vaginal bleeding)(Exception: fainted from sight of small amount of blood; small cut or abrasion)  • Negative: Extra heart beats or heart is beating fast  (i.e., \"palpitations\")  • Negative: Sounds like a life-threatening emergency to the triager  • Negative: [1] Systolic BP < 80 AND [2] NOT dizzy, lightheaded or weak  • Negative: Abdominal pain  • Negative: Fever > 100.4 F (38.0 C)  • Negative: Major surgery in the past month  • Negative: [1] Drinking very little AND [2] dehydration suspected (e.g., no urine > 12 hours, very dry mouth, very lightheaded)  • Negative: [1] Fall in systolic BP > 20 mm Hg from normal AND [2] dizzy, lightheaded, or weak  • Negative: Patient sounds very sick or weak to the triager  • Negative: [1] Systolic BP < 90 AND [2] NOT dizzy, lightheaded or weak    Answer Assessment - Initial Assessment Questions  1. BLOOD PRESSURE: \"What is the blood pressure?\" \"Did you take at least two measurements 5 minutes apart?\"    94/57  2. ONSET: \"When did you take your blood pressure?\"    This morning  3. HOW: \"How did you obtain the blood pressure?\" (e.g., visiting nurse, automatic home BP monitor)      " "automatic  4. HISTORY: \"Do you have a history of low blood pressure?\" \"What is your blood pressure normally?\"     No but has medication changes  5. MEDICATIONS: \"Are you taking any medications for blood pressure?\" If yes: \"Have they been changed recently?\"   none  6. PULSE RATE: \"Do you know what your pulse rate is?\"       77  7. OTHER SYMPTOMS: \"Have you been sick recently?\" \"Have you had a recent injury?\"      no  8. PREGNANCY: \"Is there any chance you are pregnant?\" \"When was your last menstrual period?\"      na    Protocols used: LOW BLOOD PRESSURE-ADULT-AH      "

## 2022-02-23 NOTE — TELEPHONE ENCOUNTER
Continue the metoprolol as directed. I reviewed her HR log.  We will continue to monitor at this time.

## 2022-03-08 ENCOUNTER — TELEPHONE (OUTPATIENT)
Dept: CARDIOLOGY | Facility: HOSPITAL | Age: 87
End: 2022-03-08

## 2022-03-08 ENCOUNTER — HOSPITAL ENCOUNTER (OUTPATIENT)
Dept: CARDIOLOGY | Facility: HOSPITAL | Age: 87
Discharge: HOME | End: 2022-03-08

## 2022-03-08 ENCOUNTER — CLINICAL SUPPORT (OUTPATIENT)
Dept: CARDIOLOGY | Facility: HOSPITAL | Age: 87
End: 2022-03-08

## 2022-03-08 DIAGNOSIS — I49.9 IRREGULAR HEART RATE: Primary | ICD-10-CM

## 2022-03-08 DIAGNOSIS — I49.9 IRREGULAR HEART RATE: ICD-10-CM

## 2022-03-08 DIAGNOSIS — R00.1 BRADYCARDIA: ICD-10-CM

## 2022-03-08 PROCEDURE — 93246 EXT ECG>7D<15D RECORDING: CPT

## 2022-03-08 NOTE — PROGRESS NOTES
[facility] Heart Monitor Documentation    Zoila Nava  7/13/1933  [id number]  03/08/22      [x] ZIO XT Patch  Model [id number] Prescribed for 14 Days    · Serial Number: (N + 9 Digits) [id number]   · Apply-By Date on Box: 07/31/2022  · USPS Tracking Number: [id number]  · USPS Tracking        [] [employer]ian MINI PLUS Mobile Cardiac Telemetry  Model BGMINIPLUS Prescribed for  Days    · Serial Number: (BGM + 7 Digits) BGM  · Shipped-By Date on Box:   · UPS Tracking Number: [id number]  · UPS Tracking      [] [employer] MINI Holter Monitor  Model BGMINIEL Prescribed for  Days    · Serial Number: (7 Digits)   · Shipped-By Date on Box:   · UPS Tracking Number: [id number]  · UPS Tracking        This monitor was applied to the patient's chest and checked for proper functioning.  Ms. Zoila Nava was instructed in the proper use of this monitor.  She was given the opportunity to ask questions and left the office with the device 's instruction manual.    Grace Carreno, [facility], 15:28 EST, 03/08/22                  [facility] 8.8.[facility]

## 2022-03-22 ENCOUNTER — OFFICE VISIT (OUTPATIENT)
Dept: INTERNAL MEDICINE | Facility: CLINIC | Age: 87
End: 2022-03-22

## 2022-03-22 VITALS
HEIGHT: 60 IN | DIASTOLIC BLOOD PRESSURE: 60 MMHG | WEIGHT: 148 LBS | HEART RATE: 69 BPM | TEMPERATURE: 96.8 F | OXYGEN SATURATION: 94 % | BODY MASS INDEX: 29.06 KG/M2 | SYSTOLIC BLOOD PRESSURE: 100 MMHG

## 2022-03-22 DIAGNOSIS — K21.9 GASTROESOPHAGEAL REFLUX DISEASE WITHOUT ESOPHAGITIS: ICD-10-CM

## 2022-03-22 DIAGNOSIS — I10 PRIMARY HYPERTENSION: Primary | ICD-10-CM

## 2022-03-22 DIAGNOSIS — E53.8 VITAMIN B12 DEFICIENCY: ICD-10-CM

## 2022-03-22 DIAGNOSIS — E03.8 OTHER SPECIFIED HYPOTHYROIDISM: ICD-10-CM

## 2022-03-22 DIAGNOSIS — E78.49 OTHER HYPERLIPIDEMIA: ICD-10-CM

## 2022-03-22 PROCEDURE — 99214 OFFICE O/P EST MOD 30 MIN: CPT | Performed by: INTERNAL MEDICINE

## 2022-03-22 NOTE — PROGRESS NOTES
Patient is a 88 y.o. female who is here for a follow up of hyperlipidemia and hypertension.  Chief Complaint   Patient presents with   • Hyperlipidemia   • Hypertension         HPI:    Here for mgmt of HTN and hyperlipidemia and hypothyroid.  BP has been good.  Occasional dizziness.  Energy level is not the best.  Heart rate low at times.  No abdominal pains.  No fever or chills.      History:     Patient Active Problem List   Diagnosis   • Allergic rhinitis   • Hyperlipidemia   • Hypertension   • Hypocalcemia   • Hypothyroidism   • Neuropathy   • NUD (nonulcer dyspepsia)   • Painful knee   • Pernicious anemia   • Tremor   • Vitamin B12 deficiency   • Spondylolisthesis of cervical region   • Graves' ophthalmopathy   • Anemia   • Memory impairment of gradual onset   • Ascending aortic aneurysm (Formerly [facility])   • Stage 3 chronic kidney disease (Formerly [facility])   • Pulmonary hypertension (Formerly [facility])   • Chronic heart failure with preserved ejection fraction (Formerly [facility])   • CHF (congestive heart failure), NYHA class I, acute on chronic, diastolic ([facility]ly [facility])   • CHF (congestive heart failure) (Formerly [facility])   • Non-rheumatic aortic stenosis   • Non-rheumatic aortic regurgitation   • Carpal tunnel syndrome   • Essential tremor   • Gastroesophageal reflux disease   • Nausea and vomiting   • Skin sensation disturbance   • Spinal cord disease (Formerly [facility])   • Urge incontinence of urine   • Urinary urgency   • Hypothyroidism       Past Medical History:   Diagnosis Date   • Anemia    • Arthritis    • Asthma    • Cataract    • Difficulty breathing     Chronic   • GERD (gastroesophageal reflux disease)    • Graves' ophthalmopathy    • Hoarseness    • Hypertension    • Hypertensive heart disease with acute on chronic diastolic congestive heart failure (Formerly [facility]) 10/11/2021   • Pulmonary hypertension ([facility]ly [facility]) 10/11/2021   • Spondylolisthesis of cervical region    • Vitamin B12 deficiency        Past Surgical History:   Procedure Laterality Date   • CERVICAL LAMINECTOMY     • CHOLECYSTECTOMY      • OTHER SURGICAL HISTORY Right     Neuroplasty Median Nerve at Carpal Tunnel   • THYROID SURGERY     • TOTAL KNEE ARTHROPLASTY Left 09/29/2014    Dr Colon       Current Outpatient Medications on File Prior to Visit   Medication Sig   • aspirin 81 MG EC tablet Take 81 mg by mouth Daily.   • estradiol (ESTRACE) 0.1 MG/GM vaginal cream Insert 1 applicator into the vagina Daily.   • famotidine (PEPCID) 20 MG tablet TAKE ONE TABLET BY MOUTH TWICE A DAY (Patient taking differently: Take 20 mg by mouth 2 (Two) Times a Day.)   • fluticasone (FLONASE) 50 MCG/ACT nasal spray 2 sprays by Each Nare route Daily.   • furosemide (Lasix) 20 MG tablet Take 1 tablet by mouth 2 (Two) Times a Day.   • levocetirizine (XYZAL) 5 MG tablet Take 1 tablet by mouth Daily.   • levothyroxine (SYNTHROID, LEVOTHROID) 112 MCG tablet Take 1 tablet by mouth Every Morning.   • Lyrica 150 MG capsule Take 150 mg by mouth 3 (Three) Times a Day.   • metOLazone (ZAROXOLYN) 2.5 MG tablet Take 1 tablet by mouth 3 (Three) Times a Week if Needed (weight above 148 lbs.).   • ondansetron (ZOFRAN) 4 MG tablet Take 1 tablet by mouth Every 8 (Eight) Hours As Needed for nausea or vomiting.   • potassium chloride 10 MEQ CR tablet TAKE ONE TABLET BY MOUTH DAILY   • propranolol (INDERAL) 40 MG tablet Take 1 tablet by mouth 2 (Two) Times a Day.   • sodium chloride 0.65 % nasal spray 2 sprays into the nostril(s) as directed by provider As Needed for Congestion.   • HYDROcodone-acetaminophen (NORCO) 7.5-325 MG per tablet Take 1 tablet by mouth Every 8 (Eight) Hours.     No current facility-administered medications on file prior to visit.       Family History   Problem Relation Age of Onset   • Hypertension Mother    • Other Father         cardiac disorder   • Diabetes Father    • Heart disease Father    • Hypertension Sister    • Colon cancer Neg Hx    • Colon polyps Neg Hx    • Esophageal cancer Neg Hx        Social History     Socioeconomic History   •  "Marital status:    Tobacco Use   • Smoking status: Never Smoker   • Smokeless tobacco: Never Used   Vaping Use   • Vaping Use: Never used   Substance and Sexual Activity   • Alcohol use: No   • Drug use: No   • Sexual activity: Defer         Review of Systems   Constitutional: Positive for fatigue. Negative for chills, diaphoresis, fever and unexpected weight change.   HENT: Positive for hearing loss. Negative for congestion, ear pain, nosebleeds, postnasal drip, sinus pressure and sore throat.    Eyes: Negative for pain, discharge and itching.   Respiratory: Negative for cough, chest tightness, shortness of breath and wheezing.    Cardiovascular: Positive for leg swelling. Negative for chest pain and palpitations.   Gastrointestinal: Negative for abdominal distention, abdominal pain, blood in stool, constipation, diarrhea, nausea and vomiting.   Endocrine: Positive for cold intolerance. Negative for polydipsia and polyuria.   Genitourinary: Negative for difficulty urinating, dysuria, frequency and hematuria.   Musculoskeletal: Positive for arthralgias, back pain and gait problem. Negative for joint swelling and myalgias.   Skin: Negative for rash and wound.   Neurological: Positive for tremors, weakness and numbness. Negative for syncope and headaches.   Psychiatric/Behavioral: Positive for decreased concentration. Negative for dysphoric mood and sleep disturbance. The patient is not nervous/anxious.        /60   Pulse 69   Temp 96.8 °F (36 °C) (Infrared)   Ht 152.4 cm (60\")   Wt 67.1 kg (148 lb)   LMP  (LMP Unknown)   SpO2 94%   BMI 28.90 kg/m²       Physical Exam  Constitutional:       Appearance: Normal appearance. She is well-developed.   HENT:      Head: Normocephalic and atraumatic.      Right Ear: External ear normal.      Left Ear: External ear normal.      Nose: Nose normal.      Mouth/Throat:      Mouth: Mucous membranes are moist.      Pharynx: Oropharynx is clear.   Eyes:      " Extraocular Movements: Extraocular movements intact.      Conjunctiva/sclera: Conjunctivae normal.      Pupils: Pupils are equal, round, and reactive to light.   Cardiovascular:      Rate and Rhythm: Normal rate and regular rhythm.      Heart sounds: Normal heart sounds.   Pulmonary:      Effort: Pulmonary effort is normal.      Breath sounds: Normal breath sounds.   Abdominal:      General: Bowel sounds are normal.      Palpations: Abdomen is soft.   Musculoskeletal:         General: Normal range of motion.      Cervical back: Normal range of motion and neck supple.      Right lower leg: Edema present.      Left lower leg: Edema present.   Lymphadenopathy:      Cervical: No cervical adenopathy.   Skin:     General: Skin is warm and dry.   Neurological:      General: No focal deficit present.      Mental Status: She is alert and oriented to person, place, and time.      Comments: Head tremor   Psychiatric:         Mood and Affect: Mood normal.         Behavior: Behavior normal.         Thought Content: Thought content normal.         Procedure:      Discussion/Summary:    htn-stable on BB but cut to 1/2 in am and 1 in pm  rhinitis-flonase and xyzal continuation, stable  tremor-cont BB  hypothyroid- recheck at goal for 88  Dyspepsia-cont pepcid   neuropathy-cont lyrica, stable on bid  djd-cont prn lortab, rare use  b12 def-cont b12, level at goal  hyperlipidemia-labs on rtc, cont diet control  Diverticulosis-advised citrucel qd, asymptomatic  Memory impairment-cont aricept, no better or worst  Ascending Aortic aneurysm-recheck 7/2 noted  CKD-avoid NSAIDs, recheck noted         1/21 labs noted and dw patient, advised NSAIDs avoidance and increase fluids    Current Outpatient Medications:   •  aspirin 81 MG EC tablet, Take 81 mg by mouth Daily., Disp: , Rfl:   •  estradiol (ESTRACE) 0.1 MG/GM vaginal cream, Insert 1 applicator into the vagina Daily., Disp: , Rfl:   •  famotidine (PEPCID) 20 MG tablet, TAKE ONE TABLET  BY MOUTH TWICE A DAY (Patient taking differently: Take 20 mg by mouth 2 (Two) Times a Day.), Disp: 180 tablet, Rfl: 3  •  fluticasone (FLONASE) 50 MCG/ACT nasal spray, 2 sprays by Each Nare route Daily., Disp: 18.2 mL, Rfl: 0  •  furosemide (Lasix) 20 MG tablet, Take 1 tablet by mouth 2 (Two) Times a Day., Disp: 60 tablet, Rfl: 2  •  levocetirizine (XYZAL) 5 MG tablet, Take 1 tablet by mouth Daily., Disp: , Rfl:   •  levothyroxine (SYNTHROID, LEVOTHROID) 112 MCG tablet, Take 1 tablet by mouth Every Morning., Disp: 90 tablet, Rfl: 1  •  Lyrica 150 MG capsule, Take 150 mg by mouth 3 (Three) Times a Day., Disp: , Rfl:   •  metOLazone (ZAROXOLYN) 2.5 MG tablet, Take 1 tablet by mouth 3 (Three) Times a Week if Needed (weight above 148 lbs.)., Disp: 90 tablet, Rfl: 0  •  ondansetron (ZOFRAN) 4 MG tablet, Take 1 tablet by mouth Every 8 (Eight) Hours As Needed for nausea or vomiting., Disp: 30 tablet, Rfl: 0  •  potassium chloride 10 MEQ CR tablet, TAKE ONE TABLET BY MOUTH DAILY, Disp: 90 tablet, Rfl: 3  •  propranolol (INDERAL) 40 MG tablet, Take 1 tablet by mouth 2 (Two) Times a Day., Disp: 60 tablet, Rfl: 3  •  sodium chloride 0.65 % nasal spray, 2 sprays into the nostril(s) as directed by provider As Needed for Congestion., Disp: 60 mL, Rfl: 0  •  HYDROcodone-acetaminophen (NORCO) 7.5-325 MG per tablet, Take 1 tablet by mouth Every 8 (Eight) Hours., Disp: , Rfl:         Diagnoses and all orders for this visit:    1. Primary hypertension (Primary)    2. Other hyperlipidemia    3. Vitamin B12 deficiency    4. Other specified hypothyroidism    5. Gastroesophageal reflux disease without esophagitis

## 2022-03-23 PROCEDURE — 93248 EXT ECG>7D<15D REV&INTERPJ: CPT | Performed by: INTERNAL MEDICINE

## 2022-03-29 ENCOUNTER — OFFICE VISIT (OUTPATIENT)
Dept: CARDIOLOGY | Facility: HOSPITAL | Age: 87
End: 2022-03-29

## 2022-03-29 VITALS
HEART RATE: 77 BPM | BODY MASS INDEX: 29.35 KG/M2 | DIASTOLIC BLOOD PRESSURE: 57 MMHG | TEMPERATURE: 96.3 F | SYSTOLIC BLOOD PRESSURE: 115 MMHG | WEIGHT: 149.5 LBS | OXYGEN SATURATION: 95 % | RESPIRATION RATE: 10 BRPM | HEIGHT: 60 IN

## 2022-03-29 DIAGNOSIS — I49.9 IRREGULAR HEART RATE: ICD-10-CM

## 2022-03-29 DIAGNOSIS — I50.32 CHRONIC HEART FAILURE WITH PRESERVED EJECTION FRACTION: ICD-10-CM

## 2022-03-29 DIAGNOSIS — I10 ESSENTIAL HYPERTENSION: Primary | ICD-10-CM

## 2022-03-29 PROCEDURE — 99214 OFFICE O/P EST MOD 30 MIN: CPT | Performed by: NURSE PRACTITIONER

## 2022-03-29 NOTE — PROGRESS NOTES
"[facility]    Chief Complaint  Follow-up (Holter monitor, low HR)    Subjective    History of Present Illness {  Problem List  Visit  Diagnosis   Encounters  Notes  Medications  Labs  Result Review Imaging  Media :23}     Zoila Nava presents to [facility] for   History of Present Illness     88-year-old female with hypertension, chronic kidney disease stage III, ascending aortic aneurysm, pulmonary hypertension, valvular heart disease, heart failure with preserved EF, memory impairment, PACs, PVCs, atrial tachycardia.    Assessment concern of heart rate in the 40s.  Had been on metoprolol but had worsening tremors.  Transition back to propranolol.    Holter 3/8/2022.  Duration 4 days.  Average heart rate 62 bpm.  Multiple SVT runs/longest 15 seconds.  3.7% PACs, 2.2% PVC burden.  No patient triggered events.    Patient denies dizziness, near syncope, syncope, chest pain, pressure, dyspnea, weight has been stable.  Has not had to use extra diuretics.  Spouse has noted fatigue and increased sleepiness and memory issues.  Tremors have resolved on propranolol.      Objective     Vital Signs:   Vitals:    03/29/22 0900   BP: 115/57   BP Location: Left arm   Patient Position: Sitting   Cuff Size: Adult   Pulse: 77   Resp: 10   Temp: 96.3 °F (35.7 °C)   TempSrc: Temporal   SpO2: 95%   Weight: 67.8 kg (149 lb 8 oz)   Height: 152.4 cm (60\")     Body mass index is 29.2 kg/m².  Physical Exam  Vitals reviewed.   Constitutional:       General: She is not in acute distress.     Appearance: Normal appearance.   Cardiovascular:      Rate and Rhythm: Normal rate and regular rhythm.      Pulses:           Radial pulses are 2+ on the right side.        Dorsalis pedis pulses are 2+ on the right side.        Posterior tibial pulses are 2+ on the right side.      Heart sounds: Normal heart sounds.   Pulmonary:      Effort: " Pulmonary effort is normal.      Breath sounds: Normal breath sounds.   Musculoskeletal:      Right lower leg: No edema.      Left lower leg: No edema.   Skin:     General: Skin is warm and dry.   Neurological:      Mental Status: She is alert.   Psychiatric:         Mood and Affect: Mood normal.         Behavior: Behavior is cooperative.              Result Review  Data Reviewed:{ Labs  Result Review  Imaging  Med Tab  Media :23}     Holter 3/8/2022.  Duration 4 days.  Average heart rate 62 bpm.  Multiple SVT runs/longest 15 seconds.  3.7% PACs, 2.2% PVC burden.  No patient triggered events      Echocardiogram 10/11/2021: EF 73%, grade 1 diastolic dysfunction, moderate pulmonary hypertension with RVSP 45 to 55 mmHg, moderate AR, mild AS, moderate TR.     EKG 2/16/2022: 72-hour Holter 12/2/21: Average heart rate 73 bpm, PACs 5.8%, PVCs less than 1%.  Frequent SVT/PAT up to 30 beats.  No correlation with patient events.  Lab Results   Component Value Date    WBC 6.40 01/21/2022    HGB 11.2 (L) 01/21/2022    HCT 34.2 01/21/2022    MCV 89.5 01/21/2022     01/21/2022     Lab Results   Component Value Date    GLUCOSE 94 01/21/2022    CALCIUM 8.7 01/21/2022     01/21/2022    K 3.7 01/21/2022    CO2 30.0 (H) 01/21/2022    CL 96 (L) 01/21/2022    BUN 19 01/21/2022    CREATININE 1.26 (H) 01/21/2022    EGFRIFNONA 40 (L) 01/21/2022    BCR 15.1 01/21/2022    ANIONGAP 12.0 01/21/2022     Lab Results   Component Value Date    TSH 4.820 (H) 01/21/2022       Assessment and Plan {CC Problem List  Visit Diagnosis  ROS  Review (Popup)  Health Maintenance  Quality  BestPractice  Medications  SmartSets  SnapShot Encounters  Media :23}   1. Essential hypertension  Blood pressure controlled today.    2. Chronic heart failure with preserved ejection fraction (HCC)  Euvolemic    3. Irregular heart rate  Average heart rate 62 bpm.  No significant bradycardia.  Patient with PVCs, PACs and brief SVT.  Patient is  asymptomatic at this time.  Continue to monitor.  Continue current dose of propranolol.          Follow Up {Instructions Charge Capture  Follow-up Communications :23}   Return for Next scheduled follow up.    Patient was given instructions and counseling regarding her condition or for health maintenance advice. Please see specific information pulled into the AVS if appropriate.  Patient was instructed to call the [facility] with any questions, concerns, or worsening symptoms.

## 2022-05-05 ENCOUNTER — TELEPHONE (OUTPATIENT)
Dept: CARDIOLOGY | Facility: HOSPITAL | Age: 87
End: 2022-05-05

## 2022-05-05 NOTE — TELEPHONE ENCOUNTER
----- Message from Phillip Hoff MA sent at 5/4/2022  4:08 PM EDT -----  Spoke to patients spouse, whom is very concerned she is is not doing well. Stated her walking is very unsteady and dementia is worse. Told him to follow up with primary care provider, also stated that he will call if any drastic changes in weight occurs, or SOB. Patient verbalized understanding.  ----- Message -----  From: Salvador Barnes APRN  Sent: 5/4/2022   1:14 PM EDT  To: Phillip Hoff MA    Patient's  had recently dropped by blood pressure, heart rate, weight, oxygen saturation log.  He was concerned that the patient may need to be seen in clinic.    Please call the spouse.  If he has any concerns we will be glad to schedule her a follow-up visit next week/next available.  Based on the information that he is given me I am not too concerned about her heart rate or her blood pressure, but if he feels that she is more short of breath, she is having weight gain, worsening edema, we will be glad to see her.  If he has questions about her thyroid levels or her worsening dementia I would follow-up with her primary care provider.  Of course I will be glad to see her if he has any questions or concerns.

## 2022-05-11 ENCOUNTER — LAB (OUTPATIENT)
Dept: LAB | Facility: HOSPITAL | Age: 87
End: 2022-05-11

## 2022-05-11 ENCOUNTER — OFFICE VISIT (OUTPATIENT)
Dept: INTERNAL MEDICINE | Facility: CLINIC | Age: 87
End: 2022-05-11

## 2022-05-11 VITALS
HEART RATE: 54 BPM | BODY MASS INDEX: 29.64 KG/M2 | HEIGHT: 60 IN | DIASTOLIC BLOOD PRESSURE: 60 MMHG | TEMPERATURE: 96.9 F | WEIGHT: 151 LBS | SYSTOLIC BLOOD PRESSURE: 94 MMHG | OXYGEN SATURATION: 93 %

## 2022-05-11 DIAGNOSIS — R41.3 MEMORY IMPAIRMENT OF GRADUAL ONSET: ICD-10-CM

## 2022-05-11 DIAGNOSIS — E78.49 OTHER HYPERLIPIDEMIA: ICD-10-CM

## 2022-05-11 DIAGNOSIS — E03.8 OTHER SPECIFIED HYPOTHYROIDISM: ICD-10-CM

## 2022-05-11 DIAGNOSIS — K21.9 GASTROESOPHAGEAL REFLUX DISEASE WITHOUT ESOPHAGITIS: ICD-10-CM

## 2022-05-11 DIAGNOSIS — E53.8 VITAMIN B12 DEFICIENCY: ICD-10-CM

## 2022-05-11 DIAGNOSIS — N18.30 STAGE 3 CHRONIC KIDNEY DISEASE, UNSPECIFIED WHETHER STAGE 3A OR 3B CKD: ICD-10-CM

## 2022-05-11 DIAGNOSIS — I10 PRIMARY HYPERTENSION: Primary | ICD-10-CM

## 2022-05-11 LAB
DEPRECATED RDW RBC AUTO: 43.4 FL (ref 37–54)
ERYTHROCYTE [DISTWIDTH] IN BLOOD BY AUTOMATED COUNT: 13.2 % (ref 12.3–15.4)
HCT VFR BLD AUTO: 34 % (ref 34–46.6)
HGB BLD-MCNC: 11.1 G/DL (ref 12–15.9)
MCH RBC QN AUTO: 29.5 PG (ref 26.6–33)
MCHC RBC AUTO-ENTMCNC: 32.6 G/DL (ref 31.5–35.7)
MCV RBC AUTO: 90.4 FL (ref 79–97)
PLATELET # BLD AUTO: 209 10*3/MM3 (ref 140–450)
PMV BLD AUTO: 12.1 FL (ref 6–12)
RBC # BLD AUTO: 3.76 10*6/MM3 (ref 3.77–5.28)
TSH SERPL DL<=0.05 MIU/L-ACNC: 6.82 UIU/ML (ref 0.27–4.2)
WBC NRBC COR # BLD: 6.81 10*3/MM3 (ref 3.4–10.8)

## 2022-05-11 PROCEDURE — 84443 ASSAY THYROID STIM HORMONE: CPT | Performed by: INTERNAL MEDICINE

## 2022-05-11 PROCEDURE — 85027 COMPLETE CBC AUTOMATED: CPT | Performed by: INTERNAL MEDICINE

## 2022-05-11 PROCEDURE — 99214 OFFICE O/P EST MOD 30 MIN: CPT | Performed by: INTERNAL MEDICINE

## 2022-05-11 PROCEDURE — 80053 COMPREHEN METABOLIC PANEL: CPT | Performed by: INTERNAL MEDICINE

## 2022-05-11 RX ORDER — RIVASTIGMINE 4.6 MG/24H
1 PATCH, EXTENDED RELEASE TRANSDERMAL DAILY
Qty: 30 EACH | Refills: 5 | Status: SHIPPED | OUTPATIENT
Start: 2022-05-11 | End: 2023-01-16

## 2022-05-11 NOTE — PROGRESS NOTES
Patient is a 88 y.o. female who is here for a follow up of dementia.  Chief Complaint   Patient presents with   • Dementia         HPI:    Here for mgmt of HTN and dementia and hypothyroid.  Wants to reduce her meds.  Memory is getting worst.  No abdominal pains.  Appetite is good.  Weight is stable.  Energy level is not the best.  Sleeping ok, actually sleeps more than usual.      History:     Patient Active Problem List   Diagnosis   • Allergic rhinitis   • Hyperlipidemia   • Hypertension   • Hypocalcemia   • Hypothyroidism   • Neuropathy   • NUD (nonulcer dyspepsia)   • Painful knee   • Pernicious anemia   • Tremor   • Vitamin B12 deficiency   • Spondylolisthesis of cervical region   • Graves' ophthalmopathy   • Anemia   • Memory impairment of gradual onset   • Ascending aortic aneurysm ([facility])   • Stage 3 chronic kidney disease ([facility])   • Pulmonary hypertension ([facility])   • Chronic heart failure with preserved ejection fraction ([facility])   • CHF (congestive heart failure), NYHA class I, acute on chronic, diastolic ([facility])   • CHF (congestive heart failure) ([facility])   • Non-rheumatic aortic stenosis   • Non-rheumatic aortic regurgitation   • Carpal tunnel syndrome   • Essential tremor   • Gastroesophageal reflux disease   • Nausea and vomiting   • Skin sensation disturbance   • Spinal cord disease ([facility])   • Urge incontinence of urine   • Urinary urgency   • Hypothyroidism       Past Medical History:   Diagnosis Date   • Anemia    • Arthritis    • Asthma    • Cataract    • Difficulty breathing     Chronic   • GERD (gastroesophageal reflux disease)    • Graves' ophthalmopathy    • Hoarseness    • Hypertension    • Hypertensive heart disease with acute on chronic diastolic congestive heart failure ([facility]) 10/11/2021   • Pulmonary hypertension ([facility]) 10/11/2021   • Spondylolisthesis of cervical region    • Vitamin B12 deficiency        Past Surgical History:   Procedure Laterality Date   • CERVICAL LAMINECTOMY     • CHOLECYSTECTOMY      • OTHER SURGICAL HISTORY Right     Neuroplasty Median Nerve at Carpal Tunnel   • THYROID SURGERY     • TOTAL KNEE ARTHROPLASTY Left 09/29/2014    Dr Colon       Current Outpatient Medications on File Prior to Visit   Medication Sig   • aspirin 81 MG EC tablet Take 81 mg by mouth Daily.   • estradiol (ESTRACE) 0.1 MG/GM vaginal cream Insert 1 applicator into the vagina Daily.   • famotidine (PEPCID) 20 MG tablet TAKE ONE TABLET BY MOUTH TWICE A DAY (Patient taking differently: Take 20 mg by mouth 2 (Two) Times a Day.)   • fluticasone (FLONASE) 50 MCG/ACT nasal spray 2 sprays by Each Nare route Daily.   • furosemide (Lasix) 20 MG tablet Take 1 tablet by mouth 2 (Two) Times a Day.   • HYDROcodone-acetaminophen (NORCO) 7.5-325 MG per tablet Take 1 tablet by mouth Every 8 (Eight) Hours.   • levocetirizine (XYZAL) 5 MG tablet Take 1 tablet by mouth Daily.   • Lyrica 150 MG capsule Take 150 mg by mouth 3 (Three) Times a Day.   • metOLazone (ZAROXOLYN) 2.5 MG tablet Take 1 tablet by mouth 3 (Three) Times a Week if Needed (weight above 148 lbs.).   • ondansetron (ZOFRAN) 4 MG tablet Take 1 tablet by mouth Every 8 (Eight) Hours As Needed for nausea or vomiting.   • potassium chloride 10 MEQ CR tablet TAKE ONE TABLET BY MOUTH DAILY   • propranolol (INDERAL) 40 MG tablet Take 1 tablet by mouth 2 (Two) Times a Day.   • sodium chloride 0.65 % nasal spray 2 sprays into the nostril(s) as directed by provider As Needed for Congestion.   • [DISCONTINUED] levothyroxine (SYNTHROID, LEVOTHROID) 112 MCG tablet Take 1 tablet by mouth Every Morning.     No current facility-administered medications on file prior to visit.       Family History   Problem Relation Age of Onset   • Hypertension Mother    • Other Father         cardiac disorder   • Diabetes Father    • Heart disease Father    • Hypertension Sister    • Colon cancer Neg Hx    • Colon polyps Neg Hx    • Esophageal cancer Neg Hx        Social History     Socioeconomic  "History   • Marital status:    Tobacco Use   • Smoking status: Never Smoker   • Smokeless tobacco: Never Used   Vaping Use   • Vaping Use: Never used   Substance and Sexual Activity   • Alcohol use: No   • Drug use: No   • Sexual activity: Defer         Review of Systems   Constitutional: Positive for fatigue. Negative for chills, diaphoresis, fever and unexpected weight change.   HENT: Positive for hearing loss. Negative for congestion, ear pain, nosebleeds, postnasal drip, sinus pressure and sore throat.    Eyes: Negative for pain, discharge and itching.   Respiratory: Negative for cough, chest tightness, shortness of breath and wheezing.    Cardiovascular: Positive for leg swelling. Negative for chest pain and palpitations.   Gastrointestinal: Negative for abdominal distention, abdominal pain, blood in stool, constipation, diarrhea, nausea and vomiting.   Endocrine: Positive for cold intolerance. Negative for polydipsia and polyuria.   Genitourinary: Negative for difficulty urinating, dysuria, frequency and hematuria.   Musculoskeletal: Positive for arthralgias, back pain and gait problem. Negative for joint swelling and myalgias.   Skin: Negative for rash and wound.   Neurological: Positive for tremors, weakness and numbness. Negative for syncope and headaches.   Psychiatric/Behavioral: Positive for decreased concentration. Negative for dysphoric mood and sleep disturbance. The patient is not nervous/anxious.        BP 94/60   Pulse 54   Temp 96.9 °F (36.1 °C) (Infrared)   Ht 152.4 cm (60\")   Wt 68.5 kg (151 lb)   LMP  (LMP Unknown)   SpO2 93%   BMI 29.49 kg/m²       Physical Exam  Constitutional:       Appearance: Normal appearance. She is well-developed.   HENT:      Head: Normocephalic and atraumatic.      Right Ear: External ear normal.      Left Ear: External ear normal.      Nose: Nose normal.      Mouth/Throat:      Mouth: Mucous membranes are moist.      Pharynx: Oropharynx is clear. "   Eyes:      Extraocular Movements: Extraocular movements intact.      Conjunctiva/sclera: Conjunctivae normal.      Pupils: Pupils are equal, round, and reactive to light.   Cardiovascular:      Rate and Rhythm: Normal rate and regular rhythm.      Heart sounds: Normal heart sounds.   Pulmonary:      Effort: Pulmonary effort is normal.      Breath sounds: Normal breath sounds.   Abdominal:      General: Bowel sounds are normal.      Palpations: Abdomen is soft.   Musculoskeletal:         General: Normal range of motion.      Cervical back: Normal range of motion and neck supple.      Right lower leg: Edema present.      Left lower leg: Edema present.   Lymphadenopathy:      Cervical: No cervical adenopathy.   Skin:     General: Skin is warm and dry.   Neurological:      General: No focal deficit present.      Mental Status: She is alert and oriented to person, place, and time.   Psychiatric:         Mood and Affect: Mood normal.         Behavior: Behavior normal.         Thought Content: Thought content normal.         Procedure:      Discussion/Summary:    htn-stable on BB but decrease to 1/2 bid  rhinitis-flonase and xyzal continuation, stable  tremor-cont BB, not an issue  hypothyroid- recheck noted and will increase T4  Dyspepsia-cont pepcid   neuropathy-cont lyrica, consider drop to bid  djd-cont prn lortab, rare use  b12 def-cont b12, level at goal  hyperlipidemia-labs on rtc, cont diet control  Diverticulosis-advised citrucel qd, asymptomatic  Memory impairment-trial of Exelon  Ascending Aortic aneurysm-recheck 7/2 noted  CKD-avoid NSAIDs, recheck stable         5/11 labs noted and dw patient, advised NSAIDs avoidance and increase fluids    Current Outpatient Medications:   •  aspirin 81 MG EC tablet, Take 81 mg by mouth Daily., Disp: , Rfl:   •  estradiol (ESTRACE) 0.1 MG/GM vaginal cream, Insert 1 applicator into the vagina Daily., Disp: , Rfl:   •  famotidine (PEPCID) 20 MG tablet, TAKE ONE TABLET BY  MOUTH TWICE A DAY (Patient taking differently: Take 20 mg by mouth 2 (Two) Times a Day.), Disp: 180 tablet, Rfl: 3  •  fluticasone (FLONASE) 50 MCG/ACT nasal spray, 2 sprays by Each Nare route Daily., Disp: 18.2 mL, Rfl: 0  •  furosemide (Lasix) 20 MG tablet, Take 1 tablet by mouth 2 (Two) Times a Day., Disp: 60 tablet, Rfl: 2  •  HYDROcodone-acetaminophen (NORCO) 7.5-325 MG per tablet, Take 1 tablet by mouth Every 8 (Eight) Hours., Disp: , Rfl:   •  levocetirizine (XYZAL) 5 MG tablet, Take 1 tablet by mouth Daily., Disp: , Rfl:   •  levothyroxine (SYNTHROID, LEVOTHROID) 125 MCG tablet, Take 1 tablet by mouth Every Morning., Disp: 90 tablet, Rfl: 1  •  Lyrica 150 MG capsule, Take 150 mg by mouth 3 (Three) Times a Day., Disp: , Rfl:   •  metOLazone (ZAROXOLYN) 2.5 MG tablet, Take 1 tablet by mouth 3 (Three) Times a Week if Needed (weight above 148 lbs.)., Disp: 90 tablet, Rfl: 0  •  ondansetron (ZOFRAN) 4 MG tablet, Take 1 tablet by mouth Every 8 (Eight) Hours As Needed for nausea or vomiting., Disp: 30 tablet, Rfl: 0  •  potassium chloride 10 MEQ CR tablet, TAKE ONE TABLET BY MOUTH DAILY, Disp: 90 tablet, Rfl: 3  •  propranolol (INDERAL) 40 MG tablet, Take 1 tablet by mouth 2 (Two) Times a Day., Disp: 60 tablet, Rfl: 3  •  sodium chloride 0.65 % nasal spray, 2 sprays into the nostril(s) as directed by provider As Needed for Congestion., Disp: 60 mL, Rfl: 0  •  rivastigmine (Exelon) 4.6 MG/24HR patch, Place 1 patch on the skin as directed by provider Daily., Disp: 30 each, Rfl: 5        Diagnoses and all orders for this visit:    1. Primary hypertension (Primary)  -     CBC (No Diff)  -     Comprehensive Metabolic Panel    2. Other hyperlipidemia    3. Vitamin B12 deficiency    4. Other specified hypothyroidism  -     TSH  -     levothyroxine (SYNTHROID, LEVOTHROID) 125 MCG tablet; Take 1 tablet by mouth Every Morning.  Dispense: 90 tablet; Refill: 1    5. Gastroesophageal reflux disease without esophagitis    6.  Stage 3 chronic kidney disease, unspecified whether stage 3a or 3b CKD ([facility])    7. Memory impairment of gradual onset  -     rivastigmine (Exelon) 4.6 MG/24HR patch; Place 1 patch on the skin as directed by provider Daily.  Dispense: 30 each; Refill: 5

## 2022-05-12 LAB
ALBUMIN SERPL-MCNC: 4 G/DL (ref 3.5–5.2)
ALBUMIN/GLOB SERPL: 1.5 G/DL
ALP SERPL-CCNC: 89 U/L (ref 39–117)
ALT SERPL W P-5'-P-CCNC: 22 U/L (ref 1–33)
ANION GAP SERPL CALCULATED.3IONS-SCNC: 13.1 MMOL/L (ref 5–15)
AST SERPL-CCNC: 31 U/L (ref 1–32)
BILIRUB SERPL-MCNC: 0.3 MG/DL (ref 0–1.2)
BUN SERPL-MCNC: 27 MG/DL (ref 8–23)
BUN/CREAT SERPL: 18.5 (ref 7–25)
CALCIUM SPEC-SCNC: 8.8 MG/DL (ref 8.6–10.5)
CHLORIDE SERPL-SCNC: 95 MMOL/L (ref 98–107)
CO2 SERPL-SCNC: 27.9 MMOL/L (ref 22–29)
CREAT SERPL-MCNC: 1.46 MG/DL (ref 0.57–1)
EGFRCR SERPLBLD CKD-EPI 2021: 34.5 ML/MIN/1.73
GLOBULIN UR ELPH-MCNC: 2.7 GM/DL
GLUCOSE SERPL-MCNC: 78 MG/DL (ref 65–99)
POTASSIUM SERPL-SCNC: 3.7 MMOL/L (ref 3.5–5.2)
PROT SERPL-MCNC: 6.7 G/DL (ref 6–8.5)
SODIUM SERPL-SCNC: 136 MMOL/L (ref 136–145)

## 2022-05-12 RX ORDER — LEVOTHYROXINE SODIUM 0.12 MG/1
125 TABLET ORAL EVERY MORNING
Qty: 90 TABLET | Refills: 1 | Status: SHIPPED | OUTPATIENT
Start: 2022-05-12 | End: 2022-11-29

## 2022-06-22 ENCOUNTER — OFFICE VISIT (OUTPATIENT)
Dept: INTERNAL MEDICINE | Facility: CLINIC | Age: 87
End: 2022-06-22

## 2022-06-22 VITALS
HEIGHT: 60 IN | TEMPERATURE: 96.9 F | SYSTOLIC BLOOD PRESSURE: 120 MMHG | DIASTOLIC BLOOD PRESSURE: 60 MMHG | OXYGEN SATURATION: 91 % | WEIGHT: 153 LBS | HEART RATE: 92 BPM | BODY MASS INDEX: 30.04 KG/M2

## 2022-06-22 DIAGNOSIS — J30.1 SEASONAL ALLERGIC RHINITIS DUE TO POLLEN: Primary | ICD-10-CM

## 2022-06-22 DIAGNOSIS — E78.49 OTHER HYPERLIPIDEMIA: ICD-10-CM

## 2022-06-22 DIAGNOSIS — I10 PRIMARY HYPERTENSION: ICD-10-CM

## 2022-06-22 DIAGNOSIS — E03.8 OTHER SPECIFIED HYPOTHYROIDISM: ICD-10-CM

## 2022-06-22 DIAGNOSIS — K21.9 GASTROESOPHAGEAL REFLUX DISEASE WITHOUT ESOPHAGITIS: ICD-10-CM

## 2022-06-22 DIAGNOSIS — R25.1 TREMOR: ICD-10-CM

## 2022-06-22 PROBLEM — E03.9 HYPOTHYROIDISM: Status: RESOLVED | Noted: 2021-12-15 | Resolved: 2022-06-22

## 2022-06-22 PROCEDURE — 99214 OFFICE O/P EST MOD 30 MIN: CPT | Performed by: INTERNAL MEDICINE

## 2022-06-22 RX ORDER — CETIRIZINE HYDROCHLORIDE 10 MG/1
10 TABLET ORAL DAILY
Status: ON HOLD | COMMUNITY

## 2022-06-22 NOTE — PROGRESS NOTES
Patient is a 88 y.o. female who is here for a follow up of hyperlipidemia,hypertension and hypothyroidism.  Chief Complaint   Patient presents with   • Hyperlipidemia   • Hypertension   • Hypothyroidism         HPI:    Here for mgmt of HTN and hypothyroid.  Energy level is not what it used to be.  No dizziness or lightheadedness.  No nausea or emesis.  Sleeping fair.  Her allergies are bothersome.  Memory is still an issue.  Not using the Exelon patch.     History:     Patient Active Problem List   Diagnosis   • Allergic rhinitis   • Hyperlipidemia   • Hypertension   • Hypocalcemia   • Hypothyroidism   • Neuropathy   • NUD (nonulcer dyspepsia)   • Painful knee   • Pernicious anemia   • Tremor   • Vitamin B12 deficiency   • Spondylolisthesis of cervical region   • Graves' ophthalmopathy   • Anemia   • Memory impairment of gradual onset   • Ascending aortic aneurysm ([facility])   • Stage 3 chronic kidney disease ([facility])   • Pulmonary hypertension ([facility])   • Chronic heart failure with preserved ejection fraction ([facility])   • CHF (congestive heart failure), NYHA class I, acute on chronic, diastolic ([facility])   • CHF (congestive heart failure) ([facility])   • Non-rheumatic aortic stenosis   • Non-rheumatic aortic regurgitation   • Carpal tunnel syndrome   • Essential tremor   • Gastroesophageal reflux disease   • Nausea and vomiting   • Skin sensation disturbance   • Spinal cord disease ([facility])   • Urge incontinence of urine   • Urinary urgency       Past Medical History:   Diagnosis Date   • Anemia    • Arthritis    • Asthma    • Cataract    • Difficulty breathing     Chronic   • GERD (gastroesophageal reflux disease)    • Graves' ophthalmopathy    • Hoarseness    • Hypertension    • Hypertensive heart disease with acute on chronic diastolic congestive heart failure ([facility]) 10/11/2021   • Pulmonary hypertension ([facility]) 10/11/2021   • Spondylolisthesis of cervical region    • Vitamin B12 deficiency        Past Surgical History:   Procedure  Laterality Date   • CERVICAL LAMINECTOMY     • CHOLECYSTECTOMY     • OTHER SURGICAL HISTORY Right     Neuroplasty Median Nerve at Carpal Tunnel   • THYROID SURGERY     • TOTAL KNEE ARTHROPLASTY Left 09/29/2014    Dr Colno       Current Outpatient Medications on File Prior to Visit   Medication Sig   • aspirin 81 MG EC tablet Take 81 mg by mouth Daily.   • cetirizine (zyrTEC) 10 MG tablet Take 10 mg by mouth Daily.   • estradiol (ESTRACE) 0.1 MG/GM vaginal cream Insert 1 applicator into the vagina Daily.   • famotidine (PEPCID) 20 MG tablet TAKE ONE TABLET BY MOUTH TWICE A DAY (Patient taking differently: Take 20 mg by mouth 2 (Two) Times a Day.)   • furosemide (Lasix) 20 MG tablet Take 1 tablet by mouth 2 (Two) Times a Day. (Patient taking differently: Take 20 mg by mouth Daily.)   • HYDROcodone-acetaminophen (NORCO) 7.5-325 MG per tablet Take 1 tablet by mouth Every 8 (Eight) Hours.   • levothyroxine (SYNTHROID, LEVOTHROID) 125 MCG tablet Take 1 tablet by mouth Every Morning.   • Lyrica 150 MG capsule Take 150 mg by mouth 2 (Two) Times a Day.   • ondansetron (ZOFRAN) 4 MG tablet Take 1 tablet by mouth Every 8 (Eight) Hours As Needed for nausea or vomiting.   • potassium chloride 10 MEQ CR tablet TAKE ONE TABLET BY MOUTH DAILY   • propranolol (INDERAL) 40 MG tablet Take 1 tablet by mouth 2 (Two) Times a Day. (Patient taking differently: Take 20 mg by mouth 2 (Two) Times a Day.)   • fluticasone (FLONASE) 50 MCG/ACT nasal spray 2 sprays by Each Nare route Daily.   • rivastigmine (Exelon) 4.6 MG/24HR patch Place 1 patch on the skin as directed by provider Daily.   • sodium chloride 0.65 % nasal spray 2 sprays into the nostril(s) as directed by provider As Needed for Congestion.   • [DISCONTINUED] levocetirizine (XYZAL) 5 MG tablet Take 1 tablet by mouth Daily.   • [DISCONTINUED] metOLazone (ZAROXOLYN) 2.5 MG tablet Take 1 tablet by mouth 3 (Three) Times a Week if Needed (weight above 148 lbs.).     No current  "facility-administered medications on file prior to visit.       Family History   Problem Relation Age of Onset   • Hypertension Mother    • Other Father         cardiac disorder   • Diabetes Father    • Heart disease Father    • Hypertension Sister    • Colon cancer Neg Hx    • Colon polyps Neg Hx    • Esophageal cancer Neg Hx        Social History     Socioeconomic History   • Marital status:    Tobacco Use   • Smoking status: Never Smoker   • Smokeless tobacco: Never Used   Vaping Use   • Vaping Use: Never used   Substance and Sexual Activity   • Alcohol use: No   • Drug use: No   • Sexual activity: Defer         Review of Systems   Constitutional: Positive for fatigue. Negative for chills, diaphoresis, fever and unexpected weight change.   HENT: Positive for hearing loss. Negative for congestion, ear pain, nosebleeds, postnasal drip, sinus pressure and sore throat.    Eyes: Negative for pain, discharge and itching.   Respiratory: Negative for cough, chest tightness, shortness of breath and wheezing.    Cardiovascular: Positive for leg swelling. Negative for chest pain and palpitations.   Gastrointestinal: Negative for abdominal distention, abdominal pain, blood in stool, constipation, diarrhea, nausea and vomiting.   Endocrine: Positive for cold intolerance. Negative for polydipsia and polyuria.   Genitourinary: Negative for difficulty urinating, dysuria, frequency and hematuria.   Musculoskeletal: Positive for arthralgias, back pain and gait problem. Negative for joint swelling and myalgias.   Skin: Negative for rash and wound.   Neurological: Positive for tremors, weakness and numbness. Negative for syncope and headaches.   Psychiatric/Behavioral: Positive for decreased concentration. Negative for dysphoric mood and sleep disturbance. The patient is not nervous/anxious.        /60   Pulse 92   Temp 96.9 °F (36.1 °C) (Infrared)   Ht 152.4 cm (60\")   Wt 69.4 kg (153 lb)   LMP  (LMP Unknown)   " SpO2 91%   BMI 29.88 kg/m²       Physical Exam  Constitutional:       Appearance: Normal appearance. She is well-developed.   HENT:      Head: Normocephalic and atraumatic.      Right Ear: External ear normal.      Left Ear: External ear normal.      Nose: Nose normal.      Mouth/Throat:      Mouth: Mucous membranes are moist.      Pharynx: Oropharynx is clear.   Eyes:      Extraocular Movements: Extraocular movements intact.      Conjunctiva/sclera: Conjunctivae normal.      Pupils: Pupils are equal, round, and reactive to light.   Cardiovascular:      Rate and Rhythm: Normal rate and regular rhythm.      Heart sounds: Normal heart sounds.   Pulmonary:      Effort: Pulmonary effort is normal.      Breath sounds: Normal breath sounds.   Abdominal:      General: Bowel sounds are normal.      Palpations: Abdomen is soft.   Musculoskeletal:         General: Normal range of motion.      Cervical back: Normal range of motion and neck supple.      Right lower leg: Edema present.      Left lower leg: Edema present.   Lymphadenopathy:      Cervical: No cervical adenopathy.   Skin:     General: Skin is warm and dry.   Neurological:      General: No focal deficit present.      Mental Status: She is alert and oriented to person, place, and time.   Psychiatric:         Mood and Affect: Mood normal.         Behavior: Behavior normal.         Thought Content: Thought content normal.         Procedure:      Discussion/Summary:    htn-stable on BB at 1/2 bid  rhinitis-flonase and zyrtec continuation, stable  tremor-cont BB, not an issue  hypothyroid- recheck noted and will increase T4  Dyspepsia-cont pepcid   neuropathy-cont lyrica at bid  djd-cont prn lortab, rare use  b12 def-cont b12, level at goal  hyperlipidemia-labs on rtc, cont diet control  Diverticulosis-advised citrucel qd, asymptomatic  Memory impairment-start Exelon  Ascending Aortic aneurysm-recheck 7/2 noted  CKD-avoid NSAIDs, recheck stable         5/11 labs noted  and dw patient, advised NSAIDs avoidance and increase fluids    Current Outpatient Medications:   •  aspirin 81 MG EC tablet, Take 81 mg by mouth Daily., Disp: , Rfl:   •  cetirizine (zyrTEC) 10 MG tablet, Take 10 mg by mouth Daily., Disp: , Rfl:   •  estradiol (ESTRACE) 0.1 MG/GM vaginal cream, Insert 1 applicator into the vagina Daily., Disp: , Rfl:   •  famotidine (PEPCID) 20 MG tablet, TAKE ONE TABLET BY MOUTH TWICE A DAY (Patient taking differently: Take 20 mg by mouth 2 (Two) Times a Day.), Disp: 180 tablet, Rfl: 3  •  furosemide (Lasix) 20 MG tablet, Take 1 tablet by mouth 2 (Two) Times a Day. (Patient taking differently: Take 20 mg by mouth Daily.), Disp: 60 tablet, Rfl: 2  •  HYDROcodone-acetaminophen (NORCO) 7.5-325 MG per tablet, Take 1 tablet by mouth Every 8 (Eight) Hours., Disp: , Rfl:   •  levothyroxine (SYNTHROID, LEVOTHROID) 125 MCG tablet, Take 1 tablet by mouth Every Morning., Disp: 90 tablet, Rfl: 1  •  Lyrica 150 MG capsule, Take 150 mg by mouth 2 (Two) Times a Day., Disp: , Rfl:   •  ondansetron (ZOFRAN) 4 MG tablet, Take 1 tablet by mouth Every 8 (Eight) Hours As Needed for nausea or vomiting., Disp: 30 tablet, Rfl: 0  •  potassium chloride 10 MEQ CR tablet, TAKE ONE TABLET BY MOUTH DAILY, Disp: 90 tablet, Rfl: 3  •  propranolol (INDERAL) 40 MG tablet, Take 1 tablet by mouth 2 (Two) Times a Day. (Patient taking differently: Take 20 mg by mouth 2 (Two) Times a Day.), Disp: 60 tablet, Rfl: 3  •  fluticasone (FLONASE) 50 MCG/ACT nasal spray, 2 sprays by Each Nare route Daily., Disp: 18.2 mL, Rfl: 0  •  rivastigmine (Exelon) 4.6 MG/24HR patch, Place 1 patch on the skin as directed by provider Daily., Disp: 30 each, Rfl: 5  •  sodium chloride 0.65 % nasal spray, 2 sprays into the nostril(s) as directed by provider As Needed for Congestion., Disp: 60 mL, Rfl: 0        Diagnoses and all orders for this visit:    1. Seasonal allergic rhinitis due to pollen (Primary)    2. Other hyperlipidemia    3.  Primary hypertension    4. Other specified hypothyroidism    5. Gastroesophageal reflux disease without esophagitis    6. Tremor

## 2022-06-27 ENCOUNTER — OFFICE VISIT (OUTPATIENT)
Dept: CARDIOLOGY | Facility: CLINIC | Age: 87
End: 2022-06-27

## 2022-06-27 VITALS
SYSTOLIC BLOOD PRESSURE: 118 MMHG | WEIGHT: 151 LBS | DIASTOLIC BLOOD PRESSURE: 68 MMHG | HEIGHT: 60 IN | BODY MASS INDEX: 29.64 KG/M2 | HEART RATE: 78 BPM | OXYGEN SATURATION: 94 %

## 2022-06-27 DIAGNOSIS — I50.32 CHRONIC HEART FAILURE WITH PRESERVED EJECTION FRACTION: ICD-10-CM

## 2022-06-27 DIAGNOSIS — I10 ESSENTIAL HYPERTENSION: Primary | ICD-10-CM

## 2022-06-27 PROCEDURE — 99214 OFFICE O/P EST MOD 30 MIN: CPT | Performed by: INTERNAL MEDICINE

## 2022-06-27 RX ORDER — METOPROLOL SUCCINATE 25 MG/1
1 TABLET, EXTENDED RELEASE ORAL DAILY
Status: ON HOLD | COMMUNITY
End: 2022-07-31 | Stop reason: ALTCHOICE

## 2022-06-27 RX ORDER — AMLODIPINE BESYLATE 5 MG/1
1 TABLET ORAL DAILY
COMMUNITY
End: 2022-06-27

## 2022-06-27 NOTE — PROGRESS NOTES
[facility]  [address], [address]  [place], [place], [address] [phone]  [email or web address]           OUTPATIENT CLINIC NOTE    Patient care team:  Patient Care Team:  Arnoldo Oneil MD as PCP - General (Internal Medicine)  Sourav Montes MD as Referring Physician (Neurology)  Richard Beltran MD as Consulting Physician (Gastroenterology)  Basil Richter MD as Consulting Physician (Cardiology)      Subjective:   Chief complaint:   Chief Complaint   Patient presents with   • Chronic diastolic heart failure (HCC)       HPI:    Zoila Nava is a 88 y.o. female.  Partial problem list, including cardiac problems:  1. Heart failure preserved EF  a. Dry weight appears to be around 145 pounds at home  2. Pulmonary hypertension  3. Valvular heart disease  a. Mild aortic stenosis, moderate aortic regurgitation  4. CKD  5. Ascending aortic aneurysm  6. Hypertension    The patient presents for follow-up    Possibly has some mild worsening swelling, leg diameter slightly increased over the last 6 weeks.  Patient notes persistent, chronic exertional fatigue.  Limited activities.  Poor appetite.  Eats a lot of chocolate.  No significant weight gain; mild fluctuation.  Stable blood pressure.    Recent addition of amlodipine    Review of Systems:  As noted in the HPI    PFSH:  Patient Active Problem List   Diagnosis   • Allergic rhinitis   • Hyperlipidemia   • Hypertension   • Hypocalcemia   • Hypothyroidism   • Neuropathy   • NUD (nonulcer dyspepsia)   • Painful knee   • Pernicious anemia   • Tremor   • Vitamin B12 deficiency   • Spondylolisthesis of cervical region   • Graves' ophthalmopathy   • Anemia   • Memory impairment of gradual onset   • Ascending aortic aneurysm (HCC)   • Stage 3 chronic kidney disease (HCC)   • Pulmonary hypertension (HCC)   • Chronic heart failure with preserved ejection fraction (HCC)   • CHF (congestive heart failure), NYHA class I,  acute on chronic, diastolic ([facility])   • CHF (congestive heart failure) ([facility])   • Non-rheumatic aortic stenosis   • Non-rheumatic aortic regurgitation   • Carpal tunnel syndrome   • Essential tremor   • Gastroesophageal reflux disease   • Nausea and vomiting   • Skin sensation disturbance   • Spinal cord disease ([facility])   • Urge incontinence of urine   • Urinary urgency         Current Outpatient Medications:   •  aspirin 81 MG EC tablet, Take 81 mg by mouth Daily., Disp: , Rfl:   •  cetirizine (zyrTEC) 10 MG tablet, Take 10 mg by mouth Daily., Disp: , Rfl:   •  estradiol (ESTRACE) 0.1 MG/GM vaginal cream, Insert 1 applicator into the vagina Daily., Disp: , Rfl:   •  fluticasone (FLONASE) 50 MCG/ACT nasal spray, 2 sprays by Each Nare route Daily., Disp: 18.2 mL, Rfl: 0  •  furosemide (Lasix) 20 MG tablet, Take 1 tablet by mouth 2 (Two) Times a Day. (Patient taking differently: Take 20 mg by mouth Every Other Day.), Disp: 60 tablet, Rfl: 2  •  HYDROcodone-acetaminophen (NORCO) 7.5-325 MG per tablet, Take 1 tablet by mouth Every 8 (Eight) Hours., Disp: , Rfl:   •  levothyroxine (SYNTHROID, LEVOTHROID) 125 MCG tablet, Take 1 tablet by mouth Every Morning., Disp: 90 tablet, Rfl: 1  •  Lyrica 150 MG capsule, Take 150 mg by mouth 2 (Two) Times a Day., Disp: , Rfl:   •  metoprolol succinate XL (TOPROL-XL) 25 MG 24 hr tablet, Take 1 tablet by mouth Daily., Disp: , Rfl:   •  O2 (OXYGEN), Inhale 2 L/min At Night As Needed., Disp: , Rfl:   •  ondansetron (ZOFRAN) 4 MG tablet, Take 1 tablet by mouth Every 8 (Eight) Hours As Needed for nausea or vomiting., Disp: 30 tablet, Rfl: 0  •  potassium chloride 10 MEQ CR tablet, TAKE ONE TABLET BY MOUTH DAILY (Patient taking differently: Every Other Day.), Disp: 90 tablet, Rfl: 3  •  propranolol (INDERAL) 40 MG tablet, Take 1 tablet by mouth 2 (Two) Times a Day. (Patient taking differently: Take 20 mg by mouth 2 (Two) Times a Day.), Disp: 60 tablet, Rfl: 3  •  rivastigmine (Exelon) 4.6  "MG/24HR patch, Place 1 patch on the skin as directed by provider Daily., Disp: 30 each, Rfl: 5  •  famotidine (PEPCID) 20 MG tablet, TAKE ONE TABLET BY MOUTH TWICE A DAY (Patient taking differently: Take 20 mg by mouth 2 (Two) Times a Day.), Disp: 180 tablet, Rfl: 3  •  sodium chloride 0.65 % nasal spray, 2 sprays into the nostril(s) as directed by provider As Needed for Congestion., Disp: 60 mL, Rfl: 0      Social History     Socioeconomic History   • Marital status:    Tobacco Use   • Smoking status: Never Smoker   • Smokeless tobacco: Never Used   Vaping Use   • Vaping Use: Never used   Substance and Sexual Activity   • Alcohol use: No   • Drug use: No   • Sexual activity: Defer         Objective:   Physical Exam:  /68 (BP Location: Right arm, Patient Position: Sitting, Cuff Size: Adult)   Pulse 78   Ht 152.4 cm (60\")   Wt 68.5 kg (151 lb)   LMP  (LMP Unknown)   SpO2 94%   BMI 29.49 kg/m²   CONSTITUTIONAL: Well-nourished. In no acute distress.   LUNGS: Normal effort. Clear to auscultation bilaterally without wheezing, rhonchi, or rales noted.   CARDIOVASCULAR: Regular rate and rhythm with a normal S1 and S2. JYOTI at RUSB. There is no gallop, rub, or click appreciated. Carotid upstrokes are 2+ and symmetrical without bruits.  2+ radial pulse.  There is mild to moderate significant peripheral edema.     Labs:    No results found for: CHOL  Lab Results   Component Value Date    TRIG 51 04/04/2016     Lab Results   Component Value Date    HDL 72 (H) 04/04/2016     Lab Results   Component Value Date    LDL 92 04/04/2016     No components found for: LDLDIRECTC    Diagnostic Data:    Procedures    Results for orders placed during the hospital encounter of 10/10/21    Adult Transthoracic Echo Complete W/ Cont if Necessary Per Protocol    Interpretation Summary  · Moderate aortic valve regurgitation is present.  · Mild aortic valve stenosis is present.  · Estimated right ventricular systolic pressure " from tricuspid regurgitation is moderately elevated (45-55 mmHg).  · Moderate tricuspid valve regurgitation is present.  · Estimated left ventricular EF = 72% Estimated left ventricular EF was in agreement with the calculated left ventricular EF. Left ventricular ejection fraction appears to be greater than 70%. Left ventricular systolic function is hyperdynamic (EF > 70%).  · Left ventricular diastolic function is consistent with (grade I) impaired relaxation.  · Moderate pulmonary hypertension is present.  · Normal right ventricular cavity size, wall thickness, systolic function and septal motion noted.  · There is no evidence of pericardial effusion.      Assessment and Plan:     Chronic diastolic heart failure   Chronic heart failure with preserved ejection fraction   Non-rheumatic aortic regurgitation  Non-rheumatic aortic stenosis  Pulmonary hypertension   Stage 3 chronic kidney disease, unspecified whether stage 3a or 3b CKD   -Exercise as tolerated  -Compression stockings, raising legs when seated  -Continue Lasix 20 mg every other day  -Continue potassium supplementation  -Additional Lasix as needed for a gain of 3 pounds over 3 days    Essential hypertension  Pulmonary hypertension  -Trial of discontinuation of amlodipine since swelling has worsened.  -Conservative goal average blood pressure of less than 150/90 mmHg   -If this makes things worse and not better, we will restart amlodipine.    - Return in about 6 months (around 12/27/2022).    Basil Richter MD, MSc, FACC, [facility]  Interventional Cardiology  [facility]

## 2022-06-30 RX ORDER — FAMOTIDINE 20 MG/1
TABLET, FILM COATED ORAL
Qty: 180 TABLET | Refills: 3 | Status: ON HOLD | OUTPATIENT
Start: 2022-06-30

## 2022-07-13 ENCOUNTER — TELEPHONE (OUTPATIENT)
Dept: INTERNAL MEDICINE | Facility: CLINIC | Age: 87
End: 2022-07-13

## 2022-07-13 NOTE — TELEPHONE ENCOUNTER
Caller: Matt Nava    Relationship: Emergency Contact    Best call back number: [phone]    What is the best time to reach you: ANYTIME    Who are you requesting to speak with (clinical staff, provider,  specific staff member): PROVIDER    Do you know the name of the person who called:      What was the call regarding: PATIENTS  STATES THAT HE WOULD LIKE A CALL ABOUT THE RAPID WEIGHT LOSS OF 4 POUNDS IN 10 DAYS.    Do you require a callback: YES    Called and advised to reduce Lasix to 2 times a week

## 2022-07-30 ENCOUNTER — HOSPITAL ENCOUNTER (OUTPATIENT)
Facility: HOSPITAL | Age: 87
Setting detail: OBSERVATION
Discharge: HOME HEALTH | End: 2022-08-01
Attending: EMERGENCY MEDICINE | Admitting: INTERNAL MEDICINE

## 2022-07-30 ENCOUNTER — APPOINTMENT (OUTPATIENT)
Dept: GENERAL RADIOLOGY | Facility: HOSPITAL | Age: 87
End: 2022-07-30

## 2022-07-30 DIAGNOSIS — Z86.79 HISTORY OF CONGESTIVE HEART FAILURE: ICD-10-CM

## 2022-07-30 DIAGNOSIS — I50.32 CHRONIC HEART FAILURE WITH PRESERVED EJECTION FRACTION: ICD-10-CM

## 2022-07-30 DIAGNOSIS — R06.02 SHORTNESS OF BREATH: ICD-10-CM

## 2022-07-30 DIAGNOSIS — Z86.79 HISTORY OF HYPERTENSION: ICD-10-CM

## 2022-07-30 DIAGNOSIS — R07.9 ACUTE CHEST PAIN: Primary | ICD-10-CM

## 2022-07-30 LAB
ALBUMIN SERPL-MCNC: 4.2 G/DL (ref 3.5–5.2)
ALBUMIN/GLOB SERPL: 1.4 G/DL
ALP SERPL-CCNC: 115 U/L (ref 39–117)
ALT SERPL W P-5'-P-CCNC: 20 U/L (ref 1–33)
ANION GAP SERPL CALCULATED.3IONS-SCNC: 9 MMOL/L (ref 5–15)
AST SERPL-CCNC: 27 U/L (ref 1–32)
BASOPHILS # BLD AUTO: 0.13 10*3/MM3 (ref 0–0.2)
BASOPHILS NFR BLD AUTO: 1.7 % (ref 0–1.5)
BILIRUB SERPL-MCNC: 0.2 MG/DL (ref 0–1.2)
BUN SERPL-MCNC: 21 MG/DL (ref 8–23)
BUN/CREAT SERPL: 15.1 (ref 7–25)
CALCIUM SPEC-SCNC: 8.9 MG/DL (ref 8.6–10.5)
CHLORIDE SERPL-SCNC: 93 MMOL/L (ref 98–107)
CO2 SERPL-SCNC: 34 MMOL/L (ref 22–29)
CREAT SERPL-MCNC: 1.39 MG/DL (ref 0.57–1)
DEPRECATED RDW RBC AUTO: 45.7 FL (ref 37–54)
EGFRCR SERPLBLD CKD-EPI 2021: 36.3 ML/MIN/1.73
EOSINOPHIL # BLD AUTO: 0.43 10*3/MM3 (ref 0–0.4)
EOSINOPHIL NFR BLD AUTO: 5.6 % (ref 0.3–6.2)
ERYTHROCYTE [DISTWIDTH] IN BLOOD BY AUTOMATED COUNT: 14.3 % (ref 12.3–15.4)
GLOBULIN UR ELPH-MCNC: 2.9 GM/DL
GLUCOSE SERPL-MCNC: 98 MG/DL (ref 65–99)
HCT VFR BLD AUTO: 36.1 % (ref 34–46.6)
HGB BLD-MCNC: 12.2 G/DL (ref 12–15.9)
HOLD SPECIMEN: NORMAL
HOLD SPECIMEN: NORMAL
IMM GRANULOCYTES # BLD AUTO: 0.06 10*3/MM3 (ref 0–0.05)
IMM GRANULOCYTES NFR BLD AUTO: 0.8 % (ref 0–0.5)
LIPASE SERPL-CCNC: 58 U/L (ref 13–60)
LYMPHOCYTES # BLD AUTO: 1.47 10*3/MM3 (ref 0.7–3.1)
LYMPHOCYTES NFR BLD AUTO: 19.3 % (ref 19.6–45.3)
MCH RBC QN AUTO: 29.5 PG (ref 26.6–33)
MCHC RBC AUTO-ENTMCNC: 33.8 G/DL (ref 31.5–35.7)
MCV RBC AUTO: 87.4 FL (ref 79–97)
MONOCYTES # BLD AUTO: 1.03 10*3/MM3 (ref 0.1–0.9)
MONOCYTES NFR BLD AUTO: 13.5 % (ref 5–12)
NEUTROPHILS NFR BLD AUTO: 4.51 10*3/MM3 (ref 1.7–7)
NEUTROPHILS NFR BLD AUTO: 59.1 % (ref 42.7–76)
NRBC BLD AUTO-RTO: 0 /100 WBC (ref 0–0.2)
NT-PROBNP SERPL-MCNC: 300.6 PG/ML (ref 0–1800)
PLATELET # BLD AUTO: 230 10*3/MM3 (ref 140–450)
PMV BLD AUTO: 11.4 FL (ref 6–12)
POTASSIUM SERPL-SCNC: 3.2 MMOL/L (ref 3.5–5.2)
PROT SERPL-MCNC: 7.1 G/DL (ref 6–8.5)
RBC # BLD AUTO: 4.13 10*6/MM3 (ref 3.77–5.28)
SODIUM SERPL-SCNC: 136 MMOL/L (ref 136–145)
TROPONIN T SERPL-MCNC: 0.01 NG/ML (ref 0–0.03)
WBC NRBC COR # BLD: 7.63 10*3/MM3 (ref 3.4–10.8)
WHOLE BLOOD HOLD COAG: NORMAL
WHOLE BLOOD HOLD SPECIMEN: NORMAL

## 2022-07-30 PROCEDURE — 71045 X-RAY EXAM CHEST 1 VIEW: CPT

## 2022-07-30 PROCEDURE — 99220 PR INITIAL OBSERVATION CARE/DAY 70 MINUTES: CPT | Performed by: INTERNAL MEDICINE

## 2022-07-30 PROCEDURE — 85025 COMPLETE CBC W/AUTO DIFF WBC: CPT | Performed by: EMERGENCY MEDICINE

## 2022-07-30 PROCEDURE — G0378 HOSPITAL OBSERVATION PER HR: HCPCS

## 2022-07-30 PROCEDURE — 84484 ASSAY OF TROPONIN QUANT: CPT | Performed by: EMERGENCY MEDICINE

## 2022-07-30 PROCEDURE — 83880 ASSAY OF NATRIURETIC PEPTIDE: CPT | Performed by: EMERGENCY MEDICINE

## 2022-07-30 PROCEDURE — 80053 COMPREHEN METABOLIC PANEL: CPT | Performed by: EMERGENCY MEDICINE

## 2022-07-30 PROCEDURE — 83690 ASSAY OF LIPASE: CPT | Performed by: EMERGENCY MEDICINE

## 2022-07-30 PROCEDURE — 36415 COLL VENOUS BLD VENIPUNCTURE: CPT

## 2022-07-30 PROCEDURE — 93005 ELECTROCARDIOGRAM TRACING: CPT | Performed by: EMERGENCY MEDICINE

## 2022-07-30 PROCEDURE — 99284 EMERGENCY DEPT VISIT MOD MDM: CPT

## 2022-07-30 PROCEDURE — 93005 ELECTROCARDIOGRAM TRACING: CPT

## 2022-07-30 RX ORDER — SODIUM CHLORIDE 0.9 % (FLUSH) 0.9 %
10 SYRINGE (ML) INJECTION AS NEEDED
Status: DISCONTINUED | OUTPATIENT
Start: 2022-07-30 | End: 2022-08-01 | Stop reason: HOSPADM

## 2022-07-30 RX ORDER — ASPIRIN 81 MG/1
324 TABLET, CHEWABLE ORAL ONCE
Status: DISCONTINUED | OUTPATIENT
Start: 2022-07-30 | End: 2022-08-01 | Stop reason: HOSPADM

## 2022-07-31 LAB
ANION GAP SERPL CALCULATED.3IONS-SCNC: 9 MMOL/L (ref 5–15)
BASOPHILS # BLD AUTO: 0.11 10*3/MM3 (ref 0–0.2)
BASOPHILS NFR BLD AUTO: 1.9 % (ref 0–1.5)
BUN SERPL-MCNC: 18 MG/DL (ref 8–23)
BUN/CREAT SERPL: 16.5 (ref 7–25)
CALCIUM SPEC-SCNC: 8.4 MG/DL (ref 8.6–10.5)
CHLORIDE SERPL-SCNC: 97 MMOL/L (ref 98–107)
CO2 SERPL-SCNC: 31 MMOL/L (ref 22–29)
CREAT SERPL-MCNC: 1.09 MG/DL (ref 0.57–1)
DEPRECATED RDW RBC AUTO: 43.7 FL (ref 37–54)
EGFRCR SERPLBLD CKD-EPI 2021: 48.7 ML/MIN/1.73
EOSINOPHIL # BLD AUTO: 0.36 10*3/MM3 (ref 0–0.4)
EOSINOPHIL NFR BLD AUTO: 6.1 % (ref 0.3–6.2)
ERYTHROCYTE [DISTWIDTH] IN BLOOD BY AUTOMATED COUNT: 14.2 % (ref 12.3–15.4)
FLUAV SUBTYP SPEC NAA+PROBE: NOT DETECTED
FLUBV RNA ISLT QL NAA+PROBE: NOT DETECTED
GLUCOSE SERPL-MCNC: 91 MG/DL (ref 65–99)
HCT VFR BLD AUTO: 32.1 % (ref 34–46.6)
HGB BLD-MCNC: 11.2 G/DL (ref 12–15.9)
HOLD SPECIMEN: NORMAL
IMM GRANULOCYTES # BLD AUTO: 0.01 10*3/MM3 (ref 0–0.05)
IMM GRANULOCYTES NFR BLD AUTO: 0.2 % (ref 0–0.5)
LYMPHOCYTES # BLD AUTO: 1.1 10*3/MM3 (ref 0.7–3.1)
LYMPHOCYTES NFR BLD AUTO: 18.7 % (ref 19.6–45.3)
MAGNESIUM SERPL-MCNC: 1.8 MG/DL (ref 1.6–2.4)
MCH RBC QN AUTO: 29.6 PG (ref 26.6–33)
MCHC RBC AUTO-ENTMCNC: 34.9 G/DL (ref 31.5–35.7)
MCV RBC AUTO: 84.9 FL (ref 79–97)
MONOCYTES # BLD AUTO: 0.79 10*3/MM3 (ref 0.1–0.9)
MONOCYTES NFR BLD AUTO: 13.4 % (ref 5–12)
NEUTROPHILS NFR BLD AUTO: 3.52 10*3/MM3 (ref 1.7–7)
NEUTROPHILS NFR BLD AUTO: 59.7 % (ref 42.7–76)
NRBC BLD AUTO-RTO: 0 /100 WBC (ref 0–0.2)
PHOSPHATE SERPL-MCNC: 3.8 MG/DL (ref 2.5–4.5)
PLAT MORPH BLD: NORMAL
PLATELET # BLD AUTO: 175 10*3/MM3 (ref 140–450)
PMV BLD AUTO: 11.9 FL (ref 6–12)
POTASSIUM SERPL-SCNC: 2.9 MMOL/L (ref 3.5–5.2)
QT INTERVAL: 392 MS
QTC INTERVAL: 449 MS
RBC # BLD AUTO: 3.78 10*6/MM3 (ref 3.77–5.28)
RBC MORPH BLD: NORMAL
SARS-COV-2 RNA PNL SPEC NAA+PROBE: NOT DETECTED
SODIUM SERPL-SCNC: 137 MMOL/L (ref 136–145)
TROPONIN T SERPL-MCNC: <0.01 NG/ML (ref 0–0.03)
TROPONIN T SERPL-MCNC: <0.01 NG/ML (ref 0–0.03)
WBC MORPH BLD: NORMAL
WBC NRBC COR # BLD: 5.89 10*3/MM3 (ref 3.4–10.8)

## 2022-07-31 PROCEDURE — 84100 ASSAY OF PHOSPHORUS: CPT

## 2022-07-31 PROCEDURE — G0378 HOSPITAL OBSERVATION PER HR: HCPCS

## 2022-07-31 PROCEDURE — 93010 ELECTROCARDIOGRAM REPORT: CPT | Performed by: INTERNAL MEDICINE

## 2022-07-31 PROCEDURE — 85025 COMPLETE CBC W/AUTO DIFF WBC: CPT

## 2022-07-31 PROCEDURE — 84484 ASSAY OF TROPONIN QUANT: CPT | Performed by: EMERGENCY MEDICINE

## 2022-07-31 PROCEDURE — 99225 PR SBSQ OBSERVATION CARE/DAY 25 MINUTES: CPT | Performed by: INTERNAL MEDICINE

## 2022-07-31 PROCEDURE — 83735 ASSAY OF MAGNESIUM: CPT

## 2022-07-31 PROCEDURE — 93005 ELECTROCARDIOGRAM TRACING: CPT

## 2022-07-31 PROCEDURE — 87636 SARSCOV2 & INF A&B AMP PRB: CPT

## 2022-07-31 PROCEDURE — 80048 BASIC METABOLIC PNL TOTAL CA: CPT

## 2022-07-31 PROCEDURE — 25010000002 HEPARIN (PORCINE) PER 1000 UNITS

## 2022-07-31 PROCEDURE — 99214 OFFICE O/P EST MOD 30 MIN: CPT | Performed by: STUDENT IN AN ORGANIZED HEALTH CARE EDUCATION/TRAINING PROGRAM

## 2022-07-31 PROCEDURE — 84484 ASSAY OF TROPONIN QUANT: CPT

## 2022-07-31 PROCEDURE — 96372 THER/PROPH/DIAG INJ SC/IM: CPT

## 2022-07-31 PROCEDURE — 85007 BL SMEAR W/DIFF WBC COUNT: CPT

## 2022-07-31 RX ORDER — FAMOTIDINE 20 MG/1
20 TABLET, FILM COATED ORAL DAILY
Status: DISCONTINUED | OUTPATIENT
Start: 2022-08-01 | End: 2022-08-01 | Stop reason: HOSPADM

## 2022-07-31 RX ORDER — POTASSIUM CHLORIDE 750 MG/1
40 CAPSULE, EXTENDED RELEASE ORAL 2 TIMES DAILY WITH MEALS
Status: DISPENSED | OUTPATIENT
Start: 2022-07-31 | End: 2022-08-01

## 2022-07-31 RX ORDER — LEVOTHYROXINE SODIUM 0.12 MG/1
125 TABLET ORAL
Status: DISCONTINUED | OUTPATIENT
Start: 2022-07-31 | End: 2022-08-01 | Stop reason: HOSPADM

## 2022-07-31 RX ORDER — POTASSIUM CHLORIDE 1.5 G/1.77G
40 POWDER, FOR SOLUTION ORAL AS NEEDED
Status: DISCONTINUED | OUTPATIENT
Start: 2022-07-31 | End: 2022-08-01 | Stop reason: HOSPADM

## 2022-07-31 RX ORDER — CETIRIZINE HYDROCHLORIDE 10 MG/1
5 TABLET ORAL DAILY
Status: DISCONTINUED | OUTPATIENT
Start: 2022-07-31 | End: 2022-08-01 | Stop reason: HOSPADM

## 2022-07-31 RX ORDER — ACETAMINOPHEN 325 MG/1
650 TABLET ORAL EVERY 4 HOURS PRN
Status: DISCONTINUED | OUTPATIENT
Start: 2022-07-31 | End: 2022-08-01 | Stop reason: HOSPADM

## 2022-07-31 RX ORDER — METOPROLOL SUCCINATE 25 MG/1
25 TABLET, EXTENDED RELEASE ORAL DAILY
Status: DISCONTINUED | OUTPATIENT
Start: 2022-07-31 | End: 2022-07-31

## 2022-07-31 RX ORDER — POTASSIUM CHLORIDE 1.5 G/1.77G
40 POWDER, FOR SOLUTION ORAL AS NEEDED
Status: DISCONTINUED | OUTPATIENT
Start: 2022-07-31 | End: 2022-07-31

## 2022-07-31 RX ORDER — POTASSIUM CHLORIDE 750 MG/1
40 CAPSULE, EXTENDED RELEASE ORAL AS NEEDED
Status: DISCONTINUED | OUTPATIENT
Start: 2022-07-31 | End: 2022-07-31

## 2022-07-31 RX ORDER — HEPARIN SODIUM 5000 [USP'U]/ML
5000 INJECTION, SOLUTION INTRAVENOUS; SUBCUTANEOUS EVERY 12 HOURS SCHEDULED
Status: DISCONTINUED | OUTPATIENT
Start: 2022-07-31 | End: 2022-08-01 | Stop reason: HOSPADM

## 2022-07-31 RX ORDER — ACETAMINOPHEN 650 MG/1
650 SUPPOSITORY RECTAL EVERY 4 HOURS PRN
Status: DISCONTINUED | OUTPATIENT
Start: 2022-07-31 | End: 2022-08-01 | Stop reason: HOSPADM

## 2022-07-31 RX ORDER — ACETAMINOPHEN 160 MG/5ML
650 SOLUTION ORAL EVERY 4 HOURS PRN
Status: DISCONTINUED | OUTPATIENT
Start: 2022-07-31 | End: 2022-08-01 | Stop reason: HOSPADM

## 2022-07-31 RX ORDER — ASPIRIN 81 MG/1
81 TABLET ORAL DAILY
Status: DISCONTINUED | OUTPATIENT
Start: 2022-08-01 | End: 2022-08-01 | Stop reason: HOSPADM

## 2022-07-31 RX ORDER — POTASSIUM CHLORIDE 7.45 MG/ML
10 INJECTION INTRAVENOUS
Status: DISCONTINUED | OUTPATIENT
Start: 2022-07-31 | End: 2022-07-31

## 2022-07-31 RX ORDER — POTASSIUM CHLORIDE 750 MG/1
40 CAPSULE, EXTENDED RELEASE ORAL ONCE
Status: COMPLETED | OUTPATIENT
Start: 2022-07-31 | End: 2022-07-31

## 2022-07-31 RX ORDER — FAMOTIDINE 20 MG/1
20 TABLET, FILM COATED ORAL 2 TIMES DAILY
Status: DISCONTINUED | OUTPATIENT
Start: 2022-07-31 | End: 2022-07-31

## 2022-07-31 RX ORDER — SODIUM CHLORIDE 0.9 % (FLUSH) 0.9 %
10 SYRINGE (ML) INJECTION EVERY 12 HOURS SCHEDULED
Status: DISCONTINUED | OUTPATIENT
Start: 2022-07-31 | End: 2022-08-01 | Stop reason: HOSPADM

## 2022-07-31 RX ORDER — PROPRANOLOL HYDROCHLORIDE 20 MG/1
20 TABLET ORAL 2 TIMES DAILY
Status: DISCONTINUED | OUTPATIENT
Start: 2022-07-31 | End: 2022-08-01

## 2022-07-31 RX ORDER — POTASSIUM CHLORIDE 7.45 MG/ML
10 INJECTION INTRAVENOUS
Status: DISCONTINUED | OUTPATIENT
Start: 2022-07-31 | End: 2022-08-01 | Stop reason: HOSPADM

## 2022-07-31 RX ORDER — POTASSIUM CHLORIDE 750 MG/1
40 CAPSULE, EXTENDED RELEASE ORAL AS NEEDED
Status: DISCONTINUED | OUTPATIENT
Start: 2022-07-31 | End: 2022-08-01 | Stop reason: HOSPADM

## 2022-07-31 RX ADMIN — Medication 10 ML: at 08:30

## 2022-07-31 RX ADMIN — POTASSIUM CHLORIDE 40 MEQ: 750 CAPSULE, EXTENDED RELEASE ORAL at 22:23

## 2022-07-31 RX ADMIN — POTASSIUM CHLORIDE 40 MEQ: 750 CAPSULE, EXTENDED RELEASE ORAL at 06:51

## 2022-07-31 RX ADMIN — FAMOTIDINE 20 MG: 20 TABLET ORAL at 08:30

## 2022-07-31 RX ADMIN — POTASSIUM CHLORIDE 40 MEQ: 750 CAPSULE, EXTENDED RELEASE ORAL at 08:30

## 2022-07-31 RX ADMIN — CETIRIZINE HYDROCHLORIDE TABLETS 5 MG: 10 TABLET, FILM COATED ORAL at 22:23

## 2022-07-31 RX ADMIN — LEVOTHYROXINE SODIUM 125 MCG: 125 TABLET ORAL at 05:10

## 2022-07-31 RX ADMIN — POTASSIUM CHLORIDE 40 MEQ: 750 CAPSULE, EXTENDED RELEASE ORAL at 17:45

## 2022-07-31 RX ADMIN — PROPRANOLOL HYDROCHLORIDE 20 MG: 20 TABLET ORAL at 19:58

## 2022-07-31 RX ADMIN — PROPRANOLOL HYDROCHLORIDE 20 MG: 20 TABLET ORAL at 08:30

## 2022-07-31 RX ADMIN — HEPARIN SODIUM 5000 UNITS: 5000 INJECTION INTRAVENOUS; SUBCUTANEOUS at 08:30

## 2022-07-31 RX ADMIN — Medication 10 ML: at 19:58

## 2022-07-31 RX ADMIN — HEPARIN SODIUM 5000 UNITS: 5000 INJECTION INTRAVENOUS; SUBCUTANEOUS at 19:57

## 2022-08-01 ENCOUNTER — READMISSION MANAGEMENT (OUTPATIENT)
Dept: CALL CENTER | Facility: HOSPITAL | Age: 87
End: 2022-08-01

## 2022-08-01 VITALS
BODY MASS INDEX: 29.59 KG/M2 | SYSTOLIC BLOOD PRESSURE: 139 MMHG | DIASTOLIC BLOOD PRESSURE: 76 MMHG | WEIGHT: 150.7 LBS | HEIGHT: 60 IN | TEMPERATURE: 98.6 F | HEART RATE: 66 BPM | RESPIRATION RATE: 16 BRPM | OXYGEN SATURATION: 93 %

## 2022-08-01 LAB
ANION GAP SERPL CALCULATED.3IONS-SCNC: 10 MMOL/L (ref 5–15)
BUN SERPL-MCNC: 13 MG/DL (ref 8–23)
BUN/CREAT SERPL: 12.1 (ref 7–25)
CALCIUM SPEC-SCNC: 9 MG/DL (ref 8.6–10.5)
CHLORIDE SERPL-SCNC: 100 MMOL/L (ref 98–107)
CO2 SERPL-SCNC: 29 MMOL/L (ref 22–29)
CREAT SERPL-MCNC: 1.07 MG/DL (ref 0.57–1)
EGFRCR SERPLBLD CKD-EPI 2021: 49.8 ML/MIN/1.73
GLUCOSE SERPL-MCNC: 141 MG/DL (ref 65–99)
MAGNESIUM SERPL-MCNC: 1.6 MG/DL (ref 1.6–2.4)
POTASSIUM SERPL-SCNC: 3.9 MMOL/L (ref 3.5–5.2)
QT INTERVAL: 398 MS
QTC INTERVAL: 420 MS
SODIUM SERPL-SCNC: 139 MMOL/L (ref 136–145)

## 2022-08-01 PROCEDURE — 80048 BASIC METABOLIC PNL TOTAL CA: CPT | Performed by: NURSE PRACTITIONER

## 2022-08-01 PROCEDURE — 96372 THER/PROPH/DIAG INJ SC/IM: CPT

## 2022-08-01 PROCEDURE — 93010 ELECTROCARDIOGRAM REPORT: CPT | Performed by: INTERNAL MEDICINE

## 2022-08-01 PROCEDURE — 99214 OFFICE O/P EST MOD 30 MIN: CPT | Performed by: INTERNAL MEDICINE

## 2022-08-01 PROCEDURE — 83735 ASSAY OF MAGNESIUM: CPT | Performed by: NURSE PRACTITIONER

## 2022-08-01 PROCEDURE — 99217 PR OBSERVATION CARE DISCHARGE MANAGEMENT: CPT | Performed by: INTERNAL MEDICINE

## 2022-08-01 PROCEDURE — G0378 HOSPITAL OBSERVATION PER HR: HCPCS

## 2022-08-01 PROCEDURE — 25010000002 HEPARIN (PORCINE) PER 1000 UNITS

## 2022-08-01 PROCEDURE — 93005 ELECTROCARDIOGRAM TRACING: CPT | Performed by: NURSE PRACTITIONER

## 2022-08-01 RX ORDER — FLUTICASONE PROPIONATE 50 MCG
2 SPRAY, SUSPENSION (ML) NASAL DAILY
Status: DISCONTINUED | OUTPATIENT
Start: 2022-08-01 | End: 2022-08-01 | Stop reason: HOSPADM

## 2022-08-01 RX ORDER — FUROSEMIDE 20 MG/1
20 TABLET ORAL EVERY OTHER DAY
Qty: 15 TABLET | Refills: 0 | Status: SHIPPED | OUTPATIENT
Start: 2022-08-01 | End: 2022-08-26 | Stop reason: SDUPTHER

## 2022-08-01 RX ORDER — PROPRANOLOL HYDROCHLORIDE 40 MG/1
40 TABLET ORAL 2 TIMES DAILY
Qty: 60 TABLET | Refills: 0 | Status: ON HOLD | OUTPATIENT
Start: 2022-08-01

## 2022-08-01 RX ORDER — HYDROCODONE BITARTRATE AND ACETAMINOPHEN 7.5; 325 MG/1; MG/1
1 TABLET ORAL EVERY 8 HOURS SCHEDULED
Status: DISCONTINUED | OUTPATIENT
Start: 2022-08-01 | End: 2022-08-01 | Stop reason: HOSPADM

## 2022-08-01 RX ORDER — PROPRANOLOL HYDROCHLORIDE 20 MG/1
40 TABLET ORAL 2 TIMES DAILY
Status: DISCONTINUED | OUTPATIENT
Start: 2022-08-01 | End: 2022-08-01 | Stop reason: HOSPADM

## 2022-08-01 RX ORDER — POTASSIUM CHLORIDE 1500 MG/1
20 TABLET, FILM COATED, EXTENDED RELEASE ORAL EVERY OTHER DAY
Qty: 15 TABLET | Refills: 0 | Status: SHIPPED | OUTPATIENT
Start: 2022-08-01 | End: 2022-08-26 | Stop reason: SDUPTHER

## 2022-08-01 RX ADMIN — HEPARIN SODIUM 5000 UNITS: 5000 INJECTION INTRAVENOUS; SUBCUTANEOUS at 08:24

## 2022-08-01 RX ADMIN — LEVOTHYROXINE SODIUM 125 MCG: 125 TABLET ORAL at 06:38

## 2022-08-01 RX ADMIN — FLUTICASONE PROPIONATE 2 SPRAY: 50 SPRAY, METERED NASAL at 03:07

## 2022-08-01 RX ADMIN — Medication 10 ML: at 08:24

## 2022-08-01 RX ADMIN — ASPIRIN 81 MG: 81 TABLET, COATED ORAL at 08:24

## 2022-08-01 RX ADMIN — PROPRANOLOL HYDROCHLORIDE 20 MG: 20 TABLET ORAL at 08:26

## 2022-08-01 RX ADMIN — FAMOTIDINE 20 MG: 20 TABLET ORAL at 08:24

## 2022-08-01 RX ADMIN — HYDROCODONE BITARTRATE AND ACETAMINOPHEN 1 TABLET: 7.5; 325 TABLET ORAL at 03:07

## 2022-08-01 NOTE — DISCHARGE SUMMARY
[facility]  DISCHARGE SUMMARY    Patient Name: Zoila Nava  : 1933  MRN: [id number]    Date of Admission: 2022 10:07 PM  Date of Discharge: 2022  Primary Care Physician: Arnoldo Oneil MD    Consults     Date and Time Order Name Status Description    2022  3:55 AM Inpatient Cardiology Consult Completed           Hospital Course     Presenting Problem:   Acute chest pain [R07.9]    Active Hospital Problems    Diagnosis  POA   • **Acute chest pain [R07.9]  Yes   • Gastroesophageal reflux disease [K21.9]  Yes   • Hypokalemia [E87.6]  Unknown   • CHF (congestive heart failure), NYHA class I, acute on chronic, diastolic (HCC) [I50.33]  Yes   • Chronic heart failure with preserved ejection fraction (HCC) [I50.32]  Yes   • Pulmonary hypertension (HCC) [I27.20]  Yes   • Stage 3 chronic kidney disease (HCC) [N18.30]  Yes   • Ascending aortic aneurysm (HCC) [I71.2]  Yes   • Hypertension [I10]  Yes   • Hypothyroidism [E03.9]  Yes      Resolved Hospital Problems   No resolved problems to display.          Hospital Course:  Zoila Nava is a 89 y.o. female with CKD 3, HFpEF, followed by PCP and cardiology, recently had lower extremity swelling and Home amlodipine was discontinued, has had several follow-up appointments with both PCP/cardiology, ultimately eventually complained of weight loss so her home Lasix was decreased from 3 times per week down to 2 times per week; subsequently she developed lower extremity edema, exertional chest discomfort, and dyspnea on exertion in the setting of 4 pound weight gain over 24-36-hour period.  She was admitted for evaluation of chest pain and decompensated CHF, she tolerated IV diuresis well, serial troponins were negative, she was seen by cardiology who did not recommend further ischemic work-up.  With her primary cardiologist followed up today and recommend resumption of oral Lasix 20 mg EOD, as well  as increasing her home potassium supplementation from 10 mcg up to 20 mcg on days that she takes her Lasix.  They uptitrated her propanolol to assist with blood pressure control in the setting of recent discontinuation of amlodipine.      Exertional dyspnea/chest discomfort, improved  A/C chronic HFpEF, improved  Underlying moderate pulmonary hypertension with nocturnal O2 use  -Last known EF 72%  - Serial troponins negative  - Chart reviewed, cardiology apt 6/27 with weight gain and swelling in the setting of recent amlodipine addition, amlodipine was discontinued and weight-based additional diuretics were added; telephone encounter 7/13 with PCP regarding rapid weight loss-there is a data deficit/limited history from the patient but she reports recent decrease in her diuretics by her PCP, this is not clearly substantiated but could potentially be inferred; subsequently presented with weight gain and shortness of breath  -  Cardiology has followed, no ischemic work-up at this time, patient's primary cardiologist Dr. Richter  recommended Lasix EOD, increase potassium supplementation, keep follow-up appointment with heart valve clinic this year and with cardiology early next year  -Home aspirin  - Home propanolol increased per cardiology recommendations     Hypokalemia  -  Increasing replacement home dosing     CKD stage 3  -Baseline Cr 1.1-1.5; EGFR 30s-40s  -At approximate baseline     GERD  Hypertension  -Pepcid, propanolol      Discharge Follow Up Recommendations for outpatient labs/diagnostics:  PCP 1-2 weeks  Heart valve clinic 8/26/22  Dr. Richter, cardiology, 4/24/23    Day of Discharge     HPI:   Feels fine this morning, no acute complaints, no chest pain or shortness of breath.    Review of Systems  Gen- No fevers, chills  CV- No chest pain, palpitations  Resp- No cough, dyspnea  GI- No N/V/D, abd pain    Vital Signs:   Temp:  [97.5 °F (36.4 °C)-98.8 °F (37.1 °C)] 98.6 °F (37 °C)  Heart Rate:  [66-87] 66  Resp:   [16-18] 16  BP: (139-159)/(74-94) 139/76  Flow (L/min):  [2] 2      Physical Exam:  Constitutional: Awake, alert, sitting up in bed in no acute distress  HENT: NCAT, mucous membranes moist  Respiratory: Clear to auscultation bilaterally, respiratory effort normal   Cardiovascular: RRR, no murmurs, rubs, or gallops, palpable radial pulse  Gastrointestinal: Positive bowel sounds, soft, nontender, nondistended  Musculoskeletal: No bilateral ankle edema    Pertinent  and/or Most Recent Results     LAB RESULTS:      Lab 07/31/22  0608 07/30/22 2218   WBC 5.89 7.63   HEMOGLOBIN 11.2* 12.2   HEMATOCRIT 32.1* 36.1   PLATELETS 175 230   NEUTROS ABS 3.52 4.51   IMMATURE GRANS (ABS) 0.01 0.06*   LYMPHS ABS 1.10 1.47   MONOS ABS 0.79 1.03*   EOS ABS 0.36 0.43*   MCV 84.9 87.4         Lab 08/01/22  1044 07/31/22  0608 07/30/22 2218   SODIUM 139 137 136   POTASSIUM 3.9 2.9* 3.2*   CHLORIDE 100 97* 93*   CO2 29.0 31.0* 34.0*   ANION GAP 10.0 9.0 9.0   BUN 13 18 21   CREATININE 1.07* 1.09* 1.39*   EGFR 49.8* 48.7* 36.3*   GLUCOSE 141* 91 98   CALCIUM 9.0 8.4* 8.9   MAGNESIUM 1.6 1.8  --    PHOSPHORUS  --  3.8  --          Lab 07/30/22 2218   TOTAL PROTEIN 7.1   ALBUMIN 4.20   GLOBULIN 2.9   ALT (SGPT) 20   AST (SGOT) 27   BILIRUBIN 0.2   ALK PHOS 115   LIPASE 58         Lab 07/31/22  0608 07/31/22  0011 07/30/22 2218   PROBNP  --   --  300.6   TROPONIN T <0.010 <0.010 0.013                 Brief Urine Lab Results     None        Microbiology Results (last 10 days)     Procedure Component Value - Date/Time    COVID PRE-OP / PRE-PROCEDURE SCREENING ORDER (NO ISOLATION) - Swab, Nasopharynx [[id number]]  (Normal) Collected: 07/31/22 0036    Lab Status: Final result Specimen: Swab from Nasopharynx Updated: 07/31/22 0134    Narrative:      The following orders were created for panel order COVID PRE-OP / PRE-PROCEDURE SCREENING ORDER (NO ISOLATION) - Swab, Nasopharynx.  Procedure                               Abnormality         Status                      ---------                               -----------         ------                     COVID-19 and FLU A/B PCR...[[id number]]  Normal              Final result                 Please view results for these tests on the individual orders.    COVID-19 and FLU A/B PCR - Swab, Nasopharynx [[id number]]  (Normal) Collected: 07/31/22 0036    Lab Status: Final result Specimen: Swab from Nasopharynx Updated: 07/31/22 0134     COVID19 Not Detected     Influenza A PCR Not Detected     Influenza B PCR Not Detected    Narrative:      Fact sheet for providers: https://www.fda.gov/media/347568/download    Fact sheet for patients: https://www.fda.gov/media/245174/download    Test performed by PCR.          XR Chest 1 View    Result Date: 7/30/2022  EXAM:  XR CHEST 1 VW   DATE: 7/30/2022 10:26 PM HISTORY: chest pain COMPARISON:  11/19/2021 FINDINGS:  No consolidation or pleural effusion Heart size is normal. No acute bony findings. Mildly coarsened interstitium Mural calcifications in the aorta.     Interstitial prominence, perhaps senescent changes. Electronically signed by:  Reba Doyle M.D.  7/30/2022 9:00 PM Mountain Time              Results for orders placed during the hospital encounter of 10/10/21    Adult Transthoracic Echo Complete W/ Cont if Necessary Per Protocol    Interpretation Summary  · Moderate aortic valve regurgitation is present.  · Mild aortic valve stenosis is present.  · Estimated right ventricular systolic pressure from tricuspid regurgitation is moderately elevated (45-55 mmHg).  · Moderate tricuspid valve regurgitation is present.  · Estimated left ventricular EF = 72% Estimated left ventricular EF was in agreement with the calculated left ventricular EF. Left ventricular ejection fraction appears to be greater than 70%. Left ventricular systolic function is hyperdynamic (EF > 70%).  · Left ventricular diastolic function is consistent with (grade I) impaired relaxation.  · Moderate  pulmonary hypertension is present.  · Normal right ventricular cavity size, wall thickness, systolic function and septal motion noted.  · There is no evidence of pericardial effusion.        Discharge Details        Discharge Medications      Changes to Medications      Instructions Start Date   potassium chloride ER 20 MEQ tablet controlled-release ER tablet  Commonly known as: K-TAB  What changed:   · medication strength  · how much to take  · when to take this  · additional instructions   20 mEq, Oral, Every Other Day, Take on days you take lasix (furosemide)      propranolol 40 MG tablet  Commonly known as: INDERAL  What changed: how much to take   40 mg, Oral, 2 Times Daily         Continue These Medications      Instructions Start Date   aspirin 81 MG EC tablet   81 mg, Oral, Daily      cetirizine 10 MG tablet  Commonly known as: zyrTEC   10 mg, Oral, Daily      estradiol 0.1 MG/GM vaginal cream  Commonly known as: ESTRACE   1 applicator, Vaginal, Daily      famotidine 20 MG tablet  Commonly known as: PEPCID   TAKE ONE TABLET BY MOUTH TWICE A DAY      fluticasone 50 MCG/ACT nasal spray  Commonly known as: FLONASE   2 sprays, Each Nare, Daily      furosemide 20 MG tablet  Commonly known as: Lasix   20 mg, Oral, Every Other Day      HYDROcodone-acetaminophen 7.5-325 MG per tablet  Commonly known as: NORCO   1 tablet, Oral, Every 8 Hours      levothyroxine 125 MCG tablet  Commonly known as: SYNTHROID, LEVOTHROID   125 mcg, Oral, Every Morning      Lyrica 150 MG capsule  Generic drug: pregabalin   150 mg, Oral, 2 Times Daily      O2  Commonly known as: OXYGEN   2 L/min, Inhalation, Nightly PRN      ondansetron 4 MG tablet  Commonly known as: Zofran   4 mg, Oral, Every 8 Hours PRN      rivastigmine 4.6 MG/24HR patch  Commonly known as: Exelon   1 patch, Transdermal, Daily      sodium chloride 0.65 % nasal spray   2 sprays, Nasal, As Needed             Allergies   Allergen Reactions   • Penicillins Other (See  Comments)     CHILDHOOD ALLERGY           Discharge Disposition:  Home-Health Care Svc    Diet:  Hospital:  Diet Order   Procedures   • Diet Regular; Renal          CODE STATUS:    Code Status and Medical Interventions:   Ordered at: 07/31/22 0009     Level Of Support Discussed With:    Patient     Code Status (Patient has no pulse and is not breathing):    CPR (Attempt to Resuscitate)     Medical Interventions (Patient has pulse or is breathing):    Full Support       Future Appointments   Date Time Provider Department Center   8/26/2022  1:15 PM Salvador Barnes APRN [facility]   10/24/2022  2:15 PM Arnoldo Oneil MD [facility]   4/24/2023 10:45 AM Basil Richter MD [facility]       Additional Instructions for the Follow-ups that You Need to Schedule     Discharge Follow-up with PCP   As directed       Currently Documented PCP:    Arnoldo Oneil MD    PCP Phone Number:    [phone]     Follow Up Details: 1 week         Discharge Follow-up with Specified Provider: Keep follow up appt with heart and valve clinic 8/26/22; and appt with Dr. Richter 4/24/22   As directed      To: Keep follow up appt with heart and valve clinic 8/26/22; and appt with Dr. Richter 4/24/22                     Blayne Norman DO  08/01/22      Time Spent on Discharge:  I spent  22  minutes on this discharge activity which included: face-to-face encounter with the patient, reviewing the data in the system, coordination of the care with the nursing staff as well as consultants, documentation, and entering orders.

## 2022-08-01 NOTE — PROGRESS NOTES
[facility] Cardiology    Inpatient Progress Note      Chief Complaint/Reason for service:    · Chest discomfort, tingling         Subjective:       Feels that she is at her baseline this morning.  Denies shortness of breath at rest, denies recurrent chest discomfort/tingling/pain, denies significant lower extremity swelling    Past medical, surgical, social and family history reviewed in the patient's electronic medical record.    Review of Systems:   Negative for chest pain, dyspnea at rest, lower extremity edema, palpitations    Problem List  Active Hospital Problems    Diagnosis  POA   • **Acute chest pain [R07.9]  Yes   • Gastroesophageal reflux disease [K21.9]  Yes   • Hypokalemia [E87.6]  Unknown   • CHF (congestive heart failure), NYHA class I, acute on chronic, diastolic (HCC) [I50.33]  Yes   • Chronic heart failure with preserved ejection fraction (HCC) [I50.32]  Yes   • Pulmonary hypertension (HCC) [I27.20]  Yes   • Stage 3 chronic kidney disease (HCC) [N18.30]  Yes   • Ascending aortic aneurysm (HCC) [I71.2]  Yes   • Hypertension [I10]  Yes   • Hypothyroidism [E03.9]  Yes      Resolved Hospital Problems   No resolved problems to display.            Objective:      Infusions:        Medications:    Current Facility-Administered Medications:   •  acetaminophen (TYLENOL) tablet 650 mg, 650 mg, Oral, Q4H PRN **OR** acetaminophen (TYLENOL) 160 MG/5ML solution 650 mg, 650 mg, Oral, Q4H PRN **OR** acetaminophen (TYLENOL) suppository 650 mg, 650 mg, Rectal, Q4H PRN, Krogeabbie, Oliva, APRN  •  aspirin chewable tablet 324 mg, 324 mg, Oral, Once, Da Davalos MD  •  aspirin EC tablet 81 mg, 81 mg, Oral, Daily, ArleyogeOliva leiva, APRN, 81 mg at 08/01/22 0824  •  cetirizine (zyrTEC) tablet 5 mg, 5 mg, Oral, Daily, Rhina Herron PA-C, 5 mg at 07/31/22 2223  •  famotidine (PEPCID) tablet 20 mg, 20 mg, Oral, Daily, Blayne Norman DO, 20 mg at 08/01/22 0824  •  fluticasone (FLONASE) 50 MCG/ACT  nasal spray 2 spray, 2 spray, Each Nare, Daily, Mendoza Mejia III, DO, 2 spray at 08/01/22 0307  •  heparin (porcine) 5000 UNIT/ML injection 5,000 Units, 5,000 Units, Subcutaneous, Q12H, Janny Bauera, APRN, 5,000 Units at 08/01/22 0824  •  HYDROcodone-acetaminophen (NORCO) 7.5-325 MG per tablet 1 tablet, 1 tablet, Oral, Q8H, Mendoza Mejia III, DO, 1 tablet at 08/01/22 0307  •  levothyroxine (SYNTHROID, LEVOTHROID) tablet 125 mcg, 125 mcg, Oral, Q AM, Arleyogeabbie, Oliva, APRN, 125 mcg at 08/01/22 0638  •  potassium chloride (MICRO-K) CR capsule 40 mEq, 40 mEq, Oral, PRN, 40 mEq at 07/31/22 2223 **OR** potassium chloride (KLOR-CON) packet 40 mEq, 40 mEq, Oral, PRN **OR** potassium chloride 10 mEq in 100 mL IVPB, 10 mEq, Intravenous, Q1H PRN, Susan Florez APRN  •  propranolol (INDERAL) tablet 20 mg, 20 mg, Oral, BID, Krmario, Oliva, APRN, 20 mg at 08/01/22 0826  •  sodium chloride 0.9 % flush 10 mL, 10 mL, Intravenous, PRN, Da Davalos MD  •  sodium chloride 0.9 % flush 10 mL, 10 mL, Intravenous, Q12H, Juancarlos, Oliva, APRN, 10 mL at 08/01/22 0824    Vital Sign Min/Max for last 24 hours  Temp  Min: 97.2 °F (36.2 °C)  Max: 98.8 °F (37.1 °C)   BP  Min: 139/94  Max: 159/82   Pulse  Min: 64  Max: 87   Resp  Min: 16  Max: 18   SpO2  Min: 84 %  Max: 97 %   No data recorded      Intake/Output Summary (Last 24 hours) at 8/1/2022 0834  Last data filed at 7/31/2022 1800  Gross per 24 hour   Intake 840 ml   Output --   Net 840 ml           CONSTITUTIONAL: No acute distress  RESPIRATORY: Normal effort. Clear to auscultation bilaterally without wheezing or rales  CARDIOVASCULAR: Regular rate and rhythm with normal S1 and S2. Without murmur. There is mild lower extremity edema bilaterally. Normal radial pulse.     Labs/studies:  Available lab and imaging results were reviewed by myself today    Results for orders placed during the hospital encounter of 10/10/21    Adult Transthoracic Echo Complete W/ Cont if  Necessary Per Protocol    Interpretation Summary  · Moderate aortic valve regurgitation is present.  · Mild aortic valve stenosis is present.  · Estimated right ventricular systolic pressure from tricuspid regurgitation is moderately elevated (45-55 mmHg).  · Moderate tricuspid valve regurgitation is present.  · Estimated left ventricular EF = 72% Estimated left ventricular EF was in agreement with the calculated left ventricular EF. Left ventricular ejection fraction appears to be greater than 70%. Left ventricular systolic function is hyperdynamic (EF > 70%).  · Left ventricular diastolic function is consistent with (grade I) impaired relaxation.  · Moderate pulmonary hypertension is present.  · Normal right ventricular cavity size, wall thickness, systolic function and septal motion noted.  · There is no evidence of pericardial effusion.      Tele:  NSR           Assessment/Plan:       ASSESSMENT:  1. Heart failure with preserved EF  2. Evidence of pulmonary hypertension  3. Valvular heart disease  4. CKD stage III  5. Ascending aortic aneurysm, 4.5 cm 11/2021  6. Hypertension  7. Hypothyroidism  8. GERD  9. Anemia  10. Dementia  11. Fragility, debility    PLAN:  1. Had recently taken patient off of amlodipine due to possible side effect of swelling.  Blood pressure does settle little bit higher and patient does have an ascending aortic aneurysm and presented with some degree of chest discomfort.  As such, we will increase propranolol to 40 mg twice a day  2. Hypokalemia evaluation/management per primary care team, potassium 2.9 today.  On potassium replacement protocol  3. Once potassium levels have normalized, recommend restarting Lasix 20 mg every other day alongside potassium replacement.  Would increase home potassium replacement to 20 mEq every other day when taking Lasix.  4. Cardiology will see on an as-needed basis.  Upon discharge, recommend patient keep follow-up appointment as previously scheduled  with heart and valve clinic later this month, 8/26/2022, and with Dr. Richter next year on 4/24/2023.      Basil Richter MD, MSc, FACC, [facility]  Interventional Cardiology  [facility]

## 2022-08-01 NOTE — OUTREACH NOTE
Prep Survey    Flowsheet Row Responses   [facility] [place] patient discharged from? [place]   Is LACE score < 7 ? No   Emergency Room discharge w/ pulse ox? No   Eligibility [facility]   Date of Admission 07/30/22   Date of Discharge 08/01/22   Discharge Disposition Home or Self Care   Discharge diagnosis chest pain   Does the patient have one of the following disease processes/diagnoses(primary or secondary)? Other   Does the patient have Home health ordered? No   Is there a DME ordered? No   Prep survey completed? Yes          PAOLO CLINE - Registered Nurse

## 2022-08-01 NOTE — CASE MANAGEMENT/SOCIAL WORK
Case Management Discharge Note      Final Note: Spoke with patient at bedside regarding discharge plan.  Patient has orders for discharge.  Patient denies discharge needs at this time however her  is concerned about her Oxygen needed to be recertified.  Called patient DME provider, Kelly, who advises that yes it does need to be recerted but not before discharge today.  They will contact patient who will need to see her PCP for new orders.  Patient plan is to discharge home today via car with family to transport.    Provided Post Acute Provider List?: N/A  N/A Provider List Comment: Current with Kelly    Selected Continued Care - Admitted Since 7/30/2022     Destination    No services have been selected for the patient.              Durable Medical Equipment    No services have been selected for the patient.              Dialysis/Infusion    No services have been selected for the patient.              Home Medical Care    No services have been selected for the patient.              Therapy    No services have been selected for the patient.              Community Resources    No services have been selected for the patient.              Community & DME    No services have been selected for the patient.                       Final Discharge Disposition Code: 01 - home or self-care

## 2022-08-01 NOTE — PLAN OF CARE
Goal Outcome Evaluation:  Plan of Care Reviewed With: patient        Progress: improving  Outcome Evaluation: Pt VSS. 2LNC. Did not rest well. Home [place] restarted and given as ordered with relief. NSR on tele and EKG. No complaints of chest pain or SOA. No acute overngiht events. Continue plan of care.

## 2022-08-02 ENCOUNTER — TRANSITIONAL CARE MANAGEMENT TELEPHONE ENCOUNTER (OUTPATIENT)
Dept: CALL CENTER | Facility: HOSPITAL | Age: 87
End: 2022-08-02

## 2022-08-02 NOTE — OUTREACH NOTE
Call Center TCM Note    Flowsheet Row Responses   [facility] patient discharged from? [place]   Does the patient have one of the following disease processes/diagnoses(primary or secondary)? Other   TCM attempt successful? No   Unsuccessful attempts Attempt 2          Ines Syed MA    8/2/2022, 16:14 EDT

## 2022-08-02 NOTE — OUTREACH NOTE
Call Center TCM Note    Flowsheet Row Responses   [facility] patient discharged from? [place]   Does the patient have one of the following disease processes/diagnoses(primary or secondary)? Other   TCM attempt successful? No  [verbal  but CM notes indicate  active in POC]   Unsuccessful attempts Attempt 1  [attempted home and cell ]   Does the patient have a primary care provider?  Yes   Comments regarding PCP Hospital PCP FOLLOW UP APPOINTMENT IS 22@315pm          Sherly Leavitt RN    2022, 14:56 EDT

## 2022-08-03 ENCOUNTER — TRANSITIONAL CARE MANAGEMENT TELEPHONE ENCOUNTER (OUTPATIENT)
Dept: CALL CENTER | Facility: HOSPITAL | Age: 87
End: 2022-08-03

## 2022-08-03 NOTE — OUTREACH NOTE
Call Center TCM Note    Flowsheet Row Responses   [facility] patient discharged from? [place]   Does the patient have one of the following disease processes/diagnoses(primary or secondary)? Other   TCM attempt successful? No   Unsuccessful attempts Attempt 3          Mar Valenzuela LPN    8/3/2022, 16:24 EDT

## 2022-08-08 ENCOUNTER — LAB (OUTPATIENT)
Dept: LAB | Facility: HOSPITAL | Age: 87
End: 2022-08-08

## 2022-08-08 ENCOUNTER — OFFICE VISIT (OUTPATIENT)
Dept: INTERNAL MEDICINE | Facility: CLINIC | Age: 87
End: 2022-08-08

## 2022-08-08 VITALS
TEMPERATURE: 96.9 F | OXYGEN SATURATION: 95 % | HEIGHT: 60 IN | BODY MASS INDEX: 29.25 KG/M2 | HEART RATE: 74 BPM | DIASTOLIC BLOOD PRESSURE: 64 MMHG | WEIGHT: 149 LBS | SYSTOLIC BLOOD PRESSURE: 120 MMHG

## 2022-08-08 DIAGNOSIS — R07.9 ACUTE CHEST PAIN: Primary | ICD-10-CM

## 2022-08-08 DIAGNOSIS — I50.33 ACUTE ON CHRONIC DIASTOLIC CONGESTIVE HEART FAILURE: ICD-10-CM

## 2022-08-08 DIAGNOSIS — I50.33 CHF (CONGESTIVE HEART FAILURE), NYHA CLASS I, ACUTE ON CHRONIC, DIASTOLIC: ICD-10-CM

## 2022-08-08 DIAGNOSIS — I50.33 ACUTE ON CHRONIC DIASTOLIC CHF (CONGESTIVE HEART FAILURE): Primary | ICD-10-CM

## 2022-08-08 DIAGNOSIS — R07.9 ACUTE CHEST PAIN: ICD-10-CM

## 2022-08-08 LAB
ANION GAP SERPL CALCULATED.3IONS-SCNC: 12.6 MMOL/L (ref 5–15)
BUN SERPL-MCNC: 19 MG/DL (ref 8–23)
BUN/CREAT SERPL: 13.7 (ref 7–25)
CALCIUM SPEC-SCNC: 8.8 MG/DL (ref 8.6–10.5)
CHLORIDE SERPL-SCNC: 97 MMOL/L (ref 98–107)
CO2 SERPL-SCNC: 29.4 MMOL/L (ref 22–29)
CREAT SERPL-MCNC: 1.39 MG/DL (ref 0.57–1)
EGFRCR SERPLBLD CKD-EPI 2021: 36.3 ML/MIN/1.73
GLUCOSE SERPL-MCNC: 98 MG/DL (ref 65–99)
NT-PROBNP SERPL-MCNC: 506 PG/ML (ref 0–1800)
POTASSIUM SERPL-SCNC: 3.4 MMOL/L (ref 3.5–5.2)
QT INTERVAL: 394 MS
QT INTERVAL: 428 MS
QTC INTERVAL: 413 MS
QTC INTERVAL: 448 MS
SODIUM SERPL-SCNC: 139 MMOL/L (ref 136–145)

## 2022-08-08 PROCEDURE — 36415 COLL VENOUS BLD VENIPUNCTURE: CPT

## 2022-08-08 PROCEDURE — 99495 TRANSJ CARE MGMT MOD F2F 14D: CPT | Performed by: PHYSICIAN ASSISTANT

## 2022-08-08 PROCEDURE — 85025 COMPLETE CBC W/AUTO DIFF WBC: CPT

## 2022-08-08 PROCEDURE — 80048 BASIC METABOLIC PNL TOTAL CA: CPT

## 2022-08-08 PROCEDURE — 1111F DSCHRG MED/CURRENT MED MERGE: CPT | Performed by: PHYSICIAN ASSISTANT

## 2022-08-08 PROCEDURE — 83880 ASSAY OF NATRIURETIC PEPTIDE: CPT

## 2022-08-08 PROCEDURE — 85007 BL SMEAR W/DIFF WBC COUNT: CPT

## 2022-08-08 NOTE — PROGRESS NOTES
"Transitional Care Follow Up Visit  Subjective     Zoila Nava is a 89 y.o. female who presents for a transitional care management visit.    Within 48 business hours after discharge our office contacted her via telephone to coordinate her care and needs.      I reviewed and discussed the details of that call along with the discharge summary, hospital problems, inpatient lab results, inpatient diagnostic studies, and consultation reports with Zoila.     Current outpatient and discharge medications have been reconciled for the patient.  Reviewed by: Charanjit Neal PA-C      Date of TCM Phone Call 8/1/2022   [facility]   Date of Admission 7/30/2022   Date of Discharge 8/1/2022   Discharge Disposition Home or Self Care     Risk for Readmission (LACE) Score: 8 (8/1/2022  6:01 AM)      Patient is an 89-year-old female in today for hospital discharge follow-up for chest pain and acute on chronic congestive heart failure.  Hospital course was as follows:\"Zoila Nava is a 89 y.o. female with CKD 3, HFpEF, followed by PCP and cardiology, recently had lower extremity swelling and Home amlodipine was discontinued, has had several follow-up appointments with both PCP/cardiology, ultimately eventually complained of weight loss so her home Lasix was decreased from 3 times per week down to 2 times per week; subsequently she developed lower extremity edema, exertional chest discomfort, and dyspnea on exertion in the setting of 4 pound weight gain over 24-36-hour period.  She was admitted for evaluation of chest pain and decompensated CHF, she tolerated IV diuresis well, serial troponins were negative, she was seen by cardiology who did not recommend further ischemic work-up.  With her primary cardiologist followed up today and recommend resumption of oral Lasix 20 mg EOD, as well as increasing her home potassium supplementation from 10 mcg up to 20 mcg on days that she takes her Lasix.  " "They uptitrated her propanolol to assist with blood pressure control in the setting of recent discontinuation of amlodipine.        Exertional dyspnea/chest discomfort, improved  A/C chronic HFpEF, improved  Underlying moderate pulmonary hypertension with nocturnal O2 use  -Last known EF 72%  - Serial troponins negative  - Chart reviewed, cardiology apt 6/27 with weight gain and swelling in the setting of recent amlodipine addition, amlodipine was discontinued and weight-based additional diuretics were added; telephone encounter 7/13 with PCP regarding rapid weight loss-there is a data deficit/limited history from the patient but she reports recent decrease in her diuretics by her PCP, this is not clearly substantiated but could potentially be inferred; subsequently presented with weight gain and shortness of breath  -  Cardiology has followed, no ischemic work-up at this time, patient's primary cardiologist Dr. Richter  recommended Lasix EOD, increase potassium supplementation, keep follow-up appointment with heart valve clinic this year and with cardiology early next year  -Home aspirin  - Home propanolol increased per cardiology recommendations     Hypokalemia  -  Increasing replacement home dosing     CKD stage 3  -Baseline Cr 1.1-1.5; EGFR 30s-40s  -At approximate baseline     GERD  Hypertension  -Pepcid, propanolol.\"  Patient reports taking all medications given to her upon hospital discharge as directed without any problems or side effects.  Still having some swelling.  Denies any cardiac symptoms.  Chest pain is resolved.  Overall feeling better.  When asked about heart rate being irregular.  Patient unclear as to history of diagnosis of A. fib.  Under the care of cardiology and due to follow back up with him soon in 2 weeks.  Denies any cardiac symptoms today or shortness of breath.        The following portions of the patient's history were reviewed and updated as appropriate: allergies, current medications, " past family history, past medical history, past social history, past surgical history and problem list.    Review of Systems   Constitutional: Negative for activity change, chills, fatigue, fever and unexpected weight change.   HENT: Negative for congestion, ear pain, postnasal drip, sinus pressure and sore throat.    Eyes: Negative for pain, discharge and redness.   Respiratory: Negative for cough, shortness of breath and wheezing.    Cardiovascular: Negative for chest pain, palpitations and leg swelling.   Gastrointestinal: Negative for diarrhea, nausea and vomiting.   Endocrine: Negative for cold intolerance and heat intolerance.   Genitourinary: Negative for decreased urine volume and dysuria.   Musculoskeletal: Negative for arthralgias and myalgias.   Skin: Negative for rash and wound.   Neurological: Negative for dizziness, light-headedness and headaches.   Hematological: Does not bruise/bleed easily.   Psychiatric/Behavioral: Negative for confusion, dysphoric mood and sleep disturbance. The patient is not nervous/anxious.        Objective   Physical Exam  Vitals and nursing note reviewed.   Constitutional:       General: She is not in acute distress.     Appearance: She is not ill-appearing.   HENT:      Head: Normocephalic.      Right Ear: Tympanic membrane, ear canal and external ear normal. There is no impacted cerumen.      Left Ear: Tympanic membrane, ear canal and external ear normal. There is no impacted cerumen.      Nose: No congestion or rhinorrhea.      Mouth/Throat:      Mouth: Mucous membranes are moist.      Pharynx: Oropharynx is clear. No oropharyngeal exudate or posterior oropharyngeal erythema.   Eyes:      General:         Right eye: No discharge.         Left eye: No discharge.      Extraocular Movements: Extraocular movements intact.      Conjunctiva/sclera: Conjunctivae normal.      Pupils: Pupils are equal, round, and reactive to light.   Cardiovascular:      Rate and Rhythm: Normal  rate. Rhythm irregular.      Heart sounds: Normal heart sounds. No murmur heard.    No friction rub. No gallop.   Pulmonary:      Effort: Pulmonary effort is normal. No respiratory distress.      Breath sounds: Normal breath sounds. No wheezing.   Abdominal:      General: Bowel sounds are normal. There is no distension.      Palpations: Abdomen is soft. There is no mass.      Tenderness: There is no abdominal tenderness.   Musculoskeletal:         General: No swelling. Normal range of motion.      Cervical back: Normal range of motion. No tenderness.      Right lower leg: No edema.      Left lower leg: No edema.   Lymphadenopathy:      Cervical: No cervical adenopathy.   Skin:     Findings: No bruising, erythema or rash.   Neurological:      Mental Status: She is oriented to person, place, and time.      Gait: Gait normal.   Psychiatric:         Mood and Affect: Mood normal.         Behavior: Behavior normal.         Thought Content: Thought content normal.         Judgment: Judgment normal.         Assessment & Plan     1. Acute chest pain (Primary)- resolved, advised if symptoms/condition does not continue to improve or worsen to go back to the ER immediately. Patient verbalized understanding of all instructions given and complied.    2. CHF (congestive heart failure), NYHA class I, acute on chronic, diastolic (HCC)- still having some swelling.  Weight back up.  Repeat BNP, BMP, and CBC.  May need to increase Lasix.    3. Irregular heart rate- EKG in office revealed sinus bradycardia with no evidence of A. fib or SVT.  Advised to follow-up with cardiology ASAP.

## 2022-08-09 LAB
BASOPHILS # BLD MANUAL: 0.18 10*3/MM3 (ref 0–0.2)
BASOPHILS NFR BLD MANUAL: 2.1 % (ref 0–1.5)
DEPRECATED RDW RBC AUTO: 42.7 FL (ref 37–54)
EOSINOPHIL # BLD MANUAL: 0.1 10*3/MM3 (ref 0–0.4)
EOSINOPHIL NFR BLD MANUAL: 1.1 % (ref 0.3–6.2)
ERYTHROCYTE [DISTWIDTH] IN BLOOD BY AUTOMATED COUNT: 12.9 % (ref 12.3–15.4)
HCT VFR BLD AUTO: 33.8 % (ref 34–46.6)
HGB BLD-MCNC: 11 G/DL (ref 12–15.9)
LYMPHOCYTES # BLD MANUAL: 1.39 10*3/MM3 (ref 0.7–3.1)
LYMPHOCYTES NFR BLD MANUAL: 14.7 % (ref 5–12)
MCH RBC QN AUTO: 29.4 PG (ref 26.6–33)
MCHC RBC AUTO-ENTMCNC: 32.5 G/DL (ref 31.5–35.7)
MCV RBC AUTO: 90.4 FL (ref 79–97)
MONOCYTES # BLD: 1.29 10*3/MM3 (ref 0.1–0.9)
NEUTROPHILS # BLD AUTO: 5.82 10*3/MM3 (ref 1.7–7)
NEUTROPHILS NFR BLD MANUAL: 66.3 % (ref 42.7–76)
PLAT MORPH BLD: NORMAL
PLATELET # BLD AUTO: 273 10*3/MM3 (ref 140–450)
PMV BLD AUTO: 11.9 FL (ref 6–12)
POIKILOCYTOSIS BLD QL SMEAR: ABNORMAL
RBC # BLD AUTO: 3.74 10*6/MM3 (ref 3.77–5.28)
VARIANT LYMPHS NFR BLD MANUAL: 15.8 % (ref 19.6–45.3)
WBC MORPH BLD: NORMAL
WBC NRBC COR # BLD: 8.78 10*3/MM3 (ref 3.4–10.8)

## 2022-08-10 ENCOUNTER — READMISSION MANAGEMENT (OUTPATIENT)
Dept: CALL CENTER | Facility: HOSPITAL | Age: 87
End: 2022-08-10

## 2022-08-10 NOTE — OUTREACH NOTE
Medical Week 2 Survey    Flowsheet Row Responses   [facility] patient discharged from? [place]   Does the patient have one of the following disease processes/diagnoses(primary or secondary)? Other   Week 2 attempt successful? Yes   Call start time 1244   Discharge diagnosis chest pain   Call end time 1248   Meds reviewed with patient/caregiver? Yes   Is the patient having any side effects they believe may be caused by any medication additions or changes? No   Does the patient have all medications ordered at discharge? Yes   Is the patient taking all medications as directed (includes completed medication regime)? Yes   Medication comments Patient is aware of changes to Lasix and potassium   Comments regarding appointments Cardiology 8/26/22   Does the patient have a primary care provider?  Yes   Does the patient have an appointment with their PCP within 7 days of discharge? Yes   Comments regarding PCP Hospital PCP FOLLOW UP APPOINTMENT IS 8/8/22@315pm, compliant with f/u   Has the patient kept scheduled appointments due by today? Yes   Has home health visited the patient within 72 hours of discharge? N/A   DME comments Patient uses nocturnal O2   Psychosocial issues? No   Did the patient receive a copy of their discharge instructions? Yes   Nursing interventions Reviewed instructions with patient   What is the patient's perception of their health status since discharge? Improving  [Doing well--denies any increase in SOA, no edema or wt gain--weighs daily and  keeps accurate records, aware to notify MD of changes.  Wears her knee high compression hoses, follows her recommended diet.]   Is the patient/caregiver able to teach back the hierarchy of who to call/visit for symptoms/problems? PCP, Specialist, Home health nurse, Urgent Care, ED, 911 Yes   Week 2 Call Completed? Yes   Wrap up additional comments NO questions or concerns today--doing well          ISAAC LEAHY - Registered Nurse

## 2022-08-11 DIAGNOSIS — R07.9 ACUTE CHEST PAIN: Primary | ICD-10-CM

## 2022-08-11 DIAGNOSIS — I50.33 CHF (CONGESTIVE HEART FAILURE), NYHA CLASS I, ACUTE ON CHRONIC, DIASTOLIC: ICD-10-CM

## 2022-08-12 ENCOUNTER — TELEPHONE (OUTPATIENT)
Dept: INTERNAL MEDICINE | Facility: CLINIC | Age: 87
End: 2022-08-12

## 2022-08-12 NOTE — TELEPHONE ENCOUNTER
", FORREST HIDALGO CALLED BACK REGARDING EARLIER MESSAGE.    PATIENT'S BOWEL MOVEMENT HAD BEEN MINIMAL, AND IT IS BACK TO NORMAL NOW TODAY 8/12/22. SHE HAD WEIGHT GAIN.   PATIENT'S  STATES NO NEED TO CALL NOW. THE URGENCY HAS PASSED. PATIENT'S BOWEL MOVEMENT HAS RETURNED AND THE \"FLOOD HUGHES HAVE OPENED\"  "

## 2022-08-18 ENCOUNTER — LAB (OUTPATIENT)
Dept: LAB | Facility: HOSPITAL | Age: 87
End: 2022-08-18

## 2022-08-18 DIAGNOSIS — I50.33 ACUTE ON CHRONIC DIASTOLIC CHF (CONGESTIVE HEART FAILURE): Primary | ICD-10-CM

## 2022-08-18 DIAGNOSIS — I50.33 CHF (CONGESTIVE HEART FAILURE), NYHA CLASS I, ACUTE ON CHRONIC, DIASTOLIC: ICD-10-CM

## 2022-08-18 LAB
ANION GAP SERPL CALCULATED.3IONS-SCNC: 11.4 MMOL/L (ref 5–15)
BUN SERPL-MCNC: 20 MG/DL (ref 8–23)
BUN/CREAT SERPL: 13.5 (ref 7–25)
CALCIUM SPEC-SCNC: 8.5 MG/DL (ref 8.6–10.5)
CHLORIDE SERPL-SCNC: 97 MMOL/L (ref 98–107)
CO2 SERPL-SCNC: 28.6 MMOL/L (ref 22–29)
CREAT SERPL-MCNC: 1.48 MG/DL (ref 0.57–1)
EGFRCR SERPLBLD CKD-EPI 2021: 33.7 ML/MIN/1.73
GLUCOSE SERPL-MCNC: 91 MG/DL (ref 65–99)
NT-PROBNP SERPL-MCNC: 263 PG/ML (ref 0–1800)
POTASSIUM SERPL-SCNC: 3.9 MMOL/L (ref 3.5–5.2)
SODIUM SERPL-SCNC: 137 MMOL/L (ref 136–145)

## 2022-08-18 PROCEDURE — 36415 COLL VENOUS BLD VENIPUNCTURE: CPT

## 2022-08-18 PROCEDURE — 80048 BASIC METABOLIC PNL TOTAL CA: CPT

## 2022-08-18 PROCEDURE — 83880 ASSAY OF NATRIURETIC PEPTIDE: CPT

## 2022-08-25 NOTE — PROGRESS NOTES
"[facility]  Heart and Valve Center    Chief Complaint  Follow-up and Edema    Subjective    History of Present Illness {CC  Problem List  Visit  Diagnosis   Encounters  Notes  Medications  Labs  Result Review Imaging  Media :23}     Zoila Nava is a 89 y.o. female with HFpEF, pulmonary hypertension, valvular heart disease, CKD stage III, ascending aortic aneurysm, hypertension, hypothyroidism, GERD, anemia, dementia who presents today for evaluation of heart failure.  Patient is established with Dr. Richter and Salvador SCHILLING. Patient admitted 7/30 with acute chest pain.  Her Lasix had been recently decreased and she subsequently developed lower extreme edema, exertional chest discomfort and dyspnea.  She required IV diuresis and her Lasix was resumed every other day.  Her propranolol was increased to 40 mg twice a day due to elevated blood pressure and history of ascending aortic aneurysm. Her  reports that her lasix was increased to daily by her PCP but she is certain she has not done this.  reports she is up 10 lbs. She denies shortness of breath but her  says she has had to use oxygen throughout the day. She walked in here today without dyspnea or assistance          Objective     Vital Signs:   Vitals:    08/26/22 1243   BP: 139/65   BP Location: Left arm   Patient Position: Sitting   Cuff Size: Adult   Pulse: 87   Resp: 18   Temp: 96.8 °F (36 °C)   TempSrc: Temporal   SpO2: 94%   Weight: 69.9 kg (154 lb 3.2 oz)   Height: 152.4 cm (60\")     Body mass index is 30.12 kg/m².  Physical Exam  Vitals reviewed.   Constitutional:       Appearance: Normal appearance.   HENT:      Head: Normocephalic.   Neck:      Vascular: No carotid bruit.   Cardiovascular:      Rate and Rhythm: Normal rate. Rhythm irregular.      Pulses: Normal pulses.      Heart sounds: Normal heart sounds, S1 normal and S2 normal. No murmur heard.  Pulmonary:      Effort: Pulmonary effort " is normal. No respiratory distress.      Breath sounds: Normal breath sounds.   Chest:      Chest wall: No tenderness.   Abdominal:      General: Abdomen is flat.      Palpations: Abdomen is soft.   Musculoskeletal:      Cervical back: Neck supple.      Right lower le+ Pitting Edema present.      Left lower le+ Pitting Edema present.   Skin:     General: Skin is warm and dry.   Neurological:      General: No focal deficit present.      Mental Status: She is alert and oriented to person, place, and time. Mental status is at baseline.   Psychiatric:         Mood and Affect: Mood normal.         Behavior: Behavior normal.         Thought Content: Thought content normal.              Result Review  Data Reviewed:{ Labs  Result Review  Imaging  Med Tab  Media :23}   proBNP (2022 11:17)  Basic metabolic panel (2022 11:17)  Manual Differential (2022 16:09)  proBNP (2022 16:09)  Adult Transthoracic Echo Complete W/ Cont if Necessary Per Protocol (10/11/2021 14:30)  Review notes from Dr. Richter and Salvador SCHILLING  EKG today shows normal sinus rhythm         Assessment and Plan {CC Problem List  Visit Diagnosis  ROS  Review (Popup)  Health Maintenance  Quality  BestPractice  Medications  SmartSets  SnapShot Encounters  Media :23}   1. Acute on chronic heart failure with preserved ejection fraction (HCC)  Lasix was increased to 20 mg daily, but she is not sure that she actually did this.  Advised to take 40 mg of Lasix for the next 3 days with 40 mill equivalents of potassium and then decrease to 20 mg of Lasix daily and 20 mg of potassium  Repeat BMP next week  Advised them to call me if symptoms do not improve  - furosemide (Lasix) 20 MG tablet; Take 2 tablets a day for 3 days and then decrease to 20mg daily  Dispense: 60 tablet; Refill: 0    2. Irregular heart beat  Initial heartbeat was irregular, but EKG showed normal sinus rhythm  - ECG 12 Lead; Future    3. Essential  hypertension  Appears to be well controlled  Continue to monitor    4. Stage 3 chronic kidney disease, unspecified whether stage 3a or 3b CKD (HCC)  Baseline appears to be closer to 1.2-1.5  Last renal function last week was stable (she is not sure if she was on lasix daily at the time)  Repeat BMP at follow up          Follow Up {Instructions Charge Capture  Follow-up Communications :23}   Return in about 1 week (around 9/2/2022) for Office follow up, HF with tanner.    Patient was given instructions and counseling regarding her condition or for health maintenance advice. Please see specific information pulled into the AVS if appropriate.  Advised to call the [facility] Center with any questions, concerns, or worsening symptoms.

## 2022-08-26 ENCOUNTER — OFFICE VISIT (OUTPATIENT)
Dept: CARDIOLOGY | Facility: HOSPITAL | Age: 87
End: 2022-08-26

## 2022-08-26 ENCOUNTER — HOSPITAL ENCOUNTER (OUTPATIENT)
Dept: CARDIOLOGY | Facility: HOSPITAL | Age: 87
Discharge: HOME | End: 2022-08-26

## 2022-08-26 VITALS
WEIGHT: 154.2 LBS | SYSTOLIC BLOOD PRESSURE: 139 MMHG | DIASTOLIC BLOOD PRESSURE: 65 MMHG | BODY MASS INDEX: 30.27 KG/M2 | TEMPERATURE: 96.8 F | HEIGHT: 60 IN | HEART RATE: 87 BPM | OXYGEN SATURATION: 94 % | RESPIRATION RATE: 18 BRPM

## 2022-08-26 DIAGNOSIS — I50.33 ACUTE ON CHRONIC HEART FAILURE WITH PRESERVED EJECTION FRACTION: ICD-10-CM

## 2022-08-26 DIAGNOSIS — N18.30 STAGE 3 CHRONIC KIDNEY DISEASE, UNSPECIFIED WHETHER STAGE 3A OR 3B CKD: ICD-10-CM

## 2022-08-26 DIAGNOSIS — I49.9 IRREGULAR HEART BEAT: Primary | ICD-10-CM

## 2022-08-26 DIAGNOSIS — I49.9 IRREGULAR HEART BEAT: ICD-10-CM

## 2022-08-26 DIAGNOSIS — I10 ESSENTIAL HYPERTENSION: ICD-10-CM

## 2022-08-26 LAB
QT INTERVAL: 412 MS
QTC INTERVAL: 421 MS

## 2022-08-26 PROCEDURE — 93005 ELECTROCARDIOGRAM TRACING: CPT | Performed by: NURSE PRACTITIONER

## 2022-08-26 PROCEDURE — 93010 ELECTROCARDIOGRAM REPORT: CPT | Performed by: INTERNAL MEDICINE

## 2022-08-26 PROCEDURE — 99214 OFFICE O/P EST MOD 30 MIN: CPT | Performed by: NURSE PRACTITIONER

## 2022-08-26 RX ORDER — AMLODIPINE BESYLATE 5 MG/1
5 TABLET ORAL DAILY
COMMUNITY
Start: 2022-08-19 | End: 2022-12-22 | Stop reason: SDUPTHER

## 2022-08-26 RX ORDER — POTASSIUM CHLORIDE 1500 MG/1
20 TABLET, FILM COATED, EXTENDED RELEASE ORAL EVERY OTHER DAY
Qty: 30 TABLET | Refills: 0 | Status: SHIPPED | OUTPATIENT
Start: 2022-08-26 | End: 2022-11-02

## 2022-08-26 RX ORDER — FUROSEMIDE 20 MG/1
TABLET ORAL
Qty: 60 TABLET | Refills: 0 | Status: SHIPPED | OUTPATIENT
Start: 2022-08-26 | End: 2022-09-06 | Stop reason: SDUPTHER

## 2022-08-26 NOTE — PATIENT INSTRUCTIONS
Increased furosemide to 40mg daily and potassium 40meq for 3 days and the 20mg daily with 20meq daily

## 2022-08-30 ENCOUNTER — LAB (OUTPATIENT)
Dept: LAB | Facility: HOSPITAL | Age: 87
End: 2022-08-30

## 2022-08-30 ENCOUNTER — TELEPHONE (OUTPATIENT)
Dept: CARDIOLOGY | Facility: HOSPITAL | Age: 87
End: 2022-08-30

## 2022-08-30 DIAGNOSIS — N18.30 STAGE 3 CHRONIC KIDNEY DISEASE, UNSPECIFIED WHETHER STAGE 3A OR 3B CKD: Primary | ICD-10-CM

## 2022-08-30 DIAGNOSIS — I50.33 ACUTE ON CHRONIC HEART FAILURE WITH PRESERVED EJECTION FRACTION: ICD-10-CM

## 2022-08-30 DIAGNOSIS — N18.30 STAGE 3 CHRONIC KIDNEY DISEASE, UNSPECIFIED WHETHER STAGE 3A OR 3B CKD: ICD-10-CM

## 2022-08-30 LAB
ANION GAP SERPL CALCULATED.3IONS-SCNC: 6.5 MMOL/L (ref 5–15)
BUN SERPL-MCNC: 20 MG/DL (ref 8–23)
BUN/CREAT SERPL: 15.4 (ref 7–25)
CALCIUM SPEC-SCNC: 8.5 MG/DL (ref 8.6–10.5)
CHLORIDE SERPL-SCNC: 100 MMOL/L (ref 98–107)
CO2 SERPL-SCNC: 33.5 MMOL/L (ref 22–29)
CREAT SERPL-MCNC: 1.3 MG/DL (ref 0.57–1)
EGFRCR SERPLBLD CKD-EPI 2021: 39.4 ML/MIN/1.73
GLUCOSE SERPL-MCNC: 104 MG/DL (ref 65–99)
POTASSIUM SERPL-SCNC: 4.4 MMOL/L (ref 3.5–5.2)
SODIUM SERPL-SCNC: 140 MMOL/L (ref 136–145)

## 2022-08-30 PROCEDURE — 80048 BASIC METABOLIC PNL TOTAL CA: CPT

## 2022-08-30 PROCEDURE — 36415 COLL VENOUS BLD VENIPUNCTURE: CPT

## 2022-09-06 ENCOUNTER — OFFICE VISIT (OUTPATIENT)
Dept: CARDIOLOGY | Facility: HOSPITAL | Age: 87
End: 2022-09-06

## 2022-09-06 VITALS
HEART RATE: 72 BPM | OXYGEN SATURATION: 98 % | BODY MASS INDEX: 29.96 KG/M2 | DIASTOLIC BLOOD PRESSURE: 57 MMHG | HEIGHT: 60 IN | SYSTOLIC BLOOD PRESSURE: 141 MMHG | WEIGHT: 152.6 LBS | RESPIRATION RATE: 18 BRPM | TEMPERATURE: 96.9 F

## 2022-09-06 DIAGNOSIS — I49.9 IRREGULAR HEART BEAT: ICD-10-CM

## 2022-09-06 DIAGNOSIS — I35.1 NON-RHEUMATIC AORTIC REGURGITATION: ICD-10-CM

## 2022-09-06 DIAGNOSIS — I10 ESSENTIAL HYPERTENSION: ICD-10-CM

## 2022-09-06 DIAGNOSIS — I27.20 PULMONARY HYPERTENSION: ICD-10-CM

## 2022-09-06 DIAGNOSIS — R60.0 EDEMA LEG: ICD-10-CM

## 2022-09-06 DIAGNOSIS — I50.32 CHRONIC HEART FAILURE WITH PRESERVED EJECTION FRACTION: Primary | ICD-10-CM

## 2022-09-06 PROCEDURE — 99214 OFFICE O/P EST MOD 30 MIN: CPT | Performed by: NURSE PRACTITIONER

## 2022-09-06 RX ORDER — FUROSEMIDE 20 MG/1
TABLET ORAL
Qty: 60 TABLET | Refills: 0 | Status: SHIPPED | OUTPATIENT
Start: 2022-09-06 | End: 2023-01-25 | Stop reason: SDUPTHER

## 2022-09-06 NOTE — PATIENT INSTRUCTIONS
Lasix 20 mg daily alternating with 40mg every other day    Continue Potassium  20 meq every other day.    Lab work in 2 weeks.

## 2022-09-06 NOTE — PROGRESS NOTES
"[facility], [facility] Heart and Vascular    Chief Complaint  Follow-up (HF)    Subjective    History of Present Illness {CC  Problem List  Visit  Diagnosis   Encounters  Notes  Medications  Labs  Result Review Imaging  Media :23}     Zoila Nava presents to [facility] CARDIOLOGY for   History of Present Illness     89-year-old female with heart failure preserved EF, pulmonary hypertension, valvular heart disease, chronic kidney disease stage III, ascending aortic aneurysm, hypertension, hypothyroidism, GERD, anemia, dementia.    Patient presents today with weight, heart rate, blood pressure, O2 sat log.  Patient continues to have lower extremity edema.  Recently her Lasix was increased to 40 mg daily with a downward decline in her weight.  When the Lasix was reduced back to 20 mg her weight has steadily increased.  Patient has lower extremity edema wearing compression stockings.  Reports intermittent dyspnea with exertion.  Denies PND, orthopnea, chest pain, pressure, dizziness, near-syncope, syncope, palpitations.    Patient's appetite is at baseline.  States she eats a lot of chocolate.  She will have 2 cups of coffee per day, green tea 1 to 2/day.  Does not drink noncaffeinated beverages.      Objective     Vital Signs:   Vitals:    09/06/22 1014   BP: 141/57   BP Location: Left arm   Patient Position: Sitting   Cuff Size: Adult   Pulse: 72   Resp: 18   Temp: 96.9 °F (36.1 °C)   TempSrc: Temporal   SpO2: 98%   Weight: 69.2 kg (152 lb 9.6 oz)   Height: 152.4 cm (60\")     Body mass index is 29.8 kg/m².  Physical Exam  Vitals reviewed.   Constitutional:       General: She is not in acute distress.     Appearance: Normal appearance.   Cardiovascular:      Rate and Rhythm: Normal rate and regular rhythm.      Pulses:           Radial pulses are 2+ on the right side.        Dorsalis pedis pulses are 2+ on the right side.        Posterior tibial pulses " are 2+ on the right side.      Heart sounds: Normal heart sounds.   Pulmonary:      Effort: Pulmonary effort is normal.      Breath sounds: Wheezing ( Scattered wheezes throughout, mild) present.   Musculoskeletal:      Right lower leg: Edema (1+ bilatered edema, compression stockings in place. ) present.      Left lower leg: Edema present.   Skin:     General: Skin is warm and dry.   Neurological:      Mental Status: She is alert.   Psychiatric:         Mood and Affect: Mood normal.         Behavior: Behavior is cooperative.              Result Review  Data Reviewed:{ Labs  Result Review  Imaging  Med Tab  Media :23}   Holter 3/8/2022.  Duration 4 days.  Average heart rate 62 bpm.  Multiple SVT runs/longest 15 seconds.  3.7% PACs, 2.2% PVC burden.  No patient triggered events        Echocardiogram 10/11/2021: EF 73%, grade 1 diastolic dysfunction, moderate pulmonary hypertension with RVSP 45 to 55 mmHg, moderate AR, mild AS, moderate TR.    Lab Results   Component Value Date    GLUCOSE 104 (H) 08/30/2022    CALCIUM 8.5 (L) 08/30/2022     08/30/2022    K 4.4 08/30/2022    CO2 33.5 (H) 08/30/2022     08/30/2022    BUN 20 08/30/2022    CREATININE 1.30 (H) 08/30/2022    EGFRIFNONA 40 (L) 01/21/2022    BCR 15.4 08/30/2022    ANIONGAP 6.5 08/30/2022     .              Assessment and Plan {CC Problem List  Visit Diagnosis  ROS  Review (Popup)  Health Maintenance  Quality  BestPractice  Medications  SmartSets  SnapShot Encounters  Media :23}   1. Chronic heart failure with preserved ejection fraction (HCC)    - furosemide (Lasix) 20 MG tablet; Lasix 20 mg 1 tab daily alternating 40 mg every other day.  Dispense: 60 tablet; Refill: 0  - Basic Metabolic Panel;  2 weeks.    2. Essential hypertension  Stable  Continue propranolol    3. Irregular heart beat  History of PACs and PVCs, SVT.  No reported palpitations.  Doing well on propranolol without significant bradycardia    4. Non-rheumatic aortic  regurgitation  Continue to monitor    5. Pulmonary hypertension (HCC)  Continue to monitor, appears stable today    6. Edema leg  Lower extremity edema currently on Lasix potassium supplement.  Compression socks daily.          Follow Up {Instructions Charge Capture  Follow-up Communications :23}   Return in about 2 weeks (around 9/20/2022) for Video visit, HF.    Patient was given instructions and counseling regarding her condition or for health maintenance advice. Please see specific information pulled into the AVS if appropriate.  Patient was instructed to call the [facility]ve Center with any questions, concerns, or worsening symptoms.

## 2022-09-09 NOTE — TELEPHONE ENCOUNTER
Called patient to discuss concerns.    Yesterday while in clinic.  Patient spouse reported that patient was on propranolol and metoprolol.  At that time we discussed stopping the metoprolol to continue the propranolol for tremors.    Patient called back today stating that patient has not been on the propranolol for 3 months when metoprolol was initiated.  He would like for her to restart the propranolol since her tremors worsened over the last 3 months.    Spouse is also concerned about a heart rate that went from 50s to 70s.  Discussed that heart rate is always variable.  Her average heart rate has been well controlled.    At this time we will stop metoprolol and trial the propranolol.  They will restart propranolol 40 mg 1 tablet twice a day.    Patient spouse able to teach back instructions.   Patient complains of pain at RUE IV site.

## 2022-09-20 ENCOUNTER — LAB (OUTPATIENT)
Dept: LAB | Facility: HOSPITAL | Age: 87
End: 2022-09-20

## 2022-09-20 DIAGNOSIS — I50.32 CHRONIC HEART FAILURE WITH PRESERVED EJECTION FRACTION: ICD-10-CM

## 2022-09-20 LAB
ANION GAP SERPL CALCULATED.3IONS-SCNC: 9.1 MMOL/L (ref 5–15)
BUN SERPL-MCNC: 22 MG/DL (ref 8–23)
BUN/CREAT SERPL: 17.6 (ref 7–25)
CALCIUM SPEC-SCNC: 9 MG/DL (ref 8.6–10.5)
CHLORIDE SERPL-SCNC: 103 MMOL/L (ref 98–107)
CO2 SERPL-SCNC: 27.9 MMOL/L (ref 22–29)
CREAT SERPL-MCNC: 1.25 MG/DL (ref 0.57–1)
EGFRCR SERPLBLD CKD-EPI 2021: 41.3 ML/MIN/1.73
GLUCOSE SERPL-MCNC: 109 MG/DL (ref 65–99)
POTASSIUM SERPL-SCNC: 4.2 MMOL/L (ref 3.5–5.2)
SODIUM SERPL-SCNC: 140 MMOL/L (ref 136–145)

## 2022-09-20 PROCEDURE — 80048 BASIC METABOLIC PNL TOTAL CA: CPT

## 2022-09-20 PROCEDURE — 36415 COLL VENOUS BLD VENIPUNCTURE: CPT

## 2022-09-21 ENCOUNTER — TELEMEDICINE (OUTPATIENT)
Dept: CARDIOLOGY | Facility: HOSPITAL | Age: 87
End: 2022-09-21

## 2022-09-21 VITALS
SYSTOLIC BLOOD PRESSURE: 114 MMHG | WEIGHT: 147.8 LBS | BODY MASS INDEX: 29.02 KG/M2 | HEART RATE: 64 BPM | HEIGHT: 60 IN | DIASTOLIC BLOOD PRESSURE: 65 MMHG

## 2022-09-21 DIAGNOSIS — I10 ESSENTIAL HYPERTENSION: ICD-10-CM

## 2022-09-21 DIAGNOSIS — N18.30 STAGE 3 CHRONIC KIDNEY DISEASE, UNSPECIFIED WHETHER STAGE 3A OR 3B CKD: ICD-10-CM

## 2022-09-21 DIAGNOSIS — I49.9 IRREGULAR HEART BEAT: ICD-10-CM

## 2022-09-21 DIAGNOSIS — I50.32 CHRONIC HEART FAILURE WITH PRESERVED EJECTION FRACTION: Primary | ICD-10-CM

## 2022-09-21 PROCEDURE — 99442 PR PHYS/QHP TELEPHONE EVALUATION 11-20 MIN: CPT | Performed by: NURSE PRACTITIONER

## 2022-09-21 NOTE — PROGRESS NOTES
"[facility], [facility] Heart and Vascular    This was an audio  enabled telemedicine encounter.    You have chosen to receive care through the use of telemedicine. Telemedicine enables health care providers at different locations to provide safe, effective, and convenient care through the use of technology. As with any health care service, there are risks associated with the use of telemedicine, including equipment failure, poor connections, and  issues.    • Do you understand the risks and benefits of telemedicine as I have explained them to you? Yes  • Have your questions regarding telemedicine been answered? Yes  • Do you consent to the use of telemedicine in your medical care today? Yes        Chief Complaint  Follow-up (HF after change in diuretics)    Subjective    History of Present Illness {CC  Problem List  Visit  Diagnosis   Encounters  Notes  Medications  Labs  Result Review Imaging  Media :23}     Zoila Nava presents to [facility] CARDIOLOGY for   History of Present Illness     89-year-old female with heart failure with preserved EF, pulmonary hypertension, valvular heart disease, chronic kidney disease stage III, ascending aortic aneurysm, hypertension, hyper thyroidism, GERD, anemia, dementia    Last office visit patient's weight had been up with worsening lower extremity edema.  She is wearing compression stockings.  Lasix dosing was changed.  She is now taking Lasix 20 mg daily alternating 40 mg every other day.    Patient's weight has decreased.  5 lb weight loss.      No CP or pressure, no dyspnea,  No dizziness, near syncope, syncope, palpitations.  Edema improved but still present.  Wearing compression stockings.      Creatinine 1.25  Objective     Vital Signs:   Vitals:    09/21/22 1317   BP: 114/65   Pulse: 64   Weight: 67 kg (147 lb 12.8 oz)   Height: 152.4 cm (60\")     Body mass index is 28.87 " kg/m².  Physical Exam  Vitals reviewed.   Constitutional:       General: She is not in acute distress.  Pulmonary:      Effort: Pulmonary effort is normal.   Skin:     Coloration: Skin is not pale.   Neurological:      Mental Status: She is alert.   Psychiatric:         Mood and Affect: Mood normal.         Behavior: Behavior normal. Behavior is cooperative.     Spouse present and assisting patient with telehealth visit        Result Review  Data Reviewed:{ Labs  Result Review  Imaging  Med Tab  Media :23}   Holter 3/8/2022.  Duration 4 days.  Average heart rate 62 bpm.  Multiple SVT runs/longest 15 seconds.  3.7% PACs, 2.2% PVC burden.  No patient triggered events        Echocardiogram 10/11/2021: EF 73%, grade 1 diastolic dysfunction, moderate pulmonary hypertension with RVSP 45 to 55 mmHg, moderate AR, mild AS, moderate TR    Lab Results   Component Value Date    GLUCOSE 109 (H) 09/20/2022    CALCIUM 9.0 09/20/2022     09/20/2022    K 4.2 09/20/2022    CO2 27.9 09/20/2022     09/20/2022    BUN 22 09/20/2022    CREATININE 1.25 (H) 09/20/2022    EGFRIFNONA 40 (L) 01/21/2022    BCR 17.6 09/20/2022    ANIONGAP 9.1 09/20/2022                   Assessment and Plan {CC Problem List  Visit Diagnosis  ROS  Review (Popup)  Health Maintenance  Quality  BestPractice  Medications  SmartSets  SnapShot Encounters  Media :23}   1. Chronic heart failure with preserved ejection fraction (HCC)  Improved dyspnea, weight loss    Continue Lasix 20 mg daily alternating with 40 mg every other day    2. Essential hypertension  Blood pressure controlled    3. Irregular heart beat  No concerning palpitations    4. Stage 3 chronic kidney disease, unspecified whether stage 3a or 3b CKD (HCC)  Kidney function stable    Follow-up in 3 months or sooner if needed.  Reviewed signs and symptoms of heart failure worsening and when to call.    I spent 20 minutes caring for Zoila on this date of service, 14 min (direct  contact with patient). This time includes time spent by me in the following activities:preparing for the visit, reviewing tests, performing a medically appropriate examination and/or evaluation , counseling and educating the patient/family/caregiver and documenting information in the medical record    Followup     Return in about 3 months (around 12/21/2022) for Office visit, HF.    Patient was given instructions and counseling regarding her condition or for health maintenance advice. Please see specific information pulled into the AVS if appropriate.  Patient was instructed to call the Heart [facility] Valve Center with any questions, concerns, or worsening symptoms.

## 2022-10-08 ENCOUNTER — APPOINTMENT (OUTPATIENT)
Dept: CT IMAGING | Facility: HOSPITAL | Age: 87
End: 2022-10-08

## 2022-10-08 ENCOUNTER — APPOINTMENT (OUTPATIENT)
Dept: GENERAL RADIOLOGY | Facility: HOSPITAL | Age: 87
End: 2022-10-08

## 2022-10-08 ENCOUNTER — HOSPITAL ENCOUNTER (EMERGENCY)
Facility: HOSPITAL | Age: 87
Discharge: HOME | End: 2022-10-08
Attending: EMERGENCY MEDICINE | Admitting: EMERGENCY MEDICINE

## 2022-10-08 VITALS
RESPIRATION RATE: 18 BRPM | DIASTOLIC BLOOD PRESSURE: 79 MMHG | HEART RATE: 62 BPM | BODY MASS INDEX: 30.44 KG/M2 | OXYGEN SATURATION: 95 % | TEMPERATURE: 97.4 F | HEIGHT: 58 IN | WEIGHT: 145 LBS | SYSTOLIC BLOOD PRESSURE: 145 MMHG

## 2022-10-08 DIAGNOSIS — S70.12XA CONTUSION OF LEFT HIP AND THIGH, INITIAL ENCOUNTER: Primary | ICD-10-CM

## 2022-10-08 DIAGNOSIS — S70.02XA CONTUSION OF LEFT HIP AND THIGH, INITIAL ENCOUNTER: Primary | ICD-10-CM

## 2022-10-08 PROCEDURE — 73560 X-RAY EXAM OF KNEE 1 OR 2: CPT

## 2022-10-08 PROCEDURE — 72192 CT PELVIS W/O DYE: CPT

## 2022-10-08 PROCEDURE — 99284 EMERGENCY DEPT VISIT MOD MDM: CPT

## 2022-10-08 PROCEDURE — 99283 EMERGENCY DEPT VISIT LOW MDM: CPT

## 2022-10-08 PROCEDURE — 73502 X-RAY EXAM HIP UNI 2-3 VIEWS: CPT

## 2022-10-08 RX ORDER — HYDROCODONE BITARTRATE AND ACETAMINOPHEN 5; 325 MG/1; MG/1
1 TABLET ORAL ONCE
Status: COMPLETED | OUTPATIENT
Start: 2022-10-08 | End: 2022-10-08

## 2022-10-08 RX ADMIN — HYDROCODONE BITARTRATE AND ACETAMINOPHEN 1 TABLET: 5; 325 TABLET ORAL at 13:53

## 2022-10-08 NOTE — ED PROVIDER NOTES
Subjective   History of Present Illness  89-year-old female who presents for evaluation of left hip and left lateral thigh pain after recent fall.  The patient reports that on Wednesday, 3 days ago she tripped over her own feet and fell backwards into a tub at her house.  She reports that she has been able to stand and ambulate since that given time.  She does complain of pain over her left hip and left lateral thigh.  She initially was walking without the assistance of her walker but within the last day began to ask for her walker due to increasing soreness over the left lateral hip and thigh.  Because of this the  became concerned and has brought the patient in for further evaluation.  The  reports that over 2 weeks ago she began to complain of mild pain just superior to her left knee.  She has a previous history of left knee replacement.  When she began to complain of this pain she did not have any preceding trauma.  She denies striking her head or having loss of consciousness with this recent fall.  She denies neck or midline back pain.  No posterior thoracic pain or anterior chest pain.  No abdominal pain.  She denies fever, bodies, or chills.  No cough or shortness of breath.  No definitive sick contacts.  No change in bowel or urinary function.  No new medications.  She is awake and alert with normal mentation and answers all questions appropriate.  She has not taken any medication besides over-the-counter medication to help with pain.  No other acute complaints.        Review of Systems   Constitutional: Negative for chills, fatigue and fever.   HENT: Negative for congestion, ear pain, postnasal drip, sinus pressure and sore throat.    Eyes: Negative for pain, redness and visual disturbance.   Respiratory: Negative for cough, chest tightness and shortness of breath.    Cardiovascular: Negative for chest pain, palpitations and leg swelling.   Gastrointestinal: Negative for abdominal pain, anal  bleeding, blood in stool, diarrhea, nausea and vomiting.   Endocrine: Negative for polydipsia and polyuria.   Genitourinary: Negative for difficulty urinating, dysuria, frequency and urgency.   Musculoskeletal: Positive for arthralgias and myalgias. Negative for back pain and neck pain.   Skin: Negative for pallor and rash.   Allergic/Immunologic: Negative for environmental allergies and immunocompromised state.   Neurological: Negative for dizziness, weakness and headaches.   Hematological: Negative for adenopathy.   Psychiatric/Behavioral: Negative for confusion, self-injury and suicidal ideas. The patient is not nervous/anxious.    All other systems reviewed and are negative.      Past Medical History:   Diagnosis Date   • Anemia    • Arthritis    • Asthma    • Cataract    • Difficulty breathing     Chronic   • GERD (gastroesophageal reflux disease)    • Graves' ophthalmopathy    • Hoarseness    • Hypertension    • Hypertensive heart disease with acute on chronic diastolic congestive heart failure (HCC) 10/11/2021   • Pulmonary hypertension (HCC) 10/11/2021   • Spondylolisthesis of cervical region    • Vitamin B12 deficiency        Allergies   Allergen Reactions   • Penicillins Other (See Comments)     CHILDHOOD ALLERGY         Past Surgical History:   Procedure Laterality Date   • CERVICAL LAMINECTOMY     • CHOLECYSTECTOMY     • OTHER SURGICAL HISTORY Right     Neuroplasty Median Nerve at Carpal Tunnel   • THYROID SURGERY     • TOTAL KNEE ARTHROPLASTY Left 09/29/2014    Dr Colon       Family History   Problem Relation Age of Onset   • Hypertension Mother    • Other Father         cardiac disorder   • Diabetes Father    • Heart disease Father    • Hypertension Sister    • Colon cancer Neg Hx    • Colon polyps Neg Hx    • Esophageal cancer Neg Hx        Social History     Socioeconomic History   • Marital status:    Tobacco Use   • Smoking status: Never   • Smokeless tobacco: Never   Vaping Use   •  Vaping Use: Never used   Substance and Sexual Activity   • Alcohol use: No   • Drug use: No   • Sexual activity: Defer           Objective   Physical Exam  Vitals and nursing note reviewed.   Constitutional:       General: She is not in acute distress.     Appearance: Normal appearance. She is well-developed. She is not toxic-appearing or diaphoretic.   HENT:      Head: Normocephalic and atraumatic.      Right Ear: External ear normal.      Left Ear: External ear normal.      Nose: Nose normal.   Eyes:      General: Lids are normal.      Pupils: Pupils are equal, round, and reactive to light.   Neck:      Trachea: No tracheal deviation.   Cardiovascular:      Rate and Rhythm: Normal rate and regular rhythm.      Pulses: No decreased pulses.      Heart sounds: Normal heart sounds. No murmur heard.    No friction rub. No gallop.   Pulmonary:      Effort: Pulmonary effort is normal. No respiratory distress.      Breath sounds: Normal breath sounds. No decreased breath sounds, wheezing, rhonchi or rales.   Abdominal:      General: Bowel sounds are normal.      Palpations: Abdomen is soft.      Tenderness: There is no abdominal tenderness. There is no guarding or rebound.   Musculoskeletal:         General: No deformity. Normal range of motion.      Cervical back: Normal range of motion and neck supple.        Back:         Legs:    Lymphadenopathy:      Cervical: No cervical adenopathy.   Skin:     General: Skin is warm and dry.      Findings: No rash.   Neurological:      Mental Status: She is alert and oriented to person, place, and time.      Cranial Nerves: No cranial nerve deficit.      Sensory: No sensory deficit.   Psychiatric:         Speech: Speech normal.         Behavior: Behavior normal.         Thought Content: Thought content normal.         Judgment: Judgment normal.         Procedures           ED Course                                   [facility] reviewed by Kendrick Sagastume MD       MDM  Number of  Diagnoses or Management Options  Contusion of left hip and thigh, initial encounter: new and requires workup  Diagnosis management comments: No acute injury identified on CT scan of the pelvis.  The patient is able to stand and ambulate here in the ER.  She reports increasing pain which I feel is secondary to soreness.  She already takes Lortab at home.  She will be advised to apply ice to help with pain.    Follow-up with primary care physician for recheck in 1 week and return to the ER with any further concern.       Amount and/or Complexity of Data Reviewed  Tests in the radiology section of CPT®: ordered and reviewed  Review and summarize past medical records: yes  Independent visualization of images, tracings, or specimens: yes        Final diagnoses:   Contusion of left hip and thigh, initial encounter       ED Disposition  ED Disposition     ED Disposition   Discharge    Condition   Stable    Comment   --             Arnoldo Oneil MD  [id number]  [place] [id number] [phone]    In 1 week           Medication List      No changes were made to your prescriptions during this visit.          Kendrick Sagastume MD  10/08/22 3404

## 2022-10-08 NOTE — DISCHARGE INSTRUCTIONS
By ice to the left hip.    Ambulate on a regular basis and use walker as needed to assist in ambulation.    Follow-up with primary care physician for recheck in 1 week.

## 2022-10-13 ENCOUNTER — PATIENT OUTREACH (OUTPATIENT)
Dept: CASE MANAGEMENT | Facility: OTHER | Age: 87
End: 2022-10-13

## 2022-10-13 NOTE — OUTREACH NOTE
"AMBULATORY CASE MANAGEMENT NOTE    Name and Relationship of Patient/Support Person: Matt Nava - Emergency Contact    Patient Outreach    Spouse (on QUENTIN)  returned RN-ACM call from message left previously.  Role of Ambulatory Nurse  explained.  Was contacting regarding [facility] ED visit 10/8/22 with chief c/o listed as fall.  Spouse states she is sleeping at present, but she is doing OK.  \"Has bruises and is sore,\" but has been up and about using her walker. She has regular appt with her PCP on 10/24/22. He states she has had no other recent falls and \"just got her feet tangled.\"  He reports that he monitors her bp, wt and pulse O2 every day, helps manage her medicines and reports she is compliant.  Has compression stockings, but usually cannot get her to wear. She wears O2 at night and states her pulse O2 has been running betw %. Discussed AWV. Also discussed living will, which he does think she has one and is on file at [facility], however \"probably needs updating.\"  Explained [facility] hotline and encouraged to  living will packet at PCP office. At this time, Mr. Nava states he needs to go for an appointment. Was able to explain [facility] 24/7 Nurse Call Center and deferred to AVS for #.  Encouraged to call RN-ACM for any future needs.      Adult Patient Profile  Questions/Answers    Flowsheet Row Most Recent Value   Hearing Difficulty or Deaf yes   Hearing Management  states she has hearing aids, but does not wear   Wear Glasses or Blind yes   Vision Management wears glasses   Concentrating, Remembering or Making Decisions Difficulty yes   Walking or Climbing Stairs Difficulty yes   Walking or Climbing Stairs ambulation difficulty, requires equipment   Doing Errands Independently Difficulty (such as shopping) yes   Equipment Currently Used at Home bp cuff, grab bar, scales, oxygen, walker, rolling   People in Home spouse   Current Living Arrangements home          Social Work " Assessment  Questions/Answers    Flowsheet Row Most Recent Value   People in Home spouse   Current Living Arrangements home   Primary Care Provided by self, spouse/significant other   Quality of Family Relationships supportive   Equipment Currently Used at Home bp cuff, grab bar, scales, oxygen, walker, rolling          Send Education  Questions/Answers    Flowsheet Row Most Recent Value   Annual Wellness Visit:  Patient Will Schedule   Other Patient Education/Resources  24/7 [facility] Nurse Call Line, Advanced Care Planning   24/7 Nurse Call Line Education Method Verbal   ACP Education Method Verbal   Advanced Directives: --  [spouse believes she has living will.  ACP hotline explained ]          SDOH updated and reviewed with the patient during this program:  Financial Resource Strain: Low Risk    • Difficulty of Paying Living Expenses: Not hard at all      Food Insecurity: No Food Insecurity   • Worried About Running Out of Food in the Last Year: Never true   • Ran Out of Food in the Last Year: Never true      Transportation Needs: No Transportation Needs   • Lack of Transportation (Medical): No   • Lack of Transportation (Non-Medical): No      Housing Stability: Low Risk    • Unable to Pay for Housing in the Last Year: No   • Number of Places Lived in the Last Year: 1   • Unstable Housing in the Last Year: No       KIERSTEN FIERRO  Ambulatory Case Management    10/13/2022, 11:01 EDT

## 2022-10-24 ENCOUNTER — LAB (OUTPATIENT)
Dept: LAB | Facility: HOSPITAL | Age: 87
End: 2022-10-24

## 2022-10-24 ENCOUNTER — OFFICE VISIT (OUTPATIENT)
Dept: INTERNAL MEDICINE | Facility: CLINIC | Age: 87
End: 2022-10-24

## 2022-10-24 VITALS
OXYGEN SATURATION: 92 % | DIASTOLIC BLOOD PRESSURE: 70 MMHG | HEART RATE: 73 BPM | HEIGHT: 58 IN | TEMPERATURE: 96.9 F | WEIGHT: 145 LBS | SYSTOLIC BLOOD PRESSURE: 110 MMHG | BODY MASS INDEX: 30.44 KG/M2

## 2022-10-24 DIAGNOSIS — G62.9 NEUROPATHY: ICD-10-CM

## 2022-10-24 DIAGNOSIS — E03.8 OTHER SPECIFIED HYPOTHYROIDISM: ICD-10-CM

## 2022-10-24 DIAGNOSIS — I10 PRIMARY HYPERTENSION: Primary | ICD-10-CM

## 2022-10-24 DIAGNOSIS — E78.49 OTHER HYPERLIPIDEMIA: ICD-10-CM

## 2022-10-24 PROCEDURE — 80053 COMPREHEN METABOLIC PANEL: CPT | Performed by: INTERNAL MEDICINE

## 2022-10-24 PROCEDURE — 84443 ASSAY THYROID STIM HORMONE: CPT | Performed by: INTERNAL MEDICINE

## 2022-10-24 PROCEDURE — 99214 OFFICE O/P EST MOD 30 MIN: CPT | Performed by: INTERNAL MEDICINE

## 2022-10-24 PROCEDURE — 85027 COMPLETE CBC AUTOMATED: CPT | Performed by: INTERNAL MEDICINE

## 2022-10-24 NOTE — PROGRESS NOTES
Patient is a 89 y.o. female who is here for a follow up of hyperlipidemia,hypertension and hypothyroidism.  Chief Complaint   Patient presents with   • Hyperlipidemia   • Hypertension   • Hypothyroidism         HPI:    Here for mgmt of HTN and hypothyroid.  Still gets episodes of LE edema.  Energy level is not too bad.  No dizziness or lightheadedness.  Trying to elevate her legs.  No abdominal pains.  Appetite is good.  No HAs.      History:     Patient Active Problem List   Diagnosis   • Allergic rhinitis   • Hyperlipidemia   • Hypertension   • Hypocalcemia   • Hypothyroidism   • Neuropathy   • NUD (nonulcer dyspepsia)   • Painful knee   • Pernicious anemia   • Tremor   • Vitamin B12 deficiency   • Spondylolisthesis of cervical region   • Graves' ophthalmopathy   • Anemia   • Memory impairment of gradual onset   • Ascending aortic aneurysm ([facility])   • Stage 3 chronic kidney disease ([facility])   • Pulmonary hypertension ([facility])   • Chronic heart failure with preserved ejection fraction ([facility])   • CHF (congestive heart failure), NYHA class I, acute on chronic, diastolic ([facility])   • CHF (congestive heart failure) ([facility])   • Non-rheumatic aortic stenosis   • Non-rheumatic aortic regurgitation   • Hypokalemia   • Carpal tunnel syndrome   • Essential tremor   • Gastroesophageal reflux disease   • Nausea and vomiting   • Skin sensation disturbance   • Spinal cord disease ([facility])   • Urge incontinence of urine   • Urinary urgency   • Acute chest pain       Past Medical History:   Diagnosis Date   • Anemia    • Arthritis    • Asthma    • Cataract    • Difficulty breathing     Chronic   • GERD (gastroesophageal reflux disease)    • Graves' ophthalmopathy    • Hoarseness    • Hypertension    • Hypertensive heart disease with acute on chronic diastolic congestive heart failure ([facility]) 10/11/2021   • Pulmonary hypertension ([facility]) 10/11/2021   • Spondylolisthesis of cervical region    • Vitamin B12 deficiency        Past Surgical History:    Procedure Laterality Date   • CERVICAL LAMINECTOMY     • CHOLECYSTECTOMY     • OTHER SURGICAL HISTORY Right     Neuroplasty Median Nerve at Carpal Tunnel   • THYROID SURGERY     • TOTAL KNEE ARTHROPLASTY Left 09/29/2014    Dr Colon       Current Outpatient Medications on File Prior to Visit   Medication Sig   • amLODIPine (NORVASC) 5 MG tablet Take 5 mg by mouth Daily.   • aspirin 81 MG EC tablet Take 81 mg by mouth Daily.   • cetirizine (zyrTEC) 10 MG tablet Take 10 mg by mouth Daily.   • estradiol (ESTRACE) 0.1 MG/GM vaginal cream Insert 1 applicator into the vagina Daily.   • famotidine (PEPCID) 20 MG tablet TAKE ONE TABLET BY MOUTH TWICE A DAY   • fluticasone (FLONASE) 50 MCG/ACT nasal spray 2 sprays by Each Nare route Daily.   • furosemide (Lasix) 20 MG tablet Lasix 20 mg 1 tab daily alternating 40 mg every other day. (Patient taking differently: Take 1 tablet by mouth Daily. Lasix 20 mg 1 tab daily alternating 40 mg every other day.)   • HYDROcodone-acetaminophen (NORCO) 7.5-325 MG per tablet Take 1 tablet by mouth Every 8 (Eight) Hours.   • levothyroxine (SYNTHROID, LEVOTHROID) 125 MCG tablet Take 1 tablet by mouth Every Morning.   • Lyrica 150 MG capsule Take 150 mg by mouth 2 (Two) Times a Day.   • ondansetron (ZOFRAN) 4 MG tablet Take 1 tablet by mouth Every 8 (Eight) Hours As Needed for nausea or vomiting.   • potassium chloride ER (K-TAB) 20 MEQ tablet controlled-release ER tablet Take 1 tablet by mouth Every Other Day. Take on days you take lasix (furosemide)   • propranolol (INDERAL) 40 MG tablet Take 1 tablet by mouth 2 (Two) Times a Day.   • rivastigmine (Exelon) 4.6 MG/24HR patch Place 1 patch on the skin as directed by provider Daily.   • sodium chloride 0.65 % nasal spray 2 sprays into the nostril(s) as directed by provider As Needed for Congestion.   • O2 (OXYGEN) Inhale 2 L/min At Night As Needed.     No current facility-administered medications on file prior to visit.       Family  "History   Problem Relation Age of Onset   • Hypertension Mother    • Other Father         cardiac disorder   • Diabetes Father    • Heart disease Father    • Hypertension Sister    • Colon cancer Neg Hx    • Colon polyps Neg Hx    • Esophageal cancer Neg Hx        Social History     Socioeconomic History   • Marital status:    Tobacco Use   • Smoking status: Never   • Smokeless tobacco: Never   Vaping Use   • Vaping Use: Never used   Substance and Sexual Activity   • Alcohol use: No   • Drug use: No   • Sexual activity: Defer         Review of Systems   Constitutional: Positive for fatigue. Negative for chills, diaphoresis, fever and unexpected weight change.   HENT: Positive for hearing loss. Negative for congestion, ear pain, nosebleeds, postnasal drip, sinus pressure and sore throat.    Eyes: Negative for pain, discharge and itching.   Respiratory: Negative for cough, chest tightness, shortness of breath and wheezing.    Cardiovascular: Positive for leg swelling. Negative for chest pain and palpitations.   Gastrointestinal: Negative for abdominal distention, abdominal pain, blood in stool, constipation, diarrhea, nausea and vomiting.   Endocrine: Positive for cold intolerance. Negative for polydipsia and polyuria.   Genitourinary: Negative for difficulty urinating, dysuria, frequency and hematuria.   Musculoskeletal: Positive for arthralgias, back pain and gait problem. Negative for joint swelling and myalgias.   Skin: Negative for rash and wound.   Neurological: Positive for tremors, weakness and numbness. Negative for syncope and headaches.   Psychiatric/Behavioral: Positive for decreased concentration. Negative for dysphoric mood and sleep disturbance. The patient is not nervous/anxious.        /70   Pulse 73   Temp 96.9 °F (36.1 °C) (Infrared)   Ht 147.3 cm (57.99\")   Wt 65.8 kg (145 lb)   LMP  (LMP Unknown)   SpO2 92%   BMI 30.31 kg/m²       Physical Exam  Constitutional:       " Appearance: Normal appearance. She is well-developed.   HENT:      Head: Normocephalic and atraumatic.      Right Ear: External ear normal.      Left Ear: External ear normal.      Nose: Nose normal.      Mouth/Throat:      Mouth: Mucous membranes are moist.      Pharynx: Oropharynx is clear.   Eyes:      Extraocular Movements: Extraocular movements intact.      Conjunctiva/sclera: Conjunctivae normal.      Pupils: Pupils are equal, round, and reactive to light.   Cardiovascular:      Rate and Rhythm: Normal rate and regular rhythm.      Heart sounds: Normal heart sounds.   Pulmonary:      Effort: Pulmonary effort is normal.      Breath sounds: Normal breath sounds.   Abdominal:      General: Bowel sounds are normal.      Palpations: Abdomen is soft.   Musculoskeletal:         General: Normal range of motion.      Cervical back: Normal range of motion and neck supple.      Right lower leg: Edema present.      Left lower leg: Edema present.   Lymphadenopathy:      Cervical: No cervical adenopathy.   Skin:     General: Skin is warm and dry.   Neurological:      General: No focal deficit present.      Mental Status: She is alert and oriented to person, place, and time.   Psychiatric:         Mood and Affect: Mood normal.         Behavior: Behavior normal.         Thought Content: Thought content normal.         Procedure:      Discussion/Summary:    htn-stable on BB at 1/2 bid  rhinitis-flonase and zyrtec continuation, stable  tremor-cont BB, not an issue  hypothyroid- recheck today, noted , no change in dose  Dyspepsia-cont pepcid   neuropathy-cont lyrica at bid  djd-cont prn lortab, rare use  b12 def-cont b12, level at goal  hyperlipidemia-labs on rtc, cont diet control  Diverticulosis-advised citrucel qd, asymptomatic  Memory impairment-start Exelon  Ascending Aortic aneurysm-recheck 7/2 noted  CKD-avoid NSAIDs, recheck worsened, advised increase fluids         10/24 labs noted and dw patient, advised NSAIDs  avoidance and increase fluids    Current Outpatient Medications:   •  amLODIPine (NORVASC) 5 MG tablet, Take 5 mg by mouth Daily., Disp: , Rfl:   •  aspirin 81 MG EC tablet, Take 81 mg by mouth Daily., Disp: , Rfl:   •  cetirizine (zyrTEC) 10 MG tablet, Take 10 mg by mouth Daily., Disp: , Rfl:   •  estradiol (ESTRACE) 0.1 MG/GM vaginal cream, Insert 1 applicator into the vagina Daily., Disp: , Rfl:   •  famotidine (PEPCID) 20 MG tablet, TAKE ONE TABLET BY MOUTH TWICE A DAY, Disp: 180 tablet, Rfl: 3  •  fluticasone (FLONASE) 50 MCG/ACT nasal spray, 2 sprays by Each Nare route Daily., Disp: 18.2 mL, Rfl: 0  •  furosemide (Lasix) 20 MG tablet, Lasix 20 mg 1 tab daily alternating 40 mg every other day. (Patient taking differently: Take 1 tablet by mouth Daily. Lasix 20 mg 1 tab daily alternating 40 mg every other day.), Disp: 60 tablet, Rfl: 0  •  HYDROcodone-acetaminophen (NORCO) 7.5-325 MG per tablet, Take 1 tablet by mouth Every 8 (Eight) Hours., Disp: , Rfl:   •  levothyroxine (SYNTHROID, LEVOTHROID) 125 MCG tablet, Take 1 tablet by mouth Every Morning., Disp: 90 tablet, Rfl: 1  •  Lyrica 150 MG capsule, Take 150 mg by mouth 2 (Two) Times a Day., Disp: , Rfl:   •  ondansetron (ZOFRAN) 4 MG tablet, Take 1 tablet by mouth Every 8 (Eight) Hours As Needed for nausea or vomiting., Disp: 30 tablet, Rfl: 0  •  potassium chloride ER (K-TAB) 20 MEQ tablet controlled-release ER tablet, Take 1 tablet by mouth Every Other Day. Take on days you take lasix (furosemide), Disp: 30 tablet, Rfl: 0  •  propranolol (INDERAL) 40 MG tablet, Take 1 tablet by mouth 2 (Two) Times a Day., Disp: 60 tablet, Rfl: 0  •  rivastigmine (Exelon) 4.6 MG/24HR patch, Place 1 patch on the skin as directed by provider Daily., Disp: 30 each, Rfl: 5  •  sodium chloride 0.65 % nasal spray, 2 sprays into the nostril(s) as directed by provider As Needed for Congestion., Disp: 60 mL, Rfl: 0  •  O2 (OXYGEN), Inhale 2 L/min At Night As Needed., Disp: , Rfl:          Diagnoses and all orders for this visit:    1. Primary hypertension (Primary)  -     CBC (No Diff)  -     Comprehensive Metabolic Panel    2. Other specified hypothyroidism  -     TSH    3. Other hyperlipidemia    4. Neuropathy

## 2022-10-25 LAB
ALBUMIN SERPL-MCNC: 3.8 G/DL (ref 3.5–5.2)
ALBUMIN/GLOB SERPL: 1.5 G/DL
ALP SERPL-CCNC: 102 U/L (ref 39–117)
ALT SERPL W P-5'-P-CCNC: 12 U/L (ref 1–33)
ANION GAP SERPL CALCULATED.3IONS-SCNC: 11 MMOL/L (ref 5–15)
AST SERPL-CCNC: 19 U/L (ref 1–32)
BILIRUB SERPL-MCNC: <0.2 MG/DL (ref 0–1.2)
BUN SERPL-MCNC: 20 MG/DL (ref 8–23)
BUN/CREAT SERPL: 11.9 (ref 7–25)
CALCIUM SPEC-SCNC: 8.3 MG/DL (ref 8.6–10.5)
CHLORIDE SERPL-SCNC: 101 MMOL/L (ref 98–107)
CO2 SERPL-SCNC: 27 MMOL/L (ref 22–29)
CREAT SERPL-MCNC: 1.68 MG/DL (ref 0.57–1)
DEPRECATED RDW RBC AUTO: 43.6 FL (ref 37–54)
EGFRCR SERPLBLD CKD-EPI 2021: 29 ML/MIN/1.73
ERYTHROCYTE [DISTWIDTH] IN BLOOD BY AUTOMATED COUNT: 13.3 % (ref 12.3–15.4)
GLOBULIN UR ELPH-MCNC: 2.5 GM/DL
GLUCOSE SERPL-MCNC: 90 MG/DL (ref 65–99)
HCT VFR BLD AUTO: 33.1 % (ref 34–46.6)
HGB BLD-MCNC: 10.7 G/DL (ref 12–15.9)
MCH RBC QN AUTO: 29.2 PG (ref 26.6–33)
MCHC RBC AUTO-ENTMCNC: 32.3 G/DL (ref 31.5–35.7)
MCV RBC AUTO: 90.2 FL (ref 79–97)
PLATELET # BLD AUTO: 269 10*3/MM3 (ref 140–450)
PMV BLD AUTO: 11.3 FL (ref 6–12)
POTASSIUM SERPL-SCNC: 4.3 MMOL/L (ref 3.5–5.2)
PROT SERPL-MCNC: 6.3 G/DL (ref 6–8.5)
RBC # BLD AUTO: 3.67 10*6/MM3 (ref 3.77–5.28)
SODIUM SERPL-SCNC: 139 MMOL/L (ref 136–145)
TSH SERPL DL<=0.05 MIU/L-ACNC: 6.39 UIU/ML (ref 0.27–4.2)
WBC NRBC COR # BLD: 8.37 10*3/MM3 (ref 3.4–10.8)

## 2022-11-02 RX ORDER — POTASSIUM CHLORIDE 1500 MG/1
TABLET, FILM COATED, EXTENDED RELEASE ORAL
Qty: 30 TABLET | Refills: 11 | Status: SHIPPED | OUTPATIENT
Start: 2022-11-02 | End: 2023-04-04

## 2022-11-04 ENCOUNTER — TELEPHONE (OUTPATIENT)
Dept: INTERNAL MEDICINE | Facility: CLINIC | Age: 87
End: 2022-11-04

## 2022-11-04 DIAGNOSIS — R26.81 GAIT INSTABILITY: Primary | ICD-10-CM

## 2022-11-04 NOTE — TELEPHONE ENCOUNTER
Caller: Matt Nava    Relationship: Emergency Contact    Best call back number:    [phone]        What is the best time to reach you: ASAP  Who are you requesting to speak with (clinical staff, provider,  specific staff member): CLINICAL    What was the call regarding: WIFE A FEW WEEKS AGO IN THE TUB, FELL YESTERDAY, WANTS TO KNOW WHAT THEY NEED TO DO , SHOULD SHE DO THERAPY OR GET XRAYS, PLEASE CALL BACKDo you require a callback:YES    APPOINTMENT WAS OFFERED BUT DECLINED    Called and will arrange appt for PT

## 2022-11-09 ENCOUNTER — TELEPHONE (OUTPATIENT)
Dept: INTERNAL MEDICINE | Facility: CLINIC | Age: 87
End: 2022-11-09

## 2022-11-09 NOTE — TELEPHONE ENCOUNTER
Caller: HOME HEALTH    Best call back number: [phone]    What is the best time to reach you: ANYTIME     Who are you requesting to speak with (clinical staff, provider,  specific staff member): CLINICAL STAFF     What was the call regarding:  [facility] IS CALLING TO LET THE PROVIDER KNOW THAT THE PATIENT HAS BEGUN HOME PHYSICAL THERAPY. THEY WILL WORK ON STRENGTH, GATE TRAINING, AND FALL PREVENTION THROUGHOUT THE CERTIFICATION PERIOD.     Do you require a callback: NO

## 2022-11-28 DIAGNOSIS — E03.8 OTHER SPECIFIED HYPOTHYROIDISM: ICD-10-CM

## 2022-11-29 RX ORDER — LEVOTHYROXINE SODIUM 0.12 MG/1
TABLET ORAL
Qty: 90 TABLET | Refills: 1 | Status: ON HOLD | OUTPATIENT
Start: 2022-11-29

## 2022-12-02 ENCOUNTER — TELEPHONE (OUTPATIENT)
Dept: INTERNAL MEDICINE | Facility: CLINIC | Age: 87
End: 2022-12-02

## 2022-12-02 NOTE — TELEPHONE ENCOUNTER
Caller: CHRIS LUCIA [facility]    Relationship: Other    Best call back number: [phone] DOES HAVE A SECURE VOICEMAIL    What orders are you requesting (i.e. lab or imaging): OCCUPATIONAL THERAPY   EVALUATION NEXT WEEK AND THEN WILL CALL LATER WITH THE FREQUENCY     In what timeframe would the patient need to come in: STARTING NEXT WEEK    Where will you receive your lab/imaging services: AT HOME    Additional notes: PLEASE ADVISE WITH VERBAL ORDERS

## 2022-12-08 ENCOUNTER — TELEPHONE (OUTPATIENT)
Dept: INTERNAL MEDICINE | Facility: CLINIC | Age: 87
End: 2022-12-08

## 2022-12-22 ENCOUNTER — OFFICE VISIT (OUTPATIENT)
Dept: CARDIOLOGY | Facility: HOSPITAL | Age: 87
End: 2022-12-22

## 2022-12-22 VITALS
DIASTOLIC BLOOD PRESSURE: 97 MMHG | RESPIRATION RATE: 18 BRPM | OXYGEN SATURATION: 97 % | WEIGHT: 148.4 LBS | BODY MASS INDEX: 31.15 KG/M2 | SYSTOLIC BLOOD PRESSURE: 145 MMHG | TEMPERATURE: 96.7 F | HEART RATE: 95 BPM | HEIGHT: 58 IN

## 2022-12-22 DIAGNOSIS — I27.20 PULMONARY HYPERTENSION: ICD-10-CM

## 2022-12-22 DIAGNOSIS — I10 ESSENTIAL HYPERTENSION: ICD-10-CM

## 2022-12-22 DIAGNOSIS — I49.9 IRREGULAR HEART RATE: ICD-10-CM

## 2022-12-22 DIAGNOSIS — I50.32 CHRONIC HEART FAILURE WITH PRESERVED EJECTION FRACTION: Primary | ICD-10-CM

## 2022-12-22 DIAGNOSIS — N18.30 STAGE 3 CHRONIC KIDNEY DISEASE, UNSPECIFIED WHETHER STAGE 3A OR 3B CKD: ICD-10-CM

## 2022-12-22 DIAGNOSIS — R60.0 EXTREMITY EDEMA: ICD-10-CM

## 2022-12-22 DIAGNOSIS — I38 VHD (VALVULAR HEART DISEASE): ICD-10-CM

## 2022-12-22 LAB
ANION GAP SERPL CALCULATED.3IONS-SCNC: 10.9 MMOL/L (ref 5–15)
BUN SERPL-MCNC: 21 MG/DL (ref 8–23)
BUN/CREAT SERPL: 16.4 (ref 7–25)
CALCIUM SPEC-SCNC: 8.7 MG/DL (ref 8.6–10.5)
CHLORIDE SERPL-SCNC: 106 MMOL/L (ref 98–107)
CO2 SERPL-SCNC: 23.1 MMOL/L (ref 22–29)
CREAT SERPL-MCNC: 1.28 MG/DL (ref 0.57–1)
EGFRCR SERPLBLD CKD-EPI 2021: 40.1 ML/MIN/1.73
GLUCOSE SERPL-MCNC: 105 MG/DL (ref 65–99)
POTASSIUM SERPL-SCNC: 4.7 MMOL/L (ref 3.5–5.2)
SODIUM SERPL-SCNC: 140 MMOL/L (ref 136–145)

## 2022-12-22 PROCEDURE — 80048 BASIC METABOLIC PNL TOTAL CA: CPT | Performed by: NURSE PRACTITIONER

## 2022-12-22 PROCEDURE — 99214 OFFICE O/P EST MOD 30 MIN: CPT | Performed by: NURSE PRACTITIONER

## 2022-12-22 RX ORDER — AMLODIPINE BESYLATE 5 MG/1
5 TABLET ORAL DAILY
Qty: 90 TABLET | Refills: 2 | Status: SHIPPED | OUTPATIENT
Start: 2022-12-22 | End: 2023-04-04

## 2022-12-22 NOTE — PROGRESS NOTES
"[facility]    Chief Complaint  Congestive Heart Failure and Follow-up    Subjective    History of Present Illness {  Problem List  Visit  Diagnosis   Encounters  Notes  Medications  Labs  Result Review Imaging  Media :23}     Zoila Nava presents to [facility] for   History of Present Illness     89-year-old female with heart failure with preserved EF, pulmonary hypertension, valvular heart disease, chronic kidney disease stage III, ascending aortic aneurysm, hypertension, hypothyroidism, GERD, anemia, dementia.    Pt had a fall last month.  Reports left hip soreness.  Working with PT in the home.   Weight has bee fairly stable.  Pt will take Lasix 20 mg daily.will take an extra 20 mg as needed (typical 2-3 times per week).  He has noted elevated HR since her fall.  No worsening dyspnea, cough, PND/orthopnea.  Edema is at baseline.  IMproved with elevation of feet and compression stockings. Pt is till taking BB.        Objective     Vital Signs:   Vitals:    12/22/22 1310   BP: 145/97   BP Location: Left arm   Patient Position: Sitting   Cuff Size: Adult   Pulse: 95   Resp: 18   Temp: 96.7 °F (35.9 °C)   TempSrc: Temporal   SpO2: 97%   Weight: 67.3 kg (148 lb 6.4 oz)   Height: 147.3 cm (58\")     Body mass index is 31.02 kg/m².  Physical Exam  Vitals reviewed.   Constitutional:       General: She is not in acute distress.     Appearance: Normal appearance.   Cardiovascular:      Rate and Rhythm: Normal rate and regular rhythm.      Pulses:           Radial pulses are 2+ on the right side.        Dorsalis pedis pulses are 2+ on the right side.        Posterior tibial pulses are 2+ on the right side.      Heart sounds: Normal heart sounds.   Pulmonary:      Effort: Pulmonary effort is normal.      Breath sounds: Normal breath sounds.   Musculoskeletal:      Right lower leg: Edema ( 1+ bilateral edema) present. "      Left lower leg: Edema present.   Skin:     General: Skin is warm and dry.   Neurological:      Mental Status: She is alert.   Psychiatric:         Mood and Affect: Mood normal.         Behavior: Behavior is cooperative.              Result Review  Data Reviewed:{ Labs  Result Review  Imaging  Med Tab  Media :23}     Holter 3/8/2022.  Duration 4 days.  Average heart rate 62 bpm.  Multiple SVT runs/longest 15 seconds.  3.7% PACs, 2.2% PVC burden.  No patient triggered events        Echocardiogram 10/11/2021: EF 73%, grade 1 diastolic dysfunction, moderate pulmonary hypertension with RVSP 45 to 55 mmHg, moderate AR, mild AS, moderate TR.    Lab Results   Component Value Date    WBC 8.37 10/24/2022    HGB 10.7 (L) 10/24/2022    HCT 33.1 (L) 10/24/2022    MCV 90.2 10/24/2022     10/24/2022     Lab Results   Component Value Date    GLUCOSE 90 10/24/2022    BUN 20 10/24/2022    CREATININE 1.68 (H) 10/24/2022    EGFRIFNONA 40 (L) 01/21/2022    BCR 11.9 10/24/2022    K 4.3 10/24/2022    CO2 27.0 10/24/2022    CALCIUM 8.3 (L) 10/24/2022    ALBUMIN 3.80 10/24/2022    AST 19 10/24/2022    ALT 12 10/24/2022     Lab Results   Component Value Date    TSH 6.390 (H) 10/24/2022                   Assessment and Plan {CC Problem List  Visit Diagnosis  ROS  Review (Popup)  Health Maintenance  Quality  BestPractice  Medications  SmartSets  SnapShot Encounters  Media :23}   1. Chronic heart failure with preserved ejection fraction (HCC)  Lasix    - Basic Metabolic Panel    2. Essential hypertension  Refill:  - amLODIPine (NORVASC) 5 MG tablet; Take 1 tablet by mouth Daily.  Dispense: 90 tablet; Refill: 2    A little elevated today.  Pt reports pain in left hip today.     3. Extremity edema  Compression stockings, elevation  lasix    4. Irregular heart rate  Hx of PAC/PVCs  Continue BB (essential tremors are better on propranolol then on metoprolol)    5. VHD (valvular heart disease)    - Basic Metabolic  Panel    6. Pulmonary hypertension (HCC)  stable    7. Stage 3 chronic kidney disease, unspecified whether stage 3a or 3b CKD (HCC)  elevated creatine 1.6 last month, repeat labs    - Basic Metabolic Panel          Follow Up {Instructions Charge Capture  Follow-up Communications :23}   Return in about 4 weeks (around 1/19/2023) for Office visit, HF.    Patient was given instructions and counseling regarding her condition or for health maintenance advice. Please see specific information pulled into the AVS if appropriate.  Patient was instructed to call the [facility] with any questions, concerns, or worsening symptoms.

## 2022-12-23 NOTE — PROGRESS NOTES
Your lab results have been reviewed.  Your kidney function is back to your baseline.  No changes in medications are needed at this time.  We will continue to monitor.

## 2023-01-01 ENCOUNTER — TELEPHONE (OUTPATIENT)
Dept: INTERNAL MEDICINE | Facility: CLINIC | Age: 88
End: 2023-01-01

## 2023-01-03 ENCOUNTER — OFFICE VISIT (OUTPATIENT)
Dept: INTERNAL MEDICINE | Facility: CLINIC | Age: 88
End: 2023-01-03
Payer: MEDICARE

## 2023-01-03 VITALS
DIASTOLIC BLOOD PRESSURE: 60 MMHG | OXYGEN SATURATION: 90 % | HEIGHT: 58 IN | WEIGHT: 146 LBS | HEART RATE: 91 BPM | BODY MASS INDEX: 30.64 KG/M2 | SYSTOLIC BLOOD PRESSURE: 120 MMHG

## 2023-01-03 DIAGNOSIS — E53.8 VITAMIN B12 DEFICIENCY: ICD-10-CM

## 2023-01-03 DIAGNOSIS — K21.9 GASTROESOPHAGEAL REFLUX DISEASE WITHOUT ESOPHAGITIS: ICD-10-CM

## 2023-01-03 DIAGNOSIS — N18.30 STAGE 3 CHRONIC KIDNEY DISEASE, UNSPECIFIED WHETHER STAGE 3A OR 3B CKD: ICD-10-CM

## 2023-01-03 DIAGNOSIS — R41.3 MEMORY IMPAIRMENT OF GRADUAL ONSET: ICD-10-CM

## 2023-01-03 DIAGNOSIS — Z00.00 ENCOUNTER FOR MEDICARE ANNUAL WELLNESS EXAM: ICD-10-CM

## 2023-01-03 DIAGNOSIS — E03.8 OTHER SPECIFIED HYPOTHYROIDISM: Primary | ICD-10-CM

## 2023-01-03 PROCEDURE — 1170F FXNL STATUS ASSESSED: CPT | Performed by: INTERNAL MEDICINE

## 2023-01-03 PROCEDURE — G0439 PPPS, SUBSEQ VISIT: HCPCS | Performed by: INTERNAL MEDICINE

## 2023-01-03 PROCEDURE — 1125F AMNT PAIN NOTED PAIN PRSNT: CPT | Performed by: INTERNAL MEDICINE

## 2023-01-03 PROCEDURE — 1160F RVW MEDS BY RX/DR IN RCRD: CPT | Performed by: INTERNAL MEDICINE

## 2023-01-03 NOTE — PROGRESS NOTES
The ABCs of the Annual Wellness Visit  Subsequent Medicare Wellness Visit    Chief Complaint   Patient presents with   • Annual Exam      Subjective    History of Present Illness:  Zoila Nava is a 89 y.o. female who presents for a Subsequent Medicare Wellness Visit.    The following portions of the patient's history were reviewed and   updated as appropriate: current medications, past family history, past medical history, past social history, past surgical history and problem list.     Compared to one year ago, the patient feels her physical   health is the same.    Compared to one year ago, the patient feels her mental   health is the same.    Recent Hospitalizations:  This patient has had a [facility] admission record on file within the last 365 days.    Current Medical Providers:  Patient Care Team:  Arnoldo Oneil MD as PCP - General (Internal Medicine)  Sourav Montes MD as Referring Physician (Neurology)  Richard Beltran MD as Consulting Physician (Gastroenterology)  Basil Richter MD as Consulting Physician (Cardiology)    Outpatient Medications Prior to Visit   Medication Sig Dispense Refill   • amLODIPine (NORVASC) 5 MG tablet Take 1 tablet by mouth Daily. 90 tablet 2   • aspirin 81 MG EC tablet Take 81 mg by mouth Daily.     • cetirizine (zyrTEC) 10 MG tablet Take 10 mg by mouth Daily.     • estradiol (ESTRACE) 0.1 MG/GM vaginal cream Insert 1 applicator into the vagina Daily.     • famotidine (PEPCID) 20 MG tablet TAKE ONE TABLET BY MOUTH TWICE A  tablet 3   • fluticasone (FLONASE) 50 MCG/ACT nasal spray 2 sprays by Each Nare route Daily. 18.2 mL 0   • furosemide (Lasix) 20 MG tablet Lasix 20 mg 1 tab daily alternating 40 mg every other day. (Patient taking differently: Take 20 mg by mouth Daily. Lasix 20 mg 1 tab daily alternating 40 mg every other day.) 60 tablet 0   • HYDROcodone-acetaminophen (NORCO) 7.5-325 MG per tablet Take 1 tablet by mouth Every 8 (Eight) Hours.      • levothyroxine (SYNTHROID, LEVOTHROID) 125 MCG tablet TAKE ONE TABLET BY MOUTH EVERY MORNING 90 tablet 1   • Lyrica 150 MG capsule Take 150 mg by mouth 2 (Two) Times a Day.     • ondansetron (ZOFRAN) 4 MG tablet Take 1 tablet by mouth Every 8 (Eight) Hours As Needed for nausea or vomiting. 30 tablet 0   • potassium chloride ER (K-TAB) 20 MEQ tablet controlled-release ER tablet TAKE ONE TABLET BY MOUTH EVERY OTHER DAY... TAKE ON THE DAYS YOU TAKE LASIX (FUROSEMIDE) 30 tablet 11   • propranolol (INDERAL) 40 MG tablet Take 1 tablet by mouth 2 (Two) Times a Day. 60 tablet 0   • rivastigmine (Exelon) 4.6 MG/24HR patch Place 1 patch on the skin as directed by provider Daily. 30 each 5   • sodium chloride 0.65 % nasal spray 2 sprays into the nostril(s) as directed by provider As Needed for Congestion. 60 mL 0   • O2 (OXYGEN) Inhale 2 L/min At Night As Needed.       No facility-administered medications prior to visit.       Opioid medication/s are on active medication list.  and I have evaluated her active treatment plan and pain score trends (see table).  Vitals:    01/03/23 1432   PainSc:   7     I have reviewed the chart for potential of high risk medication and harmful drug interactions in the elderly.            Aspirin is on active medication list. Aspirin use is indicated based on review of current medical condition/s. Pros and cons of this therapy have been discussed today. Benefits of this medication outweigh potential harm.  Patient has been encouraged to continue taking this medication.  .      Fall Risk Assessment was completed, and patient is at moderate risk for falls.      Patient Active Problem List   Diagnosis   • Allergic rhinitis   • Hyperlipidemia   • Hypertension   • Hypocalcemia   • Hypothyroidism   • Neuropathy   • NUD (nonulcer dyspepsia)   • Painful knee   • Pernicious anemia   • Tremor   • Vitamin B12 deficiency   • Spondylolisthesis of cervical region   • Graves' ophthalmopathy   • Anemia   •  Memory impairment of gradual onset   • Ascending aortic aneurysm ([facility])   • Stage 3 chronic kidney disease ([facility])   • Pulmonary hypertension ([facility])   • Chronic heart failure with preserved ejection fraction ([facility])   • CHF (congestive heart failure), NYHA class I, acute on chronic, diastolic (HCC)   • CHF (congestive heart failure) ([facility])   • Non-rheumatic aortic stenosis   • Non-rheumatic aortic regurgitation   • Hypokalemia   • Carpal tunnel syndrome   • Essential tremor   • Gastroesophageal reflux disease   • Nausea and vomiting   • Skin sensation disturbance   • Spinal cord disease ([facility])   • Urge incontinence of urine   • Urinary urgency   • Acute chest pain     Advance Care Planning   Advance Directive is not on file.  ACP discussion was held with the patient during this visit. Patient does not have an advance directive, information provided.    Review of Systems   Constitutional: Positive for fatigue. Negative for chills, diaphoresis, fever and unexpected weight change.   HENT: Positive for hearing loss. Negative for congestion, ear pain, nosebleeds, postnasal drip, sinus pressure and sore throat.    Eyes: Negative for pain, discharge and itching.   Respiratory: Negative for cough, chest tightness, shortness of breath and wheezing.    Cardiovascular: Positive for leg swelling. Negative for chest pain and palpitations.   Gastrointestinal: Negative for abdominal distention, abdominal pain, blood in stool, constipation, diarrhea, nausea and vomiting.   Endocrine: Positive for cold intolerance. Negative for polydipsia and polyuria.   Genitourinary: Negative for difficulty urinating, dysuria, frequency and hematuria.   Musculoskeletal: Positive for arthralgias, back pain and gait problem. Negative for joint swelling and myalgias.   Skin: Negative for rash and wound.   Neurological: Positive for tremors, weakness and numbness. Negative for syncope and headaches.   Psychiatric/Behavioral: Positive for decreased  concentration. Negative for dysphoric mood and sleep disturbance. The patient is not nervous/anxious.          Objective       Vitals:    01/03/23 1432 01/03/23 1501   BP: 124/72 120/60   BP Location: Left arm    Patient Position: Sitting    Pulse: 91    SpO2: 90%    Weight: 66.2 kg (146 lb)    Height: 147.3 cm (57.99\")    PainSc:   7      BMI Readings from Last 1 Encounters:   01/03/23 30.52 kg/m²   BMI is above normal parameters. Recommendations include: exercise counseling and nutrition counseling    Does the patient have evidence of cognitive impairment? No    Physical Exam  Constitutional:       Appearance: Normal appearance. She is well-developed.   HENT:      Head: Normocephalic and atraumatic.      Right Ear: External ear normal.      Left Ear: External ear normal.      Nose: Nose normal.      Mouth/Throat:      Mouth: Mucous membranes are moist.      Pharynx: Oropharynx is clear.   Eyes:      Extraocular Movements: Extraocular movements intact.      Conjunctiva/sclera: Conjunctivae normal.      Pupils: Pupils are equal, round, and reactive to light.   Cardiovascular:      Rate and Rhythm: Normal rate and regular rhythm.      Heart sounds: Normal heart sounds.   Pulmonary:      Effort: Pulmonary effort is normal.      Breath sounds: Normal breath sounds.   Abdominal:      General: Bowel sounds are normal.      Palpations: Abdomen is soft.   Musculoskeletal:         General: Normal range of motion.      Cervical back: Normal range of motion and neck supple.      Right lower leg: Edema present.      Left lower leg: Edema present.   Lymphadenopathy:      Cervical: No cervical adenopathy.   Skin:     General: Skin is warm and dry.   Neurological:      General: No focal deficit present.      Mental Status: She is alert and oriented to person, place, and time.   Psychiatric:         Mood and Affect: Mood normal.         Behavior: Behavior normal.         Thought Content: Thought content normal.                  HEALTH RISK ASSESSMENT    Smoking Status:  Social History     Tobacco Use   Smoking Status Never   Smokeless Tobacco Never     Alcohol Consumption:  Social History     Substance and Sexual Activity   Alcohol Use No     Fall Risk Screen:    STEADI Fall Risk Assessment was completed, and patient is at MODERATE risk for falls. Assessment completed on:1/3/2023    Depression Screening:  PHQ-2/PHQ-9 Depression Screening 1/3/2023   Retired Total Score -   Little Interest or Pleasure in Doing Things 0-->not at all   Feeling Down, Depressed or Hopeless 0-->not at all   PHQ-9: Brief Depression Severity Measure Score 0       Health Habits and Functional and Cognitive Screening:  Functional & Cognitive Status 1/3/2023   Do you have difficulty preparing food and eating? Yes   Do you have difficulty bathing yourself, getting dressed or grooming yourself? No   Do you have difficulty using the toilet? No   Do you have difficulty moving around from place to place? Yes   Do you have trouble with steps or getting out of a bed or a chair? No   Current Diet Other   Dental Exam Up to date   Eye Exam Up to date   Exercise (times per week) 0 times per week   Current Exercise Activities Include -   Do you need help using the phone?  No   Are you deaf or do you have serious difficulty hearing?  No   Do you need help with transportation? Yes   Do you need help shopping? Yes   Do you need help preparing meals?  Yes   Do you need help with housework?  Yes   Do you need help with laundry? No   Do you need help taking your medications? No   Do you need help managing money? No   Do you ever drive or ride in a car without wearing a seat belt? No   Have you felt unusual stress, anger or loneliness in the last month? No   Who do you live with? Spouse   If you need help, do you have trouble finding someone available to you? No   Have you been bothered in the last four weeks by sexual problems? No   Do you have difficulty concentrating,  remembering or making decisions? Yes       Age-appropriate Screening Schedule:  Refer to the list below for future screening recommendations based on patient's age, sex and/or medical conditions. Orders for these recommended tests are listed in the plan section. The patient has been provided with a written plan.    Health Maintenance   Topic Date Due   • DXA SCAN  Never done   • LIPID PANEL  04/04/2017   • ZOSTER VACCINE (3 of 3) 02/25/2021   • INFLUENZA VACCINE  08/01/2022   • TDAP/TD VACCINES (2 - Tdap) 08/24/2027              Assessment & Plan     CMS Preventative Services Quick Reference  Risk Factors Identified During Encounter  Immunizations Discussed/Encouraged: utd  Inactivity/Sedentary: Patient was advised to exercise at least 150 minutes a week per CDC recommendations.  Polypharmacy: Medication List reviewed and Medications are appropriate for patient  The above risks/problems have been discussed with the patient.  Follow up actions/plans if indicated are seen below in the Assessment/Plan Section.  Pertinent information has been shared with the patient in the After Visit Summary.    Diagnoses and all orders for this visit:    1. Other specified hypothyroidism (Primary)    2. Vitamin B12 deficiency    3. Gastroesophageal reflux disease without esophagitis    4. Stage 3 chronic kidney disease, unspecified whether stage 3a or 3b CKD (HCC)    5. Memory impairment of gradual onset    6. Encounter for Medicare annual wellness exam        Follow Up:   No follow-ups on file.     An After Visit Summary and PPPS were given to the patient.             htn-stable on BB at 1/2 bid  rhinitis-flonase and zyrtec continuation, stable  tremor-cont BB, not an issue  hypothyroid- recheck noted , no change in dose  Dyspepsia-cont pepcid   neuropathy-cont lyrica at bid  djd-cont prn lortab, rare use  b12 def-cont b12, level at goal  hyperlipidemia-labs on rtc, cont diet control  Diverticulosis-advised citrucel  qd, asymptomatic  Memory impairment-start Exelon  Ascending Aortic aneurysm-recheck 7/2 noted  CKD-avoid NSAIDs, recheck worsened, advised increase fluids         10/24 labs noted and dw patient, advised NSAIDs avoidance and increase fluids

## 2023-01-10 ENCOUNTER — OUTSIDE FACILITY SERVICE (OUTPATIENT)
Dept: INTERNAL MEDICINE | Facility: CLINIC | Age: 88
End: 2023-01-10
Payer: MEDICARE

## 2023-01-10 PROCEDURE — G0179 MD RECERTIFICATION HHA PT: HCPCS | Performed by: INTERNAL MEDICINE

## 2023-01-14 DIAGNOSIS — R41.3 MEMORY IMPAIRMENT OF GRADUAL ONSET: ICD-10-CM

## 2023-01-16 RX ORDER — RIVASTIGMINE 4.6 MG/24H
PATCH, EXTENDED RELEASE TRANSDERMAL
Qty: 30 PATCH | Refills: 6 | Status: SHIPPED | OUTPATIENT
Start: 2023-01-16

## 2023-01-19 ENCOUNTER — OFFICE VISIT (OUTPATIENT)
Dept: CARDIOLOGY | Facility: HOSPITAL | Age: 88
End: 2023-01-19
Payer: MEDICARE

## 2023-01-19 VITALS
TEMPERATURE: 96.3 F | WEIGHT: 148 LBS | OXYGEN SATURATION: 95 % | BODY MASS INDEX: 31.07 KG/M2 | HEIGHT: 58 IN | HEART RATE: 69 BPM | RESPIRATION RATE: 17 BRPM | SYSTOLIC BLOOD PRESSURE: 116 MMHG | DIASTOLIC BLOOD PRESSURE: 67 MMHG

## 2023-01-19 DIAGNOSIS — I49.9 IRREGULAR HEART RATE: ICD-10-CM

## 2023-01-19 DIAGNOSIS — I50.32 CHRONIC HEART FAILURE WITH PRESERVED EJECTION FRACTION: Primary | ICD-10-CM

## 2023-01-19 DIAGNOSIS — I10 ESSENTIAL HYPERTENSION: ICD-10-CM

## 2023-01-19 DIAGNOSIS — I38 VHD (VALVULAR HEART DISEASE): ICD-10-CM

## 2023-01-19 DIAGNOSIS — R60.0 EXTREMITY EDEMA: ICD-10-CM

## 2023-01-19 DIAGNOSIS — N18.30 STAGE 3 CHRONIC KIDNEY DISEASE, UNSPECIFIED WHETHER STAGE 3A OR 3B CKD: ICD-10-CM

## 2023-01-19 PROCEDURE — 99213 OFFICE O/P EST LOW 20 MIN: CPT | Performed by: NURSE PRACTITIONER

## 2023-01-19 NOTE — PROGRESS NOTES
"[facility], [facility]    Chief Complaint  Congestive Heart Failure and Follow-up    Subjective    History of Present Illness {  Problem List  Visit  Diagnosis   Encounters  Notes  Medications  Labs  Result Review Imaging  Media :23}     Zoila Nava presents to [facility] for   History of Present Illness     89-year-old female with heart failure with preserved EF, pulmonary hypertension, valvular heart disease, chronic kidney disease stage III, ascending aortic aneurysm, hypertension, hypothyroidism, GERD, anemia, dementia.  History of PACs and PVCs currently on propranolol, essential tremors.    Patient had a fall in November 2022 with left hip soreness.  Currently working with physical therapy at home.  Weight has been stable.  Takes Lasix 20 mg daily with an extra 20 mg as needed (typically 2-3 times per week).  No worsening dyspnea, cough, PND/orthopnea.  Edema at baseline.  Improved with elevation of feet and compression stockings.  Patient denies chest pain, pressure.  She is active in her home with use of walker.  No recent falls.    Objective     Vital Signs:   Vitals:    01/19/23 1340   BP: 116/67   BP Location: Right arm   Patient Position: Sitting   Cuff Size: Adult   Pulse: 69   Resp: 17   Temp: 96.3 °F (35.7 °C)   TempSrc: Temporal   SpO2: 95%   Weight: 67.1 kg (148 lb)   Height: 147.3 cm (57.99\")     Body mass index is 30.94 kg/m².  Physical Exam  Vitals reviewed.   Constitutional:       General: She is not in acute distress.     Appearance: Normal appearance.   Cardiovascular:      Rate and Rhythm: Normal rate and regular rhythm.      Pulses:           Radial pulses are 2+ on the right side and 2+ on the left side.        Posterior tibial pulses are 2+ on the right side and 2+ on the left side.      Heart sounds: Normal heart sounds.   Pulmonary:      Effort: Pulmonary effort is normal.      Breath sounds: " Normal breath sounds.   Musculoskeletal:      Right lower leg: No edema.      Left lower leg: No edema.   Skin:     General: Skin is warm and dry.   Neurological:      Mental Status: She is alert.   Psychiatric:         Mood and Affect: Mood normal.         Behavior: Behavior is cooperative.              Result Review  Data Reviewed:{ Labs  Result Review  Imaging  Med Tab  Media :23}   Holter 3/8/2022.  Duration 4 days.  Average heart rate 62 bpm.  Multiple SVT runs/longest 15 seconds.  3.7% PACs, 2.2% PVC burden.  No patient triggered events        Echocardiogram 10/11/2021: EF 73%, grade 1 diastolic dysfunction, moderate pulmonary hypertension with RVSP 45 to 55 mmHg, moderate AR, mild AS, moderate TR.    Lab Results   Component Value Date    GLUCOSE 105 (H) 12/22/2022    CALCIUM 8.7 12/22/2022     12/22/2022    K 4.7 12/22/2022    CO2 23.1 12/22/2022     12/22/2022    BUN 21 12/22/2022    CREATININE 1.28 (H) 12/22/2022    EGFRIFNONA 40 (L) 01/21/2022    BCR 16.4 12/22/2022    ANIONGAP 10.9 12/22/2022                   Assessment and Plan {CC Problem List  Visit Diagnosis  ROS  Review (Popup)  Health Maintenance  Quality  BestPractice  Medications  SmartSets  SnapShot Encounters  Media :23}   1. Chronic heart failure with preserved ejection fraction (HCC)  Continue Lasix as scheduled  Appears euvolemic  2. Essential hypertension  Continue amlodipine propranolol  Blood pressure control    3. Extremity edema  Baseline lower extremity edema.  Wearing compression stockings.  Continue to monitor.  Weight has been stable.    4. VHD (valvular heart disease)  No worsening heart failure signs and symptoms.    5. Stage 3 chronic kidney disease, unspecified whether stage 3a or 3b CKD (HCC)  Labs have been stable.    6. Irregular heart rate  No reported palpitations.  Currently on propranolol.  Heart rate controlled.      Follow-up with cardiology as scheduled.  Follow-up in the heart valve center  in 6 months or sooner if needed.          Follow Up {Instructions Charge Capture  Follow-up Communications :23}   Return in about 6 months (around 7/19/2023) for HF, Office visit.    Patient was given instructions and counseling regarding her condition or for health maintenance advice. Please see specific information pulled into the AVS if appropriate.  Patient was instructed to call the Heart and Valve Center with any questions, concerns, or worsening symptoms.

## 2023-01-25 DIAGNOSIS — I50.32 CHRONIC HEART FAILURE WITH PRESERVED EJECTION FRACTION: ICD-10-CM

## 2023-01-25 RX ORDER — FUROSEMIDE 20 MG/1
TABLET ORAL
Qty: 60 TABLET | Refills: 6 | Status: SHIPPED | OUTPATIENT
Start: 2023-01-25 | End: 2023-03-13 | Stop reason: SDUPTHER

## 2023-01-25 NOTE — TELEPHONE ENCOUNTER
Caller: Matt Nava    Relationship: Emergency Contact    Best call back number: [phone]    Requested Prescriptions:   Requested Prescriptions     Pending Prescriptions Disp Refills   • furosemide (Lasix) 20 MG tablet 60 tablet 0     Sig: Lasix 20 mg 1 tab daily alternating 40 mg every other day.        Pharmacy where request should be sent:  [facility] [phone]    Additional details provided by patient: SPOUSE STATES PATIENT HAD A 90 DAY SUPPLY OF MEDICATION HOWEVER PATIENT HAS ONLY USED 35 DAYS WORTH AND CAN NOT FIND THE BOTTLE WITH THE REST OF THE MEDICATION. PATIENT WILL BE OUT OF MEDICATION BY THIS WEEKEND.   Does the patient have less than a 3 day supply:  [x] Yes  [] No    Would you like a call back once the refill request has been completed: [x] Yes [] No    If the office needs to give you a call back, can they leave a voicemail: [x] Yes [] No    Gaviota Monterroso Rep   01/25/23 13:20 EST

## 2023-01-31 ENCOUNTER — OUTSIDE FACILITY SERVICE (OUTPATIENT)
Dept: INTERNAL MEDICINE | Facility: CLINIC | Age: 88
End: 2023-01-31
Payer: MEDICARE

## 2023-01-31 PROCEDURE — G0179 MD RECERTIFICATION HHA PT: HCPCS | Performed by: INTERNAL MEDICINE

## 2023-03-13 ENCOUNTER — TELEPHONE (OUTPATIENT)
Dept: INTERNAL MEDICINE | Facility: CLINIC | Age: 88
End: 2023-03-13
Payer: MEDICARE

## 2023-03-13 DIAGNOSIS — I50.32 CHRONIC HEART FAILURE WITH PRESERVED EJECTION FRACTION: ICD-10-CM

## 2023-03-13 RX ORDER — FUROSEMIDE 20 MG/1
TABLET ORAL
Qty: 60 TABLET | Refills: 6 | Status: SHIPPED | OUTPATIENT
Start: 2023-03-13 | End: 2023-03-31 | Stop reason: SDUPTHER

## 2023-03-13 NOTE — TELEPHONE ENCOUNTER
Caller: Matt Nava    Relationship: Emergency Contact    Best call back number:[phone]    What medications are you currently taking:   Current Outpatient Medications on File Prior to Visit   Medication Sig Dispense Refill   • amLODIPine (NORVASC) 5 MG tablet Take 1 tablet by mouth Daily. 90 tablet 2   • aspirin 81 MG EC tablet Take 81 mg by mouth Daily.     • cetirizine (zyrTEC) 10 MG tablet Take 10 mg by mouth Daily.     • estradiol (ESTRACE) 0.1 MG/GM vaginal cream Insert 1 applicator into the vagina Daily.     • famotidine (PEPCID) 20 MG tablet TAKE ONE TABLET BY MOUTH TWICE A  tablet 3   • fluticasone (FLONASE) 50 MCG/ACT nasal spray 2 sprays by Each Nare route Daily. 18.2 mL 0   • furosemide (Lasix) 20 MG tablet Lasix 20 mg 1 tab daily alternating 40 mg every other day. 60 tablet 6   • HYDROcodone-acetaminophen (NORCO) 7.5-325 MG per tablet Take 1 tablet by mouth Every 8 (Eight) Hours.     • levothyroxine (SYNTHROID, LEVOTHROID) 125 MCG tablet TAKE ONE TABLET BY MOUTH EVERY MORNING 90 tablet 1   • Lyrica 150 MG capsule Take 150 mg by mouth 2 (Two) Times a Day.     • O2 (OXYGEN) Inhale 2 L/min At Night As Needed.     • ondansetron (ZOFRAN) 4 MG tablet Take 1 tablet by mouth Every 8 (Eight) Hours As Needed for nausea or vomiting. 30 tablet 0   • potassium chloride ER (K-TAB) 20 MEQ tablet controlled-release ER tablet TAKE ONE TABLET BY MOUTH EVERY OTHER DAY... TAKE ON THE DAYS YOU TAKE LASIX (FUROSEMIDE) 30 tablet 11   • propranolol (INDERAL) 40 MG tablet Take 1 tablet by mouth 2 (Two) Times a Day. 60 tablet 0   • rivastigmine (EXELON) 4.6 MG/24HR patch APPLY ONE PATCH TO THE SKIN DAILY 30 patch 6   • sodium chloride 0.65 % nasal spray 2 sprays into the nostril(s) as directed by provider As Needed for Congestion. 60 mL 0     No current facility-administered medications on file prior to visit.      When did you start taking these medications: YEARS    Which medication are you concerned about:  FUROSEMIDE 20 MG    Who prescribed you this medication: LENA    What are your concerns: PATIENT HAD TO GO  STAY [facility] A [facility]ST [facility] DUE TO THE POWER OUTAGES. PATIENT SOMEHOW MISPLACED THE BOTTLE OF MEDICATION.       PATIENT NOW NEED A NEW SCRIPT FOR THE FUROSEMIDE SENT TO THE PHARMACY WITH THE OK TO FILL DUE TO MISPLACEMENT OF THE MEDICATION.     How long have you had these concerns: 03/11/23

## 2023-03-17 ENCOUNTER — TELEPHONE (OUTPATIENT)
Dept: INTERNAL MEDICINE | Facility: CLINIC | Age: 88
End: 2023-03-17
Payer: MEDICARE

## 2023-03-17 NOTE — TELEPHONE ENCOUNTER
Caller: Matt Nava    Relationship: Emergency Contact    Best call back number:     What is the best time to reach you: ANYTIME    Who are you requesting to speak with (clinical staff, provider,  specific staff member): CLINICAL STAFF    Do you know the name of the person who called: MATT    What was the call regarding: PATIENT WOULD LIKE TO KNOW IF SHE CAN TAKE IBUPROFEN ALONG WITH THE NORCO; SHE FELL BACKWARDS IN THE TUB A FEW MTHS AGO AND THE PAIN IS GETTING WORSE    Do you require a callback: YES    Advised to not take NSAIDs with norco

## 2023-03-24 ENCOUNTER — TELEPHONE (OUTPATIENT)
Dept: INTERNAL MEDICINE | Facility: CLINIC | Age: 88
End: 2023-03-24
Payer: MEDICARE

## 2023-03-24 DIAGNOSIS — I50.32 CHRONIC HEART FAILURE WITH PRESERVED EJECTION FRACTION: ICD-10-CM

## 2023-03-24 RX ORDER — FUROSEMIDE 20 MG/1
TABLET ORAL
Qty: 60 TABLET | Refills: 6 | Status: CANCELLED | OUTPATIENT
Start: 2023-03-24

## 2023-03-24 NOTE — TELEPHONE ENCOUNTER
Caller: Zoila Nava Lacbrandyn    Relationship: Self    Best call back number: [phone]    Requested Prescriptions:   Requested Prescriptions     Pending Prescriptions Disp Refills   • furosemide (Lasix) 20 MG tablet 60 tablet 6     Sig: Lasix 20 mg 1 tab daily alternating 40 mg every other day.        Pharmacy where request should be sent: [facility] [id number] [place], [place] - [address]  AT [facility] [address] - [phone]  - [phone] FX     Last office visit with prescribing clinician: 1/3/2023   Last telemedicine visit with prescribing clinician: 5/4/2023   Next office visit with prescribing clinician: 5/4/2023     Additional details provided by patient: NEEDS A LARGE SUPPLY TO REPLACE THE ONE LOST DURING POWER OUTTAGE    Does the patient have less than a 3 day supply:  [x] Yes  [] No    Would you like a call back once the refill request has been completed: [] Yes [x] No    If the office needs to give you a call back, can they leave a voicemail: [] Yes [x] No    Chris Dalal, DEBORAH   03/24/23 12:25 EDT

## 2023-03-24 NOTE — TELEPHONE ENCOUNTER
Spoke to pharmacy and they corrected directions and was able to get a 30 day supply to go through

## 2023-03-31 ENCOUNTER — HOSPITAL ENCOUNTER (OUTPATIENT)
Dept: CARDIOLOGY | Facility: HOSPITAL | Age: 88
Discharge: HOME | End: 2023-03-31
Payer: MEDICARE

## 2023-03-31 ENCOUNTER — OFFICE VISIT (OUTPATIENT)
Dept: CARDIOLOGY | Facility: HOSPITAL | Age: 88
End: 2023-03-31
Payer: MEDICARE

## 2023-03-31 VITALS
BODY MASS INDEX: 31.4 KG/M2 | SYSTOLIC BLOOD PRESSURE: 133 MMHG | WEIGHT: 149.6 LBS | DIASTOLIC BLOOD PRESSURE: 76 MMHG | HEART RATE: 81 BPM | RESPIRATION RATE: 18 BRPM | HEIGHT: 58 IN | TEMPERATURE: 96.9 F | OXYGEN SATURATION: 93 %

## 2023-03-31 DIAGNOSIS — N18.30 STAGE 3 CHRONIC KIDNEY DISEASE, UNSPECIFIED WHETHER STAGE 3A OR 3B CKD: ICD-10-CM

## 2023-03-31 DIAGNOSIS — I50.32 CHRONIC HEART FAILURE WITH PRESERVED EJECTION FRACTION: ICD-10-CM

## 2023-03-31 DIAGNOSIS — R06.02 SHORTNESS OF BREATH: ICD-10-CM

## 2023-03-31 DIAGNOSIS — I38 VHD (VALVULAR HEART DISEASE): ICD-10-CM

## 2023-03-31 DIAGNOSIS — R60.0 EDEMA LEG: ICD-10-CM

## 2023-03-31 DIAGNOSIS — I10 ESSENTIAL HYPERTENSION: ICD-10-CM

## 2023-03-31 DIAGNOSIS — I50.32 CHRONIC HEART FAILURE WITH PRESERVED EJECTION FRACTION: Primary | ICD-10-CM

## 2023-03-31 LAB
DEPRECATED RDW RBC AUTO: 45.6 FL (ref 37–54)
ERYTHROCYTE [DISTWIDTH] IN BLOOD BY AUTOMATED COUNT: 13.6 % (ref 12.3–15.4)
HCT VFR BLD AUTO: 35.2 % (ref 34–46.6)
HGB BLD-MCNC: 11.3 G/DL (ref 12–15.9)
MCH RBC QN AUTO: 29.3 PG (ref 26.6–33)
MCHC RBC AUTO-ENTMCNC: 32.1 G/DL (ref 31.5–35.7)
MCV RBC AUTO: 91.2 FL (ref 79–97)
PLATELET # BLD AUTO: 248 10*3/MM3 (ref 140–450)
PMV BLD AUTO: 11.7 FL (ref 6–12)
QT INTERVAL: 352 MS
QTC INTERVAL: 418 MS
RBC # BLD AUTO: 3.86 10*6/MM3 (ref 3.77–5.28)
WBC NRBC COR # BLD: 8.86 10*3/MM3 (ref 3.4–10.8)

## 2023-03-31 PROCEDURE — 85027 COMPLETE CBC AUTOMATED: CPT | Performed by: NURSE PRACTITIONER

## 2023-03-31 PROCEDURE — 80053 COMPREHEN METABOLIC PANEL: CPT | Performed by: NURSE PRACTITIONER

## 2023-03-31 PROCEDURE — 93005 ELECTROCARDIOGRAM TRACING: CPT | Performed by: NURSE PRACTITIONER

## 2023-03-31 PROCEDURE — 93010 ELECTROCARDIOGRAM REPORT: CPT | Performed by: INTERNAL MEDICINE

## 2023-03-31 PROCEDURE — 83880 ASSAY OF NATRIURETIC PEPTIDE: CPT | Performed by: NURSE PRACTITIONER

## 2023-03-31 RX ORDER — FUROSEMIDE 20 MG/1
40 TABLET ORAL DAILY
Qty: 60 TABLET | Refills: 6
Start: 2023-03-31 | End: 2023-04-08 | Stop reason: HOSPADM

## 2023-03-31 NOTE — PROGRESS NOTES
"[facility], [facility] Heart and Vascular    Chief Complaint  Shortness of Breath and Congestive Heart Failure (/)    Subjective    History of Present Illness {CC  Problem List  Visit  Diagnosis   Encounters  Notes  Medications  Labs  Result Review Imaging  Media :23}     Zoila Nava presents to [facility] CARDIOLOGY for   History of Present Illness     89-year-old female with heart failure with preserved EF, pulmonary hypertension, valvular heart disease, chronic kidney disease stage III, ascending aortic aneurysm, hypertension, hypothyroidism, GERD, anemia, dementia.  Palpitations with PACs and PVCs on propranolol, essential tremors.    Patient with intermittent lower extremity edema.  Takes Lasix 20 mg daily.  Will take an extra 20 mg as needed.  Compression stockings.      Patient reports that in the last few months she has become increasingly short of breath requiring oxygen more frequently during the day and at night.  I sleeps less than 6 hours a night and wakes frequently. Unable to lie flat. Sleep sitting up in chair due to dysnea.   PCP has referred her to ENT for evaluation of possible obstruction in her nostril.  Her evaluation was negative.  Worsening edema.  Keep her leg dangling.  Less active with back pain.  NO CP or pressure, dizziness, near syncope.  Increased fatigue.     Objective     Vital Signs:   Vitals:    03/31/23 1530   BP: 133/76   BP Location: Left arm   Patient Position: Sitting   Cuff Size: Adult   Pulse: 81   Resp: 18   Temp: 96.9 °F (36.1 °C)   TempSrc: Temporal   SpO2: 93%   Weight: 67.9 kg (149 lb 9.6 oz)   Height: 147.3 cm (58\")     Body mass index is 31.27 kg/m².  Physical Exam  Vitals reviewed.   Constitutional:       General: She is not in acute distress.     Appearance: Normal appearance.   Cardiovascular:      Rate and Rhythm: Normal rate and regular rhythm.      Pulses:           Radial pulses are 2+ on the " right side and 2+ on the left side.        Dorsalis pedis pulses are 2+ on the right side and 2+ on the left side.        Posterior tibial pulses are 2+ on the right side and 2+ on the left side.      Heart sounds: Normal heart sounds.   Pulmonary:      Effort: Pulmonary effort is normal.      Breath sounds: Rales (bases) present.   Musculoskeletal:      Right lower leg: Edema (2+ bilateral edema) present.      Left lower leg: Edema present.   Skin:     General: Skin is warm and dry.   Neurological:      Mental Status: She is alert.   Psychiatric:         Mood and Affect: Mood normal.         Behavior: Behavior is cooperative.              Result Review  Data Reviewed:{ Labs  Result Review  Imaging  Med Tab  Media :23}   Holter 3/8/2022.  Duration 4 days.  Average heart rate 62 bpm.  Multiple SVT runs/longest 15 seconds.  3.7% PACs, 2.2% PVC burden.  No patient triggered events        Echocardiogram 10/11/2021: EF 73%, grade 1 diastolic dysfunction, moderate pulmonary hypertension with RVSP 45 to 55 mmHg, moderate AR, mild AS, moderate TR.    Lab Results   Component Value Date    GLUCOSE 105 (H) 12/22/2022    CALCIUM 8.7 12/22/2022     12/22/2022    K 4.7 12/22/2022    CO2 23.1 12/22/2022     12/22/2022    BUN 21 12/22/2022    CREATININE 1.28 (H) 12/22/2022    EGFR 40.1 (L) 12/22/2022    BCR 16.4 12/22/2022    ANIONGAP 10.9 12/22/2022     Lab Results   Component Value Date    TSH 6.390 (H) 10/24/2022     Lab Results   Component Value Date    GLUCOSE 105 (H) 12/22/2022    BUN 21 12/22/2022    CREATININE 1.28 (H) 12/22/2022    EGFR 40.1 (L) 12/22/2022    BCR 16.4 12/22/2022    K 4.7 12/22/2022    CO2 23.1 12/22/2022    CALCIUM 8.7 12/22/2022    ALBUMIN 3.80 10/24/2022    BILITOT <0.2 10/24/2022    AST 19 10/24/2022    ALT 12 10/24/2022     Lab Results   Component Value Date    WBC 8.37 10/24/2022    HGB 10.7 (L) 10/24/2022    HCT 33.1 (L) 10/24/2022    MCV 90.2 10/24/2022     10/24/2022      Echocardiogram 10/11/2021: 72%, moderate pulmonary hypertension, grade 1 diastolic dysfunction              Assessment and Plan {CC Problem List  Visit Diagnosis  ROS  Review (Popup)  Health Maintenance  Quality  BestPractice  Medications  SmartSets  SnapShot Encounters  Media :23}   1. Chronic heart failure with preserved ejection fraction (HCC)    - ECG 12 Lead; sinus rhythm with sinus arrhythmia, occasional PVC 85 bpm    Increase:   - furosemide (Lasix) 20 MG tablet; Take 2 tablets by mouth Daily.  Dispense: 60 tablet; Refill: 6      - Comprehensive Metabolic Panel  - CBC (No Diff)  - proBNP      - Adult Transthoracic Echo Complete W/ Cont if Necessary Per Protocol; Future    2. VHD (valvular heart disease)    - ECG 12 Lead; Future  - Comprehensive Metabolic Panel  - CBC (No Diff)  - proBNP  - Adult Transthoracic Echo Complete W/ Cont if Necessary Per Protocol; Future    3. Essential hypertension  Controlled continue amlodipine at this time, but with swelling may need to change    4. Stage 3 chronic kidney disease, unspecified whether stage 3a or 3b CKD ([facility])  Labs today    5. Shortness of breath    - ECG 12 Lead; Future  - Comprehensive Metabolic Panel  - CBC (No Diff)  - proBNP  - Adult Transthoracic Echo Complete W/ Cont if Necessary Per Protocol; Future    6. Edema leg  Continue compression socks.  Keep elevated when possible.           Follow Up {Instructions Charge Capture  Follow-up Communications :23}   Return in about 2 weeks (around 4/14/2023), or if symptoms worsen or fail to improve, for HF, Office visit.    Patient was given instructions and counseling regarding her condition or for health maintenance advice. Please see specific information pulled into the AVS if appropriate.  Patient was instructed to call the [facility] with any questions, concerns, or worsening symptoms.

## 2023-04-01 LAB
ALBUMIN SERPL-MCNC: 3.7 G/DL (ref 3.5–5.2)
ALBUMIN/GLOB SERPL: 1.4 G/DL
ALP SERPL-CCNC: 106 U/L (ref 39–117)
ALT SERPL W P-5'-P-CCNC: 18 U/L (ref 1–33)
ANION GAP SERPL CALCULATED.3IONS-SCNC: 11 MMOL/L (ref 5–15)
AST SERPL-CCNC: 22 U/L (ref 1–32)
BILIRUB SERPL-MCNC: 0.2 MG/DL (ref 0–1.2)
BUN SERPL-MCNC: 26 MG/DL (ref 8–23)
BUN/CREAT SERPL: 17.6 (ref 7–25)
CALCIUM SPEC-SCNC: 8.4 MG/DL (ref 8.6–10.5)
CHLORIDE SERPL-SCNC: 107 MMOL/L (ref 98–107)
CO2 SERPL-SCNC: 23 MMOL/L (ref 22–29)
CREAT SERPL-MCNC: 1.48 MG/DL (ref 0.57–1)
EGFRCR SERPLBLD CKD-EPI 2021: 33.7 ML/MIN/1.73
GLOBULIN UR ELPH-MCNC: 2.7 GM/DL
GLUCOSE SERPL-MCNC: 144 MG/DL (ref 65–99)
NT-PROBNP SERPL-MCNC: 1271 PG/ML (ref 0–1800)
POTASSIUM SERPL-SCNC: 4 MMOL/L (ref 3.5–5.2)
PROT SERPL-MCNC: 6.4 G/DL (ref 6–8.5)
SODIUM SERPL-SCNC: 141 MMOL/L (ref 136–145)

## 2023-04-04 ENCOUNTER — HOSPITAL ENCOUNTER (INPATIENT)
Facility: HOSPITAL | Age: 88
LOS: 3 days | Discharge: HOME | DRG: 291 | End: 2023-04-08
Attending: EMERGENCY MEDICINE | Admitting: HOSPITALIST
Payer: MEDICARE

## 2023-04-04 ENCOUNTER — APPOINTMENT (OUTPATIENT)
Dept: GENERAL RADIOLOGY | Facility: HOSPITAL | Age: 88
DRG: 291 | End: 2023-04-04
Payer: MEDICARE

## 2023-04-04 DIAGNOSIS — I50.33 ACUTE ON CHRONIC DIASTOLIC CONGESTIVE HEART FAILURE: Primary | ICD-10-CM

## 2023-04-04 DIAGNOSIS — I50.33 CHF (CONGESTIVE HEART FAILURE), NYHA CLASS I, ACUTE ON CHRONIC, DIASTOLIC: ICD-10-CM

## 2023-04-04 DIAGNOSIS — G62.9 NEUROPATHY: ICD-10-CM

## 2023-04-04 DIAGNOSIS — I50.9 ACUTE ON CHRONIC CONGESTIVE HEART FAILURE, UNSPECIFIED HEART FAILURE TYPE: Primary | ICD-10-CM

## 2023-04-04 DIAGNOSIS — I50.32 CHRONIC HEART FAILURE WITH PRESERVED EJECTION FRACTION: ICD-10-CM

## 2023-04-04 PROBLEM — R77.8 ELEVATED TROPONIN: Status: ACTIVE | Noted: 2023-04-04

## 2023-04-04 PROBLEM — R79.89 ELEVATED TROPONIN: Status: ACTIVE | Noted: 2023-04-04

## 2023-04-04 PROBLEM — I50.30 (HFPEF) HEART FAILURE WITH PRESERVED EJECTION FRACTION: Status: ACTIVE | Noted: 2023-04-04

## 2023-04-04 PROBLEM — G47.34 NOCTURNAL HYPOXIA: Status: ACTIVE | Noted: 2023-04-04

## 2023-04-04 LAB
ALBUMIN SERPL-MCNC: 3.9 G/DL (ref 3.5–5.2)
ALBUMIN/GLOB SERPL: 1.3 G/DL
ALP SERPL-CCNC: 117 U/L (ref 39–117)
ALT SERPL W P-5'-P-CCNC: 20 U/L (ref 1–33)
ANION GAP SERPL CALCULATED.3IONS-SCNC: 16 MMOL/L (ref 5–15)
AST SERPL-CCNC: 19 U/L (ref 1–32)
BASOPHILS # BLD AUTO: 0.15 10*3/MM3 (ref 0–0.2)
BASOPHILS NFR BLD AUTO: 1.8 % (ref 0–1.5)
BILIRUB SERPL-MCNC: 0.2 MG/DL (ref 0–1.2)
BILIRUB UR QL STRIP: NEGATIVE
BUN SERPL-MCNC: 21 MG/DL (ref 8–23)
BUN/CREAT SERPL: 16.5 (ref 7–25)
CALCIUM SPEC-SCNC: 8.6 MG/DL (ref 8.6–10.5)
CHLORIDE SERPL-SCNC: 106 MMOL/L (ref 98–107)
CK SERPL-CCNC: 119 U/L (ref 20–180)
CLARITY UR: CLEAR
CO2 SERPL-SCNC: 22 MMOL/L (ref 22–29)
COLOR UR: YELLOW
CREAT SERPL-MCNC: 1.27 MG/DL (ref 0.57–1)
D-LACTATE SERPL-SCNC: 1.4 MMOL/L (ref 0.5–2)
DEPRECATED RDW RBC AUTO: 51.9 FL (ref 37–54)
EGFRCR SERPLBLD CKD-EPI 2021: 40.5 ML/MIN/1.73
EOSINOPHIL # BLD AUTO: 0.75 10*3/MM3 (ref 0–0.4)
EOSINOPHIL NFR BLD AUTO: 8.8 % (ref 0.3–6.2)
ERYTHROCYTE [DISTWIDTH] IN BLOOD BY AUTOMATED COUNT: 15.5 % (ref 12.3–15.4)
FLUAV SUBTYP SPEC NAA+PROBE: NOT DETECTED
FLUBV RNA ISLT QL NAA+PROBE: NOT DETECTED
GEN 5 2HR TROPONIN T REFLEX: 30 NG/L
GLOBULIN UR ELPH-MCNC: 3 GM/DL
GLUCOSE SERPL-MCNC: 125 MG/DL (ref 65–99)
GLUCOSE UR STRIP-MCNC: NEGATIVE MG/DL
HCT VFR BLD AUTO: 37.8 % (ref 34–46.6)
HGB BLD-MCNC: 12.1 G/DL (ref 12–15.9)
HGB UR QL STRIP.AUTO: NEGATIVE
IMM GRANULOCYTES # BLD AUTO: 0.05 10*3/MM3 (ref 0–0.05)
IMM GRANULOCYTES NFR BLD AUTO: 0.6 % (ref 0–0.5)
KETONES UR QL STRIP: NEGATIVE
LEUKOCYTE ESTERASE UR QL STRIP.AUTO: NEGATIVE
LIPASE SERPL-CCNC: 71 U/L (ref 13–60)
LYMPHOCYTES # BLD AUTO: 1.06 10*3/MM3 (ref 0.7–3.1)
LYMPHOCYTES NFR BLD AUTO: 12.5 % (ref 19.6–45.3)
MAGNESIUM SERPL-MCNC: 2 MG/DL (ref 1.6–2.4)
MCH RBC QN AUTO: 29.3 PG (ref 26.6–33)
MCHC RBC AUTO-ENTMCNC: 32 G/DL (ref 31.5–35.7)
MCV RBC AUTO: 91.5 FL (ref 79–97)
MONOCYTES # BLD AUTO: 0.65 10*3/MM3 (ref 0.1–0.9)
MONOCYTES NFR BLD AUTO: 7.7 % (ref 5–12)
NEUTROPHILS NFR BLD AUTO: 5.83 10*3/MM3 (ref 1.7–7)
NEUTROPHILS NFR BLD AUTO: 68.6 % (ref 42.7–76)
NITRITE UR QL STRIP: NEGATIVE
NRBC BLD AUTO-RTO: 0 /100 WBC (ref 0–0.2)
NT-PROBNP SERPL-MCNC: 1886 PG/ML (ref 0–1800)
PH UR STRIP.AUTO: 5.5 [PH] (ref 5–8)
PLATELET # BLD AUTO: 289 10*3/MM3 (ref 140–450)
PMV BLD AUTO: 11.2 FL (ref 6–12)
POTASSIUM SERPL-SCNC: 4.3 MMOL/L (ref 3.5–5.2)
PROCALCITONIN SERPL-MCNC: 0.07 NG/ML (ref 0–0.25)
PROT SERPL-MCNC: 6.9 G/DL (ref 6–8.5)
PROT UR QL STRIP: NEGATIVE
QT INTERVAL: 376 MS
QTC INTERVAL: 470 MS
RBC # BLD AUTO: 4.13 10*6/MM3 (ref 3.77–5.28)
SARS-COV-2 RNA RESP QL NAA+PROBE: NOT DETECTED
SODIUM SERPL-SCNC: 144 MMOL/L (ref 136–145)
SP GR UR STRIP: 1.01 (ref 1–1.03)
TROPONIN T DELTA: -6 NG/L
TROPONIN T SERPL HS-MCNC: 29 NG/L
TROPONIN T SERPL HS-MCNC: 36 NG/L
UROBILINOGEN UR QL STRIP: NORMAL
WBC NRBC COR # BLD: 8.49 10*3/MM3 (ref 3.4–10.8)

## 2023-04-04 PROCEDURE — 71045 X-RAY EXAM CHEST 1 VIEW: CPT

## 2023-04-04 PROCEDURE — 83605 ASSAY OF LACTIC ACID: CPT | Performed by: NURSE PRACTITIONER

## 2023-04-04 PROCEDURE — 85025 COMPLETE CBC W/AUTO DIFF WBC: CPT | Performed by: NURSE PRACTITIONER

## 2023-04-04 PROCEDURE — 83735 ASSAY OF MAGNESIUM: CPT | Performed by: NURSE PRACTITIONER

## 2023-04-04 PROCEDURE — 99285 EMERGENCY DEPT VISIT HI MDM: CPT

## 2023-04-04 PROCEDURE — 82550 ASSAY OF CK (CPK): CPT | Performed by: NURSE PRACTITIONER

## 2023-04-04 PROCEDURE — 25010000002 FUROSEMIDE PER 20 MG: Performed by: NURSE PRACTITIONER

## 2023-04-04 PROCEDURE — 93005 ELECTROCARDIOGRAM TRACING: CPT | Performed by: NURSE PRACTITIONER

## 2023-04-04 PROCEDURE — 25010000002 HEPARIN (PORCINE) PER 1000 UNITS: Performed by: NURSE PRACTITIONER

## 2023-04-04 PROCEDURE — 83880 ASSAY OF NATRIURETIC PEPTIDE: CPT | Performed by: NURSE PRACTITIONER

## 2023-04-04 PROCEDURE — 84484 ASSAY OF TROPONIN QUANT: CPT | Performed by: NURSE PRACTITIONER

## 2023-04-04 PROCEDURE — 99222 1ST HOSP IP/OBS MODERATE 55: CPT | Performed by: NURSE PRACTITIONER

## 2023-04-04 PROCEDURE — G0378 HOSPITAL OBSERVATION PER HR: HCPCS

## 2023-04-04 PROCEDURE — 36415 COLL VENOUS BLD VENIPUNCTURE: CPT

## 2023-04-04 PROCEDURE — 81003 URINALYSIS AUTO W/O SCOPE: CPT | Performed by: NURSE PRACTITIONER

## 2023-04-04 PROCEDURE — 87636 SARSCOV2 & INF A&B AMP PRB: CPT | Performed by: NURSE PRACTITIONER

## 2023-04-04 PROCEDURE — 80053 COMPREHEN METABOLIC PANEL: CPT | Performed by: NURSE PRACTITIONER

## 2023-04-04 PROCEDURE — 84145 PROCALCITONIN (PCT): CPT | Performed by: NURSE PRACTITIONER

## 2023-04-04 PROCEDURE — 83690 ASSAY OF LIPASE: CPT | Performed by: NURSE PRACTITIONER

## 2023-04-04 RX ORDER — RIVASTIGMINE 4.6 MG/24H
1 PATCH, EXTENDED RELEASE TRANSDERMAL DAILY
Status: DISCONTINUED | OUTPATIENT
Start: 2023-04-05 | End: 2023-04-08 | Stop reason: HOSPADM

## 2023-04-04 RX ORDER — LANOLIN ALCOHOL/MO/W.PET/CERES
1000 CREAM (GRAM) TOPICAL DAILY
COMMUNITY

## 2023-04-04 RX ORDER — FUROSEMIDE 10 MG/ML
40 INJECTION INTRAMUSCULAR; INTRAVENOUS EVERY 12 HOURS
Status: DISCONTINUED | OUTPATIENT
Start: 2023-04-05 | End: 2023-04-06

## 2023-04-04 RX ORDER — ANTIOX #8/OM3/DHA/EPA/LUT/ZEAX 250-2.5 MG
1 CAPSULE ORAL 2 TIMES DAILY
COMMUNITY

## 2023-04-04 RX ORDER — BISACODYL 10 MG
10 SUPPOSITORY, RECTAL RECTAL DAILY PRN
Status: DISCONTINUED | OUTPATIENT
Start: 2023-04-04 | End: 2023-04-08 | Stop reason: HOSPADM

## 2023-04-04 RX ORDER — SODIUM CHLORIDE 0.9 % (FLUSH) 0.9 %
10 SYRINGE (ML) INJECTION EVERY 12 HOURS SCHEDULED
Status: DISCONTINUED | OUTPATIENT
Start: 2023-04-04 | End: 2023-04-08 | Stop reason: HOSPADM

## 2023-04-04 RX ORDER — BISACODYL 5 MG/1
5 TABLET, DELAYED RELEASE ORAL DAILY PRN
Status: DISCONTINUED | OUTPATIENT
Start: 2023-04-04 | End: 2023-04-08 | Stop reason: HOSPADM

## 2023-04-04 RX ORDER — PREGABALIN 75 MG/1
150 CAPSULE ORAL 2 TIMES DAILY
Status: DISCONTINUED | OUTPATIENT
Start: 2023-04-04 | End: 2023-04-08 | Stop reason: HOSPADM

## 2023-04-04 RX ORDER — LEVOTHYROXINE SODIUM 0.12 MG/1
125 TABLET ORAL
Status: DISCONTINUED | OUTPATIENT
Start: 2023-04-05 | End: 2023-04-08 | Stop reason: HOSPADM

## 2023-04-04 RX ORDER — ACETAMINOPHEN 325 MG/1
650 TABLET ORAL EVERY 4 HOURS PRN
Status: DISCONTINUED | OUTPATIENT
Start: 2023-04-04 | End: 2023-04-08 | Stop reason: HOSPADM

## 2023-04-04 RX ORDER — AMLODIPINE BESYLATE 10 MG/1
10 TABLET ORAL DAILY
Status: DISCONTINUED | OUTPATIENT
Start: 2023-04-05 | End: 2023-04-06

## 2023-04-04 RX ORDER — HYDROCODONE BITARTRATE AND ACETAMINOPHEN 7.5; 325 MG/1; MG/1
1 TABLET ORAL EVERY 8 HOURS
Status: DISCONTINUED | OUTPATIENT
Start: 2023-04-04 | End: 2023-04-08 | Stop reason: HOSPADM

## 2023-04-04 RX ORDER — AMLODIPINE BESYLATE 10 MG/1
10 TABLET ORAL DAILY
COMMUNITY
End: 2023-04-08 | Stop reason: HOSPADM

## 2023-04-04 RX ORDER — POTASSIUM CHLORIDE 750 MG/1
10 TABLET, FILM COATED, EXTENDED RELEASE ORAL DAILY
Qty: 30 TABLET | Refills: 11 | Status: SHIPPED | OUTPATIENT
Start: 2023-04-04

## 2023-04-04 RX ORDER — ASPIRIN 81 MG/1
81 TABLET ORAL DAILY
Status: DISCONTINUED | OUTPATIENT
Start: 2023-04-05 | End: 2023-04-08 | Stop reason: HOSPADM

## 2023-04-04 RX ORDER — SODIUM CHLORIDE 0.9 % (FLUSH) 0.9 %
10 SYRINGE (ML) INJECTION AS NEEDED
Status: DISCONTINUED | OUTPATIENT
Start: 2023-04-04 | End: 2023-04-08 | Stop reason: HOSPADM

## 2023-04-04 RX ORDER — AMOXICILLIN 250 MG
2 CAPSULE ORAL 2 TIMES DAILY
Status: DISCONTINUED | OUTPATIENT
Start: 2023-04-04 | End: 2023-04-08 | Stop reason: HOSPADM

## 2023-04-04 RX ORDER — FUROSEMIDE 10 MG/ML
40 INJECTION INTRAMUSCULAR; INTRAVENOUS ONCE
Status: COMPLETED | OUTPATIENT
Start: 2023-04-04 | End: 2023-04-04

## 2023-04-04 RX ORDER — HYDROCODONE BITARTRATE AND ACETAMINOPHEN 7.5; 325 MG/1; MG/1
1 TABLET ORAL ONCE
Status: COMPLETED | OUTPATIENT
Start: 2023-04-04 | End: 2023-04-04

## 2023-04-04 RX ORDER — CHOLECALCIFEROL (VITAMIN D3) 125 MCG
5 CAPSULE ORAL NIGHTLY PRN
Status: DISCONTINUED | OUTPATIENT
Start: 2023-04-04 | End: 2023-04-08 | Stop reason: HOSPADM

## 2023-04-04 RX ORDER — POLYETHYLENE GLYCOL 3350 17 G/17G
17 POWDER, FOR SOLUTION ORAL DAILY PRN
Status: DISCONTINUED | OUTPATIENT
Start: 2023-04-04 | End: 2023-04-08 | Stop reason: HOSPADM

## 2023-04-04 RX ORDER — CETIRIZINE HYDROCHLORIDE 10 MG/1
10 TABLET ORAL DAILY
Status: DISCONTINUED | OUTPATIENT
Start: 2023-04-04 | End: 2023-04-08 | Stop reason: HOSPADM

## 2023-04-04 RX ORDER — FAMOTIDINE 20 MG/1
20 TABLET, FILM COATED ORAL 2 TIMES DAILY
Status: DISCONTINUED | OUTPATIENT
Start: 2023-04-04 | End: 2023-04-05

## 2023-04-04 RX ORDER — HEPARIN SODIUM 5000 [USP'U]/ML
5000 INJECTION, SOLUTION INTRAVENOUS; SUBCUTANEOUS EVERY 8 HOURS SCHEDULED
Status: DISCONTINUED | OUTPATIENT
Start: 2023-04-04 | End: 2023-04-06

## 2023-04-04 RX ORDER — PROPRANOLOL HYDROCHLORIDE 20 MG/1
20 TABLET ORAL 2 TIMES DAILY
Status: DISCONTINUED | OUTPATIENT
Start: 2023-04-04 | End: 2023-04-06

## 2023-04-04 RX ORDER — SODIUM CHLORIDE 9 MG/ML
40 INJECTION, SOLUTION INTRAVENOUS AS NEEDED
Status: DISCONTINUED | OUTPATIENT
Start: 2023-04-04 | End: 2023-04-08 | Stop reason: HOSPADM

## 2023-04-04 RX ADMIN — HEPARIN SODIUM 5000 UNITS: 5000 INJECTION INTRAVENOUS; SUBCUTANEOUS at 20:39

## 2023-04-04 RX ADMIN — FAMOTIDINE 20 MG: 20 TABLET, FILM COATED ORAL at 20:39

## 2023-04-04 RX ADMIN — CETIRIZINE HYDROCHLORIDE 10 MG: 10 TABLET, FILM COATED ORAL at 20:49

## 2023-04-04 RX ADMIN — SENNOSIDES AND DOCUSATE SODIUM 2 TABLET: 50; 8.6 TABLET ORAL at 20:39

## 2023-04-04 RX ADMIN — HYDROCODONE BITARTRATE AND ACETAMINOPHEN 1 TABLET: 7.5; 325 TABLET ORAL at 20:39

## 2023-04-04 RX ADMIN — PREGABALIN 150 MG: 75 CAPSULE ORAL at 20:39

## 2023-04-04 RX ADMIN — FUROSEMIDE 40 MG: 10 INJECTION, SOLUTION INTRAMUSCULAR; INTRAVENOUS at 18:22

## 2023-04-04 RX ADMIN — HYDROCODONE BITARTRATE AND ACETAMINOPHEN 1 TABLET: 7.5; 325 TABLET ORAL at 16:22

## 2023-04-04 RX ADMIN — Medication 5 MG: at 20:39

## 2023-04-04 RX ADMIN — Medication 10 ML: at 20:40

## 2023-04-04 RX ADMIN — PROPRANOLOL HYDROCHLORIDE 20 MG: 20 TABLET ORAL at 20:49

## 2023-04-04 NOTE — Clinical Note
Level of Care: Telemetry [5]   Diagnosis: (HFpEF) heart failure with preserved ejection fraction [[id number]]   Admitting Physician: BLAS VILLA [[id number]]   Attending Physician: BLAS VILLA [[id number]]

## 2023-04-04 NOTE — PROGRESS NOTES
Patient's  presented to heart and valve clinic inquiring on potassium supplementation with recent furosemide increase. Will initiate potassium 10meq daily for supplementation.      noted patient still with edema, shortness of breath. Will discuss with scheduling team to have patient follow-up this week in clinic.

## 2023-04-04 NOTE — H&P
[facility] Medicine Services  HISTORY AND PHYSICAL    Patient Name: Zoila Nava  : 1933  MRN: [id number]  Primary Care Physician: Arnoldo Oneil MD  Date of admission: 2023    Subjective   Subjective     Chief Complaint:  Shortness of breath     HPI:  Zoila Nava is a 89 y.o. female w/ a hx of nocturnal hypoxia (on 2 liters at night), CHF (ED 70%), pulmonary HTN, HTN, HLD, hypothyroidism, CKD Stage III, neuropathy, GERD, chronic pain who presented to the ED w/ c/o shortness of breath.   Pt c/o shortness of breath x ~2 weeks. Pt w/ non-productive cough, orthopnea and an ~2-4 pound weight gain over the past 2 weeks. Pt also c/o fatigue and BLE edema. Pt seen at the heart failure clinic last Friday and her Lasix dose was increased to 40mg daily (previously on 20mg/40mg every other day alternating). Despite the increase in lasix, pt's symptoms have not improved. Per pt's  her oxygen saturations usually run in the mid-high 90's on room air, recently her room air saturations have been in the lower 90's.   Pt denies fever/chills, chest pain, N/V/D, abdominal pain, dysuria, syncope, confusion.   Pt evaluated in the ED. CXR concerning for atelectasis vs PNA, cardiomegaly and pulmonary vascular congestion. ProBNP ~1900, troponin 36, 30. Pt admitted to the hospital medicine service for further evaluation.     Review of Systems   Constitutional: Positive for fatigue and unexpected weight change. Negative for chills, diaphoresis and fever.        2-3 pound weight gain over the past 2 weeks.    HENT: Negative.  Negative for congestion, postnasal drip, rhinorrhea, sinus pressure, sinus pain, sneezing, sore throat and trouble swallowing.    Eyes: Negative.  Negative for visual disturbance.   Respiratory: Positive for cough and shortness of breath. Negative for chest tightness and wheezing.    Cardiovascular: Positive for leg swelling. Negative for chest pain and  palpitations.   Gastrointestinal: Negative.  Negative for abdominal distention, abdominal pain, blood in stool, constipation, diarrhea, nausea and vomiting.   Endocrine: Negative.    Genitourinary: Negative.  Negative for decreased urine volume, difficulty urinating, dysuria, flank pain, frequency, hematuria, pelvic pain and urgency.   Musculoskeletal: Negative.  Negative for arthralgias, myalgias, neck pain and neck stiffness.   Skin: Negative.  Negative for wound.   Allergic/Immunologic: Negative.  Negative for immunocompromised state.   Neurological: Negative.  Negative for dizziness, tremors, seizures, syncope, facial asymmetry, speech difficulty, weakness, light-headedness, numbness and headaches.   Hematological: Negative.  Does not bruise/bleed easily.   Psychiatric/Behavioral: Negative.  Negative for confusion.   All other systems reviewed and are negative.     Personal History     Past Medical History:   Diagnosis Date   • Anemia    • Arthritis    • Asthma    • Cataract    • Difficulty breathing     Chronic   • GERD (gastroesophageal reflux disease)    • Graves' ophthalmopathy    • Hoarseness    • Hypertension    • Hypertensive heart disease with acute on chronic diastolic congestive heart failure 10/11/2021   • Pulmonary hypertension 10/11/2021   • Spondylolisthesis of cervical region    • Vitamin B12 deficiency      Past Surgical History:   Procedure Laterality Date   • CERVICAL LAMINECTOMY     • CHOLECYSTECTOMY     • OTHER SURGICAL HISTORY Right     Neuroplasty Median Nerve at Carpal Tunnel   • THYROID SURGERY     • TOTAL KNEE ARTHROPLASTY Left 09/29/2014    Dr Colon     Family History:  family history includes Diabetes in her father; Heart disease in her father; Hypertension in her mother and sister; Other in her father.     Social History:  reports that she has never smoked. She has never used smokeless tobacco. She reports that she does not drink alcohol and does not use drugs.  Social History      Social History Narrative    Caffeine: 2-3 coffee daily     Medications:  Boswellia-Glucosamine-Vit D, HYDROcodone-acetaminophen, amLODIPine, aspirin, cetirizine, famotidine, fluticasone, furosemide, levothyroxine, multivitamins-minerals, potassium chloride, pregabalin, propranolol, rivastigmine, sodium chloride, and vitamin B-12    Allergies   Allergen Reactions   • Penicillins Other (See Comments)     CHILDHOOD ALLERGY       Objective   Objective     Vital Signs:   Temp:  [98.5 °F (36.9 °C)] 98.5 °F (36.9 °C)  Heart Rate:  [] 70  Resp:  [18] 18  BP: (106-195)/(60-95) 117/73    Physical Exam     Constitutional: Awake, alert; chronically ill appearing   Eyes: PERRLA, sclerae anicteric, no conjunctival injection  HENT: NCAT, mucous membranes moist  Neck: Supple, no thyromegaly, no lymphadenopathy, trachea midline  Respiratory: slightly diminished througout, nonlabored respirations   Cardiovascular: RRR, no murmurs, rubs, or gallops, +2 non-pitting edema BLE   Gastrointestinal: Positive bowel sounds, soft, nontender, nondistended  Musculoskeletal: Normal ROM bilaterally   Psychiatric: Appropriate affect, cooperative  Neurologic: Oriented x 3, strength symmetric in all extremities, Cranial Nerves grossly intact to confrontation, speech clear  Skin: No rashes, lesions or wounds     Result Review:  I have personally reviewed the results from the time of this admission to 4/4/2023 20:01 EDT and agree with these findings:  [x]  Laboratory list / accordion  []  Microbiology  [x]  Radiology  [x]  EKG/Telemetry   []  Cardiology/Vascular   []  Pathology  [x]  Old records    LAB RESULTS:      Lab 04/04/23  1345 03/31/23  1614   WBC 8.49 8.86   HEMOGLOBIN 12.1 11.3*   HEMATOCRIT 37.8 35.2   PLATELETS 289 248   NEUTROS ABS 5.83  --    IMMATURE GRANS (ABS) 0.05  --    LYMPHS ABS 1.06  --    MONOS ABS 0.65  --    EOS ABS 0.75*  --    MCV 91.5 91.2   LACTATE 1.4  --          Lab 04/04/23  1345 03/31/23  1614   SODIUM 144  141   POTASSIUM 4.3 4.0   CHLORIDE 106 107   CO2 22.0 23.0   ANION GAP 16.0* 11.0   BUN 21 26*   CREATININE 1.27* 1.48*   EGFR 40.5* 33.7*   GLUCOSE 125* 144*   CALCIUM 8.6 8.4*   MAGNESIUM 2.0  --          Lab 04/04/23  1345 03/31/23  1614   TOTAL PROTEIN 6.9 6.4   ALBUMIN 3.9 3.7   GLOBULIN 3.0 2.7   ALT (SGPT) 20 18   AST (SGOT) 19 22   BILIRUBIN 0.2 0.2   ALK PHOS 117 106   LIPASE 71*  --          Lab 04/04/23  1629 04/04/23  1345 03/31/23  1614   PROBNP  --  1,886.0* 1,271.0   HSTROP T 30* 36*  --                  Brief Urine Lab Results     None        Microbiology Results (last 10 days)     Procedure Component Value - Date/Time    COVID-19 and FLU A/B PCR - Swab, Nasopharynx [[id number]]  (Normal) Collected: 04/04/23 1437    Lab Status: Final result Specimen: Swab from Nasopharynx Updated: 04/04/23 1520     COVID19 Not Detected     Influenza A PCR Not Detected     Influenza B PCR Not Detected    Narrative:      Fact sheet for providers: https://www.fda.gov/media/205671/download    Fact sheet for patients: https://www.fda.gov/media/767739/download    Test performed by PCR.        XR Chest 1 View    Result Date: 4/4/2023  XR CHEST 1 VW Date of Exam: 4/4/2023 2:18 PM EDT Indication: Dyspnea with history of congestive heart failure with peripheral edema. Comparison: July 30, 2022 Findings: There is a linear opacity within the right midlung. The heart is enlarged. There is pulmonary vascular congestion. The osseous structures appear intact.     Impression: Impression: 1.Linear opacity within right midlung, which could reflect atelectasis or pneumonia. 2.Cardiomegaly with pulmonary vascular congestion. Electronically Signed: Mikey Aguiar  4/4/2023 2:45 PM EDT  Workstation ID: [id number]    Results for orders placed during the hospital encounter of 10/10/21    Adult Transthoracic Echo Complete W/ Cont if Necessary Per Protocol    Interpretation Summary  · Moderate aortic valve regurgitation is present.  · Mild  aortic valve stenosis is present.  · Estimated right ventricular systolic pressure from tricuspid regurgitation is moderately elevated (45-55 mmHg).  · Moderate tricuspid valve regurgitation is present.  · Estimated left ventricular EF = 72% Estimated left ventricular EF was in agreement with the calculated left ventricular EF. Left ventricular ejection fraction appears to be greater than 70%. Left ventricular systolic function is hyperdynamic (EF > 70%).  · Left ventricular diastolic function is consistent with (grade I) impaired relaxation.  · Moderate pulmonary hypertension is present.  · Normal right ventricular cavity size, wall thickness, systolic function and septal motion noted.  · There is no evidence of pericardial effusion.    Assessment & Plan   Assessment & Plan       (HFpEF) heart failure with preserved ejection fraction    Hyperlipidemia    Hypertension    Hypothyroidism    Neuropathy    Stage 3 chronic kidney disease    Chronic heart failure with preserved ejection fraction    Gastroesophageal reflux disease    Elevated troponin    Nocturnal hypoxia    Zoila Nava is a 89 y.o. female w/ a hx of nocturnal hypoxia (on 2 liters at night), CHF (ED 70%), pulmonary HTN, HTN, HLD, hypothyroidism, CKD Stage III, neuropathy, GERD, chronic pain who presented to the ED w/ c/o shortness of breath.     **Acute/chronic CHF exacerbation   **Elevated troponin   **Pulmonary HTN   **HTN, HLD  -last ECHO 10/2021 w/ EF >70%, grade I LVDF, hyperdynamic LVSF  -repeat ECHO tomorrow   -pt seen at CHF clinic last Friday and Lasix dose increased to 40mg/day (up from 20mg/40mg daily- alternating doses)   -pt on room air in ED then placed on 2 liters after documented 89% on room air   -proBNP 1,886.0  -troponin 36, 30; trend overnight   -EKG stable; repeat in am   -CXR concerning for possible PNA vs atelectasis and congestion  -PA/Lateral CXR scheduled for in the am   -lactic acid, WBC both WNL; procal pending; feel  PNA less likely   -s/p Lasix 40mg IV given in ED; continue 40mg BID IV (x 2 days or until symptoms improved)  -continue routine ASA, norvasc, propranolol w/ hold parameters   -daily weights; strict I/Os  -case mgt consult   -CHF Navigator consult in am   -consider Cardiology consult if symptoms worsen; pt known to Dr. Richter     **CKD Stage III  -previous CR 1.48 on 3/31 and 128 in 12/2022  -currently 1.27 w/ eGFR 40.5  -UA pending   -repeat labs in am     **Hypothyroidism   -tsh pending   -continue synthroid     **GERD  -continue routine pepcid     **Chronic pain   -continue PRN Norco and Lyrica     DVT prophylaxis:  Heparin     CODE STATUS:    Code Status (Patient has no pulse and is not breathing): CPR (Attempt to Resuscitate)  Medical Interventions (Patient has pulse or is breathing): Full Support    Expected Discharge  Expected Discharge Date and Time     Expected Discharge Date Expected Discharge Time    Apr 6, 2023         Signature: Electronically signed by TONIE Garcia, 04/04/23, 8:01 PM EDT.    Time spent: 55 minutes

## 2023-04-04 NOTE — ED PROVIDER NOTES
EMERGENCY DEPARTMENT ENCOUNTER    Pt Name: Zoila Nava  MRN: [id number]  Pt :   1933  Room Number:    Date of encounter:  2023  PCP: Arnoldo Oneil MD  ED Provider: TONIE Hand    Historian: Patient and family      HPI:  Chief Complaint: Peripheral edema        Context: Zoila Nava is a 89 y.o. female who presents to the ED c/o persistent lower extremity swelling to both legs.  Ms. Nava presented to her cardiologist approximately 2 weeks ago for difficulty breathing and peripheral edema.  She has a history of congestive heart failure.  At that visit, her Lasix was increased from once daily to twice daily.  She was told to check her weight twice daily.  In the meantime, she has had no significant change in her weight and she continues to feel dyspnea, especially at night.  Patient routinely sits up long-term to avoid orthopnea.  She also uses oxygen at nighttime.  Nevertheless, these measures have not assisted her in the last 3 days.    Patient denies any chest pain.  She complains only of a persistent left groin pain related to a fall 3 months ago.  Patient did have an MRI to this area last Friday which showed no acute findings or fracture.    Review of systems is negative for fever chills or recent illness.  Negative for chest pain negative for cough.  Positive for shortness of breath especially exertional dyspnea and orthopnea.  GI and  systems are negative.  Positive for generalized weakness without orthostatic dizziness or syncope.  No neurosensory complaint or focal      PAST MEDICAL HISTORY  Past Medical History:   Diagnosis Date   • Anemia    • Arthritis    • Asthma    • Cataract    • Difficulty breathing     Chronic   • GERD (gastroesophageal reflux disease)    • Graves' ophthalmopathy    • Hoarseness    • Hypertension    • Hypertensive heart disease with acute on chronic diastolic congestive heart failure 10/11/2021   • Pulmonary hypertension 10/11/2021    • Spondylolisthesis of cervical region    • Vitamin B12 deficiency          PAST SURGICAL HISTORY  Past Surgical History:   Procedure Laterality Date   • CERVICAL LAMINECTOMY     • CHOLECYSTECTOMY     • OTHER SURGICAL HISTORY Right     Neuroplasty Median Nerve at Carpal Tunnel   • THYROID SURGERY     • TOTAL KNEE ARTHROPLASTY Left 09/29/2014    Dr Colon         FAMILY HISTORY  Family History   Problem Relation Age of Onset   • Hypertension Mother    • Other Father         cardiac disorder   • Diabetes Father    • Heart disease Father    • Hypertension Sister    • Colon cancer Neg Hx    • Colon polyps Neg Hx    • Esophageal cancer Neg Hx          SOCIAL HISTORY  Social History     Socioeconomic History   • Marital status:    Tobacco Use   • Smoking status: Never   • Smokeless tobacco: Never   Vaping Use   • Vaping Use: Never used   Substance and Sexual Activity   • Alcohol use: No   • Drug use: No   • Sexual activity: Defer         ALLERGIES  Penicillins        REVIEW OF SYSTEMS  Review of Systems     All systems reviewed and negative except for those discussed in HPI.       PHYSICAL EXAM    I have reviewed the triage vital signs and nursing notes.    ED Triage Vitals [04/04/23 1325]   Temp Heart Rate Resp BP SpO2   98.5 °F (36.9 °C) 83 18 115/78 92 %      Temp src Heart Rate Source Patient Position BP Location FiO2 (%)   Oral Monitor Sitting Left arm --       Physical Exam  GENERAL:   Appears in no acute distress.  She is a very good historian.  She is alert and oriented.  Her vital signs are normal with exception of lower oxygen saturation in the 90s.  She is on room air.  HENT: Nares patent.  EYES: No scleral icterus.  CV: Regular rhythm, regular rate.  No tachycardia.  She has compression hose on both legs with a tourniquet effect.  She has 1-2+ edema to her lower extremities.  No JVD no murmur appreciated.  RESPIRATORY: Normal effort.  No audible wheezes, rales or rhonchi.  ABDOMEN: Soft,  nontender  MUSCULOSKELETAL: No deformities.   NEURO: Alert, moves all extremities, follows commands.  No neurosensory deficit or focal weakness  SKIN: Warm, dry, no rash visualized.      LAB RESULTS  Recent Results (from the past 24 hour(s))   Comprehensive Metabolic Panel    Collection Time: 04/04/23  1:45 PM    Specimen: Blood   Result Value Ref Range    Glucose 125 (H) 65 - 99 mg/dL    BUN 21 8 - 23 mg/dL    Creatinine 1.27 (H) 0.57 - 1.00 mg/dL    Sodium 144 136 - 145 mmol/L    Potassium 4.3 3.5 - 5.2 mmol/L    Chloride 106 98 - 107 mmol/L    CO2 22.0 22.0 - 29.0 mmol/L    Calcium 8.6 8.6 - 10.5 mg/dL    Total Protein 6.9 6.0 - 8.5 g/dL    Albumin 3.9 3.5 - 5.2 g/dL    ALT (SGPT) 20 1 - 33 U/L    AST (SGOT) 19 1 - 32 U/L    Alkaline Phosphatase 117 39 - 117 U/L    Total Bilirubin 0.2 0.0 - 1.2 mg/dL    Globulin 3.0 gm/dL    A/G Ratio 1.3 g/dL    BUN/Creatinine Ratio 16.5 7.0 - 25.0    Anion Gap 16.0 (H) 5.0 - 15.0 mmol/L    eGFR 40.5 (L) >60.0 mL/min/1.73   Lipase    Collection Time: 04/04/23  1:45 PM    Specimen: Blood   Result Value Ref Range    Lipase 71 (H) 13 - 60 U/L   BNP    Collection Time: 04/04/23  1:45 PM    Specimen: Blood   Result Value Ref Range    proBNP 1,886.0 (H) 0.0 - 1,800.0 pg/mL   High Sensitivity Troponin T    Collection Time: 04/04/23  1:45 PM    Specimen: Blood   Result Value Ref Range    HS Troponin T 36 (H) <10 ng/L   Lactic Acid, Plasma    Collection Time: 04/04/23  1:45 PM    Specimen: Blood   Result Value Ref Range    Lactate 1.4 0.5 - 2.0 mmol/L   CK    Collection Time: 04/04/23  1:45 PM    Specimen: Blood   Result Value Ref Range    Creatine Kinase 119 20 - 180 U/L   Magnesium    Collection Time: 04/04/23  1:45 PM    Specimen: Blood   Result Value Ref Range    Magnesium 2.0 1.6 - 2.4 mg/dL   CBC Auto Differential    Collection Time: 04/04/23  1:45 PM    Specimen: Blood   Result Value Ref Range    WBC 8.49 3.40 - 10.80 10*3/mm3    RBC 4.13 3.77 - 5.28 10*6/mm3    Hemoglobin 12.1  12.0 - 15.9 g/dL    Hematocrit 37.8 34.0 - 46.6 %    MCV 91.5 79.0 - 97.0 fL    MCH 29.3 26.6 - 33.0 pg    MCHC 32.0 31.5 - 35.7 g/dL    RDW 15.5 (H) 12.3 - 15.4 %    RDW-SD 51.9 37.0 - 54.0 fl    MPV 11.2 6.0 - 12.0 fL    Platelets 289 140 - 450 10*3/mm3    Neutrophil % 68.6 42.7 - 76.0 %    Lymphocyte % 12.5 (L) 19.6 - 45.3 %    Monocyte % 7.7 5.0 - 12.0 %    Eosinophil % 8.8 (H) 0.3 - 6.2 %    Basophil % 1.8 (H) 0.0 - 1.5 %    Immature Grans % 0.6 (H) 0.0 - 0.5 %    Neutrophils, Absolute 5.83 1.70 - 7.00 10*3/mm3    Lymphocytes, Absolute 1.06 0.70 - 3.10 10*3/mm3    Monocytes, Absolute 0.65 0.10 - 0.90 10*3/mm3    Eosinophils, Absolute 0.75 (H) 0.00 - 0.40 10*3/mm3    Basophils, Absolute 0.15 0.00 - 0.20 10*3/mm3    Immature Grans, Absolute 0.05 0.00 - 0.05 10*3/mm3    nRBC 0.0 0.0 - 0.2 /100 WBC   COVID-19 and FLU A/B PCR - Swab, Nasopharynx    Collection Time: 04/04/23  2:37 PM    Specimen: Nasopharynx; Swab   Result Value Ref Range    COVID19 Not Detected Not Detected - Ref. Range    Influenza A PCR Not Detected Not Detected    Influenza B PCR Not Detected Not Detected   ECG 12 Lead Dyspnea    Collection Time: 04/04/23  2:57 PM   Result Value Ref Range    QT Interval 376 ms    QTC Interval 470 ms   High Sensitivity Troponin T 2Hr    Collection Time: 04/04/23  4:29 PM    Specimen: Blood   Result Value Ref Range    HS Troponin T 30 (H) <10 ng/L    Troponin T Delta -6 (L) >=-4 - <+4 ng/L       If labs were ordered, I independently reviewed the results and considered them in treating the patient.        RADIOLOGY  XR Chest 1 View    Result Date: 4/4/2023  XR CHEST 1 VW Date of Exam: 4/4/2023 2:18 PM EDT Indication: Dyspnea with history of congestive heart failure with peripheral edema. Comparison: July 30, 2022 Findings: There is a linear opacity within the right midlung. The heart is enlarged. There is pulmonary vascular congestion. The osseous structures appear intact.     Impression: 1.Linear opacity within  right midlung, which could reflect atelectasis or pneumonia. 2.Cardiomegaly with pulmonary vascular congestion. Electronically Signed: Mikey Cosme  4/4/2023 2:45 PM EDT  Workstation ID: [id number]        PROCEDURES    Procedures    ECG 12 Lead Dyspnea   Final Result   Test Reason : SOA   Blood Pressure :   */*   mmHG   Vent. Rate :  94 BPM     Atrial Rate :  68 BPM      P-R Int : 186 ms          QRS Dur :  90 ms       QT Int : 376 ms       P-R-T Axes :  49  13  10 degrees      QTc Int : 470 ms      Sinus rhythm with frequent premature ventricular complexes   Otherwise normal ECG   Confirmed by MIRIAM SANDERSON MD ([id number]) on 4/4/2023 3:01:22 PM      Referred By: ED MD           Confirmed By: MIRIAM SANDERSON MD          MEDICATIONS GIVEN IN ER    Medications   sodium chloride 0.9 % flush 10 mL (has no administration in time range)   furosemide (LASIX) injection 40 mg (has no administration in time range)   HYDROcodone-acetaminophen (NORCO) 7.5-325 MG per tablet 1 tablet (1 tablet Oral Given 4/4/23 1622)         MEDICAL DECISION MAKING, PROGRESS, and CONSULTS    All labs have been independently reviewed by me.  All radiology studies have been reviewed by me and the radiologist dictating the report.  All EKG's have been independently viewed and interpreted by me.      Orders placed during this visit:  Orders Placed This Encounter   Procedures   • COVID-19 and FLU A/B PCR - Swab, Nasopharynx   • XR Chest 1 View   • Comprehensive Metabolic Panel   • Lipase   • BNP   • High Sensitivity Troponin T   • Lactic Acid, Plasma   • CK   • Magnesium   • CBC Auto Differential   • High Sensitivity Troponin T 2Hr   • Cardiac Monitoring   • ECG 12 Lead Dyspnea   • Insert Peripheral IV   • Initiate Observation Status   • Tele Bed Request (STEPH / ALIA ONLY)   • CBC & Differential         Additional orders considered but not ordered:    ED Course:    Consultants:      ED Course as of 04/04/23 1747   Tue Apr 04, 2023   1602 eGFR(!): 40.5 [MS]    1740 Patient's work-up is remarkable for detection of congestive heart failure on imaging.  She has an elevated BNP.  Her kidney function is stable in comparison to recent values although her Lasix was doubled in the last several days.  We will give another dose of IV Lasix and admit to the hospital.  Patient and her family understand and concur with this inpatient plan [MS]      ED Course User Index  [MS] Kimberli Madison APRN                  AS OF 17:47 EDT VITALS:    BP - 155/94  HR - 76  TEMP - 98.5 °F (36.9 °C) (Oral)  O2 SATS - 95%                  DIAGNOSIS  Final diagnoses:   Acute on chronic congestive heart failure, unspecified heart failure type         DISPOSITION    Admitted         Please note that portions of this document were completed with voice recognition software.      Kimberli Madison, TONIE  04/04/23 6324

## 2023-04-05 ENCOUNTER — APPOINTMENT (OUTPATIENT)
Dept: GENERAL RADIOLOGY | Facility: HOSPITAL | Age: 88
DRG: 291 | End: 2023-04-05
Payer: MEDICARE

## 2023-04-05 PROBLEM — I50.9 ACUTE ON CHRONIC CONGESTIVE HEART FAILURE, UNSPECIFIED HEART FAILURE TYPE: Status: ACTIVE | Noted: 2023-04-05

## 2023-04-05 LAB
ANION GAP SERPL CALCULATED.3IONS-SCNC: 11 MMOL/L (ref 5–15)
BASOPHILS # BLD AUTO: 0.14 10*3/MM3 (ref 0–0.2)
BASOPHILS NFR BLD AUTO: 1.9 % (ref 0–1.5)
BUN SERPL-MCNC: 23 MG/DL (ref 8–23)
BUN/CREAT SERPL: 18.1 (ref 7–25)
CALCIUM SPEC-SCNC: 8.4 MG/DL (ref 8.6–10.5)
CHLORIDE SERPL-SCNC: 104 MMOL/L (ref 98–107)
CO2 SERPL-SCNC: 26 MMOL/L (ref 22–29)
CREAT SERPL-MCNC: 1.27 MG/DL (ref 0.57–1)
DEPRECATED RDW RBC AUTO: 51.8 FL (ref 37–54)
EGFRCR SERPLBLD CKD-EPI 2021: 40.5 ML/MIN/1.73
EOSINOPHIL # BLD AUTO: 0.76 10*3/MM3 (ref 0–0.4)
EOSINOPHIL NFR BLD AUTO: 10.5 % (ref 0.3–6.2)
ERYTHROCYTE [DISTWIDTH] IN BLOOD BY AUTOMATED COUNT: 15.6 % (ref 12.3–15.4)
GLUCOSE SERPL-MCNC: 95 MG/DL (ref 65–99)
HBA1C MFR BLD: 5.2 % (ref 4.8–5.6)
HCT VFR BLD AUTO: 33.3 % (ref 34–46.6)
HGB BLD-MCNC: 10.6 G/DL (ref 12–15.9)
IMM GRANULOCYTES # BLD AUTO: 0.05 10*3/MM3 (ref 0–0.05)
IMM GRANULOCYTES NFR BLD AUTO: 0.7 % (ref 0–0.5)
LARGE PLATELETS: NORMAL
LYMPHOCYTES # BLD AUTO: 1.23 10*3/MM3 (ref 0.7–3.1)
LYMPHOCYTES NFR BLD AUTO: 17.1 % (ref 19.6–45.3)
MAGNESIUM SERPL-MCNC: 1.8 MG/DL (ref 1.6–2.4)
MCH RBC QN AUTO: 29 PG (ref 26.6–33)
MCHC RBC AUTO-ENTMCNC: 31.8 G/DL (ref 31.5–35.7)
MCV RBC AUTO: 91.2 FL (ref 79–97)
MONOCYTES # BLD AUTO: 0.82 10*3/MM3 (ref 0.1–0.9)
MONOCYTES NFR BLD AUTO: 11.4 % (ref 5–12)
NEUTROPHILS NFR BLD AUTO: 4.21 10*3/MM3 (ref 1.7–7)
NEUTROPHILS NFR BLD AUTO: 58.4 % (ref 42.7–76)
NRBC BLD AUTO-RTO: 0 /100 WBC (ref 0–0.2)
PLATELET # BLD AUTO: 235 10*3/MM3 (ref 140–450)
PMV BLD AUTO: 11.2 FL (ref 6–12)
POTASSIUM SERPL-SCNC: 4.1 MMOL/L (ref 3.5–5.2)
RBC # BLD AUTO: 3.65 10*6/MM3 (ref 3.77–5.28)
RBC MORPH BLD: NORMAL
SODIUM SERPL-SCNC: 141 MMOL/L (ref 136–145)
TROPONIN T SERPL HS-MCNC: 31 NG/L
TSH SERPL DL<=0.05 MIU/L-ACNC: 6.02 UIU/ML (ref 0.27–4.2)
WBC MORPH BLD: NORMAL
WBC NRBC COR # BLD: 7.21 10*3/MM3 (ref 3.4–10.8)

## 2023-04-05 PROCEDURE — 25010000002 HEPARIN (PORCINE) PER 1000 UNITS: Performed by: NURSE PRACTITIONER

## 2023-04-05 PROCEDURE — 80048 BASIC METABOLIC PNL TOTAL CA: CPT | Performed by: NURSE PRACTITIONER

## 2023-04-05 PROCEDURE — 83036 HEMOGLOBIN GLYCOSYLATED A1C: CPT | Performed by: NURSE PRACTITIONER

## 2023-04-05 PROCEDURE — 99232 SBSQ HOSP IP/OBS MODERATE 35: CPT | Performed by: INTERNAL MEDICINE

## 2023-04-05 PROCEDURE — 83735 ASSAY OF MAGNESIUM: CPT | Performed by: NURSE PRACTITIONER

## 2023-04-05 PROCEDURE — 71046 X-RAY EXAM CHEST 2 VIEWS: CPT

## 2023-04-05 PROCEDURE — 25010000002 MAGNESIUM SULFATE 2 GM/50ML SOLUTION: Performed by: INTERNAL MEDICINE

## 2023-04-05 PROCEDURE — 84484 ASSAY OF TROPONIN QUANT: CPT | Performed by: NURSE PRACTITIONER

## 2023-04-05 PROCEDURE — 85007 BL SMEAR W/DIFF WBC COUNT: CPT | Performed by: NURSE PRACTITIONER

## 2023-04-05 PROCEDURE — 84443 ASSAY THYROID STIM HORMONE: CPT | Performed by: NURSE PRACTITIONER

## 2023-04-05 PROCEDURE — 85025 COMPLETE CBC W/AUTO DIFF WBC: CPT | Performed by: NURSE PRACTITIONER

## 2023-04-05 PROCEDURE — 25010000002 FUROSEMIDE PER 20 MG: Performed by: NURSE PRACTITIONER

## 2023-04-05 RX ORDER — FAMOTIDINE 20 MG/1
20 TABLET, FILM COATED ORAL DAILY
Status: DISCONTINUED | OUTPATIENT
Start: 2023-04-05 | End: 2023-04-08 | Stop reason: HOSPADM

## 2023-04-05 RX ORDER — MAGNESIUM SULFATE HEPTAHYDRATE 40 MG/ML
2 INJECTION, SOLUTION INTRAVENOUS ONCE
Status: COMPLETED | OUTPATIENT
Start: 2023-04-05 | End: 2023-04-05

## 2023-04-05 RX ADMIN — AMLODIPINE BESYLATE 10 MG: 10 TABLET ORAL at 08:23

## 2023-04-05 RX ADMIN — Medication 10 ML: at 20:24

## 2023-04-05 RX ADMIN — PROPRANOLOL HYDROCHLORIDE 20 MG: 20 TABLET ORAL at 20:24

## 2023-04-05 RX ADMIN — PROPRANOLOL HYDROCHLORIDE 20 MG: 20 TABLET ORAL at 08:23

## 2023-04-05 RX ADMIN — MAGNESIUM SULFATE IN WATER 2 G: 40 INJECTION, SOLUTION INTRAVENOUS at 13:38

## 2023-04-05 RX ADMIN — ASPIRIN 81 MG: 81 TABLET, COATED ORAL at 08:23

## 2023-04-05 RX ADMIN — CETIRIZINE HYDROCHLORIDE 10 MG: 10 TABLET, FILM COATED ORAL at 20:23

## 2023-04-05 RX ADMIN — FUROSEMIDE 40 MG: 10 INJECTION, SOLUTION INTRAMUSCULAR; INTRAVENOUS at 05:30

## 2023-04-05 RX ADMIN — HYDROCODONE BITARTRATE AND ACETAMINOPHEN 1 TABLET: 7.5; 325 TABLET ORAL at 13:37

## 2023-04-05 RX ADMIN — PREGABALIN 150 MG: 75 CAPSULE ORAL at 20:24

## 2023-04-05 RX ADMIN — FAMOTIDINE 20 MG: 20 TABLET, FILM COATED ORAL at 08:23

## 2023-04-05 RX ADMIN — HEPARIN SODIUM 5000 UNITS: 5000 INJECTION INTRAVENOUS; SUBCUTANEOUS at 13:37

## 2023-04-05 RX ADMIN — HEPARIN SODIUM 5000 UNITS: 5000 INJECTION INTRAVENOUS; SUBCUTANEOUS at 05:30

## 2023-04-05 RX ADMIN — HYDROCODONE BITARTRATE AND ACETAMINOPHEN 1 TABLET: 7.5; 325 TABLET ORAL at 22:37

## 2023-04-05 RX ADMIN — Medication 10 ML: at 08:24

## 2023-04-05 RX ADMIN — LEVOTHYROXINE SODIUM 125 MCG: 0.12 TABLET ORAL at 05:30

## 2023-04-05 RX ADMIN — PREGABALIN 150 MG: 75 CAPSULE ORAL at 08:23

## 2023-04-05 RX ADMIN — RIVASTIGMINE TRANSDERMAL SYSTEM 1 PATCH: 4.6 PATCH, EXTENDED RELEASE TRANSDERMAL at 08:23

## 2023-04-05 RX ADMIN — FUROSEMIDE 40 MG: 10 INJECTION, SOLUTION INTRAMUSCULAR; INTRAVENOUS at 18:59

## 2023-04-05 RX ADMIN — HEPARIN SODIUM 5000 UNITS: 5000 INJECTION INTRAVENOUS; SUBCUTANEOUS at 22:37

## 2023-04-05 RX ADMIN — HYDROCODONE BITARTRATE AND ACETAMINOPHEN 1 TABLET: 7.5; 325 TABLET ORAL at 05:30

## 2023-04-05 RX ADMIN — SENNOSIDES AND DOCUSATE SODIUM 2 TABLET: 50; 8.6 TABLET ORAL at 08:23

## 2023-04-05 NOTE — CASE MANAGEMENT/SOCIAL WORK
Discharge Planning Assessment  [facility]     Patient Name: Zoila Nava  MRN: [id number]  Today's Date: 4/5/2023    Admit Date: 4/4/2023    Plan: discharge plan   Discharge Needs Assessment     Row Name 04/05/23 1549       Living Environment    People in Home spouse    Name(s) of People in Home Matt Nava(spouse)    Current Living Arrangements home    Primary Care Provided by self    Provides Primary Care For no one, unable/limited ability to care for self    Family Caregiver if Needed child(hemalatha), adult;spouse    Family Caregiver Names Matt(spouse) and adult children    Quality of Family Relationships involved;supportive;helpful    Able to Return to Prior Arrangements yes    Living Arrangement Comments I met with pt, pt's spouse, and pt's daughter in room with permission regarding discharge plan. Pt resides in [facility] with spouse in a home.       Transition Planning    Patient/Family Anticipates Transition to home with family    Patient/Family Anticipated Services at Transition     Transportation Anticipated family or friend will provide       Discharge Needs Assessment    Readmission Within the Last 30 Days no previous admission in last 30 days    Equipment Currently Used at Home oxygen;respiratory supplies;walker, rolling;rollator;grab bar  Pt uses 2 L home O2 hs and prn provided by [facility]    Concerns to be Addressed discharge planning    Equipment Needed After Discharge grab bar, tub/shower;grab bar, toilet;respiratory supplies;oxygen;rollator;walker, rolling    Outpatient/Agency/Support Group Needs other (see comments)  Pt/spouse currently denies discharge needs.    Discharge Facility/Level of Care Needs other (see comments)  TBD    Discharge Coordination/Progress Pt confirms that she has Medicare and AARP insurance with prescription coverage and uses [facility] on [address]. Pt states she is fairly independent with ADLs and mostly uses her RW.  Pt is  here with  heart failure with preserved ejection fraction and uses home O2 2l, hs and prn. Pt/spouse denies discharge needs. Daughter Jacy in room would for her mother to have HH and I explained it would be up to the patient/spouse. CM will cont to follow               Discharge Plan     Row Name 04/05/23 6507       Plan    Plan discharge plan    Plan Comments Pt/spouse denies discharge needs. Daughter Jacy in room would for her mother to have HH and I explained it would be up to the patient/spouse and pt woould need HH order. CM will cont to follow    Final Discharge Disposition Code 01 - home or self-care              Continued Care and Services - Admitted Since 4/4/2023    Coordination has not been started for this encounter.       Expected Discharge Date and Time     Expected Discharge Date Expected Discharge Time    Apr 7, 2023          Demographic Summary     Row Name 04/05/23 1548       General Information    General Information Comments Pt's PCP is RONN PAREDES       Contact Information    Permission Granted to Share Info With     Contact Information Obtained for     Contact Information Comments Matt Nava(spouse) [phone]               Functional Status    No documentation.                Psychosocial    No documentation.                Abuse/Neglect    No documentation.                Legal    No documentation.                Substance Abuse    No documentation.                Patient Forms    No documentation.                   Kathrine Grace, RN

## 2023-04-05 NOTE — PLAN OF CARE
Goal Outcome Evaluation:  Plan of Care Reviewed With: patient           Outcome Evaluation: Pt a/o, VSS, SR, 2 LNC, sat. 94%,  no c/o pain at this time. Continue monitoring.

## 2023-04-06 ENCOUNTER — APPOINTMENT (OUTPATIENT)
Dept: CARDIOLOGY | Facility: HOSPITAL | Age: 88
DRG: 291 | End: 2023-04-06
Payer: MEDICARE

## 2023-04-06 LAB
ANION GAP SERPL CALCULATED.3IONS-SCNC: 14 MMOL/L (ref 5–15)
APTT PPP: 24.1 SECONDS (ref 60–90)
BASOPHILS # BLD AUTO: 0.13 10*3/MM3 (ref 0–0.2)
BASOPHILS NFR BLD AUTO: 1.6 % (ref 0–1.5)
BUN SERPL-MCNC: 24 MG/DL (ref 8–23)
BUN/CREAT SERPL: 17.3 (ref 7–25)
CALCIUM SPEC-SCNC: 8.4 MG/DL (ref 8.6–10.5)
CHLORIDE SERPL-SCNC: 101 MMOL/L (ref 98–107)
CO2 SERPL-SCNC: 27 MMOL/L (ref 22–29)
CREAT SERPL-MCNC: 1.39 MG/DL (ref 0.57–1)
DEPRECATED RDW RBC AUTO: 55.4 FL (ref 37–54)
EGFRCR SERPLBLD CKD-EPI 2021: 36.3 ML/MIN/1.73
EOSINOPHIL # BLD AUTO: 0.69 10*3/MM3 (ref 0–0.4)
EOSINOPHIL NFR BLD AUTO: 8.6 % (ref 0.3–6.2)
ERYTHROCYTE [DISTWIDTH] IN BLOOD BY AUTOMATED COUNT: 15.5 % (ref 12.3–15.4)
GLUCOSE SERPL-MCNC: 79 MG/DL (ref 65–99)
HCT VFR BLD AUTO: 38.2 % (ref 34–46.6)
HGB BLD-MCNC: 11.7 G/DL (ref 12–15.9)
IMM GRANULOCYTES # BLD AUTO: 0.14 10*3/MM3 (ref 0–0.05)
IMM GRANULOCYTES NFR BLD AUTO: 1.7 % (ref 0–0.5)
INR PPP: 1.01 (ref 0.84–1.13)
LYMPHOCYTES # BLD AUTO: 1.09 10*3/MM3 (ref 0.7–3.1)
LYMPHOCYTES NFR BLD AUTO: 13.6 % (ref 19.6–45.3)
MAGNESIUM SERPL-MCNC: 2.2 MG/DL (ref 1.6–2.4)
MCH RBC QN AUTO: 29.7 PG (ref 26.6–33)
MCHC RBC AUTO-ENTMCNC: 30.6 G/DL (ref 31.5–35.7)
MCV RBC AUTO: 97 FL (ref 79–97)
MONOCYTES # BLD AUTO: 0.98 10*3/MM3 (ref 0.1–0.9)
MONOCYTES NFR BLD AUTO: 12.2 % (ref 5–12)
NEUTROPHILS NFR BLD AUTO: 5 10*3/MM3 (ref 1.7–7)
NEUTROPHILS NFR BLD AUTO: 62.3 % (ref 42.7–76)
NRBC BLD AUTO-RTO: 0 /100 WBC (ref 0–0.2)
PLATELET # BLD AUTO: 216 10*3/MM3 (ref 140–450)
PMV BLD AUTO: 11 FL (ref 6–12)
POTASSIUM SERPL-SCNC: 4.1 MMOL/L (ref 3.5–5.2)
PROTHROMBIN TIME: 13.2 SECONDS (ref 11.4–14.4)
QT INTERVAL: 324 MS
QTC INTERVAL: 463 MS
RBC # BLD AUTO: 3.94 10*6/MM3 (ref 3.77–5.28)
SODIUM SERPL-SCNC: 142 MMOL/L (ref 136–145)
T4 FREE SERPL-MCNC: 1.67 NG/DL (ref 0.93–1.7)
UFH PPP CHRO-ACNC: 0.1 IU/ML (ref 0.3–0.7)
UFH PPP CHRO-ACNC: 0.13 IU/ML (ref 0.3–0.7)
WBC NRBC COR # BLD: 8.03 10*3/MM3 (ref 3.4–10.8)

## 2023-04-06 PROCEDURE — 25010000002 HEPARIN (PORCINE) PER 1000 UNITS: Performed by: NURSE PRACTITIONER

## 2023-04-06 PROCEDURE — 80048 BASIC METABOLIC PNL TOTAL CA: CPT | Performed by: INTERNAL MEDICINE

## 2023-04-06 PROCEDURE — 25010000002 HEPARIN (PORCINE) PER 1000 UNITS: Performed by: INTERNAL MEDICINE

## 2023-04-06 PROCEDURE — 85520 HEPARIN ASSAY: CPT | Performed by: INTERNAL MEDICINE

## 2023-04-06 PROCEDURE — 85025 COMPLETE CBC W/AUTO DIFF WBC: CPT | Performed by: INTERNAL MEDICINE

## 2023-04-06 PROCEDURE — 25010000002 FUROSEMIDE PER 20 MG: Performed by: NURSE PRACTITIONER

## 2023-04-06 PROCEDURE — 99222 1ST HOSP IP/OBS MODERATE 55: CPT | Performed by: INTERNAL MEDICINE

## 2023-04-06 PROCEDURE — 93306 TTE W/DOPPLER COMPLETE: CPT | Performed by: INTERNAL MEDICINE

## 2023-04-06 PROCEDURE — 85610 PROTHROMBIN TIME: CPT | Performed by: INTERNAL MEDICINE

## 2023-04-06 PROCEDURE — 25010000002 DIGOXIN PER 500 MCG: Performed by: HOSPITALIST

## 2023-04-06 PROCEDURE — 93010 ELECTROCARDIOGRAM REPORT: CPT | Performed by: INTERNAL MEDICINE

## 2023-04-06 PROCEDURE — 93306 TTE W/DOPPLER COMPLETE: CPT

## 2023-04-06 PROCEDURE — 25010000002 HEPARIN (PORCINE) 25000-0.45 UT/250ML-% SOLUTION: Performed by: INTERNAL MEDICINE

## 2023-04-06 PROCEDURE — 84439 ASSAY OF FREE THYROXINE: CPT | Performed by: INTERNAL MEDICINE

## 2023-04-06 PROCEDURE — 93005 ELECTROCARDIOGRAM TRACING: CPT | Performed by: INTERNAL MEDICINE

## 2023-04-06 PROCEDURE — 85730 THROMBOPLASTIN TIME PARTIAL: CPT | Performed by: INTERNAL MEDICINE

## 2023-04-06 PROCEDURE — 83735 ASSAY OF MAGNESIUM: CPT | Performed by: INTERNAL MEDICINE

## 2023-04-06 PROCEDURE — 99232 SBSQ HOSP IP/OBS MODERATE 35: CPT | Performed by: INTERNAL MEDICINE

## 2023-04-06 RX ORDER — HEPARIN SODIUM 10000 [USP'U]/100ML
14 INJECTION, SOLUTION INTRAVENOUS
Status: DISPENSED | OUTPATIENT
Start: 2023-04-06 | End: 2023-04-07

## 2023-04-06 RX ORDER — HEPARIN SODIUM 1000 [USP'U]/ML
25 INJECTION, SOLUTION INTRAVENOUS; SUBCUTANEOUS AS NEEDED
Status: DISCONTINUED | OUTPATIENT
Start: 2023-04-06 | End: 2023-04-06

## 2023-04-06 RX ORDER — PROPRANOLOL HYDROCHLORIDE 20 MG/1
40 TABLET ORAL 2 TIMES DAILY
Status: DISCONTINUED | OUTPATIENT
Start: 2023-04-06 | End: 2023-04-07

## 2023-04-06 RX ORDER — FUROSEMIDE 40 MG/1
40 TABLET ORAL DAILY
Status: DISCONTINUED | OUTPATIENT
Start: 2023-04-07 | End: 2023-04-07

## 2023-04-06 RX ORDER — HEPARIN SODIUM 1000 [USP'U]/ML
50 INJECTION, SOLUTION INTRAVENOUS; SUBCUTANEOUS AS NEEDED
Status: DISCONTINUED | OUTPATIENT
Start: 2023-04-06 | End: 2023-04-06

## 2023-04-06 RX ORDER — DIGOXIN 0.25 MG/ML
500 INJECTION INTRAMUSCULAR; INTRAVENOUS ONCE AS NEEDED
Status: COMPLETED | OUTPATIENT
Start: 2023-04-06 | End: 2023-04-06

## 2023-04-06 RX ORDER — HEPARIN SODIUM 1000 [USP'U]/ML
1600 INJECTION, SOLUTION INTRAVENOUS; SUBCUTANEOUS ONCE
Status: COMPLETED | OUTPATIENT
Start: 2023-04-06 | End: 2023-04-06

## 2023-04-06 RX ADMIN — SENNOSIDES AND DOCUSATE SODIUM 2 TABLET: 50; 8.6 TABLET ORAL at 08:59

## 2023-04-06 RX ADMIN — HEPARIN SODIUM 5000 UNITS: 5000 INJECTION INTRAVENOUS; SUBCUTANEOUS at 06:06

## 2023-04-06 RX ADMIN — FUROSEMIDE 40 MG: 10 INJECTION, SOLUTION INTRAMUSCULAR; INTRAVENOUS at 06:07

## 2023-04-06 RX ADMIN — HYDROCODONE BITARTRATE AND ACETAMINOPHEN 1 TABLET: 7.5; 325 TABLET ORAL at 06:07

## 2023-04-06 RX ADMIN — PREGABALIN 150 MG: 75 CAPSULE ORAL at 21:32

## 2023-04-06 RX ADMIN — EMPAGLIFLOZIN 10 MG: 10 TABLET, FILM COATED ORAL at 15:49

## 2023-04-06 RX ADMIN — HEPARIN SODIUM 12 UNITS/KG/HR: 10000 INJECTION, SOLUTION INTRAVENOUS at 13:33

## 2023-04-06 RX ADMIN — RIVASTIGMINE TRANSDERMAL SYSTEM 1 PATCH: 4.6 PATCH, EXTENDED RELEASE TRANSDERMAL at 09:07

## 2023-04-06 RX ADMIN — Medication 10 ML: at 09:00

## 2023-04-06 RX ADMIN — CETIRIZINE HYDROCHLORIDE 10 MG: 10 TABLET, FILM COATED ORAL at 21:32

## 2023-04-06 RX ADMIN — Medication 5 MG: at 21:32

## 2023-04-06 RX ADMIN — HYDROCODONE BITARTRATE AND ACETAMINOPHEN 1 TABLET: 7.5; 325 TABLET ORAL at 21:31

## 2023-04-06 RX ADMIN — DIGOXIN 500 MCG: 0.25 INJECTION INTRAMUSCULAR; INTRAVENOUS at 15:47

## 2023-04-06 RX ADMIN — DILTIAZEM HYDROCHLORIDE 2.5 MG/HR: 5 INJECTION INTRAVENOUS at 13:21

## 2023-04-06 RX ADMIN — FAMOTIDINE 20 MG: 20 TABLET, FILM COATED ORAL at 09:00

## 2023-04-06 RX ADMIN — LEVOTHYROXINE SODIUM 125 MCG: 0.12 TABLET ORAL at 06:07

## 2023-04-06 RX ADMIN — PROPRANOLOL HYDROCHLORIDE 20 MG: 20 TABLET ORAL at 09:03

## 2023-04-06 RX ADMIN — AMLODIPINE BESYLATE 10 MG: 10 TABLET ORAL at 09:00

## 2023-04-06 RX ADMIN — Medication 10 ML: at 22:21

## 2023-04-06 RX ADMIN — PREGABALIN 150 MG: 75 CAPSULE ORAL at 09:00

## 2023-04-06 RX ADMIN — HEPARIN SODIUM 1600 UNITS: 1000 INJECTION INTRAVENOUS; SUBCUTANEOUS at 21:28

## 2023-04-06 RX ADMIN — ASPIRIN 81 MG: 81 TABLET, COATED ORAL at 09:00

## 2023-04-06 NOTE — PROGRESS NOTES
[facility]  PROGRESS NOTE    Patient Name: Zoila Nava  : 1933  MRN: [id number]    Date of Admission: 2023  Primary Care Physician: Arnoldo Oneil MD    Subjective   Subjective     CC:  LE edema    HPI:  Edema slightly improved today    ROS:  Gen: no fever  GI: no nausea    Objective   Objective     Vital Signs:   Temp:  [95.7 °F (35.4 °C)-98.2 °F (36.8 °C)] 98.2 °F (36.8 °C)  Heart Rate:  [65-93] 75  Resp:  [18] 18  BP: (100-148)/(60-82) 129/73  Flow (L/min):  [2-3] 2     Physical Exam:  Constitutional - non toxic, frail, up in chair  HEENT-NCAT, mucous membranes moist  CV-RRR  Resp-CTAB  Abd-soft, nontender, nondistended, normoactive bowel sounds  Ext-2+ LE edema  Neuro-alert, speech clear, moves all extremities   Psych-normal affect   Skin- No rash on exposed UE or LE bilaterally      Results Reviewed:  LAB RESULTS:      Lab 23  1629 23  1345 23  1614   WBC 7.21  --  8.49 8.86   HEMOGLOBIN 10.6*  --  12.1 11.3*   HEMATOCRIT 33.3*  --  37.8 35.2   PLATELETS 235  --  289 248   NEUTROS ABS 4.21  --  5.83  --    IMMATURE GRANS (ABS) 0.05  --  0.05  --    LYMPHS ABS 1.23  --  1.06  --    MONOS ABS 0.82  --  0.65  --    EOS ABS 0.76*  --  0.75*  --    MCV 91.2  --  91.5 91.2   PROCALCITONIN  --  0.07  --   --    LACTATE  --   --  1.4  --          Lab 23  02123  1345 23  1614   SODIUM 141 144 141   POTASSIUM 4.1 4.3 4.0   CHLORIDE 104 106 107   CO2 26.0 22.0 23.0   ANION GAP 11.0 16.0* 11.0   BUN 23 21 26*   CREATININE 1.27* 1.27* 1.48*   EGFR 40.5* 40.5* 33.7*   GLUCOSE 95 125* 144*   CALCIUM 8.4* 8.6 8.4*   MAGNESIUM 1.8 2.0  --    HEMOGLOBIN A1C 5.20  --   --    TSH 6.020*  --   --          Lab 23  1345 23  1614   TOTAL PROTEIN 6.9 6.4   ALBUMIN 3.9 3.7   GLOBULIN 3.0 2.7   ALT (SGPT) 20 18   AST (SGOT) 19 22   BILIRUBIN 0.2 0.2   ALK PHOS 117 106   LIPASE 71*  --          Lab  04/05/23  0214 04/04/23 2057 04/04/23  1629 04/04/23  1345 03/31/23  1614   PROBNP  --   --   --  1,886.0* 1,271.0   HSTROP T 31* 29* 30* 36*  --                  Brief Urine Lab Results  (Last result in the past 365 days)      Color   Clarity   Blood   Leuk Est   Nitrite   Protein   CREAT   Urine HCG        04/04/23 2056 Yellow   Clear   Negative   Negative   Negative   Negative                 Microbiology Results Abnormal     Procedure Component Value - Date/Time    COVID-19 and FLU A/B PCR - Swab, Nasopharynx [[id number]]  (Normal) Collected: 04/04/23 1437    Lab Status: Final result Specimen: Swab from Nasopharynx Updated: 04/04/23 1520     COVID19 Not Detected     Influenza A PCR Not Detected     Influenza B PCR Not Detected    Narrative:      Fact sheet for providers: https://www.fda.gov/media/809686/download    Fact sheet for patients: https://www.fda.gov/media/440682/download    Test performed by PCR.          XR Chest 1 View    Result Date: 4/4/2023  XR CHEST 1 VW Date of Exam: 4/4/2023 2:18 PM EDT Indication: Dyspnea with history of congestive heart failure with peripheral edema. Comparison: July 30, 2022 Findings: There is a linear opacity within the right midlung. The heart is enlarged. There is pulmonary vascular congestion. The osseous structures appear intact.     Impression: Impression: 1.Linear opacity within right midlung, which could reflect atelectasis or pneumonia. 2.Cardiomegaly with pulmonary vascular congestion. Electronically Signed: Mikey Aguiar  4/4/2023 2:45 PM EDT  Workstation ID: [id number]    XR Chest PA & Lateral    Result Date: 4/5/2023  XR CHEST PA AND LATERAL Date of Exam: 4/5/2023 9:08 AM EDT Indication: CHF vs PNA vs atelectasis. Comparison: 4/4/2023 Findings: Heart shadow is decreased in size, now at upper limits of normal. Pulmonary vasculature appears upper normal as well. Mild areas of linear atelectasis or scarring in the mid and lower lungs appear stable. No new or  progressive pulmonary parenchymal disease, evidence of edema, effusion or pneumothorax is seen. Prominent pulmonary artery shadows are stable back to at least 7/30/2022. Compared to that exam, appearance of the lungs is very similar, nearing radiographic baseline.     Impression: Impression: 1. No significant remaining pulmonary vascular congestion. 2. Mild chronic appearing interstitial lung changes elsewhere, similar to multiple prior studies. Electronically Signed: Tyrone Mid  4/5/2023 9:46 AM EDT  Workstation ID: [id number]      Results for orders placed during the hospital encounter of 10/10/21    Adult Transthoracic Echo Complete W/ Cont if Necessary Per Protocol    Interpretation Summary  · Moderate aortic valve regurgitation is present.  · Mild aortic valve stenosis is present.  · Estimated right ventricular systolic pressure from tricuspid regurgitation is moderately elevated (45-55 mmHg).  · Moderate tricuspid valve regurgitation is present.  · Estimated left ventricular EF = 72% Estimated left ventricular EF was in agreement with the calculated left ventricular EF. Left ventricular ejection fraction appears to be greater than 70%. Left ventricular systolic function is hyperdynamic (EF > 70%).  · Left ventricular diastolic function is consistent with (grade I) impaired relaxation.  · Moderate pulmonary hypertension is present.  · Normal right ventricular cavity size, wall thickness, systolic function and septal motion noted.  · There is no evidence of pericardial effusion.      Current medications:  Scheduled Meds:amLODIPine, 10 mg, Oral, Daily  aspirin, 81 mg, Oral, Daily  cetirizine, 10 mg, Oral, Daily  famotidine, 20 mg, Oral, Daily  furosemide, 40 mg, Intravenous, Q12H  heparin (porcine), 5,000 Units, Subcutaneous, Q8H  HYDROcodone-acetaminophen, 1 tablet, Oral, Q8H  levothyroxine, 125 mcg, Oral, Q AM  pharmacy consult - MTM, , Does not apply, Daily  pregabalin, 150 mg, Oral, BID  propranolol, 20 mg,  Oral, BID  rivastigmine, 1 patch, Transdermal, Daily  senna-docusate sodium, 2 tablet, Oral, BID  sodium chloride, 10 mL, Intravenous, Q12H      Continuous Infusions:   PRN Meds:.•  acetaminophen  •  senna-docusate sodium **AND** polyethylene glycol **AND** bisacodyl **AND** bisacodyl  •  Magnesium Low Dose Replacement - Follow Nurse / BPA Driven Protocol  •  melatonin  •  [COMPLETED] Insert Peripheral IV **AND** sodium chloride  •  sodium chloride  •  sodium chloride    Assessment & Plan   Assessment & Plan     Active Hospital Problems    Diagnosis  POA   • **(HFpEF) heart failure with preserved ejection fraction [I50.30]  Yes   • Acute on chronic congestive heart failure, unspecified heart failure type [I50.9]  Yes   • Elevated troponin [R77.8]  Yes   • Nocturnal hypoxia [G47.34]  Yes   • Gastroesophageal reflux disease [K21.9]  Yes   • Chronic heart failure with preserved ejection fraction [I50.32]  Yes   • Stage 3 chronic kidney disease [N18.30]  Yes   • Hyperlipidemia [E78.5]  Yes   • Hypertension [I10]  Yes   • Hypothyroidism [E03.9]  Yes   • Neuropathy [G62.9]  Yes      Resolved Hospital Problems   No resolved problems to display.        Brief Hospital Course to date:  Zoila Nava is a 89 y.o. female with h/o HFpEF, pulmonary HTN, HLD, HTN, hypothyroid, CKDIII, neuropathy, Dementia, ascending aortic aneurysm, GERD and chronic pain presents with SOA and increased LE edema.    A/C Diastolic CHF  - repeat Echocardiogram  - furosemide IV BID  - I/O last 3 completed shifts:  In: -   Out: 4000 [Urine:4000]  I/O this shift:  In: -   Out: 850 [Urine:850]     CKD III  - creatinine 1.27, BMP am    Ascending aortic aneurysm    Hypothyroid    GERD    Chronic Pain      Expected Discharge Location and Transportation: home  Expected Discharge   Expected Discharge Date and Time     Expected Discharge Date Expected Discharge Time    Apr 7, 2023            DVT prophylaxis:  Medical DVT prophylaxis orders are present.           CODE STATUS:   Code Status and Medical Interventions:   Ordered at: 04/04/23 1945     Code Status (Patient has no pulse and is not breathing):    CPR (Attempt to Resuscitate)     Medical Interventions (Patient has pulse or is breathing):    Full Support       Eliel Don MD  04/05/23

## 2023-04-06 NOTE — CONSULTS
[facility] Cardiology   [address], [address]  [place], [place], [address]    [phone]  [email or web address][facility]           INPATIENT CONSULTATION NOTE    Patient Care Team:  Patient Care Team:  Arnoldo Oneil MD as PCP - General (Internal Medicine)  Sourav Montes MD as Referring Physician (Neurology)  Richard Beltran MD as Consulting Physician (Gastroenterology)  Basil Richter MD as Consulting Physician (Cardiology)    Requesting Provider and Reason for consultation: The patient is being seen today at the request of Dr. Don for heart failure.     Chief complaint:   Chief Complaint   Patient presents with   • Leg Swelling            Subjective:     Cardiac focused problem list:  1. Heart failure preserved EF  a. Echocardiogram 10/2021:  Moderate aortic valve regurgitation. Mild AS. Moderate TR. LVEF 72%. Grade I diastolic dysfunction. Moderate pulmonary hypertension.  2. Pulmonary hypertension  3. Valvular heart disease  a. Mild aortic stenosis, moderate aortic regurgitation  4. CKD  5. PVCs  6. PACs  7. Ascending aortic aneurysm  8. Hypertension    HPI:      Zoila Nava is a 89 y.o. female.  Patient presented to [facility] with complaints of worsening shortness of breath, fatigue and LE edema for the last two weeks. Patient has been seen recently in heart and valve clinic for same symptoms and Lasix dose was increased to 20 mg daily.  However her edema continued to worsen.    She presented to [facility] ED for further evaluation.  Was admitted to hospitalist service with elevated proBNP of 1886, at bedtime troponin mildly elevated but flat, chest x-ray with pulmonary edema.  She received IV diuresis with good response.  Edema significantly improved.  Breathing comfortably on room air.    Today she developed atrial fibrillation with RVR with heart rate ranging from 90 to 120 bpm.  She has a history of PVCs and PACs, no definitive atrial fibrillation.  Cardiology consulted for  further evaluation and treatment.  Patient reports she feels much better today.  Denies chest pain, palpitations, shortness of breath.  Lower extremity edema significantly improved.    Review of Systems:  As noted in the HPI    PFSH:  Patient Active Problem List   Diagnosis   • Allergic rhinitis   • Hyperlipidemia   • Hypertension   • Hypocalcemia   • Hypothyroidism   • Neuropathy   • NUD (nonulcer dyspepsia)   • Painful knee   • Pernicious anemia   • Tremor   • Vitamin B12 deficiency   • Spondylolisthesis of cervical region   • Graves' ophthalmopathy   • Anemia   • Memory impairment of gradual onset   • Ascending aortic aneurysm (HCC)   • Stage 3 chronic kidney disease   • Pulmonary hypertension   • Chronic heart failure with preserved ejection fraction   • CHF (congestive heart failure), NYHA class I, acute on chronic, diastolic   • CHF (congestive heart failure)   • Non-rheumatic aortic stenosis   • Non-rheumatic aortic regurgitation   • Hypokalemia   • Carpal tunnel syndrome   • Essential tremor   • Gastroesophageal reflux disease   • Nausea and vomiting   • Skin sensation disturbance   • Spinal cord disease   • Urge incontinence of urine   • Urinary urgency   • Acute chest pain   • (HFpEF) heart failure with preserved ejection fraction   • Elevated troponin   • Nocturnal hypoxia   • Acute on chronic congestive heart failure, unspecified heart failure type       No current facility-administered medications on file prior to encounter.     Current Outpatient Medications on File Prior to Encounter   Medication Sig Dispense Refill   • amLODIPine (NORVASC) 10 MG tablet Take 1 tablet by mouth Daily.     • aspirin 81 MG EC tablet Take 1 tablet by mouth Daily. OTC     • Boswellia-Glucosamine-Vit D (OSTEO BI-FLEX ONE PER DAY PO) Take 1 tablet by mouth Daily. OTC     • cetirizine (zyrTEC) 10 MG tablet Take 1 tablet by mouth Daily.     • famotidine (PEPCID) 20 MG tablet TAKE ONE TABLET BY MOUTH TWICE A  tablet 3   •  fluticasone (FLONASE) 50 MCG/ACT nasal spray 2 sprays by Each Nare route Daily. (Patient taking differently: 2 sprays by Each Nare route Daily As Needed.) 18.2 mL 0   • furosemide (Lasix) 20 MG tablet Take 2 tablets by mouth Daily. (Patient taking differently: Take 2 tablets by mouth Daily. Taking with Potassium Chloride 10 meq) 60 tablet 6   • HYDROcodone-acetaminophen (NORCO) 7.5-325 MG per tablet Take 1 tablet by mouth Every 8 (Eight) Hours.     • levothyroxine (SYNTHROID, LEVOTHROID) 125 MCG tablet TAKE ONE TABLET BY MOUTH EVERY MORNING 90 tablet 1   • Lyrica 150 MG capsule Take 1 capsule by mouth 2 (Two) Times a Day.     • multivitamins-minerals (PRESERVISION AREDS 2) capsule capsule Take 1 capsule by mouth 2 (Two) Times a Day. OTC     • potassium chloride 10 MEQ CR tablet Take 1 tablet by mouth Daily. (Patient taking differently: Take 1 tablet by mouth Daily. Taking with Lasix 40 mg) 30 tablet 11   • propranolol (INDERAL) 40 MG tablet Take 1 tablet by mouth 2 (Two) Times a Day. (Patient taking differently: Take 20 mg by mouth 2 (Two) Times a Day.) 60 tablet 0   • rivastigmine (EXELON) 4.6 MG/24HR patch APPLY ONE PATCH TO THE SKIN DAILY 30 patch 6   • sodium chloride 0.65 % nasal spray 2 sprays into the nostril(s) as directed by provider As Needed for Congestion. 60 mL 0   • vitamin B-12 (CYANOCOBALAMIN) 1000 MCG tablet Take 1 tablet by mouth Daily. OTC         Social History     Socioeconomic History   • Marital status:    Tobacco Use   • Smoking status: Never   • Smokeless tobacco: Never   Vaping Use   • Vaping Use: Never used   Substance and Sexual Activity   • Alcohol use: No   • Drug use: No   • Sexual activity: Defer            Objective:     Vital Sign Min/Max for last 24 hours  Temp  Min: 95.7 °F (35.4 °C)  Max: 98.6 °F (37 °C)   BP  Min: 100/60  Max: 129/73   Pulse  Min: 75  Max: 108   Resp  Min: 18  Max: 18   SpO2  Min: 90 %  Max: 97 %   Flow (L/min)  Min: 2  Max: 2      Intake/Output Summary  (Last 24 hours) at 4/6/2023 0915  Last data filed at 4/6/2023 0211  Gross per 24 hour   Intake --   Output 3000 ml   Net -3000 ml           Vitals:    04/06/23 0825   BP: 112/91   Pulse: 108   Resp: 18   Temp:    SpO2: 90%     CONSTITUTIONAL: No acute distress  RESPIRATORY: Normal effort. Mild rales in the bases, left > right.   CARDIOVASCULAR: Regular rate and rhythm with normal S1 and S2. Systolic murmur at the sternal border.   PERIPHERAL VASCULAR: No carotid bruit bilaterally.  Normal radial pulse. There is mild lower extremity edema bilaterally.    Labs and radiologic results:  Today's results were reviewed by myself.    Cardiac Data:    EKG 4/06/2023:  Afib with RVR    Results for orders placed during the hospital encounter of 10/10/21    Adult Transthoracic Echo Complete W/ Cont if Necessary Per Protocol    Interpretation Summary  · Moderate aortic valve regurgitation is present.  · Mild aortic valve stenosis is present.  · Estimated right ventricular systolic pressure from tricuspid regurgitation is moderately elevated (45-55 mmHg).  · Moderate tricuspid valve regurgitation is present.  · Estimated left ventricular EF = 72% Estimated left ventricular EF was in agreement with the calculated left ventricular EF. Left ventricular ejection fraction appears to be greater than 70%. Left ventricular systolic function is hyperdynamic (EF > 70%).  · Left ventricular diastolic function is consistent with (grade I) impaired relaxation.  · Moderate pulmonary hypertension is present.  · Normal right ventricular cavity size, wall thickness, systolic function and septal motion noted.  · There is no evidence of pericardial effusion.      Tele:  Afib with occasional RVR         Assessment and Plan:     Problem list:    (HFpEF) heart failure with preserved ejection fraction    Hyperlipidemia    Hypertension    Hypothyroidism    Neuropathy    Stage 3 chronic kidney disease    Chronic heart failure with preserved ejection  fraction    Gastroesophageal reflux disease    Elevated troponin    Nocturnal hypoxia    Acute on chronic congestive heart failure, unspecified heart failure type      ASSESSMENT:  1. Acute on chronic HFpEF  a. Most recent echo with normal EF, grade I diastolic dysfunction, moderate AR, mild AS, moderate pulmonary hypertension  2. Afib with RVR  a. Possible prior history reported per patient and family  3. PVCs, PACs  4. CKD stage III  5. Pulmonary hypertension  6. Ascending aortic aneurysm    PLAN:  1. Volume status and edema significantly improved after IV diuresis.  Will transition to Lasix 40 mg p.o. starting tomorrow morning.  2. Stopping amlodipine to allow room in blood pressure to increase beta-blocker.  3. Increase propranolol to 40 mg twice a day for rate control. Hold for SBP < 110 mmHg. Can consider additional increase tomorrow.  Previously had increased tremors on metoprolol.  4. Initiate diltiazem drip at 2.5 mg/hr to keep heart rate less than 110 bpm.  5. Can give digoxin 500 mcg IVP x 1 for sustained heart rate > 110 bpm.   6. Continue heparin drip for now with transition to Xarelto 15 milligram daily prior to discharge.  7. Adding Jardiance 10 mg daily for HF. No history of recurrent UTIs or yeast infections.   8. No room in blood pressure currently for ACE, ARB, ARNI.    Electronically signed by TONIE Orta, 04/06/23, 9:18 AM EDT.        Attending addendum  Patient seen and examined by myself today    Agree with the above history and physical.  Available Labs and studies reviewed.  The below assessment and plan represents my independent MDM.    Assessment and plan:    1. Atrial fibrillation  1. New diagnosis, onset noted today, but patient is unaware that her heart is in A-fib.  As such, chronicity is uncertain  2. Recommend rate control strategy for now  3. Increasing propranolol as noted above with holding parameters  4. Diltiazem drip as noted above with holding parameters; hold if  systolic blood pressure less than 100 mmHg  5. Can use digoxin as needed as noted above  6. Discussed risk versus benefit of anticoagulation for stroke prophylaxis with the patient and her sister-in-law.  Transition heparin to renally dose Xarelto 15 mg daily  2. HF:  1. Has diuresed well.  Borderline blood pressure today. Holding additional diuretics today.    2. Increasing propranolol  3. Discontinuing amlodipine to allow for room in her blood pressure for additional HF medications as well as a history of more pronounced swelling with amlodipine  4. Starting Jardiance  5. Considering ACE/ARB/ARNI/MRA depending on BP, renal function, electrolytes.  BP currently too low  6. Deferring on ischemic evaluation due to goals of care.  Discussed with patient today    Basil Richter MD, MSc, FACC, [facility]  Interventional Cardiology  [facility]

## 2023-04-06 NOTE — PROGRESS NOTES
HEPARIN INFUSION  Zoila Nava is a  89 y.o. female receiving heparin infusion.     Therapy for (VTE/Cardiac): Cardiac  Patient Weight: 66.4 kg  Initial Bolus (Y/N): No  Any Bolus (Y/N): Yes       Signs or Symptoms of Bleeding: None noted    Cardiac or Other (Not VTE)   Initial Bolus: 60 units/kg (Max 4,000 units)  Initial rate: 12 units/kg/hr (Max 1,000 units/hr)   Anti Xa Rebolus Infusion Hold time Change infusion Dose (Units/kg/hr) Next Anti Xa or aPTT Level Due   < 0.11 50 Units/kg  (4000 Units Max) None Increase by  3 Units/kg/hr 6 hours   0.11- 0.19 25 Units/kg  (2000 Units Max) None Increase by  2 Units/kg/hr 6 hours   0.2 - 0.29 0 None Increase by  1 Units/kg/hr 6 hours   0.3 - 0.5 0 None No Change 6 hours (after 2 consecutive levels in range check qAM)   0.51 - 0.6 0 None Decrease by  1 Units/kg/hr 6 hours   0.61 - 0.8 0 30 Minutes Decrease by  2 Units/kg/hr 6 hours   0.81 - 1 0 60 Minutes Decrease by  3 Units/kg/hr 6 hours   >1 0 Hold  After Anti Xa less than 0.5 decrease previous rate by  4 Units/kg/hr  Every 2 hours until Anti Xa  less than 0.5 then when infusion restarts in 6 hours     Results from last 7 days   Lab Units 04/05/23  0214 04/04/23  1345 03/31/23  1614   HEMOGLOBIN g/dL 10.6* 12.1 11.3*   HEMATOCRIT % 33.3* 37.8 35.2   PLATELETS 10*3/mm3 235 289 248       Date   Time   Anti-Xa Current Rate (Unit/kg/hr) Bolus   (Units) Rate Change   (Unit/kg/hr) New Rate (Unit/kg/hr) Next   Anti-Xa Comments  Pump Check Daily   4/6 1208 Pending New start -- +12 12 1800 D/W RN Coral Sánchez, PharmD, BCPS  4/6/2023  12:13 EDT    Subsequent Stages Histo Method Verbiage: Using a similar technique to that described above, a thin layer of tissue was removed from all areas where tumor was visible on the previous stage.  The tissue was again oriented, mapped, dyed, and processed as above.

## 2023-04-07 LAB
ANION GAP SERPL CALCULATED.3IONS-SCNC: 16 MMOL/L (ref 5–15)
BASOPHILS # BLD AUTO: 0.1 10*3/MM3 (ref 0–0.2)
BASOPHILS NFR BLD AUTO: 1.2 % (ref 0–1.5)
BH CV ECHO MEAS - AI P1/2T: 453.7 MSEC
BH CV ECHO MEAS - AO MAX PG: 19.4 MMHG
BH CV ECHO MEAS - AO MEAN PG: 9.7 MMHG
BH CV ECHO MEAS - AO ROOT DIAM: 2.47 CM
BH CV ECHO MEAS - AO V2 MAX: 219.6 CM/SEC
BH CV ECHO MEAS - AO V2 VTI: 36.6 CM
BH CV ECHO MEAS - EDV(CUBED): 31.1 ML
BH CV ECHO MEAS - EDV(MOD-SP4): 39.1 ML
BH CV ECHO MEAS - EF(MOD-SP4): 54.7 %
BH CV ECHO MEAS - ESV(CUBED): 21.6 ML
BH CV ECHO MEAS - ESV(MOD-SP4): 17.7 ML
BH CV ECHO MEAS - FS: 11.4 %
BH CV ECHO MEAS - IVS/LVPW: 0.97 CM
BH CV ECHO MEAS - IVSD: 0.83 CM
BH CV ECHO MEAS - LA DIMENSION: 3 CM
BH CV ECHO MEAS - LV MASS(C)D: 67.8 GRAMS
BH CV ECHO MEAS - LVIDD: 3.1 CM
BH CV ECHO MEAS - LVIDS: 2.8 CM
BH CV ECHO MEAS - LVOT AREA: 3.1 CM2
BH CV ECHO MEAS - LVOT DIAM: 1.98 CM
BH CV ECHO MEAS - LVPWD: 0.85 CM
BH CV ECHO MEAS - MED PEAK E' VEL: 12.2 CM/SEC
BH CV ECHO MEAS - MV DEC SLOPE: 558 CM/SEC2
BH CV ECHO MEAS - MV DEC TIME: 0.21 MSEC
BH CV ECHO MEAS - MV E MAX VEL: 99.1 CM/SEC
BH CV ECHO MEAS - MV MAX PG: 7.1 MMHG
BH CV ECHO MEAS - MV MEAN PG: 2.8 MMHG
BH CV ECHO MEAS - MV P1/2T: 65 MSEC
BH CV ECHO MEAS - MV V2 VTI: 34.2 CM
BH CV ECHO MEAS - MVA(P1/2T): 3.4 CM2
BH CV ECHO MEAS - PA ACC TIME: 0.11 SEC
BH CV ECHO MEAS - PA PR(ACCEL): 29.9 MMHG
BH CV ECHO MEAS - RAP SYSTOLE: 3 MMHG
BH CV ECHO MEAS - RVSP: 31.4 MMHG
BH CV ECHO MEAS - SV(MOD-SP4): 21.4 ML
BH CV ECHO MEAS - TAPSE (>1.6): 2.42 CM
BH CV ECHO MEAS - TR MAX PG: 28.4 MMHG
BH CV ECHO MEAS - TR MAX VEL: 260.3 CM/SEC
BH CV XLRA - RV BASE: 3.8 CM
BH CV XLRA - RV LENGTH: 6.3 CM
BH CV XLRA - RV MID: 3.1 CM
BH CV XLRA - TDI S': 11.3 CM/SEC
BUN SERPL-MCNC: 24 MG/DL (ref 8–23)
BUN/CREAT SERPL: 16.3 (ref 7–25)
CALCIUM SPEC-SCNC: 8.7 MG/DL (ref 8.6–10.5)
CHLORIDE SERPL-SCNC: 99 MMOL/L (ref 98–107)
CO2 SERPL-SCNC: 25 MMOL/L (ref 22–29)
CREAT SERPL-MCNC: 1.47 MG/DL (ref 0.57–1)
DEPRECATED RDW RBC AUTO: 51.5 FL (ref 37–54)
EGFRCR SERPLBLD CKD-EPI 2021: 34 ML/MIN/1.73
EOSINOPHIL # BLD AUTO: 0.76 10*3/MM3 (ref 0–0.4)
EOSINOPHIL NFR BLD AUTO: 9.2 % (ref 0.3–6.2)
ERYTHROCYTE [DISTWIDTH] IN BLOOD BY AUTOMATED COUNT: 15.1 % (ref 12.3–15.4)
GLUCOSE SERPL-MCNC: 97 MG/DL (ref 65–99)
HCT VFR BLD AUTO: 36.1 % (ref 34–46.6)
HGB BLD-MCNC: 11.3 G/DL (ref 12–15.9)
IMM GRANULOCYTES # BLD AUTO: 0.08 10*3/MM3 (ref 0–0.05)
IMM GRANULOCYTES NFR BLD AUTO: 1 % (ref 0–0.5)
LEFT ATRIUM VOLUME INDEX: 31 ML/M2
LYMPHOCYTES # BLD AUTO: 1.55 10*3/MM3 (ref 0.7–3.1)
LYMPHOCYTES NFR BLD AUTO: 18.8 % (ref 19.6–45.3)
MAXIMAL PREDICTED HEART RATE: 131 BPM
MCH RBC QN AUTO: 29.1 PG (ref 26.6–33)
MCHC RBC AUTO-ENTMCNC: 31.3 G/DL (ref 31.5–35.7)
MCV RBC AUTO: 93 FL (ref 79–97)
MONOCYTES # BLD AUTO: 1.07 10*3/MM3 (ref 0.1–0.9)
MONOCYTES NFR BLD AUTO: 13 % (ref 5–12)
NEUTROPHILS NFR BLD AUTO: 4.67 10*3/MM3 (ref 1.7–7)
NEUTROPHILS NFR BLD AUTO: 56.8 % (ref 42.7–76)
NRBC BLD AUTO-RTO: 0 /100 WBC (ref 0–0.2)
PLATELET # BLD AUTO: 228 10*3/MM3 (ref 140–450)
PMV BLD AUTO: 11.5 FL (ref 6–12)
POTASSIUM SERPL-SCNC: 3.8 MMOL/L (ref 3.5–5.2)
RBC # BLD AUTO: 3.88 10*6/MM3 (ref 3.77–5.28)
SODIUM SERPL-SCNC: 140 MMOL/L (ref 136–145)
STRESS TARGET HR: 111 BPM
UFH PPP CHRO-ACNC: 0.16 IU/ML (ref 0.3–0.7)
UFH PPP CHRO-ACNC: 0.69 IU/ML (ref 0.3–0.7)
WBC NRBC COR # BLD: 8.23 10*3/MM3 (ref 3.4–10.8)

## 2023-04-07 PROCEDURE — 99232 SBSQ HOSP IP/OBS MODERATE 35: CPT | Performed by: HOSPITALIST

## 2023-04-07 PROCEDURE — 97166 OT EVAL MOD COMPLEX 45 MIN: CPT

## 2023-04-07 PROCEDURE — 97535 SELF CARE MNGMENT TRAINING: CPT

## 2023-04-07 PROCEDURE — 80048 BASIC METABOLIC PNL TOTAL CA: CPT | Performed by: INTERNAL MEDICINE

## 2023-04-07 PROCEDURE — 85520 HEPARIN ASSAY: CPT | Performed by: INTERNAL MEDICINE

## 2023-04-07 PROCEDURE — 85025 COMPLETE CBC W/AUTO DIFF WBC: CPT | Performed by: INTERNAL MEDICINE

## 2023-04-07 PROCEDURE — 85520 HEPARIN ASSAY: CPT

## 2023-04-07 PROCEDURE — 99232 SBSQ HOSP IP/OBS MODERATE 35: CPT | Performed by: INTERNAL MEDICINE

## 2023-04-07 PROCEDURE — 97162 PT EVAL MOD COMPLEX 30 MIN: CPT

## 2023-04-07 RX ORDER — TAMSULOSIN HYDROCHLORIDE 0.4 MG/1
0.4 CAPSULE ORAL DAILY
Status: DISCONTINUED | OUTPATIENT
Start: 2023-04-07 | End: 2023-04-08 | Stop reason: HOSPADM

## 2023-04-07 RX ORDER — PROPRANOLOL HYDROCHLORIDE 20 MG/1
40 TABLET ORAL EVERY 8 HOURS SCHEDULED
Status: DISCONTINUED | OUTPATIENT
Start: 2023-04-07 | End: 2023-04-08 | Stop reason: HOSPADM

## 2023-04-07 RX ORDER — PROPRANOLOL HYDROCHLORIDE 20 MG/1
40 TABLET ORAL EVERY 8 HOURS SCHEDULED
Status: DISCONTINUED | OUTPATIENT
Start: 2023-04-07 | End: 2023-04-07

## 2023-04-07 RX ORDER — FUROSEMIDE 20 MG/1
20 TABLET ORAL DAILY
Status: DISCONTINUED | OUTPATIENT
Start: 2023-04-07 | End: 2023-04-08 | Stop reason: HOSPADM

## 2023-04-07 RX ORDER — GUAIFENESIN 600 MG/1
600 TABLET, EXTENDED RELEASE ORAL EVERY 12 HOURS PRN
Status: DISCONTINUED | OUTPATIENT
Start: 2023-04-07 | End: 2023-04-08 | Stop reason: HOSPADM

## 2023-04-07 RX ADMIN — HYDROCODONE BITARTRATE AND ACETAMINOPHEN 1 TABLET: 7.5; 325 TABLET ORAL at 22:07

## 2023-04-07 RX ADMIN — SENNOSIDES AND DOCUSATE SODIUM 2 TABLET: 50; 8.6 TABLET ORAL at 22:06

## 2023-04-07 RX ADMIN — PROPRANOLOL HYDROCHLORIDE 40 MG: 20 TABLET ORAL at 22:12

## 2023-04-07 RX ADMIN — TAMSULOSIN HYDROCHLORIDE 0.4 MG: 0.4 CAPSULE ORAL at 12:35

## 2023-04-07 RX ADMIN — EMPAGLIFLOZIN 10 MG: 10 TABLET, FILM COATED ORAL at 09:27

## 2023-04-07 RX ADMIN — Medication 10 ML: at 22:08

## 2023-04-07 RX ADMIN — PROPRANOLOL HYDROCHLORIDE 40 MG: 20 TABLET ORAL at 09:38

## 2023-04-07 RX ADMIN — GUAIFENESIN 600 MG: 600 TABLET, EXTENDED RELEASE ORAL at 22:06

## 2023-04-07 RX ADMIN — ASPIRIN 81 MG: 81 TABLET, COATED ORAL at 09:25

## 2023-04-07 RX ADMIN — RIVAROXABAN 15 MG: 15 TABLET, FILM COATED ORAL at 17:28

## 2023-04-07 RX ADMIN — SENNOSIDES AND DOCUSATE SODIUM 2 TABLET: 50; 8.6 TABLET ORAL at 09:25

## 2023-04-07 RX ADMIN — CETIRIZINE HYDROCHLORIDE 10 MG: 10 TABLET, FILM COATED ORAL at 22:07

## 2023-04-07 RX ADMIN — RIVASTIGMINE TRANSDERMAL SYSTEM 1 PATCH: 4.6 PATCH, EXTENDED RELEASE TRANSDERMAL at 09:27

## 2023-04-07 RX ADMIN — PREGABALIN 150 MG: 75 CAPSULE ORAL at 09:26

## 2023-04-07 RX ADMIN — Medication 10 ML: at 09:19

## 2023-04-07 RX ADMIN — HYDROCODONE BITARTRATE AND ACETAMINOPHEN 1 TABLET: 7.5; 325 TABLET ORAL at 12:35

## 2023-04-07 RX ADMIN — Medication 5 MG: at 22:07

## 2023-04-07 RX ADMIN — LEVOTHYROXINE SODIUM 125 MCG: 0.12 TABLET ORAL at 05:40

## 2023-04-07 RX ADMIN — FAMOTIDINE 20 MG: 20 TABLET, FILM COATED ORAL at 09:39

## 2023-04-07 RX ADMIN — GUAIFENESIN 600 MG: 600 TABLET, EXTENDED RELEASE ORAL at 04:15

## 2023-04-07 RX ADMIN — HYDROCODONE BITARTRATE AND ACETAMINOPHEN 1 TABLET: 7.5; 325 TABLET ORAL at 05:40

## 2023-04-07 RX ADMIN — FUROSEMIDE 20 MG: 40 TABLET ORAL at 09:26

## 2023-04-07 RX ADMIN — PREGABALIN 150 MG: 75 CAPSULE ORAL at 22:07

## 2023-04-07 NOTE — PLAN OF CARE
Goal Outcome Evaluation:  Plan of Care Reviewed With: patient, family           Outcome Evaluation: Pt presents w/ generalized weakness, decreased functional endurance, and mild balance deficits limiting her ADL independnece. Pt would benefit from continued skilled IPOT services to address current functional deficits. Rec home w/ A & OP PT at d/c.

## 2023-04-07 NOTE — PLAN OF CARE
Goal Outcome Evaluation:   Patient continues on heparin gtt @ 13u. Plan to stop heparin at 1800 and start PO xarelto. Patient experienced difficulty with urinating, multiple unsuccessful attempts to void on commode. MD made aware and ordered Flomax. Patient now voiding spontaneously with adequate UOP. Patient's spouse at bedside. Plan for possible d/c 4/8/23.

## 2023-04-07 NOTE — PROGRESS NOTES
[facility] Medicine Services  PROGRESS NOTE    Patient Name: Zoila Nava  : 1933  MRN: [id number]    Date of Admission: 2023  Primary Care Physician: Arnoldo Oneil MD    Subjective   Subjective     CC:  LE edema    HPI:  Edema better, no cough or shortness of breath    ROS:  Gen: no fever  GI: no nausea    Objective   Objective     Vital Signs:   Temp:  [97.6 °F (36.4 °C)-98.3 °F (36.8 °C)] 97.6 °F (36.4 °C)  Heart Rate:  [] 84  Resp:  [18] 18  BP: (103-122)/(61-74) 122/66  Flow (L/min):  [2] 2     Constitutional - frail, up in chair  HEENT-NCAT, mucous membranes moist  CV-RRR  Resp-faint crackles left base  Abd-soft, nontender, nondistended, normoactive bowel sounds  Ext-+ LE edema  Neuro-alert, speech clear, moves all extremities   Psych-normal affect   Skin- No rash on exposed UE or LE bilaterally      Results Reviewed:  LAB RESULTS:      Lab 23  1045 23  0335 23  1942 23  1223 23  0214 23  1629 23  1345 23  1614   WBC  --  8.23  --  8.03 7.21  --  8.49 8.86   HEMOGLOBIN  --  11.3*  --  11.7* 10.6*  --  12.1 11.3*   HEMATOCRIT  --  36.1  --  38.2 33.3*  --  37.8 35.2   PLATELETS  --  228  --  216 235  --  289 248   NEUTROS ABS  --  4.67  --  5.00 4.21  --  5.83  --    IMMATURE GRANS (ABS)  --  0.08*  --  0.14* 0.05  --  0.05  --    LYMPHS ABS  --  1.55  --  1.09 1.23  --  1.06  --    MONOS ABS  --  1.07*  --  0.98* 0.82  --  0.65  --    EOS ABS  --  0.76*  --  0.69* 0.76*  --  0.75*  --    MCV  --  93.0  --  97.0 91.2  --  91.5 91.2   PROCALCITONIN  --   --   --   --   --  0.07  --   --    LACTATE  --   --   --   --   --   --  1.4  --    PROTIME  --   --   --  13.2  --   --   --   --    APTT  --   --   --  24.1*  --   --   --   --    HEPARIN ANTI-XA 0.16* 0.69 0.13* 0.10*  --   --   --   --          Lab 23  0335 23  0523 23  0214 23  1345 23  1614   SODIUM 140 142 141 144 141    POTASSIUM 3.8 4.1 4.1 4.3 4.0   CHLORIDE 99 101 104 106 107   CO2 25.0 27.0 26.0 22.0 23.0   ANION GAP 16.0* 14.0 11.0 16.0* 11.0   BUN 24* 24* 23 21 26*   CREATININE 1.47* 1.39* 1.27* 1.27* 1.48*   EGFR 34.0* 36.3* 40.5* 40.5* 33.7*   GLUCOSE 97 79 95 125* 144*   CALCIUM 8.7 8.4* 8.4* 8.6 8.4*   MAGNESIUM  --  2.2 1.8 2.0  --    HEMOGLOBIN A1C  --   --  5.20  --   --    TSH  --   --  6.020*  --   --          Lab 04/04/23  1345 03/31/23  1614   TOTAL PROTEIN 6.9 6.4   ALBUMIN 3.9 3.7   GLOBULIN 3.0 2.7   ALT (SGPT) 20 18   AST (SGOT) 19 22   BILIRUBIN 0.2 0.2   ALK PHOS 117 106   LIPASE 71*  --          Lab 04/06/23  1223 04/05/23  0214 04/04/23 2057 04/04/23  1629 04/04/23  1345 03/31/23  1614   PROBNP  --   --   --   --  1,886.0* 1,271.0   HSTROP T  --  31* 29* 30* 36*  --    PROTIME 13.2  --   --   --   --   --    INR 1.01  --   --   --   --   --                  Brief Urine Lab Results  (Last result in the past 365 days)      Color   Clarity   Blood   Leuk Est   Nitrite   Protein   CREAT   Urine HCG        04/04/23 2056 Yellow   Clear   Negative   Negative   Negative   Negative                 Microbiology Results Abnormal     Procedure Component Value - Date/Time    COVID-19 and FLU A/B PCR - Swab, Nasopharynx [[id number]]  (Normal) Collected: 04/04/23 1437    Lab Status: Final result Specimen: Swab from Nasopharynx Updated: 04/04/23 1520     COVID19 Not Detected     Influenza A PCR Not Detected     Influenza B PCR Not Detected    Narrative:      Fact sheet for providers: https://www.fda.gov/media/177266/download    Fact sheet for patients: https://www.fda.gov/media/486467/download    Test performed by PCR.          Adult Transthoracic Echo Complete W/ Cont if Necessary Per Protocol    Result Date: 4/7/2023  •  Left ventricular systolic function is normal. Left ventricular ejection fraction appears to be 51 - 55%. •  Mildly reduced right ventricular systolic function noted. •  The left atrial cavity is  dilated. •  The right atrial cavity is dilated. •  There is moderate calcification of the aortic valve. •  Mild to moderate aortic valve regurgitation is present. •  Mild mitral valve regurgitation is present. •  Mild tricuspid valve regurgitation is present.       Results for orders placed during the hospital encounter of 04/04/23    Adult Transthoracic Echo Complete W/ Cont if Necessary Per Protocol    Interpretation Summary  •  Left ventricular systolic function is normal. Left ventricular ejection fraction appears to be 51 - 55%.  •  Mildly reduced right ventricular systolic function noted.  •  The left atrial cavity is dilated.  •  The right atrial cavity is dilated.  •  There is moderate calcification of the aortic valve.  •  Mild to moderate aortic valve regurgitation is present.  •  Mild mitral valve regurgitation is present.  •  Mild tricuspid valve regurgitation is present.      Current medications:  Scheduled Meds:aspirin, 81 mg, Oral, Daily  cetirizine, 10 mg, Oral, Daily  empagliflozin, 10 mg, Oral, Daily  famotidine, 20 mg, Oral, Daily  furosemide, 20 mg, Oral, Daily  HYDROcodone-acetaminophen, 1 tablet, Oral, Q8H  levothyroxine, 125 mcg, Oral, Q AM  pharmacy consult - [place], , Does not apply, Daily  pregabalin, 150 mg, Oral, BID  propranolol, 40 mg, Oral, Q8H  rivaroxaban, 15 mg, Oral, Daily With Dinner  rivastigmine, 1 patch, Transdermal, Daily  senna-docusate sodium, 2 tablet, Oral, BID  sodium chloride, 10 mL, Intravenous, Q12H  tamsulosin, 0.4 mg, Oral, Daily      Continuous Infusions:heparin, 14 Units/kg/hr, Last Rate: 14 Units/kg/hr (04/07/23 1225)  Pharmacy to Dose Heparin,       PRN Meds:.•  acetaminophen  •  senna-docusate sodium **AND** polyethylene glycol **AND** bisacodyl **AND** bisacodyl  •  guaiFENesin  •  Magnesium Low Dose Replacement - Follow Nurse / BPA Driven Protocol  •  melatonin  •  Pharmacy to Dose Heparin  •  [COMPLETED] Insert Peripheral IV **AND** sodium chloride  •  sodium  chloride  •  sodium chloride    Assessment & Plan   Assessment & Plan     Active Hospital Problems    Diagnosis  POA   • **(HFpEF) heart failure with preserved ejection fraction [I50.30]  Yes   • Acute on chronic congestive heart failure, unspecified heart failure type [I50.9]  Yes   • Elevated troponin [R77.8]  Yes   • Nocturnal hypoxia [G47.34]  Yes   • Gastroesophageal reflux disease [K21.9]  Yes   • Chronic heart failure with preserved ejection fraction [I50.32]  Yes   • Stage 3 chronic kidney disease [N18.30]  Yes   • Hyperlipidemia [E78.5]  Yes   • Hypertension [I10]  Yes   • Hypothyroidism [E03.9]  Yes   • Neuropathy [G62.9]  Yes      Resolved Hospital Problems   No resolved problems to display.        Brief Hospital Course to date:  Zoila Nava is a 89 y.o. female with h/o HFpEF, pulmonary HTN, HLD, HTN, hypothyroid, CKDIII, neuropathy, Dementia, ascending aortic aneurysm, GERD and chronic pain presents with SOA and increased LE edema.    A/C Diastolic CHF  - lasix 20 mg PO daily at discharge  - follow up in heart and valve clinic in one week with BMP and proBNP  - no ACEi, ARB, ARNI or MRA due to renal function and hypotension  - follow up with Cardiology Dr Richter in 8-12 weeks  - I/O last 3 completed shifts:  In: -   Out: 2000 [Urine:2000]  I/O this shift:  In: -   Out: 400 [Urine:400]     Atrial fibrillation with RVR  - convert from heparin IV to xarleto this evening  - propranolol dose titrated up to 400 mg TID by Cardiology  - consider ECV after patient has been on anticoagulation for > 3 weeks    CKD III  - bmp am    Urinary Retention  - added flomax, I/o cath prn    Ascending aortic aneurysm    Hypothyroid     GERD    Chronic Pain      Expected Discharge Location and Transportation: home  Expected Discharge   Expected Discharge Date and Time     Expected Discharge Date Expected Discharge Time    Apr 7, 2023            DVT prophylaxis:  Medical DVT prophylaxis orders are present.     AM-PAC 6  Clicks Score (PT): 21 (04/07/23 1035)    CODE STATUS:   Code Status and Medical Interventions:   Ordered at: 04/04/23 1945     Code Status (Patient has no pulse and is not breathing):    CPR (Attempt to Resuscitate)     Medical Interventions (Patient has pulse or is breathing):    Full Support       Eliel Don MD  04/07/23

## 2023-04-07 NOTE — THERAPY EVALUATION
Patient Name: Zoila Nava  : 1933    MRN: [id number]                              Today's Date: 2023       Admit Date: 2023    Visit Dx:     ICD-10-CM ICD-9-CM   1. Acute on chronic congestive heart failure, unspecified heart failure type  I50.9 428.0   2. CHF (congestive heart failure), NYHA class I, acute on chronic, diastolic  I50.33 428.33     428.0     Patient Active Problem List   Diagnosis   • Allergic rhinitis   • Hyperlipidemia   • Hypertension   • Hypocalcemia   • Hypothyroidism   • Neuropathy   • NUD (nonulcer dyspepsia)   • Painful knee   • Pernicious anemia   • Tremor   • Vitamin B12 deficiency   • Spondylolisthesis of cervical region   • Graves' ophthalmopathy   • Anemia   • Memory impairment of gradual onset   • Ascending aortic aneurysm (HCC)   • Stage 3 chronic kidney disease   • Pulmonary hypertension   • Chronic heart failure with preserved ejection fraction   • CHF (congestive heart failure), NYHA class I, acute on chronic, diastolic   • CHF (congestive heart failure)   • Non-rheumatic aortic stenosis   • Non-rheumatic aortic regurgitation   • Hypokalemia   • Carpal tunnel syndrome   • Essential tremor   • Gastroesophageal reflux disease   • Nausea and vomiting   • Skin sensation disturbance   • Spinal cord disease   • Urge incontinence of urine   • Urinary urgency   • Acute chest pain   • (HFpEF) heart failure with preserved ejection fraction   • Elevated troponin   • Nocturnal hypoxia   • Acute on chronic congestive heart failure, unspecified heart failure type     Past Medical History:   Diagnosis Date   • Anemia    • Arthritis    • Asthma    • Cataract    • Difficulty breathing     Chronic   • GERD (gastroesophageal reflux disease)    • Graves' ophthalmopathy    • Hoarseness    • Hypertension    • Hypertensive heart disease with acute on chronic diastolic congestive heart failure 10/11/2021   • Pulmonary hypertension 10/11/2021   • Spondylolisthesis of cervical region     • Vitamin B12 deficiency      Past Surgical History:   Procedure Laterality Date   • CERVICAL LAMINECTOMY     • CHOLECYSTECTOMY     • OTHER SURGICAL HISTORY Right     Neuroplasty Median Nerve at Carpal Tunnel   • THYROID SURGERY     • TOTAL KNEE ARTHROPLASTY Left 09/29/2014    Dr Colon      General Information     Row Name 04/07/23 1534          OT Time and Intention    Document Type evaluation  -     Mode of Treatment occupational therapy  -     Row Name 04/07/23 1534          General Information    Patient Profile Reviewed yes  -     Prior Level of Function independent:;bed mobility;transfer;all household mobility;ADL's  Pt uses rollator for mobility at baseline  -     Existing Precautions/Restrictions fall;oxygen therapy device and L/min  -     Barriers to Rehab medically complex  -     Row Name 04/07/23 1534          Living Environment    People in Home spouse  -     Row Name 04/07/23 1534          Home Main Entrance    Number of Stairs, Main Entrance two  -     Stair Railings, Main Entrance railing on right side (ascending)  -     Row Name 04/07/23 1534          Stairs Within Home, Primary    Stairs, Within Home, Primary Pt lives on main level of multi-level home, has a ramp for access to family room from main level.  -     Row Name 04/07/23 1534          Cognition    Orientation Status (Cognition) oriented x 3  -     Row Name 04/07/23 1538          Safety Issues, Functional Mobility    Safety Issues Affecting Function (Mobility) safety precaution awareness;sequencing abilities  -     Impairments Affecting Function (Mobility) balance;endurance/activity tolerance;strength  -           User Key  (r) = Recorded By, (t) = Taken By, (c) = Cosigned By    Initials Name Provider Type     Bridget Major, OT Occupational Therapist                 Mobility/ADL's     Row Name 04/07/23 1536          Bed Mobility    Comment, (Bed Mobility) Pt received on BSC, left sitting [facility]  -      [facility] Name 04/07/23 1535          Transfers    Transfers sit-stand transfer;stand-sit transfer  -[facility] 04/07/23 1535          Sit-Stand Transfer    Sit-Stand [place] (Transfers) minimum assist (75% patient effort);1 person assist;verbal cues  -     Assistive Device (Sit-Stand Transfers) walker, 4-wheeled;other (see comments)  BUE support  -MC     Row Name 04/07/23 1535          Stand-Sit Transfer    Stand-Sit [place] (Transfers) contact guard;verbal cues  -     Assistive Device (Stand-Sit Transfers) walker, 4-wheeled  -[facility] 04/07/23 1535          Functional Mobility    Functional Mobility- Ind. Level contact guard assist;verbal cues required  -     Functional Mobility- Device walker, 4-wheeled  -     Functional Mobility-Distance (Feet) --  household distance in room  -     Functional Mobility- Safety Issues balance decreased during turns;sequencing ability decreased;step length decreased;supplemental O2  -[facility] 04/07/23 1535          Activities of Daily Living    BADL Assessment/Intervention toileting;lower body dressing  -[facility] 04/07/23 1535          Toileting Assessment/Training    [place] Level (Toileting) adjust/manage clothing;change pad/brief;perform perineal hygiene;dependent (less than 25% patient effort);verbal cues  -     Assistive Devices (Toileting) commode, bedside without drop arms  -     Position (Toileting) supported standing  -MC     Row Name 04/07/23 1535          Lower Body Dressing Assessment/Training    [place] Level (Lower Body Dressing) doff;socks;dependent (less than 25% patient effort);verbal cues  -     Position (Lower Body Dressing) supported sitting  -           User Key  (r) = Recorded By, (t) = Taken By, (c) = Cosigned By    Initials Name Provider Type     Bridget Major OT Occupational Therapist               Obj/Interventions     [facility] Name 04/07/23 1536          Sensory Assessment (Somatosensory)     Sensory Assessment (Somatosensory) UE sensation intact  -[facility] Name 04/07/23 1536          Vision Assessment/Intervention    Visual Impairment/Limitations WFL  -[facility] Name 04/07/23 1536          Range of Motion Comprehensive    General Range of Motion bilateral upper extremity ROM WFL  -[facility] Name 04/07/23 1536          Strength Comprehensive (MMT)    General Manual Muscle Testing (MMT) Assessment upper extremity strength deficits identified  -     Comment, General Manual Muscle Testing (MMT) Assessment BUE grossly 4/5  -[facility] Name 04/07/23 1536          Balance    Balance Assessment sitting static balance;sitting dynamic balance;sit to stand dynamic balance;standing static balance;standing dynamic balance  -     Static Sitting Balance standby assist  -     Dynamic Sitting Balance standby assist  -     Position, Sitting Balance unsupported;sitting in chair;other (see comments)  BSC  -     Sit to Stand Dynamic Balance minimal assist;1-person assist;verbal cues  -     Static Standing Balance contact guard;verbal cues  -     Dynamic Standing Balance contact guard;verbal cues  -     Position/Device Used, Standing Balance supported;walker, 4-wheeled  -     Balance Interventions sitting;sit to stand;occupation based/functional task  -           User Key  (r) = Recorded By, (t) = Taken By, (c) = Cosigned By    Initials Name Provider Type     Bridget Major OT Occupational Therapist               Goals/Plan     Row Name 04/07/23 1538          Transfer Goal 1 (OT)    Activity/Assistive Device (Transfer Goal 1, OT) bed-to-chair/chair-to-bed;toilet;commode;rollator  -     [place] Level/Cues Needed (Transfer Goal 1, OT) standby assist  -     Time Frame (Transfer Goal 1, OT) long term goal (LTG);10 days  -     Progress/Outcome (Transfer Goal 1, OT) goal ongoing  -[facility] Name 04/07/23 1538          Dressing Goal 1 (OT)    Activity/Device (Dressing Goal 1, OT) lower  body dressing  don/doff socks w/ AAD  -     [place]/Cues Needed (Dressing Goal 1, OT) minimum assist (75% or more patient effort);verbal cues required  -     Time Frame (Dressing Goal 1, OT) long term goal (LTG);10 days  -     Progress/Outcome (Dressing Goal 1, OT) goal ongoing  -     Row Name 04/07/23 1538          Toileting Goal 1 (OT)    Activity/Device (Toileting Goal 1, OT) adjust/manage clothing;perform perineal hygiene;commode, bedside without drop arms  -     [place] Level/Cues Needed (Toileting Goal 1, OT) moderate assist (50-74% patient effort);verbal cues required  -     Time Frame (Toileting Goal 1, OT) long term goal (LTG);10 days  -     Progress/Outcome (Toileting Goal 1, OT) goal ongoing  -     Row Name 04/07/23 1538          Therapy Assessment/Plan (OT)    Planned Therapy Interventions (OT) activity tolerance training;adaptive equipment training;BADL retraining;functional balance retraining;IADL retraining;occupation/activity based interventions;ROM/therapeutic exercise;strengthening exercise;transfer/mobility retraining;patient/caregiver education/training  -           User Key  (r) = Recorded By, (t) = Taken By, (c) = Cosigned By    Initials Name Provider Type     Bridget Major, [place] Occupational Therapist               Clinical Impression     Row Name 04/07/23 1537          Pain Assessment    Pretreatment Pain Rating 0/10 - no pain  -     Posttreatment Pain Rating 0/10 - no pain  -     Row Name 04/07/23 1537          Plan of Care Review    Plan of Care Reviewed With patient;family  -     Outcome Evaluation Pt presents w/ generalized weakness, decreased functional endurance, and mild balance deficits limiting her ADL independnece. Pt would benefit from continued skilled IPOT services to address current functional deficits. Rec home w/ A & OP PT at d/c.  -     Row Name 04/07/23 1539          Therapy Assessment/Plan (OT)    Rehab Potential (OT) good, to  achieve stated therapy goals  -     Criteria for Skilled Therapeutic Interventions Met (OT) yes;skilled treatment is necessary  -     Therapy Frequency (OT) daily  -     Row Name 04/07/23 1537          Therapy Plan Review/Discharge Plan (OT)    Anticipated Discharge Disposition (OT) home with assist;home with outpatient therapy services  -     Row Name 04/07/23 1537          Vital Signs    Pre Systolic BP Rehab --  VSS  -     O2 Delivery Pre Treatment nasal cannula  -MC     O2 Delivery Intra Treatment nasal cannula  -     O2 Delivery Post Treatment nasal cannula  -MC     Pre Patient Position Sitting  -     Intra Patient Position Standing  -     Post Patient Position Sitting  -[facility] Name 04/07/23 1537          Positioning and Restraints    Pre-Treatment Position bedside commode  -     Post Treatment Position chair  -     In Chair reclined;call light within reach;encouraged to call for assist;waffle cushion;legs elevated  exit alarm status unchanged  -           User Key  (r) = Recorded By, (t) = Taken By, (c) = Cosigned By    Initials Name Provider Type    Bridget Bolton, OT Occupational Therapist               Outcome Measures     Row Name 04/07/23 1539          How much help from another is currently needed...    Putting on and taking off regular lower body clothing? 2  -MC     Bathing (including washing, rinsing, and drying) 2  -MC     Toileting (which includes using toilet bed pan or urinal) 2  -MC     Putting on and taking off regular upper body clothing 3  -MC     Taking care of personal grooming (such as brushing teeth) 4  -MC     Eating meals 4  -MC     AM-PAC 6 Clicks Score (OT) 17  -     Row Name 04/07/23 1035          How much help from another person do you currently need...    Turning from your back to your side while in flat bed without using bedrails? 4  -ML     Moving from lying on back to sitting on the side of a flat bed without bedrails? 4  -ML     Moving to and  from a bed to a chair (including a wheelchair)? 3  -ML     Standing up from a chair using your arms (e.g., wheelchair, bedside chair)? 4  -ML     Climbing 3-5 steps with a railing? 3  -ML     To walk in hospital room? 3  -ML     AM-PAC 6 Clicks Score (PT) 21  -ML     Highest level of mobility 6 --> Walked 10 steps or more  -ML     Row Name 04/07/23 1539 04/07/23 1035       Functional Assessment    Outcome Measure Options AM-PAC 6 Clicks Daily Activity (OT)  - AM-PAC 6 Clicks Basic Mobility (PT)  -          User Key  (r) = Recorded By, (t) = Taken By, (c) = Cosigned By    Initials Name Provider Type    Bridget Bolton OT Occupational Therapist    Adilene Figueroa Physical Therapist                Occupational Therapy Education     Title: PT OT SLP Therapies (In Progress)     Topic: Occupational Therapy (In Progress)     Point: ADL training (Done)     Description:   Instruct learner(s) on proper safety adaptation and remediation techniques during self care or transfers.   Instruct in proper use of assistive devices.              Learning Progress Summary           Patient Acceptance, E, VU by  at 4/7/2023 1539                   Point: Home exercise program (Not Started)     Description:   Instruct learner(s) on appropriate technique for monitoring, assisting and/or progressing therapeutic exercises/activities.              Learner Progress:  Not documented in this visit.          Point: Precautions (Done)     Description:   Instruct learner(s) on prescribed precautions during self-care and functional transfers.              Learning Progress Summary           Patient Acceptance, E, VU by  at 4/7/2023 1539                   Point: Body mechanics (Done)     Description:   Instruct learner(s) on proper positioning and spine alignment during self-care, functional mobility activities and/or exercises.              Learning Progress Summary           Patient Acceptance, E, VU by  at 4/7/2023 1539                                User Key     Initials Effective Dates Name Provider Type Discipline     10/14/22 -  Bridget Major OT Occupational Therapist OT              OT Recommendation and Plan  Planned Therapy Interventions (OT): activity tolerance training, adaptive equipment training, BADL retraining, functional balance retraining, IADL retraining, occupation/activity based interventions, ROM/therapeutic exercise, strengthening exercise, transfer/mobility retraining, patient/caregiver education/training  Therapy Frequency (OT): daily  Plan of Care Review  Plan of Care Reviewed With: patient, family  Outcome Evaluation: Pt presents w/ generalized weakness, decreased functional endurance, and mild balance deficits limiting her ADL independnece. Pt would benefit from continued skilled IPOT services to address current functional deficits. Rec home w/ A & OP PT at d/c.     Time Calculation:    Time Calculation- OT     Row Name 04/07/23 1540             Time Calculation- OT    OT Start Time 1339  -MC      OT Received On 04/07/23  -      OT Goal Re-Cert Due Date 04/17/23  -         Timed Charges    78811 - OT Therapeutic Activity Minutes 6  -MC      85938 - OT Self Care/Mgmt Minutes 9  -MC         Untimed Charges    OT Eval/Re-eval Minutes 31  -MC         Total Minutes    Timed Charges Total Minutes 15  -MC      Untimed Charges Total Minutes 31  -MC       Total Minutes 46  -MC            User Key  (r) = Recorded By, (t) = Taken By, (c) = Cosigned By    Initials Name Provider Type     Bridget Major OT Occupational Therapist              Therapy Charges for Today     Code Description Service Date Service Provider Modifiers Qty    96944753092 HC OT SELF CARE/MGMT/TRAIN EA 15 MIN 4/7/2023 Bridget Major OT GO 1    32165277876 HC OT EVAL MOD COMPLEXITY 3 4/7/2023 Bridget Major OT GO 1               Bridget Major OT  4/7/2023

## 2023-04-07 NOTE — PROGRESS NOTES
[facility] Cardiology    Inpatient Progress Note      Chief Complaint/Reason for service:    Follow up heart failure, Afib         Subjective:       Patient sitting up in recliner.  Oxygen saturations borderline, 89-90%, on room air. Denies shortness of breath, chest pain, or palpitations.  Remains in Afib with controlled rates.     Past medical, surgical, social and family history reviewed in the patient's electronic medical record.    Problem List  Active Hospital Problems    Diagnosis  POA    **(HFpEF) heart failure with preserved ejection fraction [I50.30]  Yes    Acute on chronic congestive heart failure, unspecified heart failure type [I50.9]  Yes    Elevated troponin [R77.8]  Yes    Nocturnal hypoxia [G47.34]  Yes    Gastroesophageal reflux disease [K21.9]  Yes    Chronic heart failure with preserved ejection fraction [I50.32]  Yes    Stage 3 chronic kidney disease [N18.30]  Yes    Hyperlipidemia [E78.5]  Yes    Hypertension [I10]  Yes    Hypothyroidism [E03.9]  Yes    Neuropathy [G62.9]  Yes      Resolved Hospital Problems   No resolved problems to display.            Objective:      Infusions:  dilTIAZem, 2.5-15 mg/hr, Last Rate: Stopped (04/06/23 1429)  heparin, 13 Units/kg/hr, Last Rate: 13 Units/kg/hr (04/07/23 0538)  Pharmacy to Dose Heparin,          Medications:    Current Facility-Administered Medications:     acetaminophen (TYLENOL) tablet 650 mg, 650 mg, Oral, Q4H PRN, Brianda Melchor APRN    aspirin EC tablet 81 mg, 81 mg, Oral, Daily, Brianda Melchor APRN, 81 mg at 04/06/23 0900    sennosides-docusate (PERICOLACE) 8.6-50 MG per tablet 2 tablet, 2 tablet, Oral, BID, 2 tablet at 04/06/23 0859 **AND** polyethylene glycol (MIRALAX) packet 17 g, 17 g, Oral, Daily PRN **AND** bisacodyl (DULCOLAX) EC tablet 5 mg, 5 mg, Oral, Daily PRN **AND** bisacodyl (DULCOLAX) suppository 10 mg, 10 mg, Rectal, Daily PRN, Brianda Melchor APRN    cetirizine (zyrTEC) tablet 10 mg, 10 mg, Oral, Daily,  Brianda Melchor APRN, 10 mg at 04/06/23 2132    dilTIAZem (CARDIZEM) 125 mg in sodium chloride 0.9 % 125 mL infusion, 2.5-15 mg/hr, Intravenous, Titrated, Amelia Lara APRN, Held at 04/06/23 1429    empagliflozin (JARDIANCE) tablet 10 mg, 10 mg, Oral, Daily, Amelia Lara APRN, 10 mg at 04/06/23 1549    famotidine (PEPCID) tablet 20 mg, 20 mg, Oral, Daily, Eliel Don MD, 20 mg at 04/06/23 0900    furosemide (LASIX) tablet 40 mg, 40 mg, Oral, Daily, Amelia Lara APRN    guaiFENesin (MUCINEX) 12 hr tablet 600 mg, 600 mg, Oral, Q12H PRN, Oliva Bauer APRN, 600 mg at 04/07/23 0415    heparin 81658 units/250 mL (100 units/mL) in 0.45 % NaCl infusion, 13 Units/kg/hr, Intravenous, Titrated, Manolo Youisf, PharmD, Last Rate: 8.63 mL/hr at 04/07/23 0538, 13 Units/kg/hr at 04/07/23 0538    HYDROcodone-acetaminophen (NORCO) 7.5-325 MG per tablet 1 tablet, 1 tablet, Oral, Q8H, Yessy Tavera MD, 1 tablet at 04/07/23 0540    levothyroxine (SYNTHROID, LEVOTHROID) tablet 125 mcg, 125 mcg, Oral, Q AM, Brianda Melchor APRN, 125 mcg at 04/07/23 0540    Magnesium Low Dose Replacement - Follow Nurse / BPA Driven Protocol, , Does not apply, PRN, Eliel Don MD    melatonin tablet 5 mg, 5 mg, Oral, Nightly PRN, Brianda Melchor APRN, 5 mg at 04/06/23 2132    Pharmacy Consult - MTM, , Does not apply, Daily, Lily Sánchez, PharmD    Pharmacy to Dose Heparin, , Does not apply, Continuous PRN, Eliel Don MD    pregabalin (LYRICA) capsule 150 mg, 150 mg, Oral, BID, Yessy Tavera MD, 150 mg at 04/06/23 2132    propranolol (INDERAL) tablet 40 mg, 40 mg, Oral, BID, Amelia Lara APRN    rivastigmine (EXELON) 4.6 MG/24HR patch 1 patch, 1 patch, Transdermal, Daily, Brianda Melchor APRN, 1 patch at 04/06/23 0907    [COMPLETED] Insert Peripheral IV, , , Once **AND** sodium chloride 0.9 % flush 10 mL, 10 mL, Intravenous, PRN, Kimberli Madison APRN    sodium chloride 0.9 % flush 10 mL, 10 mL,  Intravenous, Q12H, Lucho Melchora, APRN, 10 mL at 04/06/23 2221    sodium chloride 0.9 % flush 10 mL, 10 mL, Intravenous, PRN, Lucho Melchora, APRN    sodium chloride 0.9 % infusion 40 mL, 40 mL, Intravenous, PRN, Ruiz Brianda, APRN    Vital Sign Min/Max for last 24 hours  Temp  Min: 97.6 °F (36.4 °C)  Max: 98.3 °F (36.8 °C)   BP  Min: 90/67  Max: 122/66   Pulse  Min: 78  Max: 143   Resp  Min: 18  Max: 18   SpO2  Min: 90 %  Max: 98 %   No data recorded      Intake/Output Summary (Last 24 hours) at 4/7/2023 0809  Last data filed at 4/6/2023 1852  Gross per 24 hour   Intake --   Output 850 ml   Net -850 ml           CONSTITUTIONAL: No acute distress  RESPIRATORY: Normal effort. Faint rales at the bases.   CARDIOVASCULAR: Regular rate and rhythm with normal S1 and S2. Systolic murmur at the sternal border.  There is mild lower extremity edema bilaterally. Normal radial pulse.     Labs/studies:  Available lab and imaging results were reviewed by myself today    Results for orders placed during the hospital encounter of 04/04/23    Adult Transthoracic Echo Complete W/ Cont if Necessary Per Protocol    Interpretation Summary    Left ventricular systolic function is normal. Left ventricular ejection fraction appears to be 51 - 55%.    Mildly reduced right ventricular systolic function noted.    The left atrial cavity is dilated.    The right atrial cavity is dilated.    There is moderate calcification of the aortic valve.    Mild to moderate aortic valve regurgitation is present.    Mild mitral valve regurgitation is present.    Mild tricuspid valve regurgitation is present.      Tele:  Afib with controlled rates.          Assessment/Plan:       ASSESSMENT and P:  Acute on chronic HFpEF  Most recent echo with normal EF, grade I diastolic dysfunction, moderate AR, mild AS, moderate pulmonary hypertension  Repeat echo yesterday with normal EF, dilated atria, moderate calcification of the aortic valve, mild to moderate AR,  mild MR and TR.   Afib with RVR  New diagnosis, onset 4/06/2023.  Patient unaware that her heart is in Afib. Chronicity uncertain.   Recommending rate control strategy for now.   PVCs, PACs  CKD stage III  Pulmonary hypertension  Ascending aortic aneurysm    PLAN:  New Afib with unknown chronicity.  Recommending rate control strategy for now.    Increase propranolol to 40 mg TID for better rate control.   Will transition from heparin to renally doses Xarelto 15 mg daily starting this evening.   Will consider risk versus benefit of external cardioversion in the future once patient has been on consistent anticoagulation for at least 3 weeks.   Has diuresed well but renal function slightly worse today.  Will decrease Lasix to 20 mg daily   Continue Jardiance.   Holding off on ACE/ARB/ARNI/MRA due to renal function and relative hypotension.  Can consider additional HF medications at follow up.   Deferring ischemic evaluation currently due to goals of care.   Reasonable for discharge home today from cardiology standpoint if ok with hospitalists.   Upon discharge, patient will need one week follow up in heart and valve clinic with repeat BMP, proBNP.  Follow up with Dr. Richter in 8-12 weeks.   Cardiology will see on an as needed basis. Please feel free to contact us with any questions.        Electronically signed by TONIE Orta, 04/07/23, 9:31 AM EDT.

## 2023-04-07 NOTE — PROGRESS NOTES
[facility]  PROGRESS NOTE    Patient Name: Zoila Nava  : 1933  MRN: [id number]    Date of Admission: 2023  Primary Care Physician: Arnoldo Oneil MD    Subjective   Subjective     CC:  LE edema    HPI:  Edema significantly improved.  Denies chest pain or shortness of breath    ROS:  Gen: no fever  GI: no nausea    Objective   Objective     Vital Signs:   Temp:  [97.7 °F (36.5 °C)-98.6 °F (37 °C)] 98 °F (36.7 °C)  Heart Rate:  [] 83  Resp:  [18] 18  BP: ()/(64-91) 117/64  Flow (L/min):  [2] 2     Physical Exam:  Constitutional - frail, up in chair  HEENT-NCAT, mucous membranes moist  CV-irregularly irregular  Resp-crackles left base  Abd-soft, nontender, nondistended, normoactive bowel sounds  Ext-+ LE edema bilaterally  Neuro-alert, speech clear  Psych-normal affect   Skin- No rash on exposed UE or LE bilaterally        Results Reviewed:  LAB RESULTS:      Lab 23  1223 23  0214 23  1629 23  1345 23  1614   WBC 8.03 7.21  --  8.49 8.86   HEMOGLOBIN 11.7* 10.6*  --  12.1 11.3*   HEMATOCRIT 38.2 33.3*  --  37.8 35.2   PLATELETS 216 235  --  289 248   NEUTROS ABS 5.00 4.21  --  5.83  --    IMMATURE GRANS (ABS) 0.14* 0.05  --  0.05  --    LYMPHS ABS 1.09 1.23  --  1.06  --    MONOS ABS 0.98* 0.82  --  0.65  --    EOS ABS 0.69* 0.76*  --  0.75*  --    MCV 97.0 91.2  --  91.5 91.2   PROCALCITONIN  --   --  0.07  --   --    LACTATE  --   --   --  1.4  --    PROTIME 13.2  --   --   --   --    APTT 24.1*  --   --   --   --    HEPARIN ANTI-XA 0.10*  --   --   --   --          Lab 23  0523 23  0214 23  1345 23  1614   SODIUM 142 141 144 141   POTASSIUM 4.1 4.1 4.3 4.0   CHLORIDE 101 104 106 107   CO2 27.0 26.0 22.0 23.0   ANION GAP 14.0 11.0 16.0* 11.0   BUN 24* 23 21 26*   CREATININE 1.39* 1.27* 1.27* 1.48*   EGFR 36.3* 40.5* 40.5* 33.7*   GLUCOSE 79 95 125* 144*   CALCIUM 8.4* 8.4* 8.6 8.4*    MAGNESIUM 2.2 1.8 2.0  --    HEMOGLOBIN A1C  --  5.20  --   --    TSH  --  6.020*  --   --          Lab 04/04/23  1345 03/31/23  1614   TOTAL PROTEIN 6.9 6.4   ALBUMIN 3.9 3.7   GLOBULIN 3.0 2.7   ALT (SGPT) 20 18   AST (SGOT) 19 22   BILIRUBIN 0.2 0.2   ALK PHOS 117 106   LIPASE 71*  --          Lab 04/06/23  1223 04/05/23  0214 04/04/23 2057 04/04/23  1629 04/04/23  1345 03/31/23  1614   PROBNP  --   --   --   --  1,886.0* 1,271.0   HSTROP T  --  31* 29* 30* 36*  --    PROTIME 13.2  --   --   --   --   --    INR 1.01  --   --   --   --   --                  Brief Urine Lab Results  (Last result in the past 365 days)      Color   Clarity   Blood   Leuk Est   Nitrite   Protein   CREAT   Urine HCG        04/04/23 2056 Yellow   Clear   Negative   Negative   Negative   Negative                 Microbiology Results Abnormal     Procedure Component Value - Date/Time    COVID-19 and FLU A/B PCR - Swab, Nasopharynx [[id number]]  (Normal) Collected: 04/04/23 1437    Lab Status: Final result Specimen: Swab from Nasopharynx Updated: 04/04/23 1520     COVID19 Not Detected     Influenza A PCR Not Detected     Influenza B PCR Not Detected    Narrative:      Fact sheet for providers: https://www.fda.gov/media/495374/download    Fact sheet for patients: https://www.fda.gov/media/768522/download    Test performed by PCR.          XR Chest PA & Lateral    Result Date: 4/5/2023  XR CHEST PA AND LATERAL Date of Exam: 4/5/2023 9:08 AM EDT Indication: CHF vs PNA vs atelectasis. Comparison: 4/4/2023 Findings: Heart shadow is decreased in size, now at upper limits of normal. Pulmonary vasculature appears upper normal as well. Mild areas of linear atelectasis or scarring in the mid and lower lungs appear stable. No new or progressive pulmonary parenchymal disease, evidence of edema, effusion or pneumothorax is seen. Prominent pulmonary artery shadows are stable back to at least 7/30/2022. Compared to that exam, appearance of the lungs is  very similar, nearing radiographic baseline.     Impression: Impression: 1. No significant remaining pulmonary vascular congestion. 2. Mild chronic appearing interstitial lung changes elsewhere, similar to multiple prior studies. Electronically Signed: Tyrone Mid  4/5/2023 9:46 AM EDT  Workstation ID: [id number]      Results for orders placed during the hospital encounter of 10/10/21    Adult Transthoracic Echo Complete W/ Cont if Necessary Per Protocol    Interpretation Summary  · Moderate aortic valve regurgitation is present.  · Mild aortic valve stenosis is present.  · Estimated right ventricular systolic pressure from tricuspid regurgitation is moderately elevated (45-55 mmHg).  · Moderate tricuspid valve regurgitation is present.  · Estimated left ventricular EF = 72% Estimated left ventricular EF was in agreement with the calculated left ventricular EF. Left ventricular ejection fraction appears to be greater than 70%. Left ventricular systolic function is hyperdynamic (EF > 70%).  · Left ventricular diastolic function is consistent with (grade I) impaired relaxation.  · Moderate pulmonary hypertension is present.  · Normal right ventricular cavity size, wall thickness, systolic function and septal motion noted.  · There is no evidence of pericardial effusion.      Current medications:  Scheduled Meds:aspirin, 81 mg, Oral, Daily  cetirizine, 10 mg, Oral, Daily  empagliflozin, 10 mg, Oral, Daily  famotidine, 20 mg, Oral, Daily  [START ON 4/7/2023] furosemide, 40 mg, Oral, Daily  HYDROcodone-acetaminophen, 1 tablet, Oral, Q8H  levothyroxine, 125 mcg, Oral, Q AM  pharmacy consult - [place], , Does not apply, Daily  pregabalin, 150 mg, Oral, BID  propranolol, 40 mg, Oral, BID  rivastigmine, 1 patch, Transdermal, Daily  senna-docusate sodium, 2 tablet, Oral, BID  sodium chloride, 10 mL, Intravenous, Q12H      Continuous Infusions:dilTIAZem, 2.5-15 mg/hr, Last Rate: Stopped (04/06/23 9368)  heparin, 12 Units/kg/hr, Last  Rate: 12 Units/kg/hr (04/06/23 1333)  Pharmacy to Dose Heparin,       PRN Meds:.•  acetaminophen  •  senna-docusate sodium **AND** polyethylene glycol **AND** bisacodyl **AND** bisacodyl  •  Magnesium Low Dose Replacement - Follow Nurse / BPA Driven Protocol  •  melatonin  •  Pharmacy to Dose Heparin  •  [COMPLETED] Insert Peripheral IV **AND** sodium chloride  •  sodium chloride  •  sodium chloride    Assessment & Plan   Assessment & Plan     Active Hospital Problems    Diagnosis  POA   • **(HFpEF) heart failure with preserved ejection fraction [I50.30]  Yes   • Acute on chronic congestive heart failure, unspecified heart failure type [I50.9]  Yes   • Elevated troponin [R77.8]  Yes   • Nocturnal hypoxia [G47.34]  Yes   • Gastroesophageal reflux disease [K21.9]  Yes   • Chronic heart failure with preserved ejection fraction [I50.32]  Yes   • Stage 3 chronic kidney disease [N18.30]  Yes   • Hyperlipidemia [E78.5]  Yes   • Hypertension [I10]  Yes   • Hypothyroidism [E03.9]  Yes   • Neuropathy [G62.9]  Yes      Resolved Hospital Problems   No resolved problems to display.        Brief Hospital Course to date:  Zoila Nava is a 89 y.o. female with h/o HFpEF, pulmonary HTN, HLD, HTN, hypothyroid, CKDIII, neuropathy, Dementia, ascending aortic aneurysm, GERD and chronic pain presents with SOA and increased LE edema.    A/C Diastolic CHF  - repeat Echocardiogram  - transition from IV to PO lasix in am  - I/O last 3 completed shifts:  In: -   Out: 3650 [Urine:3650]  No intake/output data recorded.     Atrial fibrillation with RVR  - heparin IV  -- possible conversion to modified dose xarleto in am  - propranolol    CKD III  - bmp am    Ascending aortic aneurysm    Hypothyroid     GERD    Chronic Pain      Expected Discharge Location and Transportation: home  Expected Discharge   Expected Discharge Date and Time     Expected Discharge Date Expected Discharge Time    Apr 7, 2023            DVT prophylaxis:  Medical  DVT prophylaxis orders are present.          CODE STATUS:   Code Status and Medical Interventions:   Ordered at: 04/04/23 1945     Code Status (Patient has no pulse and is not breathing):    CPR (Attempt to Resuscitate)     Medical Interventions (Patient has pulse or is breathing):    Full Support       Eliel Don MD  04/06/23

## 2023-04-07 NOTE — PLAN OF CARE
Goal Outcome Evaluation:  Plan of Care Reviewed With: patient, spouse, family           Outcome Evaluation: Physical therapy evaluation complete. The patient required supervision for sit to stand transfer and CGA to ambulate with rollator. Patient's rollator adjusted to her height. Patient presents below baseline for mobility and recent fall at home per family. The patient would continue to benefit from skilled PT to address strength, balance and activity tolerance deficits. At hospital discharge recommend patient return home with assist and outpatient PT. Patient owns needed DME.

## 2023-04-07 NOTE — CASE MANAGEMENT/SOCIAL WORK
Continued Stay Note  [facility]     Patient Name: Zoila Nava  MRN: [id number]  Today's Date: 4/7/2023    Admit Date: 4/4/2023    Plan: discharge plan   Discharge Plan     Row Name 04/07/23 1640       Plan    Plan discharge plan    Plan Comments I met with pt and pt's spouse in room regarding discharge plan and PT's recommendations. Plan is home with spouse and go to outpatient PT at [facility]. Per request, pt provided with ambulatory PT order for outpatient PT. Pt is currently on 2 L and states has home O2 at 2 L to use at hs and as needed. Pt/spouse denies further discharge needs.    Final Discharge Disposition Code 01 - home or self-care               Discharge Codes    No documentation.               Expected Discharge Date and Time     Expected Discharge Date Expected Discharge Time    Apr 7, 2023             Kathrine Grace RN

## 2023-04-07 NOTE — PLAN OF CARE
Goal Outcome Evaluation:  Plan of Care Reviewed With: patient           Outcome Evaluation: Pt a/o, VSS, A-fib, 2 LNC, sat. 94%,  no c/o pain at this time. Continue monitoring.

## 2023-04-07 NOTE — THERAPY EVALUATION
Patient Name: Zoila Nava  : 1933    MRN: [id number]                              Today's Date: 2023       Admit Date: 2023    Visit Dx:     ICD-10-CM ICD-9-CM   1. Acute on chronic congestive heart failure, unspecified heart failure type  I50.9 428.0     Patient Active Problem List   Diagnosis   • Allergic rhinitis   • Hyperlipidemia   • Hypertension   • Hypocalcemia   • Hypothyroidism   • Neuropathy   • NUD (nonulcer dyspepsia)   • Painful knee   • Pernicious anemia   • Tremor   • Vitamin B12 deficiency   • Spondylolisthesis of cervical region   • Graves' ophthalmopathy   • Anemia   • Memory impairment of gradual onset   • Ascending aortic aneurysm (HCC)   • Stage 3 chronic kidney disease   • Pulmonary hypertension   • Chronic heart failure with preserved ejection fraction   • CHF (congestive heart failure), NYHA class I, acute on chronic, diastolic   • CHF (congestive heart failure)   • Non-rheumatic aortic stenosis   • Non-rheumatic aortic regurgitation   • Hypokalemia   • Carpal tunnel syndrome   • Essential tremor   • Gastroesophageal reflux disease   • Nausea and vomiting   • Skin sensation disturbance   • Spinal cord disease   • Urge incontinence of urine   • Urinary urgency   • Acute chest pain   • (HFpEF) heart failure with preserved ejection fraction   • Elevated troponin   • Nocturnal hypoxia   • Acute on chronic congestive heart failure, unspecified heart failure type     Past Medical History:   Diagnosis Date   • Anemia    • Arthritis    • Asthma    • Cataract    • Difficulty breathing     Chronic   • GERD (gastroesophageal reflux disease)    • Graves' ophthalmopathy    • Hoarseness    • Hypertension    • Hypertensive heart disease with acute on chronic diastolic congestive heart failure 10/11/2021   • Pulmonary hypertension 10/11/2021   • Spondylolisthesis of cervical region    • Vitamin B12 deficiency      Past Surgical History:   Procedure Laterality Date   • CERVICAL  LAMINECTOMY     • CHOLECYSTECTOMY     • OTHER SURGICAL HISTORY Right     Neuroplasty Median Nerve at Carpal Tunnel   • THYROID SURGERY     • TOTAL KNEE ARTHROPLASTY Left 09/29/2014    Dr Colon      General Information     [facility] Name 04/07/23 1025          Physical Therapy Time and Intention    Document Type evaluation  -     Mode of Treatment physical therapy  -     Row Name 04/07/23 1025          General Information    Patient Profile Reviewed yes  -ML     Prior Level of Function independent:;all household mobility;gait;transfer;ADL's;using stairs  patient reports ambulating with a rollator at baseline  -     Existing Precautions/Restrictions fall;oxygen therapy device and L/min  -     Barriers to Rehab medically complex  -     Row Name 04/07/23 1025          Living Environment    People in Home spouse  -[facility] Name 04/07/23 1025          Home Main Entrance    Number of Stairs, Main Entrance two  -ML     Stair Railings, Main Entrance railing on right side (ascending)  -[facility] Name 04/07/23 1025          Stairs Within Home, Primary    Stairs, Within Home, Primary patient lives on the main level of a multi level home, ramp on the first floor from the family room  -[facility] Name 04/07/23 1025          Cognition    Orientation Status (Cognition) oriented x 3  -ML     [facility] Name 04/07/23 1025          Safety Issues, Functional Mobility    Safety Issues Affecting Function (Mobility) insight into deficits/self-awareness;safety precaution awareness  -     Impairments Affecting Function (Mobility) balance;endurance/activity tolerance;strength  -           User Key  (r) = Recorded By, (t) = Taken By, (c) = Cosigned By    Initials Name Provider Type     Adilene South Physical Therapist               Mobility     Row Name 04/07/23 1027          Bed Mobility    Comment, (Bed Mobility) Patient found seated in bedside chair  -[facility] Name 04/07/23 1027          Sit-Stand Transfer    Sit-Stand  [place] (Transfers) supervision;verbal cues  -ML     Assistive Device (Sit-Stand Transfers) walker, 4-wheeled  -ML     Comment, (Sit-Stand Transfer) verbal cues for hand placement prior to sit to stand transfer  -ML     Row Name 04/07/23 1027          Gait/Stairs (Locomotion)    [place] Level (Gait) verbal cues;contact guard  -ML     Assistive Device (Gait) walker, 4-wheeled  -ML     Distance in Feet (Gait) 10  -ML     Deviations/Abnormal Patterns (Gait) bilateral deviations;gait speed decreased  -ML     Bilateral Gait Deviations forward flexed posture;heel strike decreased  -ML           User Key  (r) = Recorded By, (t) = Taken By, (c) = Cosigned By    Initials Name Provider Type    ML Adilene South Physical Therapist               Obj/Interventions     Row Name 04/07/23 1029          Range of Motion Comprehensive    General Range of Motion bilateral lower extremity ROM WFL  -ML     [facility] Name 04/07/23 1029          Strength Comprehensive (MMT)    General Manual Muscle Testing (MMT) Assessment lower extremity strength deficits identified  -ML     Comment, General Manual Muscle Testing (MMT) Assessment BLE at least 3/5 observed with functional mobility  -ML     [facility] Name 04/07/23 1029          Balance    Balance Assessment sitting static balance;sitting dynamic balance;sit to stand dynamic balance;standing static balance;standing dynamic balance  -ML     Static Sitting Balance standby assist  -ML     Dynamic Sitting Balance standby assist  -ML     Position, Sitting Balance unsupported;sitting in chair;other (see comments)  sitting on toilet  -ML     Sit to Stand Dynamic Balance verbal cues;supervision  -ML     Static Standing Balance supervision  -ML     Dynamic Standing Balance contact guard;verbal cues  -ML     Position/Device Used, Standing Balance supported;walker, 4-wheeled  -ML     Balance Interventions sitting;standing;sit to stand;supported;static;dynamic;occupation based/functional task  -ML            User Key  (r) = Recorded By, (t) = Taken By, (c) = Cosigned By    Initials Name Provider Type    ML Adilene South Physical Therapist               Goals/Plan     Row Name 04/07/23 1033          Bed Mobility Goal 1 (PT)    Activity/Assistive Device (Bed Mobility Goal 1, PT) sit to supine/supine to sit  -ML     [place] Level/Cues Needed (Bed Mobility Goal 1, PT) independent  -ML     Time Frame (Bed Mobility Goal 1, PT) 10 days  -ML     Row Name 04/07/23 1033          Transfer Goal 1 (PT)    Activity/Assistive Device (Transfer Goal 1, PT) sit-to-stand/stand-to-sit;bed-to-chair/chair-to-bed;other (see comments)  rollator  -ML     [place] Level/Cues Needed (Transfer Goal 1, PT) modified independence  -ML     Time Frame (Transfer Goal 1, PT) 10 days  -ML     Row Name 04/07/23 1033          Gait Training Goal 1 (PT)    Activity/Assistive Device (Gait Training Goal 1, PT) gait (walking locomotion);decrease fall risk;improve balance and speed;increase endurance/gait distance;other (see comments)  rollator  -ML     [place] Level (Gait Training Goal 1, PT) modified independence  -ML     Distance (Gait Training Goal 1, PT) 100  -ML     Time Frame (Gait Training Goal 1, PT) 10 days  -ML     Row Name 04/07/23 1033          Stairs Goal 1 (PT)    Activity/Assistive Device (Stairs Goal 1, PT) stairs, all skills;ascending stairs;descending stairs;using handrail, right;step-to-step  -ML     [place] Level/Cues Needed (Stairs Goal 1, PT) standby assist  -ML     Number of Stairs (Stairs Goal 1, PT) 2  -ML     Time Frame (Stairs Goal 1, PT) 10 days  -ML     Row Name 04/07/23 1033          Therapy Assessment/Plan (PT)    Planned Therapy Interventions (PT) balance training;bed mobility training;gait training;home exercise program;neuromuscular re-education;patient/family education;postural re-education;ROM (range of motion);stair training;strengthening;stretching;transfer training  -ML           User Key  (r) = Recorded  By, (t) = Taken By, (c) = Cosigned By    Initials Name Provider Type    Adilene Figueroa Physical Therapist               Clinical Impression     Row Name 04/07/23 1030          Pain    Pretreatment Pain Rating 0/10 - no pain  -     Posttreatment Pain Rating 0/10 - no pain  -ML     Row Name 04/07/23 1030          Plan of Care Review    Plan of Care Reviewed With patient;spouse;family  -     Outcome Evaluation Physical therapy evaluation complete. The patient required supervision for sit to stand transfer and CGA to ambulate with rollator. Patient's rollator adjusted to her height. Patient presents below baseline for mobility and recent fall at home per family. The patient would continue to benefit from skilled PT to address strength, balance and activity tolerance deficits. At hosptial discharge recommend patient return home with assist and outpatient PT. Patient owns needed DME.  -ML     Row Name 04/07/23 1030          Therapy Assessment/Plan (PT)    Rehab Potential (PT) good, to achieve stated therapy goals  -     Criteria for Skilled Interventions Met (PT) yes;meets criteria;skilled treatment is necessary  -     Therapy Frequency (PT) daily  -ML     Row Name 04/07/23 1030          Vital Signs    Pre Patient Position Sitting  -ML     Intra Patient Position Standing  -ML     Post Patient Position Sitting  -ML     Row Name 04/07/23 1030          Positioning and Restraints    Pre-Treatment Position in bed  -ML     Post Treatment Position bathroom  -ML     Bathroom sitting;call light within reach;encouraged to call for assist;with family/caregiver;notified [facility]  -           User Key  (r) = Recorded By, (t) = Taken By, (c) = Cosigned By    Initials Name Provider Type    Adilene Figueroa Physical Therapist               Outcome Measures     Row Name 04/07/23 1035          How much help from another person do you currently need...    Turning from your back to your side while in flat bed without using bedrails? 4   -ML     Moving from lying on back to sitting on the side of a flat bed without bedrails? 4  -ML     Moving to and from a bed to a chair (including a wheelchair)? 3  -ML     Standing up from a chair using your arms (e.g., wheelchair, bedside chair)? 4  -ML     Climbing 3-5 steps with a railing? 3  -ML     To walk in hospital room? 3  -ML     AM-PAC 6 Clicks Score (PT) 21  -ML     Highest level of mobility 6 --> Walked 10 steps or more  -ML     Row Name 04/07/23 1035          Functional Assessment    Outcome Measure Options AM-PAC 6 Clicks Basic Mobility (PT)  -ML           User Key  (r) = Recorded By, (t) = Taken By, (c) = Cosigned By    Initials Name Provider Type     Adilene South Physical Therapist                             Physical Therapy Education     Title: PT OT SLP Therapies (In Progress)     Topic: Physical Therapy (In Progress)     Point: Mobility training (Done)     Learning Progress Summary           Patient Acceptance, E, VU,NR by ML at 4/7/2023 1035   Family Acceptance, E, VU,NR by ML at 4/7/2023 1035                   Point: Home exercise program (Not Started)     Learner Progress:  Not documented in this visit.          Point: Body mechanics (Done)     Learning Progress Summary           Patient Acceptance, E, VU,NR by ML at 4/7/2023 1035   Family Acceptance, E, VU,NR by ML at 4/7/2023 1035                   Point: Precautions (Done)     Learning Progress Summary           Patient Acceptance, E, VU,NR by ML at 4/7/2023 1035   Family Acceptance, E, VU,NR by ML at 4/7/2023 1035                               User Key     Initials Effective Dates Name Provider Type [facility] 04/22/21 -  Adilene South Physical Therapist PT              PT Recommendation and Plan  Planned Therapy Interventions (PT): balance training, bed mobility training, gait training, home exercise program, neuromuscular re-education, patient/family education, postural re-education, ROM (range of motion), stair training,  strengthening, stretching, transfer training  Plan of Care Reviewed With: patient, spouse, family  Outcome Evaluation: Physical therapy evaluation complete. The patient required supervision for sit to stand transfer and CGA to ambulate with rollator. Patient's rollator adjusted to her height. Patient presents below baseline for mobility and recent fall at home per family. The patient would continue to benefit from skilled PT to address strength, balance and activity tolerance deficits. At hosptial discharge recommend patient return home with assist and outpatient PT. Patient owns needed DME.     Time Calculation:    PT Charges     Row Name 04/07/23 1036             Time Calculation    Start Time 0920  -ML      PT Received On 04/07/23  -ML      PT Goal Re-Cert Due Date 04/17/23  -ML         Untimed Charges    PT Eval/Re-eval Minutes 46  -ML         Total Minutes    Untimed Charges Total Minutes 46  -ML       Total Minutes 46  -ML            User Key  (r) = Recorded By, (t) = Taken By, (c) = Cosigned By    Initials Name Provider Type    Adilene Figueroa Physical Therapist              Therapy Charges for Today     Code Description Service Date Service Provider Modifiers Qty    [id number] HC PT EVAL MOD COMPLEXITY 4 4/7/2023 Adilene South GP 1          PT G-Codes  Outcome Measure Options: AM-PAC 6 Clicks Basic Mobility (PT)  AM-PAC 6 Clicks Score (PT): 21  PT Discharge Summary  Anticipated Discharge Disposition (PT): home with assist, home with outpatient therapy services    Adilene South  4/7/2023

## 2023-04-08 ENCOUNTER — READMISSION MANAGEMENT (OUTPATIENT)
Dept: CALL CENTER | Facility: HOSPITAL | Age: 88
End: 2023-04-08
Payer: MEDICARE

## 2023-04-08 VITALS
OXYGEN SATURATION: 96 % | DIASTOLIC BLOOD PRESSURE: 79 MMHG | TEMPERATURE: 98.5 F | BODY MASS INDEX: 32.83 KG/M2 | SYSTOLIC BLOOD PRESSURE: 119 MMHG | HEIGHT: 56 IN | RESPIRATION RATE: 18 BRPM | WEIGHT: 145.94 LBS | HEART RATE: 63 BPM

## 2023-04-08 PROBLEM — R77.8 ELEVATED TROPONIN: Status: RESOLVED | Noted: 2023-04-04 | Resolved: 2023-04-08

## 2023-04-08 PROBLEM — I50.9 ACUTE ON CHRONIC CONGESTIVE HEART FAILURE, UNSPECIFIED HEART FAILURE TYPE: Status: RESOLVED | Noted: 2023-04-05 | Resolved: 2023-04-08

## 2023-04-08 PROBLEM — R79.89 ELEVATED TROPONIN: Status: RESOLVED | Noted: 2023-04-04 | Resolved: 2023-04-08

## 2023-04-08 PROCEDURE — 99239 HOSP IP/OBS DSCHRG MGMT >30: CPT | Performed by: HOSPITALIST

## 2023-04-08 RX ORDER — TAMSULOSIN HYDROCHLORIDE 0.4 MG/1
0.4 CAPSULE ORAL DAILY
Qty: 30 CAPSULE | Refills: 0 | Status: SHIPPED | OUTPATIENT
Start: 2023-04-08

## 2023-04-08 RX ORDER — PROPRANOLOL HYDROCHLORIDE 40 MG/1
40 TABLET ORAL 3 TIMES DAILY
Qty: 90 TABLET | Refills: 0 | Status: SHIPPED | OUTPATIENT
Start: 2023-04-08

## 2023-04-08 RX ORDER — FUROSEMIDE 20 MG/1
20 TABLET ORAL DAILY
Qty: 30 TABLET | Refills: 0 | Status: SHIPPED | OUTPATIENT
Start: 2023-04-08 | End: 2023-04-11 | Stop reason: SDUPTHER

## 2023-04-08 RX ADMIN — FAMOTIDINE 20 MG: 20 TABLET, FILM COATED ORAL at 08:46

## 2023-04-08 RX ADMIN — EMPAGLIFLOZIN 10 MG: 10 TABLET, FILM COATED ORAL at 08:46

## 2023-04-08 RX ADMIN — PROPRANOLOL HYDROCHLORIDE 40 MG: 20 TABLET ORAL at 08:47

## 2023-04-08 RX ADMIN — PREGABALIN 150 MG: 75 CAPSULE ORAL at 08:46

## 2023-04-08 RX ADMIN — ASPIRIN 81 MG: 81 TABLET, COATED ORAL at 08:47

## 2023-04-08 RX ADMIN — TAMSULOSIN HYDROCHLORIDE 0.4 MG: 0.4 CAPSULE ORAL at 08:46

## 2023-04-08 RX ADMIN — Medication 10 ML: at 08:52

## 2023-04-08 RX ADMIN — FUROSEMIDE 20 MG: 40 TABLET ORAL at 08:46

## 2023-04-08 RX ADMIN — RIVASTIGMINE TRANSDERMAL SYSTEM 1 PATCH: 4.6 PATCH, EXTENDED RELEASE TRANSDERMAL at 08:47

## 2023-04-08 RX ADMIN — SENNOSIDES AND DOCUSATE SODIUM 2 TABLET: 50; 8.6 TABLET ORAL at 08:46

## 2023-04-08 RX ADMIN — HYDROCODONE BITARTRATE AND ACETAMINOPHEN 1 TABLET: 7.5; 325 TABLET ORAL at 06:38

## 2023-04-08 RX ADMIN — LEVOTHYROXINE SODIUM 125 MCG: 0.12 TABLET ORAL at 06:38

## 2023-04-08 NOTE — OUTREACH NOTE
Prep Survey    Flowsheet Row Responses   [facility] facility patient discharged from? [facility]   Is LACE score < 7 ? No   Eligibility [facility]   Date of Admission 04/04/23   Date of Discharge 04/08/23   Discharge Disposition Home or Self Care   Discharge diagnosis  heart failure with preserved ejection fraction   Does the patient have one of the following disease processes/diagnoses(primary or secondary)? CHF   Does the patient have Home health ordered? No   Is there a DME ordered? No   Prep survey completed? Yes          HUMBERTO GOSS - Registered Nurse

## 2023-04-08 NOTE — DISCHARGE SUMMARY
[facility] Medicine Services  DISCHARGE SUMMARY    Patient Name: Zoila Nava  : 1933  MRN: [id number]    Date of Admission: 2023  2:07 PM  Date of Discharge:  2023  Primary Care Physician: Arnoldo Oneil MD    Consults     Date and Time Order Name Status Description    2023 12:33 AM Inpatient Cardiology Consult Completed           Hospital Course     Presenting Problem:   (HFpEF) heart failure with preserved ejection fraction [I50.30]  Acute on chronic congestive heart failure, unspecified heart failure type [I50.9]    Active Hospital Problems    Diagnosis  POA   • **(HFpEF) heart failure with preserved ejection fraction [I50.30]  Yes   • Nocturnal hypoxia [G47.34]  Yes   • Gastroesophageal reflux disease [K21.9]  Yes   • Chronic heart failure with preserved ejection fraction [I50.32]  Yes   • Stage 3 chronic kidney disease [N18.30]  Yes   • Hyperlipidemia [E78.5]  Yes   • Hypertension [I10]  Yes   • Hypothyroidism [E03.9]  Yes   • Neuropathy [G62.9]  Yes      Resolved Hospital Problems    Diagnosis Date Resolved POA   • Acute on chronic congestive heart failure, unspecified heart failure type [I50.9] 2023 Yes   • Elevated troponin [R77.8] 2023 Yes          Hospital Course:  Zoila Nava is a 89 y.o. female with h/o HFpEF, pulmonary HTN, HLD, HTN, hypothyroid, CKDIII, neuropathy, Dementia, ascending aortic aneurysm, GERD and chronic pain presents with SOA and increased LE edema.    This patient's problems and plans were partially entered by my partner and updated as appropriate by me 23.     A/C Diastolic CHF  - no ACEi, ARB, ARNI or MRA due to renal function and hypotension  - increase propranolol to 40mg three times/day  - take Xarelto 15mg PO daily  - decrease Lasix to 20mg by mouth daily  - take Jardiance as prescribed  - stop Amlodipine  - f/u with heart and valve clinic in 1 week with repeat labs- BMP and proBNP  - f/u with  Dr. Richter in 8-12 weeks     Atrial fibrillation with RVR  - take Xarelto 15mg PO daily  - propranolol dose titrated up to 40 mg TID by Cardiology  - consider ECV after patient has been on anticoagulation for > 3 weeks     CKD III  - still slightly elevated but improving     Urinary Retention  - take Tamusulosin for urinary retention     Ascending aortic aneurysm     Hypothyroid     GERD     Chronic Pain      Discharge Follow Up Recommendations for outpatient labs/diagnostics:  - increase propranolol to 40mg three times/day  - take Xarelto 15mg PO daily  - decrease Lasix to 20mg by mouth daily  - take Tamusulosin for urinary retention  - take Jardiance as prescribed  - stop Amlodipine  - f/u with heart and valve clinic in 1 week with repeat labs- BMP and proBNP  - f/u with Dr. Richter in 8-12 weeks    Day of Discharge     HPI:   Patient stable overnight.    Review of Systems  Gen- No fevers, chills  CV- No chest pain, palpitations  Resp- No cough, dyspnea  GI- No N/V/D, abd pain    Vital Signs:   Temp:  [98 °F (36.7 °C)-98.5 °F (36.9 °C)] 98.5 °F (36.9 °C)  Heart Rate:  [63-92] 63  Resp:  [18-20] 18  BP: ()/(42-94) 119/79  Flow (L/min):  [2] 2      Physical Exam:  Constitutional: No acute distress, awake, alert  HENT: NCAT, mucous membranes moist  Respiratory: Course to auscultation bilaterally, respiratory effort normal   Cardiovascular: RRR, no murmurs, rubs, or gallops  Gastrointestinal: Positive bowel sounds, soft, nontender, nondistended  Musculoskeletal: + trace ankle edema  Psychiatric: Appropriate affect, cooperative  Neurologic: Oriented x 3, strength symmetric in all extremities, Cranial Nerves grossly intact to confrontation, speech clear  Skin: No rashes    Pertinent  and/or Most Recent Results     LAB RESULTS:      Lab 04/07/23  1045 04/07/23  0335 04/06/23  1942 04/06/23  1223 04/05/23  0214 04/04/23  1629 04/04/23  1345   WBC  --  8.23  --  8.03 7.21  --  8.49   HEMOGLOBIN  --  11.3*  --  11.7* 10.6*   --  12.1   HEMATOCRIT  --  36.1  --  38.2 33.3*  --  37.8   PLATELETS  --  228  --  216 235  --  289   NEUTROS ABS  --  4.67  --  5.00 4.21  --  5.83   IMMATURE GRANS (ABS)  --  0.08*  --  0.14* 0.05  --  0.05   LYMPHS ABS  --  1.55  --  1.09 1.23  --  1.06   MONOS ABS  --  1.07*  --  0.98* 0.82  --  0.65   EOS ABS  --  0.76*  --  0.69* 0.76*  --  0.75*   MCV  --  93.0  --  97.0 91.2  --  91.5   PROCALCITONIN  --   --   --   --   --  0.07  --    LACTATE  --   --   --   --   --   --  1.4   PROTIME  --   --   --  13.2  --   --   --    APTT  --   --   --  24.1*  --   --   --    HEPARIN ANTI-XA 0.16* 0.69 0.13* 0.10*  --   --   --          Lab 04/07/23  0335 04/06/23  0523 04/05/23 0214 04/04/23  1345   SODIUM 140 142 141 144   POTASSIUM 3.8 4.1 4.1 4.3   CHLORIDE 99 101 104 106   CO2 25.0 27.0 26.0 22.0   ANION GAP 16.0* 14.0 11.0 16.0*   BUN 24* 24* 23 21   CREATININE 1.47* 1.39* 1.27* 1.27*   EGFR 34.0* 36.3* 40.5* 40.5*   GLUCOSE 97 79 95 125*   CALCIUM 8.7 8.4* 8.4* 8.6   MAGNESIUM  --  2.2 1.8 2.0   HEMOGLOBIN A1C  --   --  5.20  --    TSH  --   --  6.020*  --          Lab 04/04/23  1345   TOTAL PROTEIN 6.9   ALBUMIN 3.9   GLOBULIN 3.0   ALT (SGPT) 20   AST (SGOT) 19   BILIRUBIN 0.2   ALK PHOS 117   LIPASE 71*         Lab 04/06/23  1223 04/05/23 0214 04/04/23 2057 04/04/23  1629 04/04/23  1345   PROBNP  --   --   --   --  1,886.0*   HSTROP T  --  31* 29* 30* 36*   PROTIME 13.2  --   --   --   --    INR 1.01  --   --   --   --                  Brief Urine Lab Results  (Last result in the past 365 days)      Color   Clarity   Blood   Leuk Est   Nitrite   Protein   CREAT   Urine HCG        04/04/23 2056 Yellow   Clear   Negative   Negative   Negative   Negative               Microbiology Results (last 10 days)     Procedure Component Value - Date/Time    COVID-19 and FLU A/B PCR - Swab, Nasopharynx [[id number]]  (Normal) Collected: 04/04/23 1437    Lab Status: Final result Specimen: Swab from Nasopharynx Updated:  04/04/23 1520     COVID19 Not Detected     Influenza A PCR Not Detected     Influenza B PCR Not Detected    Narrative:      Fact sheet for providers: https://www.fda.gov/media/424198/download    Fact sheet for patients: https://www.fda.gov/media/382414/download    Test performed by PCR.          Adult Transthoracic Echo Complete W/ Cont if Necessary Per Protocol    Result Date: 4/7/2023  •  Left ventricular systolic function is normal. Left ventricular ejection fraction appears to be 51 - 55%. •  Mildly reduced right ventricular systolic function noted. •  The left atrial cavity is dilated. •  The right atrial cavity is dilated. •  There is moderate calcification of the aortic valve. •  Mild to moderate aortic valve regurgitation is present. •  Mild mitral valve regurgitation is present. •  Mild tricuspid valve regurgitation is present.     XR Chest 1 View    Result Date: 4/4/2023  XR CHEST 1 VW Date of Exam: 4/4/2023 2:18 PM EDT Indication: Dyspnea with history of congestive heart failure with peripheral edema. Comparison: July 30, 2022 Findings: There is a linear opacity within the right midlung. The heart is enlarged. There is pulmonary vascular congestion. The osseous structures appear intact.     Impression: 1.Linear opacity within right midlung, which could reflect atelectasis or pneumonia. 2.Cardiomegaly with pulmonary vascular congestion. Electronically Signed: Mikey Aguiar  4/4/2023 2:45 PM EDT  Workstation ID: [id number]    XR Chest PA & Lateral    Result Date: 4/5/2023  XR CHEST PA AND LATERAL Date of Exam: 4/5/2023 9:08 AM EDT Indication: CHF vs PNA vs atelectasis. Comparison: 4/4/2023 Findings: Heart shadow is decreased in size, now at upper limits of normal. Pulmonary vasculature appears upper normal as well. Mild areas of linear atelectasis or scarring in the mid and lower lungs appear stable. No new or progressive pulmonary parenchymal disease, evidence of edema, effusion or pneumothorax is seen.  Prominent pulmonary artery shadows are stable back to at least 7/30/2022. Compared to that exam, appearance of the lungs is very similar, nearing radiographic baseline.     Impression: 1. No significant remaining pulmonary vascular congestion. 2. Mild chronic appearing interstitial lung changes elsewhere, similar to multiple prior studies. Electronically Signed: Tyrone Mid  4/5/2023 9:46 AM EDT  Workstation ID: [id number]              Results for orders placed during the hospital encounter of 04/04/23    Adult Transthoracic Echo Complete W/ Cont if Necessary Per Protocol    Interpretation Summary  •  Left ventricular systolic function is normal. Left ventricular ejection fraction appears to be 51 - 55%.  •  Mildly reduced right ventricular systolic function noted.  •  The left atrial cavity is dilated.  •  The right atrial cavity is dilated.  •  There is moderate calcification of the aortic valve.  •  Mild to moderate aortic valve regurgitation is present.  •  Mild mitral valve regurgitation is present.  •  Mild tricuspid valve regurgitation is present.      Plan for Follow-up of Pending Labs/Results: none    Discharge Details        Discharge Medications      New Medications      Instructions Start Date   empagliflozin 10 MG tablet tablet  Commonly known as: JARDIANCE   10 mg, Oral, Daily      rivaroxaban 15 MG tablet  Commonly known as: XARELTO   15 mg, Oral, Daily With Dinner      tamsulosin 0.4 MG capsule 24 hr capsule  Commonly known as: FLOMAX   0.4 mg, Oral, Daily         Changes to Medications      Instructions Start Date   furosemide 20 MG tablet  Commonly known as: LASIX  What changed: how much to take   20 mg, Oral, Daily      propranolol 40 MG tablet  Commonly known as: INDERAL  What changed: when to take this   40 mg, Oral, 3 Times Daily         Continue These Medications      Instructions Start Date   aspirin 81 MG EC tablet   81 mg, Oral, Daily, OTC      cetirizine 10 MG tablet  Commonly known as:  zyrTEC   10 mg, Oral, Daily      famotidine 20 MG tablet  Commonly known as: PEPCID   TAKE ONE TABLET BY MOUTH TWICE A DAY      HYDROcodone-acetaminophen 7.5-325 MG per tablet  Commonly known as: NORCO   1 tablet, Oral, Every 8 Hours      levothyroxine 125 MCG tablet  Commonly known as: SYNTHROID, LEVOTHROID   TAKE ONE TABLET BY MOUTH EVERY MORNING      Lyrica 150 MG capsule  Generic drug: pregabalin   150 mg, Oral, 2 Times Daily      multivitamins-minerals capsule capsule   1 capsule, Oral, 2 Times Daily, OTC      OSTEO BI-FLEX ONE PER DAY PO   1 tablet, Oral, Daily, OTC      rivastigmine 4.6 MG/24HR patch  Commonly known as: EXELON   APPLY ONE PATCH TO THE SKIN DAILY      sodium chloride 0.65 % nasal spray   2 sprays, Nasal, As Needed      vitamin B-12 1000 MCG tablet  Commonly known as: CYANOCOBALAMIN   1,000 mcg, Oral, Daily, OTC         Stop These Medications    amLODIPine 10 MG tablet  Commonly known as: NORVASC        ASK your doctor about these medications      Instructions Start Date   fluticasone 50 MCG/ACT nasal spray  Commonly known as: FLONASE   2 sprays, Each Nare, Daily      potassium chloride 10 MEQ CR tablet   10 mEq, Oral, Daily             Allergies   Allergen Reactions   • Penicillins Other (See Comments)     CHILDHOOD ALLERGY           Discharge Disposition:  Home or Self Care    Diet:  Hospital:  Diet Order   Procedures   • Diet: Regular/House Diet, Cardiac Diets; Healthy Heart (2-3 Na+); Texture: Regular Texture (IDDSI 7); Fluid Consistency: Thin (IDDSI 0)       Activity: as tolerated      Restrictions or Other Recommendations:  none       CODE STATUS:    Code Status and Medical Interventions:   Ordered at: 04/04/23 1945     Code Status (Patient has no pulse and is not breathing):    CPR (Attempt to Resuscitate)     Medical Interventions (Patient has pulse or is breathing):    Full Support       Future Appointments   Date Time Provider Department Center   4/18/2023  1:15 PM Salvador Barnes  APRN [facility]   4/24/2023 10:45 AM Basil Richter MD [facility]   5/4/2023  1:15 PM Arnoldo Oneil MD [facility]   7/18/2023  1:15 PM Salvador Barnes APRN [facility]       Additional Instructions for the Follow-ups that You Need to Schedule     Ambulatory Referral to [facility] [place] [facility]   As directed      Follow up in one week with BMP    Order Comments: Follow up in one week with BMP     Service Requested: Heart Failure Clinic         Ambulatory Referral to Physical Therapy Evaluate and treat   As directed      Specialty needed: Evaluate and treat    Follow-up needed: Yes         Discharge Follow-up with Specified Provider: Follow up with Dr. Richter in 8-12 weeks.   As directed      To: Follow up with Dr. Richter in 8-12 weeks.         Discharge Follow-up with Specified Provider: one week follow up in heart and valve clinic with repeat BMP, proBNP.   As directed      To: one week follow up in heart and valve clinic with repeat BMP, proBNP.                     Michelle Owens DO  04/08/23      Time Spent on Discharge:  I spent  35  minutes on this discharge activity which included: face-to-face encounter with the patient, reviewing the data in the system, coordination of the care with the nursing staff as well as consultants, documentation, and entering orders.

## 2023-04-08 NOTE — PROGRESS NOTES
"Enter Query Response Below      Query Response: Heart failure due to valvular heart disease, hypertensive heart disease, arrhythmia    Electronically signed by Basil Richter MD, 23, 1:11 PM EDT.                   If applicable, please update the problem list.         Patient: Zoila Nava        : 1933  Account: [id number]           Admit Date:         How to Respond to this query:       a. Click New Note     b. Answer query within the yellow box.                c. Update the Problem List, if applicable.      If you have any questions about this query contact me at:  Sincerely,    Dante Josue RN, BSN  Clinical Documentation Integrity  [facility]  [email or web address]      Dr. Richter,     89 year old female with history of hypertension, pulmonary artery hypertension, valvular heart disease and diastolic CHF presented with increased lower extremity edema, orthopnea and has acute on chronic HFpEF, along with new onset atrial fibrillation (unknown chronicity).  ECHO 2023) showed left ventricular ejection fraction 51-55%, moderate calcification of aortic valve, mild mitral and tricuspid valve regurgitation.      -Treatments: Rate control for atrial fibrillation, no ACE/ARB due to reanl function, discontinuation of Amlodipine \"to allow for room in blood pressure\" for addition of other therapies, follow up in clinic.    - ICD-10 coding directive assumes a causal relationship between hypertension and CHF.     After study, can the predominant suspected etiology of the patient's heart failure be specified?    *Heart failure secondary to hypertensive heart disease  *Heart failure secondary to valvular heart disease  *Heart failure secondary to pulmonary artery hypertension  *Heart failure due to all of the above listed diagnoses  *Heart failure secondary to combination of above diagnoses:__________(please specify)  *Other________  *Clinically undetermined    By submitting this query, " we are merely seeking further clarification of documentation to accurately reflect all conditions that you are monitoring, evaluating, treating or that extend the hospitalization or utilize additional resources of care. Please utilize your independent clinical judgment when addressing the question(s) above.     This query and your response, once completed, will be entered into the legal medical record.

## 2023-04-10 ENCOUNTER — TRANSITIONAL CARE MANAGEMENT TELEPHONE ENCOUNTER (OUTPATIENT)
Dept: CALL CENTER | Facility: HOSPITAL | Age: 88
End: 2023-04-10
Payer: MEDICARE

## 2023-04-10 NOTE — OUTREACH NOTE
Call Center TCM Note    Flowsheet Row Responses   [facility] patient discharged from? [place]   Does the patient have one of the following disease processes/diagnoses(primary or secondary)? CHF   TCM attempt successful? No   Unsuccessful attempts Attempt 1           Alanna Monroy RN    4/10/2023, 15:34 EDT

## 2023-04-10 NOTE — OUTREACH NOTE
Call Center TCM Note    Flowsheet Row Responses   [facility] patient discharged from? [place]   Does the patient have one of the following disease processes/diagnoses(primary or secondary)? CHF   TCM attempt successful? No   Unsuccessful attempts Attempt 2           Alanna Monroy RN    4/10/2023, 17:05 EDT

## 2023-04-11 ENCOUNTER — TELEPHONE (OUTPATIENT)
Dept: CARDIOLOGY | Facility: HOSPITAL | Age: 88
End: 2023-04-11
Payer: MEDICARE

## 2023-04-11 ENCOUNTER — TRANSITIONAL CARE MANAGEMENT TELEPHONE ENCOUNTER (OUTPATIENT)
Dept: CALL CENTER | Facility: HOSPITAL | Age: 88
End: 2023-04-11
Payer: MEDICARE

## 2023-04-11 DIAGNOSIS — I50.32 CHRONIC HEART FAILURE WITH PRESERVED EJECTION FRACTION: Primary | ICD-10-CM

## 2023-04-11 DIAGNOSIS — R06.02 SHORTNESS OF BREATH: ICD-10-CM

## 2023-04-11 DIAGNOSIS — R60.0 EDEMA LEG: ICD-10-CM

## 2023-04-11 DIAGNOSIS — I38 VHD (VALVULAR HEART DISEASE): ICD-10-CM

## 2023-04-11 RX ORDER — FUROSEMIDE 20 MG/1
TABLET ORAL
Qty: 60 TABLET | Refills: 6 | Status: SHIPPED | OUTPATIENT
Start: 2023-04-11 | End: 2023-04-19 | Stop reason: SDUPTHER

## 2023-04-11 NOTE — TELEPHONE ENCOUNTER
Spoke to patient's  regarding taking extra 20mg of Lasix every other day. Patient will repeat labs on Thursday or Friday of this week. Follow-up on 18th.

## 2023-04-11 NOTE — TELEPHONE ENCOUNTER
Patient recently hospitalized for worsening dyspnea.  Found to be in A-fib.  Started on Xarelto 15 mg daily.  Lasix was decreased to 20 mg daily, started on Jardiance.  Amlodipine was discontinued.  Patient's spouse called today reporting a 2.2 pound weight gain in 2 days.  Swelling in lower extremities    Patient recently started on Jardiance.  Lasix was decreased to 20 mg daily.    Patient to take an extra 20 mg of Lasix every other day.    Repeat labs this week.  Keep follow-up visit in heart valve center as scheduled.

## 2023-04-11 NOTE — OUTREACH NOTE
Call Center TCM Note    Flowsheet Row Responses   [facility] patient discharged from? [place]   Does the patient have one of the following disease processes/diagnoses(primary or secondary)? CHF   TCM attempt successful? No   Unsuccessful attempts Attempt 3           Robinson Faulkner RN    4/11/2023, 16:09 EDT

## 2023-04-14 ENCOUNTER — LAB (OUTPATIENT)
Dept: LAB | Facility: HOSPITAL | Age: 88
End: 2023-04-14
Payer: MEDICARE

## 2023-04-14 DIAGNOSIS — I50.32 CHRONIC HEART FAILURE WITH PRESERVED EJECTION FRACTION: ICD-10-CM

## 2023-04-14 DIAGNOSIS — R60.0 EDEMA LEG: ICD-10-CM

## 2023-04-14 DIAGNOSIS — I38 VHD (VALVULAR HEART DISEASE): ICD-10-CM

## 2023-04-14 LAB
ANION GAP SERPL CALCULATED.3IONS-SCNC: 10 MMOL/L (ref 5–15)
BUN SERPL-MCNC: 25 MG/DL (ref 8–23)
BUN/CREAT SERPL: 15.7 (ref 7–25)
CALCIUM SPEC-SCNC: 8.5 MG/DL (ref 8.6–10.5)
CHLORIDE SERPL-SCNC: 104 MMOL/L (ref 98–107)
CO2 SERPL-SCNC: 25 MMOL/L (ref 22–29)
CREAT SERPL-MCNC: 1.59 MG/DL (ref 0.57–1)
EGFRCR SERPLBLD CKD-EPI 2021: 30.9 ML/MIN/1.73
GLUCOSE SERPL-MCNC: 85 MG/DL (ref 65–99)
NT-PROBNP SERPL-MCNC: 1021 PG/ML (ref 0–1800)
POTASSIUM SERPL-SCNC: 4.5 MMOL/L (ref 3.5–5.2)
SODIUM SERPL-SCNC: 139 MMOL/L (ref 136–145)

## 2023-04-14 PROCEDURE — 36415 COLL VENOUS BLD VENIPUNCTURE: CPT

## 2023-04-14 PROCEDURE — 83880 ASSAY OF NATRIURETIC PEPTIDE: CPT

## 2023-04-14 PROCEDURE — 80048 BASIC METABOLIC PNL TOTAL CA: CPT

## 2023-04-18 ENCOUNTER — OFFICE VISIT (OUTPATIENT)
Dept: CARDIOLOGY | Facility: HOSPITAL | Age: 88
End: 2023-04-18
Payer: MEDICARE

## 2023-04-18 ENCOUNTER — HOSPITAL ENCOUNTER (OUTPATIENT)
Dept: CARDIOLOGY | Facility: HOSPITAL | Age: 88
Discharge: HOME | End: 2023-04-18
Payer: MEDICARE

## 2023-04-18 VITALS
RESPIRATION RATE: 16 BRPM | TEMPERATURE: 97.6 F | SYSTOLIC BLOOD PRESSURE: 101 MMHG | HEART RATE: 62 BPM | HEIGHT: 56 IN | WEIGHT: 144.8 LBS | OXYGEN SATURATION: 93 % | BODY MASS INDEX: 32.57 KG/M2 | DIASTOLIC BLOOD PRESSURE: 54 MMHG

## 2023-04-18 DIAGNOSIS — R60.0 EDEMA LEG: ICD-10-CM

## 2023-04-18 DIAGNOSIS — I50.32 CHRONIC HEART FAILURE WITH PRESERVED EJECTION FRACTION: Primary | ICD-10-CM

## 2023-04-18 DIAGNOSIS — I48.0 PAF (PAROXYSMAL ATRIAL FIBRILLATION): ICD-10-CM

## 2023-04-18 DIAGNOSIS — N18.30 STAGE 3 CHRONIC KIDNEY DISEASE, UNSPECIFIED WHETHER STAGE 3A OR 3B CKD: ICD-10-CM

## 2023-04-18 DIAGNOSIS — I38 VHD (VALVULAR HEART DISEASE): ICD-10-CM

## 2023-04-18 LAB
QT INTERVAL: 378 MS
QTC INTERVAL: 413 MS

## 2023-04-18 PROCEDURE — 80048 BASIC METABOLIC PNL TOTAL CA: CPT | Performed by: NURSE PRACTITIONER

## 2023-04-18 PROCEDURE — 93005 ELECTROCARDIOGRAM TRACING: CPT | Performed by: NURSE PRACTITIONER

## 2023-04-18 PROCEDURE — 83880 ASSAY OF NATRIURETIC PEPTIDE: CPT | Performed by: NURSE PRACTITIONER

## 2023-04-18 NOTE — PROGRESS NOTES
[facility], [facility] Heart and Vascular    Chief Complaint  Congestive Heart Failure and Follow-up    Subjective    History of Present Illness {CC  Problem List  Visit  Diagnosis   Encounters  Notes  Medications  Labs  Result Review Imaging  Media :23}     Zoila Nava presents to [facility] CARDI[facility] for   History of Present Illness     89-year-old female with history of heart failure with preserved EF, pulmonary hypertension, valvular heart disease, chronic kidney disease stage III, ascending aortic aneurysm, hypertension, hypothyroidism, GERD, anemia, dementia, palpitations with PACs and PVCs, essential tremors, neuropathy, PAF.    Patient admitted to [facility] on 4/4/2023 with heart failure exacerbation.  Worsening edema, weight gain, dyspnea.    Patient currently not on ACE, ARNI, ARB, MRA due to renal dysfunction and hypotension.  Her propranolol was increased to 40 mg 3 times a day.  She was found to have A-fib with RVR.  She was started on Xarelto 15 mg daily.  Possible plan for ECV after patient has been on anticoagulants for 3 weeks.  Started on tamsulosin for urinary retention.    Patient's was initiated on Jardiance.  Lasix was decreased to 20 mg daily.  Patient continued to have weight gain after discharge with shortness of breath and edema.  Lasix was changed to 20 mg daily with 40 mg every other day.    No worsening dyspnea, but denies improvement during hospital visit..  Mild worsening edema.  7 lb weight gain in 7 days.  Initially lost 13 pounds per dry weight at discharge, 135 pounds.  Up 7 pounds.  Denies CP or pressure, palpitations, dizziness, near syncope, syncope    Home BP and HR log has been WNL but labile per spouse report and his home grafts.   Objective     Vital Signs:   Vitals:    04/18/23 1336   BP: 101/54   BP Location: Left arm   Patient Position: Sitting   Cuff Size: Adult   Pulse: 62   Resp: 16   Temp: 97.6 °F  "(36.4 °C)   TempSrc: Temporal   SpO2: 93%   Weight: 65.7 kg (144 lb 12.8 oz)   Height: 142.2 cm (56\")     Body mass index is 32.46 kg/m².  Physical Exam  Vitals reviewed.   Constitutional:       General: She is not in acute distress.     Appearance: Normal appearance.   Cardiovascular:      Rate and Rhythm: Normal rate and regular rhythm.      Heart sounds: Normal heart sounds.   Pulmonary:      Effort: Pulmonary effort is normal.      Breath sounds: Normal breath sounds.   Musculoskeletal:      Right lower leg: Edema (1+) present.      Left lower leg: Edema present.   Skin:     General: Skin is warm and dry.   Neurological:      Mental Status: She is alert.   Psychiatric:         Mood and Affect: Mood normal.         Behavior: Behavior is cooperative.              Result Review  Data Reviewed:{ Labs  Result Review  Imaging  Med Tab  Media :23}   Lab Results   Component Value Date    GLUCOSE 85 04/14/2023    CALCIUM 8.5 (L) 04/14/2023     04/14/2023    K 4.5 04/14/2023    CO2 25.0 04/14/2023     04/14/2023    BUN 25 (H) 04/14/2023    CREATININE 1.59 (H) 04/14/2023    EGFR 30.9 (L) 04/14/2023    BCR 15.7 04/14/2023    ANIONGAP 10.0 04/14/2023     proBNP 4/14/2023: 1021    Echocardiogram 4/7/2023: EF 51 to 55%, mild to moderate AR, mild MR, mild TR    EKG 4/6/2023: Atrial fibrillation 123 bpm    Holter 3/8/2022.  Duration 4 days.  Average heart rate 62 bpm.  Multiple SVT runs/longest 15 seconds.  3.7% PACs, 2.2% PVC burden.  No patient triggered events        Echocardiogram 10/11/2021: EF 73%, grade 1 diastolic dysfunction, moderate pulmonary hypertension with RVSP 45 to 55 mmHg, moderate AR, mild AS, moderate TR.              Assessment and Plan {CC Problem List  Visit Diagnosis  ROS  Review (Popup)  Health Maintenance  Quality  BestPractice  Medications  SmartSets  SnapShot Encounters  Media :23}   1. Chronic heart failure with preserved ejection fraction  7 lbs weight gain, mild " worsening edema.     Currently on Jardiance, Lasix 20 mg daily with an extra 40 mg Monday Wednesday Friday.    Wearing compression stockings for lower extremity edema    Labs 4 days ago with a normal proBNP, worsening kidney function.  We will repeat today.  Pending lab results will adjust diuretics.  - Basic Metabolic Panel  - proBNP    2. VHD (valvular heart disease)  No significant valvular disease per last echocardiogram.    3. PAF (paroxysmal atrial fibrillation)  Patient beta-blocker, EKG shows sinus rhythm with marked sinus arrhythmia 72 bpm.  Xarelto for stroke prevention.  - ECG 12 Lead; Future    4. Stage 3 chronic kidney disease, unspecified whether stage 3a or 3b CKD    - Basic Metabolic Panel    Patient to follow-up with cardiology next week as scheduled.  Follow-up in the heart valve center in 4 week or sooner if needed.       Follow Up {Instructions Charge Capture  Follow-up Communications :23}   Return in about 4 weeks (around 5/16/2023) for HF, Office visit.    Patient was given instructions and counseling regarding her condition or for health maintenance advice. Please see specific information pulled into the AVS if appropriate.  Patient was instructed to call the Heart and Valve Center with any questions, concerns, or worsening symptoms.

## 2023-04-19 ENCOUNTER — TELEPHONE (OUTPATIENT)
Dept: CARDIOLOGY | Facility: HOSPITAL | Age: 88
End: 2023-04-19
Payer: MEDICARE

## 2023-04-19 LAB
ANION GAP SERPL CALCULATED.3IONS-SCNC: 14.4 MMOL/L (ref 5–15)
BUN SERPL-MCNC: 27 MG/DL (ref 8–23)
BUN/CREAT SERPL: 17.5 (ref 7–25)
CALCIUM SPEC-SCNC: 8.4 MG/DL (ref 8.6–10.5)
CHLORIDE SERPL-SCNC: 106 MMOL/L (ref 98–107)
CO2 SERPL-SCNC: 22.6 MMOL/L (ref 22–29)
CREAT SERPL-MCNC: 1.54 MG/DL (ref 0.57–1)
EGFRCR SERPLBLD CKD-EPI 2021: 32.1 ML/MIN/1.73
GLUCOSE SERPL-MCNC: 97 MG/DL (ref 65–99)
NT-PROBNP SERPL-MCNC: 1810 PG/ML (ref 0–1800)
POTASSIUM SERPL-SCNC: 3.8 MMOL/L (ref 3.5–5.2)
SODIUM SERPL-SCNC: 143 MMOL/L (ref 136–145)

## 2023-04-19 RX ORDER — FUROSEMIDE 20 MG/1
40 TABLET ORAL DAILY
Qty: 60 TABLET | Refills: 6 | Status: SHIPPED | OUTPATIENT
Start: 2023-04-19

## 2023-04-19 NOTE — TELEPHONE ENCOUNTER
Pt  verbalized understanding to give Lasix 40 mg daily and have labs completed in one week. He also wants to know what her target weight is. Right now he stated it is 142 lbs.  She has appointment 4/24/23 with Dr Richter and asked if he can do her labs that day while they are already here.  ----- Message from TONIE iFerro sent at 4/19/2023  8:55 AM EDT -----  I have reviewed patients labs.  With her weight gain, and worsening fluid number, I would like for her to increase her Lasix 40 mg daily.  Repeat her labs next week.

## 2023-04-24 ENCOUNTER — OFFICE VISIT (OUTPATIENT)
Dept: CARDIOLOGY | Facility: CLINIC | Age: 88
End: 2023-04-24
Payer: MEDICARE

## 2023-04-24 ENCOUNTER — LAB (OUTPATIENT)
Dept: LAB | Facility: HOSPITAL | Age: 88
End: 2023-04-24
Payer: MEDICARE

## 2023-04-24 VITALS
DIASTOLIC BLOOD PRESSURE: 56 MMHG | BODY MASS INDEX: 31.72 KG/M2 | HEART RATE: 86 BPM | SYSTOLIC BLOOD PRESSURE: 98 MMHG | WEIGHT: 141 LBS | OXYGEN SATURATION: 94 % | HEIGHT: 56 IN

## 2023-04-24 DIAGNOSIS — I50.32 CHRONIC HEART FAILURE WITH PRESERVED EJECTION FRACTION: ICD-10-CM

## 2023-04-24 DIAGNOSIS — I38 VHD (VALVULAR HEART DISEASE): ICD-10-CM

## 2023-04-24 DIAGNOSIS — R60.0 EDEMA LEG: ICD-10-CM

## 2023-04-24 DIAGNOSIS — I48.0 PAF (PAROXYSMAL ATRIAL FIBRILLATION): Primary | ICD-10-CM

## 2023-04-24 DIAGNOSIS — I10 ESSENTIAL HYPERTENSION: ICD-10-CM

## 2023-04-24 DIAGNOSIS — N18.30 STAGE 3 CHRONIC KIDNEY DISEASE, UNSPECIFIED WHETHER STAGE 3A OR 3B CKD: ICD-10-CM

## 2023-04-24 LAB
ANION GAP SERPL CALCULATED.3IONS-SCNC: 8 MMOL/L (ref 5–15)
BUN SERPL-MCNC: 30 MG/DL (ref 8–23)
BUN/CREAT SERPL: 19.1 (ref 7–25)
CALCIUM SPEC-SCNC: 8.8 MG/DL (ref 8.6–10.5)
CHLORIDE SERPL-SCNC: 107 MMOL/L (ref 98–107)
CO2 SERPL-SCNC: 27 MMOL/L (ref 22–29)
CREAT SERPL-MCNC: 1.57 MG/DL (ref 0.57–1)
EGFRCR SERPLBLD CKD-EPI 2021: 31.4 ML/MIN/1.73
GLUCOSE SERPL-MCNC: 64 MG/DL (ref 65–99)
NT-PROBNP SERPL-MCNC: 2177 PG/ML (ref 0–1800)
POTASSIUM SERPL-SCNC: 4.7 MMOL/L (ref 3.5–5.2)
SODIUM SERPL-SCNC: 142 MMOL/L (ref 136–145)

## 2023-04-24 PROCEDURE — 80048 BASIC METABOLIC PNL TOTAL CA: CPT | Performed by: NURSE PRACTITIONER

## 2023-04-24 PROCEDURE — 36415 COLL VENOUS BLD VENIPUNCTURE: CPT

## 2023-04-24 PROCEDURE — 83880 ASSAY OF NATRIURETIC PEPTIDE: CPT | Performed by: NURSE PRACTITIONER

## 2023-04-24 NOTE — PROGRESS NOTES
[facility] Cardiology  [address], [address]  [place], [place], [address]    [phone]  [email or web address].[facility]           OUTPATIENT CLINIC NOTE    Patient care team:  Patient Care Team:  Arnoldo Oneil MD as PCP - General (Internal Medicine)  Sourav Montes MD as Referring Physician (Neurology)  Richard Beltran MD as Consulting Physician (Gastroenterology)  Basil Richter MD as Consulting Physician (Cardiology)      Subjective:   Chief complaint:   Chief Complaint   Patient presents with   • Chronic heart failure with preserved ejection fraction   • Shortness of Breath       HPI:    Zoila Nava is a 89 y.o. female.  Partial problem list, including cardiac problems:  1. Heart failure preserved EF  a. Dry weight appears to be around 145 pounds at home  2. Atrial fibrillation  3. Pulmonary hypertension  4. Valvular heart disease  a. Mild aortic stenosis, moderate aortic regurgitation  5. CKD  6. Ascending aortic aneurysm  7. Hypertension    The patient presents for follow-up    After inpatient diuresis and management of new onset A-fib, the patient has gained a little bit of the weight back.  Lasix has been titrated back upward to 40 mg daily.  Swelling overall still improved    Review of Systems:  As noted in the HPI    PFSH:  Patient Active Problem List   Diagnosis   • Allergic rhinitis   • Hyperlipidemia   • Hypertension   • Hypocalcemia   • Hypothyroidism   • Neuropathy   • NUD (nonulcer dyspepsia)   • Painful knee   • Pernicious anemia   • Tremor   • Vitamin B12 deficiency   • Spondylolisthesis of cervical region   • Graves' ophthalmopathy   • Anemia   • Memory impairment of gradual onset   • Ascending aortic aneurysm (HCC)   • Stage 3 chronic kidney disease   • Pulmonary hypertension   • Chronic heart failure with preserved ejection fraction   • CHF (congestive heart failure), NYHA class I, acute on chronic, diastolic   • CHF (congestive heart failure)   •  Non-rheumatic aortic stenosis   • Non-rheumatic aortic regurgitation   • Hypokalemia   • Carpal tunnel syndrome   • Essential tremor   • Gastroesophageal reflux disease   • Nausea and vomiting   • Skin sensation disturbance   • Spinal cord disease   • Urge incontinence of urine   • Urinary urgency   • Acute chest pain   • (HFpEF) heart failure with preserved ejection fraction   • Nocturnal hypoxia         Current Outpatient Medications:   •  aspirin 81 MG EC tablet, Take 1 tablet by mouth Daily. OTC, Disp: , Rfl:   •  Boswellia-Glucosamine-Vit D (OSTEO BI-FLEX ONE PER DAY PO), Take 1 tablet by mouth Daily. OTC, Disp: , Rfl:   •  cetirizine (zyrTEC) 10 MG tablet, Take 1 tablet by mouth Daily., Disp: , Rfl:   •  empagliflozin (JARDIANCE) 10 MG tablet tablet, Take 1 tablet by mouth Daily., Disp: 30 tablet, Rfl: 0  •  famotidine (PEPCID) 20 MG tablet, TAKE ONE TABLET BY MOUTH TWICE A DAY, Disp: 180 tablet, Rfl: 3  •  fluticasone (FLONASE) 50 MCG/ACT nasal spray, 2 sprays by Each Nare route Daily. (Patient taking differently: 2 sprays by Each Nare route Daily As Needed.), Disp: 18.2 mL, Rfl: 0  •  furosemide (LASIX) 20 MG tablet, Take 2 tablets by mouth Daily., Disp: 60 tablet, Rfl: 6  •  HYDROcodone-acetaminophen (NORCO) 7.5-325 MG per tablet, Take 1 tablet by mouth Every 8 (Eight) Hours., Disp: , Rfl:   •  levothyroxine (SYNTHROID, LEVOTHROID) 125 MCG tablet, TAKE ONE TABLET BY MOUTH EVERY MORNING, Disp: 90 tablet, Rfl: 1  •  Lyrica 150 MG capsule, Take 1 capsule by mouth 2 (Two) Times a Day., Disp: , Rfl:   •  multivitamins-minerals (PRESERVISION AREDS 2) capsule capsule, Take 1 capsule by mouth 2 (Two) Times a Day. OTC, Disp: , Rfl:   •  O2 (OXYGEN), Inhale 2 L/min At Night As Needed., Disp: , Rfl:   •  potassium chloride 10 MEQ CR tablet, Take 1 tablet by mouth Daily. (Patient taking differently: Take 1 tablet by mouth Daily. Taking with Lasix 40 mg (MWF)), Disp: 30 tablet, Rfl: 11  •  propranolol (INDERAL) 40 MG  "tablet, Take 1 tablet by mouth 3 (Three) Times a Day., Disp: 90 tablet, Rfl: 0  •  rivaroxaban (XARELTO) 15 MG tablet, Take 1 tablet by mouth Daily With Dinner. Indications: Atrial Fibrillation, Disp: 30 tablet, Rfl: 0  •  rivastigmine (EXELON) 4.6 MG/24HR patch, APPLY ONE PATCH TO THE SKIN DAILY, Disp: 30 patch, Rfl: 6  •  sodium chloride 0.65 % nasal spray, 2 sprays into the nostril(s) as directed by provider As Needed for Congestion., Disp: 60 mL, Rfl: 0  •  tamsulosin (FLOMAX) 0.4 MG capsule 24 hr capsule, Take 1 capsule by mouth Daily., Disp: 30 capsule, Rfl: 0  •  vitamin B-12 (CYANOCOBALAMIN) 1000 MCG tablet, Take 1 tablet by mouth Daily. OTC, Disp: , Rfl:       Social History     Socioeconomic History   • Marital status:    Tobacco Use   • Smoking status: Never   • Smokeless tobacco: Never   Vaping Use   • Vaping Use: Never used   Substance and Sexual Activity   • Alcohol use: No   • Drug use: No   • Sexual activity: Defer         Objective:   Physical Exam:  BP 98/56 (BP Location: Left arm, Patient Position: Sitting)   Pulse 86   Ht 142.2 cm (56\")   Wt 64 kg (141 lb)   LMP  (LMP Unknown)   SpO2 94%   BMI 31.61 kg/m²   CONSTITUTIONAL: Well-nourished. In no acute distress.   LUNGS: Normal effort.  Mild bibasilar rales  CARDIOVASCULAR: Regular rate and rhythm with a normal S1 and S2. JYOTI at RUSB. Carotid without bruits.  2+ radial pulse.  There is moderate significant peripheral edema.     Labs:    No results found for: CHOL  Lab Results   Component Value Date    TRIG 51 04/04/2016     Lab Results   Component Value Date    HDL 72 (H) 04/04/2016     Lab Results   Component Value Date    LDL 92 04/04/2016     No components found for: LDLDIRECTC    Diagnostic Data:    Procedures    Results for orders placed during the hospital encounter of 04/04/23    Adult Transthoracic Echo Complete W/ Cont if Necessary Per Protocol    Interpretation Summary  •  Left ventricular systolic function is normal. Left " ventricular ejection fraction appears to be 51 - 55%.  •  Mildly reduced right ventricular systolic function noted.  •  The left atrial cavity is dilated.  •  The right atrial cavity is dilated.  •  There is moderate calcification of the aortic valve.  •  Mild to moderate aortic valve regurgitation is present.  •  Mild mitral valve regurgitation is present.  •  Mild tricuspid valve regurgitation is present.      Assessment and Plan:     Chronic diastolic heart failure   Chronic heart failure with preserved ejection fraction   Non-rheumatic aortic regurgitation  Non-rheumatic aortic stenosis  Pulmonary hypertension   Stage 3 chronic kidney disease, unspecified whether stage 3a or 3b CKD   -Continue beta-blocker, Jardiance  -Not on an ACE/ARB/ARNI/MRA due to relative hypotension on current therapy  -Continue Lasix 40 mg daily for now  - watching daily weights  -Repeat BMP and proBNP today  -Follow-up in both the cardiology and heart valve clinic as currently scheduled    Atrial fibrillation  -Continue propranolol, renally dosed Xarelto     Essential hypertension  Pulmonary hypertension  -Continue related cardiac medications    - Return in about 6 months (around 10/24/2023) for Next scheduled follow-up with an ECG.    Basil Richter MD, MSc, FACC, [facility]  Interventional Cardiology  [facility]

## 2023-04-25 NOTE — PROGRESS NOTES
Lab results are stable.  We will continue to monitor closely and repeat labs at next follow up visit.

## 2023-05-03 ENCOUNTER — TELEPHONE (OUTPATIENT)
Dept: CARDIOLOGY | Facility: HOSPITAL | Age: 88
End: 2023-05-03
Payer: MEDICARE

## 2023-05-03 ENCOUNTER — LAB (OUTPATIENT)
Dept: LAB | Facility: HOSPITAL | Age: 88
End: 2023-05-03
Payer: MEDICARE

## 2023-05-03 DIAGNOSIS — N18.30 STAGE 3 CHRONIC KIDNEY DISEASE, UNSPECIFIED WHETHER STAGE 3A OR 3B CKD: ICD-10-CM

## 2023-05-03 DIAGNOSIS — I38 VHD (VALVULAR HEART DISEASE): ICD-10-CM

## 2023-05-03 DIAGNOSIS — I50.32 CHRONIC HEART FAILURE WITH PRESERVED EJECTION FRACTION: ICD-10-CM

## 2023-05-03 DIAGNOSIS — I50.32 CHRONIC HEART FAILURE WITH PRESERVED EJECTION FRACTION: Primary | ICD-10-CM

## 2023-05-03 LAB
ANION GAP SERPL CALCULATED.3IONS-SCNC: 12 MMOL/L (ref 5–15)
BUN SERPL-MCNC: 29 MG/DL (ref 8–23)
BUN/CREAT SERPL: 20.4 (ref 7–25)
CALCIUM SPEC-SCNC: 8.6 MG/DL (ref 8.6–10.5)
CHLORIDE SERPL-SCNC: 106 MMOL/L (ref 98–107)
CO2 SERPL-SCNC: 24 MMOL/L (ref 22–29)
CREAT SERPL-MCNC: 1.42 MG/DL (ref 0.57–1)
EGFRCR SERPLBLD CKD-EPI 2021: 35.4 ML/MIN/1.73
GLUCOSE SERPL-MCNC: 118 MG/DL (ref 65–99)
POTASSIUM SERPL-SCNC: 4.2 MMOL/L (ref 3.5–5.2)
SODIUM SERPL-SCNC: 142 MMOL/L (ref 136–145)

## 2023-05-03 PROCEDURE — 80048 BASIC METABOLIC PNL TOTAL CA: CPT

## 2023-05-03 PROCEDURE — 36415 COLL VENOUS BLD VENIPUNCTURE: CPT

## 2023-05-03 NOTE — TELEPHONE ENCOUNTER
Spoke to patient's  called and is concerned about increased edema in the legs which looks the same as the last time before she was hospitalized. He states that the edema is about 13-14 inches below the knee and has a hard time even getting zip up compression stockings on. He doesn't believe that the Lasix is working and states that he fears she could be in kidney failure. He believes that her appetite is worse and states that when it comes time to eat she states that she is not hungry. He also took her to get labs done today and swung by the office.

## 2023-05-03 NOTE — TELEPHONE ENCOUNTER
Please call patient's spouse.  I have reviewed the weight and blood pressure log brought into the office yesterday.    With her weight gain I would like for her to repeat her lab work this week if possible.    She is currently taking Lasix 40 mg daily (20 mg tablets).  Have her take an extra 20 mg for the next 3 days    Monitor weight.  Please question if she has had increased swelling, shortness of breath.  If she having any shortness of breath when she lies down.  How is her appetite.?    Let schedule a follow-up visit next week.

## 2023-05-03 NOTE — TELEPHONE ENCOUNTER
Patient's spouse had stopped by to drop off for weight, blood pressure, heart rate, oxygen log.  Since hospital visit in April 18 she has had a 12 pound weight gain.    Patient has been taking Lasix 40 mg daily.  Jardiance 10 mg daily.

## 2023-05-03 NOTE — TELEPHONE ENCOUNTER
Spoke to patient's  and discussed results to labs. Patient was given an appointment for tomorrow at 3:30.

## 2023-05-03 NOTE — TELEPHONE ENCOUNTER
You can talk to Scheduling and work her into my schedule tomorrow if they would like to be seen.     Did you give him the instructions of givning her the extra 20 mg today?

## 2023-05-03 NOTE — TELEPHONE ENCOUNTER
I have reviewed patient labs.  Her Kidney function has slightly improved since last lab draw.    If possible I would like to see her tomorrow in clinic for IV diuretics then I will discuss changing her lasix to another medication to see if she has better effects.    Is she can not come in tomorrow.  Let me know and I will consider going ahead and changing her home meds.

## 2023-05-04 ENCOUNTER — OFFICE VISIT (OUTPATIENT)
Dept: INTERNAL MEDICINE | Facility: CLINIC | Age: 88
End: 2023-05-04
Payer: MEDICARE

## 2023-05-04 ENCOUNTER — OFFICE VISIT (OUTPATIENT)
Dept: CARDIOLOGY | Facility: HOSPITAL | Age: 88
End: 2023-05-04
Payer: MEDICARE

## 2023-05-04 VITALS
OXYGEN SATURATION: 89 % | HEART RATE: 78 BPM | SYSTOLIC BLOOD PRESSURE: 86 MMHG | HEIGHT: 56 IN | DIASTOLIC BLOOD PRESSURE: 60 MMHG | WEIGHT: 148 LBS | BODY MASS INDEX: 33.29 KG/M2

## 2023-05-04 VITALS
TEMPERATURE: 98 F | OXYGEN SATURATION: 93 % | RESPIRATION RATE: 20 BRPM | BODY MASS INDEX: 33.74 KG/M2 | WEIGHT: 150 LBS | DIASTOLIC BLOOD PRESSURE: 54 MMHG | HEIGHT: 56 IN | HEART RATE: 76 BPM | SYSTOLIC BLOOD PRESSURE: 113 MMHG

## 2023-05-04 DIAGNOSIS — I10 ESSENTIAL HYPERTENSION: ICD-10-CM

## 2023-05-04 DIAGNOSIS — N18.30 STAGE 3 CHRONIC KIDNEY DISEASE, UNSPECIFIED WHETHER STAGE 3A OR 3B CKD: ICD-10-CM

## 2023-05-04 DIAGNOSIS — I10 PRIMARY HYPERTENSION: ICD-10-CM

## 2023-05-04 DIAGNOSIS — R60.0 EXTREMITY EDEMA: ICD-10-CM

## 2023-05-04 DIAGNOSIS — K21.9 GASTROESOPHAGEAL REFLUX DISEASE WITHOUT ESOPHAGITIS: ICD-10-CM

## 2023-05-04 DIAGNOSIS — E78.49 OTHER HYPERLIPIDEMIA: Primary | ICD-10-CM

## 2023-05-04 DIAGNOSIS — I50.32 CHRONIC HEART FAILURE WITH PRESERVED EJECTION FRACTION: Primary | ICD-10-CM

## 2023-05-04 DIAGNOSIS — E03.8 OTHER SPECIFIED HYPOTHYROIDISM: ICD-10-CM

## 2023-05-04 DIAGNOSIS — I48.0 PAF (PAROXYSMAL ATRIAL FIBRILLATION): ICD-10-CM

## 2023-05-04 PROCEDURE — 1160F RVW MEDS BY RX/DR IN RCRD: CPT | Performed by: INTERNAL MEDICINE

## 2023-05-04 PROCEDURE — 99214 OFFICE O/P EST MOD 30 MIN: CPT | Performed by: INTERNAL MEDICINE

## 2023-05-04 PROCEDURE — 1159F MED LIST DOCD IN RCRD: CPT | Performed by: INTERNAL MEDICINE

## 2023-05-04 RX ORDER — TAMSULOSIN HYDROCHLORIDE 0.4 MG/1
0.4 CAPSULE ORAL DAILY
Qty: 30 CAPSULE | Refills: 5 | Status: SHIPPED | OUTPATIENT
Start: 2023-05-04 | End: 2023-05-08 | Stop reason: SDUPTHER

## 2023-05-04 RX ORDER — FUROSEMIDE 10 MG/ML
40 INJECTION INTRAMUSCULAR; INTRAVENOUS ONCE
Status: SHIPPED | OUTPATIENT
Start: 2023-05-04

## 2023-05-04 RX ORDER — METOLAZONE 2.5 MG/1
TABLET ORAL
Qty: 15 TABLET | Refills: 3 | Status: SHIPPED | OUTPATIENT
Start: 2023-05-04

## 2023-05-04 RX ORDER — FUROSEMIDE 20 MG/1
40 TABLET ORAL DAILY
Qty: 60 TABLET | Refills: 6
Start: 2023-05-04

## 2023-05-04 NOTE — PROGRESS NOTES
"[facility] Heart and Vascular    Chief Complaint  Congestive Heart Failure    Subjective    History of Present Illness {CC  Problem List  Visit  Diagnosis   Encounters  Notes  Medications  Labs  Result Review Imaging  Media :23}     Zoila Nava presents to [facility] CARDIOLOGY for   History of Present Illness     89-year-old female with history of heart failure with preserved EF, pulmonary hypertension, valvular heart disease, chronic kidney disease stage III, ascending aortic aneurysm, hypertension, hypothyroidism, GERD, anemia, dementia, palpitations with PACs and PVCs, essential tremors, neuropathy, PAF.    Hospitalized at [facility] 4/4/2023 with heart failure exacerbation.    Currently not on ACE, ARB, Arni due to renal dysfunction and hypotension.    Currently on propranolol for tremors and heart rate control.  Xarelto for stroke prevention.    Initiated on Jardiance.  Continued on Lasix.  Lasix has recently been increased due to weight gain, edema, dyspnea.  No improvement.    Pt with with 6 lb weight gain this week. Worsening dyspnea, edema.     Objective     Vital Signs:   Vitals:    05/04/23 1535   BP: 113/54   BP Location: Left arm   Patient Position: Sitting   Cuff Size: Adult   Pulse: 76   Resp: 20   Temp: 98 °F (36.7 °C)   TempSrc: Temporal   SpO2: 93%   Weight: 68 kg (150 lb)   Height: 142.2 cm (56\")     Body mass index is 33.63 kg/m².  Physical Exam  Vitals reviewed.   Constitutional:       General: She is not in acute distress.     Appearance: Normal appearance. She is obese.   Cardiovascular:      Rate and Rhythm: Normal rate and regular rhythm.      Pulses:           Radial pulses are 2+ on the right side and 2+ on the left side.        Dorsalis pedis pulses are 2+ on the right side and 2+ on the left side.        Posterior tibial pulses are 2+ on the right side and 2+ on the left side.      Heart sounds: " Normal heart sounds.   Pulmonary:      Effort: Pulmonary effort is normal.      Breath sounds: Normal breath sounds.   Musculoskeletal:      Right lower leg: Edema present.      Left lower leg: Edema present.   Skin:     General: Skin is warm and dry.   Neurological:      Mental Status: She is alert.   Psychiatric:         Mood and Affect: Mood normal.         Behavior: Behavior is cooperative.              Result Review  Data Reviewed:{ Labs  Result Review  Imaging  Med Tab  Media :23}   EKG 4/18/2023: Sinus rhythm with marked sinus arrhythmia 73 bpm    Echocardiogram 4/7/2023: EF 51 to 55%, mild to moderate AR, mild MR, mild TR     EKG 4/6/2023: Atrial fibrillation 123 bpm     Holter 3/8/2022.  Duration 4 days.  Average heart rate 62 bpm.  Multiple SVT runs/longest 15 seconds.  3.7% PACs, 2.2% PVC burden.  No patient triggered events        Echocardiogram 10/11/2021: EF 73%, grade 1 diastolic dysfunction, moderate pulmonary hypertension with RVSP 45 to 55 mmHg, moderate AR, mild AS, moderate TR    Lab Results   Component Value Date    GLUCOSE 118 (H) 05/03/2023    BUN 29 (H) 05/03/2023    CREATININE 1.42 (H) 05/03/2023    EGFR 35.4 (L) 05/03/2023    BCR 20.4 05/03/2023    K 4.2 05/03/2023    CO2 24.0 05/03/2023    CALCIUM 8.6 05/03/2023    ALBUMIN 3.9 04/04/2023    BILITOT 0.2 04/04/2023    AST 19 04/04/2023    ALT 20 04/04/2023     Lab Results   Component Value Date    WBC 8.23 04/07/2023    HGB 11.3 (L) 04/07/2023    HCT 36.1 04/07/2023    MCV 93.0 04/07/2023     04/07/2023     Lab Results   Component Value Date    TSH 6.020 (H) 04/05/2023     proBNP 4/24/2023: 2177.              Assessment and Plan {CC Problem List  Visit Diagnosis  ROS  Review (Popup)  Health Maintenance  Quality  BestPractice  Medications  SmartSets  SnapShot Encounters  Media :23}   1. Chronic heart failure with preserved ejection fraction  jardiance    Lasix 40 mg daily    Add metolazone 2.5 mg M,W,F    Lasix 40 mg IV  today    2. PAF (paroxysmal atrial fibrillation)  HR controlled  BB and Xarelto    3. Essential hypertension  controlled    4. Stage 3 chronic kidney disease, unspecified whether stage 3a or 3b CKD  Labs next week    5. Extremity edema  Continue compression stockings.           Follow Up {Instructions Charge Capture  Follow-up Communications :23}   Return in about 1 week (around 5/11/2023), or if symptoms worsen or fail to improve, for HF, Office visit.    Patient was given instructions and counseling regarding her condition or for health maintenance advice. Please see specific information pulled into the AVS if appropriate.  Patient was instructed to call the Heart and Valve Center with any questions, concerns, or worsening symptoms.

## 2023-05-04 NOTE — PROGRESS NOTES
Patient is a 89 y.o. female who is here for a follow up of hyperlipidemia and hypertension.  Chief Complaint   Patient presents with   • Hyperlipidemia   • Hypertension         HPI:    Here for mgmt of HTN and hypothyroid.  Despite increase in Lasix she is still SOB and wt gain.  No increase in fluids and watch NA consumption.  Sleeps with 2 pillow under her back/head.  No palpitations.  Appetite is not the best.     History:     Patient Active Problem List   Diagnosis   • Allergic rhinitis   • Hyperlipidemia   • Hypertension   • Hypocalcemia   • Hypothyroidism   • Neuropathy   • NUD (nonulcer dyspepsia)   • Painful knee   • Pernicious anemia   • Tremor   • Vitamin B12 deficiency   • Spondylolisthesis of cervical region   • Graves' ophthalmopathy   • Anemia   • Memory impairment of gradual onset   • Ascending aortic aneurysm (HCC)   • Stage 3 chronic kidney disease   • Pulmonary hypertension   • Chronic heart failure with preserved ejection fraction   • CHF (congestive heart failure), NYHA class I, acute on chronic, diastolic   • CHF (congestive heart failure)   • Non-rheumatic aortic stenosis   • Non-rheumatic aortic regurgitation   • Hypokalemia   • Carpal tunnel syndrome   • Essential tremor   • Gastroesophageal reflux disease   • Nausea and vomiting   • Skin sensation disturbance   • Spinal cord disease   • Urge incontinence of urine   • Urinary urgency   • Acute chest pain   • (HFpEF) heart failure with preserved ejection fraction   • Nocturnal hypoxia       Past Medical History:   Diagnosis Date   • Anemia    • Arthritis    • Asthma    • Cataract    • Difficulty breathing     Chronic   • GERD (gastroesophageal reflux disease)    • Graves' ophthalmopathy    • Hoarseness    • Hypertension    • Hypertensive heart disease with acute on chronic diastolic congestive heart failure 10/11/2021   • Pulmonary hypertension 10/11/2021   • Spondylolisthesis of cervical region    • Vitamin B12 deficiency        Past Surgical  History:   Procedure Laterality Date   • CERVICAL LAMINECTOMY     • CHOLECYSTECTOMY     • OTHER SURGICAL HISTORY Right     Neuroplasty Median Nerve at Carpal Tunnel   • THYROID SURGERY     • TOTAL KNEE ARTHROPLASTY Left 09/29/2014    Dr Colon       Current Outpatient Medications on File Prior to Visit   Medication Sig   • aspirin 81 MG EC tablet Take 1 tablet by mouth Daily. OTC   • Boswellia-Glucosamine-Vit D (OSTEO BI-FLEX ONE PER DAY PO) Take 1 tablet by mouth Daily. OTC   • cetirizine (zyrTEC) 10 MG tablet Take 1 tablet by mouth Daily.   • empagliflozin (JARDIANCE) 10 MG tablet tablet Take 1 tablet by mouth Daily.   • famotidine (PEPCID) 20 MG tablet TAKE ONE TABLET BY MOUTH TWICE A DAY   • fluticasone (FLONASE) 50 MCG/ACT nasal spray 2 sprays by Each Nare route Daily. (Patient taking differently: 2 sprays by Each Nare route Daily As Needed.)   • furosemide (LASIX) 20 MG tablet Take 2 tablets by mouth Daily. (Patient taking differently: Take 3 tablets by mouth Daily.)   • HYDROcodone-acetaminophen (NORCO) 7.5-325 MG per tablet Take 1 tablet by mouth Every 8 (Eight) Hours.   • levothyroxine (SYNTHROID, LEVOTHROID) 125 MCG tablet TAKE ONE TABLET BY MOUTH EVERY MORNING   • Lyrica 150 MG capsule Take 1 capsule by mouth 2 (Two) Times a Day.   • multivitamins-minerals (PRESERVISION AREDS 2) capsule capsule Take 1 capsule by mouth 2 (Two) Times a Day. OTC   • potassium chloride 10 MEQ CR tablet Take 1 tablet by mouth Daily. (Patient taking differently: Take 1 tablet by mouth Daily. Taking with Lasix 40 mg (MWF))   • propranolol (INDERAL) 40 MG tablet Take 1 tablet by mouth 3 (Three) Times a Day.   • rivaroxaban (XARELTO) 15 MG tablet Take 1 tablet by mouth Daily With Dinner. Indications: Atrial Fibrillation   • rivastigmine (EXELON) 4.6 MG/24HR patch APPLY ONE PATCH TO THE SKIN DAILY   • sodium chloride 0.65 % nasal spray 2 sprays into the nostril(s) as directed by provider As Needed for Congestion.   •  tamsulosin (FLOMAX) 0.4 MG capsule 24 hr capsule Take 1 capsule by mouth Daily.   • vitamin B-12 (CYANOCOBALAMIN) 1000 MCG tablet Take 1 tablet by mouth Daily. OTC   • O2 (OXYGEN) Inhale 2 L/min At Night As Needed.     No current facility-administered medications on file prior to visit.       Family History   Problem Relation Age of Onset   • Hypertension Mother    • Other Father         cardiac disorder   • Diabetes Father    • Heart disease Father    • Hypertension Sister    • No Known Problems Maternal Grandmother    • No Known Problems Maternal Grandfather    • No Known Problems Paternal Grandmother    • No Known Problems Paternal Grandfather    • Colon cancer Neg Hx    • Colon polyps Neg Hx    • Esophageal cancer Neg Hx        Social History     Socioeconomic History   • Marital status:    Tobacco Use   • Smoking status: Never   • Smokeless tobacco: Never   Vaping Use   • Vaping Use: Never used   Substance and Sexual Activity   • Alcohol use: No   • Drug use: No   • Sexual activity: Defer         Review of Systems   Constitutional: Positive for fatigue. Negative for chills, diaphoresis, fever and unexpected weight change.   HENT: Positive for hearing loss. Negative for congestion, ear pain, nosebleeds, postnasal drip, sinus pressure and sore throat.    Eyes: Negative for pain, discharge and itching.   Respiratory: Negative for cough, chest tightness, shortness of breath and wheezing.    Cardiovascular: Positive for leg swelling. Negative for chest pain and palpitations.   Gastrointestinal: Negative for abdominal distention, abdominal pain, blood in stool, constipation, diarrhea, nausea and vomiting.   Endocrine: Positive for cold intolerance. Negative for polydipsia and polyuria.   Genitourinary: Negative for difficulty urinating, dysuria, frequency and hematuria.   Musculoskeletal: Positive for arthralgias, back pain and gait problem. Negative for joint swelling and myalgias.   Skin: Negative for rash  "and wound.   Neurological: Positive for tremors, weakness and numbness. Negative for syncope and headaches.   Psychiatric/Behavioral: Positive for decreased concentration. Negative for dysphoric mood and sleep disturbance. The patient is not nervous/anxious.        BP (!) 86/60   Pulse 78   Ht 142.2 cm (55.98\")   Wt 67.1 kg (148 lb)   LMP  (LMP Unknown)   SpO2 (!) 89%   BMI 33.20 kg/m²       Physical Exam  Constitutional:       Appearance: Normal appearance. She is well-developed.   HENT:      Head: Normocephalic and atraumatic.      Right Ear: External ear normal.      Left Ear: External ear normal.      Nose: Nose normal.      Mouth/Throat:      Mouth: Mucous membranes are moist.      Pharynx: Oropharynx is clear.   Eyes:      Extraocular Movements: Extraocular movements intact.      Conjunctiva/sclera: Conjunctivae normal.      Pupils: Pupils are equal, round, and reactive to light.   Cardiovascular:      Rate and Rhythm: Normal rate and regular rhythm.      Heart sounds: Normal heart sounds.   Pulmonary:      Effort: Pulmonary effort is normal.      Breath sounds: Normal breath sounds.   Abdominal:      General: Bowel sounds are normal.      Palpations: Abdomen is soft.   Musculoskeletal:         General: Swelling (marked kyphosis) present.      Cervical back: Normal range of motion and neck supple.      Right lower leg: Edema present.      Left lower leg: Edema present.   Lymphadenopathy:      Cervical: No cervical adenopathy.   Skin:     General: Skin is warm and dry.   Neurological:      General: No focal deficit present.      Mental Status: She is alert and oriented to person, place, and time.   Psychiatric:         Mood and Affect: Mood normal.         Behavior: Behavior normal.         Thought Content: Thought content normal.         Procedure:      Discussion/Summary:    htn-consider changing propranolol to coreg  rhinitis-flonase and zyrtec continuation, stable  tremor-cont BB, not an " issue  hypothyroid- recheck noted , no change in dose  Dyspepsia-cont pepcid   neuropathy-cont lyrica at bid  djd-cont prn lortab, rare use  b12 def-cont b12, level at goal  hyperlipidemia-labs on rtc, cont diet control  Diverticulosis-advised citrucel qd, asymptomatic  Memory impairment-start Exelon  Ascending Aortic aneurysm-recheck 7/2 noted  CKD-avoid NSAIDs, recheck noted, advised increase fluids  Edema-consider adding metolazone 2.5 mg mwf to Lasix, if BP tolerates           10/24 labs noted and dw patient, advised NSAIDs avoidance and increase fluids    Current Outpatient Medications:   •  aspirin 81 MG EC tablet, Take 1 tablet by mouth Daily. OTC, Disp: , Rfl:   •  Boswellia-Glucosamine-Vit D (OSTEO BI-FLEX ONE PER DAY PO), Take 1 tablet by mouth Daily. OTC, Disp: , Rfl:   •  cetirizine (zyrTEC) 10 MG tablet, Take 1 tablet by mouth Daily., Disp: , Rfl:   •  empagliflozin (JARDIANCE) 10 MG tablet tablet, Take 1 tablet by mouth Daily., Disp: 30 tablet, Rfl: 0  •  famotidine (PEPCID) 20 MG tablet, TAKE ONE TABLET BY MOUTH TWICE A DAY, Disp: 180 tablet, Rfl: 3  •  fluticasone (FLONASE) 50 MCG/ACT nasal spray, 2 sprays by Each Nare route Daily. (Patient taking differently: 2 sprays by Each Nare route Daily As Needed.), Disp: 18.2 mL, Rfl: 0  •  furosemide (LASIX) 20 MG tablet, Take 2 tablets by mouth Daily. (Patient taking differently: Take 3 tablets by mouth Daily.), Disp: 60 tablet, Rfl: 6  •  HYDROcodone-acetaminophen (NORCO) 7.5-325 MG per tablet, Take 1 tablet by mouth Every 8 (Eight) Hours., Disp: , Rfl:   •  levothyroxine (SYNTHROID, LEVOTHROID) 125 MCG tablet, TAKE ONE TABLET BY MOUTH EVERY MORNING, Disp: 90 tablet, Rfl: 1  •  Lyrica 150 MG capsule, Take 1 capsule by mouth 2 (Two) Times a Day., Disp: , Rfl:   •  multivitamins-minerals (PRESERVISION AREDS 2) capsule capsule, Take 1 capsule by mouth 2 (Two) Times a Day. OTC, Disp: , Rfl:   •  potassium chloride 10 MEQ CR tablet, Take 1 tablet by mouth  Daily. (Patient taking differently: Take 1 tablet by mouth Daily. Taking with Lasix 40 mg (MWF)), Disp: 30 tablet, Rfl: 11  •  propranolol (INDERAL) 40 MG tablet, Take 1 tablet by mouth 3 (Three) Times a Day., Disp: 90 tablet, Rfl: 0  •  rivaroxaban (XARELTO) 15 MG tablet, Take 1 tablet by mouth Daily With Dinner. Indications: Atrial Fibrillation, Disp: 30 tablet, Rfl: 0  •  rivastigmine (EXELON) 4.6 MG/24HR patch, APPLY ONE PATCH TO THE SKIN DAILY, Disp: 30 patch, Rfl: 6  •  sodium chloride 0.65 % nasal spray, 2 sprays into the nostril(s) as directed by provider As Needed for Congestion., Disp: 60 mL, Rfl: 0  •  tamsulosin (FLOMAX) 0.4 MG capsule 24 hr capsule, Take 1 capsule by mouth Daily., Disp: 30 capsule, Rfl: 0  •  vitamin B-12 (CYANOCOBALAMIN) 1000 MCG tablet, Take 1 tablet by mouth Daily. OTC, Disp: , Rfl:   •  O2 (OXYGEN), Inhale 2 L/min At Night As Needed., Disp: , Rfl:         Diagnoses and all orders for this visit:    1. Other hyperlipidemia (Primary)    2. Primary hypertension    3. Other specified hypothyroidism    4. Gastroesophageal reflux disease without esophagitis

## 2023-05-08 ENCOUNTER — TELEPHONE (OUTPATIENT)
Dept: INTERNAL MEDICINE | Facility: CLINIC | Age: 88
End: 2023-05-08
Payer: MEDICARE

## 2023-05-08 RX ORDER — TAMSULOSIN HYDROCHLORIDE 0.4 MG/1
0.4 CAPSULE ORAL DAILY
Qty: 30 CAPSULE | Refills: 5 | Status: SHIPPED | OUTPATIENT
Start: 2023-05-08

## 2023-05-08 NOTE — TELEPHONE ENCOUNTER
Caller: Matt Nava    Relationship: Emergency Contact    Best call back number: [phone]    Requested Prescriptions:   XARELTO 15MG  TAMSULOSIN 0.4MG  JARDIANCE 10MG     Pharmacy where request should be sent:  [facility] [id number] - [place], [place] - [address] AT [facility] [address] - [phone] PH - [phone] FX  [phone]    Last office visit with prescribing clinician: 5/4/2023   Last telemedicine visit with prescribing clinician: 6/23/2023   Next office visit with prescribing clinician: 6/23/2023     Additional details provided by patient: THESE WERE PRESCRIBED BY THE HOSPITAL.     Does the patient have less than a 3 day supply:  [x] Yes  [] No    Would you like a call back once the refill request has been completed: [x] Yes []  Gaviota Franco Rep   05/08/23 13:07 EDT

## 2023-05-12 ENCOUNTER — OFFICE VISIT (OUTPATIENT)
Dept: CARDIOLOGY | Facility: HOSPITAL | Age: 88
End: 2023-05-12
Payer: MEDICARE

## 2023-05-12 ENCOUNTER — LAB (OUTPATIENT)
Dept: LAB | Facility: HOSPITAL | Age: 88
End: 2023-05-12
Payer: MEDICARE

## 2023-05-12 VITALS
RESPIRATION RATE: 20 BRPM | TEMPERATURE: 98.1 F | DIASTOLIC BLOOD PRESSURE: 48 MMHG | HEIGHT: 56 IN | OXYGEN SATURATION: 95 % | SYSTOLIC BLOOD PRESSURE: 82 MMHG | HEART RATE: 80 BPM | BODY MASS INDEX: 32.87 KG/M2 | WEIGHT: 146.13 LBS

## 2023-05-12 DIAGNOSIS — I50.32 CHRONIC HEART FAILURE WITH PRESERVED EJECTION FRACTION: ICD-10-CM

## 2023-05-12 DIAGNOSIS — N18.30 STAGE 3 CHRONIC KIDNEY DISEASE, UNSPECIFIED WHETHER STAGE 3A OR 3B CKD: ICD-10-CM

## 2023-05-12 DIAGNOSIS — I38 VHD (VALVULAR HEART DISEASE): ICD-10-CM

## 2023-05-12 DIAGNOSIS — R60.0 EDEMA LEG: ICD-10-CM

## 2023-05-12 DIAGNOSIS — I50.32 CHRONIC HEART FAILURE WITH PRESERVED EJECTION FRACTION: Primary | ICD-10-CM

## 2023-05-12 DIAGNOSIS — I48.0 PAF (PAROXYSMAL ATRIAL FIBRILLATION): ICD-10-CM

## 2023-05-12 DIAGNOSIS — I10 ESSENTIAL HYPERTENSION: ICD-10-CM

## 2023-05-12 LAB
ANION GAP SERPL CALCULATED.3IONS-SCNC: 12.2 MMOL/L (ref 5–15)
BUN SERPL-MCNC: 29 MG/DL (ref 8–23)
BUN/CREAT SERPL: 18.1 (ref 7–25)
CALCIUM SPEC-SCNC: 9 MG/DL (ref 8.6–10.5)
CHLORIDE SERPL-SCNC: 101 MMOL/L (ref 98–107)
CO2 SERPL-SCNC: 28.8 MMOL/L (ref 22–29)
CREAT SERPL-MCNC: 1.6 MG/DL (ref 0.57–1)
EGFRCR SERPLBLD CKD-EPI 2021: 30.7 ML/MIN/1.73
GLUCOSE SERPL-MCNC: 93 MG/DL (ref 65–99)
POTASSIUM SERPL-SCNC: 3.6 MMOL/L (ref 3.5–5.2)
SODIUM SERPL-SCNC: 142 MMOL/L (ref 136–145)

## 2023-05-12 PROCEDURE — 36415 COLL VENOUS BLD VENIPUNCTURE: CPT

## 2023-05-12 PROCEDURE — 80048 BASIC METABOLIC PNL TOTAL CA: CPT

## 2023-05-12 NOTE — PROGRESS NOTES
"[facility] Heart [facility] V[facility]    Chief Complaint  Congestive Heart Failure and Follow-up    Subjective    History of Present Illness {  Problem List  Visit  Diagnosis   Encounters  Notes  Medications  Labs  Result Review Imaging  Media :23}     Zoila Nava presents to [facility] for   History of Present Illness     89-year-old female with history of heart failure with preserved EF, pulmonary hypertension, valvular heart disease, chronic kidney disease stage III, ascending aortic aneurysm, hypertension, hypothyroidism, GERD, anemia, dementia, palpitations with PACs and PVCs, essential tremors, neuropathy, PAF.    Patient hospitalized for heart failure exacerbation on 4/4/2023.  Currently not on ACE, ARB, Arni due to renal dysfunction and hypotension.    Propranolol had been used for heart rate control due to essential tremors.  Xarelto for stroke prevention.    Patient currently on Jardiance, Lasix.    Struggling with weight gain since hospitalization.  Last week was given IV Lasix 40 mg.  Metolazone 2.5 mg was added Monday Wednesday Friday.    Follow-up today.      Pt reports improved dyspnea, edema.  Home weight loss of 6 lbs.  o2 sats have improved.  BP has been stable.   Objective     Vital Signs:   Vitals:    05/12/23 1312   BP: (!) 82/48   BP Location: Left arm   Patient Position: Sitting   Cuff Size: Adult   Pulse: 80   Resp: 20   Temp: 98.1 °F (36.7 °C)   TempSrc: Temporal   SpO2: 95%   Weight: 66.3 kg (146 lb 2 oz)   Height: 142.2 cm (56\")     Body mass index is 32.76 kg/m².  Physical Exam  Vitals reviewed.   Constitutional:       General: She is not in acute distress.     Appearance: Normal appearance.      Comments: More interactive today. Laughing, engaged.    Cardiovascular:      Rate and Rhythm: Normal rate and regular rhythm.      Pulses:           Radial pulses are 2+ on the right side and 2+ on the left side.    "     Dorsalis pedis pulses are 2+ on the right side and 2+ on the left side.        Posterior tibial pulses are 2+ on the right side and 2+ on the left side.      Heart sounds: Normal heart sounds.   Pulmonary:      Effort: Pulmonary effort is normal.      Breath sounds: Normal breath sounds.   Musculoskeletal:      Right lower leg: Edema present.      Left lower leg: Edema present.   Skin:     General: Skin is warm and dry.   Neurological:      Mental Status: She is alert.   Psychiatric:         Mood and Affect: Mood normal.         Behavior: Behavior is cooperative.              Result Review  Data Reviewed:{ Labs  Result Review  Imaging  Med Tab  Media :23}   EKG 4/18/2023: Sinus rhythm with marked sinus arrhythmia 73 bpm     Echocardiogram 4/7/2023: EF 51 to 55%, mild to moderate AR, mild MR, mild TR     EKG 4/6/2023: Atrial fibrillation 123 bpm     Holter 3/8/2022.  Duration 4 days.  Average heart rate 62 bpm.  Multiple SVT runs/longest 15 seconds.  3.7% PACs, 2.2% PVC burden.  No patient triggered events        Echocardiogram 10/11/2021: EF 73%, grade 1 diastolic dysfunction, moderate pulmonary hypertension with RVSP 45 to 55 mmHg, moderate AR, mild AS, moderate TR    Lab Results   Component Value Date    GLUCOSE 118 (H) 05/03/2023    CALCIUM 8.6 05/03/2023     05/03/2023    K 4.2 05/03/2023    CO2 24.0 05/03/2023     05/03/2023    BUN 29 (H) 05/03/2023    CREATININE 1.42 (H) 05/03/2023    EGFR 35.4 (L) 05/03/2023    BCR 20.4 05/03/2023    ANIONGAP 12.0 05/03/2023                     Assessment and Plan {CC Problem List  Visit Diagnosis  ROS  Review (Popup)  Health Maintenance  Quality  BestPractice  Medications  SmartSets  SnapShot Encounters  Media :23}   1. Chronic heart failure with preserved ejection fraction  jardiance  Lasix 40 mg daily  Metolazone 2.5 mg daily    Weight loss with improved dyspnea and edema.      - Basic Metabolic Panel; Future    2. PAF (paroxysmal atrial  fibrillation)  HR controlled on propranolol    3. Essential hypertension  Low normal today  No asymptomatic today    4. VHD (valvular heart disease)  Moderate AR    5. Stage 3 chronic kidney disease, unspecified whether stage 3a or 3b CKD    - Basic Metabolic Panel; Future    6. Edema leg  Improved  Continue compression stockings.     F/u next week as scheduled.     Xarelto 15 mg samples given #4.  Lot:  [id number], Exp: 04/2024  Jardiance 10 mg samples # 2.  Lot: [id number], Exp: 05/2025    F/u next week as scheduled.       Follow Up {Instructions Charge Capture  Follow-up Communications :23}   Return for HF, Next scheduled follow up.    Patient was given instructions and counseling regarding her condition or for health maintenance advice. Please see specific information pulled into the AVS if appropriate.  Patient was instructed to call the [facility] with any questions, concerns, or worsening symptoms.

## 2023-05-16 ENCOUNTER — TELEPHONE (OUTPATIENT)
Dept: CARDIOLOGY | Facility: HOSPITAL | Age: 88
End: 2023-05-16
Payer: MEDICARE

## 2023-05-16 DIAGNOSIS — I50.32 CHRONIC HEART FAILURE WITH PRESERVED EJECTION FRACTION: ICD-10-CM

## 2023-05-16 DIAGNOSIS — R60.0 EXTREMITY EDEMA: ICD-10-CM

## 2023-05-16 RX ORDER — METOLAZONE 2.5 MG/1
TABLET ORAL
Qty: 15 TABLET | Refills: 3
Start: 2023-05-16

## 2023-05-16 NOTE — TELEPHONE ENCOUNTER
Spoke to patient's  and discussed medication changes. Patient will continue Lasix 40mg daily and decrease metolazone to 2.5mg on Monday and Friday. Patient has follow-up on Thursday and will repeat labs next Tuesday.

## 2023-05-16 NOTE — TELEPHONE ENCOUNTER
To discuss medication changes based on lab results.    Her kidney function has slightly worsened.    Continue Lasix 40 mg daily.  Decrease metolazone to 2.5 mg 2 times a week instead of 3 times a week.    We will follow-up as scheduled.    We will repeat lab work in 1 week.

## 2023-05-18 ENCOUNTER — OFFICE VISIT (OUTPATIENT)
Dept: CARDIOLOGY | Facility: HOSPITAL | Age: 88
End: 2023-05-18
Payer: MEDICARE

## 2023-05-18 VITALS
SYSTOLIC BLOOD PRESSURE: 98 MMHG | DIASTOLIC BLOOD PRESSURE: 57 MMHG | HEIGHT: 56 IN | WEIGHT: 144.6 LBS | HEART RATE: 76 BPM | TEMPERATURE: 97.6 F | BODY MASS INDEX: 32.53 KG/M2 | RESPIRATION RATE: 18 BRPM | OXYGEN SATURATION: 92 %

## 2023-05-18 DIAGNOSIS — I95.2 HYPOTENSION DUE TO DRUGS: ICD-10-CM

## 2023-05-18 DIAGNOSIS — N18.30 STAGE 3 CHRONIC KIDNEY DISEASE, UNSPECIFIED WHETHER STAGE 3A OR 3B CKD: ICD-10-CM

## 2023-05-18 DIAGNOSIS — I48.0 PAF (PAROXYSMAL ATRIAL FIBRILLATION): ICD-10-CM

## 2023-05-18 DIAGNOSIS — I50.32 CHRONIC HEART FAILURE WITH PRESERVED EJECTION FRACTION: Primary | ICD-10-CM

## 2023-05-18 RX ORDER — MIDODRINE HYDROCHLORIDE 2.5 MG/1
2.5 TABLET ORAL
Qty: 90 TABLET | Refills: 3 | Status: SHIPPED | OUTPATIENT
Start: 2023-05-18

## 2023-05-18 NOTE — PROGRESS NOTES
"[facility] Heart and Vascular    Chief Complaint  Congestive Heart Failure and Follow-up    Subjective    History of Present Illness {  Problem List  Visit  Diagnosis   Encounters  Notes  Medications  Labs  Result Review Imaging  Media :23}     Zoila Nava presents to [facility] C[facility]IOLOG[facility] for   History of Present Illness     89-year-old female with history of heart failure with preserved EF, pulmonary hypertension, valvular heart disease, chronic kidney disease stage III, ascending aortic aneurysm, hypertension, hypothyroidism, GERD, anemia, dementia, palpitations with PACs and PVCs, essential tremors, neuropathy, PAF    Last hospitalization for heart failure exacerbation 4/4/2023.  No ACE, ARB, Arni due to renal dysfunction and hypotension.  Propranolol has been used due to essential tremors.  Stroke prevention and Xarelto.    Patient has been struggling with weight gain, worsening dyspnea and edema since hospitalization discharge.  She had been given IV diuretics as an outpatient.  Started on metolazone 2.5 mg 3 times a week.  Last week her weight started to go down.  Edema had improved.  Dyspnea had improved.  Her creatinine did increase to 1.6.    Instructed this week to decrease metolazone to twice weekly.  Continuing Lasix daily with potassium supplement.  She is still on Jardiance.    Still slowing loosing weight.  Edema present but improved.  No dyspnea, CP, pressure, palpitations.  NO dizziness, near syncope, syncope    Objective     Vital Signs:   Vitals:    05/18/23 1022   BP: 98/57   BP Location: Left arm   Patient Position: Sitting   Cuff Size: Adult   Pulse: 76   Resp: 18   Temp: 97.6 °F (36.4 °C)   TempSrc: Temporal   SpO2: 92%   Weight: 65.6 kg (144 lb 9.6 oz)   Height: 142.2 cm (56\")     Body mass index is 32.42 kg/m².  Physical Exam  Vitals reviewed.   Constitutional:       General: She is not in acute " distress.  Cardiovascular:      Rate and Rhythm: Normal rate and regular rhythm.   Pulmonary:      Effort: Pulmonary effort is normal.      Breath sounds: Normal breath sounds.   Musculoskeletal:      Right lower leg: Edema present.      Left lower leg: Edema present.   Skin:     Coloration: Skin is not pale.   Neurological:      Mental Status: She is alert.   Psychiatric:         Mood and Affect: Mood normal.         Behavior: Behavior normal. Behavior is cooperative.              Result Review  Data Reviewed:{ Labs  Result Review  Imaging  Med Tab  Media :23}   Lab Results   Component Value Date    GLUCOSE 93 05/12/2023    CALCIUM 9.0 05/12/2023     05/12/2023    K 3.6 05/12/2023    CO2 28.8 05/12/2023     05/12/2023    BUN 29 (H) 05/12/2023    CREATININE 1.60 (H) 05/12/2023    EGFR 30.7 (L) 05/12/2023    BCR 18.1 05/12/2023    ANIONGAP 12.2 05/12/2023     EKG 4/18/2023: Sinus rhythm with marked sinus arrhythmia 73 bpm     Echocardiogram 4/7/2023: EF 51 to 55%, mild to moderate AR, mild MR, mild TR     EKG 4/6/2023: Atrial fibrillation 123 bpm     Holter 3/8/2022.  Duration 4 days.  Average heart rate 62 bpm.  Multiple SVT runs/longest 15 seconds.  3.7% PACs, 2.2% PVC burden.  No patient triggered events        Echocardiogram 10/11/2021: EF 73%, grade 1 diastolic dysfunction, moderate pulmonary hypertension with RVSP 45 to 55 mmHg, moderate AR, mild AS, moderate TR              Assessment and Plan {CC Problem List  Visit Diagnosis  ROS  Review (Popup)  Health Maintenance  Quality  BestPractice  Medications  SmartSets  SnapShot Encounters  Media :23}   1. Chronic heart failure with preserved ejection fraction  Continue lasix 40 mg daily  Continue Jardiance  Continue metolazone 2.5 mg twice a week    Labs in 1 week    Continue potassium supplement    2. PAF (paroxysmal atrial fibrillation)  Heart rate controlled on propranolol  Xarelto for stroke prevention no signs and symptoms of  bleeding    3. Hypotension due to drugs  Patient's blood pressure has been on the low side of normal, will add midodrine    - midodrine (PROAMATINE) 2.5 MG tablet; Take 1 tablet by mouth 3 (Three) Times a Day Before Meals.  Dispense: 90 tablet; Refill: 3    4. Stage 3 chronic kidney disease, unspecified whether stage 3a or 3b CKD  BMP 1 week.      Follow-up in 2 weeks        Follow Up {Instructions Charge Capture  Follow-up Communications :23}   Return in about 2 weeks (around 6/1/2023), or if symptoms worsen or fail to improve, for Office visit, HF.    Patient was given instructions and counseling regarding her condition or for health maintenance advice. Please see specific information pulled into the AVS if appropriate.  Patient was instructed to call the [facility] with any questions, concerns, or worsening symptoms.

## 2023-05-25 ENCOUNTER — TELEPHONE (OUTPATIENT)
Dept: INTERNAL MEDICINE | Facility: CLINIC | Age: 88
End: 2023-05-25

## 2023-05-25 DIAGNOSIS — E03.8 OTHER SPECIFIED HYPOTHYROIDISM: ICD-10-CM

## 2023-05-25 NOTE — TELEPHONE ENCOUNTER
Caller: Matt Nava    Relationship: Emergency Contact    Best call back number: [phone]    What is the medical concern/diagnosis: LEG CONDITION    What specialty or service is being requested: PAIN MANAGEMENT    What is the provider, practice or medical service name: DR JAQUEZ    THE PATIENT'S LEG CONDITION HAS GOTTEN WORSE.

## 2023-05-26 RX ORDER — LEVOTHYROXINE SODIUM 0.12 MG/1
TABLET ORAL
Qty: 90 TABLET | Refills: 1 | Status: SHIPPED | OUTPATIENT
Start: 2023-05-26

## 2023-05-30 ENCOUNTER — TELEPHONE (OUTPATIENT)
Dept: INTERNAL MEDICINE | Facility: CLINIC | Age: 88
End: 2023-05-30

## 2023-05-30 DIAGNOSIS — M25.552 LEFT HIP PAIN: Primary | ICD-10-CM

## 2023-05-30 NOTE — TELEPHONE ENCOUNTER
Caller: Matt Nava    Relationship: Emergency Contact    Best call back number:     What is the medical concern/diagnosis: JOINT PAIN ON THE LEFT SIDE    What specialty or service is being requested: PAIN MGMT    What is the provider, practice or medical service name: DR JAQUEZ       What is the office location: [facility]    Any additional details: PATIENT IS EXPERIENCING LEFT JOINT PAIN IN THE THIGH AREA AND LEFT HIP

## 2023-06-06 ENCOUNTER — LAB (OUTPATIENT)
Dept: LAB | Facility: HOSPITAL | Age: 88
End: 2023-06-06
Payer: MEDICARE

## 2023-06-06 ENCOUNTER — TELEPHONE (OUTPATIENT)
Dept: CARDIOLOGY | Facility: HOSPITAL | Age: 88
End: 2023-06-06
Payer: MEDICARE

## 2023-06-06 DIAGNOSIS — R60.0 EXTREMITY EDEMA: ICD-10-CM

## 2023-06-06 DIAGNOSIS — I50.32 CHRONIC HEART FAILURE WITH PRESERVED EJECTION FRACTION: ICD-10-CM

## 2023-06-06 LAB
ANION GAP SERPL CALCULATED.3IONS-SCNC: 14.6 MMOL/L (ref 5–15)
BUN SERPL-MCNC: 22 MG/DL (ref 8–23)
BUN/CREAT SERPL: 14.7 (ref 7–25)
CALCIUM SPEC-SCNC: 8.8 MG/DL (ref 8.6–10.5)
CHLORIDE SERPL-SCNC: 95 MMOL/L (ref 98–107)
CO2 SERPL-SCNC: 26.4 MMOL/L (ref 22–29)
CREAT SERPL-MCNC: 1.5 MG/DL (ref 0.57–1)
EGFRCR SERPLBLD CKD-EPI 2021: 33.2 ML/MIN/1.73
GLUCOSE SERPL-MCNC: 109 MG/DL (ref 65–99)
POTASSIUM SERPL-SCNC: 3.9 MMOL/L (ref 3.5–5.2)
SODIUM SERPL-SCNC: 136 MMOL/L (ref 136–145)

## 2023-06-06 PROCEDURE — 36415 COLL VENOUS BLD VENIPUNCTURE: CPT

## 2023-06-06 PROCEDURE — 80048 BASIC METABOLIC PNL TOTAL CA: CPT

## 2023-06-06 NOTE — TELEPHONE ENCOUNTER
Pt spouse brings in a weight log today showing 6lb weight gain 12 day.  She is currently taking metolazone 2.5 mg twice a week on Monday and Friday.    Have patient take an extra metolazone with an extra potassium supplement tomorrow, Wednesday and follow-up as scheduled

## 2023-06-07 NOTE — PROGRESS NOTES
Your lab results have been reviewed.  Your labs were completed on 6/6/2023.  Your kidney function is stable.  Slight improvement from 3 weeks ago.  We will continue to monitor closely.

## 2023-06-09 ENCOUNTER — OFFICE VISIT (OUTPATIENT)
Dept: CARDIOLOGY | Facility: HOSPITAL | Age: 88
End: 2023-06-09
Payer: MEDICARE

## 2023-06-09 VITALS
SYSTOLIC BLOOD PRESSURE: 92 MMHG | BODY MASS INDEX: 34 KG/M2 | DIASTOLIC BLOOD PRESSURE: 52 MMHG | HEIGHT: 56 IN | WEIGHT: 151.13 LBS | RESPIRATION RATE: 18 BRPM | OXYGEN SATURATION: 95 % | HEART RATE: 74 BPM | TEMPERATURE: 97.6 F

## 2023-06-09 DIAGNOSIS — I50.32 CHRONIC HEART FAILURE WITH PRESERVED EJECTION FRACTION: Primary | ICD-10-CM

## 2023-06-09 DIAGNOSIS — I95.2 HYPOTENSION DUE TO DRUGS: ICD-10-CM

## 2023-06-09 DIAGNOSIS — I48.0 PAF (PAROXYSMAL ATRIAL FIBRILLATION): ICD-10-CM

## 2023-06-09 DIAGNOSIS — R60.0 EDEMA LEG: ICD-10-CM

## 2023-06-09 DIAGNOSIS — N18.30 STAGE 3 CHRONIC KIDNEY DISEASE, UNSPECIFIED WHETHER STAGE 3A OR 3B CKD: ICD-10-CM

## 2023-06-09 RX ORDER — METOLAZONE 2.5 MG/1
TABLET ORAL
Qty: 15 TABLET | Refills: 3 | Status: SHIPPED | OUTPATIENT
Start: 2023-06-09

## 2023-06-09 RX ORDER — POTASSIUM CHLORIDE 750 MG/1
TABLET, FILM COATED, EXTENDED RELEASE ORAL
Qty: 30 TABLET | Refills: 11
Start: 2023-06-09

## 2023-06-09 RX ORDER — MIDODRINE HYDROCHLORIDE 5 MG/1
5 TABLET ORAL
Qty: 90 TABLET | Refills: 3 | Status: SHIPPED | OUTPATIENT
Start: 2023-06-09

## 2023-06-09 NOTE — PROGRESS NOTES
"[facility], [facility] Heart and Vascular    Chief Complaint  Congestive Heart Failure, Edema, and Follow-up    Subjective    History of Present Illness {  Problem List  Visit  Diagnosis   Encounters  Notes  Medications  Labs  Result Review Imaging  Media :23}     Zoila Nava presents to [facility] for   History of Present Illness     89-year-old female with history of heart failure with preserved EF, pulmonary hypertension, valvular heart disease, chronic kidney disease stage III, ascending aortic aneurysm, hypertension, hypothyroidism, GERD, anemia, dementia, palpitations with PACs and PVCs, essential tremors, neuropathy, PAF     Last hospitalization for heart failure exacerbation 4/4/2023.  No ACE, ARB, Arni due to renal dysfunction and hypotension.  Propranolol has been used due to essential tremors.  Stroke prevention and Xarelto.    Patient has been struggling with weight gain, worsening dyspnea and edema since hospitalization discharge. She had been given IV diuretics as an outpatient.     Heart failure medications include Lasix 20 mg 2 tablets daily, Jardiance, metolazone 2 times per week.  Earlier this week she had had a 67 pound weight gain instructed to take another metolazone.  Follow-up today.  She is on a potassium supplement.  Also on midodrine for hypotension.  Patient's creatinine on 6/6/2023, 1.5 (baseline)    Pt did better on metolazone 3 times per week then on 2 times per week (weight gain)    Left leg is worse.  Wearing compression socks.  Not elevating legs due to left hip pain.  Not using Nsaids    Objective     Vital Signs:   Vitals:    06/09/23 1117   BP: 92/52   BP Location: Left arm   Patient Position: Sitting   Cuff Size: Adult   Pulse: 74   Resp: 18   Temp: 97.6 °F (36.4 °C)   TempSrc: Temporal   SpO2: 95%   Weight: 68.5 kg (151 lb 2 oz)   Height: 142.2 cm (56\")     Body mass index is 33.88 kg/m².  Physical " Exam  Vitals reviewed.   Constitutional:       General: She is not in acute distress.  Pulmonary:      Effort: Pulmonary effort is normal.      Breath sounds: Rales (bases) present.   Musculoskeletal:      Right lower leg: Edema (3+ bilateral edema) present.      Left lower leg: Edema present.   Skin:     Coloration: Skin is not pale.   Neurological:      Mental Status: She is alert.   Psychiatric:         Mood and Affect: Mood normal.         Behavior: Behavior normal. Behavior is cooperative.            Result Review  Data Reviewed:{ Labs  Result Review  Imaging  Med Tab  Media :23}   EKG 4/18/2023: Sinus rhythm with marked sinus arrhythmia 73 bpm     Echocardiogram 4/7/2023: EF 51 to 55%, mild to moderate AR, mild MR, mild TR     EKG 4/6/2023: Atrial fibrillation 123 bpm     Holter 3/8/2022.  Duration 4 days.  Average heart rate 62 bpm.  Multiple SVT runs/longest 15 seconds.  3.7% PACs, 2.2% PVC burden.  No patient triggered events        Echocardiogram 10/11/2021: EF 73%, grade 1 diastolic dysfunction, moderate pulmonary hypertension with RVSP 45 to 55 mmHg, moderate AR, mild AS, moderate TR    Lab Results   Component Value Date    GLUCOSE 109 (H) 06/06/2023    CALCIUM 8.8 06/06/2023     06/06/2023    K 3.9 06/06/2023    CO2 26.4 06/06/2023    CL 95 (L) 06/06/2023    BUN 22 06/06/2023    CREATININE 1.50 (H) 06/06/2023    EGFR 33.2 (L) 06/06/2023    BCR 14.7 06/06/2023    ANIONGAP 14.6 06/06/2023     Lab Results   Component Value Date    WBC 8.23 04/07/2023    HGB 11.3 (L) 04/07/2023    HCT 36.1 04/07/2023    MCV 93.0 04/07/2023     04/07/2023                   Assessment and Plan {CC Problem List  Visit Diagnosis  ROS  Review (Popup)  Health Maintenance  Quality  BestPractice  Medications  SmartSets  SnapShot Encounters  Media :23}   1. Chronic heart failure with preserved ejection fraction  Increase:  - metOLazone (ZAROXOLYN) 2.5 MG tablet; 3 times per week, M, W, F.  Dispense: 15  tablet; Refill: 3  - Basic Metabolic Panel; 2 weeks    2. PAF (paroxysmal atrial fibrillation)  HR controlled    3. Hypotension due to drugs  increase  - midodrine (PROAMATINE) 5 MG tablet; Take 1 tablet by mouth 3 (Three) Times a Day Before Meals.  Dispense: 90 tablet; Refill: 3    4. Stage 3 chronic kidney disease, unspecified whether stage 3a or 3b CKD    - Basic Metabolic Panel; Future    5. Edema leg  Compression stockings.   Elevate legs.           Follow Up {Instructions Charge Capture  Follow-up Communications :23}   Return in about 2 weeks (around 6/23/2023) for Office visit, HF.    Patient was given instructions and counseling regarding her condition or for health maintenance advice. Please see specific information pulled into the AVS if appropriate.  Patient was instructed to call the [facility] with any questions, concerns, or worsening symptoms.

## 2023-07-24 ENCOUNTER — TELEPHONE (OUTPATIENT)
Dept: CARDIOLOGY | Facility: HOSPITAL | Age: 88
End: 2023-07-24
Payer: MEDICARE

## 2023-07-24 NOTE — TELEPHONE ENCOUNTER
Jennifer, an OT with [facility], called regarding blood pressure falling out of the perimeters. BP was 82/58 after shower but patient had no signs and symptoms. Wt also fell below perimeter of 145 at 143 lbs. At what perimeter would you like to have weight reported?

## 2023-07-25 NOTE — TELEPHONE ENCOUNTER
Tao Mosquera, APRELMER  [employer] (11:46 AM)     CB  Since she is doesn't have symptoms I would recommend continue to monitor for now, contact if SBP declines further or she gets symptoms. Blood pressure may be a bit lower since just out of shower also. Continue to monitor and contact us with any symptoms, may adjust medications then. Thanks   Spoke to Jennifer and notified. Still awaiting to know what perimeters for her weight.

## 2023-07-26 ENCOUNTER — TELEPHONE (OUTPATIENT)
Dept: CARDIOLOGY | Facility: HOSPITAL | Age: 88
End: 2023-07-26

## 2023-07-26 NOTE — TELEPHONE ENCOUNTER
Caller: Zoila Nava    Relationship: Self    Best call back number: [phone] ( MANOAH CARREL [facility])    What is the best time to reach you: ANY    Who are you requesting to speak with (clinical staff, provider,  specific staff member): CLINICAL    What was the call regarding: MR. CARREL IS CALLING IN REGARDS TO THE PATIENTS WEIGHT DROPPING BELOW THE SET PARAMETERS. HER LAST WEIGHT .2 ON 07-26-23. PLEASE REACH OUT TO MR CARREL AND ADVISE HIM ON HOW TO PROCEED.

## 2023-07-28 ENCOUNTER — TELEPHONE (OUTPATIENT)
Dept: CARDIOLOGY | Facility: HOSPITAL | Age: 88
End: 2023-07-28
Payer: MEDICARE

## 2023-07-28 NOTE — TELEPHONE ENCOUNTER
Yes I would like to reorder home health as listed below.    I am concerned about a 3-5 lb weight gain in 3 days.

## 2023-07-28 NOTE — TELEPHONE ENCOUNTER
Cal Duque with [facility] called and would like to know if he could get a verbal order for patient to continue home health PT 2 times weekly. He can be reached at [phone].    Also, [facility] needs to know perimeters for patient's weight.

## 2023-07-31 NOTE — TELEPHONE ENCOUNTER
Spoke to Cal, PT, and gave verbal order to continue PT in home 2x weekly. Also notified [facility] to call over 3-5 lb weight gain in 3 days.

## 2023-08-03 ENCOUNTER — TELEPHONE (OUTPATIENT)
Dept: CARDIOLOGY | Facility: HOSPITAL | Age: 88
End: 2023-08-03
Payer: MEDICARE

## 2023-08-09 ENCOUNTER — TELEPHONE (OUTPATIENT)
Dept: CARDIOLOGY | Facility: HOSPITAL | Age: 88
End: 2023-08-09
Payer: MEDICARE

## 2023-08-09 ENCOUNTER — TELEPHONE (OUTPATIENT)
Dept: INTERNAL MEDICINE | Facility: CLINIC | Age: 88
End: 2023-08-09
Payer: MEDICARE

## 2023-08-09 NOTE — TELEPHONE ENCOUNTER
Spoke to patient's  and labs have not been completed but  plans to take patient to [facility] location to have completed by end of the week.

## 2023-08-09 NOTE — TELEPHONE ENCOUNTER
Ricardo with [facility] health care called saying Zoila's pain level is 8. Ricardo number [phone].

## 2023-08-16 NOTE — TELEPHONE ENCOUNTER
Caller: [facility]    Relationship: [facility]    Best call back number: [phone]    What orders are you requesting (i.e. lab or imaging): MOVE HER 2ND PHYSICAL THERAPY APPOINTMENT TO SOMETIME LATER IN HER CERTIFICATION PERIOD. SHE HAD A  THIS WEEK.    In what timeframe would the patient need to come in: SOMETIME IN THE NEXT 2 WKS    Where will you receive your lab/imaging services: AT HOME     Colonoscopy Procedure Note    Indications:   Personal history of colon polyps (screening only)    Referring Physician: Yuan Aberanthy MD  Anesthesia/Sedation: MAC anesthesia Propofol  Endoscopist:  Dr. Ivonne Rios    Procedure in Detail:  Informed consent was obtained for the procedure, including sedation. Risks of perforation, hemorrhage, adverse drug reaction, and aspiration were discussed. The patient was placed in the left lateral decubitus position. Based on the pre-procedure assessment, including review of the patient's medical history, medications, allergies, and review of systems, she had been deemed to be an appropriate candidate for moderate sedation; she was therefore sedated with the medications listed above. The patient was monitored continuously with ECG tracing, pulse oximetry, blood pressure monitoring, and direct observations. A rectal examination was performed. The [id number] was inserted into the rectum and advanced under direct vision to the cecum, which was identified by the ileocecal valve and appendiceal orifice. The quality of the colonic preparation was adequate. A careful inspection was made as the colonoscope was withdrawn, including a retroflexed view of the rectum; findings and interventions are described below. Appropriate photodocumentation was obtained. Findings:    Scope advanced to the cecum. Single ulceration located at area of splenic flexure 50 cm from anal verge s/p Bxs.  R/O ischemia, IBD less likely    (1) sessile 5 mm polyp in the area of the descending colon s/p cold snare removal.    Small internal hemorrhoids. Therapies:  see above    Specimen: Specimens were collected as described above and sent to pathology. Complications: None were encountered during the procedure. EBL: < 10 ml. Recommendations:     - Repeat colonoscopy in 3 years.     Signed By: Ivonne Rios MD                        August 16, 2023

## 2023-08-16 NOTE — TELEPHONE ENCOUNTER
Received labs completed with [facility] on 8/10/2023 for review.    Glucose 108, BUN 30, creatinine 1.5, estimated GFR 31, sodium 142, potassium 3.8, chloride 100, carbon oxide 22    Patient's creatinine on 7/18/2023 was 1.68.  Creatinine has improved.  Potassium is stable.  We will continue current medications as scheduled.

## 2023-08-23 ENCOUNTER — TELEPHONE (OUTPATIENT)
Dept: CARDIOLOGY | Facility: HOSPITAL | Age: 88
End: 2023-08-23
Payer: MEDICARE

## 2023-08-23 NOTE — TELEPHONE ENCOUNTER
[facility] called to report that they are d/c nursing for patient but will be continuing OT as ordered. Weight and vitals have been stable.

## 2023-08-24 NOTE — TELEPHONE ENCOUNTER
Caller: Matt Nava    Relationship: Emergency Contact    Best call back number: [phone]     Requested Prescriptions:   Requested Prescriptions     Pending Prescriptions Disp Refills    propranolol (INDERAL) 40 MG tablet 90 tablet 0     Sig: Take 1 tablet by mouth 3 (Three) Times a Day.        Pharmacy where request should be sent: [facility] [id number] [place], [place] - [address] AT [facility] [address] - [phone]  - [phone] FX     Last office visit with prescribing clinician: 6/23/2023   Last telemedicine visit with prescribing clinician: Visit date not found   Next office visit with prescribing clinician: Visit date not found     Additional details provided by patient: PATIENT HAS BEEN UNABLE TO REACH THE PRESCRIBING DOCTOR. WOULD LIKE TO SEE IF DR PAREDES WOULD CALL THIS IN SINCE SHE'S ALMOST OUT.    Does the patient have less than a 3 day supply:  [x] Yes  [] No    Would you like a call back once the refill request has been completed: [] Yes [x] No    If the office needs to give you a call back, can they leave a voicemail: [] Yes [x] No    Gaviota Cedeno Rep   08/24/23 16:15 EDT

## 2023-08-25 ENCOUNTER — TELEPHONE (OUTPATIENT)
Dept: CARDIOLOGY | Facility: HOSPITAL | Age: 88
End: 2023-08-25
Payer: MEDICARE

## 2023-08-25 RX ORDER — PROPRANOLOL HYDROCHLORIDE 40 MG/1
40 TABLET ORAL 3 TIMES DAILY
Qty: 90 TABLET | Refills: 2 | Status: SHIPPED | OUTPATIENT
Start: 2023-08-25

## 2023-08-25 NOTE — TELEPHONE ENCOUNTER
Hx of low BP on midodrine.  We will continue to monitor.    Do we know what kind of pain she is having?  She may need to contact her PCP or ortho provider for management.      She has a f/u next week.  We will evaluate her weight at that time.

## 2023-08-25 NOTE — TELEPHONE ENCOUNTER
Ricardo with [facility] called to give vitals that had fell out of perimeters. Patient's pain level was an 8/10 and BP 82/50, weight not outside of perimeters but has been decreasing each visit.--- today it was around 141.

## 2023-08-28 NOTE — TELEPHONE ENCOUNTER
Spoke to patient's . We will continue monitoring bp and weight at visit on 30th with Salvador.  states patient suffers chronic ongoing pain in hip and legs but is doing much better today. Recommended that patient call PCP or Ortho for continued management.

## 2023-09-06 ENCOUNTER — OFFICE VISIT (OUTPATIENT)
Dept: CARDIOLOGY | Facility: HOSPITAL | Age: 88
End: 2023-09-06
Payer: MEDICARE

## 2023-09-06 VITALS
HEART RATE: 91 BPM | WEIGHT: 148.3 LBS | RESPIRATION RATE: 14 BRPM | HEIGHT: 56 IN | TEMPERATURE: 97.9 F | DIASTOLIC BLOOD PRESSURE: 53 MMHG | SYSTOLIC BLOOD PRESSURE: 84 MMHG | OXYGEN SATURATION: 90 % | BODY MASS INDEX: 33.36 KG/M2

## 2023-09-06 DIAGNOSIS — I48.0 PAF (PAROXYSMAL ATRIAL FIBRILLATION): ICD-10-CM

## 2023-09-06 DIAGNOSIS — I50.32 CHRONIC HEART FAILURE WITH PRESERVED EJECTION FRACTION: Primary | ICD-10-CM

## 2023-09-06 DIAGNOSIS — R60.0 EXTREMITY EDEMA: ICD-10-CM

## 2023-09-06 DIAGNOSIS — I95.2 HYPOTENSION DUE TO DRUGS: ICD-10-CM

## 2023-09-06 LAB
ANION GAP SERPL CALCULATED.3IONS-SCNC: 14 MMOL/L (ref 5–15)
BUN SERPL-MCNC: 36 MG/DL (ref 8–23)
BUN/CREAT SERPL: 22.1 (ref 7–25)
CALCIUM SPEC-SCNC: 8.4 MG/DL (ref 8.2–9.6)
CHLORIDE SERPL-SCNC: 100 MMOL/L (ref 98–107)
CO2 SERPL-SCNC: 27 MMOL/L (ref 22–29)
CREAT SERPL-MCNC: 1.63 MG/DL (ref 0.57–1)
EGFRCR SERPLBLD CKD-EPI 2021: 29.8 ML/MIN/1.73
GLUCOSE SERPL-MCNC: 112 MG/DL (ref 65–99)
POTASSIUM SERPL-SCNC: 3.5 MMOL/L (ref 3.5–5.2)
SODIUM SERPL-SCNC: 141 MMOL/L (ref 136–145)

## 2023-09-06 PROCEDURE — 80048 BASIC METABOLIC PNL TOTAL CA: CPT | Performed by: NURSE PRACTITIONER

## 2023-09-06 RX ORDER — MIDODRINE HYDROCHLORIDE 10 MG/1
10 TABLET ORAL
Qty: 90 TABLET | Refills: 3 | Status: SHIPPED | OUTPATIENT
Start: 2023-09-06

## 2023-09-06 NOTE — PROGRESS NOTES
"[facility], [facility] Heart and Vascular    Chief Complaint  Congestive Heart Failure    Subjective    History of Present Illness {CC  Problem List  Visit  Diagnosis   Encounters  Notes  Medications  Labs  Result Review Imaging  Media :23}     Zoila Nava presents to [facility] for   History of Present Illness     89-year-old female with history of heart failure with preserved EF, pulmonary hypertension, valvular heart disease, chronic kidney disease stage III, ascending aortic aneurysm, hypertension, hypothyroidism, GERD, anemia, dementia, palpitations with PACs and PVCs, essential tremors, neuropathy, PAF    Patient been struggling with hypotension related to medications.  Currently on midodrine.    Patient had months of lower extremity edema that had worsened with hip pain and immobility.  Working with home health for leg wraps.      Pt reports her hip pain is worse.  She is not as mobile after her hip injections.  Edema has started to worsening, pitting, weeping edema.  She is no longer wearing compression stockings or getting leg wraps.  She has noted some weight gain. 3 lb since last visit.  No cP, pressure, dyspnea, dizziness, near syncope, syncope.      Home BP 80's consistantly with midodrine use.     Objective     Vital Signs:   Vitals:    09/06/23 1010   BP: (!) 84/53   BP Location: Left arm   Patient Position: Sitting   Cuff Size: Adult   Pulse: 91   Resp: 14   Temp: 97.9 °F (36.6 °C)   TempSrc: Temporal   SpO2: 90%   Weight: 67.3 kg (148 lb 4.8 oz)   Height: 142.2 cm (56\")     Body mass index is 33.25 kg/m².  Physical Exam  Vitals reviewed.   Constitutional:       General: She is not in acute distress.  Pulmonary:      Effort: Pulmonary effort is normal.      Breath sounds: Rales (bases) present.   Musculoskeletal:      Right lower leg: Edema present.      Left lower leg: Edema (2+ pitting edema, weeping edema) present. "   Skin:     Coloration: Skin is not pale.   Neurological:      Mental Status: She is alert.   Psychiatric:         Mood and Affect: Mood normal.         Behavior: Behavior normal. Behavior is cooperative.            Result Review  Data Reviewed:{ Labs  Result Review  Imaging  Med Tab  Media :23}   [facility] for review.     Glucose 108, BUN 30, creatinine 1.5, estimated GFR 31, sodium 142, potassium 3.8, chloride 100, carbon oxide 22    Echocardiogram 4/7/2023: EF 51 to 55%, mild to moderate AR, mild MR, mild TR     Holter 3/8/2022.  Duration 4 days.  Average heart rate 62 bpm.  Multiple SVT runs/longest 15 seconds.  3.7% PACs, 2.2% PVC burden.  No patient triggered events               Assessment and Plan {CC Problem List  Visit Diagnosis  ROS  Review (Popup)  Health Maintenance  Quality  BestPractice  Medications  SmartSets  SnapShot Encounters  Media :23}   1. Chronic heart failure with preserved ejection fraction  Metolazone 3 times per week, (M,W,F)  Furosemide 40 mg daily  - Basic Metabolic Panel    2. PAF (paroxysmal atrial fibrillation)  HR controlled on BB  xarelto    3. Hypotension due to drugs  increase  - midodrine (PROAMATINE) 10 MG tablet; Take 1 tablet by mouth 3 (Three) Times a Day Before Meals.  Dispense: 90 tablet; Refill: 3  - Basic Metabolic Panel    4. weeping edema    - Basic Metabolic Panel  - [facility] if [facility].           Follow Up {Instructions Charge Capture  Follow-up Communications :23}   Return in about 4 weeks (around 10/4/2023), or if symptoms worsen or fail to improve, for Office visit, HF.    Patient was given instructions and counseling regarding her condition or for health maintenance advice. Please see specific information pulled into the AVS if appropriate.  Patient was instructed to call the [facility] Center with any questions, concerns, or worsening symptoms.

## 2023-09-06 NOTE — PROGRESS NOTES
Your lab results have been reviewed.  Your kidney function is stable. Continue current medications.

## 2023-09-07 ENCOUNTER — TELEPHONE (OUTPATIENT)
Dept: CARDIOLOGY | Facility: HOSPITAL | Age: 88
End: 2023-09-07
Payer: MEDICARE

## 2023-09-07 NOTE — TELEPHONE ENCOUNTER
Please call patient.  Let them know that home health can not place the compression wraps.     We can either schedule for outpatient PT wraps or patient can try wrapping legs daily with ace wraps, removing at night.      Would they like to try wraping themselves or schedule for outpatient wraps?

## 2023-09-07 NOTE — TELEPHONE ENCOUNTER
Joelle with [facility] called and states that she has received fax for the compression wraps however Medicare does not consider this to be a qualifying skill for home health. She states that they can help patient get measured for a UV  or aditya hose but they are not able to go and change the compression wraps. Please advise.     Joelle- [phone]

## 2023-09-07 NOTE — TELEPHONE ENCOUNTER
----- Message from Grace Carreno CMA sent at 9/6/2023  3:39 PM EDT -----  Done    ----- Message -----  From: Salvador Barnes APRN  Sent: 9/6/2023  10:59 AM EDT  To: Grace Carreno CMA    I have placed an order for compression wraps/ unnaboots for weeping edema.  Can we send to [facility].  They recently d/c'd patient.  See if they can reevaluate patient for compression wrapts.

## 2023-09-08 DIAGNOSIS — R41.3 MEMORY IMPAIRMENT OF GRADUAL ONSET: ICD-10-CM

## 2023-09-08 RX ORDER — RIVASTIGMINE 4.6 MG/24H
PATCH, EXTENDED RELEASE TRANSDERMAL
Qty: 30 PATCH | Refills: 6 | Status: SHIPPED | OUTPATIENT
Start: 2023-09-08

## 2023-09-08 NOTE — TELEPHONE ENCOUNTER
Caller: Matt Nava    Relationship: Emergency Contact    Best call back number: [phone]    What was the call regarding: PATIENT RETURNING CALL FROM [facility]. TOLD HIM I WOULD TRANSFER HIM TO HER BUT HE HUNG UP BEFORE I COULD. PLEASE RETURN PATIENTS CALL. THANK YOU

## 2023-09-08 NOTE — TELEPHONE ENCOUNTER
Spoke to patient's , Matt, and he states that patient's sister in law is a nurse and applied a bandage yesterday, He believes that he can do it at home but was wondering how he can get some supplies because he is afraid of the cost.

## 2023-09-12 ENCOUNTER — TELEPHONE (OUTPATIENT)
Dept: CARDIOLOGY | Facility: HOSPITAL | Age: 88
End: 2023-09-12
Payer: MEDICARE

## 2023-09-12 ENCOUNTER — TELEPHONE (OUTPATIENT)
Dept: INTERNAL MEDICINE | Facility: CLINIC | Age: 88
End: 2023-09-12
Payer: MEDICARE

## 2023-09-12 NOTE — TELEPHONE ENCOUNTER
Jing from [facility] called regarding patient's 4+ edema in legs which are red, cool to touch, and weeping. She believes that patient really need to have hyacinth boots. She would like to know if it's okay to get an order.    [phone]

## 2023-09-12 NOTE — TELEPHONE ENCOUNTER
Caller: [facility]    Relationship: Other    Best call back number: [phone]    What orders are you requesting (i.e. lab or imaging): UNNABOOTS-TYPE OF COMPRESSION WRAP    In what timeframe would the patient need to come in: USUALLY NEEDS CHANGED 1-2 TIMES A WEEK.      Where will you receive your lab/imaging services: HOME    Additional notes: LEGS ARE WEEPY AND 4PLUS ADEMA. NEEDS A VERBAL ORDER TO CONTINUE CARE AND APPROVAL FOR THE UNNABOOTS.

## 2023-09-13 ENCOUNTER — TELEPHONE (OUTPATIENT)
Dept: CARDIOLOGY | Facility: HOSPITAL | Age: 88
End: 2023-09-13
Payer: MEDICARE

## 2023-09-13 DIAGNOSIS — I50.32 CHRONIC HEART FAILURE WITH PRESERVED EJECTION FRACTION: ICD-10-CM

## 2023-09-13 RX ORDER — METOLAZONE 2.5 MG/1
TABLET ORAL
Qty: 20 TABLET | Refills: 3 | Status: SHIPPED | OUTPATIENT
Start: 2023-09-13

## 2023-09-13 NOTE — TELEPHONE ENCOUNTER
Jing from [facility] called to report that patient's hyacinth boots were completely saturated today from weeping so she is going back today to rewrap with kurlex and coban. They are also suggesting an increase in patient's diuretic to help with the swelling.    [phone]

## 2023-09-14 RX ORDER — PROPRANOLOL HYDROCHLORIDE 40 MG/1
40 TABLET ORAL 3 TIMES DAILY
Qty: 90 TABLET | Refills: 2 | Status: SHIPPED | OUTPATIENT
Start: 2023-09-14

## 2023-09-14 NOTE — TELEPHONE ENCOUNTER
Caller: Matt Nava    Relationship: Emergency Contact    Best call back number: [phone]    Requested Prescriptions:   Requested Prescriptions     Pending Prescriptions Disp Refills    propranolol (INDERAL) 40 MG tablet 90 tablet 2     Sig: Take 1 tablet by mouth 3 (Three) Times a Day.        Pharmacy where request should be sent: [facility] [id number] [place], [place] - [address] AT [facility] [address] - [phone]  - [phone] FX     Last office visit with prescribing clinician: 6/23/2023   Last telemedicine visit with prescribing clinician: Visit date not found   Next office visit with prescribing clinician: Visit date not found     Additional details provided by patient: PT HAS TODAY AND TOMORROW LEFT    Does the patient have less than a 3 day supply:  [x] Yes  [] No    Would you like a call back once the refill request has been completed: [x] Yes [] No    If the office needs to give you a call back, can they leave a voicemail: [] Yes [] No    Gaviota Vee Rep   09/14/23 16:04 EDT

## 2023-09-15 ENCOUNTER — TELEPHONE (OUTPATIENT)
Dept: CARDIOLOGY | Facility: HOSPITAL | Age: 88
End: 2023-09-15
Payer: MEDICARE

## 2023-09-15 DIAGNOSIS — I10 ESSENTIAL HYPERTENSION: ICD-10-CM

## 2023-09-15 RX ORDER — AMLODIPINE BESYLATE 5 MG/1
TABLET ORAL
Qty: 90 TABLET | Refills: 2 | OUTPATIENT
Start: 2023-09-15

## 2023-09-15 NOTE — TELEPHONE ENCOUNTER
Jing Garcia called and reports that she saw patient today and that she had a small amount of blood with her bowel movement this morning. I contacted patient and she states that she has had a small amount of constipation this morning but has not had any more blood since the first bowel movement.

## 2023-09-20 DIAGNOSIS — I50.32 CHRONIC HEART FAILURE WITH PRESERVED EJECTION FRACTION: ICD-10-CM

## 2023-09-20 RX ORDER — METOLAZONE 2.5 MG/1
TABLET ORAL
Qty: 15 TABLET | OUTPATIENT
Start: 2023-09-20

## 2023-09-20 NOTE — TELEPHONE ENCOUNTER
Spoke to patient's sister, Ceci, and clarified that the patient is taking propanol 3 times a day. Advised patient to continue taking medication as prescribed by Dr. Romero's office.    She notes that they found that found the metolazone and they do have enough medication.

## 2023-09-20 NOTE — TELEPHONE ENCOUNTER
Patients a noted in for me today asking to clarify she should be on propranolol twice a day or 3 times a day.    Please let him know at this time we would like them to continue propranolol 40 mg 3 times a day.  The prescription was refilled by Dr. Romero's on 9/14/2023.    I had also received refill request for the metolazone.  Let the patient know that I had refilled the medication for 4 times a week on 9/13/2023.  Please check to make sure patient does have enough of the medication.

## 2023-09-22 ENCOUNTER — TELEPHONE (OUTPATIENT)
Dept: INTERNAL MEDICINE | Facility: CLINIC | Age: 88
End: 2023-09-22
Payer: MEDICARE

## 2023-09-22 NOTE — TELEPHONE ENCOUNTER
Pedro is a home health nurse for [facility], she saw her this morning and she weighed 148.8,  2 days ago she weighed 147, pt is on Lasix.Pedro wants to know if Dr DE LA FUENTE wants to change anything. Wants this call on high priority so they get a call back today.

## 2023-09-26 ENCOUNTER — TELEPHONE (OUTPATIENT)
Dept: INTERNAL MEDICINE | Facility: CLINIC | Age: 88
End: 2023-09-26
Payer: MEDICARE

## 2023-09-26 NOTE — TELEPHONE ENCOUNTER
PATIENT HAD 2 FALLS ON SATURDAY. HAS MASSIVE BRUISE ON RIGHT KNEE BUT NO PAIN. WHEN HOME HEALTH COMES BACK, THEY WANT TO GET A URINE SPECIMEN TO MAKE SURE SHE DOESN'T HAVE A UTI. BEBETO DID INFORM PATIENT THAT IF SHE HAD ANY SHARP PAIN TO CALL 911.    PHONE: [phone]

## 2023-09-29 ENCOUNTER — TELEPHONE (OUTPATIENT)
Dept: CARDIOLOGY | Facility: HOSPITAL | Age: 88
End: 2023-09-29
Payer: MEDICARE

## 2023-09-29 NOTE — TELEPHONE ENCOUNTER
Patient's , Matt, report patient had fell last Saturday night and had some bruising that started as the size of the palm of his hand at the knee. He states that it has radiated into the ankle now and states that [facility] had wrapped her leg in 3 layers and the middle layer was damp and had a brown stain that appeared to be dried blood. Patient denies having pain in the legs but states that she is still having pain in her hip.    He also reports that patient's weight is up to 149. She is not currently having any SOA or edema.

## 2023-09-29 NOTE — TELEPHONE ENCOUNTER
Have patient to reach out to home health to see about checking legs and changing dressing.  He may need to schedule a f/u next week with PCP if bleeding continues.    She will need to f/u with PCP or ortho for her hip pain.     Can you f/u with labs ordered 2 weeks ago.  Home health was going to complete.     Monitor weight at this time since she is not having edema or dyspnea.

## 2023-09-29 NOTE — TELEPHONE ENCOUNTER
Spoke to patient's  and discussed having patient reach out to home health to see about checking legs and changing dressing.  He states that a nurse is coming out otday at 12:30. Recommended the need to schedule a f/u next week with PCP if bleeding continues. Spoke to Jose about labs that was ordered 2 weeks ago and looks like these were not completed. Nurse will draw at today's visit.

## 2023-10-06 ENCOUNTER — TELEPHONE (OUTPATIENT)
Dept: CARDIOLOGY | Facility: HOSPITAL | Age: 88
End: 2023-10-06
Payer: MEDICARE

## 2023-10-06 DIAGNOSIS — I50.32 CHRONIC HEART FAILURE WITH PRESERVED EJECTION FRACTION: ICD-10-CM

## 2023-10-06 RX ORDER — TAMSULOSIN HYDROCHLORIDE 0.4 MG/1
0.4 CAPSULE ORAL DAILY
Qty: 90 CAPSULE | Refills: 1 | Status: SHIPPED | OUTPATIENT
Start: 2023-10-06

## 2023-10-06 RX ORDER — POTASSIUM CHLORIDE 750 MG/1
20 TABLET, FILM COATED, EXTENDED RELEASE ORAL DAILY
Qty: 60 TABLET | Refills: 0 | Status: SHIPPED | OUTPATIENT
Start: 2023-10-06

## 2023-10-06 NOTE — TELEPHONE ENCOUNTER
Patient's  and sister called with c/o weight gain in a week. Patient averages 144 lbs but today's weight was 151.5 lbs. Patient has edema in the thigh and reports that the right thigh is worse than the left. It has been weeping a light brown fluid which [facility] has been wrapping in 3 wraps. Patient is currently taking Metalazone 4 times a week (Monday, Wednesday, Friday, and Saturday) and Lasix 20mg daily. Patient denies SOA.

## 2023-10-06 NOTE — TELEPHONE ENCOUNTER
Tao Mosquera, TONIE  You; Salvador Barnes, APRN19 minutes ago (11:35 AM)     CB  Thanks. It looks like she is on furosemide 40mg daily.    Please have patient take additional furosemide with potassium in afternoon Friday (today), Sunday, and Tuesday; then return to daily dosing.    Close monitoring of blood pressure as she appears to fluctuate. Continue follow-up with Salvador as scheduled next week. Can you also check if she had lab work obtained yet.    Thanks     Spoke to Elizabetht and  regarding directions for medication. Patient advised to closely monitor blood pressure and Elizabetht to get blood work prior to appointment with Salvador on Friday 13th.

## 2023-10-09 ENCOUNTER — OFFICE VISIT (OUTPATIENT)
Age: 88
End: 2023-10-09
Payer: MEDICARE

## 2023-10-09 VITALS
WEIGHT: 151.9 LBS | SYSTOLIC BLOOD PRESSURE: 115 MMHG | HEIGHT: 56 IN | BODY MASS INDEX: 34.17 KG/M2 | DIASTOLIC BLOOD PRESSURE: 80 MMHG

## 2023-10-09 DIAGNOSIS — M25.552 LEFT HIP PAIN: Primary | ICD-10-CM

## 2023-10-09 DIAGNOSIS — M16.12 ARTHRITIS OF LEFT HIP: ICD-10-CM

## 2023-10-09 PROCEDURE — 1159F MED LIST DOCD IN RCRD: CPT | Performed by: STUDENT IN AN ORGANIZED HEALTH CARE EDUCATION/TRAINING PROGRAM

## 2023-10-09 PROCEDURE — 20611 DRAIN/INJ JOINT/BURSA W/US: CPT | Performed by: STUDENT IN AN ORGANIZED HEALTH CARE EDUCATION/TRAINING PROGRAM

## 2023-10-09 PROCEDURE — 1160F RVW MEDS BY RX/DR IN RCRD: CPT | Performed by: STUDENT IN AN ORGANIZED HEALTH CARE EDUCATION/TRAINING PROGRAM

## 2023-10-09 PROCEDURE — 99213 OFFICE O/P EST LOW 20 MIN: CPT | Performed by: STUDENT IN AN ORGANIZED HEALTH CARE EDUCATION/TRAINING PROGRAM

## 2023-10-09 RX ORDER — BUPIVACAINE HYDROCHLORIDE 2.5 MG/ML
2 INJECTION, SOLUTION EPIDURAL; INFILTRATION; INTRACAUDAL
Status: COMPLETED | OUTPATIENT
Start: 2023-10-09 | End: 2023-10-09

## 2023-10-09 RX ORDER — TRIAMCINOLONE ACETONIDE 40 MG/ML
2 INJECTION, SUSPENSION INTRA-ARTICULAR; INTRAMUSCULAR
Status: COMPLETED | OUTPATIENT
Start: 2023-10-09 | End: 2023-10-09

## 2023-10-09 RX ORDER — LIDOCAINE HYDROCHLORIDE 10 MG/ML
2 INJECTION, SOLUTION EPIDURAL; INFILTRATION; INTRACAUDAL; PERINEURAL
Status: COMPLETED | OUTPATIENT
Start: 2023-10-09 | End: 2023-10-09

## 2023-10-09 RX ADMIN — BUPIVACAINE HYDROCHLORIDE 2 ML: 2.5 INJECTION, SOLUTION EPIDURAL; INFILTRATION; INTRACAUDAL at 11:32

## 2023-10-09 RX ADMIN — LIDOCAINE HYDROCHLORIDE 2 ML: 10 INJECTION, SOLUTION EPIDURAL; INFILTRATION; INTRACAUDAL; PERINEURAL at 11:32

## 2023-10-09 RX ADMIN — TRIAMCINOLONE ACETONIDE 2 ML: 40 INJECTION, SUSPENSION INTRA-ARTICULAR; INTRAMUSCULAR at 11:32

## 2023-10-09 NOTE — PROGRESS NOTES
Procedure   - Large Joint Arthrocentesis: L hip joint on 10/9/2023 11:32 AM  Indications: pain  Details: 22 G needle, ultrasound-guided anterior approach  Medications: 2 mL triamcinolone acetonide 40 MG/ML; 2 mL lidocaine PF 1% 1 %; 2 mL bupivacaine (PF) 0.25 %  Outcome: tolerated well, no immediate complications  Procedure, treatment alternatives, risks and benefits explained, specific risks discussed. Consent was given by the patient. Immediately prior to procedure a time out was called to verify the correct patient, procedure, equipment, support staff and site/side marked as required. Patient was prepped and draped in the usual sterile fashion.

## 2023-10-09 NOTE — PROGRESS NOTES
[facility] Follow Up Visit     Office Follow Up      Date: 10/09/2023   Patient Name: Zoila Nava  MRN: [id number]  YOB: 1933    Referring Physician: No ref. provider found     Chief Complaint:   Chief Complaint   Patient presents with    Follow-up     3 month recheck -  Arthritis of left hip       History of Present Illness: Zoila Nava is a 89 y.o. female who is here today for follow up on left hip pain from primary hip osteoarthritis.  At last visit, we injected the hip with corticosteroid under ultrasound guidance.  This was 3 months ago and she had 1 to 2 months of great relief.  However, pain has intensified in the anterior hip and groin.  Her pain is a 3-4/10 when sitting and 8-9 when moving.  She is also having pain down the anterior aspect of her leg.  She denies any new injury, fall, trauma.    Subjective   Review of Systems: Review of Systems   Constitutional:  Negative for chills, fever, unexpected weight gain and unexpected weight loss.   HENT:  Negative for congestion, postnasal drip and rhinorrhea.    Eyes:  Negative for blurred vision.   Respiratory:  Negative for shortness of breath.    Cardiovascular:  Negative for leg swelling.   Gastrointestinal:  Negative for abdominal pain, nausea and vomiting.   Genitourinary:  Negative for difficulty urinating.   Musculoskeletal:  Positive for arthralgias. Negative for gait problem, joint swelling and myalgias.   Skin:  Negative for skin lesions and wound.   Neurological:  Negative for dizziness, weakness, light-headedness and numbness.   Hematological:  Does not bruise/bleed easily.   Psychiatric/Behavioral:  Negative for depressed mood.         Medications:   Current Outpatient Medications:     aspirin 81 MG EC tablet, Take 1 tablet by mouth Daily. OTC, Disp: , Rfl:     Boswellia-Glucosamine-Vit D (OSTEO BI-FLEX ONE PER DAY PO), Take 1 tablet by mouth Daily. OTC,  Disp: , Rfl:     cetirizine (zyrTEC) 10 MG tablet, Take 1 tablet by mouth Daily., Disp: , Rfl:     empagliflozin (JARDIANCE) 10 MG tablet tablet, Take 1 tablet by mouth Daily., Disp: 90 tablet, Rfl: 3    famotidine (PEPCID) 20 MG tablet, TAKE ONE TABLET BY MOUTH TWICE A DAY, Disp: 180 tablet, Rfl: 3    fluticasone (FLONASE) 50 MCG/ACT nasal spray, 2 sprays by Each Nare route Daily. (Patient taking differently: 2 sprays by Each Nare route Daily As Needed.), Disp: 18.2 mL, Rfl: 0    furosemide (LASIX) 20 MG tablet, Take 2 tablets by mouth Daily., Disp: 60 tablet, Rfl: 6    HYDROcodone-acetaminophen (NORCO) 7.5-325 MG per tablet, Take 1 tablet by mouth Every 8 (Eight) Hours., Disp: , Rfl:     levothyroxine (SYNTHROID, LEVOTHROID) 125 MCG tablet, TAKE ONE TABLET BY MOUTH EVERY MORNING, Disp: 90 tablet, Rfl: 1    Lyrica 150 MG capsule, Take 1 capsule by mouth 2 (Two) Times a Day., Disp: , Rfl:     metOLazone (ZAROXOLYN) 2.5 MG tablet, 4 times per week, M, W, F, sat., Disp: 20 tablet, Rfl: 3    midodrine (PROAMATINE) 10 MG tablet, Take 1 tablet by mouth 3 (Three) Times a Day Before Meals., Disp: 90 tablet, Rfl: 3    multivitamins-minerals (PRESERVISION AREDS 2) capsule capsule, Take 1 capsule by mouth 2 (Two) Times a Day. OTC, Disp: , Rfl:     mupirocin (BACTROBAN) 2 % ointment, Apply 1 application topically to the appropriate area as directed 2 (Two) Times a Day As Needed (leg ulcer)., Disp: 22 g, Rfl: 2    O2 (OXYGEN), Inhale 2 L/min At Night As Needed., Disp: , Rfl:     potassium chloride 10 MEQ CR tablet, Take 2 tablets by mouth Daily., Disp: 60 tablet, Rfl: 0    propranolol (INDERAL) 40 MG tablet, Take 1 tablet by mouth 3 (Three) Times a Day., Disp: 90 tablet, Rfl: 2    rivaroxaban (XARELTO) 15 MG tablet, Take 1 tablet by mouth Daily With Dinner. Indications: Atrial Fibrillation, Disp: 90 tablet, Rfl: 1    rivastigmine (EXELON) 4.6 MG/24HR patch, APPLY ONE PATCH TO THE SKIN DAILY, Disp: 30 patch, Rfl: 6    sodium  "chloride 0.65 % nasal spray, 2 sprays into the nostril(s) as directed by provider As Needed for Congestion., Disp: 60 mL, Rfl: 0    tamsulosin (FLOMAX) 0.4 MG capsule 24 hr capsule, Take 1 capsule by mouth Daily., Disp: 90 capsule, Rfl: 1    vitamin B-12 (CYANOCOBALAMIN) 1000 MCG tablet, Take 1 tablet by mouth Daily. OTC, Disp: , Rfl:     Allergies:   Allergies   Allergen Reactions    Penicillins Other (See Comments)     CHILDHOOD ALLERGY         I have reviewed and updated the patient's chief complaint, history of present illness, review of systems, past medical history, surgical history, family history, social history, medications and allergy list as appropriate.     Objective    Vital Signs:   Vitals:    10/09/23 1054   BP: 115/80   Weight: 68.9 kg (151 lb 14.4 oz)   Height: 142.2 cm (55.98\")     Body mass index is 34.07 kg/mý.   BMI is >= 30 and <35. (Class 1 Obesity). The following options were offered after discussion;: exercise counseling/recommendations and nutrition counseling/recommendations     Patient reports that she is a non-smoker and has not ever been a smoker.  This behavior was applauded and she was encouraged to continue in smoking cessation.  We will continue to monitor at subsequent visits.      Ortho Exam:  Constitutional: General Appearance: healthy-appearing, NAD, and normal body habitus.   Psychiatric: Orientation: oriented to time, place, and person. Mood and Affect: normal affect and mood and active and alert.   Gait and Station: Appearance: antalgic gait.   Cardiovascular System: Arterial Pulses Right: dorsalis pedis pulse normal. Varicosities Right: capillary refill test normal.   Lumbar Spine: Inspection: normal alignment.   Hip/Pelvis Appearance: Inspection: normal axial alignment.   Hips: Soft Tissue Palpation Left: tenderness of the hip flexor muscles. Active Range of Motion Left: limited (secondary to pain especially flexion and rotation). Strength Right: normal 5/5. Strength Left: " normal 5/5.   Skin: Right Lower Extremity: normal. Left Lower Extremity: normal.   Neurologic: Sensation on the Right: L1 normal, L2 normal, L3 normal, L4 normal, and S2 normal. Sensation on the Left: L1 normal, L2 normal, L3 normal, L4 normal, and S2 normal.    Results Review:   Imaging Results (Last 24 Hours)       ** No results found for the last 24 hours. **            Procedures    Assessment / Plan    Assessment/Plan:   Diagnoses and all orders for this visit:    1. Left hip pain (Primary)  -     US Guided Injection    2. Arthritis of left hip    Other orders  -     Cancel: Large Joint Arthrocentesis  -     - Large Joint Arthrocentesis: L hip joint      Follow-up on left hip pain from primary hip osteoarthritis.  Symptoms have exacerbated since the last visit.  They were initially controlled with a corticosteroid injection.  She is having pain on the anterior aspect of her leg that I believe is reflective of her hip flexors being irritated by the hip osteoarthritis.  Today, I recommended that we inject the hip again with corticosteroid under ultrasound guidance.  The risks and benefits were discussed and she is elected to proceed.  Joaner procedure well.  See procedure note.  I will have her monitor symptoms and call me back in 1 week if she is still having the pain go down her leg.  At that point, I would have her referred to see a lumbar spine specialist.  I do also believe that the increase edema in her lower extremities is to blame for the pain and difficulty with ambulation.  She is currently using Unna boots and going to therapy.  I have expressed to the patient and her  in the room today that she should continue with this in an effort to help improve her gait.  I plan to see her back in 3 months for likely repeat injection.    Follow Up:   Return in about 3 months (around 1/9/2024) for Recheck.      Josh Duron MD  [facility]

## 2023-10-10 ENCOUNTER — TELEPHONE (OUTPATIENT)
Dept: CARDIOLOGY | Facility: HOSPITAL | Age: 88
End: 2023-10-10
Payer: MEDICARE

## 2023-10-10 NOTE — TELEPHONE ENCOUNTER
Marianna, an RN with [facility], called stating that patient has had some weight gain. She took the increase of Lasix last week and had an increase in her weight over the weekend to 155. She is back down to 151. She is normally around 145-148 lbs and has not been elevating her legs as much as they have recommended.

## 2023-10-11 ENCOUNTER — TELEPHONE (OUTPATIENT)
Dept: CARDIOLOGY | Facility: HOSPITAL | Age: 88
End: 2023-10-11
Payer: MEDICARE

## 2023-10-11 NOTE — TELEPHONE ENCOUNTER
Received labs completed on 9/29/2023 with [facility].    Glucose 76, BUN 44, creatinine 1.8, estimated GFR 26, potassium 3.9.    Patient had recently complained of weight gain.  Diuretics were increased.  She is scheduled this week for evaluation we will have repeat labs at that time.

## 2023-10-13 ENCOUNTER — OFFICE VISIT (OUTPATIENT)
Dept: CARDIOLOGY | Facility: HOSPITAL | Age: 88
End: 2023-10-13
Payer: MEDICARE

## 2023-10-13 VITALS
WEIGHT: 151 LBS | OXYGEN SATURATION: 93 % | HEIGHT: 56 IN | HEART RATE: 79 BPM | BODY MASS INDEX: 33.97 KG/M2 | TEMPERATURE: 97.8 F | SYSTOLIC BLOOD PRESSURE: 115 MMHG | RESPIRATION RATE: 16 BRPM | DIASTOLIC BLOOD PRESSURE: 67 MMHG

## 2023-10-13 DIAGNOSIS — I50.32 CHRONIC HEART FAILURE WITH PRESERVED EJECTION FRACTION: Primary | ICD-10-CM

## 2023-10-13 DIAGNOSIS — I48.0 PAF (PAROXYSMAL ATRIAL FIBRILLATION): ICD-10-CM

## 2023-10-13 DIAGNOSIS — R60.0 EXTREMITY EDEMA: ICD-10-CM

## 2023-10-13 DIAGNOSIS — I95.2 HYPOTENSION DUE TO DRUGS: ICD-10-CM

## 2023-10-13 LAB
ANION GAP SERPL CALCULATED.3IONS-SCNC: 12 MMOL/L (ref 5–15)
BUN SERPL-MCNC: 43 MG/DL (ref 8–23)
BUN/CREAT SERPL: 22.4 (ref 7–25)
CALCIUM SPEC-SCNC: 8.5 MG/DL (ref 8.2–9.6)
CHLORIDE SERPL-SCNC: 105 MMOL/L (ref 98–107)
CO2 SERPL-SCNC: 25 MMOL/L (ref 22–29)
CREAT SERPL-MCNC: 1.92 MG/DL (ref 0.57–1)
EGFRCR SERPLBLD CKD-EPI 2021: 24.5 ML/MIN/1.73
GLUCOSE SERPL-MCNC: 90 MG/DL (ref 65–99)
POTASSIUM SERPL-SCNC: 4.2 MMOL/L (ref 3.5–5.2)
SODIUM SERPL-SCNC: 142 MMOL/L (ref 136–145)

## 2023-10-13 PROCEDURE — 80048 BASIC METABOLIC PNL TOTAL CA: CPT | Performed by: NURSE PRACTITIONER

## 2023-10-13 RX ORDER — FEXOFENADINE HCL 180 MG/1
180 TABLET ORAL DAILY
COMMUNITY

## 2023-10-13 NOTE — PROGRESS NOTES
"[facility]    Chief Complaint  Congestive Heart Failure    Subjective    History of Present Illness {CC  Problem List  Visit  Diagnosis   Encounters  Notes  Medications  Labs  Result Review Imaging  Media :23}     Zoila Nava presents to [facility] for   History of Present Illness     89-year-old female with history of heart failure with preserved EF, pulmonary hypertension, valvular heart disease, chronic kidney disease stage III, ascending aortic aneurysm, hypertension, hypothyroidism, GERD, anemia, dementia, palpitations with PACs and PVCs, essential tremors, neuropathy, PAF     Patient been struggling with hypotension related to medications.  Currently on midodrine.     Pt with lower extremity edema that has worsened in setting of worse hip pain    Followed by home health with leg wraps.     Weeping edema reported to have improved but worsened over the last few weeks.  Pt is not keeping leg elevated.  NO dyspnea, CP, pressure, dizziness, near syncope, syncope.  Steroid shock last week with improved hip pain. Sleeping well.  Poor protein intake.     Lasix 40 mg daily, Metolazone 2.5 mg 4 days per week.      Objective     Vital Signs:   Vitals:    10/13/23 1516   BP: 115/67   BP Location: Left arm   Patient Position: Sitting   Cuff Size: Adult   Pulse: 79   Resp: 16   Temp: 97.8 øF (36.6 øC)   TempSrc: Temporal   SpO2: 93%   Weight: 68.5 kg (151 lb)   Height: 142.2 cm (56\")     Body mass index is 33.85 kg/mý.  Physical Exam         Result Review  Data Reviewed:{ Labs  Result Review  Imaging  Med Tab  Media :23}    9/29/2023 with [employer].     Glucose 76, BUN 44, creatinine 1.8, estimated GFR 26, potassium 3.9.     "[employer] [facility] on 8/10/2023 for review.    Glucose 108, BUN 30, creatinine 1.5, estimated GFR 31, sodium 142, potassium 3.8, chloride 100, carbon oxide 22     Echocardiogram " 4/7/2023: EF 51 to 55%, mild to moderate AR, mild MR, mild TR      Holter 3/8/2022.  Duration 4 days.  Average heart rate 62 bpm.  Multiple SVT runs/longest 15 seconds.  3.7% PACs, 2.2% PVC burden.  No patient triggered events                  Assessment and Plan {CC Problem List  Visit Diagnosis  ROS  Review (Popup)  Health Maintenance  Quality  BestPractice  Medications  SmartSets  Snap[employer]ot Encounters  Media :23}   1. Chronic heart failure with preserved ejection fraction  No dyspnea  - Basic Metabolic Panel    2. PAF (paroxysmal atrial fibrillation)  HR controlled on propranolol  xarelto    3. Hypotension due to drugs  Midodrine  stable    4. weeping edema  Leg wraps.  Labs today  Will adjust diuretics pending lab  - Basic Metabolic Panel    Increased physical activity, keep legs elevated when sitting.  Increase protein intake.        Follow Up {Instructions Charge Capture  Follow-up Communications :23}   Return in about 4 weeks (around 11/10/2023), or if symptoms worsen or fail to improve, for HF.    Patient was given instructions and counseling regarding her condition or for health maintenance advice. Please see specific information pulled into the AVS if appropriate.  Patient was instructed to call the [facility] with any questions, concerns, or worsening symptoms.

## 2023-10-16 ENCOUNTER — TELEPHONE (OUTPATIENT)
Dept: CARDIOLOGY | Facility: HOSPITAL | Age: 88
End: 2023-10-16
Payer: MEDICARE

## 2023-10-16 DIAGNOSIS — N18.30 STAGE 3 CHRONIC KIDNEY DISEASE, UNSPECIFIED WHETHER STAGE 3A OR 3B CKD: ICD-10-CM

## 2023-10-16 DIAGNOSIS — I50.32 CHRONIC HEART FAILURE WITH PRESERVED EJECTION FRACTION: Primary | ICD-10-CM

## 2023-10-16 NOTE — TELEPHONE ENCOUNTER
Please call patient daughter Ceci who manages her meds    Her kidney function has worsened.      Stop the Jardiance at this time.  Continue to lasix, metolazone, and KCL as scheduled.     Arrange  BMP and CBC to be completed next week with [facility].

## 2023-10-17 ENCOUNTER — TELEPHONE (OUTPATIENT)
Dept: INTERNAL MEDICINE | Facility: CLINIC | Age: 88
End: 2023-10-17
Payer: MEDICARE

## 2023-10-17 NOTE — TELEPHONE ENCOUNTER
RONDA MOREIRA CALLED AND REQUESTED THE MOST RECENT OV NOTES BE FAXED [phone], SO THEY CAN HAVE A Dx FOR THE LAB ORDERS, LUCIANA.

## 2023-10-18 ENCOUNTER — TELEPHONE (OUTPATIENT)
Dept: CARDIOLOGY | Facility: HOSPITAL | Age: 88
End: 2023-10-18
Payer: MEDICARE

## 2023-10-18 NOTE — TELEPHONE ENCOUNTER
Patient's sister, Ceci, said she got the Jardiance stopped last night. Notified her of labs values and will continue Lasix, metolazone, and KCL as scheduled.

## 2023-10-18 NOTE — TELEPHONE ENCOUNTER
Patient's  called to report that patient has lost 12 lbs since her visit and that her weight is 141 as of today. He also reports that patient's leg edema is dried up and has not been weeping.

## 2023-10-18 NOTE — TELEPHONE ENCOUNTER
I have attempted to call patient's sister, Ceci, to verify but  states that she Is in [place] and unavailable. I left message for her to call back.

## 2023-10-18 NOTE — TELEPHONE ENCOUNTER
Received voicemail call back from sister. Attempted to call again to discuss, unable to reach but left message.

## 2023-10-18 NOTE — TELEPHONE ENCOUNTER
Attempted to dmitriy patient's sister, Ceci. Left message for her sister to call back. Called Elizabetht and gave verbal orders for her CBC and CMP.

## 2023-10-20 ENCOUNTER — TELEPHONE (OUTPATIENT)
Dept: CARDIOLOGY | Facility: HOSPITAL | Age: 88
End: 2023-10-20
Payer: MEDICARE

## 2023-10-20 DIAGNOSIS — I50.32 CHRONIC HEART FAILURE WITH PRESERVED EJECTION FRACTION: ICD-10-CM

## 2023-10-20 RX ORDER — METOLAZONE 2.5 MG/1
TABLET ORAL
Qty: 20 TABLET | Refills: 3 | Status: SHIPPED | OUTPATIENT
Start: 2023-10-20

## 2023-10-20 NOTE — TELEPHONE ENCOUNTER
Patient's  called to report that patient's weight is down to 139.2 lbs today and leg edema is minimal with no weeping.

## 2023-10-20 NOTE — TELEPHONE ENCOUNTER
Patient's sister, Ceci, notified and will make sure medication is changed to Monday and Friday dose.

## 2023-10-23 ENCOUNTER — APPOINTMENT (OUTPATIENT)
Dept: GENERAL RADIOLOGY | Facility: HOSPITAL | Age: 88
DRG: 089 | End: 2023-10-23
Payer: MEDICARE

## 2023-10-23 ENCOUNTER — APPOINTMENT (OUTPATIENT)
Dept: CT IMAGING | Facility: HOSPITAL | Age: 88
DRG: 089 | End: 2023-10-23
Payer: MEDICARE

## 2023-10-23 ENCOUNTER — HOSPITAL ENCOUNTER (EMERGENCY)
Facility: HOSPITAL | Age: 88
Discharge: HOME | DRG: 089 | End: 2023-10-23
Attending: EMERGENCY MEDICINE | Admitting: EMERGENCY MEDICINE
Payer: MEDICARE

## 2023-10-23 VITALS
DIASTOLIC BLOOD PRESSURE: 92 MMHG | TEMPERATURE: 98.3 F | BODY MASS INDEX: 28.47 KG/M2 | OXYGEN SATURATION: 91 % | HEIGHT: 60 IN | SYSTOLIC BLOOD PRESSURE: 126 MMHG | HEART RATE: 85 BPM | WEIGHT: 145 LBS | RESPIRATION RATE: 20 BRPM

## 2023-10-23 DIAGNOSIS — S00.83XA CONTUSION OF OTHER PART OF HEAD, INITIAL ENCOUNTER: ICD-10-CM

## 2023-10-23 DIAGNOSIS — Z86.39 HISTORY OF HYPERLIPIDEMIA: ICD-10-CM

## 2023-10-23 DIAGNOSIS — Z87.448 HISTORY OF CHRONIC KIDNEY DISEASE: ICD-10-CM

## 2023-10-23 DIAGNOSIS — Z79.01 ANTICOAGULATED BY ANTICOAGULATION TREATMENT: ICD-10-CM

## 2023-10-23 DIAGNOSIS — Z86.79 HISTORY OF CHF (CONGESTIVE HEART FAILURE): ICD-10-CM

## 2023-10-23 DIAGNOSIS — R79.89 ELEVATED BRAIN NATRIURETIC PEPTIDE (BNP) LEVEL: ICD-10-CM

## 2023-10-23 DIAGNOSIS — S00.03XA LEFT PARIETAL SCALP HEMATOMA, INITIAL ENCOUNTER: ICD-10-CM
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
## 2023-10-23 DIAGNOSIS — Z86.39 HISTORY OF HYPOTHYROIDISM: ICD-10-CM

## 2023-10-23 DIAGNOSIS — Z86.79 HISTORY OF HYPERTENSION: ICD-10-CM

## 2023-10-23 LAB
ALBUMIN SERPL-MCNC: 3.4 G/DL (ref 3.5–5.2)
ALBUMIN/GLOB SERPL: 1.2 G/DL
ALP SERPL-CCNC: 125 U/L (ref 39–117)
ALT SERPL W P-5'-P-CCNC: 32 U/L (ref 1–33)
ANION GAP SERPL CALCULATED.3IONS-SCNC: 14 MMOL/L (ref 5–15)
AST SERPL-CCNC: 20 U/L (ref 1–32)
BASOPHILS # BLD AUTO: 0.1 10*3/MM3 (ref 0–0.2)
BASOPHILS NFR BLD AUTO: 1 % (ref 0–1.5)
BILIRUB SERPL-MCNC: 0.3 MG/DL (ref 0–1.2)
BUN SERPL-MCNC: 42 MG/DL (ref 8–23)
BUN/CREAT SERPL: 23.2 (ref 7–25)
CALCIUM SPEC-SCNC: 8.6 MG/DL (ref 8.2–9.6)
CHLORIDE SERPL-SCNC: 104 MMOL/L (ref 98–107)
CO2 SERPL-SCNC: 26 MMOL/L (ref 22–29)
CREAT SERPL-MCNC: 1.81 MG/DL (ref 0.57–1)
DEPRECATED RDW RBC AUTO: 59.3 FL (ref 37–54)
EGFRCR SERPLBLD CKD-EPI 2021: 26.3 ML/MIN/1.73
EOSINOPHIL # BLD AUTO: 0.51 10*3/MM3 (ref 0–0.4)
EOSINOPHIL NFR BLD AUTO: 5 % (ref 0.3–6.2)
ERYTHROCYTE [DISTWIDTH] IN BLOOD BY AUTOMATED COUNT: 17.1 % (ref 12.3–15.4)
GLOBULIN UR ELPH-MCNC: 2.8 GM/DL
GLUCOSE SERPL-MCNC: 129 MG/DL (ref 65–99)
HCT VFR BLD AUTO: 37.3 % (ref 34–46.6)
HGB BLD-MCNC: 11.9 G/DL (ref 12–15.9)
IMM GRANULOCYTES # BLD AUTO: 0.05 10*3/MM3 (ref 0–0.05)
IMM GRANULOCYTES NFR BLD AUTO: 0.5 % (ref 0–0.5)
LYMPHOCYTES # BLD AUTO: 0.87 10*3/MM3 (ref 0.7–3.1)
LYMPHOCYTES NFR BLD AUTO: 8.6 % (ref 19.6–45.3)
MAGNESIUM SERPL-MCNC: 1.8 MG/DL (ref 1.6–2.4)
MCH RBC QN AUTO: 30.4 PG (ref 26.6–33)
MCHC RBC AUTO-ENTMCNC: 31.9 G/DL (ref 31.5–35.7)
MCV RBC AUTO: 95.4 FL (ref 79–97)
MONOCYTES # BLD AUTO: 1.07 10*3/MM3 (ref 0.1–0.9)
MONOCYTES NFR BLD AUTO: 10.6 % (ref 5–12)
NEUTROPHILS NFR BLD AUTO: 7.52 10*3/MM3 (ref 1.7–7)
NEUTROPHILS NFR BLD AUTO: 74.3 % (ref 42.7–76)
NRBC BLD AUTO-RTO: 0 /100 WBC (ref 0–0.2)
NT-PROBNP SERPL-MCNC: 8093 PG/ML (ref 0–1800)
PLATELET # BLD AUTO: 228 10*3/MM3 (ref 140–450)
PMV BLD AUTO: 12.3 FL (ref 6–12)
POTASSIUM SERPL-SCNC: 3.2 MMOL/L (ref 3.5–5.2)
PROT SERPL-MCNC: 6.2 G/DL (ref 6–8.5)
RBC # BLD AUTO: 3.91 10*6/MM3 (ref 3.77–5.28)
SODIUM SERPL-SCNC: 144 MMOL/L (ref 136–145)
WBC NRBC COR # BLD: 10.12 10*3/MM3 (ref 3.4–10.8)

## 2023-10-23 PROCEDURE — 72125 CT NECK SPINE W/O DYE: CPT

## 2023-10-23 PROCEDURE — 99284 EMERGENCY DEPT VISIT MOD MDM: CPT

## 2023-10-23 PROCEDURE — 71046 X-RAY EXAM CHEST 2 VIEWS: CPT

## 2023-10-23 PROCEDURE — 83880 ASSAY OF NATRIURETIC PEPTIDE: CPT | Performed by: PHYSICIAN ASSISTANT

## 2023-10-23 PROCEDURE — 85025 COMPLETE CBC W/AUTO DIFF WBC: CPT | Performed by: PHYSICIAN ASSISTANT

## 2023-10-23 PROCEDURE — 70450 CT HEAD/BRAIN W/O DYE: CPT

## 2023-10-23 PROCEDURE — 36415 COLL VENOUS BLD VENIPUNCTURE: CPT

## 2023-10-23 PROCEDURE — 80053 COMPREHEN METABOLIC PANEL: CPT | Performed by: PHYSICIAN ASSISTANT

## 2023-10-23 PROCEDURE — 83735 ASSAY OF MAGNESIUM: CPT | Performed by: PHYSICIAN ASSISTANT

## 2023-10-23 NOTE — ED PROVIDER NOTES
EMERGENCY DEPARTMENT ENCOUNTER    Pt Name: Zoila Nava  MRN: [id number]  Pt :   1933  Room Number:  15/15  Date of encounter:  10/23/2023  PCP: Arnoldo Oneil MD  ED Provider: ERIC Travis    Historian: Patient    HPI:  Chief Complaint: Fall    Context: Zoila Nava is a 89 y.o. female who presents to the ED accompanied by her family who shares that the patient incurred a fall at approximately 6 PM this evening.  Patient was sleeping and was called by another family member to come eat.  She reports that she was pushing her rollator up a ramp when she lost her balance and hit her head.  She fell onto a carpeted surface.  She has been ambulatory since the fall.  She denies any significant pain.  Family who is present at the time of interview and exam notes a hematoma to her left parietal scalp.  He states that the patient seems a little fatigued but other than that is functioning at her baseline status.  Patient does follow with cardiology here in there has been a recent change in her medications because of significant fluid loss as a result of her diuretic.  Patient also is anticoagulated on Xarelto.  HPI     REVIEW OF SYSTEMS  A chief complaint appropriate review of systems was completed and is negative except as noted in the HPI.     PAST MEDICAL HISTORY  Past Medical History:   Diagnosis Date    Anemia     Arthritis     Asthma     Cataract     Difficulty breathing     Chronic    GERD (gastroesophageal reflux disease)     Graves' ophthalmopathy     Hoarseness     Hypertension     Hypertensive heart disease with acute on chronic diastolic congestive heart failure 10/11/2021    Pulmonary hypertension 10/11/2021    Spondylolisthesis of cervical region     Vitamin B12 deficiency        PAST SURGICAL HISTORY  Past Surgical History:   Procedure Laterality Date    CERVICAL LAMINECTOMY      CHOLECYSTECTOMY      OTHER SURGICAL HISTORY Right     Neuroplasty Median Nerve at Carpal Tunnel     THYROID SURGERY      TOTAL KNEE ARTHROPLASTY Left 09/29/2014    Dr Colon       FAMILY HISTORY  Family History   Problem Relation Age of Onset    Hypertension Mother     Other Father         cardiac disorder    Diabetes Father     Heart disease Father     Hypertension Sister     No Known Problems Maternal Grandmother     No Known Problems Maternal Grandfather     No Known Problems Paternal Grandmother     No Known Problems Paternal Grandfather     Colon cancer Neg Hx     Colon polyps Neg Hx     Esophageal cancer Neg Hx        SOCIAL HISTORY  Social History     Socioeconomic History    Marital status:    Tobacco Use    Smoking status: Never    Smokeless tobacco: Never   Vaping Use    Vaping Use: Never used   Substance and Sexual Activity    Alcohol use: No    Drug use: No    Sexual activity: Defer       ALLERGIES  Penicillins    PHYSICAL EXAM  Physical Exam  Vitals and nursing note reviewed.   Constitutional:       General: She is not in acute distress.     Appearance: Normal appearance. She is not ill-appearing or toxic-appearing.   HENT:      Head: Contusion present.        Nose: Nose normal.      Mouth/Throat:      Mouth: Mucous membranes are moist.   Eyes:      Extraocular Movements: Extraocular movements intact.   Cardiovascular:      Rate and Rhythm: Normal rate.      Pulses: Normal pulses.   Pulmonary:      Effort: Pulmonary effort is normal.      Breath sounds: Normal breath sounds.   Abdominal:      General: There is no distension.      Palpations: Abdomen is soft.      Tenderness: There is no abdominal tenderness.   Musculoskeletal:         General: Normal range of motion.      Cervical back: Normal range of motion and neck supple.   Skin:     General: Skin is warm and dry.   Neurological:      General: No focal deficit present.      Mental Status: She is alert.   Psychiatric:         Mood and Affect: Mood normal.         Behavior: Behavior normal.       LAB RESULTS  Results for orders placed  or performed during the hospital encounter of 10/23/23   Comprehensive Metabolic Panel    Specimen: Blood   Result Value Ref Range    Glucose 129 (H) 65 - 99 mg/dL    BUN 42 (H) 8 - 23 mg/dL    Creatinine 1.81 (H) 0.57 - 1.00 mg/dL    Sodium 144 136 - 145 mmol/L    Potassium 3.2 (L) 3.5 - 5.2 mmol/L    Chloride 104 98 - 107 mmol/L    CO2 26.0 22.0 - 29.0 mmol/L    Calcium 8.6 8.2 - 9.6 mg/dL    Total Protein 6.2 6.0 - 8.5 g/dL    Albumin 3.4 (L) 3.5 - 5.2 g/dL    ALT (SGPT) 32 1 - 33 U/L    AST (SGOT) 20 1 - 32 U/L    Alkaline Phosphatase 125 (H) 39 - 117 U/L    Total Bilirubin 0.3 0.0 - 1.2 mg/dL    Globulin 2.8 gm/dL    A/G Ratio 1.2 g/dL    BUN/Creatinine Ratio 23.2 7.0 - 25.0    Anion Gap 14.0 5.0 - 15.0 mmol/L    eGFR 26.3 (L) >60.0 mL/min/1.73   BNP    Specimen: Blood   Result Value Ref Range    proBNP 8,093.0 (H) 0.0 - 1,800.0 pg/mL   CBC Auto Differential    Specimen: Blood   Result Value Ref Range    WBC 10.12 3.40 - 10.80 10*3/mm3    RBC 3.91 3.77 - 5.28 10*6/mm3    Hemoglobin 11.9 (L) 12.0 - 15.9 g/dL    Hematocrit 37.3 34.0 - 46.6 %    MCV 95.4 79.0 - 97.0 fL    MCH 30.4 26.6 - 33.0 pg    MCHC 31.9 31.5 - 35.7 g/dL    RDW 17.1 (H) 12.3 - 15.4 %    RDW-SD 59.3 (H) 37.0 - 54.0 fl    MPV 12.3 (H) 6.0 - 12.0 fL    Platelets 228 140 - 450 10*3/mm3    Neutrophil % 74.3 42.7 - 76.0 %    Lymphocyte % 8.6 (L) 19.6 - 45.3 %    Monocyte % 10.6 5.0 - 12.0 %    Eosinophil % 5.0 0.3 - 6.2 %    Basophil % 1.0 0.0 - 1.5 %    Immature Grans % 0.5 0.0 - 0.5 %    Neutrophils, Absolute 7.52 (H) 1.70 - 7.00 10*3/mm3    Lymphocytes, Absolute 0.87 0.70 - 3.10 10*3/mm3    Monocytes, Absolute 1.07 (H) 0.10 - 0.90 10*3/mm3    Eosinophils, Absolute 0.51 (H) 0.00 - 0.40 10*3/mm3    Basophils, Absolute 0.10 0.00 - 0.20 10*3/mm3    Immature Grans, Absolute 0.05 0.00 - 0.05 10*3/mm3    nRBC 0.0 0.0 - 0.2 /100 WBC   Magnesium    Specimen: Blood   Result Value Ref Range    Magnesium 1.8 1.6 - 2.4 mg/dL       If labs were ordered, I  independently reviewed the results and considered them in treating the patient.    RADIOLOGY  XR Chest 2 View   Final Result   Impression:      No definite infiltrate.      Chronic interstitial changes.      Calcified tortuous aorta         Electronically Signed: Montez Reynolds MD     10/23/2023 10:46 PM EDT     Workstation ID: [id number]      CT Head Without Contrast   Final Result   Impression:      No acute intracranial pathology.                           Electronically Signed: Montez Reynolds MD     10/23/2023 8:32 PM EDT     Workstation ID: [id number]      CT Cervical Spine Without Contrast   Final Result   1.No acute fractures or dislocations.                      Electronically Signed: Montez Reynolds MD     10/23/2023 8:32 PM EDT     Workstation ID: [id number]        [x] Radiologist's Report Reviewed:  I ordered and independently reviewed the above noted radiographic studies.  See radiologist's dictation for official interpretation.      PROCEDURES    Procedures    No orders to display       MEDICATIONS GIVEN IN ER    Medications - No data to display    MEDICAL DECISION MAKING, PROGRESS, and CONSULTS   Medical Decision Making  Problems Addressed:  Anticoagulated by anticoagulation treatment: chronic illness or injury  Contusion of other part of head, initial encounter: complicated acute illness or injury  Elevated brain natriuretic peptide (BNP) level: complicated acute illness or injury  Fall, initial encounter: complicated acute illness or injury  History of CHF (congestive heart failure): chronic illness or injury  History of chronic kidney disease: chronic illness or injury  History of hyperlipidemia: chronic illness or injury  History of hypertension: chronic illness or injury  History of hypothyroidism: chronic illness or injury  Left parietal scalp hematoma, initial encounter: complicated acute illness or injury    Amount and/or Complexity of Data Reviewed  Labs: ordered.  Radiology:  ordered.        All labs have been independently reviewed by me.  All radiology studies have been reviewed by me and the radiologist dictating the report.  All EKG's have been independently viewed by me.    [] Discussed with radiology regarding test interpretation:    Discussion below represents my analysis of pertinent findings related to patient's condition, differential diagnosis, treatment plan and final disposition.    Differential diagnosis:  The differential diagnosis associated with the patient's presentation includes: Mechanical fall, pres-syncopal/syncopal episode, arrhythmia,  sensory impairment (vision, vestibular, neuropathy, proprioception), vertigo,  weakness, electrolyte abnormality, UTI, pneumonia, decreased balance, gait abnormality, arthritis, polypharmacy or drug effect, environment (rugs, stairs, uneven surface, lighting).      Additional sources  Discussed/ obtained information from independent historians:   [] Spouse  [] Parent  [x] Family member  [] Friend  [] EMS   [] Other:  External (non-ED) record review:   [] Inpatient record:   [x] Office record: Personally reviewed visit with Cardiology from 10/13/2023 where changes were made to patient's blood pressure medications and diuretic   [] Outpatient record:   [] Prior Outpatient labs:   [] Prior Outpatient radiology:   [] Primary Care record:   [] Outside ED record:   [] Other:   Patient's care impacted by:   [] Diabetes  [x] Hypertension  [x] Hyperlipidemia  [x] Hypothyroidism   [] Coronary Artery Disease   [] COPD   [] Cancer   [] Obesity  [] GERD   [] Tobacco Abuse   [] Substance Abuse    [] Anxiety   [] Depression   [x] Other: CKD, CHF  Care significantly affected by Social Determinants of Health (housing and economic circumstances, unemployment)    [] Yes     [x] No   If yes, Patient's care significantly limited by  Social Determinants of Health including:   [] Inadequate housing   [] Low income   [] Alcoholism and drug addiction in  family   [] Problems related to primary support group   [] Unemployment   [] Problems related to employment   [] Other Social Determinants of Health:     Shared decision making:  I had a discussion with the patient/family regarding diagnosis, diagnostic results, treatment plan, and medications.  The patient/family indicated understanding of these instructions.  I spent adequate time at the bedside preceding discharge necessary to personally discuss the aftercare instructions, giving patient education, providing explanations of the results of our evaluations/findings, and my decision making to assure that the patient/family understand the plan of care.  Time was allotted to answer questions at that time and throughout the ED course.  Emphasis was placed on timely follow-up after discharge.  I also discussed the potential for the development of an acute emergent condition requiring further evaluation, admission, or even surgical intervention. I discussed that we found nothing during the visit today indicating the need for further workup, admission, or the presence of an unstable medical condition.  I encouraged the patient to return to the emergency department immediately for ANY concerns, worsening, new complaints, or if symptoms persist and unable to seek follow-up in a timely fashion.  The patient/family expressed understanding and agreement with this plan.  The patient will follow-up with primary care and cardiology for reevaluation.      Orders placed during this visit:  Orders Placed This Encounter   Procedures    CT Head Without Contrast    CT Cervical Spine Without Contrast    XR Chest 2 View    Comprehensive Metabolic Panel    BNP    CBC Auto Differential    Magnesium    CBC & Differential       ED Course:    ED Course as of 10/24/23 1556   Mon Oct 23, 2023   1265 Case reviewed with Dr. Isaac who was in agreement patient appropriate for discharge home with outpatient follow up as directed.  [JG]   3720  Reviewed ER work-up and recommendations from attending with patient and family at bedside who are agreeable to plan of care. [JG]   1518 In summary this is a 89 year old female who presents to the ED following a fall with a left parietal scalp contusion/hematoma. Patient is anticoagulated. CT imaging of had and cervical spine demonstrate no acute intracranial abnormalities. CT cervical spine confirms no acute fractures or dislocations. As there were recent changes to patient's diuretic secondary to increased fluid loss, blood work was obtained they showed elevated BNP. Chest x-ray was obtained to rule out evidence of volume overload or effusion and/or additional cardiopulmonary processes. Chest x-ray showed no definite infiltrate with chronic interstitial changes. Findings reviewed with attending Dr. Isaac and well as patient and family who were all in agreement based on patient presentation and ED workup that she is appropriate for discharge home and will follow up with her cardiologist for further evaluation of her BNP.    [JG]      ED Course User Index  [JG] Charanjit Hill PA            DIAGNOSIS  Final diagnoses:   Fall, initial encounter   Contusion of other part of head, initial encounter   Left parietal scalp hematoma, initial encounter   Elevated brain natriuretic peptide (BNP) level   History of hypertension   History of hyperlipidemia   History of hypothyroidism   History of chronic kidney disease   History of CHF (congestive heart failure)   Anticoagulated by anticoagulation treatment       DISPOSITION    ED Disposition       ED Disposition   Discharge    Condition   Stable    Comment   --               Please note that portions of this document were completed with voice recognition software.        Charanjit Hill PA  10/24/23 8930

## 2023-10-24 ENCOUNTER — TELEPHONE (OUTPATIENT)
Dept: CARDIOLOGY | Facility: HOSPITAL | Age: 88
End: 2023-10-24
Payer: MEDICARE

## 2023-10-24 ENCOUNTER — APPOINTMENT (OUTPATIENT)
Dept: CT IMAGING | Facility: HOSPITAL | Age: 88
DRG: 089 | End: 2023-10-24
Payer: MEDICARE

## 2023-10-24 ENCOUNTER — APPOINTMENT (OUTPATIENT)
Dept: GENERAL RADIOLOGY | Facility: HOSPITAL | Age: 88
DRG: 089 | End: 2023-10-24
Payer: MEDICARE

## 2023-10-24 ENCOUNTER — HOSPITAL ENCOUNTER (INPATIENT)
Facility: HOSPITAL | Age: 88
LOS: 5 days | Discharge: ANOTHER HOSPITAL | DRG: 089 | End: 2023-10-30
Attending: EMERGENCY MEDICINE | Admitting: INTERNAL MEDICINE
Payer: MEDICARE

## 2023-10-24 DIAGNOSIS — R13.10 DYSPHAGIA, UNSPECIFIED TYPE: ICD-10-CM

## 2023-10-24 DIAGNOSIS — R41.82 ALTERED MENTAL STATUS, UNSPECIFIED ALTERED MENTAL STATUS TYPE: Primary | ICD-10-CM

## 2023-10-24 DIAGNOSIS — I95.9 HYPOTENSION, UNSPECIFIED HYPOTENSION TYPE: ICD-10-CM

## 2023-10-24 PROBLEM — S06.0X0A CONCUSSION WITHOUT LOSS OF CONSCIOUSNESS: Status: ACTIVE | Noted: 2023-10-24

## 2023-10-24 PROBLEM — R41.0 CONFUSION: Status: ACTIVE | Noted: 2023-10-24

## 2023-10-24 LAB
ALBUMIN SERPL-MCNC: 3.8 G/DL (ref 3.5–5.2)
ALBUMIN/GLOB SERPL: 1.7 G/DL
ALP SERPL-CCNC: 136 U/L (ref 39–117)
ALT SERPL W P-5'-P-CCNC: 31 U/L (ref 1–33)
ANION GAP SERPL CALCULATED.3IONS-SCNC: 13 MMOL/L (ref 5–15)
AST SERPL-CCNC: 22 U/L (ref 1–32)
BASOPHILS # BLD AUTO: 0.12 10*3/MM3 (ref 0–0.2)
BASOPHILS NFR BLD AUTO: 1.2 % (ref 0–1.5)
BILIRUB SERPL-MCNC: 0.4 MG/DL (ref 0–1.2)
BILIRUB UR QL STRIP: NEGATIVE
BUN SERPL-MCNC: 42 MG/DL (ref 8–23)
BUN/CREAT SERPL: 25 (ref 7–25)
CALCIUM SPEC-SCNC: 8.7 MG/DL (ref 8.2–9.6)
CHLORIDE SERPL-SCNC: 106 MMOL/L (ref 98–107)
CLARITY UR: CLEAR
CO2 SERPL-SCNC: 25 MMOL/L (ref 22–29)
COLOR UR: YELLOW
CREAT SERPL-MCNC: 1.68 MG/DL (ref 0.57–1)
DEPRECATED RDW RBC AUTO: 58.4 FL (ref 37–54)
EGFRCR SERPLBLD CKD-EPI 2021: 28.8 ML/MIN/1.73
EOSINOPHIL # BLD AUTO: 0.42 10*3/MM3 (ref 0–0.4)
EOSINOPHIL NFR BLD AUTO: 4.2 % (ref 0.3–6.2)
ERYTHROCYTE [DISTWIDTH] IN BLOOD BY AUTOMATED COUNT: 16.8 % (ref 12.3–15.4)
FLUAV RNA RESP QL NAA+PROBE: NOT DETECTED
FLUBV RNA RESP QL NAA+PROBE: NOT DETECTED
GEN 5 2HR TROPONIN T REFLEX: 63 NG/L
GLOBULIN UR ELPH-MCNC: 2.2 GM/DL
GLUCOSE SERPL-MCNC: 167 MG/DL (ref 65–99)
GLUCOSE UR STRIP-MCNC: NEGATIVE MG/DL
HCT VFR BLD AUTO: 38.7 % (ref 34–46.6)
HGB BLD-MCNC: 12.4 G/DL (ref 12–15.9)
HGB UR QL STRIP.AUTO: NEGATIVE
HOLD SPECIMEN: NORMAL
IMM GRANULOCYTES # BLD AUTO: 0.07 10*3/MM3 (ref 0–0.05)
IMM GRANULOCYTES NFR BLD AUTO: 0.7 % (ref 0–0.5)
KETONES UR QL STRIP: NEGATIVE
LEUKOCYTE ESTERASE UR QL STRIP.AUTO: NEGATIVE
LYMPHOCYTES # BLD AUTO: 0.56 10*3/MM3 (ref 0.7–3.1)
LYMPHOCYTES NFR BLD AUTO: 5.6 % (ref 19.6–45.3)
MAGNESIUM SERPL-MCNC: 1.8 MG/DL (ref 1.6–2.4)
MCH RBC QN AUTO: 30.5 PG (ref 26.6–33)
MCHC RBC AUTO-ENTMCNC: 32 G/DL (ref 31.5–35.7)
MCV RBC AUTO: 95.3 FL (ref 79–97)
MONOCYTES # BLD AUTO: 0.86 10*3/MM3 (ref 0.1–0.9)
MONOCYTES NFR BLD AUTO: 8.6 % (ref 5–12)
NEUTROPHILS NFR BLD AUTO: 7.95 10*3/MM3 (ref 1.7–7)
NEUTROPHILS NFR BLD AUTO: 79.7 % (ref 42.7–76)
NITRITE UR QL STRIP: NEGATIVE
NRBC BLD AUTO-RTO: 0 /100 WBC (ref 0–0.2)
PH UR STRIP.AUTO: 5.5 [PH] (ref 5–8)
PLATELET # BLD AUTO: 234 10*3/MM3 (ref 140–450)
PMV BLD AUTO: 12 FL (ref 6–12)
POTASSIUM SERPL-SCNC: 3.4 MMOL/L (ref 3.5–5.2)
PROT SERPL-MCNC: 6 G/DL (ref 6–8.5)
PROT UR QL STRIP: NEGATIVE
QT INTERVAL: 382 MS
QTC INTERVAL: 432 MS
RBC # BLD AUTO: 4.06 10*6/MM3 (ref 3.77–5.28)
RSV RNA NPH QL NAA+NON-PROBE: NOT DETECTED
SARS-COV-2 RNA RESP QL NAA+PROBE: NOT DETECTED
SODIUM SERPL-SCNC: 144 MMOL/L (ref 136–145)
SP GR UR STRIP: 1.02 (ref 1–1.03)
T4 FREE SERPL-MCNC: 1.45 NG/DL (ref 0.93–1.7)
TROPONIN T DELTA: -4 NG/L
TROPONIN T SERPL HS-MCNC: 67 NG/L
TSH SERPL DL<=0.05 MIU/L-ACNC: 24.24 UIU/ML (ref 0.27–4.2)
UROBILINOGEN UR QL STRIP: NORMAL
WBC NRBC COR # BLD: 9.98 10*3/MM3 (ref 3.4–10.8)
WHOLE BLOOD HOLD COAG: NORMAL
WHOLE BLOOD HOLD SPECIMEN: NORMAL

## 2023-10-24 PROCEDURE — 25810000003 SODIUM CHLORIDE 0.9 % SOLUTION: Performed by: EMERGENCY MEDICINE

## 2023-10-24 PROCEDURE — 73130 X-RAY EXAM OF HAND: CPT

## 2023-10-24 PROCEDURE — 70450 CT HEAD/BRAIN W/O DYE: CPT

## 2023-10-24 PROCEDURE — G0378 HOSPITAL OBSERVATION PER HR: HCPCS

## 2023-10-24 PROCEDURE — 81003 URINALYSIS AUTO W/O SCOPE: CPT | Performed by: EMERGENCY MEDICINE

## 2023-10-24 PROCEDURE — 80053 COMPREHEN METABOLIC PANEL: CPT | Performed by: EMERGENCY MEDICINE

## 2023-10-24 PROCEDURE — 84439 ASSAY OF FREE THYROXINE: CPT | Performed by: INTERNAL MEDICINE

## 2023-10-24 PROCEDURE — 36415 COLL VENOUS BLD VENIPUNCTURE: CPT

## 2023-10-24 PROCEDURE — P9612 CATHETERIZE FOR URINE SPEC: HCPCS

## 2023-10-24 PROCEDURE — 99285 EMERGENCY DEPT VISIT HI MDM: CPT

## 2023-10-24 PROCEDURE — 93005 ELECTROCARDIOGRAM TRACING: CPT | Performed by: EMERGENCY MEDICINE

## 2023-10-24 PROCEDURE — 84443 ASSAY THYROID STIM HORMONE: CPT | Performed by: INTERNAL MEDICINE

## 2023-10-24 PROCEDURE — 84484 ASSAY OF TROPONIN QUANT: CPT | Performed by: EMERGENCY MEDICINE

## 2023-10-24 PROCEDURE — 87637 SARSCOV2&INF A&B&RSV AMP PRB: CPT | Performed by: EMERGENCY MEDICINE

## 2023-10-24 PROCEDURE — 83735 ASSAY OF MAGNESIUM: CPT | Performed by: EMERGENCY MEDICINE

## 2023-10-24 PROCEDURE — 85025 COMPLETE CBC W/AUTO DIFF WBC: CPT | Performed by: EMERGENCY MEDICINE

## 2023-10-24 PROCEDURE — 71045 X-RAY EXAM CHEST 1 VIEW: CPT

## 2023-10-24 RX ORDER — FAMOTIDINE 20 MG/1
20 TABLET, FILM COATED ORAL 2 TIMES DAILY
Status: DISCONTINUED | OUTPATIENT
Start: 2023-10-24 | End: 2023-10-30 | Stop reason: HOSPADM

## 2023-10-24 RX ORDER — MIDODRINE HYDROCHLORIDE 10 MG/1
10 TABLET ORAL
Status: DISCONTINUED | OUTPATIENT
Start: 2023-10-25 | End: 2023-10-30 | Stop reason: HOSPADM

## 2023-10-24 RX ORDER — LEVOTHYROXINE SODIUM 0.12 MG/1
125 TABLET ORAL EVERY MORNING
Status: DISCONTINUED | OUTPATIENT
Start: 2023-10-25 | End: 2023-10-30 | Stop reason: HOSPADM

## 2023-10-24 RX ORDER — SODIUM CHLORIDE 0.9 % (FLUSH) 0.9 %
10 SYRINGE (ML) INJECTION AS NEEDED
Status: DISCONTINUED | OUTPATIENT
Start: 2023-10-24 | End: 2023-10-30 | Stop reason: HOSPADM

## 2023-10-24 RX ORDER — BISACODYL 5 MG/1
5 TABLET, DELAYED RELEASE ORAL DAILY PRN
Status: DISCONTINUED | OUTPATIENT
Start: 2023-10-24 | End: 2023-10-30 | Stop reason: HOSPADM

## 2023-10-24 RX ORDER — BISACODYL 10 MG
10 SUPPOSITORY, RECTAL RECTAL DAILY PRN
Status: DISCONTINUED | OUTPATIENT
Start: 2023-10-24 | End: 2023-10-30 | Stop reason: HOSPADM

## 2023-10-24 RX ORDER — SODIUM CHLORIDE 9 MG/ML
40 INJECTION, SOLUTION INTRAVENOUS AS NEEDED
Status: DISCONTINUED | OUTPATIENT
Start: 2023-10-24 | End: 2023-10-30 | Stop reason: HOSPADM

## 2023-10-24 RX ORDER — RIVASTIGMINE 4.6 MG/24H
1 PATCH, EXTENDED RELEASE TRANSDERMAL DAILY
Status: DISCONTINUED | OUTPATIENT
Start: 2023-10-25 | End: 2023-10-27

## 2023-10-24 RX ORDER — POLYETHYLENE GLYCOL 3350 17 G/17G
17 POWDER, FOR SOLUTION ORAL DAILY PRN
Status: DISCONTINUED | OUTPATIENT
Start: 2023-10-24 | End: 2023-10-30 | Stop reason: HOSPADM

## 2023-10-24 RX ORDER — TAMSULOSIN HYDROCHLORIDE 0.4 MG/1
0.4 CAPSULE ORAL DAILY
Status: DISCONTINUED | OUTPATIENT
Start: 2023-10-25 | End: 2023-10-30 | Stop reason: HOSPADM

## 2023-10-24 RX ORDER — ASPIRIN 81 MG/1
81 TABLET ORAL DAILY
Status: DISCONTINUED | OUTPATIENT
Start: 2023-10-25 | End: 2023-10-30 | Stop reason: HOSPADM

## 2023-10-24 RX ORDER — ACETAMINOPHEN 160 MG/5ML
650 SOLUTION ORAL EVERY 4 HOURS PRN
Status: DISCONTINUED | OUTPATIENT
Start: 2023-10-24 | End: 2023-10-30 | Stop reason: HOSPADM

## 2023-10-24 RX ORDER — PREGABALIN 75 MG/1
75 CAPSULE ORAL 2 TIMES DAILY
Status: DISCONTINUED | OUTPATIENT
Start: 2023-10-24 | End: 2023-10-30 | Stop reason: HOSPADM

## 2023-10-24 RX ORDER — ACETAMINOPHEN 325 MG/1
650 TABLET ORAL EVERY 4 HOURS PRN
Status: DISCONTINUED | OUTPATIENT
Start: 2023-10-24 | End: 2023-10-30 | Stop reason: HOSPADM

## 2023-10-24 RX ORDER — SODIUM CHLORIDE 0.9 % (FLUSH) 0.9 %
10 SYRINGE (ML) INJECTION EVERY 12 HOURS SCHEDULED
Status: DISCONTINUED | OUTPATIENT
Start: 2023-10-24 | End: 2023-10-30 | Stop reason: HOSPADM

## 2023-10-24 RX ORDER — AMOXICILLIN 250 MG
2 CAPSULE ORAL 2 TIMES DAILY
Status: DISCONTINUED | OUTPATIENT
Start: 2023-10-24 | End: 2023-10-30 | Stop reason: HOSPADM

## 2023-10-24 RX ORDER — PROPRANOLOL HYDROCHLORIDE 10 MG/1
20 TABLET ORAL EVERY 8 HOURS SCHEDULED
Status: DISCONTINUED | OUTPATIENT
Start: 2023-10-24 | End: 2023-10-30 | Stop reason: HOSPADM

## 2023-10-24 RX ORDER — ACETAMINOPHEN 650 MG/1
650 SUPPOSITORY RECTAL EVERY 4 HOURS PRN
Status: DISCONTINUED | OUTPATIENT
Start: 2023-10-24 | End: 2023-10-30 | Stop reason: HOSPADM

## 2023-10-24 RX ORDER — HYDROCODONE BITARTRATE AND ACETAMINOPHEN 5; 325 MG/1; MG/1
1 TABLET ORAL EVERY 8 HOURS
Status: DISPENSED | OUTPATIENT
Start: 2023-10-24 | End: 2023-10-29

## 2023-10-24 RX ADMIN — PROPRANOLOL HYDROCHLORIDE 20 MG: 20 TABLET ORAL at 22:14

## 2023-10-24 RX ADMIN — SENNOSIDES AND DOCUSATE SODIUM 2 TABLET: 8.6; 5 TABLET ORAL at 22:14

## 2023-10-24 RX ADMIN — SODIUM CHLORIDE 1000 ML: 9 INJECTION, SOLUTION INTRAVENOUS at 11:37

## 2023-10-24 RX ADMIN — HYDROCODONE BITARTRATE AND ACETAMINOPHEN 1 TABLET: 5; 325 TABLET ORAL at 22:14

## 2023-10-24 RX ADMIN — FAMOTIDINE 20 MG: 20 TABLET, FILM COATED ORAL at 22:14

## 2023-10-24 RX ADMIN — Medication 10 ML: at 22:14

## 2023-10-24 RX ADMIN — PREGABALIN 75 MG: 75 CAPSULE ORAL at 22:14

## 2023-10-24 NOTE — TELEPHONE ENCOUNTER
Patient's , Matt, stopped by the office to let us know that Zoila had a fall last night and hit her head. He states that she was seen at [facility] ER last night and had some testing performed. They sent her home. This morning patient had a knot pop up on her head and had quite a bit of confusion. He states that they called patient's daughter who took her back to the ER. They believe that during the confusion that she may have taken more medication than prescribed. He states that her blood pressure was running very low at home and HR was between 55-58.

## 2023-10-24 NOTE — ED PROVIDER NOTES
" EMERGENCY DEPARTMENT ENCOUNTER    Pt Name: Zoila Nava  MRN: [id number]  Pt :   1933  Room Number:  33/33  Date of encounter:  10/24/2023  PCP: Arnoldo Oneil MD  ED Provider: Toni Suero MD    Historian: Patient, son, daughter-in-law,       HPI:  Chief Complaint: Confusion        Context: Zoila Nava is a 89 y.o. female who presents to the ED c/o confusion status post fall last evening.  The patient had what was presumed to be a mechanical fall when she was walking up a ramp with her rollator.  She fell to the ground and struck her head.  She came to our emergency department and underwent CT scanning which was negative for intracranial hemorrhage.  She was discharged home and seeming relatively good status.  This morning she was confused.  Her blood pressure was reported to be quite \"low\".  The patient does take a blood thinner.  She complains of some head pain status post head trauma.  She has been confused with her  in their private residence and therefore went to return to our emergency department.  They also notes some swelling of the right wrist/hand which was not noted last night.  The patient reports some mild discomfort in the region of the base of her thumb.  She denies sensorimotor changes distal to that injury.  The patient's family notes that multiple medications are missing from her daily planner.  They are uncertain whether she took these or they were spilled.      PAST MEDICAL HISTORY  Past Medical History:   Diagnosis Date    Anemia     Arthritis     Asthma     Cataract     Difficulty breathing     Chronic    GERD (gastroesophageal reflux disease)     Graves' ophthalmopathy     Hoarseness     Hypertension     Hypertensive heart disease with acute on chronic diastolic congestive heart failure 10/11/2021    Pulmonary hypertension 10/11/2021    Spondylolisthesis of cervical region     Vitamin B12 deficiency          PAST SURGICAL HISTORY  Past Surgical " History:   Procedure Laterality Date    CERVICAL LAMINECTOMY      CHOLECYSTECTOMY      OTHER SURGICAL HISTORY Right     Neuroplasty Median Nerve at Carpal Tunnel    THYROID SURGERY      TOTAL KNEE ARTHROPLASTY Left 09/29/2014    Dr Colon         FAMILY HISTORY  Family History   Problem Relation Age of Onset    Hypertension Mother     Other Father         cardiac disorder    Diabetes Father     Heart disease Father     Hypertension Sister     No Known Problems Maternal Grandmother     No Known Problems Maternal Grandfather     No Known Problems Paternal Grandmother     No Known Problems Paternal Grandfather     Colon cancer Neg Hx     Colon polyps Neg Hx     Esophageal cancer Neg Hx          SOCIAL HISTORY  Social History     Socioeconomic History    Marital status:    Tobacco Use    Smoking status: Never    Smokeless tobacco: Never   Vaping Use    Vaping Use: Never used   Substance and Sexual Activity    Alcohol use: No    Drug use: No    Sexual activity: Defer         ALLERGIES  Penicillins        REVIEW OF SYSTEMS  Review of Systems       All systems reviewed and negative except for those discussed in HPI.       PHYSICAL EXAM    I have reviewed the triage vital signs and nursing notes.    ED Triage Vitals   Temp Pulse Resp BP SpO2   -- -- -- -- --      Temp src Heart Rate Source Patient Position BP Location FiO2 (%)   -- -- -- -- --       Physical Exam  GENERAL:   Appears extremely weak, slumped to the left but this is baseline for the patient per family.  HENT: Nares patent.  Scalp tenderness status post head trauma  EYES: No scleral icterus.  CV: Regular rhythm, regular rate.  No audible murmurs gallops rubs  RESPIRATORY: Normal effort.  No audible wheezes, rales or rhonchi.  ABDOMEN: Soft, nontender  MUSCULOSKELETAL: Swelling right hand base of right thumb.  NEURO: Alert, moves all extremities, follows commands.  Clear speech with orientation to person and place and roughly day.  No pronator drift  or leg drift.  SKIN: Warm, dry, no rash visualized.      LAB RESULTS  Recent Results (from the past 24 hour(s))   Comprehensive Metabolic Panel    Collection Time: 10/23/23  8:59 PM    Specimen: Blood   Result Value Ref Range    Glucose 129 (H) 65 - 99 mg/dL    BUN 42 (H) 8 - 23 mg/dL    Creatinine 1.81 (H) 0.57 - 1.00 mg/dL    Sodium 144 136 - 145 mmol/L    Potassium 3.2 (L) 3.5 - 5.2 mmol/L    Chloride 104 98 - 107 mmol/L    CO2 26.0 22.0 - 29.0 mmol/L    Calcium 8.6 8.2 - 9.6 mg/dL    Total Protein 6.2 6.0 - 8.5 g/dL    Albumin 3.4 (L) 3.5 - 5.2 g/dL    ALT (SGPT) 32 1 - 33 U/L    AST (SGOT) 20 1 - 32 U/L    Alkaline Phosphatase 125 (H) 39 - 117 U/L    Total Bilirubin 0.3 0.0 - 1.2 mg/dL    Globulin 2.8 gm/dL    A/G Ratio 1.2 g/dL    BUN/Creatinine Ratio 23.2 7.0 - 25.0    Anion Gap 14.0 5.0 - 15.0 mmol/L    eGFR 26.3 (L) >60.0 mL/min/1.73   BNP    Collection Time: 10/23/23  8:59 PM    Specimen: Blood   Result Value Ref Range    proBNP 8,093.0 (H) 0.0 - 1,800.0 pg/mL   CBC Auto Differential    Collection Time: 10/23/23  8:59 PM    Specimen: Blood   Result Value Ref Range    WBC 10.12 3.40 - 10.80 10*3/mm3    RBC 3.91 3.77 - 5.28 10*6/mm3    Hemoglobin 11.9 (L) 12.0 - 15.9 g/dL    Hematocrit 37.3 34.0 - 46.6 %    MCV 95.4 79.0 - 97.0 fL    MCH 30.4 26.6 - 33.0 pg    MCHC 31.9 31.5 - 35.7 g/dL    RDW 17.1 (H) 12.3 - 15.4 %    RDW-SD 59.3 (H) 37.0 - 54.0 fl    MPV 12.3 (H) 6.0 - 12.0 fL    Platelets 228 140 - 450 10*3/mm3    Neutrophil % 74.3 42.7 - 76.0 %    Lymphocyte % 8.6 (L) 19.6 - 45.3 %    Monocyte % 10.6 5.0 - 12.0 %    Eosinophil % 5.0 0.3 - 6.2 %    Basophil % 1.0 0.0 - 1.5 %    Immature Grans % 0.5 0.0 - 0.5 %    Neutrophils, Absolute 7.52 (H) 1.70 - 7.00 10*3/mm3    Lymphocytes, Absolute 0.87 0.70 - 3.10 10*3/mm3    Monocytes, Absolute 1.07 (H) 0.10 - 0.90 10*3/mm3    Eosinophils, Absolute 0.51 (H) 0.00 - 0.40 10*3/mm3    Basophils, Absolute 0.10 0.00 - 0.20 10*3/mm3    Immature Grans, Absolute 0.05 0.00  - 0.05 10*3/mm3    nRBC 0.0 0.0 - 0.2 /100 WBC   Magnesium    Collection Time: 10/23/23  8:59 PM    Specimen: Blood   Result Value Ref Range    Magnesium 1.8 1.6 - 2.4 mg/dL   ECG 12 Lead ED Triage Standing Order; Weak / Dizzy / AMS    Collection Time: 10/24/23 11:32 AM   Result Value Ref Range    QT Interval 382 ms    QTC Interval 432 ms   Comprehensive Metabolic Panel    Collection Time: 10/24/23 11:33 AM    Specimen: Blood   Result Value Ref Range    Glucose 167 (H) 65 - 99 mg/dL    BUN 42 (H) 8 - 23 mg/dL    Creatinine 1.68 (H) 0.57 - 1.00 mg/dL    Sodium 144 136 - 145 mmol/L    Potassium 3.4 (L) 3.5 - 5.2 mmol/L    Chloride 106 98 - 107 mmol/L    CO2 25.0 22.0 - 29.0 mmol/L    Calcium 8.7 8.2 - 9.6 mg/dL    Total Protein 6.0 6.0 - 8.5 g/dL    Albumin 3.8 3.5 - 5.2 g/dL    ALT (SGPT) 31 1 - 33 U/L    AST (SGOT) 22 1 - 32 U/L    Alkaline Phosphatase 136 (H) 39 - 117 U/L    Total Bilirubin 0.4 0.0 - 1.2 mg/dL    Globulin 2.2 gm/dL    A/G Ratio 1.7 g/dL    BUN/Creatinine Ratio 25.0 7.0 - 25.0    Anion Gap 13.0 5.0 - 15.0 mmol/L    eGFR 28.8 (L) >60.0 mL/min/1.73   Magnesium    Collection Time: 10/24/23 11:33 AM    Specimen: Blood   Result Value Ref Range    Magnesium 1.8 1.6 - 2.4 mg/dL   Lavender Top    Collection Time: 10/24/23 11:33 AM   Result Value Ref Range    Extra Tube hold for add-on    Gold Top - SST    Collection Time: 10/24/23 11:33 AM   Result Value Ref Range    Extra Tube Hold for add-ons.    Light Blue Top    Collection Time: 10/24/23 11:33 AM   Result Value Ref Range    Extra Tube Hold for add-ons.    CBC Auto Differential    Collection Time: 10/24/23 11:33 AM    Specimen: Blood   Result Value Ref Range    WBC 9.98 3.40 - 10.80 10*3/mm3    RBC 4.06 3.77 - 5.28 10*6/mm3    Hemoglobin 12.4 12.0 - 15.9 g/dL    Hematocrit 38.7 34.0 - 46.6 %    MCV 95.3 79.0 - 97.0 fL    MCH 30.5 26.6 - 33.0 pg    MCHC 32.0 31.5 - 35.7 g/dL    RDW 16.8 (H) 12.3 - 15.4 %    RDW-SD 58.4 (H) 37.0 - 54.0 fl    MPV 12.0 6.0 -  12.0 fL    Platelets 234 140 - 450 10*3/mm3    Neutrophil % 79.7 (H) 42.7 - 76.0 %    Lymphocyte % 5.6 (L) 19.6 - 45.3 %    Monocyte % 8.6 5.0 - 12.0 %    Eosinophil % 4.2 0.3 - 6.2 %    Basophil % 1.2 0.0 - 1.5 %    Immature Grans % 0.7 (H) 0.0 - 0.5 %    Neutrophils, Absolute 7.95 (H) 1.70 - 7.00 10*3/mm3    Lymphocytes, Absolute 0.56 (L) 0.70 - 3.10 10*3/mm3    Monocytes, Absolute 0.86 0.10 - 0.90 10*3/mm3    Eosinophils, Absolute 0.42 (H) 0.00 - 0.40 10*3/mm3    Basophils, Absolute 0.12 0.00 - 0.20 10*3/mm3    Immature Grans, Absolute 0.07 (H) 0.00 - 0.05 10*3/mm3    nRBC 0.0 0.0 - 0.2 /100 WBC   COVID-19, FLU A/B, RSV PCR - Swab, Nasopharynx    Collection Time: 10/24/23 11:33 AM    Specimen: Nasopharynx; Swab   Result Value Ref Range    COVID19 Not Detected Not Detected - Ref. Range    Influenza A PCR Not Detected Not Detected    Influenza B PCR Not Detected Not Detected    RSV, PCR Not Detected Not Detected   Urinalysis With Microscopic If Indicated (No Culture) - Straight Cath    Collection Time: 10/24/23 11:45 AM    Specimen: Straight Cath; Urine   Result Value Ref Range    Color, UA Yellow Yellow, Straw    Appearance, UA Clear Clear    pH, UA 5.5 5.0 - 8.0    Specific Gravity, UA 1.016 1.001 - 1.030    Glucose, UA Negative Negative    Ketones, UA Negative Negative    Bilirubin, UA Negative Negative    Blood, UA Negative Negative    Protein, UA Negative Negative    Leuk Esterase, UA Negative Negative    Nitrite, UA Negative Negative    Urobilinogen, UA 0.2 E.U./dL 0.2 - 1.0 E.U./dL   Single High Sensitivity Troponin T    Collection Time: 10/24/23 12:32 PM    Specimen: Blood   Result Value Ref Range    HS Troponin T 67 (C) <10 ng/L   Green Top (Gel)    Collection Time: 10/24/23 12:32 PM   Result Value Ref Range    Extra Tube Hold for add-ons.        If labs were ordered, I independently reviewed the results and considered them in treating the patient.        RADIOLOGY  CT Head Without Contrast    Result Date:  10/24/2023  CT HEAD WO CONTRAST Date of Exam: 10/24/2023 12:21 PM EDT Indication: AMS since overnight.  neg head ct last evening. Comparison: Head CT 10/23/2023 Technique: Axial CT images were obtained of the head without contrast administration.  Automated exposure control and iterative construction methods were used. Findings: No acute intracranial hemorrhage. No acute large territory infarct. No mass effect. No extra-axial collections. Normal size and configuration of the ventricles. Redemonstration of bilateral exophthalmos with otherwise unremarkable appearance of the orbits. There is a contusion of the left parietal scalp which is unchanged. The paranasal sinuses are clear. The mastoid air cells are clear. No acute or suspicious bony findings.     Impression: No acute intracranial findings. No significant interval change. Redemonstration of left parietal scalp contusion. Electronically Signed: Matt Chandler MD  10/24/2023 12:28 PM EDT  Workstation ID: [id number]    XR Chest 1 View    Result Date: 10/24/2023  XR CHEST 1 VW Date of Exam: 10/24/2023 11:35 AM EDT Indication: Weak/Dizzy/AMS triage protocol Comparison: 10/23/2023 and older exams Findings: There is stable moderately severe cardiomegaly. There is infiltrate in the left lower lobe which is thought to be chronic when compared to multiple old exams. Coarse interstitial pattern elsewhere in the lung fields also appears most likely chronic. There are no definite acute infiltrates or effusions..     Impression: Chronic findings. No definite acute process. Electronically Signed: Georgina Ramsey MD  10/24/2023 12:18 PM EDT  Workstation ID: [id number]    XR Chest 2 View    Result Date: 10/23/2023  XR CHEST 2 VW Date of Exam: 10/23/2023 9:51 PM EDT Indication: Fall; Elevated BNP; recent change in diuretic Comparison: None available. Findings:  There is no acute infiltrate. There is no large pleural effusion. There are diffuse increased interstitial markings  unchanged from earlier examinations which may be chronic in nature. The cardiac silhouette is poorly evaluated due to AP technique and patient rotation. There is a calcified tortuous aorta. The visualized radha structures demonstrate no abnormality.     Impression: No definite infiltrate. Chronic interstitial changes. Calcified tortuous aorta Electronically Signed: Montez Reynolds MD  10/23/2023 10:46 PM EDT  Workstation ID: [id number]    CT Head Without Contrast    Result Date: 10/23/2023  CT HEAD WO CONTRAST Date of Exam: 10/23/2023 7:51 PM EDT Indication: Fall. Comparison: None available. Technique: Axial CT images were obtained of the head without contrast administration.  Automated exposure control and iterative construction methods were used. Findings: The ventricles and subarachnoid spaces are unremarkable. There is no intracranial hemorrhage. There is no evidence of resent large arterial distribution infarct. There is no edema or mass effect. No midline shift. Limited evaluation of the orbits is unremarkable The visualized paranasal sinuses are unremarkable.. Evaluation of the osseous structure at the appropriate bones window is unremarkable.     Impression: No acute intracranial pathology. Electronically Signed: Montez Reynolds MD  10/23/2023 8:32 PM EDT  Workstation ID: [id number]    CT Cervical Spine Without Contrast    Result Date: 10/23/2023  CT CERVICAL SPINE WO CONTRAST Date of Exam: 10/23/2023 7:51 PM EDT Indication: Fall. Comparison: None available. Technique: Axial CT images were obtained of the cervical spine without contrast administration.  Reconstructed coronal and sagittal images were also obtained. Automated exposure control and iterative construction methods were used. Findings: Spine *No acute fractures or dislocations. *Normal alignment. *No prevertebral soft tissue swelling. *No osseous destructive lesions. *Vertebral body heights and disc spaces are normal. *There are diffuse degenerative  changes of the cervical spine with multilevel loss of joint space and partial fusion of the C4-5 through C6-7 levels.     1.No acute fractures or dislocations.  Electronically Signed: Montez Reynolds MD  10/23/2023 8:32 PM EDT  Workstation ID: [id number]     I ordered and independently reviewed the above noted radiographic studies.      I viewed images of CT head which showed no acute abnormality and no significant change from CT scan of the head performed yesterday per my independent interpretation.    See radiologist's dictation for official interpretation.        PROCEDURES    Procedures    ECG 12 Lead ED Triage Standing Order; Weak / Dizzy / AMS   Final Result   Test Reason : ED Triage Standing Order~   Blood Pressure :   */*   mmHG   Vent. Rate :  77 BPM     Atrial Rate :  77 BPM      P-R Int : 202 ms          QRS Dur :  94 ms       QT Int : 382 ms       P-R-T Axes :  50  97  -9 degrees      QTc Int : 432 ms      Sinus rhythm with marked sinus arrhythmia   Rightward axis   Abnormal QRS-T angle, consider primary T wave abnormality   Abnormal ECG   Confirmed by MIRIAM SANDERSON MD ([id number]) on 10/24/2023 1:27:25 PM      Referred By: ED MD           Confirmed By: MIRIAM SANDERSON MD          MEDICATIONS GIVEN IN ER    Medications   sodium chloride 0.9 % flush 10 mL (has no administration in time range)   sodium chloride 0.9 % bolus 1,000 mL (1,000 mL Intravenous New Bag 10/24/23 1137)         MEDICAL DECISION MAKING, PROGRESS, and CONSULTS    All labs have been independently reviewed by me.  All radiology studies have been reviewed by me and the radiologist dictating the report.  All EKG's have been independently viewed and interpreted by me/my attending physician.      Discussion below represents my analysis of pertinent findings related to patient's condition, differential diagnosis, treatment plan and final disposition.      Differential diagnosis:    Fall with altered mental status while anticoagulated.  Would consider  delayed intracranial hemorrhage, concussion, hypotension related symptoms versus occult infection such as UTI.      Additional sources:    - Discussed/ obtained information from independent historians: I spoke with the patient's , son, daughter-in-law in detail.    - External (non-ED) record review: I reviewed multiple records including CT scan of the head yesterday which showed no intracranial hemorrhage or skull fracture    - Chronic or social conditions impacting care: Poor mobility requiring rollator/walker.  Lives with elderly  in a private residence.    - Shared decision making: Patient and family in complete agreement with current plans for evaluation and treatment.      Orders placed during this visit:  Orders Placed This Encounter   Procedures    COVID PRE-OP / PRE-PROCEDURE SCREENING ORDER (NO ISOLATION) - Swab, Nasopharynx    COVID-19, FLU A/B, RSV PCR - Swab, Nasopharynx    XR Chest 1 View    CT Head Without Contrast    XR Hand 3+ View Right    [place] Draw    Comprehensive Metabolic Panel    Single High Sensitivity Troponin T    Magnesium    Urinalysis With Microscopic If Indicated (No Culture) - Urine, Clean Catch    CBC Auto Differential    High Sensitivity Troponin T 2Hr    NPO Diet NPO Type: Strict NPO    Undress & Gown    Continuous Pulse Oximetry    Vital Signs    ECG 12 Lead ED Triage Standing Order; Weak / Dizzy / AMS    Insert Peripheral IV    Initiate Observation Status    ED Bed Request    Fall Precautions    CBC & Differential    Green Top (Gel)    Lavender Top    Gold Top - SST    Gray Top    Light Blue Top         Additional orders considered but not ordered:  MRI brain    ED Course:    Consultants:      ED Course as of 10/24/23 1416   Tue Oct 24, 2023   1238 Initially ordered a liter of normal saline but reduce that to 500 cc notifying the nurse. [MS]   1238 Current blood pressure 113/79 with heart rate 63.  I have asked [MS]   1239  our emergency department pharmacist to  review her medications and it appears that she is taking too many of her blood pressure medicines. [MS]   1302 HS Troponin T(!!): 67 [MS]   1331 I have contacted Dr. Kovacs, hospitalist for admission. [MS]      ED Course User Index  [MS] Toni Suero MD              Shared Decision Making:  After my consideration of clinical presentation and any laboratory/radiology studies obtained, I discussed the findings with the patient/patient representative who is in agreement with the treatment plan and the final disposition.   Risks and benefits of discharge and/or observation/admission were discussed.       AS OF 14:16 EDT VITALS:    BP - 120/77  HR - 79  TEMP - 97.8 °F (36.6 °C) (Oral)  O2 SATS - 96%                  DIAGNOSIS  Final diagnoses:   Altered mental status, unspecified altered mental status type   Hypotension, unspecified hypotension type         DISPOSITION  Admission      Please note that portions of this document were completed with voice recognition software.        Toni Suero MD  10/24/23 3300

## 2023-10-24 NOTE — Clinical Note
Level of Care: Telemetry [5]   Diagnosis: AMS (altered mental status) [[id number]]   Admitting Physician: MARIELA DWYER [[id number]]   Attending Physician: MARIELA DWYER [[id number]]

## 2023-10-24 NOTE — DISCHARGE INSTRUCTIONS
Symptomatic care is recommended. Take all medications as prescribed and instructed. Follow up with primary care and cardiology as directed or return to Emergency Department with worsening of symptoms.

## 2023-10-24 NOTE — H&P
[facility] Medicine Services  HISTORY AND PHYSICAL    Patient Name: Zoila Nava  : 1933  MRN: [id number]  Primary Care Physician: Arnoldo Oneil MD  Date of admission: 10/24/2023      Subjective   Subjective     Chief Complaint:  confusion    HPI:  Zoila Nava is a 89 y.o. female w/ HFpEF, pulmHTN, mild memory deficits on Exelon patch, CKD3, HTN, hypothyroidism, pAfib, midodrine use, who presented to the ED for evaluation of confusion. Family notes some mild changes in her cognition for a week or more. Yesterday she reportedly lost her balance and had a fall where she struck her head. She was seen in the ED with nonacute workup and dismissed home. Last night she was more confused than baseline. This AM she remained confused and family noted that multiple pills were missing from her weekly container; it is unclear if she took them or lost them. She was brought to the ED for further evaluation where she was noted to have a BP of 79/52 which responded to IVF.    In the ED an x-ray of her RT hand and wrist was ordered for swelling and pain; she is currently refusing that imaging.      Personal History     Past Medical History:   Diagnosis Date    Anemia     Arthritis     Asthma     Cataract     Difficulty breathing     Chronic    GERD (gastroesophageal reflux disease)     Graves' ophthalmopathy     Hoarseness     Hypertension     Hypertensive heart disease with acute on chronic diastolic congestive heart failure 10/11/2021    Pulmonary hypertension 10/11/2021    Spondylolisthesis of cervical region     Vitamin B12 deficiency              Past Surgical History:   Procedure Laterality Date    CERVICAL LAMINECTOMY      CHOLECYSTECTOMY      OTHER SURGICAL HISTORY Right     Neuroplasty Median Nerve at Carpal Tunnel    THYROID SURGERY      TOTAL KNEE ARTHROPLASTY Left 2014    Dr Colon       Family History: family history includes Diabetes in her father;  Heart disease in her father; Hypertension in her mother and sister; No Known Problems in her maternal grandfather, maternal grandmother, paternal grandfather, and paternal grandmother; Other in her father.     Social History:  reports that she has never smoked. She has never used smokeless tobacco. She reports that she does not drink alcohol and does not use drugs.  Social History     Social History Narrative    Caffeine: 2-3 coffee daily       Medications:  Available home medication information reviewed.  (Not in a hospital admission)      Allergies   Allergen Reactions    Penicillins Other (See Comments)     CHILDHOOD ALLERGY         Objective   Objective     Vital Signs:   Temp:  [97.8 °F (36.6 °C)] 97.8 °F (36.6 °C)  Heart Rate:  [62-79] 79  Resp:  [16] 16  BP: ()/(52-94) 120/77       Physical Exam   Constitutional: Awake, alert, elderly female laying in ED stretcher in NAD  HENT: NCAT, mucous membranes moist  Respiratory: Clear to auscultation bilaterally, respiratory effort normal   Cardiovascular: RRR, palpable pedal pulses  Gastrointestinal: Positive bowel sounds, soft, nontender, nondistended  Musculoskeletal: BL LE edema w/ compression wrapping  Psychiatric: Appropriate affect, cooperative  Neurologic: Speech is clear and fluent, she is answering questions appropriately, she has subtle horizontal nystagmus BL, she is oriented to self, locations, situation, and month/next holiday but cannot give me the exact date (family says she would usually know this)    Result Review:  I have personally reviewed the results from the time of this admission to 10/24/2023 14:33 EDT and agree with these findings:  []  Laboratory list / accordion  []  Microbiology  [x]  Radiology  []  EKG/Telemetry   []  Cardiology/Vascular   []  Pathology  []  Old records  []  Other:  Most notable findings include: nonacute head CT        LAB RESULTS:      Lab 10/24/23  1133 10/23/23  2059   WBC 9.98 10.12   HEMOGLOBIN 12.4 11.9*    HEMATOCRIT 38.7 37.3   PLATELETS 234 228   NEUTROS ABS 7.95* 7.52*   IMMATURE GRANS (ABS) 0.07* 0.05   LYMPHS ABS 0.56* 0.87   MONOS ABS 0.86 1.07*   EOS ABS 0.42* 0.51*   MCV 95.3 95.4         Lab 10/24/23  1133 10/23/23  2059   SODIUM 144 144   POTASSIUM 3.4* 3.2*   CHLORIDE 106 104   CO2 25.0 26.0   ANION GAP 13.0 14.0   BUN 42* 42*   CREATININE 1.68* 1.81*   EGFR 28.8* 26.3*   GLUCOSE 167* 129*   CALCIUM 8.7 8.6   MAGNESIUM 1.8 1.8         Lab 10/24/23  1133 10/23/23  2059   TOTAL PROTEIN 6.0 6.2   ALBUMIN 3.8 3.4*   GLOBULIN 2.2 2.8   ALT (SGPT) 31 32   AST (SGOT) 22 20   BILIRUBIN 0.4 0.3   ALK PHOS 136* 125*         Lab 10/24/23  1232 10/23/23  2059   PROBNP  --  8,093.0*   HSTROP T 67*  --                  UA          4/4/2023    20:56 10/24/2023    11:45   Urinalysis   Specific Gravity, UA 1.007  1.016    Ketones, UA Negative  Negative    Blood, UA Negative  Negative    Leukocytes, UA Negative  Negative    Nitrite, UA Negative  Negative        Microbiology Results (last 10 days)       Procedure Component Value - Date/Time    COVID PRE-OP / PRE-PROCEDURE SCREENING ORDER (NO ISOLATION) - Swab, Nasopharynx [[id number]]  (Normal) Collected: 10/24/23 1133    Lab Status: Final result Specimen: Swab from Nasopharynx Updated: 10/24/23 1239    Narrative:      The following orders were created for panel order COVID PRE-OP / PRE-PROCEDURE SCREENING ORDER (NO ISOLATION) - Swab, Nasopharynx.  Procedure                               Abnormality         Status                     ---------                               -----------         ------                     COVID-19, FLU A/B, RSV P...[[id number]]  Normal              Final result                 Please view results for these tests on the individual orders.    COVID-19, FLU A/B, RSV PCR - Swab, Nasopharynx [[id number]]  (Normal) Collected: 10/24/23 1133    Lab Status: Final result Specimen: Swab from Nasopharynx Updated: 10/24/23 1239     COVID19 Not Detected      Influenza A PCR Not Detected     Influenza B PCR Not Detected     RSV, PCR Not Detected    Narrative:      Fact sheet for providers: https://www.fda.gov/media/124372/download    Fact sheet for patients: https://www.fda.gov/media/481684/download    Test performed by PCR.            CT Head Without Contrast    Result Date: 10/24/2023  CT HEAD WO CONTRAST Date of Exam: 10/24/2023 12:21 PM EDT Indication: AMS since overnight.  neg head ct last evening. Comparison: Head CT 10/23/2023 Technique: Axial CT images were obtained of the head without contrast administration.  Automated exposure control and iterative construction methods were used. Findings: No acute intracranial hemorrhage. No acute large territory infarct. No mass effect. No extra-axial collections. Normal size and configuration of the ventricles. Redemonstration of bilateral exophthalmos with otherwise unremarkable appearance of the orbits. There is a contusion of the left parietal scalp which is unchanged. The paranasal sinuses are clear. The mastoid air cells are clear. No acute or suspicious bony findings.     Impression: Impression: No acute intracranial findings. No significant interval change. Redemonstration of left parietal scalp contusion. Electronically Signed: Matt Chandler MD  10/24/2023 12:28 PM EDT  Workstation ID: [id number]    XR Chest 1 View    Result Date: 10/24/2023  XR CHEST 1 VW Date of Exam: 10/24/2023 11:35 AM EDT Indication: Weak/Dizzy/AMS triage protocol Comparison: 10/23/2023 and older exams Findings: There is stable moderately severe cardiomegaly. There is infiltrate in the left lower lobe which is thought to be chronic when compared to multiple old exams. Coarse interstitial pattern elsewhere in the lung fields also appears most likely chronic. There are no definite acute infiltrates or effusions..     Impression: Impression: Chronic findings. No definite acute process. Electronically Signed: Georgina Ramsey MD  10/24/2023 12:18 PM  EDT  Workstation ID: [id number]    XR Chest 2 View    Result Date: 10/23/2023  XR CHEST 2 VW Date of Exam: 10/23/2023 9:51 PM EDT Indication: Fall; Elevated BNP; recent change in diuretic Comparison: None available. Findings:  There is no acute infiltrate. There is no large pleural effusion. There are diffuse increased interstitial markings unchanged from earlier examinations which may be chronic in nature. The cardiac silhouette is poorly evaluated due to AP technique and patient rotation. There is a calcified tortuous aorta. The visualized radha structures demonstrate no abnormality.     Impression: Impression: No definite infiltrate. Chronic interstitial changes. Calcified tortuous aorta Electronically Signed: Montez Reynolds MD  10/23/2023 10:46 PM EDT  Workstation ID: [id number]    CT Head Without Contrast    Result Date: 10/23/2023  CT HEAD WO CONTRAST Date of Exam: 10/23/2023 7:51 PM EDT Indication: Fall. Comparison: None available. Technique: Axial CT images were obtained of the head without contrast administration.  Automated exposure control and iterative construction methods were used. Findings: The ventricles and subarachnoid spaces are unremarkable. There is no intracranial hemorrhage. There is no evidence of resent large arterial distribution infarct. There is no edema or mass effect. No midline shift. Limited evaluation of the orbits is unremarkable The visualized paranasal sinuses are unremarkable.. Evaluation of the osseous structure at the appropriate bones window is unremarkable.     Impression: Impression: No acute intracranial pathology. Electronically Signed: Montez Reynolds MD  10/23/2023 8:32 PM EDT  Workstation ID: [id number]    CT Cervical Spine Without Contrast    Result Date: 10/23/2023  CT CERVICAL SPINE WO CONTRAST Date of Exam: 10/23/2023 7:51 PM EDT Indication: Fall. Comparison: None available. Technique: Axial CT images were obtained of the cervical spine without contrast administration.   Reconstructed coronal and sagittal images were also obtained. Automated exposure control and iterative construction methods were used. Findings: Spine *No acute fractures or dislocations. *Normal alignment. *No prevertebral soft tissue swelling. *No osseous destructive lesions. *Vertebral body heights and disc spaces are normal. *There are diffuse degenerative changes of the cervical spine with multilevel loss of joint space and partial fusion of the C4-5 through C6-7 levels.     Impression: 1.No acute fractures or dislocations.  Electronically Signed: Montez Reynolds MD  10/23/2023 8:32 PM EDT  Workstation ID: [id number]     Results for orders placed during the hospital encounter of 04/04/23    Adult Transthoracic Echo Complete W/ Cont if Necessary Per Protocol    Interpretation Summary    Left ventricular systolic function is normal. Left ventricular ejection fraction appears to be 51 - 55%.    Mildly reduced right ventricular systolic function noted.    The left atrial cavity is dilated.    The right atrial cavity is dilated.    There is moderate calcification of the aortic valve.    Mild to moderate aortic valve regurgitation is present.    Mild mitral valve regurgitation is present.    Mild tricuspid valve regurgitation is present.      Assessment & Plan   Assessment & Plan     Active Hospital Problems    Diagnosis  POA    **Confusion [R41.0]  Yes    Concussion without loss of consciousness [S06.0X0A]  Yes    Hypotension [I95.9]  Yes    Chronic heart failure with preserved ejection fraction [I50.32]  Yes    Pulmonary hypertension [I27.20]  Yes    Stage 3 chronic kidney disease [N18.30]  Yes    Memory impairment of gradual onset [R41.3]  Yes    Hypothyroidism [E03.9]  Yes     Summary: This is a 89 y/o female w/ HFpEF, pulmHTN, mild memory deficits on Exelon patch, CKD3, HTN, hypothyroidism, pAfib, midodrine use who sustained a fall in the home, had normal eval in the ED and returned home, came back the following  day w/ hypotension, missing medications from home med planner, and slightly more confused than baseline    Assessment/Plan    Fall w/ head injury, concussion  -CT head last night in the ED and today w/o acute findings  -she appears very near her baseline cognitive status  -add on TSH  -close monitoring overnight, if stable and BP remains reasonable she can likely DC home with family    -Addendum: TSH elevated at >24, add on Free T4    RT hand swelling/pain s/p fall  -refusing x-ray in the ED    CKD 3-4  -baseline Cr 1.3-1.6; eGFR 20-30's  -slightly above baseline, holding lasix/metolazone      Chronic conditions:  Chronic HFpEF  PulmHTN  Memory deficits  HTN  Hypothyroidism  pAfib      DVT prophylaxis:  scds      CODE STATUS:    Code Status and Medical Interventions:   Ordered at: 10/24/23 5873     Level Of Support Discussed With:    Patient    Next of Kin (If No Surrogate)     Code Status (Patient has no pulse and is not breathing):    No CPR (Do Not Attempt to Resuscitate)     Medical Interventions (Patient has pulse or is breathing):    Full Support         Blayne Norman, DO  10/24/23

## 2023-10-25 LAB
ANION GAP SERPL CALCULATED.3IONS-SCNC: 11 MMOL/L (ref 5–15)
BUN SERPL-MCNC: 33 MG/DL (ref 8–23)
BUN/CREAT SERPL: 24.8 (ref 7–25)
CALCIUM SPEC-SCNC: 9 MG/DL (ref 8.2–9.6)
CHLORIDE SERPL-SCNC: 104 MMOL/L (ref 98–107)
CO2 SERPL-SCNC: 29 MMOL/L (ref 22–29)
CREAT SERPL-MCNC: 1.33 MG/DL (ref 0.57–1)
EGFRCR SERPLBLD CKD-EPI 2021: 38.1 ML/MIN/1.73
GLUCOSE SERPL-MCNC: 93 MG/DL (ref 65–99)
POTASSIUM SERPL-SCNC: 3.1 MMOL/L (ref 3.5–5.2)
SODIUM SERPL-SCNC: 144 MMOL/L (ref 136–145)

## 2023-10-25 PROCEDURE — 97535 SELF CARE MNGMENT TRAINING: CPT

## 2023-10-25 PROCEDURE — 80048 BASIC METABOLIC PNL TOTAL CA: CPT | Performed by: INTERNAL MEDICINE

## 2023-10-25 PROCEDURE — 92610 EVALUATE SWALLOWING FUNCTION: CPT

## 2023-10-25 PROCEDURE — 97116 GAIT TRAINING THERAPY: CPT

## 2023-10-25 PROCEDURE — G0378 HOSPITAL OBSERVATION PER HR: HCPCS

## 2023-10-25 PROCEDURE — 97165 OT EVAL LOW COMPLEX 30 MIN: CPT

## 2023-10-25 PROCEDURE — 97162 PT EVAL MOD COMPLEX 30 MIN: CPT

## 2023-10-25 RX ORDER — FUROSEMIDE 20 MG/1
20 TABLET ORAL DAILY
Status: DISCONTINUED | OUTPATIENT
Start: 2023-10-25 | End: 2023-10-30 | Stop reason: HOSPADM

## 2023-10-25 RX ORDER — POTASSIUM CHLORIDE 1.5 G/1.58G
40 POWDER, FOR SOLUTION ORAL EVERY 4 HOURS
Qty: 4 PACKET | Refills: 0 | Status: COMPLETED | OUTPATIENT
Start: 2023-10-25 | End: 2023-10-25

## 2023-10-25 RX ADMIN — LEVOTHYROXINE SODIUM 125 MCG: 125 TABLET ORAL at 06:57

## 2023-10-25 RX ADMIN — TAMSULOSIN HYDROCHLORIDE 0.4 MG: 0.4 CAPSULE ORAL at 08:25

## 2023-10-25 RX ADMIN — HYDROCODONE BITARTRATE AND ACETAMINOPHEN 1 TABLET: 5; 325 TABLET ORAL at 06:57

## 2023-10-25 RX ADMIN — HYDROCODONE BITARTRATE AND ACETAMINOPHEN 1 TABLET: 5; 325 TABLET ORAL at 12:58

## 2023-10-25 RX ADMIN — SENNOSIDES AND DOCUSATE SODIUM 2 TABLET: 8.6; 5 TABLET ORAL at 21:45

## 2023-10-25 RX ADMIN — Medication 10 ML: at 23:01

## 2023-10-25 RX ADMIN — PROPRANOLOL HYDROCHLORIDE 20 MG: 20 TABLET ORAL at 06:57

## 2023-10-25 RX ADMIN — PREGABALIN 75 MG: 75 CAPSULE ORAL at 08:25

## 2023-10-25 RX ADMIN — MIDODRINE HYDROCHLORIDE 10 MG: 10 TABLET ORAL at 11:12

## 2023-10-25 RX ADMIN — MIDODRINE HYDROCHLORIDE 10 MG: 10 TABLET ORAL at 06:57

## 2023-10-25 RX ADMIN — HYDROCODONE BITARTRATE AND ACETAMINOPHEN 1 TABLET: 5; 325 TABLET ORAL at 22:59

## 2023-10-25 RX ADMIN — FAMOTIDINE 20 MG: 20 TABLET, FILM COATED ORAL at 21:45

## 2023-10-25 RX ADMIN — PROPRANOLOL HYDROCHLORIDE 20 MG: 20 TABLET ORAL at 14:52

## 2023-10-25 RX ADMIN — FAMOTIDINE 20 MG: 20 TABLET, FILM COATED ORAL at 08:25

## 2023-10-25 RX ADMIN — Medication 10 ML: at 08:25

## 2023-10-25 RX ADMIN — POTASSIUM CHLORIDE 40 MEQ: 1.5 POWDER, FOR SOLUTION ORAL at 12:58

## 2023-10-25 RX ADMIN — RIVASTIGMINE 1 PATCH: 4.6 PATCH TRANSDERMAL at 11:12

## 2023-10-25 RX ADMIN — FUROSEMIDE 20 MG: 20 TABLET ORAL at 11:43

## 2023-10-25 RX ADMIN — ASPIRIN 81 MG: 81 TABLET, COATED ORAL at 08:25

## 2023-10-25 RX ADMIN — POTASSIUM CHLORIDE 40 MEQ: 1.5 POWDER, FOR SOLUTION ORAL at 16:25

## 2023-10-25 RX ADMIN — PREGABALIN 75 MG: 75 CAPSULE ORAL at 21:45

## 2023-10-25 RX ADMIN — MIDODRINE HYDROCHLORIDE 10 MG: 10 TABLET ORAL at 17:08

## 2023-10-25 RX ADMIN — PROPRANOLOL HYDROCHLORIDE 20 MG: 20 TABLET ORAL at 21:45

## 2023-10-25 NOTE — NURSING NOTE
WOC consult for   wound on left lower extremity     Patient's family member stated since compression wrap therapy by home health was started a month ago that her legs have improved. They are no longer weeping and ulcerations have stopped forming.    Today, there is still some BLE pitting edema, dry skin, and what appears to be a cracked fissure (patient's family member confirms this began as fissure) with mild erythema and warmth surrounding on right anterior shin. Cleansed and applied therahoney sheet against wound bed. Will consult PT wound care to resume compression wraps while in house, and they can also apply antimicrobial dressing to leg wound.     Recommend elevating heels with pillows and to reduce edema.    Will sign off.

## 2023-10-25 NOTE — CASE MANAGEMENT/SOCIAL WORK
Discharge Planning Assessment  [facility]     Patient Name: Zoila Nava  MRN: [id number]  Today's Date: 10/25/2023    Admit Date: 10/24/2023    Plan: TBD   Discharge Needs Assessment       Row Name 10/25/23 1457       Living Environment    People in Home spouse    Current Living Arrangements home    Potentially Unsafe Housing Conditions none    Primary Care Provided by self    Provides Primary Care For no one    Family Caregiver if Needed spouse;sibling(s)    Quality of Family Relationships supportive    Able to Return to Prior Arrangements yes       Resource/Environmental Concerns    Transportation Concerns none       Transition Planning    Transportation Anticipated family or friend will provide       Discharge Needs Assessment    Readmission Within the Last 30 Days no previous admission in last 30 days    Equipment Currently Used at Home rollator;oxygen    Anticipated Changes Related to Illness none                   Discharge Plan       Row Name 10/25/23 3276       Plan    Plan TBD    Patient/Family in Agreement with Plan yes    Plan Comments Spoke with Ms. Nava and Sister at the bedside. She lives with her Spouse in [facility]. She is independent with ADL's. She has a rollator. She uses 2L oxygen at night, but unsure of which company provides the oxygen. [facility]. She does not have a POA, but has a living will. Her PCP is Arnoldo Oneil and she has Medicare and [employer] Insurance. CM will continue to follow for discharge needs.    Final Discharge Disposition Code 30 - still a patient                  Continued Care and Services - Admitted Since 10/24/2023    Coordination has not been started for this encounter.       Expected Discharge Date and Time       Expected Discharge Date Expected Discharge Time    Oct 27, 2023            Demographic Summary       Row Name 10/25/23 1456       General Information    Admission Type observation    Arrived From  home    Referral Source admission list    Reason for Consult discharge planning    Preferred Language English       Contact Information    Permission Granted to Share Info With                    Functional Status       Row Name 10/25/23 3606       Functional Status, IADL    Medications independent    Meal Preparation independent    Housekeeping independent    Laundry independent    Shopping independent       Mental Status    General Appearance WDL WDL                   Psychosocial    No documentation.                  Abuse/Neglect    No documentation.                  Legal    No documentation.                  Substance Abuse    No documentation.                  Patient Forms    No documentation.                     Sherly Eng RN

## 2023-10-25 NOTE — THERAPY EVALUATION
Acute Care - Speech Language Pathology   Swallow Initial Evaluation [facility]  Clinical Swallow Evaluation      Patient Name: Zoila Nava  : 1933  MRN: [id number]  Today's Date: 10/25/2023               Admit Date: 10/24/2023    Visit Dx:     ICD-10-CM ICD-9-CM   1. Altered mental status, unspecified altered mental status type  R41.82 780.97   2. Hypotension, unspecified hypotension type  I95.9 458.9   3. Dysphagia, unspecified type  R13.10 787.20     Patient Active Problem List   Diagnosis    Allergic rhinitis    Hyperlipidemia    Hypertension    Hypocalcemia    Hypothyroidism    Neuropathy    NUD (nonulcer dyspepsia)    Painful knee    Pernicious anemia    Tremor    Vitamin B12 deficiency    Spondylolisthesis of cervical region    Graves' ophthalmopathy    Anemia    Memory impairment of gradual onset    Ascending aortic aneurysm    Stage 3 chronic kidney disease    Pulmonary hypertension    Chronic heart failure with preserved ejection fraction    CHF (congestive heart failure), NYHA class I, acute on chronic, diastolic    CHF (congestive heart failure)    Non-rheumatic aortic stenosis    Non-rheumatic aortic regurgitation    Hypokalemia    Carpal tunnel syndrome    Essential tremor    Gastroesophageal reflux disease    Nausea and vomiting    Skin sensation disturbance    Spinal cord disease    Urge incontinence of urine    Urinary urgency    Acute chest pain    (HFpEF) heart failure with preserved ejection fraction    Nocturnal hypoxia    Confusion    Concussion without loss of consciousness    Hypotension     Past Medical History:   Diagnosis Date    Anemia     Arthritis     Asthma     Cataract     Difficulty breathing     Chronic    GERD (gastroesophageal reflux disease)     Graves' ophthalmopathy     Hoarseness     Hypertension     Hypertensive heart disease with acute on chronic diastolic congestive heart failure 10/11/2021    Pulmonary hypertension 10/11/2021    Spondylolisthesis of  cervical region     Vitamin B12 deficiency      Past Surgical History:   Procedure Laterality Date    CERVICAL LAMINECTOMY      CHOLECYSTECTOMY      OTHER SURGICAL HISTORY Right     Neuroplasty Median Nerve at Carpal Tunnel    THYROID SURGERY      TOTAL KNEE ARTHROPLASTY Left 09/29/2014    Dr Colon       SLP Recommendation and Plan  SLP Swallowing Diagnosis: R/O pharyngeal dysphagia (10/25/23 1050)  SLP Diet Recommendation: other (see comments) (continue current MD-ordered diet until MBS) (10/25/23 1050)  Recommended Precautions and Strategies: general aspiration precautions (10/25/23 1050)  SLP Rec. for Method of Medication Administration: as tolerated (10/25/23 1050)     Monitor for Signs of Aspiration: notify SLP if any concerns (10/25/23 1050)  Recommended Diagnostics: VFSS (MBS), other (see comments) (10/26) (10/25/23 1050)  Swallow Criteria for Skilled Therapeutic Interventions Met: demonstrates skilled criteria (10/25/23 1050)  Anticipated Discharge Disposition (SLP): home with home health (10/25/23 1050)  Rehab Potential/Prognosis, Swallowing: good, to achieve stated therapy goals (10/25/23 1050)     Predicted Duration Therapy Intervention (Days): until discharge (10/25/23 1050)  Oral Care Recommendations: Oral Care BID/PRN, Toothbrush (10/25/23 1050)                                      Oral Care Recommendations: Oral Care BID/PRN, Toothbrush (10/25/23 1050)    Plan of Care Reviewed With: patient, family      SWALLOW EVALUATION (last 72 hours)       SLP Adult Swallow Evaluation       Row Name 10/25/23 1050                   Rehab Evaluation    Document Type evaluation  -MP        Subjective Information no complaints  -MP        Patient Observations alert;cooperative  -MP        Patient/Family/Caregiver Comments/Observations Sister present  -MP        Patient Effort good  -MP           General Information    Patient Profile Reviewed yes  -MP        Pertinent History Of Current Problem Adm 10/24 w/  confusion, recent fall & hit head. CT head w/ no acute. Hx pulmonary HTN, mild memory deficits, CKD2, HTN, hypothyroid, Afib.  -MP        Current Method of Nutrition regular textures;thin liquids  -MP        Precautions/Limitations, Vision WFL with corrective lenses  -MP        Precautions/Limitations, Hearing WFL  -MP        Prior Level of Function-Communication other (see comments)  per chart, baseline mild memory deficits  -MP        Prior Level of Function-Swallowing no diet consistency restrictions  -MP        Plans/Goals Discussed with patient;agreed upon  -MP        Barriers to Rehab none identified  -MP        Patient's Goals for Discharge patient did not state  -MP        Family Goals for Discharge family did not state  -MP           Pain    Additional Documentation Pain Scale: FACES Pre/Post-Treatment (Group)  -MP           Pain Scale: FACES Pre/Post-Treatment    Pain: FACES Scale, Pretreatment 0-->no hurt  -MP        Posttreatment Pain Rating 0-->no hurt  -MP           Oral Motor Structure and Function    Dentition Assessment natural, present and adequate  -MP        Secretion Management WNL/WFL  -MP        Mucosal Quality moist, healthy  -MP           Oral Musculature and Cranial Nerve Assessment    Oral Motor General Assessment WFL  -MP           General Eating/Swallowing Observations    Eating/Swallowing Skills self-fed  -MP        Positioning During Eating upright in chair  -MP        Utensils Used spoon;cup;straw  -MP        Consistencies Trialed thin liquids;pureed;regular textures  -MP           Clinical Swallow Eval    Pharyngeal Phase suspected pharyngeal impairment  -MP        Clinical Swallow Evaluation Summary Intermittent, not consistent, throat clear t/o PO. Pt reported ongoing for many years, and sister reported occasional choking when eating. Discussed completing MBS to further assess swallow function and provided education on possible results/recs- pt prefers to proceed w/ eval. Continue  current MD-ordered diet until MBS tomorrow as does not appear acute in nature.  -MP           Pharyngeal Phase Concerns    Pharyngeal Phase Concerns throat clear  -MP           SLP Evaluation Clinical Impression    SLP Swallowing Diagnosis R/O pharyngeal dysphagia  -MP        Functional Impact risk of aspiration/pneumonia  -MP        Rehab Potential/Prognosis, Swallowing good, to achieve stated therapy goals  -MP        Swallow Criteria for Skilled Therapeutic Interventions Met demonstrates skilled criteria  -MP           Recommendations    Predicted Duration Therapy Intervention (Days) until discharge  -MP        SLP Diet Recommendation other (see comments)  continue current MD-ordered diet until MBS  -MP        Recommended Diagnostics VFSS (MBS);other (see comments)  10/26  -MP        Recommended Precautions and Strategies general aspiration precautions  -MP        Oral Care Recommendations Oral Care BID/PRN;Toothbrush  -MP        SLP Rec. for Method of Medication Administration as tolerated  -MP        Monitor for Signs of Aspiration notify SLP if any concerns  -MP        Anticipated Discharge Disposition (SLP) home with home health  -MP                  User Key  (r) = Recorded By, (t) = Taken By, (c) = Cosigned By      Initials Name Effective Dates    Josh Reeves MS CCC-SLP 12/28/21 -                     EDUCATION  The patient has been educated in the following areas:   Dysphagia (Swallowing Impairment).              Time Calculation:    Time Calculation- SLP       Row Name 10/25/23 1132             Time Calculation- SLP    SLP Start Time 1050  -MP      SLP Received On 10/25/23  -         Untimed Charges    79898-ND Eval Oral Pharyng Swallow Minutes 40  -MP         Total Minutes    Untimed Charges Total Minutes 40  -MP       Total Minutes 40  -MP                User Key  (r) = Recorded By, (t) = Taken By, (c) = Cosigned By      Initials Name Provider Type    Josh Reeves MS CCC-SLP  Speech and Language Pathologist                    Therapy Charges for Today       Code Description Service Date Service Provider Modifiers Qty    95800305333  ST EVAL ORAL PHARYNG SWALLOW 3 10/25/2023 Josh Tate, MS CCC-SLP GN 1                 Josh Ndiaye MS CCC-SLP  10/25/2023

## 2023-10-25 NOTE — PLAN OF CARE
Goal Outcome Evaluation:  Plan of Care Reviewed With: patient, spouse, family        Progress: no change  Outcome Evaluation: Pt. presents below baseline with ADLs and functional mobility. Limited by decreased activity tolerance, balance, and generalized weakness. Skilled OT services warranted to promote return to PLOF. Recommend IPR at discharge.      Anticipated Discharge Disposition (OT): inpatient rehabilitation facility

## 2023-10-25 NOTE — PLAN OF CARE
Goal Outcome Evaluation:  Plan of Care Reviewed With: patient, family         SLP evaluation completed. Will  plan for MBS tomorrow to further assess . Please see note for further details and recommendations.

## 2023-10-25 NOTE — THERAPY EVALUATION
Patient Name: Zoila Nava  : 1933    MRN: [id number]                              Today's Date: 10/25/2023       Admit Date: 10/24/2023    Visit Dx:     ICD-10-CM ICD-9-CM   1. Altered mental status, unspecified altered mental status type  R41.82 780.97   2. Hypotension, unspecified hypotension type  I95.9 458.9   3. Dysphagia, unspecified type  R13.10 787.20     Patient Active Problem List   Diagnosis    Allergic rhinitis    Hyperlipidemia    Hypertension    Hypocalcemia    Hypothyroidism    Neuropathy    NUD (nonulcer dyspepsia)    Painful knee    Pernicious anemia    Tremor    Vitamin B12 deficiency    Spondylolisthesis of cervical region    Graves' ophthalmopathy    Anemia    Memory impairment of gradual onset    Ascending aortic aneurysm    Stage 3 chronic kidney disease    Pulmonary hypertension    Chronic heart failure with preserved ejection fraction    CHF (congestive heart failure), NYHA class I, acute on chronic, diastolic    CHF (congestive heart failure)    Non-rheumatic aortic stenosis    Non-rheumatic aortic regurgitation    Hypokalemia    Carpal tunnel syndrome    Essential tremor    Gastroesophageal reflux disease    Nausea and vomiting    Skin sensation disturbance    Spinal cord disease    Urge incontinence of urine    Urinary urgency    Acute chest pain    (HFpEF) heart failure with preserved ejection fraction    Nocturnal hypoxia    Confusion    Concussion without loss of consciousness    Hypotension     Past Medical History:   Diagnosis Date    Anemia     Arthritis     Asthma     Cataract     Difficulty breathing     Chronic    GERD (gastroesophageal reflux disease)     Graves' ophthalmopathy     Hoarseness     Hypertension     Hypertensive heart disease with acute on chronic diastolic congestive heart failure 10/11/2021    Pulmonary hypertension 10/11/2021    Spondylolisthesis of cervical region     Vitamin B12 deficiency      Past Surgical History:   Procedure Laterality Date     CERVICAL LAMINECTOMY      CHOLECYSTECTOMY      OTHER SURGICAL HISTORY Right     Neuroplasty Median Nerve at Carpal Tunnel    THYROID SURGERY      TOTAL KNEE ARTHROPLASTY Left 09/29/2014    Dr Colon      General Information       Row Name 10/25/23 1516          OT Time and Intention    Document Type evaluation  -     Mode of Treatment occupational therapy  -       Row Name 10/25/23 1516          General Information    Patient Profile Reviewed yes  -     Prior Level of Function independent:;all household mobility;transfer;ADL's  Rollator at baseline  -     Existing Precautions/Restrictions fall  R HAND 1ST MCP FRACTURE- NO RESTRICTIONS. DENIES PAIN. CHRONIC L HIP PAIN/RESTRICTED ROM.  -     Barriers to Rehab medically complex;previous functional deficit  -       Row Name 10/25/23 1516          Living Environment    People in Home spouse  -       Row Name 10/25/23 1516          Home Main Entrance    Number of Stairs, Main Entrance none  -       Row Name 10/25/23 1516          Stairs Within Home, Primary    Number of Stairs, Within Home, Primary none  -       Row Name 10/25/23 1516          Cognition    Orientation Status (Cognition) oriented x 3  -       Row Name 10/25/23 1516          Safety Issues, Functional Mobility    Safety Issues Affecting Function (Mobility) awareness of need for assistance;insight into deficits/self-awareness;safety precaution awareness;safety precautions follow-through/compliance;sequencing abilities;positioning of assistive device  -     Impairments Affecting Function (Mobility) balance;endurance/activity tolerance;postural/trunk control;strength  -               User Key  (r) = Recorded By, (t) = Taken By, (c) = Cosigned By      Initials Name Provider Type     Cathi Jackman OT Occupational Therapist                     Mobility/ADL's       Row Name 10/25/23 1528          Bed Mobility    Comment, (Bed Mobility) UI on arrival  -       Row Name 10/25/23  1528          Transfers    Transfers sit-stand transfer;toilet transfer  -     Comment, (Transfers) VC's for hand placement and sequencing  -       Row Name 10/25/23 1528          Sit-Stand Transfer    Sit-Stand [place] (Transfers) minimum assist (75% patient effort);verbal cues  -     Assistive Device (Sit-Stand Transfers) walker, front-wheeled  -       Row Name 10/25/23 1528          Toilet Transfer    Type (Toilet Transfer) sit-stand;stand-sit  -     [place] Level (Toilet Transfer) minimum assist (75% patient effort);verbal cues  -     Assistive Device (Toilet Transfer) commode;walker, front-wheeled;grab bars/safety frame  -       Row Name 10/25/23 1528          Activities of Daily Living    BADL Assessment/Intervention grooming;toileting  -       Row Name 10/25/23 1528          Grooming Assessment/Training    [place] Level (Grooming) wash face, hands;hair care, combing/brushing;verbal cues;set up  -     Position (Grooming) supported sitting  -       Row Name 10/25/23 1528          Toileting Assessment/Training    [place] Level (Toileting) adjust/manage clothing;perform perineal hygiene;minimum assist (75% patient effort)  -     Assistive Devices (Toileting) commode;grab bar/safety frame  -     Position (Toileting) supported sitting;supported standing  -               User Key  (r) = Recorded By, (t) = Taken By, (c) = Cosigned By      Initials Name Provider Type     Cathi Jackman OT Occupational Therapist                   Obj/Interventions       Row Name 10/25/23 1530          Sensory Assessment (Somatosensory)    Sensory Assessment (Somatosensory) UE sensation intact  -[facility] Name 10/25/23 1530          Vision Assessment/Intervention    Visual Impairment/Limitations corrective lenses full-time  -       Row Name 10/25/23 1530          Range of Motion Comprehensive    General Range of Motion bilateral upper extremity ROM WFL  -       Row Name 10/25/23  1530          Balance    Balance Assessment sitting static balance;sitting dynamic balance;standing static balance;standing dynamic balance  -     Static Sitting Balance standby assist  -     Dynamic Sitting Balance contact guard  -     Position, Sitting Balance unsupported;sitting in chair  -     Static Standing Balance contact guard;verbal cues  -     Dynamic Standing Balance minimal assist;verbal cues  -     Position/Device Used, Standing Balance walker, front-wheeled  -     Balance Interventions sitting;standing;sit to stand;supported;occupation based/functional task;weight shifting activity  -     Comment, Balance Min A to steady  -               User Key  (r) = Recorded By, (t) = Taken By, (c) = Cosigned By      Initials Name Provider Type     Cathi Jackman OT Occupational Therapist                   Goals/Plan       Row Name 10/25/23 1539          Transfer Goal 1 (OT)    Activity/Assistive Device (Transfer Goal 1, OT) sit-to-stand/stand-to-sit;bed-to-chair/chair-to-bed;walker, rolling  -     [place] Level/Cues Needed (Transfer Goal 1, OT) contact guard required;verbal cues required  -     Time Frame (Transfer Goal 1, OT) long term goal (LTG);by discharge  -     Progress/Outcome (Transfer Goal 1, OT) goal ongoing  -       Row Name 10/25/23 1539          Dressing Goal 1 (OT)    Activity/Device (Dressing Goal 1, OT) lower body dressing;long-handled shoe horn;reacher;sock-aid  -     [place]/Cues Needed (Dressing Goal 1, OT) moderate assist (50-74% patient effort);verbal cues required  -     Time Frame (Dressing Goal 1, OT) long term goal (LTG);by discharge  -     Progress/Outcome (Dressing Goal 1, OT) goal ongoing  -       Row Name 10/25/23 1539          Toileting Goal 1 (OT)    Activity/Device (Toileting Goal 1, OT) adjust/manage clothing;perform perineal hygiene;commode  -     [place] Level/Cues Needed (Toileting Goal 1, OT) verbal cues required;contact  guard required  -     Time Frame (Toileting Goal 1, OT) long term goal (LTG);by discharge  -     Progress/Outcome (Toileting Goal 1, OT) goal ongoing  -               User Key  (r) = Recorded By, (t) = Taken By, (c) = Cosigned By      Initials Name Provider Type    Cathi Mann OT Occupational Therapist                   Clinical Impression       Row Name 10/25/23 1531          Pain Assessment    Pretreatment Pain Rating 0/10 - no pain  -LC     Posttreatment Pain Rating 0/10 - no pain  -LC     Additional Documentation Pain Scale: Word Pre/Post-Treatment (Group)  -       Row Name 10/25/23 1531          Plan of Care Review    Plan of Care Reviewed With patient;spouse;family  -     Progress no change  -     Outcome Evaluation Pt. presents below baseline with ADLs and functional mobility. Limited by decreased activity tolerance, balance, and generalized weakness. Skilled OT services warranted to promote return to PLOF. Recommend IPR at discharge.  -       Row Name 10/25/23 1531          Therapy Assessment/Plan (OT)    Patient/Family Therapy Goal Statement (OT) To return to PLOF  -     Therapy Frequency (OT) daily  -       Row Name 10/25/23 1531          Therapy Plan Review/Discharge Plan (OT)    Anticipated Discharge Disposition (OT) inpatient rehabilitation facility  -       Row Name 10/25/23 1531          Vital Signs    Pre Systolic BP Rehab 102  -     Pre Treatment Diastolic BP 73  -       Row Name 10/25/23 1531          Positioning and Restraints    Pre-Treatment Position sitting in chair/recliner  -     Post Treatment Position chair  -LC     In Chair notified nsg;reclined;call light within reach;encouraged to call for assist;exit alarm on;with family/caregiver;waffle cushion;legs elevated  -               User Key  (r) = Recorded By, (t) = Taken By, (c) = Cosigned By      Initials Name Provider Type    Cathi Mann OT Occupational Therapist                   Outcome Measures        Row Name 10/25/23 1541          How much help from another is currently needed...    Putting on and taking off regular lower body clothing? 1  -LC     Bathing (including washing, rinsing, and drying) 2  -LC     Toileting (which includes using toilet bed pan or urinal) 3  -LC     Putting on and taking off regular upper body clothing 3  -LC     Taking care of personal grooming (such as brushing teeth) 3  -LC     Eating meals 4  -     AM-PAC 6 Clicks Score (OT) 16  -       Row Name 10/25/23 1519 10/25/23 0800       How much help from another person do you currently need...    Turning from your back to your side while in flat bed without using bedrails? 3  -CD 3  -JH    Moving from lying on back to sitting on the side of a flat bed without bedrails? 3  -CD 3  -JH    Moving to and from a bed to a chair (including a wheelchair)? 3  -CD 3  -JH    Standing up from a chair using your arms (e.g., wheelchair, bedside chair)? 3  -CD 3  -JH    Climbing 3-5 steps with a railing? 2  -CD 2  -JH    To walk in hospital room? 3  -CD 3  -JH    AM-PAC 6 Clicks Score (PT) 17  -CD 17  -JH    Highest level of mobility 5 --> Static standing  -CD 5 --> Static standing  -JH      Row Name 10/25/23 1519          Modified Totz Scale    Modified Totz Scale 4 - Moderately severe disability.  Unable to walk without assistance, and unable to attend to own bodily needs without assistance.  -       Row Name 10/25/23 1541 10/25/23 1519       Functional Assessment    Outcome Measure Options AM-PAC 6 Clicks Daily Activity (OT)  - AM-PAC 6 Clicks Basic Mobility (PT);Modified Kristyn  -CD              User Key  (r) = Recorded By, (t) = Taken By, (c) = Cosigned By      Initials Name Provider Type    CD Sheila Panda PT Physical Therapist    Cathi Mann, OT Occupational Therapist    Zoie Benton, RN Registered Nurse                    Occupational Therapy Education       Title: PT OT SLP Therapies (In Progress)       Topic:  Occupational Therapy (In Progress)       Point: ADL training (In Progress)       Description:   Instruct learner(s) on proper safety adaptation and remediation techniques during self care or transfers.   Instruct in proper use of assistive devices.                  Learning Progress Summary             Patient Acceptance, E, NR by  at 10/25/2023 1402   Family Acceptance, E, NR by  at 10/25/2023 1402                         Point: Home exercise program (Not Started)       Description:   Instruct learner(s) on appropriate technique for monitoring, assisting and/or progressing therapeutic exercises/activities.                  Learner Progress:  Not documented in this visit.              Point: Precautions (In Progress)       Description:   Instruct learner(s) on prescribed precautions during self-care and functional transfers.                  Learning Progress Summary             Patient Acceptance, E, NR by  at 10/25/2023 1402   Family Acceptance, E, NR by  at 10/25/2023 1402                         Point: Body mechanics (In Progress)       Description:   Instruct learner(s) on proper positioning and spine alignment during self-care, functional mobility activities and/or exercises.                  Learning Progress Summary             Patient Acceptance, E, NR by  at 10/25/2023 1402   Family Acceptance, E, NR by  at 10/25/2023 1402                                         User Key       Initials Effective Dates Name Provider Type Discipline     06/16/21 -  Cathi Jackman OT Occupational Therapist OT                  OT Recommendation and Plan  Therapy Frequency (OT): daily  Plan of Care Review  Plan of Care Reviewed With: patient, spouse, family  Progress: no change  Outcome Evaluation: Pt. presents below baseline with ADLs and functional mobility. Limited by decreased activity tolerance, balance, and generalized weakness. Skilled OT services warranted to promote return to PLOF. Recommend IPR at  discharge.     Time Calculation:   Evaluation Complexity (OT)  Review Occupational Profile/Medical/Therapy History Complexity: brief/low complexity  Assessment, Occupational Performance/Identification of Deficit Complexity: 1-3 performance deficits  Clinical Decision Making Complexity (OT): problem focused assessment/low complexity  Overall Complexity of Evaluation (OT): low complexity     Time Calculation- OT       Row Name 10/25/23 1543 10/25/23 1530          Time Calculation- OT    OT Start Time 1402  -LC --     OT Received On 10/25/23  -LC --     OT Goal Re-Cert Due Date 11/04/23  -LC --        Timed Charges    64833 - Gait Training Minutes  -- 8  -CD     93580 - OT Self Care/Mgmt Minutes 13  -LC --        Untimed Charges    OT Eval/Re-eval Minutes 51  -LC --        Total Minutes    Timed Charges Total Minutes 13  -LC 8  -CD     Untimed Charges Total Minutes 51  -LC --      Total Minutes 64  -LC 8  -CD               User Key  (r) = Recorded By, (t) = Taken By, (c) = Cosigned By      Initials Name Provider Type    Sheila Pandya PT Physical Therapist    LC Cathi Jackman OT Occupational Therapist                  Therapy Charges for Today       Code Description Service Date Service Provider Modifiers Qty    16233420886 HC OT SELF CARE/MGMT/TRAIN EA 15 MIN 10/25/2023 Cathi Jackman OT GO 1    82537112910 HC OT EVAL LOW COMPLEXITY 4 10/25/2023 Cathi Jackman OT GO 1                 Cathi Jackman OT  10/25/2023

## 2023-10-25 NOTE — PROGRESS NOTES
[facility] Medicine Services  PROGRESS NOTE    Patient Name: Zoila Nava  : 1933  MRN: [id number]    Date of Admission: 10/24/2023  Primary Care Physician: Arnoldo Oneil MD    Subjective   Subjective     CC:  F/u fall, cofusion    HPI:  Patient sitting up in bed. No issues overnight. Right hand swelling/pain, bruising, patient states it has been that way for while. States she lives at home with her  and has several children that can help her.      Objective   Objective     Vital Signs:   Temp:  [97.4 °F (36.3 °C)-98.8 °F (37.1 °C)] 97.8 °F (36.6 °C)  Heart Rate:  [62-97] 97  Resp:  [18] 18  BP: ()/() 125/84  Flow (L/min):  [2] 2     Physical Exam:  Constitutional: No acute distress, awake, alert, elderly female  HENT: NCAT, mucous membranes moist  Respiratory: Clear to auscultation bilaterally, respiratory effort normal   Cardiovascular: RRR, no murmurs, rubs, or gallops  Gastrointestinal: soft, nontender, nondistended  Musculoskeletal: No bilateral ankle edema, right hand with bruising and swelling surrounding the thumb  Psychiatric: Appropriate affect, cooperative  Neurologic: Oriented x 3, speech clear, no focal deficits  Skin: No rashes      Results Reviewed:  LAB RESULTS:      Lab 10/24/23  1133 10/23/23  2059   WBC 9.98 10.12   HEMOGLOBIN 12.4 11.9*   HEMATOCRIT 38.7 37.3   PLATELETS 234 228   NEUTROS ABS 7.95* 7.52*   IMMATURE GRANS (ABS) 0.07* 0.05   LYMPHS ABS 0.56* 0.87   MONOS ABS 0.86 1.07*   EOS ABS 0.42* 0.51*   MCV 95.3 95.4         Lab 10/25/23  0544 10/24/23  1232 10/24/23  1133 10/23/23  2059   SODIUM 144  --  144 144   POTASSIUM 3.1*  --  3.4* 3.2*   CHLORIDE 104  --  106 104   CO2 29.0  --  25.0 26.0   ANION GAP 11.0  --  13.0 14.0   BUN 33*  --  42* 42*   CREATININE 1.33*  --  1.68* 1.81*   EGFR 38.1*  --  28.8* 26.3*   GLUCOSE 93  --  167* 129*   CALCIUM 9.0  --  8.7 8.6   MAGNESIUM  --   --  1.8 1.8   TSH  --  24.240*  --   --           Lab 10/24/23  1133 10/23/23  2059   TOTAL PROTEIN 6.0 6.2   ALBUMIN 3.8 3.4*   GLOBULIN 2.2 2.8   ALT (SGPT) 31 32   AST (SGOT) 22 20   BILIRUBIN 0.4 0.3   ALK PHOS 136* 125*         Lab 10/24/23  1614 10/24/23  1232 10/23/23  2059   PROBNP  --   --  8,093.0*   HSTROP T 63* 67*  --                  Brief Urine Lab Results  (Last result in the past 365 days)        Color   Clarity   Blood   Leuk Est   Nitrite   Protein   CREAT   Urine HCG        10/24/23 1145 Yellow   Clear   Negative   Negative   Negative   Negative                   Microbiology Results Abnormal       Procedure Component Value - Date/Time    COVID PRE-OP / PRE-PROCEDURE SCREENING ORDER (NO ISOLATION) - Swab, Nasopharynx [[id number]]  (Normal) Collected: 10/24/23 1133    Lab Status: Final result Specimen: Swab from Nasopharynx Updated: 10/24/23 1239    Narrative:      The following orders were created for panel order COVID PRE-OP / PRE-PROCEDURE SCREENING ORDER (NO ISOLATION) - Swab, Nasopharynx.  Procedure                               Abnormality         Status                     ---------                               -----------         ------                     COVID-19, FLU A/B, RSV P...[[id number]]  Normal              Final result                 Please view results for these tests on the individual orders.    COVID-19, FLU A/B, RSV PCR - Swab, Nasopharynx [[id number]]  (Normal) Collected: 10/24/23 1133    Lab Status: Final result Specimen: Swab from Nasopharynx Updated: 10/24/23 1239     COVID19 Not Detected     Influenza A PCR Not Detected     Influenza B PCR Not Detected     RSV, PCR Not Detected    Narrative:      Fact sheet for providers: https://www.fda.gov/media/114863/download    Fact sheet for patients: https://www.fda.gov/media/456268/download    Test performed by PCR.            XR Hand 3+ View Right    Result Date: 10/24/2023  XR HAND 3+ VW RIGHT Date of Exam: 10/24/2023 4:22 PM EDT Indication: pain Comparison: None  available. Findings: 3 views. There is a transverse fracture of the proximal portion of the first metacarpal with mild impaction and mild cortical offset although without significant displacement. There is no joint involvement. There is osteoporosis. There is severe narrowing of the DIP joints and IP joint of the thumb. There is a large old avulsion at the dorsal aspect of the third digit DIP joint. There are large osteophytes at the DIP joints and IP joint of the thumb. There is mild ulnar subluxation of the distal phalanx of the third digit in relation to the middle phalanx. There is severe narrowing of the first CMC joint with mild spurring. There is moderately severe narrowing of the radiocarpal joint and there is some chondrocalcinosis in the wrist. A prominent cyst in the capitate likely is degenerative in nature.     Impression: Impression: Transverse primarily nondisplaced fracture of the proximal first metacarpal. Other chronic findings as detailed. Electronically Signed: Georgina Ramsey MD  10/24/2023 4:47 PM EDT  Workstation ID: [id number]    CT Head Without Contrast    Result Date: 10/24/2023  CT HEAD WO CONTRAST Date of Exam: 10/24/2023 12:21 PM EDT Indication: AMS since overnight.  neg head ct last evening. Comparison: Head CT 10/23/2023 Technique: Axial CT images were obtained of the head without contrast administration.  Automated exposure control and iterative construction methods were used. Findings: No acute intracranial hemorrhage. No acute large territory infarct. No mass effect. No extra-axial collections. Normal size and configuration of the ventricles. Redemonstration of bilateral exophthalmos with otherwise unremarkable appearance of the orbits. There is a contusion of the left parietal scalp which is unchanged. The paranasal sinuses are clear. The mastoid air cells are clear. No acute or suspicious bony findings.     Impression: Impression: No acute intracranial findings. No significant  interval change. Redemonstration of left parietal scalp contusion. Electronically Signed: Matt Chandler MD  10/24/2023 12:28 PM EDT  Workstation ID: [id number]    XR Chest 1 View    Result Date: 10/24/2023  XR CHEST 1 VW Date of Exam: 10/24/2023 11:35 AM EDT Indication: Weak/Dizzy/AMS triage protocol Comparison: 10/23/2023 and older exams Findings: There is stable moderately severe cardiomegaly. There is infiltrate in the left lower lobe which is thought to be chronic when compared to multiple old exams. Coarse interstitial pattern elsewhere in the lung fields also appears most likely chronic. There are no definite acute infiltrates or effusions..     Impression: Impression: Chronic findings. No definite acute process. Electronically Signed: Georgina Ramsey MD  10/24/2023 12:18 PM EDT  Workstation ID: [id number]    XR Chest 2 View    Result Date: 10/23/2023  XR CHEST 2 VW Date of Exam: 10/23/2023 9:51 PM EDT Indication: Fall; Elevated BNP; recent change in diuretic Comparison: None available. Findings:  There is no acute infiltrate. There is no large pleural effusion. There are diffuse increased interstitial markings unchanged from earlier examinations which may be chronic in nature. The cardiac silhouette is poorly evaluated due to AP technique and patient rotation. There is a calcified tortuous aorta. The visualized radha structures demonstrate no abnormality.     Impression: Impression: No definite infiltrate. Chronic interstitial changes. Calcified tortuous aorta Electronically Signed: Montez Reynolds MD  10/23/2023 10:46 PM EDT  Workstation ID: [id number]    CT Head Without Contrast    Result Date: 10/23/2023  CT HEAD WO CONTRAST Date of Exam: 10/23/2023 7:51 PM EDT Indication: Fall. Comparison: None available. Technique: Axial CT images were obtained of the head without contrast administration.  Automated exposure control and iterative construction methods were used. Findings: The ventricles and subarachnoid  spaces are unremarkable. There is no intracranial hemorrhage. There is no evidence of resent large arterial distribution infarct. There is no edema or mass effect. No midline shift. Limited evaluation of the orbits is unremarkable The visualized paranasal sinuses are unremarkable.. Evaluation of the osseous structure at the appropriate bones window is unremarkable.     Impression: Impression: No acute intracranial pathology. Electronically Signed: Montez Reynolds MD  10/23/2023 8:32 PM EDT  Workstation ID: [id number]    CT Cervical Spine Without Contrast    Result Date: 10/23/2023  CT CERVICAL SPINE WO CONTRAST Date of Exam: 10/23/2023 7:51 PM EDT Indication: Fall. Comparison: None available. Technique: Axial CT images were obtained of the cervical spine without contrast administration.  Reconstructed coronal and sagittal images were also obtained. Automated exposure control and iterative construction methods were used. Findings: Spine *No acute fractures or dislocations. *Normal alignment. *No prevertebral soft tissue swelling. *No osseous destructive lesions. *Vertebral body heights and disc spaces are normal. *There are diffuse degenerative changes of the cervical spine with multilevel loss of joint space and partial fusion of the C4-5 through C6-7 levels.     Impression: 1.No acute fractures or dislocations.  Electronically Signed: Montez Reynolds MD  10/23/2023 8:32 PM EDT  Workstation ID: [id number]     Results for orders placed during the hospital encounter of 04/04/23    Adult Transthoracic Echo Complete W/ Cont if Necessary Per Protocol    Interpretation Summary    Left ventricular systolic function is normal. Left ventricular ejection fraction appears to be 51 - 55%.    Mildly reduced right ventricular systolic function noted.    The left atrial cavity is dilated.    The right atrial cavity is dilated.    There is moderate calcification of the aortic valve.    Mild to moderate aortic valve regurgitation is  present.    Mild mitral valve regurgitation is present.    Mild tricuspid valve regurgitation is present.      Current medications:  Scheduled Meds:aspirin, 81 mg, Oral, Daily  famotidine, 20 mg, Oral, BID  HYDROcodone-acetaminophen, 1 tablet, Oral, Q8H  levothyroxine, 125 mcg, Oral, QAM  midodrine, 10 mg, Oral, TID AC  potassium chloride, 40 mEq, Oral, Q4H  pregabalin, 75 mg, Oral, BID  propranolol, 20 mg, Oral, Q8H  rivastigmine, 1 patch, Transdermal, Daily  senna-docusate sodium, 2 tablet, Oral, BID  sodium chloride, 10 mL, Intravenous, Q12H  tamsulosin, 0.4 mg, Oral, Daily      Continuous Infusions:niCARdipine, 5-15 mg/hr, Last Rate: Stopped (10/24/23 2135)      PRN Meds:.  acetaminophen **OR** acetaminophen **OR** acetaminophen    senna-docusate sodium **AND** polyethylene glycol **AND** bisacodyl **AND** bisacodyl    Calcium Replacement - Follow Nurse / BPA Driven Protocol    Magnesium Low Dose Replacement - Follow Nurse / BPA Driven Protocol    O2    Phosphorus Replacement - Follow Nurse / BPA Driven Protocol    Potassium Replacement - Follow Nurse / BPA Driven Protocol    sodium chloride    sodium chloride    sodium chloride    Assessment & Plan   Assessment & Plan     Active Hospital Problems    Diagnosis  POA    **Confusion [R41.0]  Yes    Concussion without loss of consciousness [S06.0X0A]  Yes    Hypotension [I95.9]  Yes    Chronic heart failure with preserved ejection fraction [I50.32]  Yes    Pulmonary hypertension [I27.20]  Yes    Stage 3 chronic kidney disease [N18.30]  Yes    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        Brief Hospital Course to date:  Zoila Nava is a 89 y.o. female w/ HFpEF, pulmHTN, mild memory deficits on Exelon patch, CKD3, HTN, hypothyroidism, pAfib, midodrine use who sustained a fall in the home, had normal eval in the ED and returned home, came back the  following day w/ hypotension, missing medications from home med planner, and slightly more confused than baseline     Assessment/Plan     Fall w/ head injury, concussion  -CT head last night in the ED and today w/o acute findings  -she appears very near her baseline cognitive status, alert and oriented   -PT/OT evaluation    Proximal 1st Metacarpal Fracture, non-displaced  -will curbside Ortho who recommended thumb Spica brace for comfort, f/u outpatient PRN     CKD 3-4  -baseline Cr 1.3-1.6; eGFR 20-30's  -at baseline    Elevated TSH:  - free T4 normal, repeat TFTs outpatient in several weeks    Chronic conditions:  Chronic HFpEF  PulmHTN  Memory deficits  HTN  Hypothyroidism  pAfib        Expected Discharge Location and Transportation: Home?  Expected Discharge   Expected Discharge Date: 10/27/2023; Expected Discharge Time:      DVT prophylaxis:  Mechanical DVT prophylaxis orders are present.     AM-PAC 6 Clicks Score (PT): 17 (10/25/23 0800)    CODE STATUS:   Code Status and Medical Interventions:   Ordered at: 10/24/23 1433     Level Of Support Discussed With:    Patient    Next of Kin (If No Surrogate)     Code Status (Patient has no pulse and is not breathing):    No CPR (Do Not Attempt to Resuscitate)     Medical Interventions (Patient has pulse or is breathing):    Full Support       Milagro Lebron, DO  10/25/23

## 2023-10-25 NOTE — THERAPY EVALUATION
Patient Name: Zoila Nava  : 1933    MRN: [id number]                              Today's Date: 10/25/2023       Admit Date: 10/24/2023    Visit Dx:     ICD-10-CM ICD-9-CM   1. Altered mental status, unspecified altered mental status type  R41.82 780.97   2. Hypotension, unspecified hypotension type  I95.9 458.9   3. Dysphagia, unspecified type  R13.10 787.20     Patient Active Problem List   Diagnosis    Allergic rhinitis    Hyperlipidemia    Hypertension    Hypocalcemia    Hypothyroidism    Neuropathy    NUD (nonulcer dyspepsia)    Painful knee    Pernicious anemia    Tremor    Vitamin B12 deficiency    Spondylolisthesis of cervical region    Graves' ophthalmopathy    Anemia    Memory impairment of gradual onset    Ascending aortic aneurysm    Stage 3 chronic kidney disease    Pulmonary hypertension    Chronic heart failure with preserved ejection fraction    CHF (congestive heart failure), NYHA class I, acute on chronic, diastolic    CHF (congestive heart failure)    Non-rheumatic aortic stenosis    Non-rheumatic aortic regurgitation    Hypokalemia    Carpal tunnel syndrome    Essential tremor    Gastroesophageal reflux disease    Nausea and vomiting    Skin sensation disturbance    Spinal cord disease    Urge incontinence of urine    Urinary urgency    Acute chest pain    (HFpEF) heart failure with preserved ejection fraction    Nocturnal hypoxia    Confusion    Concussion without loss of consciousness    Hypotension     Past Medical History:   Diagnosis Date    Anemia     Arthritis     Asthma     Cataract     Difficulty breathing     Chronic    GERD (gastroesophageal reflux disease)     Graves' ophthalmopathy     Hoarseness     Hypertension     Hypertensive heart disease with acute on chronic diastolic congestive heart failure 10/11/2021    Pulmonary hypertension 10/11/2021    Spondylolisthesis of cervical region     Vitamin B12 deficiency      Past Surgical History:   Procedure Laterality Date     CERVICAL LAMINECTOMY      CHOLECYSTECTOMY      OTHER SURGICAL HISTORY Right     Neuroplasty Median Nerve at Carpal Tunnel    THYROID SURGERY      TOTAL KNEE ARTHROPLASTY Left 09/29/2014    Dr Colon      General Information       Row Name 10/25/23 1438          Physical Therapy Time and Intention    Document Type evaluation  -CD     Mode of Treatment physical therapy  -CD       Row Name 10/25/23 1438          General Information    Patient Profile Reviewed yes  -CD     Prior Level of Function independent:;all household mobility;gait;transfer;ADL's;min assist:;dressing;dependent:;driving  -CD     Existing Precautions/Restrictions fall  R HAND 1ST MCP FRACTURE- NO RESTRICTIONS. DENIES PAIN. CHRONIC L HIP PAIN/RESTRICTED ROM.  -CD     Barriers to Rehab medically complex;previous functional deficit  -CD       Row Name 10/25/23 1438          Living Environment    People in Home spouse  -CD       Row Name 10/25/23 1438          Home Main Entrance    Number of Stairs, Main Entrance none  -CD       Row Name 10/25/23 1438          Stairs Within Home, Primary    Number of Stairs, Within Home, Primary none  -CD       Row Name 10/25/23 1438          Cognition    Orientation Status (Cognition) oriented x 3  -CD       Row Name 10/25/23 1438          Safety Issues, Functional Mobility    Safety Issues Affecting Function (Mobility) safety precaution awareness;safety precautions follow-through/compliance;awareness of need for assistance;sequencing abilities;positioning of assistive device  -CD     Impairments Affecting Function (Mobility) balance;endurance/activity tolerance;strength;postural/trunk control  -CD     Comment, Safety Issues/Impairments (Mobility) GAIT UNSTEADY. PT RELIES HEAVILY ON R WALKER FOR SUPPORT.  -CD               User Key  (r) = Recorded By, (t) = Taken By, (c) = Cosigned By      Initials Name Provider Type    CD Sheila Panda PT Physical Therapist                   Mobility       Row Name 10/25/23  1446          Bed Mobility    Comment, (Bed Mobility) PT SITTING UIC UPON ARRIVAL ON RA.  -CD       Row Name 10/25/23 1446          Transfers    Comment, (Transfers) CUES FOR HAND PLACEMENT. STS FROM RECLINER AND COMMODE. COMPLETED HYGIENE WITH CGA AFTER TOILETING.  -CD       Row Name 10/25/23 1446          Sit-Stand Transfer    Sit-Stand [place] (Transfers) minimum assist (75% patient effort);verbal cues  -CD     Assistive Device (Sit-Stand Transfers) walker, front-wheeled  -CD       Row Name 10/25/23 1446          Gait/Stairs (Locomotion)    [place] Level (Gait) minimum assist (75% patient effort);verbal cues  -CD     Assistive Device (Gait) walker, front-wheeled  -CD     Distance in Feet (Gait) 28 FEET  -CD     Deviations/Abnormal Patterns (Gait) base of support, narrow;maricarmen decreased;gait speed decreased;weight shifting decreased  -CD     Bilateral Gait Deviations forward flexed posture;heel strike decreased  -CD     Comment, (Gait/Stairs) C/O CHRONIC L HIP PAIN. KYPHOTIC POSTURE. NEEDS CUES FOR PROPER WALKER PLACEMENT. GAIT DISTANCE LIMITED BY FATIGUE.  -CD               User Key  (r) = Recorded By, (t) = Taken By, (c) = Cosigned By      Initials Name Provider Type    CD Sheila Panda, PT Physical Therapist                   Obj/Interventions       Row Name 10/25/23 1448          Range of Motion Comprehensive    General Range of Motion bilateral lower extremity ROM WFL  -CD     Comment, General Range of Motion EXCEPT L HIP FLEX LIMITED APPROX 25% DUE TO CHRONIC PAIN/WEAKNESS.  -CD       Row Name 10/25/23 1444          Strength Comprehensive (MMT)    General Manual Muscle Testing (MMT) Assessment lower extremity strength deficits identified  -CD     Comment, General Manual Muscle Testing (MMT) Assessment B LE GROSSLY 4/5 AND SYMMETRICAL EXCEPT FOR R HIP FLEX 3/5.  -CD       Row Name 10/25/23 1449          Motor Skills    Motor Skills coordination;functional endurance  -CD       Row Name 10/25/23  1448          Balance    Balance Assessment sitting static balance;sitting dynamic balance;sit to stand dynamic balance;standing static balance;standing dynamic balance  -CD     Static Sitting Balance standby assist  -CD     Dynamic Sitting Balance contact guard  -CD     Position, Sitting Balance unsupported;sitting in chair  -CD     Sit to Stand Dynamic Balance minimal assist  -CD     Static Standing Balance contact guard;verbal cues  -CD     Dynamic Standing Balance minimal assist;verbal cues  -CD     Position/Device Used, Standing Balance walker, front-wheeled  -CD     Balance Interventions sitting;standing;sit to stand;supported;static;dynamic;minimal challenge  -CD     Comment, Balance GAIT UNSTEADY REQUIRING MIN ASSIST FOR BALANCE. PT RELIES HEAVILY ON R WALKER FOR SUPPORT.  -CD       Row Name 10/25/23 1448          Sensory Assessment (Somatosensory)    Sensory Assessment (Somatosensory) LE sensation intact  -CD               User Key  (r) = Recorded By, (t) = Taken By, (c) = Cosigned By      Initials Name Provider Type    CD Sheila Panda, PT Physical Therapist                   Goals/Plan       Row Name 10/25/23 1517          Bed Mobility Goal 1 (PT)    Activity/Assistive Device (Bed Mobility Goal 1, PT) sit to supine/supine to sit  -CD     [place] Level/Cues Needed (Bed Mobility Goal 1, PT) contact guard required  -CD     Time Frame (Bed Mobility Goal 1, PT) long term goal (LTG);2 weeks  -CD       Row Name 10/25/23 1511          Transfer Goal 1 (PT)    Activity/Assistive Device (Transfer Goal 1, PT) sit-to-stand/stand-to-sit;bed-to-chair/chair-to-bed  -CD     [place] Level/Cues Needed (Transfer Goal 1, PT) contact guard required  -CD     Time Frame (Transfer Goal 1, PT) long term goal (LTG);2 weeks  -CD       Row Name 10/25/23 1519          Gait Training Goal 1 (PT)    Activity/Assistive Device (Gait Training Goal 1, PT) gait (walking locomotion);walker, rolling  -CD     [place] Level (Gait  Training Goal 1, PT) contact guard required  -CD     Distance (Gait Training Goal 1, PT) 200 FEET  -CD     Time Frame (Gait Training Goal 1, PT) long term goal (LTG);2 weeks  -CD       Row Name 10/25/23 6419          Therapy Assessment/Plan (PT)    Planned Therapy Interventions (PT) balance training;bed mobility training;gait training;home exercise program;patient/family education;transfer training;postural re-education  -CD               User Key  (r) = Recorded By, (t) = Taken By, (c) = Cosigned By      Initials Name Provider Type    CD Sehila Panda, PT Physical Therapist                   Clinical Impression       Row Name 10/25/23 1510          Pain Scale: FACES Pre/Post-Treatment    Pain: FACES Scale, Pretreatment 0-->no hurt  -CD     Posttreatment Pain Rating 0-->no hurt  -CD       Row Name 10/25/23 1516          Plan of Care Review    Plan of Care Reviewed With patient;spouse;family  SISTER  -CD     Outcome Evaluation PT PRESENTS WITH EVOLVING SYMPTOMS TO INCLUDE IMPAIRED BALANCE, GENERALIZED WEAKNESS AND DECLINE IN FUNCTIONAL MOBILITY. O2 SATS STABLE ON RA WITH GAIT SHORT DISTANCE. REQUIRES MIN ASSIST FOR SAFETY AND STABILITY DESPITE USE OF R WALKER FOR SUPPORT. PT IS COOPERATIVE WITH THERAPY AND FOLLOWS ALL COMMANDS. NEEDS CUES FOR SAFETY AND SEQUENCING MOBILITY. RECOMMEND IRF AT D/C FOR BEST OUTCOME.  -CD       Row Name 10/25/23 3124          Therapy Assessment/Plan (PT)    Patient/Family Therapy Goals Statement (PT) TO RETURN TO PLOF.  -CD     Rehab Potential (PT) good, to achieve stated therapy goals  -CD     Criteria for Skilled Interventions Met (PT) yes;meets criteria;skilled treatment is necessary  -CD     Therapy Frequency (PT) daily  -CD     Predicted Duration of Therapy Intervention (PT) 2 WEEKS  -CD       Row Name 10/25/23 5489          Vital Signs    Pre Systolic BP Rehab 102  -CD     Pre Treatment Diastolic BP 73  -CD     Pretreatment Heart Rate (beats/min) 82  -CD     Pre SpO2 (%) 94  -CD      O2 Delivery Pre Treatment supplemental O2  -CD     O2 Delivery Intra Treatment room air  -CD     Post SpO2 (%) 94  -CD     O2 Delivery Post Treatment room air  -CD     Pre Patient Position Sitting  -CD     Intra Patient Position Standing  -CD     Post Patient Position Sitting  -CD       Row Name 10/25/23 1510          Positioning and Restraints    Pre-Treatment Position sitting in chair/recliner  -CD     Post Treatment Position chair  -CD     In Chair reclined;call light within reach;encouraged to call for assist;exit alarm on;with family/caregiver;notified nsg;legs elevated  -CD               User Key  (r) = Recorded By, (t) = Taken By, (c) = Cosigned By      Initials Name Provider Type    Sheila Pandya, PT Physical Therapist                   Outcome Measures       Row Name 10/25/23 1519 10/25/23 0800       How much help from another person do you currently need...    Turning from your back to your side while in flat bed without using bedrails? 3  -CD 3  -JH    Moving from lying on back to sitting on the side of a flat bed without bedrails? 3  -CD 3  -JH    Moving to and from a bed to a chair (including a wheelchair)? 3  -CD 3  -JH    Standing up from a chair using your arms (e.g., wheelchair, bedside chair)? 3  -CD 3  -JH    Climbing 3-5 steps with a railing? 2  -CD 2  -JH    To walk in hospital room? 3  -CD 3  -JH    AM-PAC 6 Clicks Score (PT) 17  -CD 17  -JH    Highest level of mobility 5 --> Static standing  -CD 5 --> Static standing  -JH      Row Name 10/25/23 1519          Modified [place] Scale    Modified [place] Scale 4 - Moderately severe disability.  Unable to walk without assistance, and unable to attend to own bodily needs without assistance.  -CD       Row Name 10/25/23 1519          Functional Assessment    Outcome Measure Options AM-PAC 6 Clicks Basic Mobility (PT);Modified [place]  -CD               User Key  (r) = Recorded By, (t) = Taken By, (c) = Cosigned By      Initials Name Provider Type     Sheila Pandya, PT Physical Therapist    Zoie Benton RN Registered Nurse                                 Physical Therapy Education       Title: PT OT SLP Therapies (Done)       Topic: Physical Therapy (Done)       Point: Mobility training (Done)       Learning Progress Summary             Patient Acceptance, E, VU,NR by CD at 10/25/2023 1523    Comment: BENEFITS OF OOB ACTIVITY, SAFETY WITH MOBILITY, PROGRESSION OF POC, D/C PLANNING,                         Point: Home exercise program (Done)       Learning Progress Summary             Patient Acceptance, E, VU,NR by CD at 10/25/2023 1523    Comment: BENEFITS OF OOB ACTIVITY, SAFETY WITH MOBILITY, PROGRESSION OF POC, D/C PLANNING,                         Point: Body mechanics (Done)       Learning Progress Summary             Patient Acceptance, E, VU,NR by CD at 10/25/2023 1523    Comment: BENEFITS OF OOB ACTIVITY, SAFETY WITH MOBILITY, PROGRESSION OF POC, D/C PLANNING,                         Point: Precautions (Done)       Learning Progress Summary             Patient Acceptance, E, VU,NR by CD at 10/25/2023 1523    Comment: BENEFITS OF OOB ACTIVITY, SAFETY WITH MOBILITY, PROGRESSION OF POC, D/C PLANNING,                                         User Key       Initials Effective Dates Name Provider Type Discipline    CD 02/03/23 -  Sheila Panda, PT Physical Therapist PT                  PT Recommendation and Plan  Planned Therapy Interventions (PT): balance training, bed mobility training, gait training, home exercise program, patient/family education, transfer training, postural re-education  Plan of Care Reviewed With: patient, spouse, family (SISTER)  Outcome Evaluation: PT PRESENTS WITH EVOLVING SYMPTOMS TO INCLUDE IMPAIRED BALANCE, GENERALIZED WEAKNESS AND DECLINE IN FUNCTIONAL MOBILITY. O2 SATS STABLE ON RA WITH GAIT SHORT DISTANCE. REQUIRES MIN ASSIST FOR SAFETY AND STABILITY DESPITE USE OF R WALKER FOR SUPPORT. PT IS COOPERATIVE WITH THERAPY  AND FOLLOWS ALL COMMANDS. NEEDS CUES FOR SAFETY AND SEQUENCING MOBILITY. RECOMMEND IRF AT D/C FOR BEST OUTCOME.     Time Calculation:   PT Evaluation Complexity  History, PT Evaluation Complexity: 3 or more personal factors and/or comorbidities  Examination of Body Systems (PT Eval Complexity): total of 4 or more elements  Clinical Presentation (PT Evaluation Complexity): evolving  Clinical Decision Making (PT Evaluation Complexity): moderate complexity  Overall Complexity (PT Evaluation Complexity): moderate complexity     PT Charges       Row Name 10/25/23 1530             Time Calculation    Start Time 1405  -CD      PT Received On 10/25/23  -CD      PT Goal Re-Cert Due Date 11/04/23  -CD         Time Calculation- PT    Total Timed Code Minutes- PT 8 minute(s)  -CD         Timed Charges    70169 - Gait Training Minutes  8  -CD         Untimed Charges    PT Eval/Re-eval Minutes 59  -CD         Total Minutes    Timed Charges Total Minutes 8  -CD      Untimed Charges Total Minutes 59  -CD       Total Minutes 67  -CD                User Key  (r) = Recorded By, (t) = Taken By, (c) = Cosigned By      Initials Name Provider Type    CD Sheila Panda, PT Physical Therapist                  Therapy Charges for Today       Code Description Service Date Service Provider Modifiers Qty    61141680515 HC GAIT TRAINING EA 15 MIN 10/25/2023 Sheila Panda, PT GP 1    25977694019 HC PT EVAL MOD COMPLEXITY 4 10/25/2023 Sheila Panda, PT GP 1            PT G-Codes  Outcome Measure Options: AM-PAC 6 Clicks Basic Mobility (PT), Modified [place]  AM-PAC 6 Clicks Score (PT): 17  Modified [place] Scale: 4 - Moderately severe disability.  Unable to walk without assistance, and unable to attend to own bodily needs without assistance.  PT Discharge Summary  Anticipated Discharge Disposition (PT): inpatient rehabilitation facility    Sheila Panda, PT  10/25/2023

## 2023-10-25 NOTE — PLAN OF CARE
Goal Outcome Evaluation:  Plan of Care Reviewed With: patient, spouse, family (SISTER)           Outcome Evaluation: PT PRESENTS WITH EVOLVING SYMPTOMS TO INCLUDE IMPAIRED BALANCE, GENERALIZED WEAKNESS AND DECLINE IN FUNCTIONAL MOBILITY. O2 SATS STABLE ON RA WITH GAIT SHORT DISTANCE. REQUIRES MIN ASSIST FOR SAFETY AND STABILITY DESPITE USE OF R WALKER FOR SUPPORT. PT IS COOPERATIVE WITH THERAPY AND FOLLOWS ALL COMMANDS. NEEDS CUES FOR SAFETY AND SEQUENCING MOBILITY. RECOMMEND IRF AT D/C FOR BEST OUTCOME.      Anticipated Discharge Disposition (PT): inpatient rehabilitation facility

## 2023-10-26 ENCOUNTER — APPOINTMENT (OUTPATIENT)
Dept: GENERAL RADIOLOGY | Facility: HOSPITAL | Age: 88
DRG: 089 | End: 2023-10-26
Payer: MEDICARE

## 2023-10-26 PROBLEM — R41.82 AMS (ALTERED MENTAL STATUS): Status: ACTIVE | Noted: 2023-10-26

## 2023-10-26 PROCEDURE — 92611 MOTION FLUOROSCOPY/SWALLOW: CPT

## 2023-10-26 PROCEDURE — 74230 X-RAY XM SWLNG FUNCJ C+: CPT

## 2023-10-26 PROCEDURE — 97597 DBRDMT OPN WND 1ST 20 CM/<: CPT

## 2023-10-26 PROCEDURE — 97164 PT RE-EVAL EST PLAN CARE: CPT

## 2023-10-26 PROCEDURE — 29580 STRAPPING UNNA BOOT: CPT

## 2023-10-26 RX ADMIN — SENNOSIDES AND DOCUSATE SODIUM 2 TABLET: 8.6; 5 TABLET ORAL at 21:45

## 2023-10-26 RX ADMIN — PREGABALIN 75 MG: 75 CAPSULE ORAL at 21:45

## 2023-10-26 RX ADMIN — TAMSULOSIN HYDROCHLORIDE 0.4 MG: 0.4 CAPSULE ORAL at 08:52

## 2023-10-26 RX ADMIN — HYDROCODONE BITARTRATE AND ACETAMINOPHEN 1 TABLET: 5; 325 TABLET ORAL at 05:25

## 2023-10-26 RX ADMIN — RIVASTIGMINE 1 PATCH: 4.6 PATCH TRANSDERMAL at 10:20

## 2023-10-26 RX ADMIN — PROPRANOLOL HYDROCHLORIDE 20 MG: 20 TABLET ORAL at 05:25

## 2023-10-26 RX ADMIN — ASPIRIN 81 MG: 81 TABLET, COATED ORAL at 08:52

## 2023-10-26 RX ADMIN — FUROSEMIDE 20 MG: 20 TABLET ORAL at 08:52

## 2023-10-26 RX ADMIN — PROPRANOLOL HYDROCHLORIDE 20 MG: 20 TABLET ORAL at 16:38

## 2023-10-26 RX ADMIN — HYDROCODONE BITARTRATE AND ACETAMINOPHEN 1 TABLET: 5; 325 TABLET ORAL at 22:08

## 2023-10-26 RX ADMIN — Medication 10 ML: at 08:53

## 2023-10-26 RX ADMIN — Medication 10 ML: at 21:45

## 2023-10-26 RX ADMIN — BARIUM SULFATE 20 ML: 400 PASTE ORAL at 10:08

## 2023-10-26 RX ADMIN — MIDODRINE HYDROCHLORIDE 10 MG: 10 TABLET ORAL at 16:38

## 2023-10-26 RX ADMIN — PROPRANOLOL HYDROCHLORIDE 20 MG: 20 TABLET ORAL at 22:08

## 2023-10-26 RX ADMIN — FAMOTIDINE 20 MG: 20 TABLET, FILM COATED ORAL at 08:52

## 2023-10-26 RX ADMIN — MIDODRINE HYDROCHLORIDE 10 MG: 10 TABLET ORAL at 10:54

## 2023-10-26 RX ADMIN — FAMOTIDINE 20 MG: 20 TABLET, FILM COATED ORAL at 21:45

## 2023-10-26 RX ADMIN — HYDROCODONE BITARTRATE AND ACETAMINOPHEN 1 TABLET: 5; 325 TABLET ORAL at 13:47

## 2023-10-26 RX ADMIN — LEVOTHYROXINE SODIUM 125 MCG: 125 TABLET ORAL at 08:52

## 2023-10-26 RX ADMIN — BARIUM SULFATE 100 ML: 0.81 POWDER, FOR SUSPENSION ORAL at 10:08

## 2023-10-26 RX ADMIN — PREGABALIN 75 MG: 75 CAPSULE ORAL at 08:52

## 2023-10-26 RX ADMIN — MIDODRINE HYDROCHLORIDE 10 MG: 10 TABLET ORAL at 08:52

## 2023-10-26 NOTE — PLAN OF CARE
Goal Outcome Evaluation:  Plan of Care Reviewed With: patient, family        Progress: improving

## 2023-10-26 NOTE — MBS/VFSS/FEES
Acute Care - Speech Language Pathology   Swallow Initial Evaluation  [place]  Modified Barium Swallow Study (MBS)      Patient Name: Zoila Nava  : 1933  MRN: [id number]  Today's Date: 10/26/2023               Admit Date: 10/24/2023    Visit Dx:     ICD-10-CM ICD-9-CM   1. Altered mental status, unspecified altered mental status type  R41.82 780.97   2. Hypotension, unspecified hypotension type  I95.9 458.9   3. Dysphagia, unspecified type  R13.10 787.20     Patient Active Problem List   Diagnosis    Allergic rhinitis    Hyperlipidemia    Hypertension    Hypocalcemia    Hypothyroidism    Neuropathy    NUD (nonulcer dyspepsia)    Painful knee    Pernicious anemia    Tremor    Vitamin B12 deficiency    Spondylolisthesis of cervical region    Graves' ophthalmopathy    Anemia    Memory impairment of gradual onset    Ascending aortic aneurysm    Stage 3 chronic kidney disease    Pulmonary hypertension    Chronic heart failure with preserved ejection fraction    CHF (congestive heart failure), NYHA class I, acute on chronic, diastolic    CHF (congestive heart failure)    Non-rheumatic aortic stenosis    Non-rheumatic aortic regurgitation    Hypokalemia    Carpal tunnel syndrome    Essential tremor    Gastroesophageal reflux disease    Nausea and vomiting    Skin sensation disturbance    Spinal cord disease    Urge incontinence of urine    Urinary urgency    Acute chest pain    (HFpEF) heart failure with preserved ejection fraction    Nocturnal hypoxia    Confusion    Concussion without loss of consciousness    Hypotension     Past Medical History:   Diagnosis Date    Anemia     Arthritis     Asthma     Cataract     Difficulty breathing     Chronic    GERD (gastroesophageal reflux disease)     Graves' ophthalmopathy     Hoarseness     Hypertension     Hypertensive heart disease with acute on chronic diastolic congestive heart failure 10/11/2021    Pulmonary hypertension 10/11/2021    Spondylolisthesis  of cervical region     Vitamin B12 deficiency      Past Surgical History:   Procedure Laterality Date    CERVICAL LAMINECTOMY      CHOLECYSTECTOMY      OTHER SURGICAL HISTORY Right     Neuroplasty Median Nerve at Carpal Tunnel    THYROID SURGERY      TOTAL KNEE ARTHROPLASTY Left 09/29/2014    Dr Colon       SLP Recommendation and Plan  SLP Swallowing Diagnosis: functional oral phase, functional pharyngeal phase (10/26/23 1000)  SLP Diet Recommendation: regular textures, thin liquids (10/26/23 1000)  Recommended Precautions and Strategies: general aspiration precautions (10/26/23 1000)  SLP Rec. for Method of Medication Administration: meds whole, with thin liquids, with puree, as tolerated (10/26/23 1000)     Monitor for Signs of Aspiration: notify SLP if any concerns (10/26/23 1000)     Swallow Criteria for Skilled Therapeutic Interventions Met: no problems identified which require skilled intervention (10/26/23 1000)  Anticipated Discharge Disposition (SLP): No further SLP services warranted (10/26/23 1000)     Therapy Frequency (Swallow): evaluation only (10/26/23 1000)     Oral Care Recommendations: Oral Care BID/PRN, Toothbrush (10/26/23 1000)                                      Oral Care Recommendations: Oral Care BID/PRN, Toothbrush (10/26/23 1000)    Plan of Care Reviewed With: patient      SWALLOW EVALUATION (last 72 hours)       SLP Adult Swallow Evaluation       Row Name 10/26/23 1000 10/25/23 1050                Rehab Evaluation    Document Type evaluation  -MP evaluation  -MP       Subjective Information no complaints  -MP no complaints  -MP       Patient Observations alert;cooperative  -MP alert;cooperative  -MP       Patient/Family/Caregiver Comments/Observations No family present  -MP Sister present  -MP       Patient Effort good  -MP good  -MP          General Information    Patient Profile Reviewed yes  -MP yes  -MP       Pertinent History Of Current Problem See prev eval. MBS to further  assess swallow function  -MP Adm 10/24 w/ confusion, recent fall & hit head. CT head w/ no acute. Hx pulmonary HTN, mild memory deficits, CKD2, HTN, hypothyroid, Afib.  -MP       Current Method of Nutrition -- regular textures;thin liquids  -MP       Precautions/Limitations, Vision -- WFL with corrective lenses  -MP       Precautions/Limitations, Hearing -- WFL  -MP       Prior Level of Function-Communication -- other (see comments)  per chart, baseline mild memory deficits  -MP       Prior Level of Function-Swallowing -- no diet consistency restrictions  -MP       Plans/Goals Discussed with -- patient;agreed upon  -MP       Barriers to Rehab -- none identified  -MP       Patient's Goals for Discharge -- patient did not state  -MP       Family Goals for Discharge -- family did not state  -MP          Pain    Additional Documentation Pain Scale: FACES Pre/Post-Treatment (Group)  -MP Pain Scale: FACES Pre/Post-Treatment (Group)  -MP          Pain Scale: FACES Pre/Post-Treatment    Pain: FACES Scale, Pretreatment 0-->no hurt  -MP 0-->no hurt  -MP       Posttreatment Pain Rating 0-->no hurt  -MP 0-->no hurt  -MP          Oral Motor Structure and Function    Dentition Assessment -- natural, present and adequate  -MP       Secretion Management -- WNL/WFL  -MP       Mucosal Quality -- moist, healthy  -MP          Oral Musculature and Cranial Nerve Assessment    Oral Motor General Assessment -- WFL  -MP          General Eating/Swallowing Observations    Eating/Swallowing Skills -- self-fed  -MP       Positioning During Eating -- upright in chair  -MP       Utensils Used -- spoon;cup;straw  -MP       Consistencies Trialed -- thin liquids;pureed;regular textures  -MP          Clinical Swallow Eval    Pharyngeal Phase -- suspected pharyngeal impairment  -MP       Clinical Swallow Evaluation Summary -- Intermittent, not consistent, throat clear t/o PO. Pt reported ongoing for many years, and sister reported occasional choking  when eating. Discussed completing MBS to further assess swallow function and provided education on possible results/recs- pt prefers to proceed w/ eval. Continue current MD-ordered diet until MBS tomorrow as does not appear acute in nature.  -MP          Pharyngeal Phase Concerns    Pharyngeal Phase Concerns -- throat clear  -MP          MBS/VFSS    Utensils Used spoon;cup;straw  -MP --       Consistencies Trialed thin liquids;pudding thick;regular textures  -MP --          MBS/VFSS Interpretation    Oral Prep Phase WFL  -MP --       Oral Transit Phase WFL  -MP --       Oral Residue WFL  -MP --       VFSS Summary Grossly functional oropharyngeal swallow. No penetration/aspiration or significant pharyngeal residue noted w/ any consistency. Rec regular diet, thin liquids. Standard aspiration prctns.  -MP --          Initiation of Pharyngeal Swallow    Initiation of Pharyngeal Swallow bolus in pyriform sinuses  -MP --       Pharyngeal Phase functional pharyngeal phase of swallowing  -MP --          SLP Evaluation Clinical Impression    SLP Swallowing Diagnosis functional oral phase;functional pharyngeal phase  - R/O pharyngeal dysphagia  -MP       Functional Impact -- risk of aspiration/pneumonia  -       Rehab Potential/Prognosis, Swallowing -- good, to achieve stated therapy goals  -       Swallow Criteria for Skilled Therapeutic Interventions Met no problems identified which require skilled intervention  -MP demonstrates skilled criteria  -          Recommendations    Therapy Frequency (Swallow) evaluation only  -MP --       Predicted Duration Therapy Intervention (Days) -- until discharge  -       SLP Diet Recommendation regular textures;thin liquids  -MP other (see comments)  [place] [facility] [place]       Recommended Diagnostics -- VFSS (MBS);other (see comments)  10/26  -       Recommended Precautions and Strategies general aspiration precautions  -MP general aspiration  precautions  -MP       Oral Care Recommendations Oral Care BID/PRN;Toothbrush  -MP Oral Care BID/PRN;Toothbrush  -MP       SLP Rec. for Method of Medication Administration meds whole;with thin liquids;with puree;as tolerated  -MP as tolerated  -MP       Monitor for Signs of Aspiration notify SLP if any concerns  -MP notify SLP if any concerns  -MP       Anticipated Discharge Disposition (SLP) No further SLP services warranted  -MP home with home health  -MP                 User Key  (r) = Recorded By, (t) = Taken By, (c) = Cosigned By      Initials Name Effective Dates    MP Josh Tate MS CCC-SLP 12/28/21 -                     EDUCATION  The patient has been educated in the following areas:   Dysphagia (Swallowing Impairment).              Time Calculation:    Time Calculation- SLP       Row Name 10/26/23 1122             Time Calculation- SLP    SLP Start Time 1000  -MP      SLP Received On 10/26/23  -MP         Untimed Charges    24212-AN Motion Fluoro Eval Swallow Minutes 60  -MP         Total Minutes    Untimed Charges Total Minutes 60  -MP       Total Minutes 60  -MP                User Key  (r) = Recorded By, (t) = Taken By, (c) = Cosigned By      Initials Name Provider Type    MP Josh Tate MS CCC-SLP Speech and Language Pathologist                    Therapy Charges for Today       Code Description Service Date Service Provider Modifiers Qty    09016811492 HC ST EVAL ORAL PHARYNG SWALLOW 3 10/25/2023 Josh Tate MS CCC-SLP GN 1    [id number] HC ST MOTION FLUORO EVAL SWALLOW 4 10/26/2023 Josh Tate MS CCC-SLP GN 1                 Josh Ndiaye MS CCC-KENNEY  10/26/2023

## 2023-10-26 NOTE — PLAN OF CARE
Goal Outcome Evaluation:  Plan of Care Reviewed With: patient, family        Progress: no change  Outcome Evaluation: PT wound care initial evaluation complete. Pt presenting with chonic BLE edema along with mild/moderate RLE erythema and open ulceration to the R anterior leg. PT applied therhoney to help promote autolytic debridement of slough at wound base along with mepilex ag to help reduce bacterial load. PT applied BLE Unna boots to promote venous return, improve skin integrity, and promote optimal wound healing potential. PT plans to f/u with further dressings/Unna boot change in 2-3 days.

## 2023-10-26 NOTE — THERAPY RE-EVALUATION
Acute Care - Wound/Debridement Initial Evaluation  [facility]     Patient Name: Zoila Nava  : 1933  MRN: [id number]  Today's Date: 10/26/2023                Admit Date: 10/24/2023    Visit Dx:    ICD-10-CM ICD-9-CM   1. Altered mental status, unspecified altered mental status type  R41.82 780.97   2. Hypotension, unspecified hypotension type  I95.9 458.9   3. Dysphagia, unspecified type  R13.10 787.20     BLE      RLE        Patient Active Problem List   Diagnosis    Allergic rhinitis    Hyperlipidemia    Hypertension    Hypocalcemia    Hypothyroidism    Neuropathy    NUD (nonulcer dyspepsia)    Painful knee    Pernicious anemia    Tremor    Vitamin B12 deficiency    Spondylolisthesis of cervical region    Graves' ophthalmopathy    Anemia    Memory impairment of gradual onset    Ascending aortic aneurysm    Stage 3 chronic kidney disease    Pulmonary hypertension    Chronic heart failure with preserved ejection fraction    CHF (congestive heart failure), NYHA class I, acute on chronic, diastolic    CHF (congestive heart failure)    Non-rheumatic aortic stenosis    Non-rheumatic aortic regurgitation    Hypokalemia    Carpal tunnel syndrome    Essential tremor    Gastroesophageal reflux disease    Nausea and vomiting    Skin sensation disturbance    Spinal cord disease    Urge incontinence of urine    Urinary urgency    Acute chest pain    (HFpEF) heart failure with preserved ejection fraction    Nocturnal hypoxia    Confusion    Concussion without loss of consciousness    Hypotension    AMS (altered mental status)        Past Medical History:   Diagnosis Date    Anemia     Arthritis     Asthma     Cataract     Difficulty breathing     Chronic    GERD (gastroesophageal reflux disease)     Graves' ophthalmopathy     Hoarseness     Hypertension     Hypertensive heart disease with acute on chronic diastolic congestive heart failure 10/11/2021    Pulmonary hypertension 10/11/2021    Spondylolisthesis  of cervical region     Vitamin B12 deficiency         Past Surgical History:   Procedure Laterality Date    CERVICAL LAMINECTOMY      CHOLECYSTECTOMY      OTHER SURGICAL HISTORY Right     Neuroplasty Median Nerve at Carpal Tunnel    THYROID SURGERY      TOTAL KNEE ARTHROPLASTY Left 09/29/2014    Dr Colon           Wound 10/24/23 2000 Right medial leg (Active)   Wound Image    10/26/23 1311   Dressing Appearance intact;moist drainage 10/26/23 1311   Closure Adhesive bandage 10/26/23 0800   Base moist;yellow;slough;red 10/26/23 1311   Periwound intact;redness;swelling;warm;edematous 10/26/23 1311   Periwound Temperature warm 10/26/23 1311   Periwound Skin Turgor soft 10/26/23 1311   Edges open 10/26/23 1311   Wound Length (cm) 3.1 cm 10/26/23 1311   Wound Width (cm) 0.7 cm 10/26/23 1311   Wound Surface Area (cm^2) 2.17 cm^2 10/26/23 1311   Drainage Characteristics/Odor serosanguineous 10/26/23 1311   Drainage Amount scant 10/26/23 1311   Care, Wound cleansed with;soap and water;debrided;honey applied;hyacinth boot 10/26/23 1311   Dressing Care dressing applied;antimicrobial agent applied;low-adherent;silver impregnated;foam 10/26/23 1311   Periwound Care cleansed with pH balanced cleanser;dry periwound area maintained 10/26/23 1311      Lymphedema       Row Name 10/26/23 1300             Lymphedema Edema Assessment    Ptting Edema Category By severity  -      Pitting Edema Moderate  -LH         Skin Changes/Observations    Location/Assessment Lower Extremity  -      Lower Extremity Conditions left:;intact;bilateral:;clean;dry;right:;inflamed;weeping  -      Lower Extremity Color/Pigment right:;blanchable;red;erythema;bilateral:;normal;hyperpigmented  -LH         Lymphedema Pulses/Capillary Refill    Lymphedema Pulses/Capillary Refill lower extremity pulses;capillary refill  -      Dorsalis Pedis Pulse right:;left:;+2 normal  -LH      Posterior Tibialis Pulse right:;left:;+2 normal  -LH      Capillary Refill  "lower extremity capillary refill  -      Lower Extremity Capillary Refill right:;left:;less than 3 seconds  -         Lymphedema Measurements    Measurement Type(s) Quick Girth  -      Quick Girth Areas Lower extremities  -         LLE Quick Girth (cm)    Mid foot 22 cm  -      Smallest ankle 22.1 cm  -      Largest calf 40 cm  -         RLE Quick Girth (cm)    Mid foot 22.4 cm  -LH      Smallest ankle 21.1 cm  -LH      Largest calf 35.2 cm  -      RLE Quick Girth Total 78.7  -         Compression/Skin Care    Compression/Skin Care skin care;wrapping location;bandaging  -      Skin Care washed/dried;lotion applied  -      Wrapping Location lower extremity  -      Wrapping Location LE bilateral:;foot to knee  -      Wrapping Comments BLE Unna boots applied in clamshell pattern, 4\" coban, size 5 spandage to secure.  -                User Key  (r) = Recorded By, (t) = Taken By, (c) = Cosigned By      Initials Name Provider Type     Garcia Ayon, PT Physical Therapist                    WOUND DEBRIDEMENT  Total area of Debridement: 1cm2  Debridement Site 1  Location- Site 1: RLE  Selective Debridement- Site 1: Wound Surface <20cmsq  Instruments- Site 1: tweezers  Excised Tissue Description- Site 1: minimum, slough  Bleeding- Site 1: none               PT Assessment (last 12 hours)       PT Evaluation and Treatment       Row Name 10/26/23 1311          Physical Therapy Time and Intention    Subjective Information complains of;fatigue;weakness;swelling  -     Document Type evaluation;wound care  -     Mode of Treatment physical therapy;individual therapy  -       Row Name 10/26/23 1311          General Information    Patient Profile Reviewed yes  -     Patient Observations alert;cooperative;agree to therapy  -     Pertinent History of Current Functional Problem Pt/family reports pt with chronic BLE edema with recurring issues with blisters and weeping wounds. Reports pt has HH " "wound care who applies Unna boots 2x/week.  -     Risks Reviewed patient and family:;increased discomfort  -     Benefits Reviewed patient and family:;increase knowledge;improve skin integrity;improve function  -     Barriers to Rehab medically complex  -       Row Name 10/26/23 1311          Pain    Pretreatment Pain Rating 0/10 - no pain  -     Posttreatment Pain Rating 0/10 - no pain  -       Row Name 10/26/23 1311          Cognition    Affect/Mental Status (Cognition) WFL  -     Orientation Status (Cognition) oriented x 3  -       Row Name 10/26/23 1311          Wound 10/24/23 2000 Right medial leg    Wound - Properties Group Placement Date: 10/24/23  -KG Placement Time: 2000 -KG Present on Original Admission: Y  -KG Side: Right  -KG Orientation: medial  -KG Location: leg  -KG    Wound Image Images linked: 2  -     Dressing Appearance intact;moist drainage  Therahoney sheet, 6\" optifoam  -     Base moist;yellow;slough;red  -     Periwound intact;redness;swelling;warm;edematous  -     Periwound Temperature warm  -     Periwound Skin Turgor soft  -     Edges open  -     Wound Length (cm) 3.1 cm  -     Wound Width (cm) 0.7 cm  -     Wound Depth (cm) --  obscured  -     Wound Surface Area (cm^2) 2.17 cm^2  -     Drainage Characteristics/Odor serosanguineous  -     Drainage Amount scant  -LH     Care, Wound cleansed with;soap and water;debrided;honey applied;hyacinth boot  -     Dressing Care dressing applied;antimicrobial agent applied;low-adherent;silver impregnated;foam  Therahoney, Mepilex Ag, Unna boot  -     Periwound Care cleansed with pH balanced cleanser;dry periwound area maintained  -     Retired Wound - Properties Group Placement Date: 10/24/23  -KG Placement Time: 2000 -KG Present on Original Admission: Y  -KG Side: Right  -KG Orientation: medial  -KG Location: leg  -KG    Retired Wound - Properties Group Date first assessed: 10/24/23  -KG Time first assessed: " 2000  -KG Present on Original Admission: Y  -KG Side: Right  -KG Location: leg  -KG      Row Name 10/26/23 1311          Coping    Observed Emotional State calm;cooperative;pleasant  -     Verbalized Emotional State acceptance  -     Trust Relationship/Rapport care explained;questions answered  -     Family/Support Persons family  -     Involvement in Care at bedside;attentive to patient  -       Row Name 10/26/23 1311          Plan of Care Review    Plan of Care Reviewed With patient;family  -     Progress no change  -     Outcome Evaluation PT wound care initial evaluation complete. Pt presenting with chonic BLE edema along with mild/moderate RLE erythema and open ulceration to the R anterior leg. PT applied therhoney to help promote autolytic debridement of slough at wound base along with mepilex ag to help reduce bacterial load. PT applied BLE Unna boots to promote venous return, improve skin integrity, and promote optimal wound healing potential. PT plans to f/u with further dressings/Unna boot change in 2-3 days.  -       Row Name 10/26/23 1311          Positioning and Restraints    Pre-Treatment Position sitting in chair/recliner  -     Post Treatment Position chair  -     In Chair reclined;call light within reach;encouraged to call for assist  -       Row Name 10/26/23 1311          Therapy Assessment/Plan (PT)    Patient/Family Therapy Goals Statement (PT) Reduce swelling, heal wound  -     PT Diagnosis (PT) Moderate BLE edema, mild RLE erythem, RLE ulceration  -     Criteria for Skilled Interventions Met (PT) yes;meets criteria;skilled treatment is necessary  -       Row Name 10/26/23 1311          Therapy Plan Review/Discharge Plan (PT)    Therapy Plan Review (PT) evaluation/treatment results reviewed;care plan/treatment goals reviewed;risks/benefits reviewed;current/potential barriers reviewed;participants voiced agreement with care plan;participants included;patient;daughter   -       Row Name 10/26/23 1311          Physical Therapy Goals    Wound Care Goal Selection (PT) wound care, PT goal 1;wound care, PT goal 2  -       Row Name 10/26/23 1311          Wound Care Goal 1 (PT)    Wound Care Goal 1 (PT) Decrease area of wound by 50% as evidence of wound healing.  -     Time Frame (Wound Care Goal 1, PT) long term goal (LTG);10 days  -       Row Name 10/26/23 1311          Wound Care Goal 2 (PT)    Wound Care Goal 2 (PT) Demonstrate minimal remaining BLE edema to allow for transition to long term compression option/return to PLOF.  -     Time Frame (Wound Care Goal 2, PT) long term goal (LTG);10 days  -               User Key  (r) = Recorded By, (t) = Taken By, (c) = Cosigned By      Initials Name Provider Type     Garcia Ayon, PT Physical Therapist    Parth Lopez, RN Registered Nurse                  Physical Therapy Education       Title: PT OT SLP Therapies (In Progress)       Topic: Physical Therapy (Done)       Point: Mobility training (Done)       Learning Progress Summary             Patient Acceptance, E, VU,NR by CD at 10/25/2023 1523    Comment: BENEFITS OF OOB ACTIVITY, SAFETY WITH MOBILITY, PROGRESSION OF POC, D/C PLANNING,                         Point: Home exercise program (Done)       Learning Progress Summary             Patient Acceptance, E, VU,NR by CD at 10/25/2023 1523    Comment: BENEFITS OF OOB ACTIVITY, SAFETY WITH MOBILITY, PROGRESSION OF POC, D/C PLANNING,                         Point: Body mechanics (Done)       Learning Progress Summary             Patient Acceptance, E, VU,NR by CD at 10/25/2023 1523    Comment: BENEFITS OF OOB ACTIVITY, SAFETY WITH MOBILITY, PROGRESSION OF POC, D/C PLANNING,                         Point: Precautions (Done)       Learning Progress Summary             Patient Acceptance, E, VU,NR by CD at 10/25/2023 1523    Comment: BENEFITS OF OOB ACTIVITY, SAFETY WITH MOBILITY, PROGRESSION OF POC, D/C PLANNING,                                          User Key       Initials Effective Dates Name Provider Type Discipline    CD 02/03/23 -  Sheila Panda, PT Physical Therapist PT                    Recommendation and Plan  Anticipated Discharge Disposition (PT): inpatient rehabilitation facility  Planned Therapy Interventions (PT): wound care, patient/family education  Therapy Frequency (PT): daily  Plan of Care Reviewed With: patient, family   Progress: no change       Progress: no change  Outcome Evaluation: PT wound care initial evaluation complete. Pt presenting with chonic BLE edema along with mild/moderate RLE erythema and open ulceration to the R anterior leg. PT applied therhoney to help promote autolytic debridement of slough at wound base along with mepilex ag to help reduce bacterial load. PT applied BLE Unna boots to promote venous return, improve skin integrity, and promote optimal wound healing potential. PT plans to f/u with further dressings/Unna boot change in 2-3 days.  Plan of Care Reviewed With: patient, family            Time Calculation   PT Charges       Row Name 10/26/23 1355             Time Calculation    Start Time 1311  -      PT Goal Re-Cert Due Date 11/04/23  -         Untimed Charges    PT Eval/Re-eval Minutes 33  -LH      Wound Care 68266 Selective debridement;76312 Unna boot  -LH      29580-Unna Boot 15  -LH      67835-Jefrfehcg debridement 10  -LH         Total Minutes    Untimed Charges Total Minutes 58  -LH       Total Minutes 58  -LH                User Key  (r) = Recorded By, (t) = Taken By, (c) = Cosigned By      Initials Name Provider Type     Garcia Ayon, PT Physical Therapist                      Therapy Charges for Today       Code Description Service Date Service Provider Modifiers Qty    31126318904 HC PT RE-EVAL ESTABLISHED PLAN 2 10/26/2023 Garcia Ayon, PT GP 1    [id number]  [place] DEBRIDE OPEN WOUND UP TO 20CM 10/26/2023 Garcia Ayon, PT GP 1    [id number] HC  PT STAPPING UNNA BOOT 10/26/2023 Garcia Ayon, PT GP 1              PT G-Codes  Outcome Measure Options: AM-PAC 6 Clicks Daily Activity (OT)  AM-PAC 6 Clicks Score (PT): 14  AM-PAC 6 Clicks Score (OT): 16  Modified [place] Scale: 4 - Moderately severe disability.  Unable to walk without assistance, and unable to attend to own bodily needs without assistance.       Garcia Ayon, PT  10/26/2023

## 2023-10-26 NOTE — PLAN OF CARE
Goal Outcome Evaluation:  Plan of Care Reviewed With: patient            SLP MBS evaluation completed. Grossly functional oropharyngeal swallow. Will sign-off. Please see note for further details and recommendations.

## 2023-10-26 NOTE — CASE MANAGEMENT/SOCIAL WORK
Continued Stay Note   [place]     Patient Name: Zoila Nava  MRN: [id number]  Today's Date: 10/26/2023    Admit Date: 10/24/2023    Plan: [facility]   Discharge Plan       Row Name 10/26/23 1515       Plan    Plan [facility]    Patient/Family in Agreement with Plan yes    Plan Comments Spoke with Ms. Amaro Daughter, Michaela at the room. PT is recommending rehab. Discussed with Daughter and she would like her to go to [facility]. Referral given to Christi with [facility]. CM will continue to follow up.    Final Discharge Disposition Code 62 - inpatient rehab facility                   Discharge Codes    No documentation.                 Expected Discharge Date and Time       Expected Discharge Date Expected Discharge Time    Oct 27, 2023               Sherly Eng RN

## 2023-10-26 NOTE — PROGRESS NOTES
[facility] Medicine Services  PROGRESS NOTE    Patient Name: Zoila Nava  : 1933  MRN: [id number]    Date of Admission: 10/24/2023  Primary Care Physician: Arnoldo Oneil MD    Subjective   Subjective     CC:  F/u fall, cofusion    HPI:  Patient sitting up in chair visiting with her  and sister-in-law.  Patient answers most questions correctly, but could not answer the name of the hospital initially.  MALENA and  both state that she was getting forgetful prior to this admission.  They both say that she is NOT back to baseline.      Objective   Objective     Vital Signs:   Temp:  [98 °F (36.7 °C)-98.4 °F (36.9 °C)] 98 °F (36.7 °C)  Heart Rate:  [65-85] 76  Resp:  [16-20] 18  BP: (100-174)/(68-94) 122/83  Flow (L/min):  [2] 2     Physical Exam:  Constitutional: Alert, very pleasant elderly female sitting up in a chair in NAD  Eyes: EOMI, sclerae anicteric, no conjunctival injection  Head: NCAT  ENT: [place], moist mucous membranes   Respiratory: Nonlabored, symmetrical chest expansion, CTAB, 98% RA   Cardiovascular: RRR, no M/R/G, +DP pulses bilaterally  gastrointestinal: Soft, NT, ND +BS  Musculoskeletal: RICE; no LE edema bilaterally  Neurologic: Oriented to some things but confused about others, follows all commands, speech clear  Skin: No rashes on exposed skin, large bandage to right shin  Psychiatric: Pleasant and cooperative; normal affect    Results Reviewed:  LAB RESULTS:      Lab 10/24/23  1133 10/23/23  2059   WBC 9.98 10.12   HEMOGLOBIN 12.4 11.9*   HEMATOCRIT 38.7 37.3   PLATELETS 234 228   NEUTROS ABS 7.95* 7.52*   IMMATURE GRANS (ABS) 0.07* 0.05   LYMPHS ABS 0.56* 0.87   MONOS ABS 0.86 1.07*   EOS ABS 0.42* 0.51*   MCV 95.3 95.4         Lab 10/25/23  0544 10/24/23  1232 10/24/23  1133 10/23/23  2059   SODIUM 144  --  144 144   POTASSIUM 3.1*  --  3.4* 3.2*   CHLORIDE 104  --  106 104   CO2 29.0  --  25.0 26.0   ANION GAP 11.0  --  13.0 14.0   BUN  33*  --  42* 42*   CREATININE 1.33*  --  1.68* 1.81*   EGFR 38.1*  --  28.8* 26.3*   GLUCOSE 93  --  167* 129*   CALCIUM 9.0  --  8.7 8.6   MAGNESIUM  --   --  1.8 1.8   TSH  --  24.240*  --   --          Lab 10/24/23  1133 10/23/23  2059   TOTAL PROTEIN 6.0 6.2   ALBUMIN 3.8 3.4*   GLOBULIN 2.2 2.8   ALT (SGPT) 31 32   AST (SGOT) 22 20   BILIRUBIN 0.4 0.3   ALK PHOS 136* 125*         Lab 10/24/23  1614 10/24/23  1232 10/23/23  2059   PROBNP  --   --  8,093.0*   HSTROP T 63* 67*  --                  Brief Urine Lab Results  (Last result in the past 365 days)        Color   Clarity   Blood   Leuk Est   Nitrite   Protein   CREAT   Urine HCG        10/24/23 1145 Yellow   Clear   Negative   Negative   Negative   Negative                   Microbiology Results Abnormal       Procedure Component Value - Date/Time    COVID PRE-OP / PRE-PROCEDURE SCREENING ORDER (NO ISOLATION) - Swab, Nasopharynx [[id number]]  (Normal) Collected: 10/24/23 1133    Lab Status: Final result Specimen: Swab from Nasopharynx Updated: 10/24/23 1239    Narrative:      The following orders were created for panel order COVID PRE-OP / PRE-PROCEDURE SCREENING ORDER (NO ISOLATION) - Swab, Nasopharynx.  Procedure                               Abnormality         Status                     ---------                               -----------         ------                     COVID-19, FLU A/B, RSV P...[[id number]]  Normal              Final result                 Please view results for these tests on the individual orders.    COVID-19, FLU A/B, RSV PCR - Swab, Nasopharynx [[id number]]  (Normal) Collected: 10/24/23 1133    Lab Status: Final result Specimen: Swab from Nasopharynx Updated: 10/24/23 1239     COVID19 Not Detected     Influenza A PCR Not Detected     Influenza B PCR Not Detected     RSV, PCR Not Detected    Narrative:      Fact sheet for providers: https://www.fda.gov/media/332391/download    Fact sheet for patients:  https://www.fda.gov/media/720127/download    Test performed by PCR.            XR Hand 3+ View Right    Result Date: 10/24/2023  XR HAND 3+ VW RIGHT Date of Exam: 10/24/2023 4:22 PM EDT Indication: pain Comparison: None available. Findings: 3 views. There is a transverse fracture of the proximal portion of the first metacarpal with mild impaction and mild cortical offset although without significant displacement. There is no joint involvement. There is osteoporosis. There is severe narrowing of the DIP joints and IP joint of the thumb. There is a large old avulsion at the dorsal aspect of the third digit DIP joint. There are large osteophytes at the DIP joints and IP joint of the thumb. There is mild ulnar subluxation of the distal phalanx of the third digit in relation to the middle phalanx. There is severe narrowing of the first CMC joint with mild spurring. There is moderately severe narrowing of the radiocarpal joint and there is some chondrocalcinosis in the wrist. A prominent cyst in the capitate likely is degenerative in nature.     Impression: Impression: Transverse primarily nondisplaced fracture of the proximal first metacarpal. Other chronic findings as detailed. Electronically Signed: Georgina Ramsey MD  10/24/2023 4:47 PM EDT  Workstation ID: [id number]    CT Head Without Contrast    Result Date: 10/24/2023  CT HEAD WO CONTRAST Date of Exam: 10/24/2023 12:21 PM EDT Indication: AMS since overnight.  neg head ct last evening. Comparison: Head CT 10/23/2023 Technique: Axial CT images were obtained of the head without contrast administration.  Automated exposure control and iterative construction methods were used. Findings: No acute intracranial hemorrhage. No acute large territory infarct. No mass effect. No extra-axial collections. Normal size and configuration of the ventricles. Redemonstration of bilateral exophthalmos with otherwise unremarkable appearance of the orbits. There is a contusion of the left  parietal scalp which is unchanged. The paranasal sinuses are clear. The mastoid air cells are clear. No acute or suspicious bony findings.     Impression: Impression: No acute intracranial findings. No significant interval change. Redemonstration of left parietal scalp contusion. Electronically Signed: Matt Chandler MD  10/24/2023 12:28 PM EDT  Workstation ID: [id number]     Results for orders placed during the hospital encounter of 04/04/23    Adult Transthoracic Echo Complete W/ Cont if Necessary Per Protocol    Interpretation Summary    Left ventricular systolic function is normal. Left ventricular ejection fraction appears to be 51 - 55%.    Mildly reduced right ventricular systolic function noted.    The left atrial cavity is dilated.    The right atrial cavity is dilated.    There is moderate calcification of the aortic valve.    Mild to moderate aortic valve regurgitation is present.    Mild mitral valve regurgitation is present.    Mild tricuspid valve regurgitation is present.      Current medications:  Scheduled Meds:aspirin, 81 mg, Oral, Daily  famotidine, 20 mg, Oral, BID  furosemide, 20 mg, Oral, Daily  HYDROcodone-acetaminophen, 1 tablet, Oral, Q8H  levothyroxine, 125 mcg, Oral, QAM  midodrine, 10 mg, Oral, TID AC  pregabalin, 75 mg, Oral, BID  propranolol, 20 mg, Oral, Q8H  rivastigmine, 1 patch, Transdermal, Daily  senna-docusate sodium, 2 tablet, Oral, BID  sodium chloride, 10 mL, Intravenous, Q12H  tamsulosin, 0.4 mg, Oral, Daily      Continuous Infusions:niCARdipine, 5-15 mg/hr, Last Rate: Stopped (10/24/23 2135)      PRN Meds:.  acetaminophen **OR** acetaminophen **OR** acetaminophen    senna-docusate sodium **AND** polyethylene glycol **AND** bisacodyl **AND** bisacodyl    Calcium Replacement - Follow Nurse / BPA Driven Protocol    Magnesium Low Dose Replacement - Follow Nurse / BPA Driven Protocol    O2    Phosphorus Replacement - Follow Nurse / BPA Driven Protocol    Potassium Replacement -  Follow Nurse / BPA Driven Protocol    sodium chloride    sodium chloride    sodium chloride    Assessment & Plan   Assessment & Plan     Active Hospital Problems    Diagnosis  POA    **Confusion [R41.0]  Yes    Concussion without loss of consciousness [S06.0X0A]  Yes    Hypotension [I95.9]  Yes    Chronic heart failure with preserved ejection fraction [I50.32]  Yes    Pulmonary hypertension [I27.20]  Yes    Stage 3 chronic kidney disease [N18.30]  Yes    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        Brief Hospital Course to date:  Zoila Nava is a 89 y.o. female w/ HFpEF, pulmHTN, mild memory deficits on Exelon patch, CKD3, HTN, hypothyroidism, pAfib, midodrine use who sustained a fall in the home, had normal eval in the ED and returned home, came back the following day w/ hypotension, missing medications from home med planner, and slightly more confused than baseline     These problems are new to me today    This patient's problems and plans were partially entered by my partner and updated as appropriate by me 10/26/23.     Fall w/ head injury, concussion  -CT head last night in the ED and today w/o acute findings  -she appears very near her baseline cognitive status, alert and oriented   -PT/OT evaluation recommends IPR    Proximal 1st Metacarpal Fracture, non-displaced  -will curbside Ortho who recommended thumb Spica brace for comfort, f/u outpatient PRN    Hypokalemia  --K 3.1 10/25/2023  -- Klor-Con 40 mEq x 2 doses on 10/25/2023  --AM BMP     CKD 3-4  -baseline Cr 1.3-1.6; eGFR 20-30's  -at baseline    Elevated TSH:  - free T4 normal, repeat TFTs outpatient in several weeks    Chronic conditions:  Chronic HFpEF  PulmHTN  Memory deficits  HTN  Hypothyroidism  pAfib        Expected Discharge Location and Transportation: Home?  Expected Discharge   Expected Discharge Date: 10/27/2023; Expected  Discharge Time:      DVT prophylaxis:  Mechanical DVT prophylaxis orders are present.     AM-PAC 6 Clicks Score (PT): 14 (10/25/23 8271)    CODE STATUS:   Code Status and Medical Interventions:   Ordered at: 10/24/23 1432     Level Of Support Discussed With:    Patient    Next of Kin (If No Surrogate)     Code Status (Patient has no pulse and is not breathing):    No CPR (Do Not Attempt to Resuscitate)     Medical Interventions (Patient has pulse or is breathing):    Full Support       TONIE Roque  10/26/23

## 2023-10-27 LAB
ANION GAP SERPL CALCULATED.3IONS-SCNC: 10 MMOL/L (ref 5–15)
BUN SERPL-MCNC: 31 MG/DL (ref 8–23)
BUN/CREAT SERPL: 22.5 (ref 7–25)
CALCIUM SPEC-SCNC: 8.5 MG/DL (ref 8.2–9.6)
CHLORIDE SERPL-SCNC: 103 MMOL/L (ref 98–107)
CO2 SERPL-SCNC: 31 MMOL/L (ref 22–29)
CREAT SERPL-MCNC: 1.38 MG/DL (ref 0.57–1)
DEPRECATED RDW RBC AUTO: 54.3 FL (ref 37–54)
EGFRCR SERPLBLD CKD-EPI 2021: 36.4 ML/MIN/1.73
ERYTHROCYTE [DISTWIDTH] IN BLOOD BY AUTOMATED COUNT: 16.3 % (ref 12.3–15.4)
GLUCOSE SERPL-MCNC: 97 MG/DL (ref 65–99)
HCT VFR BLD AUTO: 38.2 % (ref 34–46.6)
HGB BLD-MCNC: 12.5 G/DL (ref 12–15.9)
MCH RBC QN AUTO: 30.1 PG (ref 26.6–33)
MCHC RBC AUTO-ENTMCNC: 32.7 G/DL (ref 31.5–35.7)
MCV RBC AUTO: 92 FL (ref 79–97)
PLATELET # BLD AUTO: 224 10*3/MM3 (ref 140–450)
PMV BLD AUTO: 12.1 FL (ref 6–12)
POTASSIUM SERPL-SCNC: 3.9 MMOL/L (ref 3.5–5.2)
RBC # BLD AUTO: 4.15 10*6/MM3 (ref 3.77–5.28)
SODIUM SERPL-SCNC: 144 MMOL/L (ref 136–145)
WBC NRBC COR # BLD: 11.61 10*3/MM3 (ref 3.4–10.8)

## 2023-10-27 PROCEDURE — 80048 BASIC METABOLIC PNL TOTAL CA: CPT | Performed by: NURSE PRACTITIONER

## 2023-10-27 PROCEDURE — 85027 COMPLETE CBC AUTOMATED: CPT | Performed by: NURSE PRACTITIONER

## 2023-10-27 RX ORDER — RIVASTIGMINE 4.6 MG/24H
1 PATCH, EXTENDED RELEASE TRANSDERMAL DAILY
Status: DISCONTINUED | OUTPATIENT
Start: 2023-10-27 | End: 2023-10-30 | Stop reason: HOSPADM

## 2023-10-27 RX ORDER — POLYMYXIN B SULFATE AND TRIMETHOPRIM 1; 10000 MG/ML; [USP'U]/ML
1 SOLUTION OPHTHALMIC EVERY 6 HOURS SCHEDULED
Status: DISCONTINUED | OUTPATIENT
Start: 2023-10-27 | End: 2023-10-30 | Stop reason: HOSPADM

## 2023-10-27 RX ADMIN — LEVOTHYROXINE SODIUM 125 MCG: 125 TABLET ORAL at 09:31

## 2023-10-27 RX ADMIN — FAMOTIDINE 20 MG: 20 TABLET, FILM COATED ORAL at 09:31

## 2023-10-27 RX ADMIN — TAMSULOSIN HYDROCHLORIDE 0.4 MG: 0.4 CAPSULE ORAL at 09:31

## 2023-10-27 RX ADMIN — HYDROCODONE BITARTRATE AND ACETAMINOPHEN 1 TABLET: 5; 325 TABLET ORAL at 13:40

## 2023-10-27 RX ADMIN — ASPIRIN 81 MG: 81 TABLET, COATED ORAL at 09:30

## 2023-10-27 RX ADMIN — HYDROCODONE BITARTRATE AND ACETAMINOPHEN 1 TABLET: 5; 325 TABLET ORAL at 05:45

## 2023-10-27 RX ADMIN — POLYMYXIN B SULFATE AND TRIMETHOPRIM 1 DROP: 10000; 1 SOLUTION OPHTHALMIC at 12:46

## 2023-10-27 RX ADMIN — PROPRANOLOL HYDROCHLORIDE 20 MG: 20 TABLET ORAL at 13:40

## 2023-10-27 RX ADMIN — Medication 10 ML: at 12:33

## 2023-10-27 RX ADMIN — PROPRANOLOL HYDROCHLORIDE 20 MG: 20 TABLET ORAL at 05:44

## 2023-10-27 RX ADMIN — PROPRANOLOL HYDROCHLORIDE 20 MG: 20 TABLET ORAL at 19:54

## 2023-10-27 RX ADMIN — PREGABALIN 75 MG: 75 CAPSULE ORAL at 09:31

## 2023-10-27 RX ADMIN — POLYMYXIN B SULFATE AND TRIMETHOPRIM 1 DROP: 10000; 1 SOLUTION OPHTHALMIC at 18:56

## 2023-10-27 RX ADMIN — FAMOTIDINE 20 MG: 20 TABLET, FILM COATED ORAL at 19:54

## 2023-10-27 RX ADMIN — HYDROCODONE BITARTRATE AND ACETAMINOPHEN 1 TABLET: 5; 325 TABLET ORAL at 19:54

## 2023-10-27 RX ADMIN — FUROSEMIDE 20 MG: 20 TABLET ORAL at 09:30

## 2023-10-27 RX ADMIN — PREGABALIN 75 MG: 75 CAPSULE ORAL at 19:54

## 2023-10-27 RX ADMIN — SENNOSIDES AND DOCUSATE SODIUM 2 TABLET: 8.6; 5 TABLET ORAL at 19:54

## 2023-10-27 NOTE — PROGRESS NOTES
[facility] Medicine Services  PROGRESS NOTE    Patient Name: Zoila Nava  : 1933  MRN: [id number]    Date of Admission: 10/24/2023  Primary Care Physician: Arnoldo Oneil MD    Subjective   Subjective     CC:  F/u fall, cofusion    HPI:  Patient up in bed. Alert and chatty/ smiling. Daughter at bedside states now at baseline. No overnight issues      Objective   Objective     Vital Signs:   Temp:  [97 °F (36.1 °C)-98.2 °F (36.8 °C)] 98.2 °F (36.8 °C)  Heart Rate:  [] 91  Resp:  [16-18] 18  BP: (104-152)/(68-88) 130/84  Flow (L/min):  [2] 2     Physical Exam:  Constitutional: Alert, NAD, up in bed  Head: NCAT  ENT: [place], moist mucous membranes   Respiratory: CTAB, unlabored on RA  Cardiovascular: RRR  gastrointestinal: Soft, NT, ND +BS  Musculoskeletal: RICE; no LE edema bilaterally  Neurologic: mild confusion, answers ROS, RICE, speech clear  Skin: No rashes   Psychiatric: cooperative; normal affect    Results Reviewed:  LAB RESULTS:      Lab 10/27/23  0429 10/24/23  1133 10/23/23  2059   WBC 11.61* 9.98 10.12   HEMOGLOBIN 12.5 12.4 11.9*   HEMATOCRIT 38.2 38.7 37.3   PLATELETS 224 234 228   NEUTROS ABS  --  7.95* 7.52*   IMMATURE GRANS (ABS)  --  0.07* 0.05   LYMPHS ABS  --  0.56* 0.87   MONOS ABS  --  0.86 1.07*   EOS ABS  --  0.42* 0.51*   MCV 92.0 95.3 95.4         Lab 10/27/23  0429 10/25/23  0544 10/24/23  1232 10/24/23  1133 10/23/23  2059   SODIUM 144 144  --  144 144   POTASSIUM 3.9 3.1*  --  3.4* 3.2*   CHLORIDE 103 104  --  106 104   CO2 31.0* 29.0  --  25.0 26.0   ANION GAP 10.0 11.0  --  13.0 14.0   BUN 31* 33*  --  42* 42*   CREATININE 1.38* 1.33*  --  1.68* 1.81*   EGFR 36.4* 38.1*  --  28.8* 26.3*   GLUCOSE 97 93  --  167* 129*   CALCIUM 8.5 9.0  --  8.7 8.6   MAGNESIUM  --   --   --  1.8 1.8   TSH  --   --  24.240*  --   --          Lab 10/24/23  1133 10/23/23  2059   TOTAL PROTEIN 6.0 6.2   ALBUMIN 3.8 3.4*   GLOBULIN 2.2 2.8   ALT (SGPT) 31 32    AST (SGOT) 22 20   BILIRUBIN 0.4 0.3   ALK PHOS 136* 125*         Lab 10/24/23  1614 10/24/23  1232 10/23/23  2059   PROBNP  --   --  8,093.0*   HSTROP T 63* 67*  --                  Brief Urine Lab Results  (Last result in the past 365 days)        Color   Clarity   Blood   Leuk Est   Nitrite   Protein   CREAT   Urine HCG        10/24/23 1145 Yellow   Clear   Negative   Negative   Negative   Negative                   Microbiology Results Abnormal       Procedure Component Value - Date/Time    COVID PRE-OP / PRE-PROCEDURE SCREENING ORDER (NO ISOLATION) - Swab, Nasopharynx [[id number]]  (Normal) Collected: 10/24/23 1133    Lab Status: Final result Specimen: Swab from Nasopharynx Updated: 10/24/23 1239    Narrative:      The following orders were created for panel order COVID PRE-OP / PRE-PROCEDURE SCREENING ORDER (NO ISOLATION) - Swab, Nasopharynx.  Procedure                               Abnormality         Status                     ---------                               -----------         ------                     COVID-19, FLU A/B, RSV P...[[id number]]  Normal              Final result                 Please view results for these tests on the individual orders.    COVID-19, FLU A/B, RSV PCR - Swab, Nasopharynx [[id number]]  (Normal) Collected: 10/24/23 1133    Lab Status: Final result Specimen: Swab from Nasopharynx Updated: 10/24/23 1239     COVID19 Not Detected     Influenza A PCR Not Detected     Influenza B PCR Not Detected     RSV, PCR Not Detected    Narrative:      Fact sheet for providers: https://www.fda.gov/media/333280/download    Fact sheet for patients: https://www.fda.gov/media/219994/download    Test performed by PCR.            FL Video Swallow With Speech Single Contrast    Result Date: 10/26/2023  FL VIDEO SWALLOW W SPEECH SINGLE-CONTRAST Date of Exam: 10/26/2023 9:51 AM EDT Indication: dysphagia Comparison: None available. Technique:   The speech pathologist administered food and/or liquid  mixed with barium to the patient with cine/video imaging.  Imaging assistance was provided to the speech pathologist and an image was saved. Fluoroscopic Time: 42 seconds Number of Images: 8 associated fluoroscopic loops were saved Findings: Please see speech therapy report for full details and recommendations.     Impression: Impression: Fluoroscopy provided for a modified barium swallow. Please see speech therapy report for full details and recommendations. Electronically Signed: Tyrone Aranda MD  10/26/2023 5:40 PM EDT  Workstation ID: [id number]     Results for orders placed during the hospital encounter of 04/04/23    Adult Transthoracic Echo Complete W/ Cont if Necessary Per Protocol    Interpretation Summary    Left ventricular systolic function is normal. Left ventricular ejection fraction appears to be 51 - 55%.    Mildly reduced right ventricular systolic function noted.    The left atrial cavity is dilated.    The right atrial cavity is dilated.    There is moderate calcification of the aortic valve.    Mild to moderate aortic valve regurgitation is present.    Mild mitral valve regurgitation is present.    Mild tricuspid valve regurgitation is present.      Current medications:  Scheduled Meds:aspirin, 81 mg, Oral, Daily  famotidine, 20 mg, Oral, BID  furosemide, 20 mg, Oral, Daily  HYDROcodone-acetaminophen, 1 tablet, Oral, Q8H  levothyroxine, 125 mcg, Oral, QAM  midodrine, 10 mg, Oral, TID AC  pregabalin, 75 mg, Oral, BID  propranolol, 20 mg, Oral, Q8H  rivastigmine, 1 patch, Transdermal, Daily  senna-docusate sodium, 2 tablet, Oral, BID  sodium chloride, 10 mL, Intravenous, Q12H  tamsulosin, 0.4 mg, Oral, Daily      Continuous Infusions:niCARdipine, 5-15 mg/hr, Last Rate: Stopped (10/24/23 2135)      PRN Meds:.  acetaminophen **OR** acetaminophen **OR** acetaminophen    senna-docusate sodium **AND** polyethylene glycol **AND** bisacodyl **AND** bisacodyl    Calcium Replacement - Follow Nurse / BPA Driven  Protocol    Magnesium Low Dose Replacement - Follow Nurse / BPA Driven Protocol    O2    Phosphorus Replacement - Follow Nurse / BPA Driven Protocol    Potassium Replacement - Follow Nurse / BPA Driven Protocol    sodium chloride    sodium chloride    sodium chloride    Assessment & Plan   Assessment & Plan     Active Hospital Problems    Diagnosis  POA    **Confusion [R41.0]  Yes    AMS (altered mental status) [R41.82]  Yes    Concussion without loss of consciousness [S06.0X0A]  Yes    Hypotension [I95.9]  Yes    Chronic heart failure with preserved ejection fraction [I50.32]  Yes    Pulmonary hypertension [I27.20]  Yes    Stage 3 chronic kidney disease [N18.30]  Yes    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        Brief Hospital Course to date:  Zoila Nava is a 89 y.o. female w/ HFpEF, pulmHTN, mild memory deficits on Exelon patch, CKD3, HTN, hypothyroidism, pAfib, midodrine use who sustained a fall in the home, had normal eval in the ED and returned home, came back the following day w/ hypotension, missing medications from home med planner, and slightly more confused than baseline    This patient's problems and plans were partially entered by my partner and updated as appropriate by me 10/27/23.     Fall w/ head injury, concussion  -CT head in the ED and repeat w/o acute findings  -she appears back to her baseline cognitive status, alert and oriented   -PT/OT evaluation recommends IPR- referrals sent to [facility]    Proximal 1st Metacarpal Fracture, non-displaced  -will curbside Ortho who recommended thumb Spica brace for comfort, f/u outpatient PRN  -currently without pain    Hypokalemia  -- Klor-Con 40 mEq x 2 doses on 10/25/2023  --replace prn     CKD 3-4  -baseline Cr 1.3-1.6; eGFR 20-30's  -at baseline    Elevated TSH:  - free T4 normal, repeat TFTs outpatient in several weeks    Chronic  conditions:  Chronic HFpEF  PulmHTN  Memory deficits  HTN  Hypothyroidism  pAfib        Expected Discharge Location and Transportation: [facility] anytime bed available  Expected Discharge   Expected Discharge Date: 10/27/2023; Expected Discharge Time:      DVT prophylaxis:  Mechanical DVT prophylaxis orders are present.     AM-PAC 6 Clicks Score (PT): 14 (10/26/23 1915)    CODE STATUS:   Code Status and Medical Interventions:   Ordered at: 10/24/23 1433     Level Of Support Discussed With:    Patient    Next of Kin (If No Surrogate)     Code Status (Patient has no pulse and is not breathing):    No CPR (Do Not Attempt to Resuscitate)     Medical Interventions (Patient has pulse or is breathing):    Full Support       Stephanie Leavitt, APRN  10/27/23

## 2023-10-27 NOTE — CASE MANAGEMENT/SOCIAL WORK
Continued Stay Note  [facility]     Patient Name: Zoila Nava  MRN: [id number]  Today's Date: 10/27/2023    Admit Date: 10/24/2023    Plan: [facility]   Discharge Plan       Row Name 10/27/23 1420       Plan    Plan [facility]    Patient/Family in Agreement with Plan yes    Plan Comments Spoke with Christi at [facility] and she has submitted the patient for MD approval and can possibly have a bed this weekend. CM will continue to follow.    Final Discharge Disposition Code 62 - inpatient rehab facility                   Discharge Codes    No documentation.                 Expected Discharge Date and Time       Expected Discharge Date Expected Discharge Time    Oct 27, 2023               Ruiz Andino RN

## 2023-10-27 NOTE — CASE MANAGEMENT/SOCIAL WORK
Continued Stay Note   [place]     Patient Name: Zoila Nava  MRN: [id number]  Today's Date: 10/27/2023    Admit Date: 10/24/2023    Plan: [facility]   Discharge Plan       Row Name 10/27/23 0925       Plan    Plan [facility]    Patient/Family in Agreement with Plan yes    Plan Comments Spoke with patient and daughter at bedside. Plan is [facility] is following. Only discuss [facility] with Michaela [phone] first before speaking to the patient. ZEENAT will continue to follow.    Final Discharge Disposition Code 62 - inpatient rehab facility                   Discharge Codes    No documentation.                 Expected Discharge Date and Time       Expected Discharge Date Expected Discharge Time    Oct 27, 2023               Ruiz Andino RN

## 2023-10-28 PROCEDURE — 81003 URINALYSIS AUTO W/O SCOPE: CPT | Performed by: NURSE PRACTITIONER

## 2023-10-28 PROCEDURE — 25010000002 ONDANSETRON PER 1 MG: Performed by: NURSE PRACTITIONER

## 2023-10-28 RX ORDER — ONDANSETRON 2 MG/ML
4 INJECTION INTRAMUSCULAR; INTRAVENOUS EVERY 6 HOURS PRN
Status: DISCONTINUED | OUTPATIENT
Start: 2023-10-28 | End: 2023-10-30 | Stop reason: HOSPADM

## 2023-10-28 RX ADMIN — PREGABALIN 75 MG: 75 CAPSULE ORAL at 20:26

## 2023-10-28 RX ADMIN — Medication 10 ML: at 20:27

## 2023-10-28 RX ADMIN — HYDROCODONE BITARTRATE AND ACETAMINOPHEN 1 TABLET: 5; 325 TABLET ORAL at 20:25

## 2023-10-28 RX ADMIN — FAMOTIDINE 20 MG: 20 TABLET, FILM COATED ORAL at 20:26

## 2023-10-28 RX ADMIN — ASPIRIN 81 MG: 81 TABLET, COATED ORAL at 08:56

## 2023-10-28 RX ADMIN — PROPRANOLOL HYDROCHLORIDE 20 MG: 20 TABLET ORAL at 20:28

## 2023-10-28 RX ADMIN — MIDODRINE HYDROCHLORIDE 10 MG: 10 TABLET ORAL at 17:02

## 2023-10-28 RX ADMIN — Medication 10 ML: at 08:57

## 2023-10-28 RX ADMIN — RIVASTIGMINE 1 PATCH: 4.6 PATCH TRANSDERMAL at 08:57

## 2023-10-28 RX ADMIN — POLYMYXIN B SULFATE AND TRIMETHOPRIM 1 DROP: 10000; 1 SOLUTION OPHTHALMIC at 00:15

## 2023-10-28 RX ADMIN — FAMOTIDINE 20 MG: 20 TABLET, FILM COATED ORAL at 08:56

## 2023-10-28 RX ADMIN — HYDROCODONE BITARTRATE AND ACETAMINOPHEN 1 TABLET: 5; 325 TABLET ORAL at 05:59

## 2023-10-28 RX ADMIN — SENNOSIDES AND DOCUSATE SODIUM 2 TABLET: 8.6; 5 TABLET ORAL at 20:25

## 2023-10-28 RX ADMIN — MIDODRINE HYDROCHLORIDE 10 MG: 10 TABLET ORAL at 11:47

## 2023-10-28 RX ADMIN — POLYMYXIN B SULFATE AND TRIMETHOPRIM 1 DROP: 10000; 1 SOLUTION OPHTHALMIC at 17:02

## 2023-10-28 RX ADMIN — PROPRANOLOL HYDROCHLORIDE 20 MG: 20 TABLET ORAL at 05:58

## 2023-10-28 RX ADMIN — PREGABALIN 75 MG: 75 CAPSULE ORAL at 08:56

## 2023-10-28 RX ADMIN — POLYMYXIN B SULFATE AND TRIMETHOPRIM 1 DROP: 10000; 1 SOLUTION OPHTHALMIC at 23:30

## 2023-10-28 RX ADMIN — LEVOTHYROXINE SODIUM 125 MCG: 125 TABLET ORAL at 08:56

## 2023-10-28 RX ADMIN — MIDODRINE HYDROCHLORIDE 10 MG: 10 TABLET ORAL at 08:56

## 2023-10-28 RX ADMIN — ONDANSETRON 4 MG: 2 INJECTION INTRAMUSCULAR; INTRAVENOUS at 23:06

## 2023-10-28 RX ADMIN — SENNOSIDES AND DOCUSATE SODIUM 2 TABLET: 8.6; 5 TABLET ORAL at 08:56

## 2023-10-28 RX ADMIN — POLYMYXIN B SULFATE AND TRIMETHOPRIM 1 DROP: 10000; 1 SOLUTION OPHTHALMIC at 05:58

## 2023-10-28 RX ADMIN — TAMSULOSIN HYDROCHLORIDE 0.4 MG: 0.4 CAPSULE ORAL at 08:57

## 2023-10-28 RX ADMIN — POLYMYXIN B SULFATE AND TRIMETHOPRIM 1 DROP: 10000; 1 SOLUTION OPHTHALMIC at 11:47

## 2023-10-28 RX ADMIN — HYDROCODONE BITARTRATE AND ACETAMINOPHEN 1 TABLET: 5; 325 TABLET ORAL at 14:15

## 2023-10-28 NOTE — PROGRESS NOTES
[facility] Medicine Services  PROGRESS NOTE    Patient Name: Zoila Nava  : 1933  MRN: [id number]    Date of Admission: 10/24/2023  Primary Care Physician: Arnoldo Oneil MD    Subjective   Subjective     CC:  F/u fall, cofusion    HPI:  Patient resting in chair, dtr at bedside. Ate a donut for breakfast. Reports wearing 2L O2 at night at home, satting 88-90%felt a little SOA, placed NC 2L on and O2 improved to low 90s, felt better. Denies fever, malaise, NV. Reports no BM today (per flowsheet BM yesterday and day prior)    Objective   Objective     Vital Signs:   Temp:  [97.8 °F (36.6 °C)-98.4 °F (36.9 °C)] 98.2 °F (36.8 °C)  Heart Rate:  [] 69  Resp:  [18] 18  BP: (100-152)/(64-97) 100/64  Flow (L/min):  [2] 2     Physical Exam:  Constitutional: Alert, NAD, resting in chair  Head: NCAT  ENT: [place], moist mucous membranes   Respiratory: CTAB, satting 93% on 2L NC  Cardiovascular: RRR  gastrointestinal: Soft, NT, ND +BS  Musculoskeletal: RICE; wearing compression bandages bilat LE  Neurologic: mild memory impairment, face symmetric, speech clear, Muscle strength symmetric BUE/BLE  Skin: No rashes exposed areas  Psychiatric: cooperative; normal affect    Results Reviewed:  LAB RESULTS:      Lab 10/27/23  0429 10/24/23  1133 10/23/23  2059   WBC 11.61* 9.98 10.12   HEMOGLOBIN 12.5 12.4 11.9*   HEMATOCRIT 38.2 38.7 37.3   PLATELETS 224 234 228   NEUTROS ABS  --  7.95* 7.52*   IMMATURE GRANS (ABS)  --  0.07* 0.05   LYMPHS ABS  --  0.56* 0.87   MONOS ABS  --  0.86 1.07*   EOS ABS  --  0.42* 0.51*   MCV 92.0 95.3 95.4         Lab 10/27/23  0429 10/25/23  0544 10/24/23  1232 10/24/23  1133 10/23/23  2059   SODIUM 144 144  --  144 144   POTASSIUM 3.9 3.1*  --  3.4* 3.2*   CHLORIDE 103 104  --  106 104   CO2 31.0* 29.0  --  25.0 26.0   ANION GAP 10.0 11.0  --  13.0 14.0   BUN 31* 33*  --  42* 42*   CREATININE 1.38* 1.33*  --  1.68* 1.81*   EGFR 36.4* 38.1*  --  28.8* 26.3*    GLUCOSE 97 93  --  167* 129*   CALCIUM 8.5 9.0  --  8.7 8.6   MAGNESIUM  --   --   --  1.8 1.8   TSH  --   --  24.240*  --   --          Lab 10/24/23  1133 10/23/23  2059   TOTAL PROTEIN 6.0 6.2   ALBUMIN 3.8 3.4*   GLOBULIN 2.2 2.8   ALT (SGPT) 31 32   AST (SGOT) 22 20   BILIRUBIN 0.4 0.3   ALK PHOS 136* 125*         Lab 10/24/23  1614 10/24/23  1232 10/23/23  2059   PROBNP  --   --  8,093.0*   HSTROP T 63* 67*  --                  Brief Urine Lab Results  (Last result in the past 365 days)        Color   Clarity   Blood   Leuk Est   Nitrite   Protein   CREAT   Urine HCG        10/24/23 1145 Yellow   Clear   Negative   Negative   Negative   Negative                   Microbiology Results Abnormal       Procedure Component Value - Date/Time    COVID PRE-OP / PRE-PROCEDURE SCREENING ORDER (NO ISOLATION) - Swab, Nasopharynx [[id number]]  (Normal) Collected: 10/24/23 1133    Lab Status: Final result Specimen: Swab from Nasopharynx Updated: 10/24/23 1239    Narrative:      The following orders were created for panel order COVID PRE-OP / PRE-PROCEDURE SCREENING ORDER (NO ISOLATION) - Swab, Nasopharynx.  Procedure                               Abnormality         Status                     ---------                               -----------         ------                     COVID-19, FLU A/B, RSV P...[[id number]]  Normal              Final result                 Please view results for these tests on the individual orders.    COVID-19, FLU A/B, RSV PCR - Swab, Nasopharynx [[id number]]  (Normal) Collected: 10/24/23 1133    Lab Status: Final result Specimen: Swab from Nasopharynx Updated: 10/24/23 1239     COVID19 Not Detected     Influenza A PCR Not Detected     Influenza B PCR Not Detected     RSV, PCR Not Detected    Narrative:      Fact sheet for providers: https://www.fda.gov/media/024901/download    Fact sheet for patients: https://www.fda.gov/media/193215/download    Test performed by PCR.            FL Video Swallow  With Speech Single Contrast    Result Date: 10/26/2023  FL VIDEO SWALLOW W SPEECH SINGLE-CONTRAST Date of Exam: 10/26/2023 9:51 AM EDT Indication: dysphagia Comparison: None available. Technique:   The speech pathologist administered food and/or liquid mixed with barium to the patient with cine/video imaging.  Imaging assistance was provided to the speech pathologist and an image was saved. Fluoroscopic Time: 42 seconds Number of Images: 8 associated fluoroscopic loops were saved Findings: Please see speech therapy report for full details and recommendations.     Impression: Impression: Fluoroscopy provided for a modified barium swallow. Please see speech therapy report for full details and recommendations. Electronically Signed: Tyrone Aranda MD  10/26/2023 5:40 PM EDT  Workstation ID: [id number]     Results for orders placed during the hospital encounter of 04/04/23    Adult Transthoracic Echo Complete W/ Cont if Necessary Per Protocol    Interpretation Summary    Left ventricular systolic function is normal. Left ventricular ejection fraction appears to be 51 - 55%.    Mildly reduced right ventricular systolic function noted.    The left atrial cavity is dilated.    The right atrial cavity is dilated.    There is moderate calcification of the aortic valve.    Mild to moderate aortic valve regurgitation is present.    Mild mitral valve regurgitation is present.    Mild tricuspid valve regurgitation is present.      Current medications:  Scheduled Meds:aspirin, 81 mg, Oral, Daily  famotidine, 20 mg, Oral, BID  furosemide, 20 mg, Oral, Daily  HYDROcodone-acetaminophen, 1 tablet, Oral, Q8H  levothyroxine, 125 mcg, Oral, QAM  midodrine, 10 mg, Oral, TID AC  pregabalin, 75 mg, Oral, BID  propranolol, 20 mg, Oral, Q8H  rivastigmine, 1 patch, Transdermal, Daily  senna-docusate sodium, 2 tablet, Oral, BID  sodium chloride, 10 mL, Intravenous, Q12H  tamsulosin, 0.4 mg, Oral, Daily  trimethoprim-polymyxin b, 1 drop, Right Eye,  Q6H      Continuous Infusions:niCARdipine, 5-15 mg/hr, Last Rate: Stopped (10/24/23 2135)      PRN Meds:.  acetaminophen **OR** acetaminophen **OR** acetaminophen    senna-docusate sodium **AND** polyethylene glycol **AND** bisacodyl **AND** bisacodyl    Calcium Replacement - Follow Nurse / BPA Driven Protocol    Magnesium Low Dose Replacement - Follow Nurse / BPA Driven Protocol    O2    Phosphorus Replacement - Follow Nurse / BPA Driven Protocol    Potassium Replacement - Follow Nurse / BPA Driven Protocol    sodium chloride    sodium chloride    sodium chloride    Assessment & Plan   Assessment & Plan     Active Hospital Problems    Diagnosis  POA    **Confusion [R41.0]  Yes    AMS (altered mental status) [R41.82]  Yes    Concussion without loss of consciousness [S06.0X0A]  Yes    Hypotension [I95.9]  Yes    Chronic heart failure with preserved ejection fraction [I50.32]  Yes    Pulmonary hypertension [I27.20]  Yes    Stage 3 chronic kidney disease [N18.30]  Yes    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        Brief Hospital Course to date:  Zoila Nava is a 89 y.o. female w/ HFpEF, pulmHTN, mild memory deficits on Exelon patch, CKD3, HTN, hypothyroidism, pAfib, midodrine use who sustained a fall in the home, had normal eval in the ED and returned home, came back the following day w/ hypotension, missing medications from home med planner, and slightly more confused than baseline    This patient's problems and plans were partially entered by my partner and updated as appropriate by me 10/28/23.     Fall w/ head injury, concussion  -CT head in the ED and repeat w/o acute findings  -she appears back to her baseline cognitive status, alert and oriented   -PT/OT evaluation recommends IPR- referrals sent to [facility]  SLP has seen rec reg diet and thins    Proximal 1st Metacarpal Fracture, non-displaced  -will  curbside Ortho who recommended thumb Spica brace for comfort, f/u outpatient PRN  -currently without pain    PT wound seeing for Chronic BLE - wound care, unna boots    Hypokalemia  -- Klor-Con 40 mEq x 2 doses on 10/25/2023  --replace prn     CKD 3-4  -baseline Cr 1.3-1.6; eGFR 20-30's  -at baseline    Elevated TSH:  - free T4 normal, repeat TFTs outpatient in several weeks    Chronic conditions:  Chronic HFpEF  PulmHTN  Memory deficits  HTN  Hypothyroidism  pAfib        Expected Discharge Location and Transportation: [facility] anytime bed available  Expected Discharge   Expected Discharge Date: 10/27/2023; Expected Discharge Time:      DVT prophylaxis:  Mechanical DVT prophylaxis orders are present.     AM-PAC 6 Clicks Score (PT): 14 (10/27/23 1930)    CODE STATUS:   Code Status and Medical Interventions:   Ordered at: 10/24/23 1433     Level Of Support Discussed With:    Patient    Next of Kin (If No Surrogate)     Code Status (Patient has no pulse and is not breathing):    No CPR (Do Not Attempt to Resuscitate)     Medical Interventions (Patient has pulse or is breathing):    Full Support       Casie M Mayne, PA-C  10/28/23

## 2023-10-29 PROBLEM — S81.809D: Status: ACTIVE | Noted: 2023-10-29

## 2023-10-29 LAB
BILIRUB UR QL STRIP: NEGATIVE
CLARITY UR: CLEAR
COLOR UR: YELLOW
GLUCOSE UR STRIP-MCNC: NEGATIVE MG/DL
HGB UR QL STRIP.AUTO: NEGATIVE
KETONES UR QL STRIP: NEGATIVE
LEUKOCYTE ESTERASE UR QL STRIP.AUTO: NEGATIVE
NITRITE UR QL STRIP: NEGATIVE
PH UR STRIP.AUTO: 6 [PH] (ref 5–8)
PROT UR QL STRIP: NEGATIVE
SODIUM SERPL-SCNC: 139 MMOL/L (ref 136–145)
SP GR UR STRIP: 1.01 (ref 1–1.03)
UROBILINOGEN UR QL STRIP: NORMAL

## 2023-10-29 PROCEDURE — 25010000002 ONDANSETRON PER 1 MG: Performed by: NURSE PRACTITIONER

## 2023-10-29 PROCEDURE — 84295 ASSAY OF SERUM SODIUM: CPT | Performed by: NURSE PRACTITIONER

## 2023-10-29 PROCEDURE — 29581 APPL MULTLAYER CMPRN SYS LEG: CPT

## 2023-10-29 RX ORDER — CETIRIZINE HYDROCHLORIDE 10 MG/1
10 TABLET ORAL DAILY
Status: DISCONTINUED | OUTPATIENT
Start: 2023-10-29 | End: 2023-10-30 | Stop reason: HOSPADM

## 2023-10-29 RX ADMIN — PREGABALIN 75 MG: 75 CAPSULE ORAL at 20:13

## 2023-10-29 RX ADMIN — RIVASTIGMINE 1 PATCH: 4.6 PATCH TRANSDERMAL at 08:43

## 2023-10-29 RX ADMIN — ASPIRIN 81 MG: 81 TABLET, COATED ORAL at 08:42

## 2023-10-29 RX ADMIN — POLYMYXIN B SULFATE AND TRIMETHOPRIM 1 DROP: 10000; 1 SOLUTION OPHTHALMIC at 05:44

## 2023-10-29 RX ADMIN — LEVOTHYROXINE SODIUM 125 MCG: 125 TABLET ORAL at 08:41

## 2023-10-29 RX ADMIN — HYDROCODONE BITARTRATE AND ACETAMINOPHEN 1 TABLET: 5; 325 TABLET ORAL at 05:44

## 2023-10-29 RX ADMIN — CETIRIZINE HYDROCHLORIDE 10 MG: 10 TABLET, FILM COATED ORAL at 11:16

## 2023-10-29 RX ADMIN — MIDODRINE HYDROCHLORIDE 10 MG: 10 TABLET ORAL at 11:14

## 2023-10-29 RX ADMIN — MIDODRINE HYDROCHLORIDE 10 MG: 10 TABLET ORAL at 16:37

## 2023-10-29 RX ADMIN — FAMOTIDINE 20 MG: 20 TABLET, FILM COATED ORAL at 20:13

## 2023-10-29 RX ADMIN — POLYMYXIN B SULFATE AND TRIMETHOPRIM 1 DROP: 10000; 1 SOLUTION OPHTHALMIC at 23:29

## 2023-10-29 RX ADMIN — POLYMYXIN B SULFATE AND TRIMETHOPRIM 1 DROP: 10000; 1 SOLUTION OPHTHALMIC at 11:14

## 2023-10-29 RX ADMIN — PROPRANOLOL HYDROCHLORIDE 20 MG: 20 TABLET ORAL at 20:15

## 2023-10-29 RX ADMIN — ONDANSETRON 4 MG: 2 INJECTION INTRAMUSCULAR; INTRAVENOUS at 12:23

## 2023-10-29 RX ADMIN — Medication 10 ML: at 08:42

## 2023-10-29 RX ADMIN — FAMOTIDINE 20 MG: 20 TABLET, FILM COATED ORAL at 08:41

## 2023-10-29 RX ADMIN — Medication 10 ML: at 20:14

## 2023-10-29 RX ADMIN — SENNOSIDES AND DOCUSATE SODIUM 2 TABLET: 8.6; 5 TABLET ORAL at 08:42

## 2023-10-29 RX ADMIN — PREGABALIN 75 MG: 75 CAPSULE ORAL at 08:41

## 2023-10-29 RX ADMIN — TAMSULOSIN HYDROCHLORIDE 0.4 MG: 0.4 CAPSULE ORAL at 08:42

## 2023-10-29 RX ADMIN — FUROSEMIDE 20 MG: 20 TABLET ORAL at 08:42

## 2023-10-29 RX ADMIN — PROPRANOLOL HYDROCHLORIDE 20 MG: 20 TABLET ORAL at 05:44

## 2023-10-29 RX ADMIN — MIDODRINE HYDROCHLORIDE 10 MG: 10 TABLET ORAL at 08:42

## 2023-10-29 NOTE — PROGRESS NOTES
[facility] Medicine Services  PROGRESS NOTE    Patient Name: Zoila Nava  : 1933  MRN: [id number]    Date of Admission: 10/24/2023  Primary Care Physician: Arnoldo Oneil MD    Subjective   Subjective     CC:  F/u fall, cofusion    HPI:  Patient sitting up in a chair talking with family member in NAD.  She states that she feels much better.  States legs feel much better wrapped.  Pending placement.    Objective   Objective     Vital Signs:   Temp:  [97.4 °F (36.3 °C)-98.1 °F (36.7 °C)] 98.1 °F (36.7 °C)  Heart Rate:  [71-93] 71  Resp:  [18] 18  BP: (105-159)/(69-93) 118/93  Flow (L/min):  [2] 2     Physical Exam:  Constitutional: Alert, very pleasant elderly female sitting up in a chair in NAD  Eyes: EOMI, sclerae anicteric, no conjunctival injection  Head: NCAT  ENT: [place], moist mucous membranes   Respiratory: Nonlabored, symmetrical chest expansion, CTAB, 98% RA   Cardiovascular: RRR, no M/R/G, +DP pulses bilaterally  gastrointestinal: Soft, NT, ND +BS  Musculoskeletal: RICE; lower extremity Unna boots  Neurologic: Oriented to some things but confused about others, follows all commands, speech clear  Skin: No rashes on exposed skin  Psychiatric: Pleasant and cooperative; normal affect    Results Reviewed:  LAB RESULTS:      Lab 10/27/23  0429 10/24/23  1133 10/23/23  2059   WBC 11.61* 9.98 10.12   HEMOGLOBIN 12.5 12.4 11.9*   HEMATOCRIT 38.2 38.7 37.3   PLATELETS 224 234 228   NEUTROS ABS  --  7.95* 7.52*   IMMATURE GRANS (ABS)  --  0.07* 0.05   LYMPHS ABS  --  0.56* 0.87   MONOS ABS  --  0.86 1.07*   EOS ABS  --  0.42* 0.51*   MCV 92.0 95.3 95.4         Lab 10/29/23  1154 10/27/23  0429 10/25/23  0544 10/24/23  1232 10/24/23  1133 10/23/23  2059   SODIUM 139 144 144  --  144 144   POTASSIUM  --  3.9 3.1*  --  3.4* 3.2*   CHLORIDE  --  103 104  --  106 104   CO2  --  31.0* 29.0  --  25.0 26.0   ANION GAP  --  10.0 11.0  --  13.0 14.0   BUN  --  31* 33*  --  42* 42*    CREATININE  --  1.38* 1.33*  --  1.68* 1.81*   EGFR  --  36.4* 38.1*  --  28.8* 26.3*   GLUCOSE  --  97 93  --  167* 129*   CALCIUM  --  8.5 9.0  --  8.7 8.6   MAGNESIUM  --   --   --   --  1.8 1.8   TSH  --   --   --  24.240*  --   --          Lab 10/24/23  1133 10/23/23  2059   TOTAL PROTEIN 6.0 6.2   ALBUMIN 3.8 3.4*   GLOBULIN 2.2 2.8   ALT (SGPT) 31 32   AST (SGOT) 22 20   BILIRUBIN 0.4 0.3   ALK PHOS 136* 125*         Lab 10/24/23  1614 10/24/23  1232 10/23/23  2059   PROBNP  --   --  8,093.0*   HSTROP T 63* 67*  --                  Brief Urine Lab Results  (Last result in the past 365 days)        Color   Clarity   Blood   Leuk Est   Nitrite   Protein   CREAT   Urine HCG        10/28/23 2320 Yellow   Clear   Negative   Negative   Negative   Negative                   Microbiology Results Abnormal       Procedure Component Value - Date/Time    COVID PRE-OP / PRE-PROCEDURE SCREENING ORDER (NO ISOLATION) - Swab, Nasopharynx [[id number]]  (Normal) Collected: 10/24/23 1133    Lab Status: Final result Specimen: Swab from Nasopharynx Updated: 10/24/23 1239    Narrative:      The following orders were created for panel order COVID PRE-OP / PRE-PROCEDURE SCREENING ORDER (NO ISOLATION) - Swab, Nasopharynx.  Procedure                               Abnormality         Status                     ---------                               -----------         ------                     COVID-19, FLU A/B, RSV P...[[id number]]  Normal              Final result                 Please view results for these tests on the individual orders.    COVID-19, FLU A/B, RSV PCR - Swab, Nasopharynx [[id number]]  (Normal) Collected: 10/24/23 1133    Lab Status: Final result Specimen: Swab from Nasopharynx Updated: 10/24/23 1239     COVID19 Not Detected     Influenza A PCR Not Detected     Influenza B PCR Not Detected     RSV, PCR Not Detected    Narrative:      Fact sheet for providers: https://www.fda.gov/media/918831/download    Fact sheet  for patients: https://www.[employer].gov/media/242917/download    Test performed by PCR.            No radiology results from the last 24 hrs    Results for orders placed during the hospital encounter of 04/04/23    Adult Transthoracic Echo Complete W/ Cont if Necessary Per Protocol    Interpretation Summary    Left ventricular systolic function is normal. Left ventricular ejection fraction appears to be 51 - 55%.    Mildly reduced right ventricular systolic function noted.    The left atrial cavity is dilated.    The right atrial cavity is dilated.    There is moderate calcification of the aortic valve.    Mild to moderate aortic valve regurgitation is present.    Mild mitral valve regurgitation is present.    Mild tricuspid valve regurgitation is present.      Current medications:  Scheduled Meds:aspirin, 81 mg, Oral, Daily  cetirizine, 10 mg, Oral, Daily  famotidine, 20 mg, Oral, BID  furosemide, 20 mg, Oral, Daily  HYDROcodone-acetaminophen, 1 tablet, Oral, Q8H  levothyroxine, 125 mcg, Oral, QAM  midodrine, 10 mg, Oral, TID AC  pregabalin, 75 mg, Oral, BID  propranolol, 20 mg, Oral, Q8H  rivastigmine, 1 patch, Transdermal, Daily  senna-docusate sodium, 2 tablet, Oral, BID  sodium chloride, 10 mL, Intravenous, Q12H  tamsulosin, 0.4 mg, Oral, Daily  trimethoprim-polymyxin b, 1 drop, Right Eye, Q6H      Continuous Infusions:niCARdipine, 5-15 mg/hr, Last Rate: Stopped (10/24/23 2135)      PRN Meds:.  acetaminophen **OR** acetaminophen **OR** acetaminophen    senna-docusate sodium **AND** polyethylene glycol **AND** bisacodyl **AND** bisacodyl    Calcium Replacement - Follow Nurse / BPA Driven Protocol    Magnesium Low Dose Replacement - Follow Nurse / BPA Driven Protocol    O2    ondansetron    Phosphorus Replacement - Follow Nurse / BPA Driven Protocol    Potassium Replacement - Follow Nurse / BPA Driven Protocol    sodium chloride    sodium chloride    sodium chloride    Assessment & Plan   Assessment & Plan     Active  Hospital Problems    Diagnosis  POA    **Confusion [R41.0]  Yes    Multiple open wounds of lower extremity, unspecified laterality, subsequent encounter [S81.809D]  Not Applicable    AMS (altered mental status) [R41.82]  Yes    Concussion without loss of consciousness [S06.0X0A]  Yes    Hypotension [I95.9]  Yes    Chronic heart failure with preserved ejection fraction [I50.32]  Yes    Pulmonary hypertension [I27.20]  Yes    Stage 3 chronic kidney disease [N18.30]  Yes    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        Brief Hospital Course to date:  Zoila Nava is a 89 y.o. female w/ HFpEF, pulmHTN, mild memory deficits on Exelon patch, CKD3, HTN, hypothyroidism, pAfib, midodrine use who sustained a fall in the home, had normal eval in the ED and returned home, came back the following day w/ hypotension, missing medications from home med planner, and slightly more confused than baseline    This patient's problems and plans were partially entered by my partner and updated as appropriate by me 10/29/23.     Fall w/ head injury, concussion  -CT head in the ED and repeat w/o acute findings  -she appears back to her baseline cognitive status, alert and oriented   -PT/OT evaluation recommends IPR- referrals sent to [facility]  SLP has seen rec reg diet and thins    Proximal 1st Metacarpal Fracture, non-displaced  -will curbside Ortho who recommended thumb Spica brace for comfort, f/u outpatient PRN  -currently without pain    Chronic BLE wounds   --PT Wound following  -- wound care, unna boots    Hypokalemia  -- Klor-Con 40 mEq x 2 doses on 10/25/2023  --replace prn     CKD 3-4  -baseline Cr 1.3-1.6; eGFR 20-30's  -at baseline    Elevated TSH:  - free T4 normal, repeat TFTs outpatient in 6 weeks    Chronic conditions:  Chronic HFpEF  PulmHTN  Memory deficits  HTN  Hypothyroidism  pAfib        Expected Discharge  Location and Transportation: [facility] anytime bed available    Expected Discharge   Expected Discharge Date: 10/30/2023; Expected Discharge Time:      DVT prophylaxis:  Mechanical DVT prophylaxis orders are present.     AM-PAC 6 Clicks Score (PT): 17 (10/29/23 0800)    CODE STATUS:   Code Status and Medical Interventions:   Ordered at: 10/24/23 1433     Level Of Support Discussed With:    Patient    Next of Kin (If No Surrogate)     Code Status (Patient has no pulse and is not breathing):    No CPR (Do Not Attempt to Resuscitate)     Medical Interventions (Patient has pulse or is breathing):    Full Support       TONIE Roque  10/29/23

## 2023-10-29 NOTE — PLAN OF CARE
Goal Outcome Evaluation:  Plan of Care Reviewed With: patient           Outcome Evaluation: BLE edema improving.  PT converted pt to size 4/5 compressogrip doubled and overlapping for gradient compression, to help further reduce LE edema and improve skin integrity. PT will f/u with compression wrapping change in 2-3 days.

## 2023-10-30 VITALS
DIASTOLIC BLOOD PRESSURE: 78 MMHG | WEIGHT: 145 LBS | OXYGEN SATURATION: 89 % | TEMPERATURE: 98.3 F | RESPIRATION RATE: 18 BRPM | HEART RATE: 94 BPM | BODY MASS INDEX: 28.47 KG/M2 | SYSTOLIC BLOOD PRESSURE: 126 MMHG | HEIGHT: 60 IN

## 2023-10-30 RX ORDER — FUROSEMIDE 20 MG/1
20 TABLET ORAL DAILY
Start: 2023-10-31

## 2023-10-30 RX ORDER — PROPRANOLOL HYDROCHLORIDE 20 MG/1
20 TABLET ORAL EVERY 8 HOURS SCHEDULED
Start: 2023-10-30

## 2023-10-30 RX ADMIN — LEVOTHYROXINE SODIUM 125 MCG: 125 TABLET ORAL at 08:35

## 2023-10-30 RX ADMIN — POLYMYXIN B SULFATE AND TRIMETHOPRIM 1 DROP: 10000; 1 SOLUTION OPHTHALMIC at 06:00

## 2023-10-30 RX ADMIN — POLYMYXIN B SULFATE AND TRIMETHOPRIM 1 DROP: 10000; 1 SOLUTION OPHTHALMIC at 12:12

## 2023-10-30 RX ADMIN — TAMSULOSIN HYDROCHLORIDE 0.4 MG: 0.4 CAPSULE ORAL at 08:37

## 2023-10-30 RX ADMIN — SENNOSIDES AND DOCUSATE SODIUM 2 TABLET: 8.6; 5 TABLET ORAL at 08:37

## 2023-10-30 RX ADMIN — FUROSEMIDE 20 MG: 20 TABLET ORAL at 08:37

## 2023-10-30 RX ADMIN — ASPIRIN 81 MG: 81 TABLET, COATED ORAL at 08:37

## 2023-10-30 RX ADMIN — MIDODRINE HYDROCHLORIDE 10 MG: 10 TABLET ORAL at 12:11

## 2023-10-30 RX ADMIN — CETIRIZINE HYDROCHLORIDE 10 MG: 10 TABLET, FILM COATED ORAL at 08:37

## 2023-10-30 RX ADMIN — PROPRANOLOL HYDROCHLORIDE 20 MG: 20 TABLET ORAL at 06:00

## 2023-10-30 RX ADMIN — Medication 10 ML: at 08:38

## 2023-10-30 RX ADMIN — FAMOTIDINE 20 MG: 20 TABLET, FILM COATED ORAL at 08:35

## 2023-10-30 RX ADMIN — MIDODRINE HYDROCHLORIDE 10 MG: 10 TABLET ORAL at 08:35

## 2023-10-30 RX ADMIN — PREGABALIN 75 MG: 75 CAPSULE ORAL at 08:37

## 2023-10-30 NOTE — DISCHARGE SUMMARY
[facility] Medicine Services  DISCHARGE SUMMARY    Patient Name: Zoila Nava  : 1933  MRN: [id number]    Date of Admission: 10/24/2023 11:20 AM  Date of Discharge:  10/30/2023  Primary Care Physician: Arnoldo Oneil MD    Consults       No orders found from 2023 to 10/25/2023.            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          Hospital Course:  Zoila Nava is a 89 y.o. female w/ HFpEF, pulmHTN, mild memory deficits on Exelon patch, CKD3, HTN, hypothyroidism, pAfib, midodrine use who sustained a fall in the home, had normal eval in the ED and returned home, came back the following day w/ hypotension, missing medications from home med planner, and slightly more confused than baseline     Fall w/ head injury, concussion  -CT head in the ED and repeat w/o acute findings  -she appears back to her baseline cognitive status, alert and oriented   -PT/OT evaluation recommends IPR- planning H today  -SLP has seen rec reg diet and thins     Proximal 1st Metacarpal Fracture, non-displaced  -Ortho recommended thumb Spica brace for comfort, f/u outpatient PRN  -currently without pain     Chronic BLE wounds   --PT Wound following  -- wound care, unna boots in place     Hypokalemia  -- monitor, continue daily replacement      CKD 3-4  -baseline Cr 1.3-1.6, at baseline     Elevated TSH:  - free T4 normal, repeat TFTs outpatient in 6 weeks     Chronic conditions:  Chronic HFpEF - has heart and valve f/u on 11/15  PulmHTN  Memory deficits  HTN  Hypothyroidism  pAfib  - continue home medications as per Med rec below      Discharge Follow Up Recommendations for outpatient labs/diagnostics:       Day of Discharge     HPI:   Patient sitting up in bed, she has no complaints. Denies pain.     Review of Systems  Gen- No fevers, chills  CV- No chest pain, palpitations  Resp- No cough, dyspnea  GI- No N/V/D, abd pain    Vital Signs:   Temp:  [97.9 °F (36.6 °C)-98.6 °F (37 °C)] 98.6 °F (37 °C)  Heart Rate:  [] 96  Resp:  [16-18] 16  BP: ()/(52-90) 108/59  Flow (L/min):  [2] 2      Physical Exam:  Constitutional: No acute distress, awake, alert  HENT: NCAT, mucous membranes moist  Respiratory: Clear to auscultation bilaterally, respiratory effort normal   Cardiovascular: RRR, no murmurs, rubs, or gallops  Gastrointestinal: soft, nontender, nondistended  Musculoskeletal: bilateral LE compression wraps in place  Psychiatric: Appropriate affect, cooperative  Neurologic: Oriented to person/place, speech clear, no focal deficits  Skin: No rashes      Pertinent  and/or Most Recent Results     LAB RESULTS:      Lab 10/27/23  0429 10/24/23  1133 10/23/23  2059   WBC 11.61* 9.98 10.12   HEMOGLOBIN 12.5 12.4 11.9*   HEMATOCRIT 38.2 38.7 37.3   PLATELETS 224 234 228   NEUTROS ABS  --  7.95* 7.52*   IMMATURE GRANS (ABS)  --  0.07* 0.05   LYMPHS ABS  --  0.56* 0.87   MONOS ABS  --  0.86 1.07*   EOS ABS  --  0.42* 0.51*   MCV 92.0 95.3 95.4         Lab 10/29/23  1154 10/27/23  0429 10/25/23  0544 10/24/23  1232 10/24/23  1133 10/23/23  2059   SODIUM 139 144 144  --  144 144   POTASSIUM  --  3.9 3.1*  --  3.4* 3.2*   CHLORIDE  --  103 104  --  106 104   CO2  --  31.0* 29.0  --  25.0 26.0   ANION GAP  --  10.0 11.0  --  13.0 14.0   BUN  --  31* 33*  --   42* 42*   CREATININE  --  1.38* 1.33*  --  1.68* 1.81*   EGFR  --  36.4* 38.1*  --  28.8* 26.3*   GLUCOSE  --  97 93  --  167* 129*   CALCIUM  --  8.5 9.0  --  8.7 8.6   MAGNESIUM  --   --   --   --  1.8 1.8   TSH  --   --   --  24.240*  --   --          Lab 10/24/23  1133 10/23/23  2059   TOTAL PROTEIN 6.0 6.2   ALBUMIN 3.8 3.4*   GLOBULIN 2.2 2.8   ALT (SGPT) 31 32   AST (SGOT) 22 20   BILIRUBIN 0.4 0.3   ALK PHOS 136* 125*         Lab 10/24/23  1614 10/24/23  1232 10/23/23  2059   PROBNP  --   --  8,093.0*   HSTROP T 63* 67*  --                  Brief Urine Lab Results  (Last result in the past 365 days)        Color   Clarity   Blood   Leuk Est   Nitrite   Protein   CREAT   Urine HCG        10/28/23 2320 Yellow   Clear   Negative   Negative   Negative   Negative                 Microbiology Results (last 10 days)       Procedure Component Value - Date/Time    COVID PRE-OP / PRE-PROCEDURE SCREENING ORDER (NO ISOLATION) - Swab, Nasopharynx [[id number]]  (Normal) Collected: 10/24/23 1133    Lab Status: Final result Specimen: Swab from Nasopharynx Updated: 10/24/23 1239    Narrative:      The following orders were created for panel order COVID PRE-OP / PRE-PROCEDURE SCREENING ORDER (NO ISOLATION) - Swab, Nasopharynx.  Procedure                               Abnormality         Status                     ---------                               -----------         ------                     COVID-19, FLU A/B, RSV P...[[id number]]  Normal              Final result                 Please view results for these tests on the individual orders.    COVID-19, FLU A/B, RSV PCR - Swab, Nasopharynx [[id number]]  (Normal) Collected: 10/24/23 1133    Lab Status: Final result Specimen: Swab from Nasopharynx Updated: 10/24/23 1239     COVID19 Not Detected     Influenza A PCR Not Detected     Influenza B PCR Not Detected     RSV, PCR Not Detected    Narrative:      Fact sheet for providers: https://www.fda.gov/media/094511/download     Fact sheet for patients: https://www.fda.gov/media/734425/download    Test performed by PCR.            FL Video Swallow With Speech Single Contrast    Result Date: 10/26/2023  FL VIDEO SWALLOW W SPEECH SINGLE-CONTRAST Date of Exam: 10/26/2023 9:51 AM EDT Indication: dysphagia Comparison: None available. Technique:   The speech pathologist administered food and/or liquid mixed with barium to the patient with cine/video imaging.  Imaging assistance was provided to the speech pathologist and an image was saved. Fluoroscopic Time: 42 seconds Number of Images: 8 associated fluoroscopic loops were saved Findings: Please see speech therapy report for full details and recommendations.     Impression: Fluoroscopy provided for a modified barium swallow. Please see speech therapy report for full details and recommendations. Electronically Signed: Tyrone Aranda MD  10/26/2023 5:40 PM EDT  Workstation ID: [id number]    XR Hand 3+ View Right    Result Date: 10/24/2023  XR HAND 3+ VW RIGHT Date of Exam: 10/24/2023 4:22 PM EDT Indication: pain Comparison: None available. Findings: 3 views. There is a transverse fracture of the proximal portion of the first metacarpal with mild impaction and mild cortical offset although without significant displacement. There is no joint involvement. There is osteoporosis. There is severe narrowing of the DIP joints and IP joint of the thumb. There is a large old avulsion at the dorsal aspect of the third digit DIP joint. There are large osteophytes at the DIP joints and IP joint of the thumb. There is mild ulnar subluxation of the distal phalanx of the third digit in relation to the middle phalanx. There is severe narrowing of the first CMC joint with mild spurring. There is moderately severe narrowing of the radiocarpal joint and there is some chondrocalcinosis in the wrist. A prominent cyst in the capitate likely is degenerative in nature.     Impression: Transverse primarily nondisplaced fracture  of the proximal first metacarpal. Other chronic findings as detailed. Electronically Signed: Georgina Ramsey MD  10/24/2023 4:47 PM EDT  Workstation ID: [id number]    CT Head Without Contrast    Result Date: 10/24/2023  CT HEAD WO CONTRAST Date of Exam: 10/24/2023 12:21 PM EDT Indication: AMS since overnight.  neg head ct last evening. Comparison: Head CT 10/23/2023 Technique: Axial CT images were obtained of the head without contrast administration.  Automated exposure control and iterative construction methods were used. Findings: No acute intracranial hemorrhage. No acute large territory infarct. No mass effect. No extra-axial collections. Normal size and configuration of the ventricles. Redemonstration of bilateral exophthalmos with otherwise unremarkable appearance of the orbits. There is a contusion of the left parietal scalp which is unchanged. The paranasal sinuses are clear. The mastoid air cells are clear. No acute or suspicious bony findings.     Impression: No acute intracranial findings. No significant interval change. Redemonstration of left parietal scalp contusion. Electronically Signed: Matt Chandler MD  10/24/2023 12:28 PM EDT  Workstation ID: [id number]    XR Chest 1 View    Result Date: 10/24/2023  XR CHEST 1 VW Date of Exam: 10/24/2023 11:35 AM EDT Indication: Weak/Dizzy/AMS triage protocol Comparison: 10/23/2023 and older exams Findings: There is stable moderately severe cardiomegaly. There is infiltrate in the left lower lobe which is thought to be chronic when compared to multiple old exams. Coarse interstitial pattern elsewhere in the lung fields also appears most likely chronic. There are no definite acute infiltrates or effusions..     Impression: Chronic findings. No definite acute process. Electronically Signed: Georgina Ramsey MD  10/24/2023 12:18 PM EDT  Workstation ID: [id number]    XR Chest 2 View    Result Date: 10/23/2023  XR CHEST 2 VW Date of Exam: 10/23/2023 9:51 PM EDT Indication:  Fall; Elevated BNP; recent change in diuretic Comparison: None available. Findings:  There is no acute infiltrate. There is no large pleural effusion. There are diffuse increased interstitial markings unchanged from earlier examinations which may be chronic in nature. The cardiac silhouette is poorly evaluated due to AP technique and patient rotation. There is a calcified tortuous aorta. The visualized radha structures demonstrate no abnormality.     Impression: No definite infiltrate. Chronic interstitial changes. Calcified tortuous aorta Electronically Signed: Montez Reynolds MD  10/23/2023 10:46 PM EDT  Workstation ID: [id number]    CT Head Without Contrast    Result Date: 10/23/2023  CT HEAD WO CONTRAST Date of Exam: 10/23/2023 7:51 PM EDT Indication: Fall. Comparison: None available. Technique: Axial CT images were obtained of the head without contrast administration.  Automated exposure control and iterative construction methods were used. Findings: The ventricles and subarachnoid spaces are unremarkable. There is no intracranial hemorrhage. There is no evidence of resent large arterial distribution infarct. There is no edema or mass effect. No midline shift. Limited evaluation of the orbits is unremarkable The visualized paranasal sinuses are unremarkable.. Evaluation of the osseous structure at the appropriate bones window is unremarkable.     Impression: No acute intracranial pathology. Electronically Signed: Montez Reynolds MD  10/23/2023 8:32 PM EDT  Workstation ID: [id number]    CT Cervical Spine Without Contrast    Result Date: 10/23/2023  CT CERVICAL SPINE WO CONTRAST Date of Exam: 10/23/2023 7:51 PM EDT Indication: Fall. Comparison: None available. Technique: Axial CT images were obtained of the cervical spine without contrast administration.  Reconstructed coronal and sagittal images were also obtained. Automated exposure control and iterative construction methods were used. Findings: Spine *No acute  fractures or dislocations. *Normal alignment. *No prevertebral soft tissue swelling. *No osseous destructive lesions. *Vertebral body heights and disc spaces are normal. *There are diffuse degenerative changes of the cervical spine with multilevel loss of joint space and partial fusion of the C4-5 through C6-7 levels.     1.No acute fractures or dislocations.  Electronically Signed: Montez Reynolds MD  10/23/2023 8:32 PM EDT  Workstation ID: [id number]             Results for orders placed during the hospital encounter of 04/04/23    Adult Transthoracic Echo Complete W/ Cont if Necessary Per Protocol    Interpretation Summary    Left ventricular systolic function is normal. Left ventricular ejection fraction appears to be 51 - 55%.    Mildly reduced right ventricular systolic function noted.    The left atrial cavity is dilated.    The right atrial cavity is dilated.    There is moderate calcification of the aortic valve.    Mild to moderate aortic valve regurgitation is present.    Mild mitral valve regurgitation is present.    Mild tricuspid valve regurgitation is present.      Plan for Follow-up of Pending Labs/Results:     Discharge Details        Discharge Medications        Changes to Medications        Instructions Start Date   furosemide 20 MG tablet  Commonly known as: LASIX  What changed: how much to take   20 mg, Oral, Daily   Start Date: October 31, 2023     propranolol 20 MG tablet  Commonly known as: INDERAL  What changed:   medication strength  how much to take  when to take this   20 mg, Oral, Every 8 Hours Scheduled             Continue These Medications        Instructions Start Date   aspirin 81 MG EC tablet   81 mg, Oral, Daily, OTC      famotidine 20 MG tablet  Commonly known as: PEPCID   TAKE ONE TABLET BY MOUTH TWICE A DAY      fexofenadine 180 MG tablet  Commonly known as: ALLEGRA   180 mg, Oral, Daily      HYDROcodone-acetaminophen 7.5-325 MG per tablet  Commonly known as: NORCO   1 tablet,  Oral, Every 8 Hours      levothyroxine 125 MCG tablet  Commonly known as: SYNTHROID, LEVOTHROID   TAKE ONE TABLET BY MOUTH EVERY MORNING      Lyrica 150 MG capsule  Generic drug: pregabalin   150 mg, Oral, 2 Times Daily      metOLazone 2.5 MG tablet  Commonly known as: ZAROXOLYN   2 times per week, M,  F,      midodrine 10 MG tablet  Commonly known as: PROAMATINE   10 mg, Oral, 3 Times Daily Before Meals      multivitamins-minerals capsule capsule   1 capsule, Oral, 2 Times Daily, OTC      O2  Commonly known as: OXYGEN   2 L/min, Inhalation, Nightly PRN      OSTEO BI-FLEX ONE PER DAY PO   1 tablet, Oral, Daily, OTC      potassium chloride 10 MEQ CR tablet   20 mEq, Oral, Daily      rivaroxaban 15 MG tablet  Commonly known as: XARELTO   15 mg, Oral, Daily With Dinner      rivastigmine 4.6 MG/24HR patch  Commonly known as: EXELON   APPLY ONE PATCH TO THE SKIN DAILY      sodium chloride 0.65 % nasal spray   2 sprays, Nasal, As Needed      tamsulosin 0.4 MG capsule 24 hr capsule  Commonly known as: FLOMAX   0.4 mg, Oral, Daily      vitamin B-12 1000 MCG tablet  Commonly known as: CYANOCOBALAMIN   1,000 mcg, Oral, Daily, OTC             Stop These Medications      cetirizine 10 MG tablet  Commonly known as: zyrTEC     fluticasone 50 MCG/ACT nasal spray  Commonly known as: FLONASE              Allergies   Allergen Reactions    Penicillins Other (See Comments)     CHILDHOOD ALLERGY           Discharge Disposition:  Short Term Hospital (DC - External)    Diet:  Hospital:  Diet Order   Procedures    Diet: Regular/House Diet; Texture: Regular Texture (IDDSI 7); Fluid Consistency: Thin (IDDSI 0)            Activity:  - as tolerated    Restrictions or Other Recommendations:  - none       CODE STATUS:    Code Status and Medical Interventions:   Ordered at: 10/24/23 4070     Level Of Support Discussed With:    Patient    Next of Kin (If No Surrogate)     Code Status (Patient has no pulse and is not breathing):    No CPR (Do Not  Attempt to Resuscitate)     Medical Interventions (Patient has pulse or is breathing):    Full Support       Future Appointments   Date Time Provider Department Center   11/15/2023  2:15 PM Salvador Barnes APRN [facility]   1/10/2024 11:20 AM Garfield Duron MD [facility]   6/24/2024 11:45 AM Basil Richter MD [facility]                 Milagro Lebron DO  10/30/23      Time Spent on Discharge:  I spent  40  minutes on this discharge activity which included: face-to-face encounter with the patient, reviewing the data in the system, coordination of the care with the nursing staff as well as consultants, documentation, and entering orders.

## 2023-10-30 NOTE — DISCHARGE PLACEMENT REQUEST
"Driss Hidalgo (89 y.o. Female)       Date of Birth   07/13/1933    Social Security Number       Address   [address] [place] [address]    Home Phone   [phone]    MRN   [id number]       Confucianist   Presbyterian    Marital Status                               Admission Date   10/24/23    Admission Type   Emergency    Admitting Provider   Milagro Lebron DO    Attending Provider   Milagro Lebron DO    Department, Room/Bed   [facility] 3F, S313/1       Discharge Date       Discharge Disposition   Short Term Hospital (DC - External)    Discharge Destination                                 Attending Provider: Milagro Lebron DO    Allergies: Penicillins    Isolation: None   Infection: None   Code Status: No CPR    Ht: 152.4 cm (60\")   Wt: 65.8 kg (145 lb)    Admission Cmt: None   Principal Problem: Confusion [R41.0]                   Active Insurance as of 10/24/2023       Primary Coverage       Payor Plan Insurance Group Employer/Plan Group    MEDICARE MEDICARE A & B        Payor Plan Address Payor Plan Phone Number Payor Plan Fax Number Effective Dates    [address] [phone]  7/1/1998 - None Entered    [facility] [id number]         Subscriber Name Subscriber Birth Date Member ID       DRISS HIDALGO 7/13/1933 [id number]               Secondary Coverage       Payor Plan Insurance Group Employer/Plan Group    AARP MC SUP AAR HEALTH CARE OPTIONS PLAN C       Payor Plan Address Payor Plan Phone Number Payor Plan Fax Number Effective Dates    [facility] [phone]  1/1/2016 - None Entered    [address]       [facility] [address]         Subscriber Name Subscriber Birth Date Member ID       DRISS HIDALGO 7/13/1933 [id number]                     Emergency Contacts        (Rel.) Home Phone Work Phone Mobile Phone    Matt Hidalgo (Spouse) -- -- [phone]    Nidia Cardenas (Daughter) -- -- [phone]    LALO RATLIFF " (Relative) -- -- [phone]    DORIAN RASMUSSEN (Sister) [phone] -- [phone]    Jacy Johnson (Daughter) [phone] -- --              Insurance Information                  MEDICARE/MEDICARE A & B Phone: [phone]    Subscriber: Zoila Nava Subscriber#: [id number]    Group#: -- Precert#: --        [facility] SUP/[facility] HEALTH CARE OPTIONS Phone: [phone]    Subscriber: Zoila Nava Subscriber#: [id number]    Group#: PLAN C Precert#: --               Discharge Summary        Milagro Lebron, DO at 10/30/23 [address] [place] [address] Medicine Services  DISCHARGE SUMMARY    Patient Name: Zoila Nava  : 1933  MRN: [id number]    Date of Admission: 10/24/2023 11:20 AM  Date of Discharge:  10/30/2023  Primary Care Physician: Arnoldo Oneil MD    Consults       No orders found from 2023 to 10/25/2023.            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          Hospital Course:  Zoila Nava is a 89 y.o. female w/ HFpEF, pulmHTN, mild memory deficits on Exelon patch, CKD3, HTN, hypothyroidism, pAfib, midodrine use who sustained a fall in the home, had normal eval in the ED and returned home, came back the following day w/ hypotension, missing medications from home med planner, and slightly more  confused than baseline     Fall w/ head injury, concussion  -CT head in the ED and repeat w/o acute findings  -she appears back to her baseline cognitive status, alert and oriented   -PT/OT evaluation recommends IPR- planning H today  -SLP has seen rec reg diet and thins     Proximal 1st Metacarpal Fracture, non-displaced  -Ortho recommended thumb Spica brace for comfort, f/u outpatient PRN  -currently without pain     Chronic BLE wounds   --PT Wound following  -- wound care, unna boots in place     Hypokalemia  -- monitor, continue daily replacement     CKD 3-4  -baseline Cr 1.3-1.6, at baseline     Elevated TSH:  - free T4 normal, repeat TFTs outpatient in 6 weeks     Chronic conditions:  Chronic HFpEF - has heart and valve f/u on 11/15  PulmHTN  Memory deficits  HTN  Hypothyroidism  pAfib  - continue home medications as per Med rec below      Discharge Follow Up Recommendations for outpatient labs/diagnostics:       Day of Discharge     HPI:   Patient sitting up in bed, she has no complaints. Denies pain.     Review of Systems  Gen- No fevers, chills  CV- No chest pain, palpitations  Resp- No cough, dyspnea  GI- No N/V/D, abd pain    Vital Signs:   Temp:  [97.9 °F (36.6 °C)-98.6 °F (37 °C)] 98.6 °F (37 °C)  Heart Rate:  [] 96  Resp:  [16-18] 16  BP: ()/(52-90) 108/59  Flow (L/min):  [2] 2      Physical Exam:  Constitutional: No acute distress, awake, alert  HENT: NCAT, mucous membranes moist  Respiratory: Clear to auscultation bilaterally, respiratory effort normal   Cardiovascular: RRR, no murmurs, rubs, or gallops  Gastrointestinal: soft, nontender, nondistended  Musculoskeletal: bilateral LE compression wraps in place  Psychiatric: Appropriate affect, cooperative  Neurologic: Oriented to person/place, speech clear, no focal deficits  Skin: No rashes      Pertinent  and/or Most Recent Results     LAB RESULTS:      Lab 10/27/23  0429 10/24/23  1133 10/23/23  2059   WBC 11.61* 9.98 10.12    HEMOGLOBIN 12.5 12.4 11.9*   HEMATOCRIT 38.2 38.7 37.3   PLATELETS 224 234 228   NEUTROS ABS  --  7.95* 7.52*   IMMATURE GRANS (ABS)  --  0.07* 0.05   LYMPHS ABS  --  0.56* 0.87   MONOS ABS  --  0.86 1.07*   EOS ABS  --  0.42* 0.51*   MCV 92.0 95.3 95.4         Lab 10/29/23  1154 10/27/23  0429 10/25/23  0544 10/24/23  1232 10/24/23  1133 10/23/23  2059   SODIUM 139 144 144  --  144 144   POTASSIUM  --  3.9 3.1*  --  3.4* 3.2*   CHLORIDE  --  103 104  --  106 104   CO2  --  31.0* 29.0  --  25.0 26.0   ANION GAP  --  10.0 11.0  --  13.0 14.0   BUN  --  31* 33*  --  42* 42*   CREATININE  --  1.38* 1.33*  --  1.68* 1.81*   EGFR  --  36.4* 38.1*  --  28.8* 26.3*   GLUCOSE  --  97 93  --  167* 129*   CALCIUM  --  8.5 9.0  --  8.7 8.6   MAGNESIUM  --   --   --   --  1.8 1.8   TSH  --   --   --  24.240*  --   --          Lab 10/24/23  1133 10/23/23  2059   TOTAL PROTEIN 6.0 6.2   ALBUMIN 3.8 3.4*   GLOBULIN 2.2 2.8   ALT (SGPT) 31 32   AST (SGOT) 22 20   BILIRUBIN 0.4 0.3   ALK PHOS 136* 125*         Lab 10/24/23  1614 10/24/23  1232 10/23/23  2059   PROBNP  --   --  8,093.0*   HSTROP T 63* 67*  --                  Brief Urine Lab Results  (Last result in the past 365 days)        Color   Clarity   Blood   Leuk Est   Nitrite   Protein   CREAT   Urine HCG        10/28/23 2320 Yellow   Clear   Negative   Negative   Negative   Negative                 Microbiology Results (last 10 days)       Procedure Component Value - Date/Time    COVID PRE-OP / PRE-PROCEDURE SCREENING ORDER (NO ISOLATION) - Swab, Nasopharynx [[id number]]  (Normal) Collected: 10/24/23 4999    Lab Status: Final result Specimen: Swab from Nasopharynx Updated: 10/24/23 9890    Narrative:      The following orders were created for panel order COVID PRE-OP / PRE-PROCEDURE SCREENING ORDER (NO ISOLATION) - Swab, Nasopharynx.  Procedure                               Abnormality         Status                     ---------                               -----------          ------                     COVID-19, FLU A/B, RSV P...[[id number]]  Normal              Final result                 Please view results for these tests on the individual orders.    COVID-19, FLU A/B, RSV PCR - Swab, Nasopharynx [[id number]]  (Normal) Collected: 10/24/23 1133    Lab Status: Final result Specimen: Swab from Nasopharynx Updated: 10/24/23 1239     COVID19 Not Detected     Influenza A PCR Not Detected     Influenza B PCR Not Detected     RSV, PCR Not Detected    Narrative:      Fact sheet for providers: https://www.fda.gov/media/510123/download    Fact sheet for patients: https://www.fda.gov/media/332077/download    Test performed by PCR.            FL Video Swallow With Speech Single Contrast    Result Date: 10/26/2023  FL VIDEO SWALLOW W SPEECH SINGLE-CONTRAST Date of Exam: 10/26/2023 9:51 AM EDT Indication: dysphagia Comparison: None available. Technique:   The speech pathologist administered food and/or liquid mixed with barium to the patient with cine/video imaging.  Imaging assistance was provided to the speech pathologist and an image was saved. Fluoroscopic Time: 42 seconds Number of Images: 8 associated fluoroscopic loops were saved Findings: Please see speech therapy report for full details and recommendations.     Impression: Fluoroscopy provided for a modified barium swallow. Please see speech therapy report for full details and recommendations. Electronically Signed: Tyrone Aranda MD  10/26/2023 5:40 PM EDT  Workstation ID: [id number]    XR Hand 3+ View Right    Result Date: 10/24/2023  XR HAND 3+ VW RIGHT Date of Exam: 10/24/2023 4:22 PM EDT Indication: pain Comparison: None available. Findings: 3 views. There is a transverse fracture of the proximal portion of the first metacarpal with mild impaction and mild cortical offset although without significant displacement. There is no joint involvement. There is osteoporosis. There is severe narrowing of the DIP joints and IP joint of the  thumb. There is a large old avulsion at the dorsal aspect of the third digit DIP joint. There are large osteophytes at the DIP joints and IP joint of the thumb. There is mild ulnar subluxation of the distal phalanx of the third digit in relation to the middle phalanx. There is severe narrowing of the first CMC joint with mild spurring. There is moderately severe narrowing of the radiocarpal joint and there is some chondrocalcinosis in the wrist. A prominent cyst in the capitate likely is degenerative in nature.     Impression: Transverse primarily nondisplaced fracture of the proximal first metacarpal. Other chronic findings as detailed. Electronically Signed: Georgina Ramsey MD  10/24/2023 4:47 PM EDT  Workstation ID: [id number]    CT Head Without Contrast    Result Date: 10/24/2023  CT HEAD WO CONTRAST Date of Exam: 10/24/2023 12:21 PM EDT Indication: AMS since overnight.  neg head ct last evening. Comparison: Head CT 10/23/2023 Technique: Axial CT images were obtained of the head without contrast administration.  Automated exposure control and iterative construction methods were used. Findings: No acute intracranial hemorrhage. No acute large territory infarct. No mass effect. No extra-axial collections. Normal size and configuration of the ventricles. Redemonstration of bilateral exophthalmos with otherwise unremarkable appearance of the orbits. There is a contusion of the left parietal scalp which is unchanged. The paranasal sinuses are clear. The mastoid air cells are clear. No acute or suspicious bony findings.     Impression: No acute intracranial findings. No significant interval change. Redemonstration of left parietal scalp contusion. Electronically Signed: Matt Chandler MD  10/24/2023 12:28 PM EDT  Workstation ID: [id number]    XR Chest 1 View    Result Date: 10/24/2023  XR CHEST 1 VW Date of Exam: 10/24/2023 11:35 AM EDT Indication: Weak/Dizzy/AMS triage protocol Comparison: 10/23/2023 and older exams  Findings: There is stable moderately severe cardiomegaly. There is infiltrate in the left lower lobe which is thought to be chronic when compared to multiple old exams. Coarse interstitial pattern elsewhere in the lung fields also appears most likely chronic. There are no definite acute infiltrates or effusions..     Impression: Chronic findings. No definite acute process. Electronically Signed: Georgina Ramsey MD  10/24/2023 12:18 PM EDT  Workstation ID: [id number]    XR Chest 2 View    Result Date: 10/23/2023  XR CHEST 2 VW Date of Exam: 10/23/2023 9:51 PM EDT Indication: Fall; Elevated BNP; recent change in diuretic Comparison: None available. Findings:  There is no acute infiltrate. There is no large pleural effusion. There are diffuse increased interstitial markings unchanged from earlier examinations which may be chronic in nature. The cardiac silhouette is poorly evaluated due to AP technique and patient rotation. There is a calcified tortuous aorta. The visualized radha structures demonstrate no abnormality.     Impression: No definite infiltrate. Chronic interstitial changes. Calcified tortuous aorta Electronically Signed: Montez Reynolds MD  10/23/2023 10:46 PM EDT  Workstation ID: [id number]    CT Head Without Contrast    Result Date: 10/23/2023  CT HEAD WO CONTRAST Date of Exam: 10/23/2023 7:51 PM EDT Indication: Fall. Comparison: None available. Technique: Axial CT images were obtained of the head without contrast administration.  Automated exposure control and iterative construction methods were used. Findings: The ventricles and subarachnoid spaces are unremarkable. There is no intracranial hemorrhage. There is no evidence of resent large arterial distribution infarct. There is no edema or mass effect. No midline shift. Limited evaluation of the orbits is unremarkable The visualized paranasal sinuses are unremarkable.. Evaluation of the osseous structure at the appropriate bones window is unremarkable.      Impression: No acute intracranial pathology. Electronically Signed: Montez Reynolds MD  10/23/2023 8:32 PM EDT  Workstation ID: [id number]    CT Cervical Spine Without Contrast    Result Date: 10/23/2023  CT CERVICAL SPINE WO CONTRAST Date of Exam: 10/23/2023 7:51 PM EDT Indication: Fall. Comparison: None available. Technique: Axial CT images were obtained of the cervical spine without contrast administration.  Reconstructed coronal and sagittal images were also obtained. Automated exposure control and iterative construction methods were used. Findings: Spine *No acute fractures or dislocations. *Normal alignment. *No prevertebral soft tissue swelling. *No osseous destructive lesions. *Vertebral body heights and disc spaces are normal. *There are diffuse degenerative changes of the cervical spine with multilevel loss of joint space and partial fusion of the C4-5 through C6-7 levels.     1.No acute fractures or dislocations.  Electronically Signed: Montez Reynolds MD  10/23/2023 8:32 PM EDT  Workstation ID: [id number]             Results for orders placed during the hospital encounter of 04/04/23    Adult Transthoracic Echo Complete W/ Cont if Necessary Per Protocol    Interpretation Summary    Left ventricular systolic function is normal. Left ventricular ejection fraction appears to be 51 - 55%.    Mildly reduced right ventricular systolic function noted.    The left atrial cavity is dilated.    The right atrial cavity is dilated.    There is moderate calcification of the aortic valve.    Mild to moderate aortic valve regurgitation is present.    Mild mitral valve regurgitation is present.    Mild tricuspid valve regurgitation is present.      Plan for Follow-up of Pending Labs/Results:     Discharge Details        Discharge Medications        Changes to Medications        Instructions Start Date   furosemide 20 MG tablet  Commonly known as: LASIX  What changed: how much to take   20 mg, Oral, Daily   Start Date:  October 31, 2023     propranolol 20 MG tablet  Commonly known as: INDERAL  What changed:   medication strength  how much to take  when to take this   20 mg, Oral, Every 8 Hours Scheduled             Continue These Medications        Instructions Start Date   aspirin 81 MG EC tablet   81 mg, Oral, Daily, OTC      famotidine 20 MG tablet  Commonly known as: PEPCID   TAKE ONE TABLET BY MOUTH TWICE A DAY      fexofenadine 180 MG tablet  Commonly known as: ALLEGRA   180 mg, Oral, Daily      HYDROcodone-acetaminophen 7.5-325 MG per tablet  Commonly known as: NORCO   1 tablet, Oral, Every 8 Hours      levothyroxine 125 MCG tablet  Commonly known as: SYNTHROID, LEVOTHROID   TAKE ONE TABLET BY MOUTH EVERY MORNING      Lyrica 150 MG capsule  Generic drug: pregabalin   150 mg, Oral, 2 Times Daily      metOLazone 2.5 MG tablet  Commonly known as: ZAROXOLYN   2 times per week, M,  F,      midodrine 10 MG tablet  Commonly known as: PROAMATINE   10 mg, Oral, 3 Times Daily Before Meals      multivitamins-minerals capsule capsule   1 capsule, Oral, 2 Times Daily, OTC      O2  Commonly known as: OXYGEN   2 L/min, Inhalation, Nightly PRN      OSTEO BI-FLEX ONE PER DAY PO   1 tablet, Oral, Daily, OTC      potassium chloride 10 MEQ CR tablet   20 mEq, Oral, Daily      rivaroxaban 15 MG tablet  Commonly known as: XARELTO   15 mg, Oral, Daily With Dinner      rivastigmine 4.6 MG/24HR patch  Commonly known as: EXELON   APPLY ONE PATCH TO THE SKIN DAILY      sodium chloride 0.65 % nasal spray   2 sprays, Nasal, As Needed      tamsulosin 0.4 MG capsule 24 hr capsule  Commonly known as: FLOMAX   0.4 mg, Oral, Daily      vitamin B-12 1000 MCG tablet  Commonly known as: CYANOCOBALAMIN   1,000 mcg, Oral, Daily, OTC             Stop These Medications      cetirizine 10 MG tablet  Commonly known as: zyrTEC     fluticasone 50 MCG/ACT nasal spray  Commonly known as: FLONASE              Allergies   Allergen Reactions    Penicillins Other (See  Comments)     CHILDHOOD ALLERGY           Discharge Disposition:  Short Term Hospital (DC - External)    Diet:  Hospital:  Diet Order   Procedures    Diet: Regular/House Diet; Texture: Regular Texture (IDDSI 7); Fluid Consistency: Thin (IDDSI 0)            Activity:  - as tolerated    Restrictions or Other Recommendations:  - none       CODE STATUS:    Code Status and Medical Interventions:   Ordered at: 10/24/23 1433     Level Of Support Discussed With:    Patient    Next of Kin (If No Surrogate)     Code Status (Patient has no pulse and is not breathing):    No CPR (Do Not Attempt to Resuscitate)     Medical Interventions (Patient has pulse or is breathing):    Full Support       Future Appointments   Date Time Provider Department Center   11/15/2023  2:15 PM Salvador Barnes APRN [facility]   1/10/2024 11:20 AM Garfield Duron MD [facility]   6/24/2024 11:45 AM Basil Richter MD [facility]                 Milagro Lebron DO  10/30/23      Time Spent on Discharge:  I spent  40  minutes on this discharge activity which included: face-to-face encounter with the patient, reviewing the data in the system, coordination of the care with the nursing staff as well as consultants, documentation, and entering orders.            Electronically signed by Milagro Lebron DO at 10/30/23 1003

## 2023-10-30 NOTE — CASE MANAGEMENT/SOCIAL WORK
Case Management Discharge Note      Final Note: To [facility], acute rehab today. Nursing to call report to [phone]. Patient will need to be in the 1700/maternity entrance on or before 2:30 for the [facility] wheelchair van shuttle.  I have spoken with patient's daughter Jacy earlier today and with patient and her spouse moments ago and all in agreement with this plan which includes transportation.         Selected Continued Care - Admitted Since 10/24/2023       Destination Coordination complete.      Service Provider Selected Services Address Phone Fax Patient Preferred    [facility] [address] [facility] [address] [phone] --              Durable Medical Equipment    No services have been selected for the patient.                Dialysis/Infusion    No services have been selected for the patient.                Home Medical Care    No services have been selected for the patient.                Therapy    No services have been selected for the patient.                Community Resources    No services have been selected for the patient.                Community & DME    No services have been selected for the patient.                         Final Discharge Disposition Code: 62 - inpatient rehab facility

## 2023-10-31 ENCOUNTER — TELEPHONE (OUTPATIENT)
Dept: CARDIOLOGY | Facility: HOSPITAL | Age: 88
End: 2023-10-31
Payer: MEDICARE

## 2023-11-06 RX ORDER — RIVAROXABAN 15 MG/1
15 TABLET, FILM COATED ORAL
Qty: 30 TABLET | Refills: 3 | Status: SHIPPED | OUTPATIENT
Start: 2023-11-06

## 2023-11-09 ENCOUNTER — TELEPHONE (OUTPATIENT)
Dept: INTERNAL MEDICINE | Facility: CLINIC | Age: 88
End: 2023-11-09
Payer: MEDICARE

## 2023-11-09 NOTE — TELEPHONE ENCOUNTER
Caller: PAOLO    Relationship: [facility]     Best call back number: [phone]    What is the best time to reach you: ANYTIME    Who are you requesting to speak with (clinical staff, provider,  specific staff member): CLINICAL STAFF      What was the call regarding: PATIENT WILL BE GOING HOME FROM [facility] ON THIS COMING MONDAY 11/13/2023. NEED VERIFICATION THAT DR PAREDES WILL SIGN THE HOME HEALTH ORDERS

## 2023-11-13 ENCOUNTER — READMISSION MANAGEMENT (OUTPATIENT)
Dept: CALL CENTER | Facility: HOSPITAL | Age: 88
End: 2023-11-13
Payer: MEDICARE

## 2023-11-13 NOTE — OUTREACH NOTE
Prep Survey      Flowsheet Row Responses   [facility] facility patient discharged from? Non-BH   Is LACE score < 7 ? Non-BH Discharge   Eligibility [facility]   Date of Discharge 11/13/23   Discharge Disposition Home or Self Care   Discharge diagnosis Other fracture of first metacarpal bone, right hand, subsequent encounter for fracture with routine healing   Does the patient have one of the following disease processes/diagnoses(primary or secondary)? Other   Prep survey completed? Yes            Marina BARRIENTOS - Registered Nurse

## 2023-11-14 ENCOUNTER — TRANSITIONAL CARE MANAGEMENT TELEPHONE ENCOUNTER (OUTPATIENT)
Dept: CALL CENTER | Facility: HOSPITAL | Age: 88
End: 2023-11-14
Payer: MEDICARE

## 2023-11-14 NOTE — OUTREACH NOTE
Call Center TCM Note      Flowsheet Row Responses   [facility] patient discharged from? Non-   Does the patient have one of the following disease processes/diagnoses(primary or secondary)? Other   TCM attempt successful? No   Unsuccessful attempts Attempt 2   Call Status Left message            Robinson Faulkner RN    11/14/2023, 09:09 EST

## 2023-11-14 NOTE — OUTREACH NOTE
Call Center TCM Note      Flowsheet Row Responses   [facility] patient discharged from? Non-   Does the patient have one of the following disease processes/diagnoses(primary or secondary)? Other   TCM attempt successful? No   Unsuccessful attempts Attempt 1   Call Status Left message            Robinson Faulkner RN    11/14/2023, 08:51 EST

## 2023-11-15 ENCOUNTER — TRANSITIONAL CARE MANAGEMENT TELEPHONE ENCOUNTER (OUTPATIENT)
Dept: CALL CENTER | Facility: HOSPITAL | Age: 88
End: 2023-11-15
Payer: MEDICARE

## 2023-11-15 NOTE — OUTREACH NOTE
Call Center TCM Note      Flowsheet Row Responses   [facility] facility patient discharged from? Non-BH  [[facility]]   Does the patient have one of the following disease processes/diagnoses(primary or secondary)? Other   TCM attempt successful? No   Unsuccessful attempts Attempt 3            Sherly Leavitt RN    11/15/2023, 09:58 EST

## 2023-11-20 RX ORDER — HYDROCODONE BITARTRATE AND ACETAMINOPHEN 7.5; 325 MG/1; MG/1
1 TABLET ORAL EVERY 8 HOURS PRN
Qty: 90 TABLET | Refills: 0 | Status: SHIPPED | OUTPATIENT
Start: 2023-11-20

## 2023-11-20 NOTE — TELEPHONE ENCOUNTER
Caller: MARCO A    Relationship: Other    Best call back number: [phone]     Requested Prescriptions:   Requested Prescriptions     Pending Prescriptions Disp Refills    HYDROcodone-acetaminophen (NORCO) 7.5-325 MG per tablet       Sig: Take 1 tablet by mouth Every 8 (Eight) Hours.        Pharmacy where request should be sent: [address] [phone]  - [phone] FX     Last office visit with prescribing clinician: 6/23/2023   Last telemedicine visit with prescribing clinician: Visit date not found   Next office visit with prescribing clinician: Visit date not found       Does the patient have less than a 3 day supply:  [] Yes  [] No        Gaviota Covarrubias Rep   11/20/23 11:38 EST

## 2023-11-21 ENCOUNTER — TELEPHONE (OUTPATIENT)
Dept: CARDIOLOGY | Facility: HOSPITAL | Age: 88
End: 2023-11-21
Payer: MEDICARE

## 2023-11-21 NOTE — TELEPHONE ENCOUNTER
Overdue test results notification received for patient's CBC and CMP ordered on 10/18/2023. Patient was hospitalized and labs were drawn on 10/24/2023.

## 2023-11-29 ENCOUNTER — OFFICE VISIT (OUTPATIENT)
Dept: CARDIOLOGY | Facility: HOSPITAL | Age: 88
End: 2023-11-29
Payer: MEDICARE

## 2023-11-29 VITALS
BODY MASS INDEX: 27.25 KG/M2 | WEIGHT: 138.8 LBS | OXYGEN SATURATION: 92 % | RESPIRATION RATE: 18 BRPM | TEMPERATURE: 98 F | HEART RATE: 77 BPM | SYSTOLIC BLOOD PRESSURE: 111 MMHG | DIASTOLIC BLOOD PRESSURE: 72 MMHG | HEIGHT: 60 IN

## 2023-11-29 DIAGNOSIS — I95.2 HYPOTENSION DUE TO DRUGS: ICD-10-CM

## 2023-11-29 DIAGNOSIS — I50.32 CHRONIC HEART FAILURE WITH PRESERVED EJECTION FRACTION: Primary | ICD-10-CM

## 2023-11-29 DIAGNOSIS — I48.0 PAF (PAROXYSMAL ATRIAL FIBRILLATION): ICD-10-CM

## 2023-11-29 DIAGNOSIS — R60.0 EDEMA LEG: ICD-10-CM

## 2023-11-29 DIAGNOSIS — N18.30 STAGE 3 CHRONIC KIDNEY DISEASE, UNSPECIFIED WHETHER STAGE 3A OR 3B CKD: ICD-10-CM

## 2023-11-29 RX ORDER — PANTOPRAZOLE SODIUM 20 MG/1
20 TABLET, DELAYED RELEASE ORAL DAILY
COMMUNITY

## 2023-11-29 RX ORDER — MIDODRINE HYDROCHLORIDE 5 MG/1
5 TABLET ORAL
COMMUNITY

## 2023-11-29 RX ORDER — MAGNESIUM OXIDE 400 MG/1
400 TABLET ORAL DAILY
COMMUNITY

## 2023-11-29 NOTE — PROGRESS NOTES
"[facility] Heart and Vascular    Chief Complaint  Congestive Heart Failure    Subjective    History of Present Illness {CC  Problem List  Visit  Diagnosis   Encounters  Notes  Medications  Labs  Result Review Imaging  Media :23}     Zoila Nava presents to [facility] CARDIOLOGY for   History of Present Illness     89-year-old female with history of heart failure with preserved EF, pulmonary hypertension, valvular heart disease, chronic kidney disease stage III, ascending aortic aneurysm, hypertension, hypothyroidism, GERD, anemia, dementia, palpitations with PACs and PVCs, essential tremors, neuropathy, PAF     Pt with recent falls and confusion.  Hospital visit 10/24/23.  Pt d/c to SNF ([facility]).    Pt is doing well.  Has not required diuretics.  Edema has been stable with compression stockings.  No CP, pressure, palpitations, dizziness, near syncope, syncope.  No falls. Working with PT/OT regularly.    Enjoying placement at [facility].  Feels that her needs are being addressed    Sleeping well.  Eating well.     Spouse is still at home.  She hopes he will join her soon.         Objective     Vital Signs:   Vitals:    11/29/23 1418   BP: 111/72   BP Location: Left arm   Patient Position: Sitting   Cuff Size: Adult   Pulse: 77   Resp: 18   Temp: 98 °F (36.7 °C)   TempSrc: Temporal   SpO2: 92%   Weight: 63 kg (138 lb 12.8 oz)   Height: 152.4 cm (60\")     Body mass index is 27.11 kg/m².  Physical Exam  Vitals reviewed.   Constitutional:       General: She is not in acute distress.  Pulmonary:      Effort: Pulmonary effort is normal.   Musculoskeletal:      Right lower leg: Edema present.      Left lower leg: Edema present.   Skin:     Coloration: Skin is not pale.   Neurological:      Mental Status: She is alert.   Psychiatric:         Mood and Affect: Mood normal.         Behavior: Behavior normal. Behavior is cooperative.      "         Result Review  Data Reviewed:{ Labs  Result Review  Imaging  Med Tab  Media :23}     Lab Results   Component Value Date    WBC 11.61 (H) 10/27/2023    HGB 12.5 10/27/2023    HCT 38.2 10/27/2023    MCV 92.0 10/27/2023     10/27/2023     Lab Results   Component Value Date    GLUCOSE 97 10/27/2023    CALCIUM 8.5 10/27/2023     10/29/2023    K 3.9 10/27/2023    CO2 31.0 (H) 10/27/2023     10/27/2023    BUN 31 (H) 10/27/2023    CREATININE 1.38 (H) 10/27/2023    EGFR 36.4 (L) 10/27/2023    BCR 22.5 10/27/2023    ANIONGAP 10.0 10/27/2023       Echocardiogram 4/7/2023: EF 51 to 55%, mild to moderate AR, mild MR, mild TR      Holter 3/8/2022.  Duration 4 days.  Average heart rate 62 bpm.  Multiple SVT runs/longest 15 seconds.  3.7% PACs, 2.2% PVC burden.  No patient triggered events               Assessment and Plan {CC Problem List  Visit Diagnosis  ROS  Review (Popup)  Health Maintenance  Quality  BestPractice  Medications  SmartSets  SnapShot Encounters  Media :23}   1. Chronic heart failure with preserved ejection fraction  Stable  Currently off diuretics  Continue to monitor.    Monitor weight.  Report 3-5 lb weight gain associated with dyspnea, or worsening edema    2. PAF (paroxysmal atrial fibrillation)  HR controlled  Continue BB and xarelto    3. Hypotension due to drugs  On midodrine    4. Stage 3 chronic kidney disease, unspecified whether stage 3a or 3b CKD    Bmp 2 weeks.   5. Edema leg  Stable  Compression stockings daily. Place in the morning.  Remove at bed time          Follow Up {Instructions Charge Capture  Follow-up Communications :23}   Return in about 3 months (around 2/29/2024), or if symptoms worsen or fail to improve, for HF.    Patient was given instructions and counseling regarding her condition or for health maintenance advice. Please see specific information pulled into the AVS if appropriate.  Patient was instructed to call the Heart and Valve  Center with any questions, concerns, or worsening symptoms.

## 2023-12-04 ENCOUNTER — TELEMEDICINE (OUTPATIENT)
Dept: INTERNAL MEDICINE | Facility: CLINIC | Age: 88
End: 2023-12-04
Payer: MEDICARE

## 2023-12-04 DIAGNOSIS — I50.33 CHF (CONGESTIVE HEART FAILURE), NYHA CLASS I, ACUTE ON CHRONIC, DIASTOLIC: ICD-10-CM

## 2023-12-04 DIAGNOSIS — M79.89 LEG SWELLING: Primary | ICD-10-CM

## 2023-12-04 DIAGNOSIS — E03.9 HYPOTHYROIDISM, UNSPECIFIED TYPE: ICD-10-CM

## 2023-12-04 PROCEDURE — 99214 OFFICE O/P EST MOD 30 MIN: CPT | Performed by: PHYSICIAN ASSISTANT

## 2023-12-04 PROCEDURE — 1160F RVW MEDS BY RX/DR IN RCRD: CPT | Performed by: PHYSICIAN ASSISTANT

## 2023-12-04 PROCEDURE — 1159F MED LIST DOCD IN RCRD: CPT | Performed by: PHYSICIAN ASSISTANT

## 2023-12-04 RX ORDER — ZINC OXIDE 25 G/1
1 DRESSING TOPICAL DAILY
Qty: 2 EACH | Refills: 0 | Status: SHIPPED | OUTPATIENT
Start: 2023-12-04

## 2023-12-04 RX ORDER — BUMETANIDE 0.5 MG/1
0.5 TABLET ORAL EVERY MORNING
Qty: 30 TABLET | Refills: 1 | Status: SHIPPED | OUTPATIENT
Start: 2023-12-04

## 2023-12-04 NOTE — PROGRESS NOTES
[facility] PRIMARY CARE  [address]  [place] [address]  Dept: [phone]  Dept Fax: [phone]  Loc: [phone]  Loc Fax: [phone]    Zoila Nava  7/13/1933    Televisit Note    You have chosen to receive care through a telephone visit. Do you consent to use a telephone visit for your medical care today? Yes. Pt at home and provider in office during visit today.    History of Present Illness:  Zoila Nava is a 89 y.o. female who presents via video-conference for leg swelling.  Symptoms have been going on for about 3 days now.  Has history of CHF and under the care of cardiology.  Patient currently lives at assisted living facility.  Video call today was with assisted living nurse.  Patient having some weeping of her legs as well.  Some redness.  Denies any shortness of breath.  Patient currently not on any diuretic.  On propranolol as directed.  On midodrine as well.  Blood pressures have been low.  Denies any other cardiac symptoms.    The following portions of the patient's history were reviewed and updated as appropriate: allergies, current medications, past family history, past medical history, past social history, past surgical history, and problem list.    This visit was scheduled as a phone visit to comply with patient safety concerns in accordance with CDC recommendations.  Time spent in discussion with the patient was 25 minutes.     Medications    Current Outpatient Medications:     aspirin 81 MG EC tablet, Take 1 tablet by mouth Daily. OTC, Disp: , Rfl:     bumetanide (BUMEX) 0.5 MG tablet, Take 1 tablet by mouth Every Morning., Disp: 30 tablet, Rfl: 1    HYDROcodone-acetaminophen (NORCO) 7.5-325 MG per tablet, Take 1 tablet by mouth Every 8 (Eight) Hours As Needed for Moderate Pain., Disp: 90 tablet, Rfl: 0    levothyroxine (SYNTHROID, LEVOTHROID) 125 MCG tablet, TAKE ONE TABLET BY MOUTH EVERY MORNING, Disp: 90 tablet, Rfl:  1    Lyrica 150 MG capsule, Take 1 capsule by mouth 2 (Two) Times a Day., Disp: , Rfl:     magnesium oxide (MAG-OX) 400 MG tablet, Take 1 tablet by mouth Daily., Disp: , Rfl:     midodrine (PROAMATINE) 5 MG tablet, Take 1 tablet by mouth 3 (Three) Times a Day Before Meals., Disp: , Rfl:     O2 (OXYGEN), Inhale 2 L/min At Night As Needed., Disp: , Rfl:     pantoprazole (PROTONIX) 20 MG EC tablet, Take 1 tablet by mouth Daily., Disp: , Rfl:     propranolol (INDERAL) 20 MG tablet, Take 1 tablet by mouth Every 8 (Eight) Hours., Disp: , Rfl:     rivaroxaban (Xarelto) 15 MG tablet, TAKE ONE TABLET BY MOUTH DAILY WITH DINNER, Disp: 30 tablet, Rfl: 3    rivastigmine (EXELON) 4.6 MG/24HR patch, APPLY ONE PATCH TO THE SKIN DAILY, Disp: 30 patch, Rfl: 6    tamsulosin (FLOMAX) 0.4 MG capsule 24 hr capsule, Take 1 capsule by mouth Daily., Disp: 90 capsule, Rfl: 1    vitamin B-12 (CYANOCOBALAMIN) 1000 MCG tablet, Take 1 tablet by mouth Daily. OTC, Disp: , Rfl:     Wound Dressings (Curity Unna Boot) misc, Apply 1 each topically Daily., Disp: 2 each, Rfl: 0    Subjective  Allergies   Allergen Reactions    Penicillins Other (See Comments)     CHILDHOOD ALLERGY          Past Medical History:   Diagnosis Date    Anemia     Arthritis     Asthma     Cataract     Difficulty breathing     Chronic    GERD (gastroesophageal reflux disease)     Graves' ophthalmopathy     Hoarseness     Hypertension     Hypertensive heart disease with acute on chronic diastolic congestive heart failure 10/11/2021    Pulmonary hypertension 10/11/2021    Spondylolisthesis of cervical region     Vitamin B12 deficiency        Past Surgical History:   Procedure Laterality Date    CERVICAL LAMINECTOMY      CHOLECYSTECTOMY      OTHER SURGICAL HISTORY Right     Neuroplasty Median Nerve at Carpal Tunnel    THYROID SURGERY      TOTAL KNEE ARTHROPLASTY Left 09/29/2014    Dr Colon       Family History   Problem Relation Age of Onset    Hypertension Mother      Other Father         cardiac disorder    Diabetes Father     Heart disease Father     Hypertension Sister     No Known Problems Maternal Grandmother     No Known Problems Maternal Grandfather     No Known Problems Paternal Grandmother     No Known Problems Paternal Grandfather     Colon cancer Neg Hx     Colon polyps Neg Hx     Esophageal cancer Neg Hx         Social History     Socioeconomic History    Marital status:    Tobacco Use    Smoking status: Never    Smokeless tobacco: Never   Vaping Use    Vaping Use: Never used   Substance and Sexual Activity    Alcohol use: No    Drug use: No    Sexual activity: Defer         Objective  There were no vitals filed for this visit.  There is no height or weight on file to calculate BMI.    Assessment  Diagnoses and all orders for this visit:    1. Leg swelling (Primary)  -     CBC (No Diff)  -     Comprehensive Metabolic Panel  -     Ambulatory Referral to Home Health    2. CHF (congestive heart failure), NYHA class I, acute on chronic, diastolic  -     BNP ([facility] Only); Future  -     Ambulatory Referral to Home Health    3. Hypothyroidism, unspecified type  -     TSH Rfx On Abnormal To Free T4    Other orders  -     bumetanide (BUMEX) 0.5 MG tablet; Take 1 tablet by mouth Every Morning.  Dispense: 30 tablet; Refill: 1  -     Wound Dressings (Curity Unna Boot) misc; Apply 1 each topically Daily.  Dispense: 2 each; Refill: 0        Plan    1. Leg swelling (Primary)- worse, placed order for Unna boots.  Consulted patient's PCP Dr. Oneil. Obtain CBC, CMP, and TSH.  Add Bumex 0.5 mg every morning.    2. CHF (congestive heart failure), NYHA class I, acute on chronic, diastolic- repeat BMP and placed order for home health.    3. Hypothyroidism, unspecified type- on Synthroid 125 mcg daily.  Repeat TSH.      Return for Next scheduled follow up.    Charanjit Neal PA-C  12/04/2023

## 2023-12-22 RX ORDER — PREGABALIN 150 MG/1
150 CAPSULE ORAL 2 TIMES DAILY
Qty: 60 CAPSULE | Refills: 2 | Status: SHIPPED | OUTPATIENT
Start: 2023-12-22

## 2023-12-22 NOTE — TELEPHONE ENCOUNTER
Caller: Matt Nava    Relationship: Emergency Contact    Best call back number: [phone]     Requested Prescriptions:   Requested Prescriptions     Pending Prescriptions Disp Refills    Lyrica 150 MG capsule       Sig: Take 1 capsule by mouth 2 (Two) Times a Day.        Pharmacy where request should be sent: [address] [phone]  - [phone] FX     Last office visit with prescribing clinician: 6/23/2023   Last telemedicine visit with prescribing clinician: 12/4/2023   Next office visit with prescribing clinician: Visit date not found     Does the patient have less than a 3 day supply:  [x] Yes  [] No    Would you like a call back once the refill request has been completed: [] Yes [x] No    If the office needs to give you a call back, can they leave a voicemail: [] Yes [x] No    Gaviota Ventura Rep   12/22/23 14:31 EST

## 2023-12-28 ENCOUNTER — TELEPHONE (OUTPATIENT)
Dept: CARDIOLOGY | Facility: HOSPITAL | Age: 88
End: 2023-12-28
Payer: MEDICARE

## 2023-12-28 NOTE — TELEPHONE ENCOUNTER
Caller: Nidia Cardenas     Relationship: [unfilled]     Best call back number: [phone]    What is your medical concern? PATIENTS DAUGHTER CONCERNED WITH MOTHERS WEIGHT GAIN. REPORTS SHE HAS GAINED 6-7 POUNDS IN THE PAST 10 DAYS. STATES HOME HEALTH NURSE CHECKED HER VITALS TODAY AND EVERYTHING LOOKED FINE. WOULD LIKE TO KNOW IF SHE IS TO GET BLOOD WORK DONE OR IF SHE SHOULD BE SEEN IN THE OFFICE FOR A FOLLOW UP.     How long has this issue been going on? PAST 10 DAYS

## 2023-12-29 RX ORDER — HYDROCODONE BITARTRATE AND ACETAMINOPHEN 7.5; 325 MG/1; MG/1
1 TABLET ORAL EVERY 8 HOURS PRN
Qty: 90 TABLET | Refills: 0 | Status: SHIPPED | OUTPATIENT
Start: 2023-12-29

## 2023-12-29 NOTE — TELEPHONE ENCOUNTER
Spoke to daughter and she stated that her mom still has edema in both legs and that is where the weight gain is. She stated had home health listened to her lungs and nothing concerning.   She is currently at [facility] and not sure when her most recent labs were and about her Bumex. I tried calling but have not go in touch with anyone to speak to someone.Daughter gave me number to the DON and I left message to return call.

## 2023-12-29 NOTE — TELEPHONE ENCOUNTER
Call patients daughter and f/u on weight, edema, dyspnea.      What dose of Bumex is she taking and how long has she been on that dose?     When was her last labs and can we get a copy.

## 2023-12-29 NOTE — TELEPHONE ENCOUNTER
We can schedule next week if we have openings but she may want to call and discuss her concerns with nursing staff at her facility.  They may be able to f/u with her PCP.

## 2024-01-01 ENCOUNTER — APPOINTMENT (OUTPATIENT)
Dept: CT IMAGING | Facility: HOSPITAL | Age: 89
DRG: 291 | End: 2024-01-01
Payer: MEDICARE

## 2024-01-01 ENCOUNTER — APPOINTMENT (OUTPATIENT)
Dept: GENERAL RADIOLOGY | Facility: HOSPITAL | Age: 89
DRG: 291 | End: 2024-01-01
Payer: MEDICARE

## 2024-01-01 ENCOUNTER — APPOINTMENT (OUTPATIENT)
Dept: CARDIOLOGY | Facility: HOSPITAL | Age: 89
DRG: 291 | End: 2024-01-01
Payer: MEDICARE

## 2024-01-01 ENCOUNTER — APPOINTMENT (OUTPATIENT)
Dept: ULTRASOUND IMAGING | Facility: HOSPITAL | Age: 89
DRG: 291 | End: 2024-01-01
Payer: MEDICARE

## 2024-01-01 ENCOUNTER — HOSPITAL ENCOUNTER (INPATIENT)
Facility: HOSPITAL | Age: 89
LOS: 8 days | DRG: 291 | End: 2024-01-24
Attending: EMERGENCY MEDICINE | Admitting: INTERNAL MEDICINE
Payer: MEDICARE

## 2024-01-01 VITALS
RESPIRATION RATE: 15 BRPM | SYSTOLIC BLOOD PRESSURE: 126 MMHG | HEIGHT: 60 IN | BODY MASS INDEX: 28.91 KG/M2 | DIASTOLIC BLOOD PRESSURE: 71 MMHG | TEMPERATURE: 99.5 F | WEIGHT: 147.27 LBS | OXYGEN SATURATION: 74 %

## 2024-01-01 DIAGNOSIS — Z86.79 HISTORY OF PULMONARY HYPERTENSION: ICD-10-CM

## 2024-01-01 DIAGNOSIS — L03.115 CELLULITIS OF RIGHT LOWER EXTREMITY: Primary | ICD-10-CM

## 2024-01-01 DIAGNOSIS — I50.9 ACUTE ON CHRONIC CONGESTIVE HEART FAILURE, UNSPECIFIED HEART FAILURE TYPE: ICD-10-CM

## 2024-01-01 DIAGNOSIS — I10 ELEVATED BLOOD PRESSURE READING WITH DIAGNOSIS OF HYPERTENSION: ICD-10-CM

## 2024-01-01 DIAGNOSIS — R09.02 HYPOXEMIA REQUIRING SUPPLEMENTAL OXYGEN: ICD-10-CM

## 2024-01-01 DIAGNOSIS — Z99.81 HYPOXEMIA REQUIRING SUPPLEMENTAL OXYGEN: ICD-10-CM

## 2024-01-01 LAB
ALBUMIN SERPL-MCNC: 2.9 G/DL (ref 3.5–5.2)
ALBUMIN SERPL-MCNC: 3.5 G/DL (ref 3.5–5.2)
ALBUMIN SERPL-MCNC: 4.2 G/DL (ref 3.5–5.2)
ALBUMIN/GLOB SERPL: 1.2 G/DL
ALBUMIN/GLOB SERPL: 1.6 G/DL
ALBUMIN/GLOB SERPL: 1.8 G/DL
ALP SERPL-CCNC: 107 U/L (ref 39–117)
ALP SERPL-CCNC: 129 U/L (ref 39–117)
ALP SERPL-CCNC: 94 U/L (ref 39–117)
ALT SERPL W P-5'-P-CCNC: 22 U/L (ref 1–33)
ALT SERPL W P-5'-P-CCNC: 24 U/L (ref 1–33)
ALT SERPL W P-5'-P-CCNC: 36 U/L (ref 1–33)
ANION GAP SERPL CALCULATED.3IONS-SCNC: 12 MMOL/L (ref 5–15)
ANION GAP SERPL CALCULATED.3IONS-SCNC: 13 MMOL/L (ref 5–15)
ANION GAP SERPL CALCULATED.3IONS-SCNC: 14 MMOL/L (ref 5–15)
ANION GAP SERPL CALCULATED.3IONS-SCNC: 14 MMOL/L (ref 5–15)
ANION GAP SERPL CALCULATED.3IONS-SCNC: 17 MMOL/L (ref 5–15)
ANION GAP SERPL CALCULATED.3IONS-SCNC: 19 MMOL/L (ref 5–15)
ARTERIAL PATENCY WRIST A: ABNORMAL
ARTERIAL PATENCY WRIST A: POSITIVE
ASCENDING AORTA: 2.9 CM
AST SERPL-CCNC: 19 U/L (ref 1–32)
AST SERPL-CCNC: 34 U/L (ref 1–32)
AST SERPL-CCNC: 53 U/L (ref 1–32)
ATMOSPHERIC PRESS: ABNORMAL MM[HG]
AV AREA-PISA: 0.24 CM2
AV RADIUS PISA: 0.7 CM
AV REGURGITANT FRACTION: 86 %
AV REGURGITANT VOLUME: 43 CC
B PARAPERT DNA SPEC QL NAA+PROBE: NOT DETECTED
B PERT DNA SPEC QL NAA+PROBE: NOT DETECTED
BACTERIA BLD CULT: ABNORMAL
BACTERIA SPEC AEROBE CULT: ABNORMAL
BACTERIA SPEC AEROBE CULT: ABNORMAL
BASE EXCESS BLDA CALC-SCNC: -0.1 MMOL/L (ref 0–2)
BASE EXCESS BLDA CALC-SCNC: -1.5 MMOL/L (ref 0–2)
BASE EXCESS BLDA CALC-SCNC: -2.3 MMOL/L (ref 0–2)
BASE EXCESS BLDA CALC-SCNC: -2.5 MMOL/L (ref 0–2)
BASE EXCESS BLDA CALC-SCNC: 0.9 MMOL/L (ref 0–2)
BASE EXCESS BLDA CALC-SCNC: 1.4 MMOL/L (ref 0–2)
BASOPHILS # BLD AUTO: 0.01 10*3/MM3 (ref 0–0.2)
BASOPHILS # BLD AUTO: 0.02 10*3/MM3 (ref 0–0.2)
BASOPHILS # BLD AUTO: 0.02 10*3/MM3 (ref 0–0.2)
BASOPHILS # BLD AUTO: 0.03 10*3/MM3 (ref 0–0.2)
BASOPHILS # BLD AUTO: 0.03 10*3/MM3 (ref 0–0.2)
BASOPHILS # BLD AUTO: 0.05 10*3/MM3 (ref 0–0.2)
BASOPHILS NFR BLD AUTO: 0.1 % (ref 0–1.5)
BASOPHILS NFR BLD AUTO: 0.2 % (ref 0–1.5)
BDY SITE: ABNORMAL
BH CV ECHO MEAS - AI P1/2T: 461.4 MSEC
BH CV ECHO MEAS - AO MAX PG: 19 MMHG
BH CV ECHO MEAS - AO MEAN PG: 9.9 MMHG
BH CV ECHO MEAS - AO ROOT DIAM: 2.9 CM
BH CV ECHO MEAS - AO V2 MAX: 217.2 CM/SEC
BH CV ECHO MEAS - AO V2 VTI: 39.8 CM
BH CV ECHO MEAS - AVA(I,D): 1.29 CM2
BH CV ECHO MEAS - EDV(CUBED): 42.7 ML
BH CV ECHO MEAS - EDV(MOD-SP2): 53.1 ML
BH CV ECHO MEAS - EDV(MOD-SP4): 39.4 ML
BH CV ECHO MEAS - EF(MOD-BP): 61.4 %
BH CV ECHO MEAS - EF(MOD-SP2): 63.1 %
BH CV ECHO MEAS - EF(MOD-SP4): 57.4 %
BH CV ECHO MEAS - ESV(CUBED): 10.3 ML
BH CV ECHO MEAS - ESV(MOD-SP2): 19.6 ML
BH CV ECHO MEAS - ESV(MOD-SP4): 16.8 ML
BH CV ECHO MEAS - FS: 37.7 %
BH CV ECHO MEAS - IVS/LVPW: 0.59 CM
BH CV ECHO MEAS - IVSD: 0.69 CM
BH CV ECHO MEAS - LA DIMENSION: 3 CM
BH CV ECHO MEAS - LAT PEAK E' VEL: 3.8 CM/SEC
BH CV ECHO MEAS - LV DIASTOLIC VOL/BSA (35-75): 24.5 CM2
BH CV ECHO MEAS - LV MASS(C)D: 93.6 GRAMS
BH CV ECHO MEAS - LV MAX PG: 6 MMHG
BH CV ECHO MEAS - LV MEAN PG: 3.7 MMHG
BH CV ECHO MEAS - LV SYSTOLIC VOL/BSA (12-30): 10.4 CM2
BH CV ECHO MEAS - LV V1 MAX: 122 CM/SEC
BH CV ECHO MEAS - LV V1 VTI: 22.1 CM
BH CV ECHO MEAS - LVIDD: 3.5 CM
BH CV ECHO MEAS - LVIDS: 2.18 CM
BH CV ECHO MEAS - LVOT AREA: 2.32 CM2
BH CV ECHO MEAS - LVOT DIAM: 1.72 CM
BH CV ECHO MEAS - LVPWD: 1.18 CM
BH CV ECHO MEAS - MED PEAK E' VEL: 4.2 CM/SEC
BH CV ECHO MEAS - MV A MAX VEL: 99 CM/SEC
BH CV ECHO MEAS - MV DEC SLOPE: 117.9 CM/SEC2
BH CV ECHO MEAS - MV DEC TIME: 0.19 SEC
BH CV ECHO MEAS - MV E MAX VEL: 66 CM/SEC
BH CV ECHO MEAS - MV E/A: 0.7
BH CV ECHO MEAS - MV MAX PG: 4.8 MMHG
BH CV ECHO MEAS - MV MEAN PG: 1.93 MMHG
BH CV ECHO MEAS - MV P1/2T: 139.4 MSEC
BH CV ECHO MEAS - MV V2 VTI: 28.1 CM
BH CV ECHO MEAS - MVA(P1/2T): 1.58 CM2
BH CV ECHO MEAS - MVA(VTI): 1.83 CM2
BH CV ECHO MEAS - PA ACC TIME: 0.17 SEC
BH CV ECHO MEAS - PA V2 MAX: 71.3 CM/SEC
BH CV ECHO MEAS - RAP SYSTOLE: 8 MMHG
BH CV ECHO MEAS - RVSP: 68.3 MMHG
BH CV ECHO MEAS - SI(MOD-SP2): 20.8 ML/M2
BH CV ECHO MEAS - SI(MOD-SP4): 14 ML/M2
BH CV ECHO MEAS - SV(LVOT): 51.3 ML
BH CV ECHO MEAS - SV(MOD-SP2): 33.5 ML
BH CV ECHO MEAS - SV(MOD-SP4): 22.6 ML
BH CV ECHO MEAS - TAPSE (>1.6): 2.21 CM
BH CV ECHO MEAS - TR MAX PG: 60.3 MMHG
BH CV ECHO MEAS - TR MAX VEL: 388.2 CM/SEC
BH CV ECHO MEASUREMENTS AVERAGE E/E' RATIO: 16.5
BH CV FAKE - AV AREA-PISA: 0 CM2
BH CV LOWER VASCULAR LEFT COMMON FEMORAL AUGMENT: NORMAL
BH CV LOWER VASCULAR LEFT COMMON FEMORAL COMPRESS: NORMAL
BH CV LOWER VASCULAR LEFT COMMON FEMORAL PHASIC: NORMAL
BH CV LOWER VASCULAR LEFT COMMON FEMORAL SPONT: NORMAL
BH CV LOWER VASCULAR LEFT DISTAL FEMORAL AUGMENT: NORMAL
BH CV LOWER VASCULAR LEFT DISTAL FEMORAL COMPRESS: NORMAL
BH CV LOWER VASCULAR LEFT DISTAL FEMORAL PHASIC: NORMAL
BH CV LOWER VASCULAR LEFT DISTAL FEMORAL SPONT: NORMAL
BH CV LOWER VASCULAR LEFT GASTRONEMIUS COMPRESS: NORMAL
BH CV LOWER VASCULAR LEFT GREATER SAPH AK COMPRESS: NORMAL
BH CV LOWER VASCULAR LEFT GREATER SAPH BK COMPRESS: NORMAL
BH CV LOWER VASCULAR LEFT LESSER SAPH COMPRESS: NORMAL
BH CV LOWER VASCULAR LEFT MID FEMORAL AUGMENT: NORMAL
BH CV LOWER VASCULAR LEFT MID FEMORAL COMPRESS: NORMAL
BH CV LOWER VASCULAR LEFT MID FEMORAL PHASIC: NORMAL
BH CV LOWER VASCULAR LEFT MID FEMORAL SPONT: NORMAL
BH CV LOWER VASCULAR LEFT PERONEAL COMPRESS: NORMAL
BH CV LOWER VASCULAR LEFT POPLITEAL AUGMENT: NORMAL
BH CV LOWER VASCULAR LEFT POPLITEAL COMPRESS: NORMAL
BH CV LOWER VASCULAR LEFT POPLITEAL PHASIC: NORMAL
BH CV LOWER VASCULAR LEFT POPLITEAL SPONT: NORMAL
BH CV LOWER VASCULAR LEFT POSTERIOR TIBIAL COMPRESS: NORMAL
BH CV LOWER VASCULAR LEFT PROFUNDA FEMORAL AUGMENT: NORMAL
BH CV LOWER VASCULAR LEFT PROFUNDA FEMORAL PHASIC: NORMAL
BH CV LOWER VASCULAR LEFT PROFUNDA FEMORAL SPONT: NORMAL
BH CV LOWER VASCULAR LEFT PROXIMAL FEMORAL AUGMENT: NORMAL
BH CV LOWER VASCULAR LEFT PROXIMAL FEMORAL COMPRESS: NORMAL
BH CV LOWER VASCULAR LEFT PROXIMAL FEMORAL PHASIC: NORMAL
BH CV LOWER VASCULAR LEFT PROXIMAL FEMORAL SPONT: NORMAL
BH CV LOWER VASCULAR LEFT SAPHENOFEMORAL JUNCTION AUGMENT: NORMAL
BH CV LOWER VASCULAR LEFT SAPHENOFEMORAL JUNCTION COMPRESS: NORMAL
BH CV LOWER VASCULAR LEFT SAPHENOFEMORAL JUNCTION PHASIC: NORMAL
BH CV LOWER VASCULAR LEFT SAPHENOFEMORAL JUNCTION SPONT: NORMAL
BH CV LOWER VASCULAR RIGHT COMMON FEMORAL AUGMENT: NORMAL
BH CV LOWER VASCULAR RIGHT COMMON FEMORAL COMPRESS: NORMAL
BH CV LOWER VASCULAR RIGHT COMMON FEMORAL PHASIC: NORMAL
BH CV LOWER VASCULAR RIGHT COMMON FEMORAL SPONT: NORMAL
BH CV LOWER VASCULAR RIGHT DISTAL FEMORAL AUGMENT: NORMAL
BH CV LOWER VASCULAR RIGHT DISTAL FEMORAL COMPRESS: NORMAL
BH CV LOWER VASCULAR RIGHT DISTAL FEMORAL PHASIC: NORMAL
BH CV LOWER VASCULAR RIGHT DISTAL FEMORAL SPONT: NORMAL
BH CV LOWER VASCULAR RIGHT GASTRONEMIUS COMPRESS: NORMAL
BH CV LOWER VASCULAR RIGHT GREATER SAPH AK COMPRESS: NORMAL
BH CV LOWER VASCULAR RIGHT GREATER SAPH BK COMPRESS: NORMAL
BH CV LOWER VASCULAR RIGHT LESSER SAPH COMPRESS: NORMAL
BH CV LOWER VASCULAR RIGHT MID FEMORAL AUGMENT: NORMAL
BH CV LOWER VASCULAR RIGHT MID FEMORAL COMPRESS: NORMAL
BH CV LOWER VASCULAR RIGHT MID FEMORAL PHASIC: NORMAL
BH CV LOWER VASCULAR RIGHT MID FEMORAL SPONT: NORMAL
BH CV LOWER VASCULAR RIGHT PERONEAL COMPRESS: NORMAL
BH CV LOWER VASCULAR RIGHT POPLITEAL AUGMENT: NORMAL
BH CV LOWER VASCULAR RIGHT POPLITEAL COMPRESS: NORMAL
BH CV LOWER VASCULAR RIGHT POPLITEAL PHASIC: NORMAL
BH CV LOWER VASCULAR RIGHT POPLITEAL SPONT: NORMAL
BH CV LOWER VASCULAR RIGHT POSTERIOR TIBIAL COMPRESS: NORMAL
BH CV LOWER VASCULAR RIGHT PROFUNDA FEMORAL AUGMENT: NORMAL
BH CV LOWER VASCULAR RIGHT PROFUNDA FEMORAL PHASIC: NORMAL
BH CV LOWER VASCULAR RIGHT PROFUNDA FEMORAL SPONT: NORMAL
BH CV LOWER VASCULAR RIGHT PROXIMAL FEMORAL AUGMENT: NORMAL
BH CV LOWER VASCULAR RIGHT PROXIMAL FEMORAL COMPRESS: NORMAL
BH CV LOWER VASCULAR RIGHT PROXIMAL FEMORAL PHASIC: NORMAL
BH CV LOWER VASCULAR RIGHT PROXIMAL FEMORAL SPONT: NORMAL
BH CV LOWER VASCULAR RIGHT SAPHENOFEMORAL JUNCTION AUGMENT: NORMAL
BH CV LOWER VASCULAR RIGHT SAPHENOFEMORAL JUNCTION COMPRESS: NORMAL
BH CV LOWER VASCULAR RIGHT SAPHENOFEMORAL JUNCTION PHASIC: NORMAL
BH CV LOWER VASCULAR RIGHT SAPHENOFEMORAL JUNCTION SPONT: NORMAL
BH CV VAS BP LEFT ARM: NORMAL MMHG
BH CV XLRA - RV BASE: 4.6 CM
BH CV XLRA - RV LENGTH: 7.1 CM
BH CV XLRA - RV MID: 2.8 CM
BH CV XLRA - TDI S': 10 CM/SEC
BILIRUB SERPL-MCNC: 0.4 MG/DL (ref 0–1.2)
BILIRUB SERPL-MCNC: 0.8 MG/DL (ref 0–1.2)
BILIRUB SERPL-MCNC: <0.2 MG/DL (ref 0–1.2)
BODY TEMPERATURE: 37
BOTTLE TYPE: ABNORMAL
BUN SERPL-MCNC: 23 MG/DL (ref 8–23)
BUN SERPL-MCNC: 24 MG/DL (ref 8–23)
BUN SERPL-MCNC: 27 MG/DL (ref 8–23)
BUN SERPL-MCNC: 28 MG/DL (ref 8–23)
BUN SERPL-MCNC: 29 MG/DL (ref 8–23)
BUN SERPL-MCNC: 33 MG/DL (ref 8–23)
BUN SERPL-MCNC: 34 MG/DL (ref 8–23)
BUN SERPL-MCNC: 34 MG/DL (ref 8–23)
BUN SERPL-MCNC: 39 MG/DL (ref 8–23)
BUN SERPL-MCNC: 47 MG/DL (ref 8–23)
BUN/CREAT SERPL: 16.8 (ref 7–25)
BUN/CREAT SERPL: 17.7 (ref 7–25)
BUN/CREAT SERPL: 19.2 (ref 7–25)
BUN/CREAT SERPL: 20 (ref 7–25)
BUN/CREAT SERPL: 21.8 (ref 7–25)
BUN/CREAT SERPL: 22 (ref 7–25)
BUN/CREAT SERPL: 23.6 (ref 7–25)
BUN/CREAT SERPL: 23.7 (ref 7–25)
BUN/CREAT SERPL: 23.8 (ref 7–25)
BUN/CREAT SERPL: 24.3 (ref 7–25)
C PNEUM DNA NPH QL NAA+NON-PROBE: NOT DETECTED
CALCIUM SPEC-SCNC: 7.9 MG/DL (ref 8.2–9.6)
CALCIUM SPEC-SCNC: 8 MG/DL (ref 8.2–9.6)
CALCIUM SPEC-SCNC: 8.1 MG/DL (ref 8.2–9.6)
CALCIUM SPEC-SCNC: 8.4 MG/DL (ref 8.2–9.6)
CALCIUM SPEC-SCNC: 8.8 MG/DL (ref 8.2–9.6)
CALCIUM SPEC-SCNC: 8.8 MG/DL (ref 8.2–9.6)
CALCIUM SPEC-SCNC: 8.9 MG/DL (ref 8.2–9.6)
CALCIUM SPEC-SCNC: 8.9 MG/DL (ref 8.2–9.6)
CALCIUM SPEC-SCNC: 9 MG/DL (ref 8.2–9.6)
CALCIUM SPEC-SCNC: 9.4 MG/DL (ref 8.2–9.6)
CHLORIDE SERPL-SCNC: 100 MMOL/L (ref 98–107)
CHLORIDE SERPL-SCNC: 100 MMOL/L (ref 98–107)
CHLORIDE SERPL-SCNC: 101 MMOL/L (ref 98–107)
CHLORIDE SERPL-SCNC: 102 MMOL/L (ref 98–107)
CHLORIDE SERPL-SCNC: 103 MMOL/L (ref 98–107)
CHLORIDE SERPL-SCNC: 93 MMOL/L (ref 98–107)
CHLORIDE SERPL-SCNC: 96 MMOL/L (ref 98–107)
CHLORIDE SERPL-SCNC: 97 MMOL/L (ref 98–107)
CHLORIDE SERPL-SCNC: 98 MMOL/L (ref 98–107)
CHLORIDE SERPL-SCNC: 98 MMOL/L (ref 98–107)
CHOLEST SERPL-MCNC: 110 MG/DL (ref 0–200)
CHOLEST SERPL-MCNC: 168 MG/DL (ref 0–200)
CO2 BLDA-SCNC: 24.2 MMOL/L (ref 22–33)
CO2 BLDA-SCNC: 24.6 MMOL/L (ref 22–33)
CO2 BLDA-SCNC: 25 MMOL/L (ref 22–33)
CO2 BLDA-SCNC: 25.6 MMOL/L (ref 22–33)
CO2 BLDA-SCNC: 26.4 MMOL/L (ref 22–33)
CO2 BLDA-SCNC: 26.8 MMOL/L (ref 22–33)
CO2 SERPL-SCNC: 20 MMOL/L (ref 22–29)
CO2 SERPL-SCNC: 21 MMOL/L (ref 22–29)
CO2 SERPL-SCNC: 22 MMOL/L (ref 22–29)
CO2 SERPL-SCNC: 22 MMOL/L (ref 22–29)
CO2 SERPL-SCNC: 24 MMOL/L (ref 22–29)
CO2 SERPL-SCNC: 25 MMOL/L (ref 22–29)
CO2 SERPL-SCNC: 25 MMOL/L (ref 22–29)
CO2 SERPL-SCNC: 26 MMOL/L (ref 22–29)
COHGB MFR BLD: 0.8 % (ref 0–2)
COHGB MFR BLD: 0.9 % (ref 0–2)
COHGB MFR BLD: 0.9 % (ref 0–2)
COHGB MFR BLD: 1 % (ref 0–2)
COHGB MFR BLD: 1.1 % (ref 0–2)
COHGB MFR BLD: 1.2 % (ref 0–2)
CREAT SERPL-MCNC: 1.3 MG/DL (ref 0.57–1)
CREAT SERPL-MCNC: 1.32 MG/DL (ref 0.57–1)
CREAT SERPL-MCNC: 1.35 MG/DL (ref 0.57–1)
CREAT SERPL-MCNC: 1.36 MG/DL (ref 0.57–1)
CREAT SERPL-MCNC: 1.43 MG/DL (ref 0.57–1)
CREAT SERPL-MCNC: 1.43 MG/DL (ref 0.57–1)
CREAT SERPL-MCNC: 1.44 MG/DL (ref 0.57–1)
CREAT SERPL-MCNC: 1.46 MG/DL (ref 0.57–1)
CREAT SERPL-MCNC: 1.79 MG/DL (ref 0.57–1)
CREAT SERPL-MCNC: 1.98 MG/DL (ref 0.57–1)
CRP SERPL-MCNC: 4.7 MG/DL (ref 0–0.5)
D DIMER PPP FEU-MCNC: 2.22 MCGFEU/ML (ref 0–0.9)
D-LACTATE SERPL-SCNC: 1.2 MMOL/L (ref 0.5–2)
D-LACTATE SERPL-SCNC: 2 MMOL/L (ref 0.5–2)
D-LACTATE SERPL-SCNC: 4.3 MMOL/L (ref 0.5–2)
DEPRECATED RDW RBC AUTO: 50 FL (ref 37–54)
DEPRECATED RDW RBC AUTO: 50.8 FL (ref 37–54)
DEPRECATED RDW RBC AUTO: 51 FL (ref 37–54)
DEPRECATED RDW RBC AUTO: 51.1 FL (ref 37–54)
DEPRECATED RDW RBC AUTO: 52.2 FL (ref 37–54)
DEPRECATED RDW RBC AUTO: 52.5 FL (ref 37–54)
DEPRECATED RDW RBC AUTO: 54.1 FL (ref 37–54)
DEPRECATED RDW RBC AUTO: 55.2 FL (ref 37–54)
EGFRCR SERPLBLD CKD-EPI 2021: 23.6 ML/MIN/1.73
EGFRCR SERPLBLD CKD-EPI 2021: 26.7 ML/MIN/1.73
EGFRCR SERPLBLD CKD-EPI 2021: 34.1 ML/MIN/1.73
EGFRCR SERPLBLD CKD-EPI 2021: 34.6 ML/MIN/1.73
EGFRCR SERPLBLD CKD-EPI 2021: 34.9 ML/MIN/1.73
EGFRCR SERPLBLD CKD-EPI 2021: 34.9 ML/MIN/1.73
EGFRCR SERPLBLD CKD-EPI 2021: 37.1 ML/MIN/1.73
EGFRCR SERPLBLD CKD-EPI 2021: 37.4 ML/MIN/1.73
EGFRCR SERPLBLD CKD-EPI 2021: 38.4 ML/MIN/1.73
EGFRCR SERPLBLD CKD-EPI 2021: 39.1 ML/MIN/1.73
EOSINOPHIL # BLD AUTO: 0 10*3/MM3 (ref 0–0.4)
EOSINOPHIL # BLD AUTO: 0.02 10*3/MM3 (ref 0–0.4)
EOSINOPHIL # BLD AUTO: 0.11 10*3/MM3 (ref 0–0.4)
EOSINOPHIL # BLD AUTO: 0.15 10*3/MM3 (ref 0–0.4)
EOSINOPHIL # BLD AUTO: 0.17 10*3/MM3 (ref 0–0.4)
EOSINOPHIL # BLD AUTO: 0.24 10*3/MM3 (ref 0–0.4)
EOSINOPHIL NFR BLD AUTO: 0 % (ref 0.3–6.2)
EOSINOPHIL NFR BLD AUTO: 0.1 % (ref 0.3–6.2)
EOSINOPHIL NFR BLD AUTO: 0.7 % (ref 0.3–6.2)
EOSINOPHIL NFR BLD AUTO: 0.8 % (ref 0.3–6.2)
EOSINOPHIL NFR BLD AUTO: 1.2 % (ref 0.3–6.2)
EOSINOPHIL NFR BLD AUTO: 1.9 % (ref 0.3–6.2)
EPAP: 0
ERYTHROCYTE [DISTWIDTH] IN BLOOD BY AUTOMATED COUNT: 14.9 % (ref 12.3–15.4)
ERYTHROCYTE [DISTWIDTH] IN BLOOD BY AUTOMATED COUNT: 15 % (ref 12.3–15.4)
ERYTHROCYTE [DISTWIDTH] IN BLOOD BY AUTOMATED COUNT: 15.1 % (ref 12.3–15.4)
ERYTHROCYTE [DISTWIDTH] IN BLOOD BY AUTOMATED COUNT: 15.6 % (ref 12.3–15.4)
ERYTHROCYTE [DISTWIDTH] IN BLOOD BY AUTOMATED COUNT: 15.6 % (ref 12.3–15.4)
ERYTHROCYTE [DISTWIDTH] IN BLOOD BY AUTOMATED COUNT: 15.8 % (ref 12.3–15.4)
ERYTHROCYTE [DISTWIDTH] IN BLOOD BY AUTOMATED COUNT: 16 % (ref 12.3–15.4)
ERYTHROCYTE [DISTWIDTH] IN BLOOD BY AUTOMATED COUNT: 16.1 % (ref 12.3–15.4)
FLUAV SUBTYP SPEC NAA+PROBE: NOT DETECTED
FLUAV SUBTYP SPEC NAA+PROBE: NOT DETECTED
FLUBV RNA ISLT QL NAA+PROBE: NOT DETECTED
FLUBV RNA ISLT QL NAA+PROBE: NOT DETECTED
GLOBULIN UR ELPH-MCNC: 1.9 GM/DL
GLOBULIN UR ELPH-MCNC: 2.4 GM/DL
GLOBULIN UR ELPH-MCNC: 2.6 GM/DL
GLUCOSE BLDC GLUCOMTR-MCNC: 122 MG/DL (ref 70–130)
GLUCOSE BLDC GLUCOMTR-MCNC: 125 MG/DL (ref 70–130)
GLUCOSE BLDC GLUCOMTR-MCNC: 129 MG/DL (ref 70–130)
GLUCOSE BLDC GLUCOMTR-MCNC: 130 MG/DL (ref 70–130)
GLUCOSE BLDC GLUCOMTR-MCNC: 132 MG/DL (ref 70–130)
GLUCOSE BLDC GLUCOMTR-MCNC: 137 MG/DL (ref 70–130)
GLUCOSE BLDC GLUCOMTR-MCNC: 140 MG/DL (ref 70–130)
GLUCOSE BLDC GLUCOMTR-MCNC: 141 MG/DL (ref 70–130)
GLUCOSE BLDC GLUCOMTR-MCNC: 143 MG/DL (ref 70–130)
GLUCOSE BLDC GLUCOMTR-MCNC: 154 MG/DL (ref 70–130)
GLUCOSE SERPL-MCNC: 101 MG/DL (ref 65–99)
GLUCOSE SERPL-MCNC: 107 MG/DL (ref 65–99)
GLUCOSE SERPL-MCNC: 123 MG/DL (ref 65–99)
GLUCOSE SERPL-MCNC: 126 MG/DL (ref 65–99)
GLUCOSE SERPL-MCNC: 133 MG/DL (ref 65–99)
GLUCOSE SERPL-MCNC: 135 MG/DL (ref 65–99)
GLUCOSE SERPL-MCNC: 151 MG/DL (ref 65–99)
GLUCOSE SERPL-MCNC: 158 MG/DL (ref 65–99)
GLUCOSE SERPL-MCNC: 160 MG/DL (ref 65–99)
GLUCOSE SERPL-MCNC: 185 MG/DL (ref 65–99)
GRAM STN SPEC: ABNORMAL
HADV DNA SPEC NAA+PROBE: NOT DETECTED
HCO3 BLDA-SCNC: 23.3 MMOL/L (ref 20–26)
HCO3 BLDA-SCNC: 23.4 MMOL/L (ref 20–26)
HCO3 BLDA-SCNC: 23.6 MMOL/L (ref 20–26)
HCO3 BLDA-SCNC: 24.1 MMOL/L (ref 20–26)
HCO3 BLDA-SCNC: 25.1 MMOL/L (ref 20–26)
HCO3 BLDA-SCNC: 25.6 MMOL/L (ref 20–26)
HCOV 229E RNA SPEC QL NAA+PROBE: NOT DETECTED
HCOV HKU1 RNA SPEC QL NAA+PROBE: NOT DETECTED
HCOV NL63 RNA SPEC QL NAA+PROBE: NOT DETECTED
HCOV OC43 RNA SPEC QL NAA+PROBE: DETECTED
HCT VFR BLD AUTO: 29.6 % (ref 34–46.6)
HCT VFR BLD AUTO: 31.9 % (ref 34–46.6)
HCT VFR BLD AUTO: 32.2 % (ref 34–46.6)
HCT VFR BLD AUTO: 32.7 % (ref 34–46.6)
HCT VFR BLD AUTO: 33.2 % (ref 34–46.6)
HCT VFR BLD AUTO: 34.2 % (ref 34–46.6)
HCT VFR BLD AUTO: 35.2 % (ref 34–46.6)
HCT VFR BLD AUTO: 40.6 % (ref 34–46.6)
HCT VFR BLD CALC: 31.3 % (ref 38–51)
HCT VFR BLD CALC: 31.8 % (ref 38–51)
HCT VFR BLD CALC: 32.2 % (ref 38–51)
HCT VFR BLD CALC: 33.1 % (ref 38–51)
HCT VFR BLD CALC: 35.9 % (ref 38–51)
HCT VFR BLD CALC: 39 % (ref 38–51)
HDLC SERPL-MCNC: 48 MG/DL (ref 40–60)
HGB BLD-MCNC: 10.1 G/DL (ref 12–15.9)
HGB BLD-MCNC: 10.3 G/DL (ref 12–15.9)
HGB BLD-MCNC: 10.4 G/DL (ref 12–15.9)
HGB BLD-MCNC: 10.6 G/DL (ref 12–15.9)
HGB BLD-MCNC: 11.2 G/DL (ref 12–15.9)
HGB BLD-MCNC: 11.2 G/DL (ref 12–15.9)
HGB BLD-MCNC: 11.5 G/DL (ref 12–15.9)
HGB BLD-MCNC: 13.2 G/DL (ref 12–15.9)
HGB BLDA-MCNC: 10.2 G/DL (ref 14–18)
HGB BLDA-MCNC: 10.4 G/DL (ref 14–18)
HGB BLDA-MCNC: 10.5 G/DL (ref 14–18)
HGB BLDA-MCNC: 10.8 G/DL (ref 14–18)
HGB BLDA-MCNC: 11.7 G/DL (ref 14–18)
HGB BLDA-MCNC: 12.7 G/DL (ref 14–18)
HMPV RNA NPH QL NAA+NON-PROBE: NOT DETECTED
HOLD SPECIMEN: NORMAL
HPIV1 RNA ISLT QL NAA+PROBE: NOT DETECTED
HPIV2 RNA SPEC QL NAA+PROBE: NOT DETECTED
HPIV3 RNA NPH QL NAA+PROBE: NOT DETECTED
HPIV4 P GENE NPH QL NAA+PROBE: NOT DETECTED
IMM GRANULOCYTES # BLD AUTO: 0.07 10*3/MM3 (ref 0–0.05)
IMM GRANULOCYTES # BLD AUTO: 0.08 10*3/MM3 (ref 0–0.05)
IMM GRANULOCYTES # BLD AUTO: 0.08 10*3/MM3 (ref 0–0.05)
IMM GRANULOCYTES # BLD AUTO: 0.1 10*3/MM3 (ref 0–0.05)
IMM GRANULOCYTES # BLD AUTO: 0.11 10*3/MM3 (ref 0–0.05)
IMM GRANULOCYTES # BLD AUTO: 0.15 10*3/MM3 (ref 0–0.05)
IMM GRANULOCYTES # BLD AUTO: 0.2 10*3/MM3 (ref 0–0.05)
IMM GRANULOCYTES # BLD AUTO: 0.25 10*3/MM3 (ref 0–0.05)
IMM GRANULOCYTES NFR BLD AUTO: 0.5 % (ref 0–0.5)
IMM GRANULOCYTES NFR BLD AUTO: 0.5 % (ref 0–0.5)
IMM GRANULOCYTES NFR BLD AUTO: 0.6 % (ref 0–0.5)
IMM GRANULOCYTES NFR BLD AUTO: 0.9 % (ref 0–0.5)
IMM GRANULOCYTES NFR BLD AUTO: 1 % (ref 0–0.5)
IMM GRANULOCYTES NFR BLD AUTO: 1 % (ref 0–0.5)
IMM GRANULOCYTES NFR BLD AUTO: 1.1 % (ref 0–0.5)
IMM GRANULOCYTES NFR BLD AUTO: 1.2 % (ref 0–0.5)
INHALED O2 CONCENTRATION: 40 %
INHALED O2 CONCENTRATION: 44 %
INHALED O2 CONCENTRATION: 50 %
IPAP: 0
ISOLATED FROM: ABNORMAL
ISOLATED FROM: ABNORMAL
IVRT: 137 MS
LDLC SERPL CALC-MCNC: 46 MG/DL (ref 0–100)
LDLC/HDLC SERPL: 0.97 {RATIO}
LEFT ATRIUM VOLUME INDEX: 22.1 ML/M2
LYMPHOCYTES # BLD AUTO: 0.25 10*3/MM3 (ref 0.7–3.1)
LYMPHOCYTES # BLD AUTO: 0.28 10*3/MM3 (ref 0.7–3.1)
LYMPHOCYTES # BLD AUTO: 0.32 10*3/MM3 (ref 0.7–3.1)
LYMPHOCYTES # BLD AUTO: 0.4 10*3/MM3 (ref 0.7–3.1)
LYMPHOCYTES # BLD AUTO: 0.4 10*3/MM3 (ref 0.7–3.1)
LYMPHOCYTES # BLD AUTO: 0.58 10*3/MM3 (ref 0.7–3.1)
LYMPHOCYTES # BLD AUTO: 0.59 10*3/MM3 (ref 0.7–3.1)
LYMPHOCYTES # BLD AUTO: 1.13 10*3/MM3 (ref 0.7–3.1)
LYMPHOCYTES NFR BLD AUTO: 2.2 % (ref 19.6–45.3)
LYMPHOCYTES NFR BLD AUTO: 2.2 % (ref 19.6–45.3)
LYMPHOCYTES NFR BLD AUTO: 2.6 % (ref 19.6–45.3)
LYMPHOCYTES NFR BLD AUTO: 2.8 % (ref 19.6–45.3)
LYMPHOCYTES NFR BLD AUTO: 3.1 % (ref 19.6–45.3)
LYMPHOCYTES NFR BLD AUTO: 3.2 % (ref 19.6–45.3)
LYMPHOCYTES NFR BLD AUTO: 3.5 % (ref 19.6–45.3)
LYMPHOCYTES NFR BLD AUTO: 4.9 % (ref 19.6–45.3)
M PNEUMO IGG SER IA-ACNC: NOT DETECTED
MAGNESIUM SERPL-MCNC: 1.8 MG/DL (ref 1.6–2.4)
MAGNESIUM SERPL-MCNC: 1.9 MG/DL (ref 1.6–2.4)
MAGNESIUM SERPL-MCNC: 2 MG/DL (ref 1.6–2.4)
MCH RBC QN AUTO: 29.3 PG (ref 26.6–33)
MCH RBC QN AUTO: 29.6 PG (ref 26.6–33)
MCH RBC QN AUTO: 29.7 PG (ref 26.6–33)
MCH RBC QN AUTO: 30.2 PG (ref 26.6–33)
MCH RBC QN AUTO: 30.3 PG (ref 26.6–33)
MCH RBC QN AUTO: 30.4 PG (ref 26.6–33)
MCH RBC QN AUTO: 30.5 PG (ref 26.6–33)
MCH RBC QN AUTO: 30.9 PG (ref 26.6–33)
MCHC RBC AUTO-ENTMCNC: 31 G/DL (ref 31.5–35.7)
MCHC RBC AUTO-ENTMCNC: 32 G/DL (ref 31.5–35.7)
MCHC RBC AUTO-ENTMCNC: 32.5 G/DL (ref 31.5–35.7)
MCHC RBC AUTO-ENTMCNC: 32.6 G/DL (ref 31.5–35.7)
MCHC RBC AUTO-ENTMCNC: 32.7 G/DL (ref 31.5–35.7)
MCHC RBC AUTO-ENTMCNC: 33.7 G/DL (ref 31.5–35.7)
MCHC RBC AUTO-ENTMCNC: 34.1 G/DL (ref 31.5–35.7)
MCHC RBC AUTO-ENTMCNC: 34.3 G/DL (ref 31.5–35.7)
MCV RBC AUTO: 89.2 FL (ref 79–97)
MCV RBC AUTO: 90.1 FL (ref 79–97)
MCV RBC AUTO: 90.5 FL (ref 79–97)
MCV RBC AUTO: 90.9 FL (ref 79–97)
MCV RBC AUTO: 91.7 FL (ref 79–97)
MCV RBC AUTO: 92.4 FL (ref 79–97)
MCV RBC AUTO: 93.3 FL (ref 79–97)
MCV RBC AUTO: 95.8 FL (ref 79–97)
METHGB BLD QL: 0.4 % (ref 0–1.5)
METHGB BLD QL: 0.4 % (ref 0–1.5)
METHGB BLD QL: 0.5 % (ref 0–1.5)
METHGB BLD QL: 0.6 % (ref 0–1.5)
METHGB BLD QL: 0.6 % (ref 0–1.5)
METHGB BLD QL: 0.7 % (ref 0–1.5)
MODALITY: ABNORMAL
MONOCYTES # BLD AUTO: 0.27 10*3/MM3 (ref 0.1–0.9)
MONOCYTES # BLD AUTO: 0.43 10*3/MM3 (ref 0.1–0.9)
MONOCYTES # BLD AUTO: 0.5 10*3/MM3 (ref 0.1–0.9)
MONOCYTES # BLD AUTO: 0.5 10*3/MM3 (ref 0.1–0.9)
MONOCYTES # BLD AUTO: 0.57 10*3/MM3 (ref 0.1–0.9)
MONOCYTES # BLD AUTO: 0.7 10*3/MM3 (ref 0.1–0.9)
MONOCYTES # BLD AUTO: 0.79 10*3/MM3 (ref 0.1–0.9)
MONOCYTES # BLD AUTO: 1.08 10*3/MM3 (ref 0.1–0.9)
MONOCYTES NFR BLD AUTO: 1.6 % (ref 5–12)
MONOCYTES NFR BLD AUTO: 3.5 % (ref 5–12)
MONOCYTES NFR BLD AUTO: 3.8 % (ref 5–12)
MONOCYTES NFR BLD AUTO: 3.9 % (ref 5–12)
MONOCYTES NFR BLD AUTO: 4.4 % (ref 5–12)
MONOCYTES NFR BLD AUTO: 4.5 % (ref 5–12)
MONOCYTES NFR BLD AUTO: 4.7 % (ref 5–12)
MONOCYTES NFR BLD AUTO: 5.6 % (ref 5–12)
NEUTROPHILS NFR BLD AUTO: 10.06 10*3/MM3 (ref 1.7–7)
NEUTROPHILS NFR BLD AUTO: 10.37 10*3/MM3 (ref 1.7–7)
NEUTROPHILS NFR BLD AUTO: 11.18 10*3/MM3 (ref 1.7–7)
NEUTROPHILS NFR BLD AUTO: 11.52 10*3/MM3 (ref 1.7–7)
NEUTROPHILS NFR BLD AUTO: 13.46 10*3/MM3 (ref 1.7–7)
NEUTROPHILS NFR BLD AUTO: 15.63 10*3/MM3 (ref 1.7–7)
NEUTROPHILS NFR BLD AUTO: 18.99 10*3/MM3 (ref 1.7–7)
NEUTROPHILS NFR BLD AUTO: 20.3 10*3/MM3 (ref 1.7–7)
NEUTROPHILS NFR BLD AUTO: 88.6 % (ref 42.7–76)
NEUTROPHILS NFR BLD AUTO: 89 % (ref 42.7–76)
NEUTROPHILS NFR BLD AUTO: 89.8 % (ref 42.7–76)
NEUTROPHILS NFR BLD AUTO: 91.5 % (ref 42.7–76)
NEUTROPHILS NFR BLD AUTO: 92.3 % (ref 42.7–76)
NEUTROPHILS NFR BLD AUTO: 92.4 % (ref 42.7–76)
NEUTROPHILS NFR BLD AUTO: 93.6 % (ref 42.7–76)
NEUTROPHILS NFR BLD AUTO: 93.7 % (ref 42.7–76)
NRBC BLD AUTO-RTO: 0 /100 WBC (ref 0–0.2)
NRBC BLD AUTO-RTO: 0.3 /100 WBC (ref 0–0.2)
NT-PROBNP SERPL-MCNC: ABNORMAL PG/ML (ref 0–1800)
OXYHGB MFR BLDV: 93.4 % (ref 94–99)
OXYHGB MFR BLDV: 94.4 % (ref 94–99)
OXYHGB MFR BLDV: 96.2 % (ref 94–99)
OXYHGB MFR BLDV: 97.2 % (ref 94–99)
OXYHGB MFR BLDV: 97.3 % (ref 94–99)
OXYHGB MFR BLDV: 98 % (ref 94–99)
PAW @ PEAK INSP FLOW SETTING VENT: 0 CMH2O
PCO2 BLDA: 28.4 MM HG (ref 35–45)
PCO2 BLDA: 39.2 MM HG (ref 35–45)
PCO2 BLDA: 40.2 MM HG (ref 35–45)
PCO2 BLDA: 42.4 MM HG (ref 35–45)
PCO2 BLDA: 44.8 MM HG (ref 35–45)
PCO2 BLDA: 49 MM HG (ref 35–45)
PCO2 TEMP ADJ BLD: 28.4 MM HG (ref 35–45)
PCO2 TEMP ADJ BLD: 39.2 MM HG (ref 35–45)
PCO2 TEMP ADJ BLD: 40.2 MM HG (ref 35–45)
PCO2 TEMP ADJ BLD: 42.4 MM HG (ref 35–45)
PCO2 TEMP ADJ BLD: 44.8 MM HG (ref 35–45)
PCO2 TEMP ADJ BLD: 49 MM HG (ref 35–45)
PEEP RESPIRATORY: 5 CM[H2O]
PEEP RESPIRATORY: 5 CM[H2O]
PH BLDA: 7.3 PH UNITS (ref 7.35–7.45)
PH BLDA: 7.33 PH UNITS (ref 7.35–7.45)
PH BLDA: 7.38 PH UNITS (ref 7.35–7.45)
PH BLDA: 7.38 PH UNITS (ref 7.35–7.45)
PH BLDA: 7.41 PH UNITS (ref 7.35–7.45)
PH BLDA: 7.52 PH UNITS (ref 7.35–7.45)
PH, TEMP CORRECTED: 7.3 PH UNITS
PH, TEMP CORRECTED: 7.33 PH UNITS
PH, TEMP CORRECTED: 7.38 PH UNITS
PH, TEMP CORRECTED: 7.38 PH UNITS
PH, TEMP CORRECTED: 7.41 PH UNITS
PH, TEMP CORRECTED: 7.52 PH UNITS
PHOSPHATE SERPL-MCNC: 3 MG/DL (ref 2.5–4.5)
PHOSPHATE SERPL-MCNC: 4.9 MG/DL (ref 2.5–4.5)
PHOSPHATE SERPL-MCNC: 5.1 MG/DL (ref 2.5–4.5)
PISA AR ALIASING VEL: 0.31 M/S
PISA AR MAX VEL: 390 M/S
PLAT MORPH BLD: NORMAL
PLATELET # BLD AUTO: 168 10*3/MM3 (ref 140–450)
PLATELET # BLD AUTO: 170 10*3/MM3 (ref 140–450)
PLATELET # BLD AUTO: 185 10*3/MM3 (ref 140–450)
PLATELET # BLD AUTO: 203 10*3/MM3 (ref 140–450)
PLATELET # BLD AUTO: 204 10*3/MM3 (ref 140–450)
PLATELET # BLD AUTO: 259 10*3/MM3 (ref 140–450)
PLATELET # BLD AUTO: 262 10*3/MM3 (ref 140–450)
PLATELET # BLD AUTO: 265 10*3/MM3 (ref 140–450)
PMV BLD AUTO: 10.8 FL (ref 6–12)
PMV BLD AUTO: 11.5 FL (ref 6–12)
PMV BLD AUTO: 12 FL (ref 6–12)
PMV BLD AUTO: 12.1 FL (ref 6–12)
PMV BLD AUTO: 12.2 FL (ref 6–12)
PMV BLD AUTO: 12.3 FL (ref 6–12)
PMV BLD AUTO: 12.4 FL (ref 6–12)
PMV BLD AUTO: 12.4 FL (ref 6–12)
PO2 BLDA: 112 MM HG (ref 83–108)
PO2 BLDA: 114 MM HG (ref 83–108)
PO2 BLDA: 114 MM HG (ref 83–108)
PO2 BLDA: 77.2 MM HG (ref 83–108)
PO2 BLDA: 81.5 MM HG (ref 83–108)
PO2 BLDA: 91.6 MM HG (ref 83–108)
PO2 TEMP ADJ BLD: 112 MM HG (ref 83–108)
PO2 TEMP ADJ BLD: 114 MM HG (ref 83–108)
PO2 TEMP ADJ BLD: 114 MM HG (ref 83–108)
PO2 TEMP ADJ BLD: 77.2 MM HG (ref 83–108)
PO2 TEMP ADJ BLD: 81.5 MM HG (ref 83–108)
PO2 TEMP ADJ BLD: 91.6 MM HG (ref 83–108)
POTASSIUM SERPL-SCNC: 3.2 MMOL/L (ref 3.5–5.2)
POTASSIUM SERPL-SCNC: 3.3 MMOL/L (ref 3.5–5.2)
POTASSIUM SERPL-SCNC: 3.4 MMOL/L (ref 3.5–5.2)
POTASSIUM SERPL-SCNC: 3.8 MMOL/L (ref 3.5–5.2)
POTASSIUM SERPL-SCNC: 4 MMOL/L (ref 3.5–5.2)
POTASSIUM SERPL-SCNC: 4.3 MMOL/L (ref 3.5–5.2)
POTASSIUM SERPL-SCNC: 4.4 MMOL/L (ref 3.5–5.2)
POTASSIUM SERPL-SCNC: 4.5 MMOL/L (ref 3.5–5.2)
POTASSIUM SERPL-SCNC: 4.8 MMOL/L (ref 3.5–5.2)
POTASSIUM SERPL-SCNC: 4.8 MMOL/L (ref 3.5–5.2)
PREALB SERPL-MCNC: 14.5 MG/DL (ref 20–40)
PROCALCITONIN SERPL-MCNC: 0.12 NG/ML (ref 0–0.25)
PROT SERPL-MCNC: 5.3 G/DL (ref 6–8.5)
PROT SERPL-MCNC: 5.4 G/DL (ref 6–8.5)
PROT SERPL-MCNC: 6.8 G/DL (ref 6–8.5)
QT INTERVAL: 252 MS
QT INTERVAL: 318 MS
QT INTERVAL: 338 MS
QT INTERVAL: 340 MS
QT INTERVAL: 362 MS
QT INTERVAL: 376 MS
QT INTERVAL: 412 MS
QT INTERVAL: 412 MS
QTC INTERVAL: 407 MS
QTC INTERVAL: 439 MS
QTC INTERVAL: 460 MS
QTC INTERVAL: 465 MS
QTC INTERVAL: 468 MS
QTC INTERVAL: 498 MS
QTC INTERVAL: 522 MS
QTC INTERVAL: 556 MS
RBC # BLD AUTO: 3.32 10*6/MM3 (ref 3.77–5.28)
RBC # BLD AUTO: 3.51 10*6/MM3 (ref 3.77–5.28)
RBC # BLD AUTO: 3.51 10*6/MM3 (ref 3.77–5.28)
RBC # BLD AUTO: 3.57 10*6/MM3 (ref 3.77–5.28)
RBC # BLD AUTO: 3.63 10*6/MM3 (ref 3.77–5.28)
RBC # BLD AUTO: 3.67 10*6/MM3 (ref 3.77–5.28)
RBC # BLD AUTO: 3.81 10*6/MM3 (ref 3.77–5.28)
RBC # BLD AUTO: 4.35 10*6/MM3 (ref 3.77–5.28)
RBC MORPH BLD: NORMAL
RHINOVIRUS RNA SPEC NAA+PROBE: NOT DETECTED
RSV RNA NPH QL NAA+NON-PROBE: NOT DETECTED
SARS-COV-2 RNA NPH QL NAA+NON-PROBE: NOT DETECTED
SARS-COV-2 RNA RESP QL NAA+PROBE: NOT DETECTED
SODIUM SERPL-SCNC: 130 MMOL/L (ref 136–145)
SODIUM SERPL-SCNC: 133 MMOL/L (ref 136–145)
SODIUM SERPL-SCNC: 134 MMOL/L (ref 136–145)
SODIUM SERPL-SCNC: 135 MMOL/L (ref 136–145)
SODIUM SERPL-SCNC: 135 MMOL/L (ref 136–145)
SODIUM SERPL-SCNC: 137 MMOL/L (ref 136–145)
SODIUM SERPL-SCNC: 139 MMOL/L (ref 136–145)
SODIUM SERPL-SCNC: 139 MMOL/L (ref 136–145)
SODIUM SERPL-SCNC: 140 MMOL/L (ref 136–145)
SODIUM SERPL-SCNC: 140 MMOL/L (ref 136–145)
TOTAL RATE: 0 BREATHS/MINUTE
TRIGL SERPL-MCNC: 65 MG/DL (ref 0–150)
TRIGL SERPL-MCNC: 78 MG/DL (ref 0–150)
TROPONIN T SERPL HS-MCNC: 51 NG/L
VENTILATOR MODE: ABNORMAL
VENTILATOR MODE: ABNORMAL
VLDLC SERPL-MCNC: 16 MG/DL (ref 5–40)
VT ON VENT VENT: 0.3 ML
WBC MORPH BLD: NORMAL
WBC NRBC COR # BLD AUTO: 10.89 10*3/MM3 (ref 3.4–10.8)
WBC NRBC COR # BLD AUTO: 11.23 10*3/MM3 (ref 3.4–10.8)
WBC NRBC COR # BLD AUTO: 12.61 10*3/MM3 (ref 3.4–10.8)
WBC NRBC COR # BLD AUTO: 12.83 10*3/MM3 (ref 3.4–10.8)
WBC NRBC COR # BLD AUTO: 14.36 10*3/MM3 (ref 3.4–10.8)
WBC NRBC COR # BLD AUTO: 16.69 10*3/MM3 (ref 3.4–10.8)
WBC NRBC COR # BLD AUTO: 20.77 10*3/MM3 (ref 3.4–10.8)
WBC NRBC COR # BLD AUTO: 22.83 10*3/MM3 (ref 3.4–10.8)
WHOLE BLOOD HOLD COAG: NORMAL
WHOLE BLOOD HOLD SPECIMEN: NORMAL

## 2024-01-01 PROCEDURE — 82465 ASSAY BLD/SERUM CHOLESTEROL: CPT | Performed by: INTERNAL MEDICINE

## 2024-01-01 PROCEDURE — 25010000002 DEXAMETHASONE PER 1 MG: Performed by: INTERNAL MEDICINE

## 2024-01-01 PROCEDURE — 25010000002 FENTANYL 10 MCG/1 ML NS

## 2024-01-01 PROCEDURE — 99232 SBSQ HOSP IP/OBS MODERATE 35: CPT | Performed by: NURSE PRACTITIONER

## 2024-01-01 PROCEDURE — 84100 ASSAY OF PHOSPHORUS: CPT | Performed by: INTERNAL MEDICINE

## 2024-01-01 PROCEDURE — 93005 ELECTROCARDIOGRAM TRACING: CPT | Performed by: EMERGENCY MEDICINE

## 2024-01-01 PROCEDURE — 82805 BLOOD GASES W/O2 SATURATION: CPT

## 2024-01-01 PROCEDURE — 71045 X-RAY EXAM CHEST 1 VIEW: CPT

## 2024-01-01 PROCEDURE — 25010000002 CEFTRIAXONE PER 250 MG: Performed by: INTERNAL MEDICINE

## 2024-01-01 PROCEDURE — 84484 ASSAY OF TROPONIN QUANT: CPT | Performed by: EMERGENCY MEDICINE

## 2024-01-01 PROCEDURE — 80048 BASIC METABOLIC PNL TOTAL CA: CPT | Performed by: NURSE PRACTITIONER

## 2024-01-01 PROCEDURE — 94002 VENT MGMT INPAT INIT DAY: CPT

## 2024-01-01 PROCEDURE — 83880 ASSAY OF NATRIURETIC PEPTIDE: CPT | Performed by: INTERNAL MEDICINE

## 2024-01-01 PROCEDURE — 93970 EXTREMITY STUDY: CPT

## 2024-01-01 PROCEDURE — 5A09357 ASSISTANCE WITH RESPIRATORY VENTILATION, LESS THAN 24 CONSECUTIVE HOURS, CONTINUOUS POSITIVE AIRWAY PRESSURE: ICD-10-PCS | Performed by: INTERNAL MEDICINE

## 2024-01-01 PROCEDURE — 25010000002 BUMETANIDE PER 0.5 MG: Performed by: NURSE PRACTITIONER

## 2024-01-01 PROCEDURE — 99232 SBSQ HOSP IP/OBS MODERATE 35: CPT | Performed by: INTERNAL MEDICINE

## 2024-01-01 PROCEDURE — 85007 BL SMEAR W/DIFF WBC COUNT: CPT | Performed by: INTERNAL MEDICINE

## 2024-01-01 PROCEDURE — 25010000002 BUMETANIDE PER 0.5 MG: Performed by: INTERNAL MEDICINE

## 2024-01-01 PROCEDURE — 25010000002 AMIODARONE IN DEXTROSE 5% 360-4.14 MG/200ML-% SOLUTION: Performed by: NURSE PRACTITIONER

## 2024-01-01 PROCEDURE — 94799 UNLISTED PULMONARY SVC/PX: CPT

## 2024-01-01 PROCEDURE — 85025 COMPLETE CBC W/AUTO DIFF WBC: CPT | Performed by: PEDIATRICS

## 2024-01-01 PROCEDURE — 99223 1ST HOSP IP/OBS HIGH 75: CPT | Performed by: INTERNAL MEDICINE

## 2024-01-01 PROCEDURE — 83880 ASSAY OF NATRIURETIC PEPTIDE: CPT | Performed by: EMERGENCY MEDICINE

## 2024-01-01 PROCEDURE — 0BH17EZ INSERTION OF ENDOTRACHEAL AIRWAY INTO TRACHEA, VIA NATURAL OR ARTIFICIAL OPENING: ICD-10-PCS | Performed by: INTERNAL MEDICINE

## 2024-01-01 PROCEDURE — 85025 COMPLETE CBC W/AUTO DIFF WBC: CPT | Performed by: INTERNAL MEDICINE

## 2024-01-01 PROCEDURE — 86140 C-REACTIVE PROTEIN: CPT | Performed by: INTERNAL MEDICINE

## 2024-01-01 PROCEDURE — 80053 COMPREHEN METABOLIC PANEL: CPT | Performed by: PEDIATRICS

## 2024-01-01 PROCEDURE — 87040 BLOOD CULTURE FOR BACTERIA: CPT | Performed by: INTERNAL MEDICINE

## 2024-01-01 PROCEDURE — 83735 ASSAY OF MAGNESIUM: CPT | Performed by: INTERNAL MEDICINE

## 2024-01-01 PROCEDURE — 93010 ELECTROCARDIOGRAM REPORT: CPT | Performed by: INTERNAL MEDICINE

## 2024-01-01 PROCEDURE — 93005 ELECTROCARDIOGRAM TRACING: CPT | Performed by: INTERNAL MEDICINE

## 2024-01-01 PROCEDURE — 99291 CRITICAL CARE FIRST HOUR: CPT | Performed by: INTERNAL MEDICINE

## 2024-01-01 PROCEDURE — 25010000002 MIDAZOLAM PER 1 MG

## 2024-01-01 PROCEDURE — 94003 VENT MGMT INPAT SUBQ DAY: CPT

## 2024-01-01 PROCEDURE — 80048 BASIC METABOLIC PNL TOTAL CA: CPT | Performed by: INTERNAL MEDICINE

## 2024-01-01 PROCEDURE — 71250 CT THORAX DX C-: CPT

## 2024-01-01 PROCEDURE — 36415 COLL VENOUS BLD VENIPUNCTURE: CPT

## 2024-01-01 PROCEDURE — 84134 ASSAY OF PREALBUMIN: CPT | Performed by: INTERNAL MEDICINE

## 2024-01-01 PROCEDURE — 25010000002 AMIODARONE IN DEXTROSE 5% 150-4.21 MG/100ML-% SOLUTION: Performed by: NURSE PRACTITIONER

## 2024-01-01 PROCEDURE — 70450 CT HEAD/BRAIN W/O DYE: CPT

## 2024-01-01 PROCEDURE — 87636 SARSCOV2 & INF A&B AMP PRB: CPT | Performed by: EMERGENCY MEDICINE

## 2024-01-01 PROCEDURE — 82948 REAGENT STRIP/BLOOD GLUCOSE: CPT

## 2024-01-01 PROCEDURE — 80053 COMPREHEN METABOLIC PANEL: CPT | Performed by: INTERNAL MEDICINE

## 2024-01-01 PROCEDURE — 85007 BL SMEAR W/DIFF WBC COUNT: CPT | Performed by: NURSE PRACTITIONER

## 2024-01-01 PROCEDURE — 85025 COMPLETE CBC W/AUTO DIFF WBC: CPT | Performed by: NURSE PRACTITIONER

## 2024-01-01 PROCEDURE — 82375 ASSAY CARBOXYHB QUANT: CPT

## 2024-01-01 PROCEDURE — 25010000002 MIDAZOLAM PER 1 MG: Performed by: INTERNAL MEDICINE

## 2024-01-01 PROCEDURE — 25010000002 FENTANYL 10 MCG/1 ML NS: Performed by: INTERNAL MEDICINE

## 2024-01-01 PROCEDURE — 25010000002 LINEZOLID 600 MG/300ML SOLUTION: Performed by: INTERNAL MEDICINE

## 2024-01-01 PROCEDURE — 80061 LIPID PANEL: CPT | Performed by: INTERNAL MEDICINE

## 2024-01-01 PROCEDURE — 93306 TTE W/DOPPLER COMPLETE: CPT

## 2024-01-01 PROCEDURE — 25010000002 ALBUMIN HUMAN 25% PER 50 ML: Performed by: NURSE PRACTITIONER

## 2024-01-01 PROCEDURE — 83735 ASSAY OF MAGNESIUM: CPT | Performed by: NURSE PRACTITIONER

## 2024-01-01 PROCEDURE — 85379 FIBRIN DEGRADATION QUANT: CPT | Performed by: INTERNAL MEDICINE

## 2024-01-01 PROCEDURE — 25810000003 SODIUM CHLORIDE 0.9 % SOLUTION: Performed by: EMERGENCY MEDICINE

## 2024-01-01 PROCEDURE — 99233 SBSQ HOSP IP/OBS HIGH 50: CPT | Performed by: NURSE PRACTITIONER

## 2024-01-01 PROCEDURE — 25010000002 MORPHINE PER 10 MG: Performed by: INTERNAL MEDICINE

## 2024-01-01 PROCEDURE — 94761 N-INVAS EAR/PLS OXIMETRY MLT: CPT

## 2024-01-01 PROCEDURE — 99232 SBSQ HOSP IP/OBS MODERATE 35: CPT | Performed by: HOSPITALIST

## 2024-01-01 PROCEDURE — 94660 CPAP INITIATION&MGMT: CPT

## 2024-01-01 PROCEDURE — 93005 ELECTROCARDIOGRAM TRACING: CPT | Performed by: NURSE PRACTITIONER

## 2024-01-01 PROCEDURE — 93970 EXTREMITY STUDY: CPT | Performed by: INTERNAL MEDICINE

## 2024-01-01 PROCEDURE — 25010000002 MEROPENEM PER 100 MG: Performed by: EMERGENCY MEDICINE

## 2024-01-01 PROCEDURE — 25010000002 NITROGLYCERIN 200 MCG/ML SOLUTION: Performed by: EMERGENCY MEDICINE

## 2024-01-01 PROCEDURE — 0202U NFCT DS 22 TRGT SARS-COV-2: CPT | Performed by: INTERNAL MEDICINE

## 2024-01-01 PROCEDURE — 85025 COMPLETE CBC W/AUTO DIFF WBC: CPT | Performed by: PHYSICIAN ASSISTANT

## 2024-01-01 PROCEDURE — 25010000002 BUMETANIDE PER 0.5 MG: Performed by: EMERGENCY MEDICINE

## 2024-01-01 PROCEDURE — 25510000001 IOPAMIDOL 61 % SOLUTION: Performed by: INTERNAL MEDICINE

## 2024-01-01 PROCEDURE — 94664 DEMO&/EVAL PT USE INHALER: CPT

## 2024-01-01 PROCEDURE — 36600 WITHDRAWAL OF ARTERIAL BLOOD: CPT

## 2024-01-01 PROCEDURE — C1751 CATH, INF, PER/CENT/MIDLINE: HCPCS

## 2024-01-01 PROCEDURE — 80053 COMPREHEN METABOLIC PANEL: CPT | Performed by: EMERGENCY MEDICINE

## 2024-01-01 PROCEDURE — 80048 BASIC METABOLIC PNL TOTAL CA: CPT | Performed by: PHYSICIAN ASSISTANT

## 2024-01-01 PROCEDURE — 84478 ASSAY OF TRIGLYCERIDES: CPT | Performed by: INTERNAL MEDICINE

## 2024-01-01 PROCEDURE — 25010000002 CEFAZOLIN PER 500 MG: Performed by: INTERNAL MEDICINE

## 2024-01-01 PROCEDURE — 25810000003 SODIUM CHLORIDE 0.9 % SOLUTION: Performed by: PEDIATRICS

## 2024-01-01 PROCEDURE — 87186 SC STD MICRODIL/AGAR DIL: CPT | Performed by: INTERNAL MEDICINE

## 2024-01-01 PROCEDURE — 5A1945Z RESPIRATORY VENTILATION, 24-96 CONSECUTIVE HOURS: ICD-10-PCS | Performed by: INTERNAL MEDICINE

## 2024-01-01 PROCEDURE — 29581 APPL MULTLAYER CMPRN SYS LEG: CPT

## 2024-01-01 PROCEDURE — 29580 STRAPPING UNNA BOOT: CPT

## 2024-01-01 PROCEDURE — 83050 HGB METHEMOGLOBIN QUAN: CPT

## 2024-01-01 PROCEDURE — 97166 OT EVAL MOD COMPLEX 45 MIN: CPT

## 2024-01-01 PROCEDURE — 97162 PT EVAL MOD COMPLEX 30 MIN: CPT

## 2024-01-01 PROCEDURE — 25010000002 VANCOMYCIN 10 G RECONSTITUTED SOLUTION: Performed by: EMERGENCY MEDICINE

## 2024-01-01 PROCEDURE — 70491 CT SOFT TISSUE NECK W/DYE: CPT

## 2024-01-01 PROCEDURE — 93306 TTE W/DOPPLER COMPLETE: CPT | Performed by: INTERNAL MEDICINE

## 2024-01-01 PROCEDURE — 97530 THERAPEUTIC ACTIVITIES: CPT

## 2024-01-01 PROCEDURE — 25010000002 AMIODARONE IN DEXTROSE 5% 360-4.14 MG/200ML-% SOLUTION: Performed by: INTERNAL MEDICINE

## 2024-01-01 PROCEDURE — 25010000002 METHYLPREDNISOLONE PER 125 MG: Performed by: NURSE PRACTITIONER

## 2024-01-01 PROCEDURE — 99231 SBSQ HOSP IP/OBS SF/LOW 25: CPT | Performed by: NURSE PRACTITIONER

## 2024-01-01 PROCEDURE — P9047 ALBUMIN (HUMAN), 25%, 50ML: HCPCS | Performed by: NURSE PRACTITIONER

## 2024-01-01 PROCEDURE — 99291 CRITICAL CARE FIRST HOUR: CPT

## 2024-01-01 PROCEDURE — 94640 AIRWAY INHALATION TREATMENT: CPT

## 2024-01-01 PROCEDURE — 83605 ASSAY OF LACTIC ACID: CPT | Performed by: EMERGENCY MEDICINE

## 2024-01-01 PROCEDURE — C1894 INTRO/SHEATH, NON-LASER: HCPCS

## 2024-01-01 PROCEDURE — 99222 1ST HOSP IP/OBS MODERATE 55: CPT | Performed by: INTERNAL MEDICINE

## 2024-01-01 PROCEDURE — 25810000003 SODIUM CHLORIDE 0.9 % SOLUTION: Performed by: NURSE PRACTITIONER

## 2024-01-01 PROCEDURE — 84145 PROCALCITONIN (PCT): CPT | Performed by: INTERNAL MEDICINE

## 2024-01-01 PROCEDURE — 76700 US EXAM ABDOM COMPLETE: CPT

## 2024-01-01 PROCEDURE — 25010000002 MORPHINE PER 10 MG: Performed by: FAMILY MEDICINE

## 2024-01-01 PROCEDURE — 87040 BLOOD CULTURE FOR BACTERIA: CPT | Performed by: EMERGENCY MEDICINE

## 2024-01-01 PROCEDURE — 83735 ASSAY OF MAGNESIUM: CPT | Performed by: PHYSICIAN ASSISTANT

## 2024-01-01 PROCEDURE — 87154 CUL TYP ID BLD PTHGN 6+ TRGT: CPT | Performed by: EMERGENCY MEDICINE

## 2024-01-01 PROCEDURE — 87150 DNA/RNA AMPLIFIED PROBE: CPT | Performed by: EMERGENCY MEDICINE

## 2024-01-01 PROCEDURE — 74018 RADEX ABDOMEN 1 VIEW: CPT

## 2024-01-01 PROCEDURE — 25010000002 MAGNESIUM SULFATE IN D5W 1G/100ML (PREMIX) 1-5 GM/100ML-% SOLUTION: Performed by: NURSE PRACTITIONER

## 2024-01-01 PROCEDURE — 85025 COMPLETE CBC W/AUTO DIFF WBC: CPT | Performed by: EMERGENCY MEDICINE

## 2024-01-01 PROCEDURE — 25010000002 FUROSEMIDE PER 20 MG: Performed by: PHYSICIAN ASSISTANT

## 2024-01-01 PROCEDURE — 99233 SBSQ HOSP IP/OBS HIGH 50: CPT | Performed by: PHYSICIAN ASSISTANT

## 2024-01-01 PROCEDURE — 25010000002 AMIODARONE IN DEXTROSE 5% 360-4.14 MG/200ML-% SOLUTION

## 2024-01-01 PROCEDURE — 83605 ASSAY OF LACTIC ACID: CPT | Performed by: INTERNAL MEDICINE

## 2024-01-01 PROCEDURE — 83880 ASSAY OF NATRIURETIC PEPTIDE: CPT | Performed by: PEDIATRICS

## 2024-01-01 PROCEDURE — 25810000003 SODIUM CHLORIDE 0.9 % SOLUTION

## 2024-01-01 PROCEDURE — 99232 SBSQ HOSP IP/OBS MODERATE 35: CPT

## 2024-01-01 PROCEDURE — 87186 SC STD MICRODIL/AGAR DIL: CPT | Performed by: EMERGENCY MEDICINE

## 2024-01-01 RX ORDER — ASPIRIN 81 MG/1
81 TABLET ORAL DAILY
Status: DISCONTINUED | OUTPATIENT
Start: 2024-01-01 | End: 2024-01-01

## 2024-01-01 RX ORDER — MIDODRINE HYDROCHLORIDE 5 MG/1
5 TABLET ORAL
Status: DISCONTINUED | OUTPATIENT
Start: 2024-01-01 | End: 2024-01-01

## 2024-01-01 RX ORDER — MIDAZOLAM HYDROCHLORIDE 1 MG/ML
2 INJECTION INTRAMUSCULAR; INTRAVENOUS ONCE
Status: COMPLETED | OUTPATIENT
Start: 2024-01-01 | End: 2024-01-01

## 2024-01-01 RX ORDER — TAMSULOSIN HYDROCHLORIDE 0.4 MG/1
0.4 CAPSULE ORAL DAILY
Status: DISCONTINUED | OUTPATIENT
Start: 2024-01-01 | End: 2024-01-01 | Stop reason: HOSPADM

## 2024-01-01 RX ORDER — ACETAMINOPHEN 325 MG/1
650 TABLET ORAL EVERY 4 HOURS PRN
Status: DISCONTINUED | OUTPATIENT
Start: 2024-01-01 | End: 2024-01-01 | Stop reason: HOSPADM

## 2024-01-01 RX ORDER — LORAZEPAM 1 MG/1
2 TABLET ORAL
Status: DISCONTINUED | OUTPATIENT
Start: 2024-01-01 | End: 2024-01-01 | Stop reason: HOSPADM

## 2024-01-01 RX ORDER — SODIUM CHLORIDE 0.9 % (FLUSH) 0.9 %
10 SYRINGE (ML) INJECTION AS NEEDED
Status: DISCONTINUED | OUTPATIENT
Start: 2024-01-01 | End: 2024-01-01 | Stop reason: HOSPADM

## 2024-01-01 RX ORDER — GUAIFENESIN 200 MG/10ML
200 LIQUID ORAL EVERY 4 HOURS PRN
Status: DISCONTINUED | OUTPATIENT
Start: 2024-01-01 | End: 2024-01-01 | Stop reason: HOSPADM

## 2024-01-01 RX ORDER — POLYETHYLENE GLYCOL 3350 17 G/17G
17 POWDER, FOR SOLUTION ORAL DAILY PRN
Status: DISCONTINUED | OUTPATIENT
Start: 2024-01-01 | End: 2024-01-01 | Stop reason: HOSPADM

## 2024-01-01 RX ORDER — NOREPINEPHRINE BITARTRATE 0.03 MG/ML
INJECTION, SOLUTION INTRAVENOUS
Status: DISPENSED
Start: 2024-01-01 | End: 2024-01-01

## 2024-01-01 RX ORDER — BUMETANIDE 1 MG/1
0.5 TABLET ORAL DAILY
Status: DISCONTINUED | OUTPATIENT
Start: 2024-01-01 | End: 2024-01-01

## 2024-01-01 RX ORDER — POTASSIUM CHLORIDE 750 MG/1
40 CAPSULE, EXTENDED RELEASE ORAL ONCE
Status: COMPLETED | OUTPATIENT
Start: 2024-01-01 | End: 2024-01-01

## 2024-01-01 RX ORDER — HYDROXYZINE HYDROCHLORIDE 25 MG/1
25 TABLET, FILM COATED ORAL EVERY 4 HOURS PRN
Status: DISCONTINUED | OUTPATIENT
Start: 2024-01-01 | End: 2024-01-01 | Stop reason: HOSPADM

## 2024-01-01 RX ORDER — SODIUM CHLORIDE 9 MG/ML
40 INJECTION, SOLUTION INTRAVENOUS AS NEEDED
Status: DISCONTINUED | OUTPATIENT
Start: 2024-01-01 | End: 2024-01-01 | Stop reason: HOSPADM

## 2024-01-01 RX ORDER — CHLORHEXIDINE GLUCONATE ORAL RINSE 1.2 MG/ML
15 SOLUTION DENTAL EVERY 12 HOURS SCHEDULED
Status: DISCONTINUED | OUTPATIENT
Start: 2024-01-01 | End: 2024-01-01 | Stop reason: HOSPADM

## 2024-01-01 RX ORDER — MIDODRINE HYDROCHLORIDE 10 MG/1
10 TABLET ORAL
Status: DISCONTINUED | OUTPATIENT
Start: 2024-01-01 | End: 2024-01-01

## 2024-01-01 RX ORDER — AMOXICILLIN 250 MG
2 CAPSULE ORAL 2 TIMES DAILY
Status: DISCONTINUED | OUTPATIENT
Start: 2024-01-01 | End: 2024-01-01 | Stop reason: HOSPADM

## 2024-01-01 RX ORDER — PANTOPRAZOLE SODIUM 20 MG/1
20 TABLET, DELAYED RELEASE ORAL DAILY
Status: DISCONTINUED | OUTPATIENT
Start: 2024-01-01 | End: 2024-01-01

## 2024-01-01 RX ORDER — PANTOPRAZOLE SODIUM 40 MG/10ML
40 INJECTION, POWDER, LYOPHILIZED, FOR SOLUTION INTRAVENOUS
Status: DISCONTINUED | OUTPATIENT
Start: 2024-01-01 | End: 2024-01-01

## 2024-01-01 RX ORDER — MIDAZOLAM HYDROCHLORIDE 1 MG/ML
2 INJECTION INTRAMUSCULAR; INTRAVENOUS
Status: DISCONTINUED | OUTPATIENT
Start: 2024-01-01 | End: 2024-01-01 | Stop reason: HOSPADM

## 2024-01-01 RX ORDER — POTASSIUM CHLORIDE 20 MEQ/1
40 TABLET, EXTENDED RELEASE ORAL ONCE
Status: COMPLETED | OUTPATIENT
Start: 2024-01-01 | End: 2024-01-01

## 2024-01-01 RX ORDER — NEOMYCIN POLYMYXIN B SULFATES AND DEXAMETHASONE 3.5; 10000; 1 MG/ML; [USP'U]/ML; MG/ML
1 SUSPENSION/ DROPS OPHTHALMIC 4 TIMES DAILY
COMMUNITY

## 2024-01-01 RX ORDER — LORAZEPAM 2 MG/ML
2 CONCENTRATE ORAL
Status: DISCONTINUED | OUTPATIENT
Start: 2024-01-01 | End: 2024-01-01

## 2024-01-01 RX ORDER — ETOMIDATE 2 MG/ML
20 INJECTION INTRAVENOUS ONCE
Status: COMPLETED | OUTPATIENT
Start: 2024-01-01 | End: 2024-01-01

## 2024-01-01 RX ORDER — HYDROXYZINE HYDROCHLORIDE 25 MG/1
25 TABLET, FILM COATED ORAL EVERY 6 HOURS PRN
Status: DISCONTINUED | OUTPATIENT
Start: 2024-01-01 | End: 2024-01-01

## 2024-01-01 RX ORDER — BUMETANIDE 0.25 MG/ML
1 INJECTION INTRAMUSCULAR; INTRAVENOUS ONCE
Status: COMPLETED | OUTPATIENT
Start: 2024-01-01 | End: 2024-01-01

## 2024-01-01 RX ORDER — ETOMIDATE 2 MG/ML
INJECTION INTRAVENOUS
Status: DISPENSED
Start: 2024-01-01 | End: 2024-01-01

## 2024-01-01 RX ORDER — LORAZEPAM 2 MG/ML
2 CONCENTRATE ORAL
Status: DISCONTINUED | OUTPATIENT
Start: 2024-01-01 | End: 2024-01-01 | Stop reason: HOSPADM

## 2024-01-01 RX ORDER — PREGABALIN 75 MG/1
150 CAPSULE ORAL DAILY
Status: DISCONTINUED | OUTPATIENT
Start: 2024-01-01 | End: 2024-01-01 | Stop reason: HOSPADM

## 2024-01-01 RX ORDER — ACETAMINOPHEN 650 MG/1
650 SUPPOSITORY RECTAL EVERY 4 HOURS PRN
Status: DISCONTINUED | OUTPATIENT
Start: 2024-01-01 | End: 2024-01-01 | Stop reason: HOSPADM

## 2024-01-01 RX ORDER — AMIODARONE HYDROCHLORIDE 200 MG/1
200 TABLET ORAL
Status: DISCONTINUED | OUTPATIENT
Start: 2024-01-01 | End: 2024-01-01

## 2024-01-01 RX ORDER — MIDAZOLAM HYDROCHLORIDE 1 MG/ML
INJECTION INTRAMUSCULAR; INTRAVENOUS
Status: COMPLETED
Start: 2024-01-01 | End: 2024-01-01

## 2024-01-01 RX ORDER — WATER 10 ML/10ML
INJECTION INTRAMUSCULAR; INTRAVENOUS; SUBCUTANEOUS
Status: DISPENSED
Start: 2024-01-01 | End: 2024-01-01

## 2024-01-01 RX ORDER — SODIUM CHLORIDE 9 MG/ML
INJECTION, SOLUTION INTRAVENOUS
Status: COMPLETED | OUTPATIENT
Start: 2024-01-01 | End: 2024-01-01

## 2024-01-01 RX ORDER — MORPHINE SULFATE 2 MG/ML
1 INJECTION, SOLUTION INTRAMUSCULAR; INTRAVENOUS
Status: DISCONTINUED | OUTPATIENT
Start: 2024-01-01 | End: 2024-01-01

## 2024-01-01 RX ORDER — IPRATROPIUM BROMIDE AND ALBUTEROL SULFATE 2.5; .5 MG/3ML; MG/3ML
3 SOLUTION RESPIRATORY (INHALATION)
Status: DISCONTINUED | OUTPATIENT
Start: 2024-01-01 | End: 2024-01-01

## 2024-01-01 RX ORDER — ONDANSETRON 4 MG/1
4 TABLET, ORALLY DISINTEGRATING ORAL EVERY 6 HOURS PRN
Status: DISCONTINUED | OUTPATIENT
Start: 2024-01-01 | End: 2024-01-01 | Stop reason: HOSPADM

## 2024-01-01 RX ORDER — MORPHINE SULFATE 2 MG/ML
2 INJECTION, SOLUTION INTRAMUSCULAR; INTRAVENOUS
Status: DISCONTINUED | OUTPATIENT
Start: 2024-01-01 | End: 2024-01-01

## 2024-01-01 RX ORDER — ALBUMIN (HUMAN) 12.5 G/50ML
25 SOLUTION INTRAVENOUS ONCE
Status: COMPLETED | OUTPATIENT
Start: 2024-01-01 | End: 2024-01-01

## 2024-01-01 RX ORDER — AMIODARONE HYDROCHLORIDE 200 MG/1
200 TABLET ORAL EVERY 8 HOURS
Status: DISCONTINUED | OUTPATIENT
Start: 2024-01-01 | End: 2024-01-01

## 2024-01-01 RX ORDER — DEXAMETHASONE SODIUM PHOSPHATE 4 MG/ML
4 INJECTION, SOLUTION INTRA-ARTICULAR; INTRALESIONAL; INTRAMUSCULAR; INTRAVENOUS; SOFT TISSUE EVERY 6 HOURS
Status: DISCONTINUED | OUTPATIENT
Start: 2024-01-01 | End: 2024-01-01 | Stop reason: HOSPADM

## 2024-01-01 RX ORDER — FUROSEMIDE 10 MG/ML
20 INJECTION INTRAMUSCULAR; INTRAVENOUS ONCE
Status: COMPLETED | OUTPATIENT
Start: 2024-01-01 | End: 2024-01-01

## 2024-01-01 RX ORDER — CALCIUM CARBONATE 500 MG/1
1 TABLET, CHEWABLE ORAL 3 TIMES DAILY PRN
Status: DISCONTINUED | OUTPATIENT
Start: 2024-01-01 | End: 2024-01-01 | Stop reason: HOSPADM

## 2024-01-01 RX ORDER — SODIUM CHLORIDE 0.9 % (FLUSH) 0.9 %
10 SYRINGE (ML) INJECTION AS NEEDED
Status: DISCONTINUED | OUTPATIENT
Start: 2024-01-01 | End: 2024-01-01

## 2024-01-01 RX ORDER — BISACODYL 5 MG/1
5 TABLET, DELAYED RELEASE ORAL DAILY PRN
Status: DISCONTINUED | OUTPATIENT
Start: 2024-01-01 | End: 2024-01-01 | Stop reason: HOSPADM

## 2024-01-01 RX ORDER — HEPARIN SODIUM 5000 [USP'U]/ML
5000 INJECTION, SOLUTION INTRAVENOUS; SUBCUTANEOUS EVERY 12 HOURS SCHEDULED
Status: DISCONTINUED | OUTPATIENT
Start: 2024-01-01 | End: 2024-01-01

## 2024-01-01 RX ORDER — HYDROMORPHONE HYDROCHLORIDE 2 MG/ML
2 INJECTION, SOLUTION INTRAMUSCULAR; INTRAVENOUS; SUBCUTANEOUS
Status: DISCONTINUED | OUTPATIENT
Start: 2024-01-01 | End: 2024-01-01 | Stop reason: HOSPADM

## 2024-01-01 RX ORDER — HYDRALAZINE HYDROCHLORIDE 20 MG/ML
10 INJECTION INTRAMUSCULAR; INTRAVENOUS EVERY 6 HOURS PRN
Status: DISCONTINUED | OUTPATIENT
Start: 2024-01-01 | End: 2024-01-01

## 2024-01-01 RX ORDER — AMIODARONE HYDROCHLORIDE 200 MG/1
200 TABLET ORAL EVERY 12 HOURS SCHEDULED
Status: DISCONTINUED | OUTPATIENT
Start: 2024-01-01 | End: 2024-01-01

## 2024-01-01 RX ORDER — NOREPINEPHRINE BITARTRATE 0.03 MG/ML
.02-.3 INJECTION, SOLUTION INTRAVENOUS
Status: DISCONTINUED | OUTPATIENT
Start: 2024-01-01 | End: 2024-01-01

## 2024-01-01 RX ORDER — SODIUM CHLORIDE 0.9 % (FLUSH) 0.9 %
10 SYRINGE (ML) INJECTION EVERY 12 HOURS SCHEDULED
Status: DISCONTINUED | OUTPATIENT
Start: 2024-01-01 | End: 2024-01-01 | Stop reason: HOSPADM

## 2024-01-01 RX ORDER — LINEZOLID 2 MG/ML
600 INJECTION, SOLUTION INTRAVENOUS EVERY 12 HOURS
Qty: 3000 ML | Refills: 0 | Status: DISCONTINUED | OUTPATIENT
Start: 2024-01-01 | End: 2024-01-01

## 2024-01-01 RX ORDER — AMIODARONE HYDROCHLORIDE 200 MG/1
200 TABLET ORAL DAILY
Status: DISCONTINUED | OUTPATIENT
Start: 2024-02-09 | End: 2024-01-01

## 2024-01-01 RX ORDER — BISACODYL 10 MG
10 SUPPOSITORY, RECTAL RECTAL DAILY PRN
Status: DISCONTINUED | OUTPATIENT
Start: 2024-01-01 | End: 2024-01-01 | Stop reason: HOSPADM

## 2024-01-01 RX ORDER — DEXMEDETOMIDINE HYDROCHLORIDE 4 UG/ML
.2-1.5 INJECTION, SOLUTION INTRAVENOUS
Status: DISCONTINUED | OUTPATIENT
Start: 2024-01-01 | End: 2024-01-01 | Stop reason: HOSPADM

## 2024-01-01 RX ORDER — SODIUM CHLORIDE 9 MG/ML
100 INJECTION, SOLUTION INTRAVENOUS CONTINUOUS
Status: ACTIVE | OUTPATIENT
Start: 2024-01-01 | End: 2024-01-01

## 2024-01-01 RX ORDER — CETIRIZINE HYDROCHLORIDE 10 MG/1
5 TABLET ORAL NIGHTLY
Status: DISCONTINUED | OUTPATIENT
Start: 2024-01-01 | End: 2024-01-01 | Stop reason: HOSPADM

## 2024-01-01 RX ORDER — METHYLPREDNISOLONE SODIUM SUCCINATE 125 MG/2ML
125 INJECTION, POWDER, LYOPHILIZED, FOR SOLUTION INTRAMUSCULAR; INTRAVENOUS ONCE
Status: COMPLETED | OUTPATIENT
Start: 2024-01-01 | End: 2024-01-01

## 2024-01-01 RX ORDER — BUMETANIDE 0.25 MG/ML
2 INJECTION INTRAMUSCULAR; INTRAVENOUS ONCE
Status: COMPLETED | OUTPATIENT
Start: 2024-01-01 | End: 2024-01-01

## 2024-01-01 RX ORDER — DIPHENOXYLATE HYDROCHLORIDE AND ATROPINE SULFATE 2.5; .025 MG/1; MG/1
1 TABLET ORAL
Status: DISCONTINUED | OUTPATIENT
Start: 2024-01-01 | End: 2024-01-01 | Stop reason: HOSPADM

## 2024-01-01 RX ORDER — AMIODARONE HYDROCHLORIDE 200 MG/1
200 TABLET ORAL ONCE
Status: COMPLETED | OUTPATIENT
Start: 2024-01-01 | End: 2024-01-01

## 2024-01-01 RX ORDER — PROPRANOLOL HYDROCHLORIDE 20 MG/1
20 TABLET ORAL EVERY 8 HOURS
Status: DISCONTINUED | OUTPATIENT
Start: 2024-01-01 | End: 2024-01-01

## 2024-01-01 RX ORDER — ACETAMINOPHEN 160 MG/5ML
650 SOLUTION ORAL EVERY 4 HOURS PRN
Status: DISCONTINUED | OUTPATIENT
Start: 2024-01-01 | End: 2024-01-01 | Stop reason: HOSPADM

## 2024-01-01 RX ORDER — GUAR GUM
1 PACKET (EA) ORAL 2 TIMES DAILY
Status: DISCONTINUED | OUTPATIENT
Start: 2024-01-01 | End: 2024-01-01

## 2024-01-01 RX ORDER — MAGNESIUM SULFATE 1 G/100ML
1 INJECTION INTRAVENOUS ONCE
Status: COMPLETED | OUTPATIENT
Start: 2024-01-01 | End: 2024-01-01

## 2024-01-01 RX ORDER — BUMETANIDE 1 MG/1
2 TABLET ORAL DAILY
Status: DISCONTINUED | OUTPATIENT
Start: 2024-01-01 | End: 2024-01-01

## 2024-01-01 RX ORDER — ALBUMIN (HUMAN) 12.5 G/50ML
25 SOLUTION INTRAVENOUS ONCE
Status: DISCONTINUED | OUTPATIENT
Start: 2024-01-01 | End: 2024-01-01

## 2024-01-01 RX ORDER — ONDANSETRON 2 MG/ML
4 INJECTION INTRAMUSCULAR; INTRAVENOUS EVERY 6 HOURS PRN
Status: DISCONTINUED | OUTPATIENT
Start: 2024-01-01 | End: 2024-01-01 | Stop reason: HOSPADM

## 2024-01-01 RX ORDER — BUMETANIDE 0.25 MG/ML
0.5 INJECTION INTRAMUSCULAR; INTRAVENOUS ONCE
Status: COMPLETED | OUTPATIENT
Start: 2024-01-01 | End: 2024-01-01

## 2024-01-01 RX ORDER — AMIODARONE HYDROCHLORIDE 200 MG/1
200 TABLET ORAL EVERY 12 HOURS
Status: DISCONTINUED | OUTPATIENT
Start: 2024-01-25 | End: 2024-01-01

## 2024-01-01 RX ORDER — LORAZEPAM 1 MG/1
2 TABLET ORAL
Status: DISCONTINUED | OUTPATIENT
Start: 2024-01-01 | End: 2024-01-01

## 2024-01-01 RX ORDER — NITROGLYCERIN 20 MG/100ML
5-200 INJECTION INTRAVENOUS
Status: DISCONTINUED | OUTPATIENT
Start: 2024-01-01 | End: 2024-01-01

## 2024-01-01 RX ORDER — MIDAZOLAM HYDROCHLORIDE 1 MG/ML
1 INJECTION INTRAMUSCULAR; INTRAVENOUS
Status: DISCONTINUED | OUTPATIENT
Start: 2024-01-01 | End: 2024-01-01

## 2024-01-01 RX ORDER — PANTOPRAZOLE SODIUM 40 MG/1
40 TABLET, DELAYED RELEASE ORAL DAILY
Status: DISCONTINUED | OUTPATIENT
Start: 2024-01-01 | End: 2024-01-01

## 2024-01-01 RX ORDER — RIVASTIGMINE 4.6 MG/24H
1 PATCH, EXTENDED RELEASE TRANSDERMAL DAILY
Status: DISCONTINUED | OUTPATIENT
Start: 2024-01-01 | End: 2024-01-01 | Stop reason: HOSPADM

## 2024-01-01 RX ORDER — CHOLECALCIFEROL (VITAMIN D3) 125 MCG
5 CAPSULE ORAL NIGHTLY
Status: DISCONTINUED | OUTPATIENT
Start: 2024-01-01 | End: 2024-01-01

## 2024-01-01 RX ORDER — CHOLECALCIFEROL (VITAMIN D3) 125 MCG
5 CAPSULE ORAL NIGHTLY PRN
Status: DISCONTINUED | OUTPATIENT
Start: 2024-01-01 | End: 2024-01-01

## 2024-01-01 RX ORDER — LEVOTHYROXINE SODIUM 0.12 MG/1
125 TABLET ORAL
Status: DISCONTINUED | OUTPATIENT
Start: 2024-01-01 | End: 2024-01-01 | Stop reason: HOSPADM

## 2024-01-01 RX ADMIN — ASPIRIN 81 MG: 81 TABLET, COATED ORAL at 09:07

## 2024-01-01 RX ADMIN — POTASSIUM CHLORIDE 40 MEQ: 750 CAPSULE, EXTENDED RELEASE ORAL at 06:39

## 2024-01-01 RX ADMIN — IPRATROPIUM BROMIDE 0.5 MG: 0.5 SOLUTION RESPIRATORY (INHALATION) at 19:16

## 2024-01-01 RX ADMIN — RIVASTIGMINE 1 PATCH: 4.6 PATCH TRANSDERMAL at 08:08

## 2024-01-01 RX ADMIN — AMIODARONE HYDROCHLORIDE 0.5 MG/MIN: 1.8 INJECTION, SOLUTION INTRAVENOUS at 09:07

## 2024-01-01 RX ADMIN — Medication 200 MCG/HR: at 04:31

## 2024-01-01 RX ADMIN — Medication 10 ML: at 22:38

## 2024-01-01 RX ADMIN — MIDODRINE HYDROCHLORIDE 10 MG: 10 TABLET ORAL at 12:18

## 2024-01-01 RX ADMIN — TAMSULOSIN HYDROCHLORIDE 0.4 MG: 0.4 CAPSULE ORAL at 08:00

## 2024-01-01 RX ADMIN — CEFTRIAXONE 2000 MG: 2 INJECTION, POWDER, FOR SOLUTION INTRAMUSCULAR; INTRAVENOUS at 08:12

## 2024-01-01 RX ADMIN — CETIRIZINE HYDROCHLORIDE 5 MG: 10 TABLET, FILM COATED ORAL at 20:32

## 2024-01-01 RX ADMIN — Medication 5 MG: at 20:25

## 2024-01-01 RX ADMIN — Medication 10 ML: at 20:38

## 2024-01-01 RX ADMIN — DEXAMETHASONE SODIUM PHOSPHATE 4 MG: 4 INJECTION INTRA-ARTICULAR; INTRALESIONAL; INTRAMUSCULAR; INTRAVENOUS; SOFT TISSUE at 05:45

## 2024-01-01 RX ADMIN — ASPIRIN 81 MG: 81 TABLET, COATED ORAL at 08:00

## 2024-01-01 RX ADMIN — DEXAMETHASONE SODIUM PHOSPHATE 4 MG: 4 INJECTION INTRA-ARTICULAR; INTRALESIONAL; INTRAMUSCULAR; INTRAVENOUS; SOFT TISSUE at 12:26

## 2024-01-01 RX ADMIN — SENNOSIDES AND DOCUSATE SODIUM 2 TABLET: 8.6; 5 TABLET ORAL at 20:24

## 2024-01-01 RX ADMIN — POTASSIUM CHLORIDE 40 MEQ: 750 CAPSULE, EXTENDED RELEASE ORAL at 02:14

## 2024-01-01 RX ADMIN — RIVAROXABAN 15 MG: 15 TABLET, FILM COATED ORAL at 17:40

## 2024-01-01 RX ADMIN — AMIODARONE HYDROCHLORIDE 0.5 MG/MIN: 1.8 INJECTION, SOLUTION INTRAVENOUS at 04:39

## 2024-01-01 RX ADMIN — AMIODARONE HYDROCHLORIDE 200 MG: 200 TABLET ORAL at 12:26

## 2024-01-01 RX ADMIN — Medication 1 PACKET: at 08:13

## 2024-01-01 RX ADMIN — MIDODRINE HYDROCHLORIDE 10 MG: 10 TABLET ORAL at 12:27

## 2024-01-01 RX ADMIN — MIDODRINE HYDROCHLORIDE 10 MG: 10 TABLET ORAL at 09:07

## 2024-01-01 RX ADMIN — IPRATROPIUM BROMIDE 0.5 MG: 0.5 SOLUTION RESPIRATORY (INHALATION) at 18:46

## 2024-01-01 RX ADMIN — PANTOPRAZOLE SODIUM 40 MG: 40 TABLET, DELAYED RELEASE ORAL at 08:06

## 2024-01-01 RX ADMIN — Medication 5 MG: at 02:06

## 2024-01-01 RX ADMIN — IPRATROPIUM BROMIDE AND ALBUTEROL SULFATE 3 ML: 2.5; .5 SOLUTION RESPIRATORY (INHALATION) at 09:27

## 2024-01-01 RX ADMIN — DEXAMETHASONE SODIUM PHOSPHATE 4 MG: 4 INJECTION INTRA-ARTICULAR; INTRALESIONAL; INTRAMUSCULAR; INTRAVENOUS; SOFT TISSUE at 17:40

## 2024-01-01 RX ADMIN — LINEZOLID 600 MG: 600 INJECTION, SOLUTION INTRAVENOUS at 09:18

## 2024-01-01 RX ADMIN — PREGABALIN 150 MG: 75 CAPSULE ORAL at 09:07

## 2024-01-01 RX ADMIN — AMIODARONE HYDROCHLORIDE 0.5 MG/MIN: 1.8 INJECTION, SOLUTION INTRAVENOUS at 05:41

## 2024-01-01 RX ADMIN — MORPHINE SULFATE 1 MG: 2 INJECTION, SOLUTION INTRAMUSCULAR; INTRAVENOUS at 06:07

## 2024-01-01 RX ADMIN — CEFTRIAXONE 2000 MG: 2 INJECTION, POWDER, FOR SOLUTION INTRAMUSCULAR; INTRAVENOUS at 08:06

## 2024-01-01 RX ADMIN — AMIODARONE HYDROCHLORIDE 1 MG/MIN: 1.8 INJECTION, SOLUTION INTRAVENOUS at 11:27

## 2024-01-01 RX ADMIN — ACETAMINOPHEN 650 MG: 325 TABLET ORAL at 22:01

## 2024-01-01 RX ADMIN — Medication 50 MCG/HR: at 16:08

## 2024-01-01 RX ADMIN — MIDAZOLAM HYDROCHLORIDE 2 MG: 1 INJECTION, SOLUTION INTRAMUSCULAR; INTRAVENOUS at 08:48

## 2024-01-01 RX ADMIN — SENNOSIDES AND DOCUSATE SODIUM 2 TABLET: 8.6; 5 TABLET ORAL at 09:06

## 2024-01-01 RX ADMIN — ASPIRIN 81 MG: 81 TABLET, COATED ORAL at 08:10

## 2024-01-01 RX ADMIN — PROPRANOLOL HYDROCHLORIDE 20 MG: 20 TABLET ORAL at 08:08

## 2024-01-01 RX ADMIN — EMPAGLIFLOZIN 10 MG: 10 TABLET, FILM COATED ORAL at 08:11

## 2024-01-01 RX ADMIN — ASPIRIN 81 MG: 81 TABLET, COATED ORAL at 08:58

## 2024-01-01 RX ADMIN — GUAIFENESIN 200 MG: 200 SOLUTION ORAL at 06:24

## 2024-01-01 RX ADMIN — IPRATROPIUM BROMIDE 0.5 MG: 0.5 SOLUTION RESPIRATORY (INHALATION) at 15:26

## 2024-01-01 RX ADMIN — MIDAZOLAM HYDROCHLORIDE 2 MG: 1 INJECTION, SOLUTION INTRAMUSCULAR; INTRAVENOUS at 09:29

## 2024-01-01 RX ADMIN — AMIODARONE HYDROCHLORIDE 200 MG: 200 TABLET ORAL at 08:00

## 2024-01-01 RX ADMIN — SODIUM CHLORIDE 500 ML: 9 INJECTION, SOLUTION INTRAVENOUS at 23:49

## 2024-01-01 RX ADMIN — RIVASTIGMINE 1 PATCH: 4.6 PATCH TRANSDERMAL at 08:02

## 2024-01-01 RX ADMIN — MIDAZOLAM HYDROCHLORIDE 2 MG: 1 INJECTION, SOLUTION INTRAMUSCULAR; INTRAVENOUS at 02:14

## 2024-01-01 RX ADMIN — CEFTRIAXONE 2000 MG: 2 INJECTION, POWDER, FOR SOLUTION INTRAMUSCULAR; INTRAVENOUS at 10:26

## 2024-01-01 RX ADMIN — MIDODRINE HYDROCHLORIDE 10 MG: 10 TABLET ORAL at 08:31

## 2024-01-01 RX ADMIN — LINEZOLID 600 MG: 600 INJECTION, SOLUTION INTRAVENOUS at 08:25

## 2024-01-01 RX ADMIN — SODIUM CHLORIDE 250 ML: 9 INJECTION, SOLUTION INTRAVENOUS at 12:12

## 2024-01-01 RX ADMIN — DEXAMETHASONE SODIUM PHOSPHATE 4 MG: 4 INJECTION INTRA-ARTICULAR; INTRALESIONAL; INTRAMUSCULAR; INTRAVENOUS; SOFT TISSUE at 05:41

## 2024-01-01 RX ADMIN — NOREPINEPHRINE BITARTRATE 0.08 MCG/KG/MIN: 0.03 INJECTION, SOLUTION INTRAVENOUS at 14:38

## 2024-01-01 RX ADMIN — SENNOSIDES AND DOCUSATE SODIUM 2 TABLET: 8.6; 5 TABLET ORAL at 08:10

## 2024-01-01 RX ADMIN — EMPAGLIFLOZIN 10 MG: 10 TABLET, FILM COATED ORAL at 08:06

## 2024-01-01 RX ADMIN — ALBUMIN (HUMAN) 25 G: 0.25 INJECTION, SOLUTION INTRAVENOUS at 13:45

## 2024-01-01 RX ADMIN — AMIODARONE HYDROCHLORIDE 0.5 MG/MIN: 1.8 INJECTION, SOLUTION INTRAVENOUS at 17:28

## 2024-01-01 RX ADMIN — EMPAGLIFLOZIN 10 MG: 10 TABLET, FILM COATED ORAL at 09:17

## 2024-01-01 RX ADMIN — ASPIRIN 81 MG: 81 TABLET, COATED ORAL at 08:06

## 2024-01-01 RX ADMIN — MIDODRINE HYDROCHLORIDE 10 MG: 10 TABLET ORAL at 18:20

## 2024-01-01 RX ADMIN — SENNOSIDES AND DOCUSATE SODIUM 2 TABLET: 8.6; 5 TABLET ORAL at 08:31

## 2024-01-01 RX ADMIN — Medication 10 ML: at 08:59

## 2024-01-01 RX ADMIN — CETIRIZINE HYDROCHLORIDE 5 MG: 10 TABLET, FILM COATED ORAL at 00:13

## 2024-01-01 RX ADMIN — POTASSIUM CHLORIDE 40 MEQ: 1500 TABLET, EXTENDED RELEASE ORAL at 12:18

## 2024-01-01 RX ADMIN — PREGABALIN 150 MG: 75 CAPSULE ORAL at 10:26

## 2024-01-01 RX ADMIN — IPRATROPIUM BROMIDE AND ALBUTEROL SULFATE 3 ML: 2.5; .5 SOLUTION RESPIRATORY (INHALATION) at 13:14

## 2024-01-01 RX ADMIN — GUAIFENESIN 200 MG: 200 SOLUTION ORAL at 23:05

## 2024-01-01 RX ADMIN — RIVASTIGMINE 1 PATCH: 4.6 PATCH TRANSDERMAL at 12:07

## 2024-01-01 RX ADMIN — MIDODRINE HYDROCHLORIDE 10 MG: 10 TABLET ORAL at 06:23

## 2024-01-01 RX ADMIN — RIVASTIGMINE 1 PATCH: 4.6 PATCH TRANSDERMAL at 08:29

## 2024-01-01 RX ADMIN — CEFTRIAXONE 2000 MG: 2 INJECTION, POWDER, FOR SOLUTION INTRAMUSCULAR; INTRAVENOUS at 08:00

## 2024-01-01 RX ADMIN — CETIRIZINE HYDROCHLORIDE 5 MG: 10 TABLET, FILM COATED ORAL at 20:25

## 2024-01-01 RX ADMIN — IPRATROPIUM BROMIDE 0.5 MG: 0.5 SOLUTION RESPIRATORY (INHALATION) at 12:39

## 2024-01-01 RX ADMIN — DEXAMETHASONE SODIUM PHOSPHATE 4 MG: 4 INJECTION INTRA-ARTICULAR; INTRALESIONAL; INTRAMUSCULAR; INTRAVENOUS; SOFT TISSUE at 17:11

## 2024-01-01 RX ADMIN — IPRATROPIUM BROMIDE AND ALBUTEROL SULFATE 3 ML: 2.5; .5 SOLUTION RESPIRATORY (INHALATION) at 09:15

## 2024-01-01 RX ADMIN — MIDAZOLAM HYDROCHLORIDE 2 MG: 1 INJECTION, SOLUTION INTRAMUSCULAR; INTRAVENOUS at 01:54

## 2024-01-01 RX ADMIN — AMIODARONE HYDROCHLORIDE 200 MG: 200 TABLET ORAL at 06:41

## 2024-01-01 RX ADMIN — Medication 10 ML: at 09:17

## 2024-01-01 RX ADMIN — RACEPINEPHRINE HYDROCHLORIDE 0.5 ML: 11.25 SOLUTION RESPIRATORY (INHALATION) at 14:54

## 2024-01-01 RX ADMIN — SENNOSIDES AND DOCUSATE SODIUM 2 TABLET: 8.6; 5 TABLET ORAL at 20:32

## 2024-01-01 RX ADMIN — MAGNESIUM SULFATE HEPTAHYDRATE 1 G: 1 INJECTION, SOLUTION INTRAVENOUS at 21:36

## 2024-01-01 RX ADMIN — IPRATROPIUM BROMIDE 0.5 MG: 0.5 SOLUTION RESPIRATORY (INHALATION) at 19:04

## 2024-01-01 RX ADMIN — AMIODARONE HYDROCHLORIDE 200 MG: 200 TABLET ORAL at 22:44

## 2024-01-01 RX ADMIN — METHYLPREDNISOLONE SODIUM SUCCINATE 125 MG: 125 INJECTION, POWDER, FOR SOLUTION INTRAMUSCULAR; INTRAVENOUS at 22:51

## 2024-01-01 RX ADMIN — BUMETANIDE 0.5 MG: 1 TABLET ORAL at 08:00

## 2024-01-01 RX ADMIN — PANTOPRAZOLE SODIUM 40 MG: 40 TABLET, DELAYED RELEASE ORAL at 08:58

## 2024-01-01 RX ADMIN — Medication 1 PACKET: at 09:06

## 2024-01-01 RX ADMIN — Medication 10 ML: at 08:11

## 2024-01-01 RX ADMIN — IPRATROPIUM BROMIDE AND ALBUTEROL SULFATE 3 ML: 2.5; .5 SOLUTION RESPIRATORY (INHALATION) at 20:20

## 2024-01-01 RX ADMIN — MAGNESIUM OXIDE TAB 400 MG (241.3 MG ELEMENTAL MG) 400 MG: 400 (241.3 MG) TAB at 08:11

## 2024-01-01 RX ADMIN — IPRATROPIUM BROMIDE AND ALBUTEROL SULFATE 3 ML: 2.5; .5 SOLUTION RESPIRATORY (INHALATION) at 03:17

## 2024-01-01 RX ADMIN — PREGABALIN 150 MG: 75 CAPSULE ORAL at 08:58

## 2024-01-01 RX ADMIN — Medication 300 MCG/HR: at 10:33

## 2024-01-01 RX ADMIN — BUMETANIDE 2 MG: 0.25 INJECTION, SOLUTION INTRAMUSCULAR; INTRAVENOUS at 08:10

## 2024-01-01 RX ADMIN — TAMSULOSIN HYDROCHLORIDE 0.4 MG: 0.4 CAPSULE ORAL at 08:10

## 2024-01-01 RX ADMIN — LEVOTHYROXINE SODIUM 125 MCG: 125 TABLET ORAL at 07:02

## 2024-01-01 RX ADMIN — DEXAMETHASONE SODIUM PHOSPHATE 4 MG: 4 INJECTION INTRA-ARTICULAR; INTRALESIONAL; INTRAMUSCULAR; INTRAVENOUS; SOFT TISSUE at 12:06

## 2024-01-01 RX ADMIN — RIVASTIGMINE 1 PATCH: 4.6 PATCH TRANSDERMAL at 08:16

## 2024-01-01 RX ADMIN — LEVOTHYROXINE SODIUM 125 MCG: 125 TABLET ORAL at 05:45

## 2024-01-01 RX ADMIN — MAGNESIUM OXIDE TAB 400 MG (241.3 MG ELEMENTAL MG) 400 MG: 400 (241.3 MG) TAB at 08:58

## 2024-01-01 RX ADMIN — Medication 5 MG: at 00:13

## 2024-01-01 RX ADMIN — MIDAZOLAM HYDROCHLORIDE 2 MG: 1 INJECTION, SOLUTION INTRAMUSCULAR; INTRAVENOUS at 06:42

## 2024-01-01 RX ADMIN — MIDODRINE HYDROCHLORIDE 10 MG: 10 TABLET ORAL at 06:56

## 2024-01-01 RX ADMIN — EMPAGLIFLOZIN 10 MG: 10 TABLET, FILM COATED ORAL at 18:21

## 2024-01-01 RX ADMIN — MIDODRINE HYDROCHLORIDE 10 MG: 10 TABLET ORAL at 07:03

## 2024-01-01 RX ADMIN — SODIUM CHLORIDE 100 ML/HR: 9 INJECTION, SOLUTION INTRAVENOUS at 17:52

## 2024-01-01 RX ADMIN — BUMETANIDE 2 MG: 0.25 INJECTION, SOLUTION INTRAMUSCULAR; INTRAVENOUS at 10:40

## 2024-01-01 RX ADMIN — RACEPINEPHRINE HYDROCHLORIDE 0.5 ML: 11.25 SOLUTION RESPIRATORY (INHALATION) at 15:15

## 2024-01-01 RX ADMIN — CEFAZOLIN 1000 MG: 1 INJECTION, POWDER, FOR SOLUTION INTRAMUSCULAR; INTRAVENOUS at 22:01

## 2024-01-01 RX ADMIN — DEXAMETHASONE SODIUM PHOSPHATE 4 MG: 4 INJECTION INTRA-ARTICULAR; INTRALESIONAL; INTRAMUSCULAR; INTRAVENOUS; SOFT TISSUE at 05:33

## 2024-01-01 RX ADMIN — AMIODARONE HYDROCHLORIDE 1 MG/MIN: 1.8 INJECTION, SOLUTION INTRAVENOUS at 23:14

## 2024-01-01 RX ADMIN — BUMETANIDE 1 MG: 0.25 INJECTION, SOLUTION INTRAMUSCULAR; INTRAVENOUS at 11:13

## 2024-01-01 RX ADMIN — MIDODRINE HYDROCHLORIDE 10 MG: 10 TABLET ORAL at 17:40

## 2024-01-01 RX ADMIN — Medication 5 MG: at 21:36

## 2024-01-01 RX ADMIN — NITROGLYCERIN 5 MCG/MIN: 20 INJECTION INTRAVENOUS at 11:44

## 2024-01-01 RX ADMIN — IPRATROPIUM BROMIDE 0.5 MG: 0.5 SOLUTION RESPIRATORY (INHALATION) at 11:18

## 2024-01-01 RX ADMIN — IPRATROPIUM BROMIDE AND ALBUTEROL SULFATE 3 ML: 2.5; .5 SOLUTION RESPIRATORY (INHALATION) at 20:48

## 2024-01-01 RX ADMIN — PANTOPRAZOLE SODIUM 40 MG: 40 INJECTION, POWDER, FOR SOLUTION INTRAVENOUS at 05:41

## 2024-01-01 RX ADMIN — PREGABALIN 150 MG: 75 CAPSULE ORAL at 08:31

## 2024-01-01 RX ADMIN — IPRATROPIUM BROMIDE AND ALBUTEROL SULFATE 3 ML: 2.5; .5 SOLUTION RESPIRATORY (INHALATION) at 16:02

## 2024-01-01 RX ADMIN — Medication 10 ML: at 09:09

## 2024-01-01 RX ADMIN — MIDODRINE HYDROCHLORIDE 10 MG: 10 TABLET ORAL at 12:06

## 2024-01-01 RX ADMIN — IPRATROPIUM BROMIDE 0.5 MG: 0.5 SOLUTION RESPIRATORY (INHALATION) at 13:57

## 2024-01-01 RX ADMIN — IPRATROPIUM BROMIDE AND ALBUTEROL SULFATE 3 ML: 2.5; .5 SOLUTION RESPIRATORY (INHALATION) at 03:20

## 2024-01-01 RX ADMIN — SODIUM CHLORIDE 500 ML: 9 INJECTION, SOLUTION INTRAVENOUS at 22:22

## 2024-01-01 RX ADMIN — IPRATROPIUM BROMIDE 0.5 MG: 0.5 SOLUTION RESPIRATORY (INHALATION) at 13:27

## 2024-01-01 RX ADMIN — RIVAROXABAN 15 MG: 15 TABLET, FILM COATED ORAL at 17:22

## 2024-01-01 RX ADMIN — MORPHINE SULFATE 1 MG: 2 INJECTION, SOLUTION INTRAMUSCULAR; INTRAVENOUS at 01:36

## 2024-01-01 RX ADMIN — LEVOTHYROXINE SODIUM 125 MCG: 125 TABLET ORAL at 06:23

## 2024-01-01 RX ADMIN — MIDODRINE HYDROCHLORIDE 10 MG: 10 TABLET ORAL at 17:43

## 2024-01-01 RX ADMIN — CHLORHEXIDINE GLUCONATE 15 ML: 1.2 SOLUTION ORAL at 09:07

## 2024-01-01 RX ADMIN — Medication 10 ML: at 20:25

## 2024-01-01 RX ADMIN — BUMETANIDE 1 MG: 0.25 INJECTION INTRAMUSCULAR; INTRAVENOUS at 19:42

## 2024-01-01 RX ADMIN — IPRATROPIUM BROMIDE 0.5 MG: 0.5 SOLUTION RESPIRATORY (INHALATION) at 07:44

## 2024-01-01 RX ADMIN — LEVOTHYROXINE SODIUM 125 MCG: 125 TABLET ORAL at 06:56

## 2024-01-01 RX ADMIN — LEVOTHYROXINE SODIUM 125 MCG: 125 TABLET ORAL at 05:34

## 2024-01-01 RX ADMIN — RIVASTIGMINE 1 PATCH: 4.6 PATCH TRANSDERMAL at 09:08

## 2024-01-01 RX ADMIN — DEXAMETHASONE SODIUM PHOSPHATE 4 MG: 4 INJECTION INTRA-ARTICULAR; INTRALESIONAL; INTRAMUSCULAR; INTRAVENOUS; SOFT TISSUE at 23:56

## 2024-01-01 RX ADMIN — DEXAMETHASONE SODIUM PHOSPHATE 4 MG: 4 INJECTION INTRA-ARTICULAR; INTRALESIONAL; INTRAMUSCULAR; INTRAVENOUS; SOFT TISSUE at 00:02

## 2024-01-01 RX ADMIN — RIVAROXABAN 15 MG: 15 TABLET, FILM COATED ORAL at 17:11

## 2024-01-01 RX ADMIN — CEFTRIAXONE 2000 MG: 2 INJECTION, POWDER, FOR SOLUTION INTRAMUSCULAR; INTRAVENOUS at 09:06

## 2024-01-01 RX ADMIN — NOREPINEPHRINE BITARTRATE 0.1 MCG/KG/MIN: 0.03 INJECTION, SOLUTION INTRAVENOUS at 17:39

## 2024-01-01 RX ADMIN — MIDODRINE HYDROCHLORIDE 10 MG: 10 TABLET ORAL at 18:41

## 2024-01-01 RX ADMIN — MIDAZOLAM HYDROCHLORIDE 2 MG: 1 INJECTION, SOLUTION INTRAMUSCULAR; INTRAVENOUS at 16:00

## 2024-01-01 RX ADMIN — DEXAMETHASONE SODIUM PHOSPHATE 4 MG: 4 INJECTION INTRA-ARTICULAR; INTRALESIONAL; INTRAMUSCULAR; INTRAVENOUS; SOFT TISSUE at 17:59

## 2024-01-01 RX ADMIN — CEFTRIAXONE 2000 MG: 2 INJECTION, POWDER, FOR SOLUTION INTRAMUSCULAR; INTRAVENOUS at 08:57

## 2024-01-01 RX ADMIN — AMIODARONE HYDROCHLORIDE 150 MG: 1.5 INJECTION, SOLUTION INTRAVENOUS at 23:05

## 2024-01-01 RX ADMIN — Medication 1 PACKET: at 20:33

## 2024-01-01 RX ADMIN — LINEZOLID 600 MG: 600 INJECTION, SOLUTION INTRAVENOUS at 21:14

## 2024-01-01 RX ADMIN — PREGABALIN 150 MG: 75 CAPSULE ORAL at 08:06

## 2024-01-01 RX ADMIN — Medication 10 ML: at 08:07

## 2024-01-01 RX ADMIN — AMIODARONE HYDROCHLORIDE 150 MG: 1.5 INJECTION, SOLUTION INTRAVENOUS at 10:51

## 2024-01-01 RX ADMIN — IPRATROPIUM BROMIDE AND ALBUTEROL SULFATE 3 ML: 2.5; .5 SOLUTION RESPIRATORY (INHALATION) at 14:05

## 2024-01-01 RX ADMIN — IPRATROPIUM BROMIDE AND ALBUTEROL SULFATE 3 ML: 2.5; .5 SOLUTION RESPIRATORY (INHALATION) at 09:06

## 2024-01-01 RX ADMIN — Medication 10 ML: at 08:01

## 2024-01-01 RX ADMIN — IPRATROPIUM BROMIDE AND ALBUTEROL SULFATE 3 ML: 2.5; .5 SOLUTION RESPIRATORY (INHALATION) at 12:38

## 2024-01-01 RX ADMIN — SODIUM CHLORIDE 500 ML: 9 INJECTION, SOLUTION INTRAVENOUS at 01:13

## 2024-01-01 RX ADMIN — CHLORHEXIDINE GLUCONATE 15 ML: 1.2 SOLUTION ORAL at 21:03

## 2024-01-01 RX ADMIN — MIDODRINE HYDROCHLORIDE 10 MG: 10 TABLET ORAL at 11:11

## 2024-01-01 RX ADMIN — ASPIRIN 81 MG: 81 TABLET, COATED ORAL at 08:31

## 2024-01-01 RX ADMIN — DEXAMETHASONE SODIUM PHOSPHATE 4 MG: 4 INJECTION INTRA-ARTICULAR; INTRALESIONAL; INTRAMUSCULAR; INTRAVENOUS; SOFT TISSUE at 00:13

## 2024-01-01 RX ADMIN — MIDODRINE HYDROCHLORIDE 10 MG: 10 TABLET ORAL at 17:42

## 2024-01-01 RX ADMIN — Medication 10 ML: at 08:32

## 2024-01-01 RX ADMIN — EMPAGLIFLOZIN 10 MG: 10 TABLET, FILM COATED ORAL at 09:07

## 2024-01-01 RX ADMIN — CHLORHEXIDINE GLUCONATE 15 ML: 1.2 SOLUTION ORAL at 08:31

## 2024-01-01 RX ADMIN — Medication 300 MCG/HR: at 02:58

## 2024-01-01 RX ADMIN — MIDODRINE HYDROCHLORIDE 10 MG: 10 TABLET ORAL at 09:18

## 2024-01-01 RX ADMIN — LINEZOLID 600 MG: 600 INJECTION, SOLUTION INTRAVENOUS at 08:58

## 2024-01-01 RX ADMIN — MIDODRINE HYDROCHLORIDE 10 MG: 10 TABLET ORAL at 17:11

## 2024-01-01 RX ADMIN — IPRATROPIUM BROMIDE 0.5 MG: 0.5 SOLUTION RESPIRATORY (INHALATION) at 15:21

## 2024-01-01 RX ADMIN — Medication 10 ML: at 20:32

## 2024-01-01 RX ADMIN — SENNOSIDES AND DOCUSATE SODIUM 2 TABLET: 8.6; 5 TABLET ORAL at 08:58

## 2024-01-01 RX ADMIN — Medication 1 PACKET: at 09:13

## 2024-01-01 RX ADMIN — MIDODRINE HYDROCHLORIDE 5 MG: 5 TABLET ORAL at 12:48

## 2024-01-01 RX ADMIN — MORPHINE SULFATE 1 MG: 2 INJECTION, SOLUTION INTRAMUSCULAR; INTRAVENOUS at 03:45

## 2024-01-01 RX ADMIN — DEXMEDETOMIDINE HYDROCHLORIDE 0.2 MCG/KG/HR: 4 INJECTION, SOLUTION INTRAVENOUS at 12:57

## 2024-01-01 RX ADMIN — PANTOPRAZOLE SODIUM 40 MG: 40 INJECTION, POWDER, FOR SOLUTION INTRAVENOUS at 05:45

## 2024-01-01 RX ADMIN — PREGABALIN 150 MG: 75 CAPSULE ORAL at 08:11

## 2024-01-01 RX ADMIN — IPRATROPIUM BROMIDE AND ALBUTEROL SULFATE 3 ML: 2.5; .5 SOLUTION RESPIRATORY (INHALATION) at 16:31

## 2024-01-01 RX ADMIN — PANTOPRAZOLE SODIUM 40 MG: 40 TABLET, DELAYED RELEASE ORAL at 08:11

## 2024-01-01 RX ADMIN — MIDAZOLAM HYDROCHLORIDE 2 MG: 1 INJECTION, SOLUTION INTRAMUSCULAR; INTRAVENOUS at 15:52

## 2024-01-01 RX ADMIN — MAGNESIUM OXIDE TAB 400 MG (241.3 MG ELEMENTAL MG) 400 MG: 400 (241.3 MG) TAB at 09:07

## 2024-01-01 RX ADMIN — BUMETANIDE 0.5 MG: 0.25 INJECTION INTRAMUSCULAR; INTRAVENOUS at 09:24

## 2024-01-01 RX ADMIN — PROPRANOLOL HYDROCHLORIDE 20 MG: 20 TABLET ORAL at 18:21

## 2024-01-01 RX ADMIN — HYDROXYZINE HYDROCHLORIDE 25 MG: 25 TABLET, FILM COATED ORAL at 14:23

## 2024-01-01 RX ADMIN — TAMSULOSIN HYDROCHLORIDE 0.4 MG: 0.4 CAPSULE ORAL at 08:12

## 2024-01-01 RX ADMIN — MAGNESIUM OXIDE TAB 400 MG (241.3 MG ELEMENTAL MG) 400 MG: 400 (241.3 MG) TAB at 08:34

## 2024-01-01 RX ADMIN — DEXAMETHASONE SODIUM PHOSPHATE 4 MG: 4 INJECTION INTRA-ARTICULAR; INTRALESIONAL; INTRAMUSCULAR; INTRAVENOUS; SOFT TISSUE at 10:50

## 2024-01-01 RX ADMIN — IPRATROPIUM BROMIDE AND ALBUTEROL SULFATE 3 ML: 2.5; .5 SOLUTION RESPIRATORY (INHALATION) at 09:44

## 2024-01-01 RX ADMIN — EMPAGLIFLOZIN 10 MG: 10 TABLET, FILM COATED ORAL at 08:00

## 2024-01-01 RX ADMIN — MAGNESIUM OXIDE TAB 400 MG (241.3 MG ELEMENTAL MG) 400 MG: 400 (241.3 MG) TAB at 08:06

## 2024-01-01 RX ADMIN — MAGNESIUM OXIDE TAB 400 MG (241.3 MG ELEMENTAL MG) 400 MG: 400 (241.3 MG) TAB at 08:00

## 2024-01-01 RX ADMIN — PREGABALIN 150 MG: 75 CAPSULE ORAL at 08:00

## 2024-01-01 RX ADMIN — IPRATROPIUM BROMIDE 0.5 MG: 0.5 SOLUTION RESPIRATORY (INHALATION) at 07:20

## 2024-01-01 RX ADMIN — TAMSULOSIN HYDROCHLORIDE 0.4 MG: 0.4 CAPSULE ORAL at 08:58

## 2024-01-01 RX ADMIN — AMIODARONE HYDROCHLORIDE 0.5 MG/MIN: 1.8 INJECTION, SOLUTION INTRAVENOUS at 17:40

## 2024-01-01 RX ADMIN — TAMSULOSIN HYDROCHLORIDE 0.4 MG: 0.4 CAPSULE ORAL at 08:06

## 2024-01-01 RX ADMIN — EMPAGLIFLOZIN 10 MG: 10 TABLET, FILM COATED ORAL at 08:58

## 2024-01-01 RX ADMIN — RIVAROXABAN 15 MG: 15 TABLET, FILM COATED ORAL at 18:21

## 2024-01-01 RX ADMIN — CEFTRIAXONE 2000 MG: 2 INJECTION, POWDER, FOR SOLUTION INTRAMUSCULAR; INTRAVENOUS at 09:18

## 2024-01-01 RX ADMIN — EMPAGLIFLOZIN 10 MG: 10 TABLET, FILM COATED ORAL at 08:31

## 2024-01-01 RX ADMIN — MAGNESIUM OXIDE TAB 400 MG (241.3 MG ELEMENTAL MG) 400 MG: 400 (241.3 MG) TAB at 08:10

## 2024-01-01 RX ADMIN — CHLORHEXIDINE GLUCONATE 15 ML: 1.2 SOLUTION ORAL at 20:25

## 2024-01-01 RX ADMIN — PREGABALIN 150 MG: 75 CAPSULE ORAL at 09:17

## 2024-01-01 RX ADMIN — IPRATROPIUM BROMIDE AND ALBUTEROL SULFATE 3 ML: 2.5; .5 SOLUTION RESPIRATORY (INHALATION) at 23:34

## 2024-01-01 RX ADMIN — AMIODARONE HYDROCHLORIDE 0.5 MG/MIN: 1.8 INJECTION, SOLUTION INTRAVENOUS at 04:52

## 2024-01-01 RX ADMIN — AMIODARONE HYDROCHLORIDE 0.5 MG/MIN: 1.8 INJECTION, SOLUTION INTRAVENOUS at 17:31

## 2024-01-01 RX ADMIN — ETOMIDATE 20 MG: 2 INJECTION, SOLUTION INTRAVENOUS at 16:01

## 2024-01-01 RX ADMIN — NOREPINEPHRINE BITARTRATE 0.02 MCG/KG/MIN: 0.03 INJECTION, SOLUTION INTRAVENOUS at 17:19

## 2024-01-01 RX ADMIN — PANTOPRAZOLE SODIUM 40 MG: 40 TABLET, DELAYED RELEASE ORAL at 08:01

## 2024-01-01 RX ADMIN — MIDODRINE HYDROCHLORIDE 10 MG: 10 TABLET ORAL at 08:06

## 2024-01-01 RX ADMIN — IPRATROPIUM BROMIDE AND ALBUTEROL SULFATE 3 ML: 2.5; .5 SOLUTION RESPIRATORY (INHALATION) at 23:59

## 2024-01-01 RX ADMIN — Medication 10 ML: at 21:50

## 2024-01-01 RX ADMIN — Medication 5 MG: at 20:32

## 2024-01-01 RX ADMIN — AMIODARONE HYDROCHLORIDE 200 MG: 200 TABLET ORAL at 08:31

## 2024-01-01 RX ADMIN — BUMETANIDE 0.5 MG: 1 TABLET ORAL at 08:11

## 2024-01-01 RX ADMIN — ASPIRIN 81 MG: 81 TABLET, COATED ORAL at 09:16

## 2024-01-01 RX ADMIN — LEVOTHYROXINE SODIUM 125 MCG: 125 TABLET ORAL at 05:41

## 2024-01-01 RX ADMIN — LEVOTHYROXINE SODIUM 125 MCG: 125 TABLET ORAL at 06:39

## 2024-01-01 RX ADMIN — MIDODRINE HYDROCHLORIDE 10 MG: 10 TABLET ORAL at 17:22

## 2024-01-01 RX ADMIN — HYDROXYZINE HYDROCHLORIDE 25 MG: 25 TABLET, FILM COATED ORAL at 02:21

## 2024-01-01 RX ADMIN — SENNOSIDES AND DOCUSATE SODIUM 2 TABLET: 8.6; 5 TABLET ORAL at 08:11

## 2024-01-01 RX ADMIN — AMIODARONE HYDROCHLORIDE 0.5 MG/MIN: 1.8 INJECTION, SOLUTION INTRAVENOUS at 09:18

## 2024-01-01 RX ADMIN — IPRATROPIUM BROMIDE AND ALBUTEROL SULFATE 3 ML: 2.5; .5 SOLUTION RESPIRATORY (INHALATION) at 15:57

## 2024-01-01 RX ADMIN — CHLORHEXIDINE GLUCONATE 15 ML: 1.2 SOLUTION ORAL at 20:31

## 2024-01-01 RX ADMIN — SENNOSIDES AND DOCUSATE SODIUM 2 TABLET: 8.6; 5 TABLET ORAL at 21:14

## 2024-01-01 RX ADMIN — SODIUM CHLORIDE 250 ML: 9 INJECTION, SOLUTION INTRAVENOUS at 17:14

## 2024-01-01 RX ADMIN — IPRATROPIUM BROMIDE AND ALBUTEROL SULFATE 3 ML: 2.5; .5 SOLUTION RESPIRATORY (INHALATION) at 19:29

## 2024-01-01 RX ADMIN — PANTOPRAZOLE SODIUM 40 MG: 40 TABLET, DELAYED RELEASE ORAL at 08:10

## 2024-01-01 RX ADMIN — MORPHINE SULFATE 1 MG: 2 INJECTION, SOLUTION INTRAMUSCULAR; INTRAVENOUS at 14:44

## 2024-01-01 RX ADMIN — LINEZOLID 600 MG: 600 INJECTION, SOLUTION INTRAVENOUS at 21:53

## 2024-01-01 RX ADMIN — PANTOPRAZOLE SODIUM 40 MG: 40 INJECTION, POWDER, FOR SOLUTION INTRAVENOUS at 05:33

## 2024-01-01 RX ADMIN — IPRATROPIUM BROMIDE AND ALBUTEROL SULFATE 3 ML: 2.5; .5 SOLUTION RESPIRATORY (INHALATION) at 19:30

## 2024-01-01 RX ADMIN — SENNOSIDES AND DOCUSATE SODIUM 2 TABLET: 8.6; 5 TABLET ORAL at 00:12

## 2024-01-01 RX ADMIN — Medication 300 MCG/HR: at 12:13

## 2024-01-01 RX ADMIN — MORPHINE SULFATE 2 MG: 2 INJECTION, SOLUTION INTRAMUSCULAR; INTRAVENOUS at 15:45

## 2024-01-01 RX ADMIN — CEFAZOLIN 1000 MG: 1 INJECTION, POWDER, FOR SOLUTION INTRAMUSCULAR; INTRAVENOUS at 06:24

## 2024-01-01 RX ADMIN — Medication 1 PACKET: at 20:39

## 2024-01-01 RX ADMIN — MORPHINE SULFATE 1 MG: 2 INJECTION, SOLUTION INTRAMUSCULAR; INTRAVENOUS at 04:50

## 2024-01-01 RX ADMIN — Medication 10 ML: at 08:13

## 2024-01-01 RX ADMIN — Medication 150 MCG/HR: at 08:57

## 2024-01-01 RX ADMIN — IPRATROPIUM BROMIDE AND ALBUTEROL SULFATE 3 ML: 2.5; .5 SOLUTION RESPIRATORY (INHALATION) at 15:58

## 2024-01-01 RX ADMIN — Medication 10 ML: at 22:02

## 2024-01-01 RX ADMIN — Medication 300 MCG/HR: at 01:06

## 2024-01-01 RX ADMIN — MIDODRINE HYDROCHLORIDE 10 MG: 10 TABLET ORAL at 13:29

## 2024-01-01 RX ADMIN — SENNOSIDES AND DOCUSATE SODIUM 2 TABLET: 8.6; 5 TABLET ORAL at 09:17

## 2024-01-01 RX ADMIN — AMIODARONE HYDROCHLORIDE 200 MG: 200 TABLET ORAL at 20:25

## 2024-01-01 RX ADMIN — CETIRIZINE HYDROCHLORIDE 5 MG: 10 TABLET, FILM COATED ORAL at 21:35

## 2024-01-01 RX ADMIN — FUROSEMIDE 20 MG: 10 INJECTION, SOLUTION INTRAMUSCULAR; INTRAVENOUS at 02:06

## 2024-01-01 RX ADMIN — EMPAGLIFLOZIN 10 MG: 10 TABLET, FILM COATED ORAL at 08:10

## 2024-01-01 RX ADMIN — RIVASTIGMINE 1 PATCH: 4.6 PATCH TRANSDERMAL at 08:34

## 2024-01-01 RX ADMIN — CEFTRIAXONE 2000 MG: 2 INJECTION, POWDER, FOR SOLUTION INTRAMUSCULAR; INTRAVENOUS at 08:31

## 2024-01-01 RX ADMIN — VANCOMYCIN HYDROCHLORIDE 1250 MG: 10 INJECTION, POWDER, LYOPHILIZED, FOR SOLUTION INTRAVENOUS at 11:37

## 2024-01-01 RX ADMIN — IPRATROPIUM BROMIDE 0.5 MG: 0.5 SOLUTION RESPIRATORY (INHALATION) at 07:26

## 2024-01-01 RX ADMIN — MIDODRINE HYDROCHLORIDE 10 MG: 10 TABLET ORAL at 12:34

## 2024-01-01 RX ADMIN — RIVAROXABAN 15 MG: 15 TABLET, FILM COATED ORAL at 18:41

## 2024-01-01 RX ADMIN — IPRATROPIUM BROMIDE AND ALBUTEROL SULFATE 3 ML: 2.5; .5 SOLUTION RESPIRATORY (INHALATION) at 16:53

## 2024-01-01 RX ADMIN — MAGNESIUM OXIDE TAB 400 MG (241.3 MG ELEMENTAL MG) 400 MG: 400 (241.3 MG) TAB at 09:17

## 2024-01-01 RX ADMIN — MEROPENEM 1000 MG: 1 INJECTION, POWDER, FOR SOLUTION INTRAVENOUS at 10:58

## 2024-01-01 RX ADMIN — LINEZOLID 600 MG: 600 INJECTION, SOLUTION INTRAVENOUS at 22:41

## 2024-01-01 RX ADMIN — Medication 300 MCG/HR: at 19:14

## 2024-01-01 RX ADMIN — IOPAMIDOL 75 ML: 612 INJECTION, SOLUTION INTRAVENOUS at 20:03

## 2024-01-01 RX ADMIN — RIVASTIGMINE 1 PATCH: 4.6 PATCH TRANSDERMAL at 08:57

## 2024-01-01 RX ADMIN — CHLORHEXIDINE GLUCONATE 15 ML: 1.2 SOLUTION ORAL at 09:17

## 2024-01-01 RX ADMIN — Medication 10 ML: at 18:19

## 2024-01-01 RX ADMIN — LEVOTHYROXINE SODIUM 125 MCG: 125 TABLET ORAL at 06:24

## 2024-01-01 RX ADMIN — Medication 300 MCG/HR: at 17:55

## 2024-01-01 RX ADMIN — RIVAROXABAN 15 MG: 15 TABLET, FILM COATED ORAL at 17:43

## 2024-01-03 ENCOUNTER — TELEPHONE (OUTPATIENT)
Dept: INTERNAL MEDICINE | Facility: CLINIC | Age: 89
End: 2024-01-03
Payer: MEDICARE

## 2024-01-03 NOTE — TELEPHONE ENCOUNTER
Rosamaria with [facility] called and stated that they Are seeing pt for hh, and in order for pt to get hh the notes for 12-. Rosamaria stated that the homebound statement needs to say due to chf and leg swelling it is taxing to pt to leave her home and would benefit from hh.  Rosamaria [facility]  [phone]

## 2024-01-08 ENCOUNTER — TELEPHONE (OUTPATIENT)
Dept: INTERNAL MEDICINE | Facility: CLINIC | Age: 89
End: 2024-01-08
Payer: MEDICARE

## 2024-01-08 DIAGNOSIS — R41.3 MEMORY IMPAIRMENT OF GRADUAL ONSET: ICD-10-CM

## 2024-01-08 NOTE — TELEPHONE ENCOUNTER
Patient's  called stating that patient's HR has dropped between 48-53.    I agree with plan above and discussed with NP.  cont basal -bolus insulin   Bgs well controlled.

## 2024-01-09 ENCOUNTER — OUTSIDE FACILITY SERVICE (OUTPATIENT)
Dept: INTERNAL MEDICINE | Facility: CLINIC | Age: 89
End: 2024-01-09
Payer: MEDICARE

## 2024-01-09 RX ORDER — RIVASTIGMINE 4.6 MG/24H
PATCH, EXTENDED RELEASE TRANSDERMAL
Qty: 30 PATCH | Refills: 11 | Status: SHIPPED | OUTPATIENT
Start: 2024-01-09

## 2024-01-09 RX ORDER — PANTOPRAZOLE SODIUM 20 MG/1
20 TABLET, DELAYED RELEASE ORAL DAILY
Qty: 30 TABLET | Refills: 11 | Status: SHIPPED | OUTPATIENT
Start: 2024-01-09

## 2024-01-09 RX ORDER — ASCORBIC ACID 500 MG
500 TABLET ORAL DAILY
Qty: 30 TABLET | Refills: 11 | Status: SHIPPED | OUTPATIENT
Start: 2024-01-09

## 2024-01-09 RX ORDER — PROPRANOLOL HYDROCHLORIDE 20 MG/1
20 TABLET ORAL EVERY 8 HOURS
Qty: 90 TABLET | Refills: 11 | Status: SHIPPED | OUTPATIENT
Start: 2024-01-09

## 2024-01-09 RX ORDER — MIDODRINE HYDROCHLORIDE 5 MG/1
5 TABLET ORAL 3 TIMES DAILY
Qty: 90 TABLET | Refills: 11 | Status: SHIPPED | OUTPATIENT
Start: 2024-01-09

## 2024-01-09 RX ORDER — RIVAROXABAN 15 MG/1
TABLET, FILM COATED ORAL
Qty: 30 TABLET | Refills: 11 | Status: SHIPPED | OUTPATIENT
Start: 2024-01-09

## 2024-01-10 ENCOUNTER — OFFICE VISIT (OUTPATIENT)
Age: 89
End: 2024-01-10
Payer: MEDICARE

## 2024-01-10 VITALS
HEIGHT: 60 IN | BODY MASS INDEX: 27.27 KG/M2 | WEIGHT: 138.89 LBS | SYSTOLIC BLOOD PRESSURE: 100 MMHG | DIASTOLIC BLOOD PRESSURE: 60 MMHG

## 2024-01-10 DIAGNOSIS — M25.552 LEFT HIP PAIN: Primary | ICD-10-CM

## 2024-01-10 DIAGNOSIS — M16.12 ARTHRITIS OF LEFT HIP: ICD-10-CM

## 2024-01-10 RX ORDER — LIDOCAINE HYDROCHLORIDE 10 MG/ML
2 INJECTION, SOLUTION EPIDURAL; INFILTRATION; INTRACAUDAL; PERINEURAL
Status: COMPLETED | OUTPATIENT
Start: 2024-01-10 | End: 2024-01-10

## 2024-01-10 RX ORDER — BUPIVACAINE HYDROCHLORIDE 5 MG/ML
2 INJECTION, SOLUTION EPIDURAL; INTRACAUDAL
Status: COMPLETED | OUTPATIENT
Start: 2024-01-10 | End: 2024-01-10

## 2024-01-10 RX ORDER — TRIAMCINOLONE ACETONIDE 40 MG/ML
80 INJECTION, SUSPENSION INTRA-ARTICULAR; INTRAMUSCULAR
Status: COMPLETED | OUTPATIENT
Start: 2024-01-10 | End: 2024-01-10

## 2024-01-10 RX ADMIN — BUPIVACAINE HYDROCHLORIDE 2 ML: 5 INJECTION, SOLUTION EPIDURAL; INTRACAUDAL at 14:33

## 2024-01-10 RX ADMIN — LIDOCAINE HYDROCHLORIDE 2 ML: 10 INJECTION, SOLUTION EPIDURAL; INFILTRATION; INTRACAUDAL; PERINEURAL at 14:33

## 2024-01-10 RX ADMIN — TRIAMCINOLONE ACETONIDE 80 MG: 40 INJECTION, SUSPENSION INTRA-ARTICULAR; INTRAMUSCULAR at 14:33

## 2024-01-10 NOTE — PROGRESS NOTES
[facility]opaedic Surgery Office Follow Up Visit     Office Follow Up      Date: 01/10/2024   Patient Name: Zoila Nava  MRN: [id number]  YOB: 1933    Referring Physician: No ref. provider found     Chief Complaint:   Chief Complaint   Patient presents with    Follow-up     3 month recheck - Left hip pain & Arthritis of Left hip       History of Present Illness: Zoila Nava is a 89 y.o. female who is here today for follow up on left hip pain from primary hip osteoarthritis.  Last seen on 10/9/2023.  Received ultrasound-guided left hip joint corticosteroid injection.  Pain greatly improved for a period of 6 to 8 weeks.  Symptoms have returned.  Pain now 7-8/10.  Has also been doing physical therapy.    Subjective   Review of Systems: Review of Systems   Constitutional:  Negative for chills, fever, unexpected weight gain and unexpected weight loss.   HENT:  Negative for congestion, postnasal drip and rhinorrhea.    Eyes:  Negative for blurred vision.   Respiratory:  Negative for shortness of breath.    Cardiovascular:  Negative for leg swelling.   Gastrointestinal:  Negative for abdominal pain, nausea and vomiting.   Genitourinary:  Negative for difficulty urinating.   Musculoskeletal:  Positive for arthralgias. Negative for gait problem, joint swelling and myalgias.   Skin:  Negative for skin lesions and wound.   Neurological:  Negative for dizziness, weakness, light-headedness and numbness.   Hematological:  Does not bruise/bleed easily.   Psychiatric/Behavioral:  Negative for depressed mood.    All other systems reviewed and are negative.       Medications:   Current Outpatient Medications:     aspirin 81 MG EC tablet, Take 1 tablet by mouth Daily. OTC, Disp: , Rfl:     HYDROcodone-acetaminophen (NORCO) 7.5-325 MG per tablet, TAKE ONE TABLET BY MOUTH EVERY 8 HOURS AS NEEDED FOR MODERATE PAIN, Disp: 90 tablet, Rfl: 0    levothyroxine (SYNTHROID,  LEVOTHROID) 125 MCG tablet, TAKE ONE TABLET BY MOUTH EVERY MORNING, Disp: 90 tablet, Rfl: 1    magnesium oxide (MAG-OX) 400 MG tablet, Take 1 tablet by mouth Daily., Disp: , Rfl:     midodrine (PROAMATINE) 5 MG tablet, TAKE 1 TABLET BY MOUTH THREE TIMES A DAY, Disp: 90 tablet, Rfl: 11    pantoprazole (PROTONIX) 20 MG EC tablet, TAKE ONE TABLET BY MOUTH DAILY, Disp: 30 tablet, Rfl: 11    pregabalin (Lyrica) 150 MG capsule, Take 1 capsule by mouth 2 (Two) Times a Day., Disp: 60 capsule, Rfl: 2    propranolol (INDERAL) 20 MG tablet, TAKE 1 TABLET BY MOUTH EVERY 8 HOURS, Disp: 90 tablet, Rfl: 11    rivastigmine (EXELON) 4.6 MG/24HR patch, APPLY ONE PATCH TO THE SKIN EVERY DAY. REMOVE OLD PATCH BEFORE APPLYING NEW, Disp: 30 patch, Rfl: 11    vitamin B-12 (CYANOCOBALAMIN) 1000 MCG tablet, Take 1 tablet by mouth Daily. OTC, Disp: , Rfl:     vitamin C (ASCORBIC ACID) 500 MG tablet, TAKE ONE TABLET BY MOUTH DAILY, Disp: 30 tablet, Rfl: 11    Xarelto 15 MG tablet, TAKE ONE TABLET BY MOUTH EVERY EVENING WITH DINNER, Disp: 30 tablet, Rfl: 11    bumetanide (BUMEX) 0.5 MG tablet, Take 1 tablet by mouth Every Morning. (Patient not taking: Reported on 1/10/2024), Disp: 30 tablet, Rfl: 1    O2 (OXYGEN), Inhale 2 L/min At Night As Needed., Disp: , Rfl:     tamsulosin (FLOMAX) 0.4 MG capsule 24 hr capsule, Take 1 capsule by mouth Daily. (Patient not taking: Reported on 1/10/2024), Disp: 90 capsule, Rfl: 1    Wound Dressings (Curity Unna Boot) misc, Apply 1 each topically Daily., Disp: 2 each, Rfl: 0    Allergies:   Allergies   Allergen Reactions    Penicillins Other (See Comments)     CHILDHOOD ALLERGY         I have reviewed and updated the patient's chief complaint, history of present illness, review of systems, past medical history, surgical history, family history, social history, medications and allergy list as appropriate.     Objective    Vital Signs:   Vitals:    01/10/24 1425   BP: 100/60   Weight: 63 kg (138 lb 14.2 oz)  "  Height: 152.4 cm (60\")     Body mass index is 27.13 kg/m².  BMI is >= 30 and <35. (Class 1 Obesity). The following options were offered after discussion;: exercise counseling/recommendations and nutrition counseling/recommendations     Patient reports that she is a non-smoker and has not ever been a smoker.  This behavior was applauded and she was encouraged to continue in smoking cessation.  We will continue to monitor at subsequent visits.      Ortho Exam:  Constitutional: General Appearance: healthy-appearing, NAD, and normal body habitus.   Psychiatric: Orientation: oriented to time, place, and person. Mood and Affect: normal affect and mood and active and alert.   Gait and Station: Appearance: antalgic gait.   Cardiovascular System: Arterial Pulses Right: dorsalis pedis pulse normal. Varicosities Right: capillary refill test normal.   Lumbar Spine: Inspection: normal alignment.   Hip/Pelvis Appearance: Inspection: normal axial alignment.   Hips: Soft Tissue Palpation Left: tenderness of the hip flexor muscles. Active Range of Motion Left: limited (secondary to pain especially flexion and rotation). Strength Right: normal 5/5. Strength Left: normal 5/5.   Skin: Right Lower Extremity: normal. Left Lower Extremity: normal.   Neurologic: Sensation on the Right: L1 normal, L2 normal, L3 normal, L4 normal, and S2 normal. Sensation on the Left: L1 normal, L2 normal, L3 normal, L4 normal, and S2 normal.    Results Review:   Imaging Results (Last 24 Hours)       Procedure Component Value Units Date/Time    US Guided Injection [[id number]] Resulted: 01/10/24 1433     Updated: 01/10/24 1501            Procedures    Assessment / Plan    Assessment/Plan:   Diagnoses and all orders for this visit:    1. Left hip pain (Primary)  -     US Guided Injection    2. Arthritis of left hip  -     - Large Joint Arthrocentesis: L hip joint    Exacerbation of her chronic pain from primary hip osteoarthritis  Good response in the past " to ultrasound-guided hip joint corticosteroid injection  Repeated the injection today-risks and benefits discussed  Continue with physical therapy  Ambulate as able  No additional low back or radicular pain today  Follow-up as symptoms dictate    Follow Up:   Return if symptoms worsen or fail to improve.      Josh Duron MD  [facility]

## 2024-01-10 NOTE — PROGRESS NOTES
Patient educated on condition. Procedure   - Large Joint Arthrocentesis: L hip joint on 1/10/2024 2:33 PM  Indications: pain  Details: 22 G (spinal) needle, ultrasound-guided anterolateral approach  Medications: 80 mg triamcinolone acetonide 40 MG/ML; 2 mL lidocaine PF 1% 1 %; 2 mL bupivacaine (PF) 0.5 %  Outcome: tolerated well, no immediate complications  Procedure, treatment alternatives, risks and benefits explained, specific risks discussed. Consent was given by the patient. Immediately prior to procedure a time out was called to verify the correct patient, procedure, equipment, support staff and site/side marked as required. Patient was prepped and draped in the usual sterile fashion.

## 2024-01-11 ENCOUNTER — TELEPHONE (OUTPATIENT)
Dept: INTERNAL MEDICINE | Facility: CLINIC | Age: 89
End: 2024-01-11
Payer: MEDICARE

## 2024-01-11 NOTE — TELEPHONE ENCOUNTER
Caller: THERESA JENKINS    Relationship:     Best call back number: [phone]    What orders are you requesting (i.e. lab or imaging): WOUND CARE ORDERS    In what timeframe would the patient need to come in: ASAP    Where will you receive your lab/imaging services: HOME    Additional notes: CONTACT JESSICA AND GET ORDERS FOR THE FOLLOWING:    CLEANSE WITH WOUND CLEANSER  APPLY XEROFORM GAUZE TO WOUND  COVER WITH FOAM DRESSING  WRAP WITH CURLEX  SECURE WITH TAPE  APPLY HER OWN COMPRESSION SOCKS OVER DRESSING    2X A WEEK STARTING 1/15/24. NEEDING ALSO A PRN ORDER TO START THIS  SO SHE CAN GO TOMORROW

## 2024-01-12 ENCOUNTER — OFFICE VISIT (OUTPATIENT)
Dept: CARDIOLOGY | Facility: HOSPITAL | Age: 89
End: 2024-01-12
Payer: MEDICARE

## 2024-01-12 VITALS
RESPIRATION RATE: 18 BRPM | WEIGHT: 141.2 LBS | SYSTOLIC BLOOD PRESSURE: 98 MMHG | DIASTOLIC BLOOD PRESSURE: 54 MMHG | TEMPERATURE: 97.3 F | BODY MASS INDEX: 27.72 KG/M2 | HEART RATE: 93 BPM | HEIGHT: 60 IN | OXYGEN SATURATION: 93 %

## 2024-01-12 DIAGNOSIS — I48.0 PAF (PAROXYSMAL ATRIAL FIBRILLATION): ICD-10-CM

## 2024-01-12 DIAGNOSIS — I50.32 CHRONIC HEART FAILURE WITH PRESERVED EJECTION FRACTION: Primary | ICD-10-CM

## 2024-01-12 DIAGNOSIS — N18.30 STAGE 3 CHRONIC KIDNEY DISEASE, UNSPECIFIED WHETHER STAGE 3A OR 3B CKD: ICD-10-CM

## 2024-01-12 DIAGNOSIS — R60.0 EDEMA LEG: ICD-10-CM

## 2024-01-12 DIAGNOSIS — I95.2 HYPOTENSION DUE TO DRUGS: ICD-10-CM

## 2024-01-12 NOTE — PROGRESS NOTES
[facility], [facility]    Chief Complaint  Follow-up (Lower extremity edema)    Subjective    History of Present Illness {CC  Problem List  Visit  Diagnosis   Encounters  Notes  Medications  Labs  Result Review Imaging  Media :23}     Zoila Nava presents to [facility] CARDI[facility] for   History of Present Illness     Resident of SNF ([facility]).  Home health agency: [facility].  Pharmacy change: [facility] [place].    89-year-old female with history of heart failure with preserved EF, pulmonary hypertension, valvular heart disease, chronic kidney disease stage III, ascending aortic aneurysm, hypertension, hypothyroidism, GERD, anemia, dementia, palpitations with PACs and PVCs, essential tremors, neuropathy, PAF.        Pt with worsening edema and weight gain.    Over the last 3 weeks patient has had gradual weight gain, lower extremity edema.  She developed weeping edema.  She again was less active due to hip pain.  She has a long history of lower extremity edema that worsens with sedentary behavior associated with hip discomfort.  Symptoms always improve after she receives her hip injections with improved physical activity.    Bumex was initiated 0.5 mg daily.  Unna boots used regularly.  Patient now has home health following her closely, [facility] home health agency.  Working with physical therapy and Occupational Therapy.  Received her injection last week.  She has already had a 9 pound weight loss.  Edema slowly improving.  She is receiving wound care for a right lower extremity wound after a mechanical fall.  Wound is weeping.    Patient denies chest pain, pressure, dyspnea, dizziness, near-syncope, syncope.  Overall she states she is doing so much better after her hip injection.  No concerns.    Daughter present today states over the last week her mother has shown significant improvement.    Objective     Vital  "Signs:   Vitals:    01/12/24 1333   BP: 98/54   BP Location: Left arm   Patient Position: Sitting   Cuff Size: Adult   Pulse: 93   Resp: 18   Temp: 97.3 °F (36.3 °C)   TempSrc: Temporal   SpO2: 93%   Weight: 64 kg (141 lb 3.2 oz)   Height: 152.4 cm (60\")     Body mass index is 27.58 kg/m².  Physical Exam  Vitals reviewed.   Constitutional:       General: She is not in acute distress.  Cardiovascular:      Rate and Rhythm: Normal rate and regular rhythm.   Pulmonary:      Effort: Pulmonary effort is normal.      Breath sounds: Normal breath sounds.   Musculoskeletal:      Right lower leg: Edema (Lower extremity weeping edema.) present.      Left lower leg: Edema present.   Skin:     Coloration: Skin is not pale.   Neurological:      Mental Status: She is alert.   Psychiatric:         Mood and Affect: Mood normal.         Behavior: Behavior normal. Behavior is cooperative.              Result Review  Data Reviewed:{ Labs  Result Review  Imaging  Med Tab  Media :23}   Echocardiogram 4/7/2023: EF 51 to 55%, mild to moderate AR, mild MR, mild TR      Holter 3/8/2022.  Duration 4 days.  Average heart rate 62 bpm.  Multiple SVT runs/longest 15 seconds.  3.7% PACs, 2.2% PVC burden.  No patient triggered events     12/5/2023:  Glucose 98, sodium 137, potassium 4.2, chloride 102, carbon oxide 28, BUN 20, creatinine 1.2, estimated GFR 42, AST 16, ALT 14    Hemoglobin 11.5, hematocrit 35.1, platelet 223, WBC 6.8              Assessment and Plan {CC Problem List  Visit Diagnosis  ROS  Review (Popup)  Health Maintenance  Quality  BestPractice  Medications  SmartSets  SnapShot Encounters  Media :23}   1. Chronic heart failure with preserved ejection fraction  No dyspnea.  No PND, orthopnea.  Weight loss on Bumex.  Improvement of edema.  Continue Bumex 0.5 mg daily.  2. PAF (paroxysmal atrial fibrillation)  Heart rate controlled on propranolol.  Xarelto.  3. Hypotension due to drugs  Low normal blood pressure on " midodrine    No dizziness, near-syncope, syncope.    4. Edema leg  Lower extremity weeping edema.  Improved with Unna boots, diuretics, activity.  Followed closely by PT/OT and home health.  Continue to monitor    5. Stage 3 chronic kidney disease, unspecified whether stage 3a or 3b CKD  Received labs completed on 12/5/2023: Creatinine stable.  Continue to monitor.          Follow Up {Instructions Charge Capture  Follow-up Communications :23}   Return if symptoms worsen or fail to improve, for Next scheduled follow up.    Patient was given instructions and counseling regarding her condition or for health maintenance advice. Please see specific information pulled into the AVS if appropriate.  Patient was instructed to call the [facility] with any questions, concerns, or worsening symptoms.

## 2024-01-16 PROBLEM — L03.115 CELLULITIS OF RIGHT LOWER EXTREMITY: Status: ACTIVE | Noted: 2024-01-01

## 2024-01-16 PROBLEM — J96.21 ACUTE ON CHRONIC RESPIRATORY FAILURE WITH HYPOXIA: Status: ACTIVE | Noted: 2024-01-01

## 2024-01-16 PROBLEM — I50.33 ACUTE ON CHRONIC HEART FAILURE WITH PRESERVED EJECTION FRACTION (HFPEF): Status: ACTIVE | Noted: 2024-01-01

## 2024-01-16 NOTE — CONSULTS
INFECTIOUS DISEASES  INPATIENT CONSULT NOTE / INITIAL HOSPITAL VISIT      PATIENT NAME:  Zoila Nava  YOB: 1933  MEDICAL RECORD NUMBER:  [id number]    Date of Admission:  1/16/2024  Date of Consult: 1/16/2024    Requesting Provider: TOSIN Lyons MD  Evaluating Physician: Dwain Kaur MD    Chief Complaint   Patient presents with    Wound Check    Shortness of Breath       Reason for Consultation:   Left leg cellulitis    History of Present Illness:  Patient is a 89 y.o. female with a past medical history significant for heart failure and chronic leg edema who was admitted to [facility] on 1/16/2024 after presenting to ED with complaints of redness of the right leg.   Family at the bedside gives history.  They state that the patient has had chronic issues with leg edema and has had cellulitis in the past.  She recently had some weeping from a skin tear on the right anterior shin.  This was improving with wraps at home.  However, they then noticed that the leg was warm and red.  Upon presentation to the emergency department today, she was febrile at 100.5 °F, tachycardic, and hypotensive.  She was also hypoxic.  Chest x-ray was clear.  COVID-19 and influenza PCR negative.  Blood cultures were obtained.  Creatinine 1.4, lactic acid 4.3, WBC 16.7.  proBNP around 10,000.  She was given diuresis.  She was given 1 dose of vancomycin and started on meropenem.  She has listed allergy to penicillin as a child.      I have been consulted to assist in workup and antimicrobial management of right leg cellulitis.    Past Medical History:   Diagnosis Date    Anemia     Arthritis     Asthma     Cataract     Difficulty breathing     Chronic    GERD (gastroesophageal reflux disease)     Graves' ophthalmopathy     Hoarseness     Hypertension     Hypertensive heart disease with acute on chronic diastolic congestive heart failure 10/11/2021    Pulmonary hypertension 10/11/2021     Spondylolisthesis of cervical region     Vitamin B12 deficiency        Past Surgical History:   Procedure Laterality Date    CERVICAL LAMINECTOMY      CHOLECYSTECTOMY      OTHER SURGICAL HISTORY Right     Neuroplasty Median Nerve at Carpal Tunnel    THYROID SURGERY      TOTAL KNEE ARTHROPLASTY Left 09/29/2014    Dr Colon       Family History   Problem Relation Age of Onset    Hypertension Mother     Other Father         cardiac disorder    Diabetes Father     Heart disease Father     Hypertension Sister     No Known Problems Maternal Grandmother     No Known Problems Maternal Grandfather     No Known Problems Paternal Grandmother     No Known Problems Paternal Grandfather     Colon cancer Neg Hx     Colon polyps Neg Hx     Esophageal cancer Neg Hx        Social History     Socioeconomic History    Marital status:    Tobacco Use    Smoking status: Never    Smokeless tobacco: Never   Vaping Use    Vaping Use: Never used   Substance and Sexual Activity    Alcohol use: No    Drug use: No    Sexual activity: Defer         Allergies:  Allergies   Allergen Reactions    Penicillins Other (See Comments)     CHILDHOOD ALLERGY         Home Medications:  No current facility-administered medications on file prior to encounter.     Current Outpatient Medications on File Prior to Encounter   Medication Sig Dispense Refill    aspirin 81 MG EC tablet Take 1 tablet by mouth Daily. OTC      bumetanide (BUMEX) 0.5 MG tablet Take 1 tablet by mouth Every Morning. 30 tablet 1    HYDROcodone-acetaminophen (NORCO) 7.5-325 MG per tablet TAKE ONE TABLET BY MOUTH EVERY 8 HOURS AS NEEDED FOR MODERATE PAIN 90 tablet 0    levothyroxine (SYNTHROID, LEVOTHROID) 125 MCG tablet TAKE ONE TABLET BY MOUTH EVERY MORNING 90 tablet 1    magnesium oxide (MAG-OX) 400 MG tablet Take 1 tablet by mouth Daily.      midodrine (PROAMATINE) 5 MG tablet TAKE 1 TABLET BY MOUTH THREE TIMES A DAY 90 tablet 11    neomycin-polymyxin-dexamethasone (MAXITROL)  0.1 % ophthalmic suspension Administer 1 application  into the left eye 4 (Four) Times a Day.      pantoprazole (PROTONIX) 20 MG EC tablet TAKE ONE TABLET BY MOUTH DAILY 30 tablet 11    pregabalin (Lyrica) 150 MG capsule Take 1 capsule by mouth 2 (Two) Times a Day. 60 capsule 2    propranolol (INDERAL) 20 MG tablet TAKE 1 TABLET BY MOUTH EVERY 8 HOURS 90 tablet 11    rivastigmine (EXELON) 4.6 MG/24HR patch APPLY ONE PATCH TO THE SKIN EVERY DAY. REMOVE OLD PATCH BEFORE APPLYING NEW 30 patch 11    vitamin B-12 (CYANOCOBALAMIN) 1000 MCG tablet Take 1 tablet by mouth Daily. OTC      vitamin C (ASCORBIC ACID) 500 MG tablet TAKE ONE TABLET BY MOUTH DAILY 30 tablet 11    Xarelto 15 MG tablet TAKE ONE TABLET BY MOUTH EVERY EVENING WITH DINNER 30 tablet 11    [DISCONTINUED] O2 (OXYGEN) Inhale 2 L/min At Night As Needed.      [DISCONTINUED] tamsulosin (FLOMAX) 0.4 MG capsule 24 hr capsule Take 1 capsule by mouth Daily. 90 capsule 1    [DISCONTINUED] Wound Dressings (Curity Unna Boot) misc Apply 1 each topically Daily. 2 each 0       Current Hospital Medications:  Current Facility-Administered Medications   Medication Dose Route Frequency Provider Last Rate Last Admin    acetaminophen (TYLENOL) tablet 650 mg  650 mg Oral Q4H PRN Cosme Lyons MD        Or    acetaminophen (TYLENOL) 160 MG/5ML oral solution 650 mg  650 mg Oral Q4H PRN Cosme Lyons MD        Or    acetaminophen (TYLENOL) suppository 650 mg  650 mg Rectal Q4H PRN Cosme Lyons MD        [START ON 1/17/2024] aspirin EC tablet 81 mg  81 mg Oral Daily Cosme Lyons MD        sennosides-docusate (PERICOLACE) 8.6-50 MG per tablet 2 tablet  2 tablet Oral BID Cosme Lyons MD        And    polyethylene glycol (MIRALAX) packet 17 g  17 g Oral Daily PRN Cosme Lyons MD        And    bisacodyl (DULCOLAX) EC tablet 5 mg  5 mg Oral Daily PRN Cosme Lyons MD        And    bisacodyl (DULCOLAX) suppository 10 mg  10 mg Rectal Daily PRN Eloy  MD Cosme        [START ON 1/17/2024] bumetanide (BUMEX) injection 2 mg  2 mg Intravenous Once Basil Richter MD        [START ON 1/18/2024] bumetanide (BUMEX) tablet 2 mg  2 mg Oral Daily Basil Richter MD        calcium carbonate (TUMS) chewable tablet 500 mg (200 mg elemental)  1 tablet Oral TID PRN Cosme Lyons MD        empagliflozin (JARDIANCE) tablet 10 mg  10 mg Oral Daily Basil Richter MD   10 mg at 01/16/24 1821    hydrALAZINE (APRESOLINE) injection 10 mg  10 mg Intravenous Q6H PRN Cosme Lyons MD        ipratropium-albuterol (DUO-NEB) nebulizer solution 3 mL  3 mL Nebulization 4x Daily - RT Cosme Lyons MD   3 mL at 01/16/24 1558    [START ON 1/17/2024] levothyroxine (SYNTHROID, LEVOTHROID) tablet 125 mcg  125 mcg Oral Q AM Cosme Lyons MD        [START ON 1/17/2024] magnesium oxide (MAG-OX) tablet 400 mg  400 mg Oral Daily Cosme Lyons MD        meropenem (MERREM) 500 mg in sodium chloride 0.9 % 100 mL IVPB  500 mg Intravenous Q12H Cosme Lyons MD        midodrine (PROAMATINE) tablet 10 mg  10 mg Oral TID AC Basil Richter MD   10 mg at 01/16/24 1820    nitroglycerin (TRIDIL) 200 mcg/ml infusion  5-200 mcg/min Intravenous Titrated Matt Cochran MD   Held at 01/16/24 1220    ondansetron ODT (ZOFRAN-ODT) disintegrating tablet 4 mg  4 mg Oral Q6H PRN Cosme Lyons MD        Or    ondansetron (ZOFRAN) injection 4 mg  4 mg Intravenous Q6H PRN Cosme Lyons MD        [START ON 1/17/2024] pantoprazole (PROTONIX) EC tablet 20 mg  20 mg Oral Daily Cosme Lyons MD        [START ON 1/17/2024] Pharmacy Consult - MTM   Does not apply Daily Garfield Mejia IV, PharmD        [START ON 1/17/2024] pregabalin (LYRICA) capsule 150 mg  150 mg Oral Daily Cosme Lyons MD        propranolol (INDERAL) tablet 20 mg  20 mg Oral Q8H Cosme Lyons MD   20 mg at 01/16/24 1821    rivaroxaban (XARELTO) tablet 15 mg  15 mg Oral Daily With Dinner Cosme Lyons MD   15  "mg at 24 1821    [START ON 2024] rivastigmine (EXELON) 4.6 MG/24HR patch 1 patch  1 patch Transdermal Daily oCsme Lyons MD        sodium chloride 0.9 % flush 10 mL  10 mL Intravenous PRN Matt Cochran MD        sodium chloride 0.9 % flush 10 mL  10 mL Intravenous Q12H Cosme Lyons MD   10 mL at 24 1819    sodium chloride 0.9 % flush 10 mL  10 mL Intravenous PRN Cosme Lyons MD        sodium chloride 0.9 % infusion 40 mL  40 mL Intravenous PRN Cosme Lyons MD        [START ON 2024] tamsulosin (FLOMAX) 24 hr capsule 0.4 mg  0.4 mg Oral Daily Cosme Lyons MD           Antimicrobials:  Anti-Infectives (From admission, onward)      Ordered     Dose/Rate Route Frequency Start Stop    24 1236  meropenem (MERREM) 500 mg in sodium chloride 0.9 % 100 mL IVPB        Ordering Provider: Cosme Lyons MD    500 mg  over 3 Hours Intravenous Every 12 Hours 24 1008  vancomycin 1250 mg/250 mL 0.9% NS IVPB ([facility])        Ordering Provider: Matt Cochran MD    20 mg/kg × 64 kg  over 75 Minutes Intravenous Once 24 1024 24 1325    24 1008  meropenem (MERREM) 1,000 mg in sodium chloride 0.9 % 100 mL IVPB        Ordering Provider: Matt Cochran MD    1,000 mg  over 30 Minutes Intravenous Once 24 1024 24 1218              Review of Systems   Unable to perform ROS: Mental status change            Vital Signs:  Temp (24hrs), Av.5 °F (38.1 °C), Min:100.5 °F (38.1 °C), Max:100.5 °F (38.1 °C)    /69   Pulse (!) 126   Temp 100.5 °F (38.1 °C) (Axillary)   Resp 24   Ht 152.4 cm (60\")   Wt 64 kg (141 lb 3.2 oz)   LMP  (LMP Unknown)   SpO2 93%   BMI 27.58 kg/m²     Physical Examination:  GENERAL: Acutely ill-appearing female.  Lethargic.  HEENT: Normocephalic, atraumatic.  Eyes closed.  LYMPH: No cervical, axillary, or inguinal lymphadenopathy.  CV: RRR. No murmur, rubs, gallops.  Normal " S1S2.  LUNGS: Clear to auscultation bilaterally without wheezing, rales, rhonchi. Normal respiratory effort.  ABDOMEN: Positive bowel sounds.  Soft, nontender, nondistended.  No rebound or guarding.    EXT: 2+ bilateral lower extremity edema, right slightly greater than left.  MSK: Previous left knee arthroplasty site is benign  SKIN: Right lower leg with right anterior shin abrasion.  No drainage or purulence.  Circumferential erythema and warmth of the right lower leg extending up to the right medial thigh.  No crepitus.  No induration.  NEURO: Lethargic.      File Link    Scan on 1/16/2024 1005 by Matt Cochran MD: Right anterior shin wound       Laboratory Data:    Results from last 7 days   Lab Units 01/16/24  1024   WBC 10*3/mm3 16.69*   HEMOGLOBIN g/dL 13.2   HEMATOCRIT % 40.6   PLATELETS 10*3/mm3 262     Results from last 7 days   Lab Units 01/16/24  1024   SODIUM mmol/L 140   POTASSIUM mmol/L 4.4   CHLORIDE mmol/L 100   CO2 mmol/L 26.0   BUN mg/dL 33*   CREATININE mg/dL 1.36*   GLUCOSE mg/dL 160*   CALCIUM mg/dL 9.0     Results from last 7 days   Lab Units 01/16/24  1024   ALK PHOS U/L 94   BILIRUBIN mg/dL 0.8   ALT (SGPT) U/L 24   AST (SGOT) U/L 34*             Estimated Creatinine Clearance: 23 mL/min (A) (by C-G formula based on SCr of 1.36 mg/dL (H)).  Results from last 7 days   Lab Units 01/16/24  1335   LACTATE mmol/L 2.0                     Microbiology:  Microbiology Results (last 10 days)       Procedure Component Value - Date/Time    COVID PRE-OP / PRE-PROCEDURE SCREENING ORDER (NO ISOLATION) - Swab, Nasopharynx [[id number]]  (Normal) Collected: 01/16/24 1040    Lab Status: Final result Specimen: Swab from Nasopharynx Updated: 01/16/24 1116    Narrative:      The following orders were created for panel order COVID PRE-OP / PRE-PROCEDURE SCREENING ORDER (NO ISOLATION) - Swab, Nasopharynx.  Procedure                               Abnormality         Status                     ---------                                -----------         ------                     COVID-19 and FLU A/B PCR...[[id number]]  Normal              Final result                 Please view results for these tests on the individual orders.    COVID-19 and FLU A/B PCR, 1 HR TAT - Swab, Nasopharynx [[id number]]  (Normal) Collected: 01/16/24 1040    Lab Status: Final result Specimen: Swab from Nasopharynx Updated: 01/16/24 1116     COVID19 Not Detected     Influenza A PCR Not Detected     Influenza B PCR Not Detected    Narrative:      Fact sheet for providers: https://www.fda.gov/media/245670/download    Fact sheet for patients: https://www.fda.gov/media/179636/download    Test performed by PCR.                Radiology:  Imaging Results (Last 72 Hours)       Procedure Component Value Units Date/Time    XR Chest 1 View [[id number]] Collected: 01/16/24 1108     Updated: 01/16/24 1112    Narrative:      XR CHEST 1 VW    Date of Exam: 1/16/2024 10:21 AM EST    Indication: SOA triage protocol    Comparison: 10/24/2023.    Findings:  There is continued moderately severe cardiomegaly. There is stable chronic infiltrate in the left lower lobe. Mild chronic infiltrates elsewhere in the bilateral lung fields also are again noted. No new infiltrates or effusions are identified.      Impression:      Impression:  Chronic findings. No acute process.      Electronically Signed: Georgina Ramsey MD    1/16/2024 11:09 AM EST    Workstation ID: [id number]                IMPRESSION:  89 y.o. female with history of heart failure and chronic leg edema admitted with sepsis secondary to right lower extremity cellulitis.       PROBLEM LIST:   -- Sepsis, due to right lower extremity cellulitis, consider secondary bacteremia.  Fever, tachycardia, hypotension, hypoxia, encephalopathy, lactic acidosis, leukocytosis, acute kidney injury.  -- Nonpurulent right lower extremity cellulitis, portal of entry right anterior shin abrasion.  No purulence.  No crepitus.  Likely  streptococcal.  -- Chronic leg edema.  -- Acute on chronic heart failure with preserved ejection fraction.  -- Allergy to penicillin as a child, unknown reaction.    PLAN:  -- Discontinue meropenem  -- Start cefazolin 1 g IV every 8 hours for nonpurulent cellulitis  -- Add linezolid 600 mg IV every 12 hours to decrease toxin formation from suspected streptococcal infection  -- Assess for adverse drug reactions from antimicrobials.  -- Follow vitals, exam, labs, and cultures.  -- Follow results of pending culture data and tailor antibiotics as appropriate.  -- Final plans pending clinical course.    Thank you for the consultation.  I will continue to follow along with you.  Plan discussed with family at bedside and bedside nurse.    Dwain Kaur MD  1/16/2024  18:27 EST

## 2024-01-16 NOTE — ED NOTES
Zoila Nava    Nursing Report ED to Floor:  Mental status: A&OX3   Ambulatory status: Bedbound (on pure wick)  Oxygen Therapy:  bipap  Cardiac Rhythm: a-fib  Admitted from: ED  Safety Concerns:  O2  Social Issues: from [facility] [place]   ED Room #:  11    ED Nurse Phone Extension - [phone] or may call [phone].      HPI:   Chief Complaint   Patient presents with    Wound Check    Shortness of Breath       Past Medical History:  Past Medical History:   Diagnosis Date    Anemia     Arthritis     Asthma     Cataract     Difficulty breathing     Chronic    GERD (gastroesophageal reflux disease)     Graves' ophthalmopathy     Hoarseness     Hypertension     Hypertensive heart disease with acute on chronic diastolic congestive heart failure 10/11/2021    Pulmonary hypertension 10/11/2021    Spondylolisthesis of cervical region     Vitamin B12 deficiency         Past Surgical History:  Past Surgical History:   Procedure Laterality Date    CERVICAL LAMINECTOMY      CHOLECYSTECTOMY      OTHER SURGICAL HISTORY Right     Neuroplasty Median Nerve at Carpal Tunnel    THYROID SURGERY      TOTAL KNEE ARTHROPLASTY Left 09/29/2014    Dr Colon        Admitting Doctor:   Cosme Lyons MD    Consulting Provider(s):  Consults       No orders found from 12/18/2023 to 1/17/2024.             Admitting Diagnosis:   The primary encounter diagnosis was Acute on chronic congestive heart failure, unspecified heart failure type. Diagnoses of Cellulitis of right lower extremity, Elevated blood pressure reading with diagnosis of hypertension, History of pulmonary hypertension, and Hypoxemia requiring supplemental oxygen were also pertinent to this visit.    Most Recent Vitals:   Vitals:    01/16/24 1220 01/16/24 1230 01/16/24 1248 01/16/24 1248   BP: (!) 84/50 (!) 80/56 (!) 87/65 (!) 87/65   BP Location:    Right arm   Patient Position:       Pulse: 92 90     Resp:       Temp:       TempSrc:       SpO2: 98% 98%     Weight:       Height:            Active LDAs/IV Access:   Lines, Drains & Airways       Active LDAs       Name Placement date Placement time Site Days    Peripheral IV 01/16/24 1015 Anterior;Right Forearm 01/16/24  1015  Forearm  less than 1    Peripheral IV 01/16/24 1131 Left Antecubital 01/16/24  1131  Antecubital  less than 1                    Labs (abnormal labs have a star):   Labs Reviewed   COMPREHENSIVE METABOLIC PANEL - Abnormal; Notable for the following components:       Result Value    Glucose 160 (*)     BUN 33 (*)     Creatinine 1.36 (*)     AST (SGOT) 34 (*)     eGFR 37.1 (*)     All other components within normal limits    Narrative:     GFR Normal >60  Chronic Kidney Disease <60  Kidney Failure <15    The GFR formula is only valid for adults with stable renal function between ages 18 and 70.   BNP (IN-HOUSE) - Abnormal; Notable for the following components:    proBNP 10,191.0 (*)     All other components within normal limits    Narrative:     This assay is used as an aid in the diagnosis of individuals suspected of having heart failure. It can be used as an aid in the diagnosis of acute decompensated heart failure (ADHF) in patients presenting with signs and symptoms of ADHF to the emergency department (ED). In addition, NT-proBNP of <300 pg/mL indicates ADHF is not likely.    Age Range Result Interpretation  NT-proBNP Concentration (pg/mL:      <50             Positive            >450                   Gray                 300-450                    Negative             <300    50-75           Positive            >900                  Gray                300-900                  Negative            <300      >75             Positive            >1800                  Gray                300-1800                  Negative            <300   SINGLE HSTROPONIN T - Abnormal; Notable for the following components:    HS Troponin T 51 (*)     All other components within normal limits    Narrative:     High Sensitive Troponin T  Reference Range:  <14.0 ng/L- Negative Female for AMI  <22.0 ng/L- Negative Male for AMI  >=14 - Abnormal Female indicating possible myocardial injury.  >=22 - Abnormal Male indicating possible myocardial injury.   Clinicians would have to utilize clinical acumen, EKG, Troponin, and serial changes to determine if it is an Acute Myocardial Infarction or myocardial injury due to an underlying chronic condition.        CBC WITH AUTO DIFFERENTIAL - Abnormal; Notable for the following components:    WBC 16.69 (*)     RDW 16.0 (*)     RDW-SD 55.2 (*)     MPV 12.2 (*)     Neutrophil % 93.6 (*)     Lymphocyte % 3.5 (*)     Monocyte % 1.6 (*)     Neutrophils, Absolute 15.63 (*)     Lymphocytes, Absolute 0.58 (*)     Immature Grans, Absolute 0.08 (*)     All other components within normal limits   LACTIC ACID, PLASMA - Abnormal; Notable for the following components:    Lactate 4.3 (*)     All other components within normal limits   BLOOD GAS, ARTERIAL W/CO-OXIMETRY - Abnormal; Notable for the following components:    pH, Arterial 7.523 (*)     pCO2, Arterial 28.4 (*)     pO2, Arterial 112.0 (*)     Hemoglobin, Blood Gas 12.7 (*)     pCO2, Temperature Corrected 28.4 (*)     pO2, Temperature Corrected 112 (*)     All other components within normal limits   COVID-19 AND FLU A/B, NP SWAB IN TRANSPORT MEDIA 1 HR TAT - Normal    Narrative:     Fact sheet for providers: https://www.fda.gov/media/949829/download    Fact sheet for patients: https://www.fda.gov/media/377216/download    Test performed by PCR.   COVID PRE-OP / PRE-PROCEDURE SCREENING ORDER (NO ISOLATION)    Narrative:     The following orders were created for panel order COVID PRE-OP / PRE-PROCEDURE SCREENING ORDER (NO ISOLATION) - Swab, Nasopharynx.  Procedure                               Abnormality         Status                     ---------                               -----------         ------                     COVID-19 and FLU A/B PCR...[[id number]]  Normal               Final result                 Please view results for these tests on the individual orders.   BLOOD CULTURE   BLOOD CULTURE   RAINBOW DRAW    Narrative:     The following orders were created for panel order [place] Draw.  Procedure                               Abnormality         Status                     ---------                               -----------         ------                     Green Top (Gel)[[id number]]                                  Final result               Lavender Top[[id number]]                                     Final result               Gold Top - SST[[id number]]                                   Final result               Gray Top[[id number]]                                         In process                 Light Blue Top[[id number]]                                   Final result                 Please view results for these tests on the individual orders.   BLOOD GAS, ARTERIAL   LACTIC ACID, REFLEX   CBC AND DIFFERENTIAL    Narrative:     The following orders were created for panel order CBC & Differential.  Procedure                               Abnormality         Status                     ---------                               -----------         ------                     CBC Auto Differential[[id number]]        Abnormal            Final result                 Please view results for these tests on the individual orders.   GREEN TOP   LAVENDER TOP   GOLD TOP - SST   LIGHT BLUE TOP   GRAY TOP       Meds Given in ED:   Medications   sodium chloride 0.9 % flush 10 mL (has no administration in time range)   nitroglycerin (TRIDIL) 200 mcg/ml infusion (0 mcg/min Intravenous Hold 1/16/24 1220)   aspirin EC tablet 81 mg (has no administration in time range)   bumetanide (BUMEX) tablet 0.5 mg (has no administration in time range)   levothyroxine (SYNTHROID, LEVOTHROID) tablet 125 mcg (has no administration in time range)   magnesium oxide (MAG-OX) tablet 400 mg (has no administration  in time range)   midodrine (PROAMATINE) tablet 5 mg (5 mg Oral Given 1/16/24 1248)   pregabalin (LYRICA) capsule 150 mg (has no administration in time range)   rivastigmine (EXELON) 4.6 MG/24HR patch 1 patch (has no administration in time range)   tamsulosin (FLOMAX) 24 hr capsule 0.4 mg (has no administration in time range)   rivaroxaban (XARELTO) tablet 15 mg (has no administration in time range)   sodium chloride 0.9 % flush 10 mL (has no administration in time range)   sodium chloride 0.9 % flush 10 mL (has no administration in time range)   sodium chloride 0.9 % infusion 40 mL (has no administration in time range)   sennosides-docusate (PERICOLACE) 8.6-50 MG per tablet 2 tablet (has no administration in time range)     And   polyethylene glycol (MIRALAX) packet 17 g (has no administration in time range)     And   bisacodyl (DULCOLAX) EC tablet 5 mg (has no administration in time range)     And   bisacodyl (DULCOLAX) suppository 10 mg (has no administration in time range)   acetaminophen (TYLENOL) tablet 650 mg (has no administration in time range)     Or   acetaminophen (TYLENOL) 160 MG/5ML oral solution 650 mg (has no administration in time range)     Or   acetaminophen (TYLENOL) suppository 650 mg (has no administration in time range)   ondansetron ODT (ZOFRAN-ODT) disintegrating tablet 4 mg (has no administration in time range)     Or   ondansetron (ZOFRAN) injection 4 mg (has no administration in time range)   calcium carbonate (TUMS) chewable tablet 500 mg (200 mg elemental) (has no administration in time range)   meropenem (MERREM) 500 mg in sodium chloride 0.9 % 100 mL IVPB (has no administration in time range)   hydrALAZINE (APRESOLINE) injection 10 mg (has no administration in time range)   Pharmacy Consult - MTM (has no administration in time range)   bumetanide (BUMEX) injection 2 mg (2 mg Intravenous Given 1/16/24 1040)   vancomycin 1250 mg/250 mL 0.9% NS IVPB ([facility]) (1,250 mg Intravenous New Bag  1/16/24 1137)   meropenem (MERREM) 1,000 mg in sodium chloride 0.9 % 100 mL IVPB (0 mg Intravenous Stopped 1/16/24 1218)     nitroglycerin, 5-200 mcg/min, Last Rate: Stopped (01/16/24 1220)

## 2024-01-16 NOTE — CASE MANAGEMENT/SOCIAL WORK
Discharge Planning Assessment  [facility]     Patient Name: Zoila Nava  MRN: [id number]  Today's Date: 1/16/2024    Admit Date: 1/16/2024    Plan: IDP   Discharge Needs Assessment       Row Name 01/16/24 1344       Living Environment    Current Living Arrangements assisted living facility;other (see comments)  [facility]    Potentially Unsafe Housing Conditions unable to assess    Primary Care Provided by other (see comments);self  [facility] staff    Provides Primary Care For no one, unable/limited ability to care for self    Family Caregiver if Needed child(hemalatha), adult;spouse    Family Caregiver Names Matt Nava--spouse and Nidia Cardenas--daughter    Quality of Family Relationships helpful;involved;supportive    Able to Return to Prior Arrangements yes       Resource/Environmental Concerns    Resource/Environmental Concerns none    Transportation Concerns none       Transition Planning    Patient/Family Anticipates Transition to other (see comments)  [facility]    Transportation Anticipated family or friend will provide       Discharge Needs Assessment    Readmission Within the Last 30 Days no previous admission in last 30 days    Equipment Currently Used at Home wheelchair;walker, standard;oxygen                   Discharge Plan       Row Name 01/16/24 1348       Plan    Plan IDP    Plan Comments MSW met with pt and pt.'s daughter Nidia Cardenas at bedside. Pt. is a resident at [facility] on [facility] with Personal Care component in [facility]. Pt.'s PCP is Arnoldo Oneil. Pt.'s medications are handled by facility. Pt.'s insurance is Medicare and AARP. [facility] assists with all IADL's. Pt. can get dressed by herself but needs assistance with bathing. Pt. uses a walker and wheelchair. Pt. uses 2L of O2 when sitting, sleeping, and PRN (unsure of provider). Pt. is current with [facility] for PT/OT/SN. Pt.'s daughter can provide transportation back to  [facility] when pt. is medically ready to d/c. Pt. has an advanced directive and ACP documentation on file. CM will continue to follow pt. throughout her stay.    Final Discharge Disposition Code 30 - still a patient                  Continued Care and Services - Admitted Since 1/16/2024    Coordination has not been started for this encounter.       Selected Continued Care - Prior Encounters Includes continued care and service providers with selected services from prior encounters from 10/18/2023 to 1/16/2024      Discharged on 10/30/2023 Admission date: 10/24/2023 - Discharge disposition: Rehab Facility or Unit (DC - External)      Destination       Service Provider Selected Services Address Phone Fax Patient Preferred    [facility] [address] [address] [facility] [phone] --                             Demographic Summary       Row Name 01/16/24 1339       General Information    Admission Type inpatient    Arrived From home    Referral Source admission list;emergency department    Reason for Consult discharge planning    Preferred Language English                   Functional Status       Row Name 01/16/24 1342       Functional Status, IADL    Medications completely dependent    Meal Preparation other (see comments)  [facility] provides    Housekeeping completely dependent    Laundry completely dependent    Shopping assistive equipment and person       Mental Status Summary    Recent Changes in Mental Status/Cognitive Functioning unable to assess       Employment/    Employment Status retired                   Psychosocial    No documentation.                  Abuse/Neglect    No documentation.                  Legal    No documentation.                  Substance Abuse    No documentation.                  Patient Forms    No documentation.                     GUANAKITO Santacruz

## 2024-01-16 NOTE — H&P
[facility] Medicine Services  HISTORY AND PHYSICAL    Patient Name: Zoila Nava  : 1933  MRN: [id number]  Primary Care Physician: Arnoldo Oneil MD  Date of admission: 2024      Subjective   Subjective     Chief Complaint: Shortness of breath, severe edema of lower extremities.      HPI:  Zoila Nava is a 89 y.o. female with past medical history significant for chronic diastolic heart failure, pulmonary hypertension, chronic kidney disease, hypothyroidism, paroxysmal atrial atrial fibrillation, dementia.  Patient lives in an assisted living independently but family members visit her daily.  Patient's daughter is present at the bedside and provides most of the history.  Evidently patient has had lower extremity wounds but last time that she was sent to rehab this problem was taking care of and after discharge from rehab she did not have lower extremity wounds.  The daughter tells me that up until about a week ago she was doing okay.  About a week ago they noticed that right lower extremity had developed a wound after patient hitting it against furniture.  Then following that the right lower extremity became red and redness and edema worsened gradually.  Patient's edema actually increased bilaterally and she started weeping.  Meanwhile her respiratory symptoms worsened and she became progressively short of breath.  The above symptoms prompted emergency room visit.  Here at the emergency room patient was noticed to have severe edema of lower extremities.  Patient was diuresed and also was started on IV antibiotic for lower extremity cellulitis on the right side.  Patient denies fever or chills.  No chest pain or palpitation.  No nausea vomiting or diarrhea.      Personal History     Past Medical History:   Diagnosis Date    Anemia     Arthritis     Asthma     Cataract     Difficulty breathing     Chronic    GERD (gastroesophageal reflux disease)     Graves'  ophthalmopathy     Hoarseness     Hypertension     Hypertensive heart disease with acute on chronic diastolic congestive heart failure 10/11/2021    Pulmonary hypertension 10/11/2021    Spondylolisthesis of cervical region     Vitamin B12 deficiency            Past Surgical History:   Procedure Laterality Date    CERVICAL LAMINECTOMY      CHOLECYSTECTOMY      OTHER SURGICAL HISTORY Right     Neuroplasty Median Nerve at Carpal Tunnel    THYROID SURGERY      TOTAL KNEE ARTHROPLASTY Left 09/29/2014    Dr Colon       Family History: family history includes Diabetes in her father; Heart disease in her father; Hypertension in her mother and sister; No Known Problems in her maternal grandfather, maternal grandmother, paternal grandfather, and paternal grandmother; Other in her father.     Social History:  reports that she has never smoked. She has never used smokeless tobacco. She reports that she does not drink alcohol and does not use drugs.  Social History     Social History Narrative    Caffeine: 2-3 coffee daily       Medications:  Available home medication information reviewed.  HYDROcodone-acetaminophen, aspirin, bumetanide, levothyroxine, magnesium oxide, midodrine, neomycin-polymyxin-dexamethasone, pantoprazole, pregabalin, propranolol, rivaroxaban, rivastigmine, vitamin B-12, and vitamin C    Allergies   Allergen Reactions    Penicillins Other (See Comments)     CHILDHOOD ALLERGY         Objective   Objective     Vital Signs:   Temp:  [100.5 °F (38.1 °C)] 100.5 °F (38.1 °C)  Heart Rate:  [] 100  Resp:  [44] 44  BP: ()/() 100/61  Flow (L/min):  [6-10] 6       Physical Exam   Constitutional: No acute distress, awake, alert  HENT: NCAT, mucous membranes moist  Respiratory: Clear to auscultation bilaterally, patient was on BiPAP at the time of my examination and was saturating above 97%.  Cardiovascular: RRR, no murmurs, rubs, or gallops  Gastrointestinal: Positive bowel sounds, soft,  nontender, nondistended  Musculoskeletal:  bilateral ankle edema, redness and a closed wound on the shin on the right side.  Psychiatric: Appropriate affect, cooperative  Neurologic: Awake, alert, follows commands, speech clear  Skin: Right lower extremity skin redness as mentioned above    Result Review:  I have personally reviewed the results from the time of this admission to 1/16/2024 15:44 EST and agree with these findings:  []  Laboratory list / accordion  []  Microbiology  []  Radiology  []  EKG/Telemetry   []  Cardiology/Vascular   []  Pathology  []  Old records  []  Other:  Most notable findings include: Very elevated proBNP at 10,191.  WBC of 16.69.  Elevated lactate.      LAB RESULTS:      Lab 01/16/24  1335 01/16/24  1024   WBC  --  16.69*   HEMOGLOBIN  --  13.2   HEMATOCRIT  --  40.6   PLATELETS  --  262   NEUTROS ABS  --  15.63*   IMMATURE GRANS (ABS)  --  0.08*   LYMPHS ABS  --  0.58*   MONOS ABS  --  0.27   EOS ABS  --  0.11   MCV  --  93.3   LACTATE 2.0 4.3*         Lab 01/16/24  1024   SODIUM 140   POTASSIUM 4.4   CHLORIDE 100   CO2 26.0   ANION GAP 14.0   BUN 33*   CREATININE 1.36*   EGFR 37.1*   GLUCOSE 160*   CALCIUM 9.0         Lab 01/16/24  1024   TOTAL PROTEIN 6.8   ALBUMIN 4.2   GLOBULIN 2.6   ALT (SGPT) 24   AST (SGOT) 34*   BILIRUBIN 0.8   ALK PHOS 94         Lab 01/16/24  1024   PROBNP 10,191.0*   HSTROP T 51*                 Lab 01/16/24  1059   PH, ARTERIAL 7.523*   PCO2, ARTERIAL 28.4*   PO2 .0*   FIO2 50   HCO3 ART 23.3   BASE EXCESS ART 1.4   CARBOXYHEMOGLOBIN 1.2     UA          4/4/2023    20:56 10/24/2023    11:45 10/28/2023    23:20   Urinalysis   Specific Gravity, UA 1.007  1.016  1.013    Ketones, UA Negative  Negative  Negative    Blood, UA Negative  Negative  Negative    Leukocytes, UA Negative  Negative  Negative    Nitrite, UA Negative  Negative  Negative        Microbiology Results (last 10 days)       Procedure Component Value - Date/Time    COVID PRE-OP /  PRE-PROCEDURE SCREENING ORDER (NO ISOLATION) - Swab, Nasopharynx [[id number]]  (Normal) Collected: 01/16/24 1040    Lab Status: Final result Specimen: Swab from Nasopharynx Updated: 01/16/24 1116    Narrative:      The following orders were created for panel order COVID PRE-OP / PRE-PROCEDURE SCREENING ORDER (NO ISOLATION) - Swab, Nasopharynx.  Procedure                               Abnormality         Status                     ---------                               -----------         ------                     COVID-19 and FLU A/B PCR...[[id number]]  Normal              Final result                 Please view results for these tests on the individual orders.    COVID-19 and FLU A/B PCR, 1 HR TAT - Swab, Nasopharynx [[id number]]  (Normal) Collected: 01/16/24 1040    Lab Status: Final result Specimen: Swab from Nasopharynx Updated: 01/16/24 1116     COVID19 Not Detected     Influenza A PCR Not Detected     Influenza B PCR Not Detected    Narrative:      Fact sheet for providers: https://www.fda.gov/media/121455/download    Fact sheet for patients: https://www.fda.gov/media/420037/download    Test performed by PCR.            XR Chest 1 View    Result Date: 1/16/2024  XR CHEST 1 VW Date of Exam: 1/16/2024 10:21 AM EST Indication: SOA triage protocol Comparison: 10/24/2023. Findings: There is continued moderately severe cardiomegaly. There is stable chronic infiltrate in the left lower lobe. Mild chronic infiltrates elsewhere in the bilateral lung fields also are again noted. No new infiltrates or effusions are identified.     Impression: Impression: Chronic findings. No acute process. Electronically Signed: Georgina Ramsey MD  1/16/2024 11:09 AM EST  Workstation ID: [id number]     Results for orders placed during the hospital encounter of 04/04/23    Adult Transthoracic Echo Complete W/ Cont if Necessary Per Protocol    Interpretation Summary    Left ventricular systolic function is normal. Left ventricular ejection  fraction appears to be 51 - 55%.    Mildly reduced right ventricular systolic function noted.    The left atrial cavity is dilated.    The right atrial cavity is dilated.    There is moderate calcification of the aortic valve.    Mild to moderate aortic valve regurgitation is present.    Mild mitral valve regurgitation is present.    Mild tricuspid valve regurgitation is present.      Assessment & Plan   Assessment & Plan       Acute on chronic heart failure with preserved ejection fraction (HFpEF)    Acute on chronic respiratory failure with hypoxia    Cellulitis of right lower extremity        Assessment and plan:  Patient is a pleasant 89-year-old  female with past medical history significant for chronic diastolic heart failure, chronic respiratory failure on 2 L of nasal cannula, paroxysmal atrial fibrillation, valvular heart disease, mildly reduced right systolic function, dementia.  Patient was brought to the emergency room for worsening edema of lower extremities, wound and redness of right lower extremity, worsening shortness of breath.    **Progressively worsening shortness of breath.  The main contributing factor most likely is acute diastolic heart failure on chronic diastolic heart failure.   -- Patient was diuresed at the emergency room and patient feels a little better.  Will continue diuretics.    --Will also ask cardiology to see the patient.    --get a new echocardiogram.  -- Neb treatment    **Cellulitis of right lower extremities with a wound on the shins which is closed.  --  Patient was started on IV antibiotics at the emergency room and will continue that.    --Will ask ID to see the patient also.    **Atrial fibrillation, on Xarelto, will continue     **Hypothyroidism, will continue home Synthroid    **Patient is on midodrine for control of blood pressure.  She is also on beta-blockers for control of heart rate.  -- Will continue midodrine  -- Continue propranolol          DVT  prophylaxis: On Xarelto  Medical DVT prophylaxis orders are present.      CODE STATUS:    Code Status and Medical Interventions:   Ordered at: 01/16/24 1233     Medical Intervention Limits:    NO intubation (DNI)     Code Status (Patient has no pulse and is not breathing):    No CPR (Do Not Attempt to Resuscitate)     Medical Interventions (Patient has pulse or is breathing):    Limited Support       Expected Discharge   Expected discharge date/ time has not been documented.     Cosme Lyons MD  01/16/24

## 2024-01-16 NOTE — Clinical Note
Level of Care: Telemetry [5]   Diagnosis: Acute on chronic heart failure with preserved ejection fraction (HFpEF) [[id number]]   Certification: I Certify That Inpatient Hospital Services Are Medically Necessary For Greater Than 2 Midnights

## 2024-01-16 NOTE — CONSULTS
[facility] Cardiology   [address], [address]  [place], [place], [address] [phone]  [email or web address]"[employer]           INPATIENT CONSULTATION NOTE    Patient Care Team:  Patient Care Team:  Arnoldo Oneil MD as PCP - General (Internal Medicine)  Sourav Montes MD as Referring Physician (Neurology)  Richard Beltran MD as Consulting Physician (Gastroenterology)  Basil Richter MD as Consulting Physician (Cardiology)  Garcia Marte MD as Consulting Physician (Pain Medicine)    Requesting Provider and Reason for consultation: The patient is being seen today at the request of Dr. Lyons for congestive heart failure.     Chief complaint:   Chief Complaint   Patient presents with    Wound Check    Shortness of Breath            Subjective:     Cardiac focused problem list:  Heart failure preserved EF  Dry weight appears to be around 145 pounds at home  Paroxysmal atrial fibrillation  Pulmonary hypertension  Valvular heart disease  Mild aortic stenosis, moderate aortic regurgitation  CKD  Ascending aortic aneurysm  Hypertension  Hypothyroidism   GERD   Anemia   Dementia   Neuropathy     HPI:      Zoila Nava is a 89 y.o. female.  Patient presented to [facility] ED with shortness of breath, LE edema and altered mental status. Has been seen frequently in heart and valve clinic for similar symptoms.  Was started on Bumex 0.5 mg daily last month. Daughter at bedside.  Reports patient was doing ok until about one week ago.  She developed a wound on her right LE that began weeping.  Also with bilateral LE edema and worsening shortness of breath over the last week.  She was transported by EMS from assisted living facility to [facility] ED.  Workup in the ED revealing elevated proBNP of 10,191, HS troponin 51, CXR with moderately severe cardiomegaly, chronic infiltrate in the LLL, no acute process. Received IV Bumex 2 mg x 1 in the ED. Admitted to the hospitalists service.  Cardiology  "consulted for acute on chronic HFpEF.     Patient's daughter at bedside.  Notes that her mother had been doing \"ok\" until about 1 week ago.  She began having increased LE edema and shortness of breath.  Was able to ambulate in the halls at her assisted living facility up until yesterday but became too fatigued and short of breath to ambulate yesterday.  Patient denies chest pain or palpitations.  Patient previously on Jardiance but was stopped due to unknown reason.  Daughter reports no know UTI or yeast infections.     Review of Systems:  As noted in the HPI    PFSH:  Patient Active Problem List   Diagnosis    Allergic rhinitis    Hyperlipidemia    Hypertension    Hypocalcemia    Hypothyroidism    Neuropathy    NUD (nonulcer dyspepsia)    Painful knee    Pernicious anemia    Tremor    Vitamin B12 deficiency    Spondylolisthesis of cervical region    Graves' ophthalmopathy    Anemia    Memory impairment of gradual onset    Ascending aortic aneurysm    Stage 3 chronic kidney disease    Pulmonary hypertension    Chronic heart failure with preserved ejection fraction    CHF (congestive heart failure), NYHA class I, acute on chronic, diastolic    CHF (congestive heart failure)    Non-rheumatic aortic stenosis    Non-rheumatic aortic regurgitation    Hypokalemia    Carpal tunnel syndrome    Essential tremor    Gastroesophageal reflux disease    Nausea and vomiting    Skin sensation disturbance    Spinal cord disease    Urge incontinence of urine    Urinary urgency    Acute chest pain    (HFpEF) heart failure with preserved ejection fraction    Nocturnal hypoxia    Confusion    Concussion without loss of consciousness    Hypotension    AMS (altered mental status)    Multiple open wounds of lower extremity, unspecified laterality, subsequent encounter    Acute on chronic heart failure with preserved ejection fraction (HFpEF)    Acute on chronic respiratory failure with hypoxia    Cellulitis of right lower extremity "       No current facility-administered medications on file prior to encounter.     Current Outpatient Medications on File Prior to Encounter   Medication Sig Dispense Refill    aspirin 81 MG EC tablet Take 1 tablet by mouth Daily. OTC      bumetanide (BUMEX) 0.5 MG tablet Take 1 tablet by mouth Every Morning. 30 tablet 1    HYDROcodone-acetaminophen (NORCO) 7.5-325 MG per tablet TAKE ONE TABLET BY MOUTH EVERY 8 HOURS AS NEEDED FOR MODERATE PAIN 90 tablet 0    levothyroxine (SYNTHROID, LEVOTHROID) 125 MCG tablet TAKE ONE TABLET BY MOUTH EVERY MORNING 90 tablet 1    magnesium oxide (MAG-OX) 400 MG tablet Take 1 tablet by mouth Daily.      midodrine (PROAMATINE) 5 MG tablet TAKE 1 TABLET BY MOUTH THREE TIMES A DAY 90 tablet 11    neomycin-polymyxin-dexamethasone (MAXITROL) 0.1 % ophthalmic suspension Administer 1 application  into the left eye 4 (Four) Times a Day.      pantoprazole (PROTONIX) 20 MG EC tablet TAKE ONE TABLET BY MOUTH DAILY 30 tablet 11    pregabalin (Lyrica) 150 MG capsule Take 1 capsule by mouth 2 (Two) Times a Day. 60 capsule 2    propranolol (INDERAL) 20 MG tablet TAKE 1 TABLET BY MOUTH EVERY 8 HOURS 90 tablet 11    rivastigmine (EXELON) 4.6 MG/24HR patch APPLY ONE PATCH TO THE SKIN EVERY DAY. REMOVE OLD PATCH BEFORE APPLYING NEW 30 patch 11    vitamin B-12 (CYANOCOBALAMIN) 1000 MCG tablet Take 1 tablet by mouth Daily. OTC      vitamin C (ASCORBIC ACID) 500 MG tablet TAKE ONE TABLET BY MOUTH DAILY 30 tablet 11    Xarelto 15 MG tablet TAKE ONE TABLET BY MOUTH EVERY EVENING WITH DINNER 30 tablet 11    [DISCONTINUED] O2 (OXYGEN) Inhale 2 L/min At Night As Needed.      [DISCONTINUED] tamsulosin (FLOMAX) 0.4 MG capsule 24 hr capsule Take 1 capsule by mouth Daily. 90 capsule 1    [DISCONTINUED] Wound Dressings (Curity Unna Boot) misc Apply 1 each topically Daily. 2 each 0       Social History     Socioeconomic History    Marital status:    Tobacco Use    Smoking status: Never    Smokeless tobacco:  Never   Vaping Use    Vaping Use: Never used   Substance and Sexual Activity    Alcohol use: No    Drug use: No    Sexual activity: Defer            Objective:     Vital Sign Min/Max for last 24 hours  Temp  Min: 100.5 °F (38.1 °C)  Max: 100.5 °F (38.1 °C)   BP  Min: 71/38  Max: 162/133   Pulse  Min: 90  Max: 117   Resp  Min: 44  Max: 44   SpO2  Min: 89 %  Max: 100 %   Flow (L/min)  Min: 6  Max: 10      Intake/Output Summary (Last 24 hours) at 1/16/2024 1550  Last data filed at 1/16/2024 1325  Gross per 24 hour   Intake 350 ml   Output --   Net 350 ml           Vitals:    01/16/24 1316   BP: 100/61   Pulse: 100   Resp:    Temp:    SpO2: 99%     CONSTITUTIONAL: No acute distress  RESPIRATORY: Normal effort. Bilateral rales, left > right.  On 6L nasal cannula.   CARDIOVASCULAR: Regular rate and rhythm with normal S1 and S2. Without murmur.  PERIPHERAL VASCULAR: No carotid bruit bilaterally.  Normal radial pulse. There is 3+ lower extremity edema bilaterally. Small wound on the anterior aspect of right LE with redness and weeping.     Labs and radiologic results:  Today's results were reviewed by myself.    Cardiac Data:    EKG 1/16/2024:  Sinus tachycardia    Results for orders placed during the hospital encounter of 04/04/23    Adult Transthoracic Echo Complete W/ Cont if Necessary Per Protocol    Interpretation Summary    Left ventricular systolic function is normal. Left ventricular ejection fraction appears to be 51 - 55%.    Mildly reduced right ventricular systolic function noted.    The left atrial cavity is dilated.    The right atrial cavity is dilated.    There is moderate calcification of the aortic valve.    Mild to moderate aortic valve regurgitation is present.    Mild mitral valve regurgitation is present.    Mild tricuspid valve regurgitation is present.      Tele:  NSR with PACs, PVCs         Assessment and Plan:     Problem list:    Acute on chronic heart failure with preserved ejection fraction  (HFpEF)    Acute on chronic respiratory failure with hypoxia    Cellulitis of right lower extremity      ASSESSMENT:  Acute on chronic HFpEF   Elevated proBNP of 10,000  IV Bumex 2 mg x 1 in the ED.   Most recent echo 4/2023 with EF 51-55%, mildly reduced RV systolc function, dilated LA an RA, mild to moderate AR, mild MR, mild TR.   Right LE cellulitis, Hypotension, possible sepsis  Acute on chronic respiratory failure   CKD stage III    PLAN:  Hold off additional Bumex today.  Will give additional dose of Bumex 2 mg IV tomorrow morning followed by transition to po dosing on Thursday.    Daily BMP to monitor renal function and electrolytes closely.   Strict I's and O's.   Repeat echocardiogram.   Venous duplex right LE tomorrow to rule out DVT.   Hold off restarting Jardiance (10mg daily) until infection clears.    Increase midodrine to 10 mg three times a day for hypotension.   Not on ACE/ARB/ARNI/MRA secondary to hypotension.     Scribed for Dr. Richter by Amelia Lara, APRN. 1/16/2024  15:50 EST      I, Basil Richter MD, personally performed the services as scribed by the above named individual. I have made any necessary edits and it is both accurate and complete.     Basil Richter MD, MSc, FACC, [facility]  Interventional Cardiology  [facility]

## 2024-01-16 NOTE — ED PROVIDER NOTES
Subjective   History of Present Illness  Patient is a 89-year-old female presenting to the emergency department via EMS secondary to increased shortness of breath with history of congestive heart failure with tachypnea.  EMS also reports that the nursing care facility noted redness and drainage with a wound to the right lower extremity.  Very limited history and the patient and very limited history from EMS overall.  Patient is tremulous and tachypneic on arrival.  Quite ill-appearing.  Moderate respiratory distress.    History provided by:  EMS personnel  History limited by:  Severe respiratory distress and mental status change      Review of Systems    Past Medical History:   Diagnosis Date    Anemia     Arthritis     Asthma     Cataract     Difficulty breathing     Chronic    GERD (gastroesophageal reflux disease)     Graves' ophthalmopathy     Hoarseness     Hypertension     Hypertensive heart disease with acute on chronic diastolic congestive heart failure 10/11/2021    Pulmonary hypertension 10/11/2021    Spondylolisthesis of cervical region     Vitamin B12 deficiency        Allergies   Allergen Reactions    Penicillins Other (See Comments)     CHILDHOOD ALLERGY - has tolerated ceftriaxone and cefazolin         Past Surgical History:   Procedure Laterality Date    CERVICAL LAMINECTOMY      CHOLECYSTECTOMY      OTHER SURGICAL HISTORY Right     Neuroplasty Median Nerve at Carpal Tunnel    THYROID SURGERY      TOTAL KNEE ARTHROPLASTY Left 09/29/2014    Dr Colon       Family History   Problem Relation Age of Onset    Hypertension Mother     Other Father         cardiac disorder    Diabetes Father     Heart disease Father     Hypertension Sister     No Known Problems Maternal Grandmother     No Known Problems Maternal Grandfather     No Known Problems Paternal Grandmother     No Known Problems Paternal Grandfather     Colon cancer Neg Hx     Colon polyps Neg Hx     Esophageal cancer Neg Hx        Social  History     Socioeconomic History    Marital status:    Tobacco Use    Smoking status: Never    Smokeless tobacco: Never   Vaping Use    Vaping Use: Never used   Substance and Sexual Activity    Alcohol use: No    Drug use: No    Sexual activity: Defer           Objective   Physical Exam  Vitals and nursing note reviewed.   Constitutional:       General: She is in acute distress.      Appearance: She is ill-appearing.   Cardiovascular:      Rate and Rhythm: Tachycardia present. Rhythm irregularly irregular.   Pulmonary:      Effort: Tachypnea present.   Musculoskeletal:      Right lower leg: Edema present.      Left lower leg: Edema present.      Comments: Mild venous stasis with edema bilateral lower extremities.  There is a small wound of the anterior aspect of the right lower extremity with associated cellulitis of the right lower extremity.  See attached image.   Skin:     Findings: Erythema present.   Neurological:      Mental Status: She is oriented to person, place, and time.   Psychiatric:         Mood and Affect: Mood normal.         Behavior: Behavior normal.         Critical Care    Performed by: Matt Cochran MD  Authorized by: Matt Cochran MD    Critical care provider statement:     Critical care time (minutes):  45    Critical care end time:  1/16/2024 12:53 PM    Critical care was necessary to treat or prevent imminent or life-threatening deterioration of the following conditions:  Respiratory failure    Critical care was time spent personally by me on the following activities:  Discussions with consultants, evaluation of patient's response to treatment, examination of patient, obtaining history from patient or surrogate, ordering and performing treatments and interventions, ordering and review of laboratory studies, ordering and review of radiographic studies, pulse oximetry and re-evaluation of patient's condition    Care discussed with: admitting provider               ED  Course  ED Course as of 01/26/24 1917 Tue Jan 16, 2024   1033 Temp: 100.5 °F (38.1 °C)  Arrival vital signs reviewed the patient is tachycardic, hypertensive, tachypneic, and febrile. [RS]   1052 WBC(!): 16.69  Leukocytosis noted. [RS]   1101 pH, Arterial(!): 7.523  Respiratory alkalosis noted. [RS]   1128 Creatinine(!): 1.36  Stable chronic kidney disease when compared with prior. [RS]   1129 proBNP(!): 10,191.0  BNP elevated when compared to prior. [RS]   1129 XR Chest 1 View  Personally reviewed single view chest on my interpretation there is no focal lobar infiltrate. [RS]   1129 Patient with evidence of CHF exacerbation as well as cellulitis with the right lower extremity.  Will plan admission for further evaluation and management.  Hospitalist messaged for admission [RS]      ED Course User Index  [RS] Matt Cochran MD                                             Medical Decision Making  Problems Addressed:  Acute on chronic congestive heart failure, unspecified heart failure type: complicated acute illness or injury  Cellulitis of right lower extremity: complicated acute illness or injury  Elevated blood pressure reading with diagnosis of hypertension: complicated acute illness or injury  History of pulmonary hypertension: chronic illness or injury  Hypoxemia requiring supplemental oxygen: complicated acute illness or injury    Amount and/or Complexity of Data Reviewed  Independent Historian: EMS  External Data Reviewed: labs.  Labs: ordered. Decision-making details documented in ED Course.  Radiology: ordered. Decision-making details documented in ED Course.  ECG/medicine tests: ordered.  Discussion of management or test interpretation with external provider(s): Hospitalist    Risk  Prescription drug management.  Decision regarding hospitalization.    Critical Care  Total time providing critical care: 45 minutes        Final diagnoses:   Acute on chronic congestive heart failure, unspecified heart  failure type   Cellulitis of right lower extremity   Elevated blood pressure reading with diagnosis of hypertension   History of pulmonary hypertension   Hypoxemia requiring supplemental oxygen       ED Disposition  ED Disposition       ED Disposition   Decision to Admit    Condition   --    Comment   Level of Care: Telemetry [5]   Diagnosis: Acute on chronic heart failure with preserved ejection fraction (HFpEF) [[id number]]                 [facility]  [address]  [facility] [address] [phone]  Schedule an appointment as soon as possible for a visit in 5 day(s)  heart failure with Arnoldo Figueroa MD  [address]  [place] [address]  [phone]               Medication List      No changes were made to your prescriptions during this visit.            Matt Cochran MD  01/16/24 1254       Matt Cochran MD  01/26/24 8806

## 2024-01-17 NOTE — CONSULTS
COPD Nurse Navigator has received consult and completed a chart review.  Patient no documented history of smoking or COPD.  No PFT on file and no home respiratory regimen.  NN will sign off at this time.  Please consult again if felt I can be of assistance.

## 2024-01-17 NOTE — CASE MANAGEMENT/SOCIAL WORK
Discharge Planning Assessment  [facility]     Patient Name: Zoila Nava  MRN: [id number]  Today's Date: 1/17/2024    Admit Date: 1/16/2024    Plan: Assited Living [facility]   Discharge Needs Assessment    No documentation.                  Discharge Plan       Row Name 01/17/24 1637       Plan    Plan Assited Living [facility]    Patient/Family in Agreement with Plan yes    Plan Comments CM called Cheryl the Director of Nursing at [facility]. Patient can return back but will need to be a one person assist. PT and OT are ordered. ID is following and patient is on IV ABX at current. Patient discharge plan is back to [facility] personal care unit with [facility] for SN, PT and OT. Daughter can transport back to [facility]. CM will follow for any discharge needs.                  Continued Care and Services - Admitted Since 1/16/2024    Coordination has not been started for this encounter.       Selected Continued Care - Prior Encounters Includes continued care and service providers with selected services from prior encounters from 10/18/2023 to 1/17/2024      Discharged on 10/30/2023 Admission date: 10/24/2023 - Discharge disposition: Rehab Facility or Unit (DC - External)      Destination       Service Provider Selected Services Address Phone Fax Patient Preferred    [facility] [address] [place] [id number] [phone] --                          Expected Discharge Date and Time       Expected Discharge Date Expected Discharge Time    Jan 22, 2024            Demographic Summary    No documentation.                  Functional Status    No documentation.                  Psychosocial    No documentation.                  Abuse/Neglect    No documentation.                  Legal    No documentation.                  Substance Abuse    No documentation.                  Patient Forms    No documentation.                      Yissel Oliver, RN

## 2024-01-17 NOTE — PLAN OF CARE
Goal Outcome Evaluation:  Plan of Care Reviewed With: patient        Progress: no change  Outcome Evaluation: Slept throughout the night. Remains on 6LNC humidified. +Fever. Tylenol given and cooling measures rendered. Cont on ABX therapy without adverse reaction noted. Will cont current POC

## 2024-01-17 NOTE — PLAN OF CARE
Goal Outcome Evaluation:         Patient came from [facility] and we received her from the ED with Temp of 100.5 F, on 6L Humidified Nasal Cannula, in Sinus Tachycardia on monitor, Aox3, has a purewick on but hates using it, is a X2 assist to the bedside commode, slept most of the afternoon, can take pills whole- VSS - will continue POC.

## 2024-01-17 NOTE — PROGRESS NOTES
"  INFECTIOUS DISEASES  INPATIENT PROGRESS NOTE  2024      PATIENT NAME: Zoila Nava  :  1933  MRN:  [id number]  Date of Admission:  2024      Antimicrobials:  Day 2:  Linezolid  Ceftriaxone      MAR reviewed.    Chief Complaint   Patient presents with    Wound Check    Shortness of Breath       Reason for consultation: Sepsis, right leg cellulitis, E. coli bacteremia    Interval history: Patient feeling much better today.  Low-grade fever this morning.  Much more awake and alert.  Family at the bedside pleased with her progress.  Blood cultures returned positive for E. Coli, no resistance genes detected.    ROS:  No fevers/chills overnight.  No new rashes.  No nausea/vomiting/diarrhea.  Denies side effects from antimicrobials.      Objective:  Temp (24hrs), Av °F (37.2 °C), Min:98.1 °F (36.7 °C), Max:100.4 °F (38 °C)    BP 90/56   Pulse 81   Temp 98.6 °F (37 °C)   Resp 18   Ht 152.4 cm (60\")   Wt 64 kg (141 lb)   LMP  (LMP Unknown)   SpO2 99%   BMI 27.54 kg/m²     Physical Examination:  GENERAL: Improved-appearing female.  Awake and alert.  HEENT: Normocephalic, atraumatic.  Minimal cerumen in right external auditory canal, left with TM slightly dull with fluid behind.  CV: RRR. No murmur, rubs, gallops.  Normal S1S2.  LUNGS: Clear to auscultation bilaterally without wheezing, rales, rhonchi. Normal respiratory effort.  ABDOMEN: Positive bowel sounds.  Soft, nontender, nondistended.  No rebound or guarding.    EXT: 2+ bilateral lower extremity edema, right slightly greater than left.  MSK: Previous left knee arthroplasty site is benign  SKIN: Right lower leg with right anterior shin abrasion.  No drainage or purulence.  Circumferential erythema and warmth of the right lower leg extending up to the right medial thigh has improved.  No crepitus.  No induration.  NEURO: Awake and alert, hard of hearing.    Laboratory Data:    Results from last 7 days   Lab Units 24  0700 " 01/16/24  1024   WBC 10*3/mm3 22.83* 16.69*   HEMOGLOBIN g/dL 11.5* 13.2   HEMATOCRIT % 35.2 40.6   PLATELETS 10*3/mm3 170 262     Results from last 7 days   Lab Units 01/17/24  0932   SODIUM mmol/L 139   POTASSIUM mmol/L 3.4*   CHLORIDE mmol/L 101   CO2 mmol/L 25.0   BUN mg/dL 34*   CREATININE mg/dL 1.43*   GLUCOSE mg/dL 151*   CALCIUM mg/dL 8.0*     Results from last 7 days   Lab Units 01/16/24  1024   ALK PHOS U/L 94   BILIRUBIN mg/dL 0.8   ALT (SGPT) U/L 24   AST (SGOT) U/L 34*             Estimated Creatinine Clearance: 21.8 mL/min (A) (by C-G formula based on SCr of 1.43 mg/dL (H)).  Results from last 7 days   Lab Units 01/16/24  1335   LACTATE mmol/L 2.0                   Microbiology:    Microbiology Results (last 10 days)       Procedure Component Value - Date/Time    COVID PRE-OP / PRE-PROCEDURE SCREENING ORDER (NO ISOLATION) - Swab, Nasopharynx [[id number]]  (Normal) Collected: 01/16/24 1040    Lab Status: Final result Specimen: Swab from Nasopharynx Updated: 01/16/24 1116    Narrative:      The following orders were created for panel order COVID PRE-OP / PRE-PROCEDURE SCREENING ORDER (NO ISOLATION) - Swab, Nasopharynx.  Procedure                               Abnormality         Status                     ---------                               -----------         ------                     COVID-19 and FLU A/B PCR...[[id number]]  Normal              Final result                 Please view results for these tests on the individual orders.    COVID-19 and FLU A/B PCR, 1 HR TAT - Swab, Nasopharynx [[id number]]  (Normal) Collected: 01/16/24 1040    Lab Status: Final result Specimen: Swab from Nasopharynx Updated: 01/16/24 1116     COVID19 Not Detected     Influenza A PCR Not Detected     Influenza B PCR Not Detected    Narrative:      Fact sheet for providers: https://www.fda.gov/media/900620/download    Fact sheet for patients: https://www.fda.gov/media/640975/download    Test performed by PCR.    Blood  Culture - Blood, Arm, Right [[id number]]  (Abnormal) Collected: 01/16/24 1025    Lab Status: Preliminary result Specimen: Blood from Arm, Right Updated: 01/17/24 0657     Blood Culture Escherichia coli     Isolated from Aerobic and Anaerobic Bottles     Gram Stain Anaerobic Bottle Gram negative bacilli      Aerobic Bottle Gram negative bacilli    Blood Culture ID, PCR - Blood, Arm, Right [[id number]]  (Abnormal) Collected: 01/16/24 1025    Lab Status: Final result Specimen: Blood from Arm, Right Updated: 01/16/24 2050     BCID, PCR Escherichia coli. Identification by BCID2 PCR.     BOTTLE TYPE Anaerobic Bottle    Narrative:      No resistance genes detected.    Blood Culture - Blood, Arm, Right [[id number]]  (Abnormal) Collected: 01/16/24 1020    Lab Status: Preliminary result Specimen: Blood from Arm, Right Updated: 01/17/24 0657     Blood Culture Escherichia coli     Isolated from Aerobic and Anaerobic Bottles     Gram Stain Anaerobic Bottle Gram negative bacilli      Aerobic Bottle Gram negative bacilli    Narrative:      Less than seven (7) mL's of blood was collected.  Insufficient quantity may yield false negative results.              Radiology:  XR Chest 1 View    Result Date: 1/16/2024  Impression: Chronic findings. No acute process. Electronically Signed: Georgina Ramsey MD  1/16/2024 11:09 AM EST  Workstation ID: [id number]       DISCUSSION:  89 y.o. female with history of heart failure and chronic leg edema admitted with sepsis secondary to right lower extremity cellulitis.        PROBLEM LIST:   -- Sepsis, due to right lower extremity cellulitis and bacteremia.  Fever, tachycardia, hypotension, hypoxia, encephalopathy, lactic acidosis, leukocytosis, acute kidney injury.  Improving.  -- Nonpurulent right lower extremity cellulitis, portal of entry right anterior shin abrasion.  No purulence.  No crepitus.  -- E. coli bacteremia, negative CTX-M gene for ESBL by BCID.  Would be unusual for cellulitis from E.  coli but possible.  No urinalysis at presentation.  Denies abdominal complaints.  LFTs ok.  -- Chronic leg edema.  -- Acute on chronic heart failure with preserved ejection fraction.  -- Allergy to penicillin as a child, unknown reaction.    PLAN:  -- Given E. coli bacteremia, will proceed with abdominal ultrasound to rule out intra-abdominal process as cause though may simply be due to cellulitis  -- Ceftriaxone 2 g iv daily for nonpurulent cellulitis and E coli bacteremia  -- Continue linezolid 600 mg IV every 12 hours to decrease toxin formation from possible streptococcal infection  -- Assess for adverse drug reactions from antimicrobials.  -- Following vitals, exam, labs, and cultures.  -- Follow results of pending culture data and tailor antibiotics as appropriate.  -- Final plans pending clinical course.    Complex case.  Plan discussed with patient and family at bedside.    Dwain Kaur MD  1/17/2024  15:47 EST

## 2024-01-17 NOTE — NURSING NOTE
WOC consulted for cellulitis-limb assessment of reddening of right lower extremity    Patient presents with a small skin tear like injury to her right medial lower leg.  Patient is being worked up for cellulitis.  Right lower extremity is edematous, red and warm.  No active drainage at this time.  Patient's daughter states that she does wear compression therapy at her facility.    ID following and managing antibiotics recommendations.    Will consult PT wound care to evaluate for compression therapy as patient's daughter states that this therapy has been utilized in the past to help with her chronic lower extremity edema.    Pressure injury prevention during hospitalization.    Sign off.    Darek Meade RN, BSN, CCRN, CWOCN  Wound, Ostomy and Continence (WOC) Department  [facility]

## 2024-01-17 NOTE — PROGRESS NOTES
[facility]  PROGRESS NOTE    Zoila Nava   [id number]   7/13/1933    LOS: 1 day .  Date of Admission: 1/16/2024  Date of Service: 01/17/24    Primary Care Physician: Arnoldo Oneil MD    Chief Complaint: f/u CHF    Problem List:   Acute on chronic heart failure with preserved ejection fraction (HFpEF)    Acute on chronic respiratory failure with hypoxia    Cellulitis of right lower extremity    Subjective      Patient lying in bed with daughter at bedside. Patient states her breathing is comfortable on 6L NC. Denies chest pain, palpitations, lightheadedness and dizziness. Right lower extremity is still red and warm to touch.     ROS  All systems have been reviewed and are negative with the exception of those mentioned in the HPI and problem list above.     Objective   Vital Sign Min/Max for last 24 hours  Temp  Min: 98.1 °F (36.7 °C)  Max: 100.5 °F (38.1 °C)   BP  Min: 71/38  Max: 162/133   Pulse  Min: 80  Max: 126   Resp  Min: 16  Max: 44   SpO2  Min: 89 %  Max: 100 %   No data recorded   Weight  Min: 64 kg (141 lb 3.2 oz)  Max: 64 kg (141 lb 3.2 oz)     Physical Exam:  GENERAL: Alert, cooperative, in no acute distress.   HEENT: Normocephalic, no jugular venous distention  HEART: Regular rhythm, normal rate, and no murmurs, gallops, or rubs.   LUNGS: Clear to auscultation bilaterally. No wheezing, rales or rhonchi. On 6 L NC  NEUROLOGIC: No focal abnormalities involving strength or sensation are noted.   EXTREMITIES: No clubbing, cyanosis, or edema noted. Right lower extremity red and warm to touch.     Results:  Results from last 7 days   Lab Units 01/17/24  0700 01/16/24  1024   WBC 10*3/mm3 22.83* 16.69*   HEMOGLOBIN g/dL 11.5* 13.2   HEMATOCRIT % 35.2 40.6   PLATELETS 10*3/mm3 170 262     Results from last 7 days   Lab Units 01/16/24  1024   SODIUM mmol/L 140   POTASSIUM mmol/L 4.4   CHLORIDE mmol/L 100   CO2 mmol/L 26.0   BUN mg/dL 33*   CREATININE mg/dL  1.36*   GLUCOSE mg/dL 160*      Lab Results   Component Value Date    TRIG 51 04/04/2016    HDL 72 (H) 04/04/2016    LDL 92 04/04/2016    AST 34 (H) 01/16/2024    ALT 24 01/16/2024                         Results from last 7 days   Lab Units 01/16/24  1024   HSTROP T ng/L 51*     Results from last 7 days   Lab Units 01/16/24  1024   PROBNP pg/mL 10,191.0*       Intake/Output Summary (Last 24 hours) at 1/17/2024 0838  Last data filed at 1/17/2024 0353  Gross per 24 hour   Intake 350 ml   Output 650 ml   Net -300 ml       EKG/TELE: NSR    Radiology Data:   XR Chest 1 View    Result Date: 1/16/2024  Impression: Chronic findings. No acute process. Electronically Signed: Georgina Ramsey MD  1/16/2024 11:09 AM EST  Workstation ID: [id number]    Results for orders placed during the hospital encounter of 04/04/23    Adult Transthoracic Echo Complete W/ Cont if Necessary Per Protocol    Interpretation Summary    Left ventricular systolic function is normal. Left ventricular ejection fraction appears to be 51 - 55%.    Mildly reduced right ventricular systolic function noted.    The left atrial cavity is dilated.    The right atrial cavity is dilated.    There is moderate calcification of the aortic valve.    Mild to moderate aortic valve regurgitation is present.    Mild mitral valve regurgitation is present.    Mild tricuspid valve regurgitation is present.     Current Medications:  aspirin, 81 mg, Oral, Daily  [START ON 1/18/2024] bumetanide, 2 mg, Oral, Daily  ceFAZolin, 1,000 mg, Intravenous, Q8H  empagliflozin, 10 mg, Oral, Daily  ipratropium-albuterol, 3 mL, Nebulization, 4x Daily - RT  levothyroxine, 125 mcg, Oral, Q AM  Linezolid, 600 mg, Intravenous, Q12H  magnesium oxide, 400 mg, Oral, Daily  midodrine, 10 mg, Oral, TID AC  pantoprazole, 40 mg, Oral, Daily  pharmacy consult - MTM, , Does not apply, Daily  pregabalin, 150 mg, Oral, Daily  propranolol, 20 mg, Oral, Q8H  rivaroxaban, 15 mg, Oral, Daily With  Dinner  rivastigmine, 1 patch, Transdermal, Daily  senna-docusate sodium, 2 tablet, Oral, BID  sodium chloride, 10 mL, Intravenous, Q12H  tamsulosin, 0.4 mg, Oral, Daily      nitroglycerin, 5-200 mcg/min, Last Rate: Stopped (01/16/24 1220)      Assessment and Plan:   Acute on chronic HFpEF   Elevated proBNP of 10,000  IV Bumex 2 mg x 1 in the ED.   Most recent echo 4/2023 with EF 51-55%, mildly reduced RV systolc function, dilated LA an RA, mild to moderate AR, mild MR, mild TR.   Right LE cellulitis, Hypotension, possible sepsis  Acute on chronic respiratory failure   CKD stage III    - Bumex 2 mg IV today and transition to po dosing on Thursday. Monitor renal function and electrolytes closely. Strict I's and O's.   - Repeat echocardiogram pending   - Venous duplex right LE pending   - Hold off restarting Jardiance (10mg daily) until infection clears.    - Continue midodrine to 10 mg three times a day for hypotension.   - Not on ACE/ARB/ARNI/MRA secondary to hypotension.     Electronically signed by TONIE Nicole, 01/17/24, 8:38 AM EST.     Please note that portions of this note were dictated utilizing Dragon dictation.

## 2024-01-17 NOTE — PROGRESS NOTES
[facility] Medicine Services  PROGRESS NOTE    Patient Name: Zoila Nava  : 1933  MRN: [id number]    Date of Admission: 2024  Primary Care Physician: Arnoldo Oneil MD    Subjective   Subjective     CC:  SOA     HPI:  Sleeping, arouses to voice. NAD. No pain. States breathing is much improved since admission and is diuresing well. Currently resting comfortably in bed on 4 LNC, down from NRB mask. Thinks the swelling in her right leg has improved as well.       Objective   Objective     Vital Signs:   Temp:  [98.1 °F (36.7 °C)-100.4 °F (38 °C)] 98.6 °F (37 °C)  Heart Rate:  [] 81  Resp:  [16-24] 18  BP: ()/(56-73) 90/56  Flow (L/min):  [4-6] 4     Physical Exam:  Constitutional: No acute distress, awake, alert, elderly/ frail   HENT: NCAT, mucous membranes moist  Respiratory: Clear to auscultation bilaterally, respiratory effort normal on 4LNC satting 98%  Cardiovascular: RRR, no murmurs, rubs, or gallops  Gastrointestinal: Positive bowel sounds, soft, nontender, nondistended  Musculoskeletal: 1-2+ BLE   Psychiatric: Appropriate affect, cooperative  Neurologic: Oriented x 3, RICE, speech clear  Skin: RLE with erythema/ increased warmth and edema from medial right thigh/ and circumferential knee down to foot without induration or drainage     Results Reviewed:  LAB RESULTS:      Lab 24  0700 24  1335 24  1024   WBC 22.83*  --  16.69*   HEMOGLOBIN 11.5*  --  13.2   HEMATOCRIT 35.2  --  40.6   PLATELETS 170  --  262   NEUTROS ABS 20.30*  --  15.63*   IMMATURE GRANS (ABS) 0.25*  --  0.08*   LYMPHS ABS 1.13  --  0.58*   MONOS ABS 1.08*  --  0.27   EOS ABS 0.02  --  0.11   MCV 92.4  --  93.3   PROCALCITONIN  --   --  0.12   LACTATE  --  2.0 4.3*         Lab 24  0932 24  1024   SODIUM 139 140   POTASSIUM 3.4* 4.4   CHLORIDE 101 100   CO2 25.0 26.0   ANION GAP 13.0 14.0   BUN 34* 33*   CREATININE 1.43* 1.36*   EGFR 34.9* 37.1*    GLUCOSE 151* 160*   CALCIUM 8.0* 9.0   MAGNESIUM  --  1.9   PHOSPHORUS  --  3.0         Lab 01/16/24  1024   TOTAL PROTEIN 6.8   ALBUMIN 4.2   GLOBULIN 2.6   ALT (SGPT) 24   AST (SGOT) 34*   BILIRUBIN 0.8   ALK PHOS 94         Lab 01/16/24  1024   PROBNP 10,191.0*   HSTROP T 51*         Lab 01/17/24  0932   CHOLESTEROL 110   LDL CHOL 46   HDL CHOL 48   TRIGLYCERIDES 78             Lab 01/16/24  1059   PH, ARTERIAL 7.523*   PCO2, ARTERIAL 28.4*   PO2 .0*   FIO2 50   HCO3 ART 23.3   BASE EXCESS ART 1.4   CARBOXYHEMOGLOBIN 1.2     Brief Urine Lab Results  (Last result in the past 365 days)        Color   Clarity   Blood   Leuk Est   Nitrite   Protein   CREAT   Urine HCG        10/28/23 2320 Yellow   Clear   Negative   Negative   Negative   Negative                   Microbiology Results Abnormal       Procedure Component Value - Date/Time    COVID PRE-OP / PRE-PROCEDURE SCREENING ORDER (NO ISOLATION) - Swab, Nasopharynx [[id number]]  (Normal) Collected: 01/16/24 1040    Lab Status: Final result Specimen: Swab from Nasopharynx Updated: 01/16/24 1116    Narrative:      The following orders were created for panel order COVID PRE-OP / PRE-PROCEDURE SCREENING ORDER (NO ISOLATION) - Swab, Nasopharynx.  Procedure                               Abnormality         Status                     ---------                               -----------         ------                     COVID-19 and FLU A/B PCR...[[id number]]  Normal              Final result                 Please view results for these tests on the individual orders.    COVID-19 and FLU A/B PCR, 1 HR TAT - Swab, Nasopharynx [[id number]]  (Normal) Collected: 01/16/24 1040    Lab Status: Final result Specimen: Swab from Nasopharynx Updated: 01/16/24 1116     COVID19 Not Detected     Influenza A PCR Not Detected     Influenza B PCR Not Detected    Narrative:      Fact sheet for providers: https://www.fda.gov/media/785042/download    Fact sheet for patients:  https://www.fda.gov/media/232850/download    Test performed by PCR.            XR Chest 1 View    Result Date: 1/16/2024  XR CHEST 1 VW Date of Exam: 1/16/2024 10:21 AM EST Indication: SOA triage protocol Comparison: 10/24/2023. Findings: There is continued moderately severe cardiomegaly. There is stable chronic infiltrate in the left lower lobe. Mild chronic infiltrates elsewhere in the bilateral lung fields also are again noted. No new infiltrates or effusions are identified.     Impression: Impression: Chronic findings. No acute process. Electronically Signed: Georgina Ramsey MD  1/16/2024 11:09 AM EST  Workstation ID: [id number]     Results for orders placed during the hospital encounter of 04/04/23    Adult Transthoracic Echo Complete W/ Cont if Necessary Per Protocol    Interpretation Summary    Left ventricular systolic function is normal. Left ventricular ejection fraction appears to be 51 - 55%.    Mildly reduced right ventricular systolic function noted.    The left atrial cavity is dilated.    The right atrial cavity is dilated.    There is moderate calcification of the aortic valve.    Mild to moderate aortic valve regurgitation is present.    Mild mitral valve regurgitation is present.    Mild tricuspid valve regurgitation is present.      Current medications:  Scheduled Meds:aspirin, 81 mg, Oral, Daily  [START ON 1/18/2024] bumetanide, 2 mg, Oral, Daily  cefTRIAXone, 2,000 mg, Intravenous, Q24H  empagliflozin, 10 mg, Oral, Daily  ipratropium-albuterol, 3 mL, Nebulization, 4x Daily - RT  levothyroxine, 125 mcg, Oral, Q AM  Linezolid, 600 mg, Intravenous, Q12H  magnesium oxide, 400 mg, Oral, Daily  midodrine, 10 mg, Oral, TID AC  pantoprazole, 40 mg, Oral, Daily  pharmacy consult - MTM, , Does not apply, Daily  pregabalin, 150 mg, Oral, Daily  propranolol, 20 mg, Oral, Q8H  rivaroxaban, 15 mg, Oral, Daily With Dinner  rivastigmine, 1 patch, Transdermal, Daily  senna-docusate sodium, 2 tablet, Oral,  BID  sodium chloride, 10 mL, Intravenous, Q12H  tamsulosin, 0.4 mg, Oral, Daily      Continuous Infusions:nitroglycerin, 5-200 mcg/min, Last Rate: Stopped (01/16/24 1220)      PRN Meds:.  acetaminophen **OR** acetaminophen **OR** acetaminophen    senna-docusate sodium **AND** polyethylene glycol **AND** bisacodyl **AND** bisacodyl    calcium carbonate    hydrALAZINE    ondansetron ODT **OR** ondansetron    Potassium Replacement - Follow Nurse / BPA Driven Protocol    sodium chloride    sodium chloride    sodium chloride    Assessment & Plan   Assessment & Plan     Active Hospital Problems    Diagnosis  POA    **Acute on chronic heart failure with preserved ejection fraction (HFpEF) [I50.33]  Yes    Acute on chronic respiratory failure with hypoxia [J96.21]  Yes    Cellulitis of right lower extremity [L03.115]  Yes      Resolved Hospital Problems   No resolved problems to display.        Brief Hospital Course to date:  Zoila Nava is a 89 y.o. female  with past medical history significant for chronic diastolic heart failure, chronic respiratory failure on 2 L of nasal cannula, paroxysmal atrial fibrillation, valvular heart disease, mildly reduced right systolic function, dementia.  Patient was brought to the emergency room for worsening edema of lower extremities, wound and redness of right lower extremity, worsening shortness of breath.     **Acute on chronic DHF  -- Patient was diuresed at the emergency room   --Cardiology consultation   --ECHO pending   -- Cont diuresis per cardiology  -venous duplex pending      **Cellulitis of right lower extremity   **Bacteremia  --  Patient was started on IV antibiotics at the emergency room and will continue that.    --ID consultation and changed IV antibiotics to Cefazolin and Linezolid   --labs in AM      **Atrial fibrillation, on Xarelto, will continue      **Hypothyroidism, will continue home Synthroid     **HTN  --hypotensive likely due to above (sepsis)  --  Will continue midodrine TID   -- Continue propranolol       Expected Discharge Location and Transportation: TBD, likely return to shelter   Expected Discharge   Expected Discharge Date: 1/22/2024; Expected Discharge Time:      DVT prophylaxis:  Medical DVT prophylaxis orders are present.     AM-PAC 6 Clicks Score (PT): 16 (01/17/24 1241)    CODE STATUS:   Code Status and Medical Interventions:   Ordered at: 01/16/24 1233     Medical Intervention Limits:    NO intubation (DNI)     Code Status (Patient has no pulse and is not breathing):    No CPR (Do Not Attempt to Resuscitate)     Medical Interventions (Patient has pulse or is breathing):    Limited Support       Theresa Moseley, APRN  01/17/24

## 2024-01-18 NOTE — PLAN OF CARE
Goal Outcome Evaluation:  Plan of Care Reviewed With: patient, caregiver           Outcome Evaluation: Based on PT evaluation, pt is below baseline level of mobility. Pt demonstrating decreased activity tolerance, strength, and balance warranting IP PT services to facilitate return to PLOF. Recommend return to previous facility with  PT services following d/c.      Anticipated Discharge Disposition (PT): assisted living, home with home health

## 2024-01-18 NOTE — PLAN OF CARE
Goal Outcome Evaluation:              Outcome Evaluation: Oxygen weaned to 3LNC overnight. Patient denies pain. Patient went into Afib with RVR and was hypotensive this shift. RRT called and Cardiology paged. Total 1.5 L bolus normal saline given per Hospitalist orders. Amiodarone IV to PO protocol ordered per Taylor Duque APRN/Cardiology. RN to notify provider for MAP <60 per MD order. Continue POC.

## 2024-01-18 NOTE — SIGNIFICANT NOTE
Rapid response called for hypotension as well as tachycardia.  Patient found to be in A-fib with RVR.  Cardiology was notified.  Patient given an additional 500 mL bolus of normal saline and cardiology recommended amiodarone.  Blood pressure initially improved but after the bolus was done, blood pressure trended back down.  Repeat 500 mL bolus x 2.  Total of 1.5L bolus.  O2 requirement stable at 3 L.  Patient not complaining of any shortness of breath.  HR improved with the amiodarone.    BP remained on the low side.  Labs this AM significant for inc in BNP, but other labs overall stable.  Pt remains asymptomatic with stable O2 requirement.  Spoke with ICU attending--recommended continue monitoring of BP and clinical status.  Would not recommend pressors at this time since she was overall not having any worsening SOB or hypoxia.  EF on ECHO from 1/17-EF 55-60% with diastolic dysfunction.

## 2024-01-18 NOTE — THERAPY EVALUATION
Patient Name: Zoila Nava  : 1933    MRN: [id number]                              Today's Date: 2024       Admit Date: 2024    Visit Dx:     ICD-10-CM ICD-9-CM   1. Acute on chronic congestive heart failure, unspecified heart failure type  I50.9 428.0   2. Cellulitis of right lower extremity  L03.115 682.6   3. Elevated blood pressure reading with diagnosis of hypertension  I10 401.9   4. History of pulmonary hypertension  Z86.79 V12.59   5. Hypoxemia requiring supplemental oxygen  R09.02 799.02    Z99.81      Patient Active Problem List   Diagnosis    Allergic rhinitis    Hyperlipidemia    Hypertension    Hypocalcemia    Hypothyroidism    Neuropathy    NUD (nonulcer dyspepsia)    Painful knee    Pernicious anemia    Tremor    Vitamin B12 deficiency    Spondylolisthesis of cervical region    Graves' ophthalmopathy    Anemia    Memory impairment of gradual onset    Ascending aortic aneurysm    Stage 3 chronic kidney disease    Pulmonary hypertension    Chronic heart failure with preserved ejection fraction    CHF (congestive heart failure), NYHA class I, acute on chronic, diastolic    CHF (congestive heart failure)    Non-rheumatic aortic stenosis    Non-rheumatic aortic regurgitation    Hypokalemia    Carpal tunnel syndrome    Essential tremor    Gastroesophageal reflux disease    Nausea and vomiting    Skin sensation disturbance    Spinal cord disease    Urge incontinence of urine    Urinary urgency    Acute chest pain    (HFpEF) heart failure with preserved ejection fraction    Nocturnal hypoxia    Confusion    Concussion without loss of consciousness    Hypotension    AMS (altered mental status)    Multiple open wounds of lower extremity, unspecified laterality, subsequent encounter    Acute on chronic heart failure with preserved ejection fraction (HFpEF)    Acute on chronic respiratory failure with hypoxia    Cellulitis of right lower extremity     Past Medical History:   Diagnosis  Date    Anemia     Arthritis     Asthma     Cataract     Difficulty breathing     Chronic    GERD (gastroesophageal reflux disease)     Graves' ophthalmopathy     Hoarseness     Hypertension     Hypertensive heart disease with acute on chronic diastolic congestive heart failure 10/11/2021    Pulmonary hypertension 10/11/2021    Spondylolisthesis of cervical region     Vitamin B12 deficiency      Past Surgical History:   Procedure Laterality Date    CERVICAL LAMINECTOMY      CHOLECYSTECTOMY      OTHER SURGICAL HISTORY Right     Neuroplasty Median Nerve at Carpal Tunnel    THYROID SURGERY      TOTAL KNEE ARTHROPLASTY Left 09/29/2014    Dr Colon      General Information       Row Name 01/18/24 0939          OT Time and Intention    Document Type evaluation  -AF     Mode of Treatment occupational therapy  -AF       Row Name 01/18/24 0939          General Information    Patient Profile Reviewed yes  -AF     Prior Level of Function independent:;grooming;dressing;feeding;mod assist:;bathing;dependent:;home management;cooking;cleaning;min assist:;all household mobility;transfer  -AF     Existing Precautions/Restrictions fall;oxygen therapy device and L/min  -AF     Barriers to Rehab medically complex;previous functional deficit;hearing deficit  -AF       Row Name 01/18/24 0939          Occupational Profile    Environmental Supports and Barriers (Occupational Profile) Pt resides in [facility], she has person care and receives PT/OT through . Daughter reported pt is indp. w/ dressing and grooming but requires assistance with bathing. She utilizes a walker at baseline and uses a w/c for longer distances.  -AF       Row Name 01/18/24 0939          Living Environment    People in Home facility resident  -AF       Row Name 01/18/24 0939          Home Main Entrance    Number of Stairs, Main Entrance none  -AF     Stair Railings, Main Entrance none  -AF       Row Name 01/18/24 0939          Stairs Within Home, Primary    Number  of Stairs, Within Home, Primary none  -AF     Stair Railings, Within Home, Primary none  -AF       Row Name 01/18/24 0939          Cognition    Orientation Status (Cognition) oriented x 4  -AF       Row Name 01/18/24 0939          Safety Issues, Functional Mobility    Safety Issues Affecting Function (Mobility) sequencing abilities  -AF     Impairments Affecting Function (Mobility) balance;coordination;endurance/activity tolerance;postural/trunk control;sensation/sensory awareness;shortness of breath;strength  -AF               User Key  (r) = Recorded By, (t) = Taken By, (c) = Cosigned By      Initials Name Provider Type    AF Kat Cruz OT Occupational Therapist                     Mobility/ADL's       Row Name 01/18/24 0941          Bed Mobility    Bed Mobility supine-sit  -AF     Supine-Sit [place] (Bed Mobility) supervision  -AF     Assistive Device (Bed Mobility) head of bed elevated;bed rails  -AF       Row Name 01/18/24 0941          Transfers    Transfers sit-stand transfer;stand-sit transfer  -AF       Row Name 01/18/24 0941          Sit-Stand Transfer    Sit-Stand [place] (Transfers) contact guard  -AF     Assistive Device (Sit-Stand Transfers) other (see comments)  BUE support  -AF       Row Name 01/18/24 0941          Stand-Sit Transfer    Stand-Sit [place] (Transfers) contact guard  -AF     Assistive Device (Stand-Sit Transfers) other (see comments)  BUE support  -AF       Row Name 01/18/24 0941          Functional Mobility    Functional Mobility- Comment Defer to PT.  -AF       Row Name 01/18/24 0941          Activities of Daily Living    BADL Assessment/Intervention lower body dressing;grooming  -AF       Row Name 01/18/24 0941          Lower Body Dressing Assessment/Training    [place] Level (Lower Body Dressing) don;socks;dependent (less than 25% patient effort)  -AF     Position (Lower Body Dressing) supine  -AF       Row Name 01/18/24 0941          Grooming  Assessment/Training    [place] Level (Grooming) hair care, combing/brushing;wash face, hands;set up  -AF     Position (Grooming) supported sitting  -AF               User Key  (r) = Recorded By, (t) = Taken By, (c) = Cosigned By      Initials Name Provider Type    AF Kat Cruz OT Occupational Therapist                   Obj/Interventions       Row Name 01/18/24 0942          Sensory Assessment (Somatosensory)    Sensory Assessment (Somatosensory) UE sensation intact  -AF     Sensory Assessment Daughter reported she does ocassionally have numbess/tingling in hands.  -AF       Row Name 01/18/24 0942          Vision Assessment/Intervention    Visual Impairment/Limitations WFL  -AF       Row Name 01/18/24 0942          Range of Motion Comprehensive    General Range of Motion bilateral upper extremity ROM WFL  -AF       Row Name 01/18/24 0942          Strength Comprehensive (MMT)    General Manual Muscle Testing (MMT) Assessment upper extremity strength deficits identified  -AF     Comment, General Manual Muscle Testing (MMT) Assessment BUE grossly 3+/5  -AF       Row Name 01/18/24 0942          Motor Skills    Motor Skills functional endurance  -AF     Functional Endurance Fair endurance sitting EOB. HR steady at 75-84 during movement.  -AF       Row Name 01/18/24 0942          Balance    Balance Assessment sitting static balance;sitting dynamic balance;sit to stand dynamic balance;standing static balance  -AF     Static Sitting Balance supervision  -AF     Dynamic Sitting Balance contact guard  -AF     Position, Sitting Balance unsupported;sitting edge of bed;supported;sitting in chair  -AF     Sit to Stand Dynamic Balance contact guard  -AF     Static Standing Balance contact guard  -AF     Position/Device Used, Standing Balance supported;other (see comments)  BUE support  -AF     Balance Interventions sitting;standing;sit to stand;supported;static;occupation based/functional task  -AF               User Key   (r) = Recorded By, (t) = Taken By, (c) = Cosigned By      Initials Name Provider Type    AF Kat Cruz OT Occupational Therapist                   Goals/Plan       Row Name 01/18/24 0947          Transfer Goal 1 (OT)    Activity/Assistive Device (Transfer Goal 1, OT) sit-to-stand/stand-to-sit;toilet;commode, bedside without drop arms;bed-to-chair/chair-to-bed  -AF     [place] Level/Cues Needed (Transfer Goal 1, OT) contact guard required  -AF     Time Frame (Transfer Goal 1, OT) long term goal (LTG);by discharge  -AF       Row Name 01/18/24 0947          Dressing Goal 1 (OT)    Activity/Device (Dressing Goal 1, OT) upper body dressing;lower body dressing  -AF     [place]ise/Cues Needed (Dressing Goal 1, OT) minimum assist (75% or more patient effort)  -AF     Time Frame (Dressing Goal 1, OT) long term goal (LTG);by discharge  -AF       Row Name 01/18/24 0947          Toileting Goal 1 (OT)    Activity/Device (Toileting Goal 1, OT) adjust/manage clothing;perform perineal hygiene  -AF     [place]ise Level/Cues Needed (Toileting Goal 1, OT) minimum assist (75% or more patient effort)  -AF     Time Frame (Toileting Goal 1, OT) long term goal (LTG);by discharge  -AF       Row Name 01/18/24 0965          Therapy Assessment/Plan (OT)    Planned Therapy Interventions (OT) activity tolerance training;adaptive equipment training;BADL retraining;functional balance retraining;occupation/activity based interventions;patient/caregiver education/training;ROM/therapeutic exercise;strengthening exercise;transfer/mobility retraining  -AF               User Key  (r) = Recorded By, (t) = Taken By, (c) = Cosigned By      Initials Name Provider Type    AF Kat Cruz OT Occupational Therapist                   Clinical Impression       Row Name 01/18/24 0944          Pain Assessment    Pretreatment Pain Rating 0/10 - no pain  -AF     Posttreatment Pain Rating 0/10 - no pain  -AF     Pre/Posttreatment Pain Comment Pt  tolerated.  -AF       Row Name 01/18/24 0944          Plan of Care Review    Plan of Care Reviewed With patient;daughter  -AF     Outcome Evaluation Pt presents with generalized weakness and decreased activity tolerance significantly impacting ADL and fxnl mobility indp. Rec. IPOT to address deficits and support return to baseline. Rec. return to [facility] w/  OT upon DC.  -AF       Row Name 01/18/24 0944          Therapy Assessment/Plan (OT)    Patient/Family Therapy Goal Statement (OT) Return to PLOF  -AF     Rehab Potential (OT) good, to achieve stated therapy goals  -AF     Criteria for Skilled Therapeutic Interventions Met (OT) yes;meets criteria;skilled treatment is necessary  -AF     Therapy Frequency (OT) daily  -AF       Row Name 01/18/24 0944          Therapy Plan Review/Discharge Plan (OT)    Anticipated Discharge Disposition (OT) assisted living;home with outpatient therapy services  [facility] w/ [facility] OT.  -AF       Row Name 01/18/24 0944          Vital Signs    Pre Systolic BP Rehab 105  -AF     Pre Treatment Diastolic BP 91  -AF     Intra Systolic BP Rehab 103  -AF     Intra Treatment Diastolic BP 60  -AF     Post Systolic BP Rehab 107  -AF     Post Treatment Diastolic BP 62  -AF     Pretreatment Heart Rate (beats/min) 74  -AF     Intratreatment Heart Rate (beats/min) 84  -AF     Posttreatment Heart Rate (beats/min) 72  -AF     Pre SpO2 (%) 99  -AF     O2 Delivery Pre Treatment nasal cannula  -AF     Intra SpO2 (%) 95  -AF     O2 Delivery Intra Treatment nasal cannula  -AF     Post SpO2 (%) 98  -AF     O2 Delivery Post Treatment nasal cannula  -AF     Pre Patient Position Supine  -AF     Intra Patient Position Standing  -AF     Post Patient Position Sitting  -AF       Row Name 01/18/24 0944          Positioning and Restraints    Pre-Treatment Position in bed  -AF     Post Treatment Position chair  -AF     In Chair reclined;waffle cushion;call light within reach;with nsg;encouraged to call for assist;exit  alarm on;with family/caregiver;legs elevated  -AF               User Key  (r) = Recorded By, (t) = Taken By, (c) = Cosigned By      Initials Name Provider Type    Kat Kapoor OT Occupational Therapist                   Outcome Measures       Row Name 01/18/24 0948          How much help from another is currently needed...    Putting on and taking off regular lower body clothing? 2  -AF     Bathing (including washing, rinsing, and drying) 2  -AF     Toileting (which includes using toilet bed pan or urinal) 2  -AF     Putting on and taking off regular upper body clothing 3  -AF     Taking care of personal grooming (such as brushing teeth) 3  -AF     Eating meals 4  -AF     AM-PAC 6 Clicks Score (OT) 16  -AF       Row Name 01/18/24 0948          Functional Assessment    Outcome Measure Options AM-PAC 6 Clicks Daily Activity (OT)  -AF               User Key  (r) = Recorded By, (t) = Taken By, (c) = Cosigned By      Initials Name Provider Type    Kat Kapoor OT Occupational Therapist                    Occupational Therapy Education       Title: PT OT SLP Therapies (In Progress)       Topic: Occupational Therapy (In Progress)       Point: ADL training (Done)       Description:   Instruct learner(s) on proper safety adaptation and remediation techniques during self care or transfers.   Instruct in proper use of assistive devices.                  Learning Progress Summary             Patient Acceptance, E,TB, VU by AF at 1/18/2024 0948   Family Acceptance, E,TB, VU by AF at 1/18/2024 0948                         Point: Home exercise program (Not Started)       Description:   Instruct learner(s) on appropriate technique for monitoring, assisting and/or progressing therapeutic exercises/activities.                  Learner Progress:  Not documented in this visit.              Point: Precautions (Not Started)       Description:   Instruct learner(s) on prescribed precautions during self-care and functional  transfers.                  Learner Progress:  Not documented in this visit.              Point: Body mechanics (Done)       Description:   Instruct learner(s) on proper positioning and spine alignment during self-care, functional mobility activities and/or exercises.                  Learning Progress Summary             Patient Acceptance, E,TB, VU by AF at 1/18/2024 0948   Family Acceptance, E,TB, VU by AF at 1/18/2024 0948                                         User Key       Initials Effective Dates Name Provider Type Discipline     08/15/23 -  Kat Cruz OT Occupational Therapist OT                  OT Recommendation and Plan  Planned Therapy Interventions (OT): activity tolerance training, adaptive equipment training, BADL retraining, functional balance retraining, occupation/activity based interventions, patient/caregiver education/training, ROM/therapeutic exercise, strengthening exercise, transfer/mobility retraining  Therapy Frequency (OT): daily  Plan of Care Review  Plan of Care Reviewed With: patient, daughter  Outcome Evaluation: Pt presents with generalized weakness and decreased activity tolerance significantly impacting ADL and fxnl mobility indp. Rec. IPOT to address deficits and support return to baseline. Rec. return to [facility] w/  OT upon DC.     Time Calculation:   Evaluation Complexity (OT)  Review Occupational Profile/Medical/Therapy History Complexity: expanded/moderate complexity  Assessment, Occupational Performance/Identification of Deficit Complexity: 3-5 performance deficits  Clinical Decision Making Complexity (OT): detailed assessment/moderate complexity  Overall Complexity of Evaluation (OT): moderate complexity     Time Calculation- OT       Row Name 01/18/24 0949             Time Calculation- OT    OT Start Time 0835  -AF      OT Received On 01/18/24  -AF      OT Goal Re-Cert Due Date 01/28/24  -AF         Timed Charges    32521 - OT Therapeutic Activity Minutes 15  -AF          Untimed Charges    OT Eval/Re-eval Minutes 25  -AF         Total Minutes    Timed Charges Total Minutes 15  -AF      Untimed Charges Total Minutes 25  -AF       Total Minutes 40  -AF                User Key  (r) = Recorded By, (t) = Taken By, (c) = Cosigned By      Initials Name Provider Type    AF Kat Cruz OT Occupational Therapist                  Therapy Charges for Today       Code Description Service Date Service Provider Modifiers Qty    [id number]  OT EVAL MOD COMPLEXITY 2 1/18/2024 Kat Cruz OT GO 1    [id number]  OT THERAPEUTIC ACT EA 15 MIN 1/18/2024 Kat Cruz OT GO 1                 Kat Cruz OT  1/18/2024

## 2024-01-18 NOTE — PLAN OF CARE
Goal Outcome Evaluation:         Patient is on 4L Humidified Nasal Cannula, currently NPO for US Abdomen Complete procedure which should happen tonight, she is in A.fib, and Aox3. VSS - will continue POC.

## 2024-01-18 NOTE — THERAPY RE-EVALUATION
Acute Care - Wound/Debridement Initial Evaluation  [facility]     Patient Name: Zoila Nava  : 1933  MRN: [id number]  Today's Date: 2024                Admit Date: 2024    Visit Dx:    ICD-10-CM ICD-9-CM   1. Acute on chronic congestive heart failure, unspecified heart failure type  I50.9 428.0   2. Cellulitis of right lower extremity  L03.115 682.6   3. Elevated blood pressure reading with diagnosis of hypertension  I10 401.9   4. History of pulmonary hypertension  Z86.79 V12.59   5. Hypoxemia requiring supplemental oxygen  R09.02 799.02    Z99.81        Patient Active Problem List   Diagnosis    Allergic rhinitis    Hyperlipidemia    Hypertension    Hypocalcemia    Hypothyroidism    Neuropathy    NUD (nonulcer dyspepsia)    Painful knee    Pernicious anemia    Tremor    Vitamin B12 deficiency    Spondylolisthesis of cervical region    Graves' ophthalmopathy    Anemia    Memory impairment of gradual onset    Ascending aortic aneurysm    Stage 3 chronic kidney disease    Pulmonary hypertension    Chronic heart failure with preserved ejection fraction    CHF (congestive heart failure), NYHA class I, acute on chronic, diastolic    CHF (congestive heart failure)    Non-rheumatic aortic stenosis    Non-rheumatic aortic regurgitation    Hypokalemia    Carpal tunnel syndrome    Essential tremor    Gastroesophageal reflux disease    Nausea and vomiting    Skin sensation disturbance    Spinal cord disease    Urge incontinence of urine    Urinary urgency    Acute chest pain    (HFpEF) heart failure with preserved ejection fraction    Nocturnal hypoxia    Confusion    Concussion without loss of consciousness    Hypotension    AMS (altered mental status)    Multiple open wounds of lower extremity, unspecified laterality, subsequent encounter    Acute on chronic heart failure with preserved ejection fraction (HFpEF)    Acute on chronic respiratory failure with hypoxia    Cellulitis of right lower  extremity        Past Medical History:   Diagnosis Date    Anemia     Arthritis     Asthma     Cataract     Difficulty breathing     Chronic    GERD (gastroesophageal reflux disease)     Graves' ophthalmopathy     Hoarseness     Hypertension     Hypertensive heart disease with acute on chronic diastolic congestive heart failure 10/11/2021    Pulmonary hypertension 10/11/2021    Spondylolisthesis of cervical region     Vitamin B12 deficiency         Past Surgical History:   Procedure Laterality Date    CERVICAL LAMINECTOMY      CHOLECYSTECTOMY      OTHER SURGICAL HISTORY Right     Neuroplasty Median Nerve at Carpal Tunnel    THYROID SURGERY      TOTAL KNEE ARTHROPLASTY Left 09/29/2014    Dr Colon           Wound 10/24/23 2000 Right medial leg (Active)   Dressing Appearance open to air 01/18/24 1100   Base red 01/18/24 0921   Drainage Amount none 01/18/24 0921   Dressing Care open to air 01/18/24 0921   Periwound Care dry periwound area maintained 01/17/24 2023      Lymphedema       Row Name 01/18/24 1100             Lymphedema Edema Assessment    Ptting Edema Category By severity  -      Pitting Edema Moderate;Mild  R mod L mild  -         Skin Changes/Observations    Location/Assessment Lower Extremity  -      Lower Extremity Conditions right:;inflamed;left:;intact;clean;dry  -         Lymphedema Pulses/Capillary Refill    Lymphedema Pulses/Capillary Refill lower extremity pulses;capillary refill  -      Dorsalis Pedis Pulse right:;left:;+1 diminished  -      Capillary Refill lower extremity capillary refill  -      Lower Extremity Capillary Refill right:;left:;less than 3 seconds  -         Compression/Skin Care    Compression/Skin Care skin care;bandaging;wrapping location  -      Skin Care washed/dried;lotion applied  -MF      Wrapping Location lower extremity  -MF      Wrapping Location LE right:;foot to knee  -      Wrapping Comments unna boot applied in clamshell with kerlix and  Net[employer]es[employer]atrix Separations to secure.  -                User Key  (r) = Recorded By, (t) = Taken By, (c) = Cosigned By      Initials Name Provider Type    MF Basil Horvath, PT Physical Therapist                    WOUND DEBRIDEMENT                     PT Assessment (last 12 hours)       PT Evaluation and Treatment       Row Name 01/18/24 1100          Physical Therapy Time and Intention    Subjective Information complains of;weakness;fatigue  -     Document Type evaluation;therapy note (daily note);wound care  -     Mode of Treatment individual therapy;physical therapy  -       Row Name 01/18/24 1100          General Information    Patient Profile Reviewed yes  -     Patient Observations alert;cooperative;agree to therapy  -     Pertinent History of Current Functional Problem RLE edema with erythema  -     Risks Reviewed patient:;increased discomfort  -     Benefits Reviewed patient:;improve skin integrity  -     Barriers to Rehab medically complex;previous functional deficit;physical barrier  -       Row Name 01/18/24 1100          Pain    Pretreatment Pain Rating 0/10 - no pain  -     Posttreatment Pain Rating 0/10 - no pain  -       Row Name 01/18/24 1100          Cognition    Orientation Status (Cognition) oriented x 3  -       Row Name 01/18/24 1100          Wound 10/24/23 2000 Right medial leg    Wound - Properties Group Placement Date: 10/24/23  -KG Placement Time: 2000  -KG Present on Original Admission: Y  -KG Side: Right  -KG Orientation: medial  -KG Location: leg  -KG    Dressing Appearance open to air  -     Retired Wound - Properties Group Placement Date: 10/24/23  -KG Placement Time: 2000  -KG Present on Original Admission: Y  -KG Side: Right  -KG Orientation: medial  -KG Location: leg  -KG    Retired Wound - Properties Group Date first assessed: 10/24/23  -KG Time first assessed: 2000  -KG Present on Original Admission: Y  -KG Side: Right  -KG Location: leg  -KG      Row Name 01/18/24  1100          Coping    Observed Emotional State calm;cooperative  -     Verbalized Emotional State acceptance  -     Trust Relationship/Rapport care explained  -       Row Name 01/18/24 1100          Plan of Care Review    Plan of Care Reviewed With patient;daughter  -     Outcome Evaluation Pt presents with moderate RLE edema with erythema and pt c/o SOA.  Pt will benefit from light compression, but with multiple medical issues and increased c/o SOA, PT held significant compression and only applied unna boot with kerlix to help gently increase venous return to improve skin integrity and reduce LE edema.  PT will f/u in 2-3 days with compression wrapping change and possible addition of compression as pt is able to dianna.  -       Row Name 01/18/24 1100          Positioning and Restraints    Pre-Treatment Position sitting in chair/recliner  -     Post Treatment Position chair  -     In Chair reclined;call light within reach;with family/caregiver  -       Row Name 01/18/24 1100          Therapy Assessment/Plan (PT)    Patient/Family Therapy Goals Statement (PT) decrease LE edema  -     PT Diagnosis (PT) RLE cellulitis vs vasculitis  -     Rehab Potential (PT) fair, will monitor progress closely  -     Criteria for Skilled Interventions Met (PT) yes;meets criteria;skilled treatment is necessary  -       Row Name 01/18/24 1100          Physical Therapy Goals    Wound Care Goal Selection (PT) wound care, PT goal 1  -       Row Name 01/18/24 1100          Wound Care Goal 1 (PT)    Wound Care Goal 1 (PT) Decrease RLE edema to none to improve skin integrity and mobility  -     Time Frame (Wound Care Goal 1, PT) 10 days  -               User Key  (r) = Recorded By, (t) = Taken By, (c) = Cosigned By      Initials Name Provider Type    MF Basil Horvath, PT Physical Therapist    Parth Lopez, RN Registered Nurse                  Physical Therapy Education       Title: PT OT SLP Therapies  (In Progress)       Topic: Physical Therapy (In Progress)       Point: Mobility training (Done)       Learning Progress Summary             Patient Acceptance, E, VU by ND at 1/18/2024 0953   Family Acceptance, E, VU by ND at 1/18/2024 0953                         Point: Home exercise program (Not Started)       Learner Progress:  Not documented in this visit.              Point: Body mechanics (Done)       Learning Progress Summary             Patient Acceptance, E, VU by ND at 1/18/2024 0953   Family Acceptance, E, VU by ND at 1/18/2024 0953                         Point: Precautions (Done)       Learning Progress Summary             Patient Acceptance, E, VU by ND at 1/18/2024 0953   Family Acceptance, E, VU by ND at 1/18/2024 0953                                         User Key       Initials Effective Dates Name Provider Type Discipline    ND 11/16/23 -  Cassie Trejo, PT Physical Therapist PT                    Recommendation and Plan  Anticipated Discharge Disposition (PT): assisted living, home with home health  Planned Therapy Interventions (PT): wound care, patient/family education  Therapy Frequency (PT): daily  Plan of Care Reviewed With: patient, daughter           Outcome Evaluation: Pt presents with moderate RLE edema with erythema and pt c/o SOA.  Pt will benefit from light compression, but with multiple medical issues and increased c/o SOA, PT held significant compression and only applied unna boot with kerlix to help gently increase venous return to improve skin integrity and reduce LE edema.  PT will f/u in 2-3 days with compression wrapping change and possible addition of compression as pt is able to dianna.  Plan of Care Reviewed With: patient, daughter            Time Calculation   PT Charges       Row Name 01/18/24 1100 01/18/24 0954          Time Calculation    Start Time 1100  - 0837  -ND     PT Received On -- 01/18/24 -ND     PT Goal Re-Cert Due Date 01/28/24  - 01/28/24 -ND         Untimed Charges    PT Eval/Re-eval Minutes 35  -MF 48  -ND     Wound Care 91338 Unna boot  -MF --     29580-Unna Boot 25  -MF --        Total Minutes    Untimed Charges Total Minutes 60  -MF 48  -ND      Total Minutes 60  -MF 48  -ND               User Key  (r) = Recorded By, (t) = Taken By, (c) = Cosigned By      Initials Name Provider Type    Basil Pritchett, PT Physical Therapist    ND Cassie Trejo PT Physical Therapist                            PT G-Codes  Outcome Measure Options: AM-PAC 6 Clicks Basic Mobility (PT)  AM-PAC 6 Clicks Score (PT): 16  AM-PAC 6 Clicks Score (OT): 16       Basil Horvath, PT  1/18/2024

## 2024-01-18 NOTE — PLAN OF CARE
Goal Outcome Evaluation:  Plan of Care Reviewed With: patient, daughter           Outcome Evaluation: Pt presents with moderate RLE edema with erythema and pt c/o SOA.  Pt will benefit from light compression, but with multiple medical issues and increased c/o SOA, PT held significant compression and only applied unna boot with kerlix to help gently increase venous return to improve skin integrity and reduce LE edema.  PT will f/u in 2-3 days with compression wrapping change and possible addition of compression as pt is able to dianna.

## 2024-01-18 NOTE — PLAN OF CARE
Goal Outcome Evaluation:  Plan of Care Reviewed With: patient, daughter           Outcome Evaluation: Pt presents with generalized weakness and decreased activity tolerance significantly impacting ADL and fxnl mobility indp. Rec. IPOT to address deficits and support return to baseline. Rec. return to [facility] w/  OT upon DC.      Anticipated Discharge Disposition (OT): assisted living, home with outpatient therapy services ([facility] w/ continued  OT.)

## 2024-01-18 NOTE — PROGRESS NOTES
[facility] Cardiology    Inpatient Progress Note      Chief Complaint/Reason for service:    Follow up congestive heart failure         Subjective:       Patient sitting up in recliner.  Remains on 4L nasal cannula, breathing comfortably.  Denies chest pain, palpitations, or shortness of breath.  Developed Afib with RVR overnight and was started on amiodarone.  Currently maintaining sinus rhythm.     Past medical, surgical, social and family history reviewed in the patient's electronic medical record.         Objective:      Infusions:  amiodarone, 0.5 mg/min, Last Rate: 0.5 mg/min (24 043)  nitroglycerin, 5-200 mcg/min, Last Rate: Stopped (24 1220)         Medications:    Current Facility-Administered Medications:     acetaminophen (TYLENOL) tablet 650 mg, 650 mg, Oral, Q4H PRN, 650 mg at 24 **OR** acetaminophen (TYLENOL) 160 MG/5ML oral solution 650 mg, 650 mg, Oral, Q4H PRN **OR** acetaminophen (TYLENOL) suppository 650 mg, 650 mg, Rectal, Q4H PRN, Cosme Lyons MD    [COMPLETED] amiodarone 150 mg in 100 mL D5W (loading dose), 150 mg, Intravenous, Once, 150 mg at 24 2305 **FOLLOWED BY** [] amiodarone 360 mg in 200 mL D5W infusion, 1 mg/min, Intravenous, Continuous, Stopped at 24 0437 **FOLLOWED BY** amiodarone 360 mg in 200 mL D5W infusion, 0.5 mg/min, Intravenous, Continuous, Last Rate: 16.67 mL/hr at 24 043, 0.5 mg/min at 24 043 **FOLLOWED BY** amiodarone (PACERONE) tablet 200 mg, 200 mg, Oral, Once **FOLLOWED BY** [START ON 2024] amiodarone (PACERONE) tablet 200 mg, 200 mg, Oral, Q8H **FOLLOWED BY** [START ON 2024] amiodarone (PACERONE) tablet 200 mg, 200 mg, Oral, Q12H **FOLLOWED BY** [START ON 2024] amiodarone (PACERONE) tablet 200 mg, 200 mg, Oral, Daily, Taylor Duque APRN    aspirin EC tablet 81 mg, 81 mg, Oral, Daily, Cosme Lyons MD, 81 mg at 24 0810    sennosides-docusate (PERICOLACE) 8.6-50 MG  per tablet 2 tablet, 2 tablet, Oral, BID, 2 tablet at 01/17/24 0810 **AND** polyethylene glycol (MIRALAX) packet 17 g, 17 g, Oral, Daily PRN **AND** bisacodyl (DULCOLAX) EC tablet 5 mg, 5 mg, Oral, Daily PRN **AND** bisacodyl (DULCOLAX) suppository 10 mg, 10 mg, Rectal, Daily PRN, Cosme Lyons MD    calcium carbonate (TUMS) chewable tablet 500 mg (200 mg elemental), 1 tablet, Oral, TID PRN, Cosme Lyons MD    cefTRIAXone (ROCEPHIN) 2,000 mg in sodium chloride 0.9 % 100 mL IVPB, 2,000 mg, Intravenous, Q24H, Dwain Kaur MD, Last Rate: 200 mL/hr at 01/17/24 1026, 2,000 mg at 01/17/24 1026    empagliflozin (JARDIANCE) tablet 10 mg, 10 mg, Oral, Daily, Basil Richter MD, 10 mg at 01/17/24 0810    hydrALAZINE (APRESOLINE) injection 10 mg, 10 mg, Intravenous, Q6H PRN, Cosme Lyons MD    ipratropium-albuterol (DUO-NEB) nebulizer solution 3 mL, 3 mL, Nebulization, 4x Daily - RT, Cosme Lyons MD, 3 mL at 01/17/24 1557    levothyroxine (SYNTHROID, LEVOTHROID) tablet 125 mcg, 125 mcg, Oral, Q AM, Cosme Lyons MD, 125 mcg at 01/18/24 0702    Linezolid (ZYVOX) 600 mg 300 mL, 600 mg, Intravenous, Q12H, Dwain Kaur MD, Last Rate: 300 mL/hr at 01/17/24 2153, 600 mg at 01/17/24 2153    magnesium oxide (MAG-OX) tablet 400 mg, 400 mg, Oral, Daily, Cosme Lyons MD, 400 mg at 01/17/24 0810    midodrine (PROAMATINE) tablet 10 mg, 10 mg, Oral, TID AC, Basil Richter MD, 10 mg at 01/18/24 0703    nitroglycerin (TRIDIL) 200 mcg/ml infusion, 5-200 mcg/min, Intravenous, Titrated, Matt Cochran MD, Held at 01/16/24 1220    ondansetron ODT (ZOFRAN-ODT) disintegrating tablet 4 mg, 4 mg, Oral, Q6H PRN **OR** ondansetron (ZOFRAN) injection 4 mg, 4 mg, Intravenous, Q6H PRN, Cosme Lyons MD    pantoprazole (PROTONIX) EC tablet 40 mg, 40 mg, Oral, Daily, Jun Fajardo MD, 40 mg at 01/17/24 0810    Pharmacy Consult - [facility], , Does not apply, Daily, Garfield Mejia IV, PharmD    Potassium  Replacement - Follow Nurse / BPA Driven Protocol, , Does not apply, PRN, Basil Richter MD    pregabalin (LYRICA) capsule 150 mg, 150 mg, Oral, Daily, Gillian Hanson, PharmD, 150 mg at 01/17/24 1026    propranolol (INDERAL) tablet 20 mg, 20 mg, Oral, Q8H, Cosme Lyons MD, 20 mg at 01/17/24 0808    rivaroxaban (XARELTO) tablet 15 mg, 15 mg, Oral, Daily With Dinner, Cosme Lyons MD, 15 mg at 01/17/24 1743    rivastigmine (EXELON) 4.6 MG/24HR patch 1 patch, 1 patch, Transdermal, Daily, Cosme Lyons MD, 1 patch at 01/17/24 0808    sodium chloride 0.9 % flush 10 mL, 10 mL, Intravenous, PRN, Matt Cochran MD    sodium chloride 0.9 % flush 10 mL, 10 mL, Intravenous, Q12H, Cosme Lyons MD, 10 mL at 01/17/24 2150    sodium chloride 0.9 % flush 10 mL, 10 mL, Intravenous, PRN, Cosme Lyons MD    sodium chloride 0.9 % infusion 40 mL, 40 mL, Intravenous, PRN, Cosme Lyons MD    tamsulosin (FLOMAX) 24 hr capsule 0.4 mg, 0.4 mg, Oral, Daily, Cosme Lyons MD, 0.4 mg at 01/17/24 0810    Vital Sign Min/Max for last 24 hours  Temp  Min: 97.3 °F (36.3 °C)  Max: 98.6 °F (37 °C)   BP  Min: 66/56  Max: 108/54   Pulse  Min: 74  Max: 163   Resp  Min: 18  Max: 20   SpO2  Min: 90 %  Max: 100 %   No data recorded      Intake/Output Summary (Last 24 hours) at 1/18/2024 0836  Last data filed at 1/18/2024 0600  Gross per 24 hour   Intake --   Output 1500 ml   Net -1500 ml           CONSTITUTIONAL: No acute distress  RESPIRATORY: Normal effort. Clear to auscultation bilaterally without wheezing or rales. On 4L nasal cannula.   CARDIOVASCULAR: Regular rate and rhythm with normal S1 and S2. Without murmur. There is moderate lower extremity edema bilaterally with redness and warmth in the right LE. Normal radial pulse.     Labs/studies:  Available lab and imaging results were reviewed by myself today    Results for orders placed during the hospital encounter of 01/16/24    Adult Transthoracic Echo Complete w/  Color, Spectral and Contrast if Necessary Per Protocol    Interpretation Summary    Left ventricular systolic function is normal. Left ventricular ejection fraction appears to be 56 - 60%.    Left ventricular wall thickness is consistent with mild posterior asymmetric hypertrophy.    Left ventricular diastolic function is consistent with (grade Ia w/high LAP) impaired relaxation.    Mildly reduced right ventricular systolic function noted.    Systolic and diastolic flattening of the interventricular septum consistent with right ventricle pressure and volume overload.    The right ventricular cavity is moderately dilated.    The right atrial cavity is mild to moderately dilated.    There is mild calcification of the aortic valve.    Moderate aortic valve regurgitation is present.    Mild mitral valve regurgitation is present.    Moderate to severe tricuspid valve regurgitation is present.    Estimated right ventricular systolic pressure from tricuspid regurgitation is markedly elevated (>55 mmHg).      Tele:  NSR          Assessment/Plan:         Acute on chronic heart failure with preserved ejection fraction (HFpEF)    Acute on chronic respiratory failure with hypoxia    Cellulitis of right lower extremity       ASSESSMENT:  Acute on chronic HFpEF   Elevated proBNP of 10,000  IV Bumex 2 mg x 1 in the ED.   Most recent echo 4/2023 with EF 51-55%, mildly reduced RV systolc function, dilated LA an RA, mild to moderate AR, mild MR, mild TR.   Afib with RVR  Anticoagulated with Xarelto   On Amiodarone protocol.  Right LE cellulitis, Hypotension, possible sepsis  Acute on chronic respiratory failure   CKD stage III    PLAN:  Repeat echo with normal EF, moderate AR, moderate to severe TR and elevated RVSP >55 mmHg.   Venous duplex negative for DVT or SVT.   Developed Afib with RVR overnight. Started on amiodarone protocol with conversion to NSR this morning.  Maintaining sinus rhythm currently.   Continue midodrine 10 mg  three times a day for hypotension.  Patient has received multiple IVF boluses.   ProBNP elevated today.  CXR stable.  Will hold off additional diuresis today.  Dr. Richter to reassess volume status tomorrow and determine need for additional diuresis in the morning.     Electronically signed by TONIE Orta, 01/18/24, 8:37 AM EST.

## 2024-01-18 NOTE — THERAPY EVALUATION
Patient Name: Zoila Nava  : 1933    MRN: [id number]                              Today's Date: 2024       Admit Date: 2024    Visit Dx:     ICD-10-CM ICD-9-CM   1. Acute on chronic congestive heart failure, unspecified heart failure type  I50.9 428.0   2. Cellulitis of right lower extremity  L03.115 682.6   3. Elevated blood pressure reading with diagnosis of hypertension  I10 401.9   4. History of pulmonary hypertension  Z86.79 V12.59   5. Hypoxemia requiring supplemental oxygen  R09.02 799.02    Z99.81      Patient Active Problem List   Diagnosis    Allergic rhinitis    Hyperlipidemia    Hypertension    Hypocalcemia    Hypothyroidism    Neuropathy    NUD (nonulcer dyspepsia)    Painful knee    Pernicious anemia    Tremor    Vitamin B12 deficiency    Spondylolisthesis of cervical region    Graves' ophthalmopathy    Anemia    Memory impairment of gradual onset    Ascending aortic aneurysm    Stage 3 chronic kidney disease    Pulmonary hypertension    Chronic heart failure with preserved ejection fraction    CHF (congestive heart failure), NYHA class I, acute on chronic, diastolic    CHF (congestive heart failure)    Non-rheumatic aortic stenosis    Non-rheumatic aortic regurgitation    Hypokalemia    Carpal tunnel syndrome    Essential tremor    Gastroesophageal reflux disease    Nausea and vomiting    Skin sensation disturbance    Spinal cord disease    Urge incontinence of urine    Urinary urgency    Acute chest pain    (HFpEF) heart failure with preserved ejection fraction    Nocturnal hypoxia    Confusion    Concussion without loss of consciousness    Hypotension    AMS (altered mental status)    Multiple open wounds of lower extremity, unspecified laterality, subsequent encounter    Acute on chronic heart failure with preserved ejection fraction (HFpEF)    Acute on chronic respiratory failure with hypoxia    Cellulitis of right lower extremity     Past Medical History:   Diagnosis  Date    Anemia     Arthritis     Asthma     Cataract     Difficulty breathing     Chronic    GERD (gastroesophageal reflux disease)     Graves' ophthalmopathy     Hoarseness     Hypertension     Hypertensive heart disease with acute on chronic diastolic congestive heart failure 10/11/2021    Pulmonary hypertension 10/11/2021    Spondylolisthesis of cervical region     Vitamin B12 deficiency      Past Surgical History:   Procedure Laterality Date    CERVICAL LAMINECTOMY      CHOLECYSTECTOMY      OTHER SURGICAL HISTORY Right     Neuroplasty Median Nerve at Carpal Tunnel    THYROID SURGERY      TOTAL KNEE ARTHROPLASTY Left 09/29/2014    Dr Colon      General Information       Row Name 01/18/24 0943          Physical Therapy Time and Intention    Document Type evaluation  -ND     Mode of Treatment physical therapy  -ND       Row Name 01/18/24 0943          General Information    Patient Profile Reviewed yes  -ND     Prior Level of Function independent:;all household mobility;gait;transfer;bed mobility  Pt uses RWx for household distances and w/c for community distances.  -ND     Existing Precautions/Restrictions fall;oxygen therapy device and L/min  -ND     Barriers to Rehab medically complex;previous functional deficit;hearing deficit  -ND       Row Name 01/18/24 0943          Living Environment    People in Home facility resident  -ND       Row Name 01/18/24 0943          Home Main Entrance    Number of Stairs, Main Entrance none  -ND       Row Name 01/18/24 0943          Stairs Within Home, Primary    Number of Stairs, Within Home, Primary none  -ND       Row Name 01/18/24 0943          Cognition    Orientation Status (Cognition) oriented x 3  -ND       Row Name 01/18/24 0943          Safety Issues, Functional Mobility    Safety Issues Affecting Function (Mobility) safety precaution awareness;sequencing abilities  -ND     Impairments Affecting Function (Mobility) balance;endurance/activity  tolerance;strength;postural/trunk control;shortness of breath  -ND               User Key  (r) = Recorded By, (t) = Taken By, (c) = Cosigned By      Initials Name Provider Type    Cassie Reed PT Physical Therapist                   Mobility       Row Name 01/18/24 0945          Bed Mobility    Bed Mobility supine-sit  -ND     Supine-Sit [place] (Bed Mobility) standby assist  -ND     Assistive Device (Bed Mobility) head of bed elevated;bed rails  -ND     Comment, (Bed Mobility) Increased time for task.  -ND       Row Name 01/18/24 0945          Bed-Chair Transfer    Bed-Chair [place] (Transfers) minimum assist (75% patient effort);2 person assist;verbal cues  -ND     Assistive Device (Bed-Chair Transfers) other (see comments)  BUE support  -ND     Comment, (Bed-Chair Transfer) Increased time for task. Pt taking 5-7 shuffling steps.  -ND       Row Name 01/18/24 0945          Sit-Stand Transfer    Sit-Stand [place] (Transfers) contact guard  -ND     Assistive Device (Sit-Stand Transfers) other (see comments)  BUE support  -ND     Comment, (Sit-Stand Transfer) STS from EOB x2. Pt endorses dizziness with initial STS and returns to sitting for BP reading, /65.  -ND       Row Name 01/18/24 0945          Gait/Stairs (Locomotion)    Comment, (Gait/Stairs) Gait deferred due to fatigue.  -ND               User Key  (r) = Recorded By, (t) = Taken By, (c) = Cosigned By      Initials Name Provider Type    Cassie Reed PT Physical Therapist                   Obj/Interventions       Row Name 01/18/24 0948          Range of Motion Comprehensive    General Range of Motion bilateral lower extremity ROM WFL  -ND       Row Name 01/18/24 0948          Strength Comprehensive (MMT)    General Manual Muscle Testing (MMT) Assessment lower extremity strength deficits identified  -ND     Comment, General Manual Muscle Testing (MMT) Assessment BLE MMT 3+/5.  -ND       Row Name 01/18/24 0917           Motor Skills    Therapeutic Exercise knee  -ND       Row Name 01/18/24 0948          Knee (Therapeutic Exercise)    Knee (Therapeutic Exercise) AROM (active range of motion)  -ND     Knee AROM (Therapeutic Exercise) bilateral;LAQ (long arc quad);10 repetitions  -ND       Row Name 01/18/24 0948          Balance    Balance Assessment sitting static balance;sitting dynamic balance;sit to stand dynamic balance;standing static balance;standing dynamic balance  -ND     Static Sitting Balance standby assist  -ND     Dynamic Sitting Balance contact guard  -ND     Position, Sitting Balance unsupported;sitting edge of bed;supported;sitting in chair  -ND     Sit to Stand Dynamic Balance contact guard  -ND     Static Standing Balance contact guard  -ND     Dynamic Standing Balance minimal assist;2-person assist  -ND     Position/Device Used, Standing Balance supported;other (see comments)  BUE support  -ND     Balance Interventions sitting;standing;sit to stand;supported;static;dynamic  -ND     Comment, Balance Pt demonstrating posterior lean with sitting EOB when dizzy. Pt with general instability with standing and ambulating tasks.  -ND       Row Name 01/18/24 0948          Sensory Assessment (Somatosensory)    Sensory Assessment (Somatosensory) LE sensation intact  -ND               User Key  (r) = Recorded By, (t) = Taken By, (c) = Cosigned By      Initials Name Provider Type    ND Cassie Trejo, PT Physical Therapist                   Goals/Plan       Row Name 01/18/24 0952          Bed Mobility Goal 1 (PT)    Activity/Assistive Device (Bed Mobility Goal 1, PT) sit to supine/supine to sit  -ND     [place] Level/Cues Needed (Bed Mobility Goal 1, PT) modified independence  -ND     Time Frame (Bed Mobility Goal 1, PT) long term goal (LTG);10 days  -ND       Row Name 01/18/24 0952          Transfer Goal 1 (PT)    Activity/Assistive Device (Transfer Goal 1, PT)  sit-to-stand/stand-to-sit;bed-to-chair/chair-to-bed;walker, rolling  -ND     [place] Level/Cues Needed (Transfer Goal 1, PT) modified independence  -ND     Time Frame (Transfer Goal 1, PT) long term goal (LTG);10 days  -ND       Row Name 01/18/24 0952          Gait Training Goal 1 (PT)    Activity/Assistive Device (Gait Training Goal 1, PT) gait (walking locomotion);assistive device use;increase endurance/gait distance;decrease fall risk;walker, rolling  -ND     [place] Level (Gait Training Goal 1, PT) supervision required  -ND     Distance (Gait Training Goal 1, PT) 100  -ND     Time Frame (Gait Training Goal 1, PT) long term goal (LTG);10 days  -ND       Row Name 01/18/24 0952          Therapy Assessment/Plan (PT)    Planned Therapy Interventions (PT) balance training;bed mobility training;gait training;home exercise program;transfer training;patient/family education;strengthening;postural re-education  -ND               User Key  (r) = Recorded By, (t) = Taken By, (c) = Cosigned By      Initials Name Provider Type    ND Cassie Trejo, PT Physical Therapist                   Clinical Impression       Row Name 01/18/24 0949          Pain    Pretreatment Pain Rating 0/10 - no pain  -ND     Posttreatment Pain Rating 0/10 - no pain  -ND       Row Name 01/18/24 0949          Plan of Care Review    Plan of Care Reviewed With patient;caregiver  -ND     Outcome Evaluation Based on PT evaluation, pt is below baseline level of mobility. Pt demonstrating decreased activity tolerance, strength, and balance warranting IP PT services to facilitate return to PLOF. Recommend return to previous facility with  PT services following d/c.  -ND       Row Name 01/18/24 0949          Therapy Assessment/Plan (PT)    Patient/Family Therapy Goals Statement (PT) Return to PLOF.  -ND     Criteria for Skilled Interventions Met (PT) yes;meets criteria;skilled treatment is necessary  -ND     Therapy Frequency (PT) daily  -ND        Row Name 01/18/24 0949          Vital Signs    Pre Systolic BP Rehab 105  supine  -ND     Pre Treatment Diastolic BP 91  -ND     Intra Systolic BP Rehab 102  -ND     Intra Treatment Diastolic BP 65  -ND     Post Systolic BP Rehab 106  -ND     Post Treatment Diastolic BP 81  -ND     Pretreatment Heart Rate (beats/min) 72  -ND     Posttreatment Heart Rate (beats/min) 76  -ND     Pre SpO2 (%) 98  -ND     O2 Delivery Pre Treatment nasal cannula  -ND     O2 Delivery Intra Treatment nasal cannula  -ND     Post SpO2 (%) 98  -ND     O2 Delivery Post Treatment nasal cannula  -ND     Pre Patient Position Supine  -ND     Intra Patient Position Standing  -ND     Post Patient Position Sitting  -ND       Row Name 01/18/24 0949          Positioning and Restraints    Pre-Treatment Position in bed  -ND     Post Treatment Position chair  -ND     In Chair notified nsg;reclined;legs elevated;call light within reach;encouraged to call for assist;exit alarm on;waffle cushion;with nsg;with OT  -ND               User Key  (r) = Recorded By, (t) = Taken By, (c) = Cosigned By      Initials Name Provider Type    ND Cassie Trejo, PT Physical Therapist                   Outcome Measures       Row Name 01/18/24 0953          How much help from another person do you currently need...    Turning from your back to your side while in flat bed without using bedrails? 3  -ND     Moving from lying on back to sitting on the side of a flat bed without bedrails? 3  -ND     Moving to and from a bed to a chair (including a wheelchair)? 3  -ND     Standing up from a chair using your arms (e.g., wheelchair, bedside chair)? 3  -ND     Climbing 3-5 steps with a railing? 2  -ND     To walk in hospital room? 2  -ND     AM-PAC 6 Clicks Score (PT) 16  -ND     Highest Level of Mobility Goal 5 --> Static standing  -ND       Row Name 01/18/24 0953 01/18/24 0948       Functional Assessment    Outcome Measure Options AM-PAC 6 Clicks Basic Mobility (PT)  -ND  AM-PAC 6 Clicks Daily Activity (OT)  -AF              User Key  (r) = Recorded By, (t) = Taken By, (c) = Cosigned By      Initials Name Provider Type    AF Kat Cruz OT Occupational Therapist    Cassie Reed, PT Physical Therapist                                 Physical Therapy Education       Title: PT OT SLP Therapies (In Progress)       Topic: Physical Therapy (In Progress)       Point: Mobility training (Done)       Learning Progress Summary             Patient Acceptance, E, VU by ND at 1/18/2024 0953   Family Acceptance, E, VU by ND at 1/18/2024 0953                         Point: Home exercise program (Not Started)       Learner Progress:  Not documented in this visit.              Point: Body mechanics (Done)       Learning Progress Summary             Patient Acceptance, E, VU by ND at 1/18/2024 0953   Family Acceptance, E, VU by ND at 1/18/2024 0953                         Point: Precautions (Done)       Learning Progress Summary             Patient Acceptance, E, VU by ND at 1/18/2024 0953   Family Acceptance, E, VU by ND at 1/18/2024 0953                                         User Key       Initials Effective Dates Name Provider Type Discipline    ND 11/16/23 -  Cassie Trejo, FABRICE Physical Therapist PT                  PT Recommendation and Plan  Planned Therapy Interventions (PT): balance training, bed mobility training, gait training, home exercise program, transfer training, patient/family education, strengthening, postural re-education  Plan of Care Reviewed With: patient, caregiver  Outcome Evaluation: Based on PT evaluation, pt is below baseline level of mobility. Pt demonstrating decreased activity tolerance, strength, and balance warranting IP PT services to facilitate return to PLOF. Recommend return to previous facility with  PT services following d/c.     Time Calculation:   PT Evaluation Complexity  History, PT Evaluation Complexity: 3 or more personal factors and/or  comorbidities  Examination of Body Systems (PT Eval Complexity): total of 4 or more elements  Clinical Presentation (PT Evaluation Complexity): evolving  Clinical Decision Making (PT Evaluation Complexity): moderate complexity  Overall Complexity (PT Evaluation Complexity): moderate complexity     PT Charges       Row Name 01/18/24 0954             Time Calculation    Start Time 0837  -ND      PT Received On 01/18/24  -ND      PT Goal Re-Cert Due Date 01/28/24  -ND         Untimed Charges    PT Eval/Re-eval Minutes 48  -ND         Total Minutes    Untimed Charges Total Minutes 48  -ND       Total Minutes 48  -ND                User Key  (r) = Recorded By, (t) = Taken By, (c) = Cosigned By      Initials Name Provider Type    ND Cassie Trejo, PT Physical Therapist                  Therapy Charges for Today       Code Description Service Date Service Provider Modifiers Qty    [id number] HC PT EVAL MOD COMPLEXITY 4 1/18/2024 Cassie Trejo, PT GP 1            PT G-Codes  Outcome Measure Options: AM-PAC 6 Clicks Basic Mobility (PT)  AM-PAC 6 Clicks Score (PT): 16  AM-PAC 6 Clicks Score (OT): 16  PT Discharge Summary  Anticipated Discharge Disposition (PT): assisted living, home with home health    Cassie Trejo PT  1/18/2024

## 2024-01-18 NOTE — PROGRESS NOTES
"  INFECTIOUS DISEASES  INPATIENT PROGRESS NOTE  2024      PATIENT NAME: Zoila Nava  :  1933  MRN:  [id number]  Date of Admission:  2024      Antimicrobials:  Day 3:  Linezolid  Ceftriaxone      MAR reviewed.    Chief Complaint   Patient presents with    Wound Check    Shortness of Breath       Reason for consultation: Sepsis, right leg cellulitis, E. coli bacteremia    Interval history: Patient had Afib/RVR overnight and currently on amiodarone drip.  Otherwise, doing well.  No fevers.  Right leg has been wrapped.  Edema doing better.  Tolerating abx.  Up in chair today.  Ate some lunch.    ROS:  No fevers/chills overnight.  No new rashes.  No nausea/vomiting/diarrhea.  Denies side effects from antimicrobials.      Objective:  Temp (24hrs), Av.7 °F (36.5 °C), Min:97.1 °F (36.2 °C), Max:98.3 °F (36.8 °C)    BP 96/62 (BP Location: Left arm, Patient Position: Sitting)   Pulse 69   Temp 97.1 °F (36.2 °C) (Oral)   Resp 18   Ht 152.4 cm (60\")   Wt 64 kg (141 lb)   LMP  (LMP Unknown)   SpO2 99%   BMI 27.54 kg/m²     Physical Examination:  GENERAL: Improved-appearing female.  Awake and alert.  Sitting up in chair.  HEENT: Normocephalic, atraumatic.    LUNGS: Clear to auscultation bilaterally without wheezing, rales, rhonchi. Normal respiratory effort.  ABDOMEN: Positive bowel sounds.  Soft, nontender, mildly distended.  EXT: 1+ bilateral lower extremity edema, improved.  MSK: Previous left knee arthroplasty site is benign  SKIN: Right lower leg wrapped.  Right medial thigh erythema/warmth improved.  NEURO: Awake and alert, hard of hearing.    Laboratory Data:    Results from last 7 days   Lab Units 24  0120 24  0700 24  1024   WBC 10*3/mm3 20.77* 22.83* 16.69*   HEMOGLOBIN g/dL 11.2* 11.5* 13.2   HEMATOCRIT % 33.2* 35.2 40.6   PLATELETS 10*3/mm3 185 170 262     Results from last 7 days   Lab Units 24  0120   SODIUM mmol/L 139   POTASSIUM mmol/L 3.3*   CHLORIDE " mmol/L 102   CO2 mmol/L 24.0   BUN mg/dL 34*   CREATININE mg/dL 1.44*   GLUCOSE mg/dL 158*   CALCIUM mg/dL 7.9*     Results from last 7 days   Lab Units 01/18/24  0120   ALK PHOS U/L 129*   BILIRUBIN mg/dL 0.4   ALT (SGPT) U/L 36*   AST (SGOT) U/L 53*             Estimated Creatinine Clearance: 21.7 mL/min (A) (by C-G formula based on SCr of 1.44 mg/dL (H)).  Results from last 7 days   Lab Units 01/16/24  1335   LACTATE mmol/L 2.0                   Microbiology:    Microbiology Results (last 10 days)       Procedure Component Value - Date/Time    COVID PRE-OP / PRE-PROCEDURE SCREENING ORDER (NO ISOLATION) - Swab, Nasopharynx [[id number]]  (Normal) Collected: 01/16/24 1040    Lab Status: Final result Specimen: Swab from Nasopharynx Updated: 01/16/24 1116    Narrative:      The following orders were created for panel order COVID PRE-OP / PRE-PROCEDURE SCREENING ORDER (NO ISOLATION) - Swab, Nasopharynx.  Procedure                               Abnormality         Status                     ---------                               -----------         ------                     COVID-19 and FLU A/B PCR...[[id number]]  Normal              Final result                 Please view results for these tests on the individual orders.    COVID-19 and FLU A/B PCR, 1 HR TAT - Swab, Nasopharynx [[id number]]  (Normal) Collected: 01/16/24 1040    Lab Status: Final result Specimen: Swab from Nasopharynx Updated: 01/16/24 1116     COVID19 Not Detected     Influenza A PCR Not Detected     Influenza B PCR Not Detected    Narrative:      Fact sheet for providers: https://www.fda.gov/media/552871/download    Fact sheet for patients: https://www.fda.gov/media/586544/download    Test performed by PCR.    Blood Culture - Blood, Arm, Right [[id number]]  (Abnormal)  (Susceptibility) Collected: 01/16/24 1025    Lab Status: Final result Specimen: Blood from Arm, Right Updated: 01/18/24 0618     Blood Culture Escherichia coli     Isolated from Aerobic  and Anaerobic Bottles     Gram Stain Anaerobic Bottle Gram negative bacilli      Aerobic Bottle Gram negative bacilli    Susceptibility        Escherichia coli      VIANCA      Amoxicillin + Clavulanate Susceptible      Ampicillin Susceptible      Ampicillin + Sulbactam Susceptible      Cefepime Susceptible      Ceftazidime Susceptible      Ceftriaxone Susceptible      Gentamicin Susceptible      Levofloxacin Susceptible      Piperacillin + Tazobactam Susceptible      Trimethoprim + Sulfamethoxazole Susceptible                       Susceptibility Comments       Escherichia coli    Cefazolin sensitivity will not be reported for Enterobacteriaceae in non-urine isolates. If cefazolin is preferred, please call the microbiology lab to request an E-test.  With the exception of urinary-sourced infections, aminoglycosides should not be used as monotherapy.               Blood Culture ID, PCR - Blood, Arm, Right [[id number]]  (Abnormal) Collected: 01/16/24 1025    Lab Status: Final result Specimen: Blood from Arm, Right Updated: 01/16/24 2050     BCID, PCR Escherichia coli. Identification by BCID2 PCR.     BOTTLE TYPE Anaerobic Bottle    Narrative:      No resistance genes detected.    Blood Culture - Blood, Arm, Right [[id number]]  (Abnormal) Collected: 01/16/24 1020    Lab Status: Final result Specimen: Blood from Arm, Right Updated: 01/18/24 0618     Blood Culture Escherichia coli     Isolated from Aerobic and Anaerobic Bottles     Gram Stain Anaerobic Bottle Gram negative bacilli      Aerobic Bottle Gram negative bacilli    Narrative:      Less than seven (7) mL's of blood was collected.  Insufficient quantity may yield false negative results.    Refer to previous blood culture collected on 01/16/2024 1025 for MICs.              Radiology:  XR Chest 1 View    Result Date: 1/18/2024  Impression: Chronic findings. No acute process. Electronically Signed: Georgina Ramsey MD  1/18/2024 11:33 AM EST  Workstation ID:  [id number]    US Abdomen Complete    Result Date: 1/18/2024  Small liver hemangioma. Small right renal cyst. No acute process. Electronically Signed: Georgina Ramsey MD  1/18/2024 7:15 AM EST  Workstation ID: [id number]       DISCUSSION:  89 y.o. female with history of heart failure and chronic leg edema admitted with sepsis secondary to right lower extremity cellulitis.        PROBLEM LIST:   -- Sepsis, due to right lower extremity cellulitis and bacteremia.  Fever, tachycardia, hypotension, hypoxia, encephalopathy, lactic acidosis, leukocytosis, acute kidney injury.  Improving.  -- Nonpurulent right lower extremity cellulitis, portal of entry right anterior shin abrasion.  No purulence.  No crepitus.  Improving.  -- E. coli bacteremia, pan sensitive.  Unusual for cellulitis from E. coli but possible.  No urinalysis at presentation.  Denies abdominal complaints.  LFTs ok.  Abd U/S benign.  -- Chronic leg edema.  -- Acute on chronic heart failure with preserved ejection fraction.  -- Allergy to penicillin as a child, unknown reaction.  -- Afib/RVR, on amiodarone.    PLAN:  -- Ceftriaxone 2 g iv daily for nonpurulent cellulitis and E coli bacteremia  -- Continue linezolid 600 mg IV every 12 hours to decrease toxin formation from possible streptococcal infection - likely stop tomorrow.  -- Continue compression and elevation of right leg  -- Assessing for adverse drug reactions from antimicrobials.  -- Following vitals, exam, labs, and cultures.  -- Final plans pending clinical course.  Suggest at least 7 days IV ceftriaxone before oral stepdown.    Complex case.  Plan discussed with patient and family at bedside.    Dwain Kaur MD  1/18/2024  13:01 EST

## 2024-01-18 NOTE — PROGRESS NOTES
[facility] Medicine Services  PROGRESS NOTE    Patient Name: Zoila Nava  : 1933  MRN: [id number]    Date of Admission: 2024  Primary Care Physician: Arnoldo Oneil MD    Subjective   Subjective     CC:  SOA     HPI:  Up in chair, daughter in room and states she thinks the patient appears to be a little more labored in her breathing today. Patient denies any soa, is on 3LNC satting 98% and endorses she uses oxygen at home, but unsure what percentage. Pt denies any symptoms from afib RVR overnight, HR is now controlled. I discussed with patient and dtr the POC that can be limited and difficult depending on her blood pressures/ heart rate/ renal function and sepsis/infection. Both with verbal understanding and agreeable to no heroic measures, and open to Palliative care discussions.       Objective   Objective     Vital Signs:   Temp:  [97.3 °F (36.3 °C)-98.6 °F (37 °C)] 97.3 °F (36.3 °C)  Heart Rate:  [] 103  Resp:  [18-20] 20  BP: ()/(45-84) 89/75  Flow (L/min):  [3-6] 3     Physical Exam:  Constitutional: No acute distress, awake, alert, elderly/ frail   HENT: NCAT, mucous membranes moist  Respiratory: Fine bibasilar rales, respiratory effort normal on 3LNC satting 98%  Cardiovascular: Irreg/ irreg, no murmurs, rubs, or gallops  Gastrointestinal: Positive bowel sounds, soft, nontender, nondistended  Musculoskeletal: 2+ BLE with RLE compression wrap in place   Psychiatric: Flat affect, cooperative  Neurologic: Oriented x 3, RICE, speech clear  Skin: RLE with erythema/ increased warmth and edema from medial right thigh/ and circumferential knee down to foot without induration or drainage     Results Reviewed:  LAB RESULTS:      Lab 24  0120 24  0700 24  1335 24  1024   WBC 20.77* 22.83*  --  16.69*   HEMOGLOBIN 11.2* 11.5*  --  13.2   HEMATOCRIT 33.2* 35.2  --  40.6   PLATELETS 185 170  --  262   NEUTROS ABS 18.99* 20.30*  --   15.63*   IMMATURE GRANS (ABS) 0.20* 0.25*  --  0.08*   LYMPHS ABS 0.59* 1.13  --  0.58*   MONOS ABS 0.79 1.08*  --  0.27   EOS ABS 0.17 0.02  --  0.11   MCV 90.5 92.4  --  93.3   PROCALCITONIN  --   --   --  0.12   LACTATE  --   --  2.0 4.3*         Lab 01/18/24  0120 01/17/24  0932 01/16/24  1024   SODIUM 139 139 140   POTASSIUM 3.3* 3.4* 4.4   CHLORIDE 102 101 100   CO2 24.0 25.0 26.0   ANION GAP 13.0 13.0 14.0   BUN 34* 34* 33*   CREATININE 1.44* 1.43* 1.36*   EGFR 34.6* 34.9* 37.1*   GLUCOSE 158* 151* 160*   CALCIUM 7.9* 8.0* 9.0   MAGNESIUM  --   --  1.9   PHOSPHORUS  --   --  3.0         Lab 01/18/24  0120 01/16/24  1024   TOTAL PROTEIN 5.3* 6.8   ALBUMIN 2.9* 4.2   GLOBULIN 2.4 2.6   ALT (SGPT) 36* 24   AST (SGOT) 53* 34*   BILIRUBIN 0.4 0.8   ALK PHOS 129* 94         Lab 01/18/24  0120 01/16/24  1024   PROBNP 35,116.0* 10,191.0*   HSTROP T  --  51*         Lab 01/17/24  0932   CHOLESTEROL 110   LDL CHOL 46   HDL CHOL 48   TRIGLYCERIDES 78             Lab 01/16/24  1059   PH, ARTERIAL 7.523*   PCO2, ARTERIAL 28.4*   PO2 .0*   FIO2 50   HCO3 ART 23.3   BASE EXCESS ART 1.4   CARBOXYHEMOGLOBIN 1.2     Brief Urine Lab Results  (Last result in the past 365 days)        Color   Clarity   Blood   Leuk Est   Nitrite   Protein   CREAT   Urine HCG        10/28/23 2320 Yellow   Clear   Negative   Negative   Negative   Negative                   Microbiology Results Abnormal       Procedure Component Value - Date/Time    COVID PRE-OP / PRE-PROCEDURE SCREENING ORDER (NO ISOLATION) - Swab, Nasopharynx [[id number]]  (Normal) Collected: 01/16/24 1040    Lab Status: Final result Specimen: Swab from Nasopharynx Updated: 01/16/24 1116    Narrative:      The following orders were created for panel order COVID PRE-OP / PRE-PROCEDURE SCREENING ORDER (NO ISOLATION) - Swab, Nasopharynx.  Procedure                               Abnormality         Status                     ---------                               -----------          ------                     COVID-19 and FLU A/B PCR...[[id number]]  Normal              Final result                 Please view results for these tests on the individual orders.    COVID-19 and FLU A/B PCR, 1 HR TAT - Swab, Nasopharynx [[id number]]  (Normal) Collected: 01/16/24 1040    Lab Status: Final result Specimen: Swab from Nasopharynx Updated: 01/16/24 1116     COVID19 Not Detected     Influenza A PCR Not Detected     Influenza B PCR Not Detected    Narrative:      Fact sheet for providers: https://www.fda.gov/media/289466/download    Fact sheet for patients: https://www.fda.gov/media/136298/download    Test performed by PCR.            US Abdomen Complete    Result Date: 1/18/2024  US ABDOMEN COMPLETE Date of Exam: 1/17/2024 11:18 PM EST Indication: E coli bacteremia, acute kidney injury. Comparison: No comparisons available. Technique: Routine gray scale and color Doppler imaging of the complete abdomen was performed according to routine protocol. Findings: There is atherosclerotic calcific plaque of the abdominal aorta. The abdominal aorta is normal in size and demonstrates normal blood flow on the color flow Doppler images. The intrahepatic IVC is patent. The pancreas is normal in size and echogenicity. Liver is normal in size and shape. There is an 11 x 9 mm hyperechoic focus in the right hepatic lobe most compatible with an hemangioma. No other liver lesions are present. The gallbladder has been removed. There is no bile duct dilatation. The portal vein and hepatic veins are patent. Kidneys are normal in size and shape and demonstrate no hydronephrosis. There is a 7 mm right renal cortical cyst. The spleen size is normal.     Impression: Small liver hemangioma. Small right renal cyst. No acute process. Electronically Signed: Georgina Ramsey MD  1/18/2024 7:15 AM EST  Workstation ID: [id number]    Duplex Venous Lower Extremity - Bilateral CAR    Result Date: 1/17/2024    The bilateral lower extremity  venous duplex scan is negative for evidence of a DVT and SVT in the areas visualized.  The peroneal veins were not well-visualized..     Adult Transthoracic Echo Complete w/ Color, Spectral and Contrast if Necessary Per Protocol    Result Date: 1/17/2024    Left ventricular systolic function is normal. Left ventricular ejection fraction appears to be 56 - 60%.   Left ventricular wall thickness is consistent with mild posterior asymmetric hypertrophy.   Left ventricular diastolic function is consistent with (grade Ia w/high LAP) impaired relaxation.   Mildly reduced right ventricular systolic function noted.   Systolic and diastolic flattening of the interventricular septum consistent with right ventricle pressure and volume overload.   The right ventricular cavity is moderately dilated.   The right atrial cavity is mild to moderately dilated.   There is mild calcification of the aortic valve.   Moderate aortic valve regurgitation is present.   Mild mitral valve regurgitation is present.   Moderate to severe tricuspid valve regurgitation is present.   Estimated right ventricular systolic pressure from tricuspid regurgitation is markedly elevated (>55 mmHg).     XR Chest 1 View    Result Date: 1/16/2024  XR CHEST 1 VW Date of Exam: 1/16/2024 10:21 AM EST Indication: SOA triage protocol Comparison: 10/24/2023. Findings: There is continued moderately severe cardiomegaly. There is stable chronic infiltrate in the left lower lobe. Mild chronic infiltrates elsewhere in the bilateral lung fields also are again noted. No new infiltrates or effusions are identified.     Impression: Impression: Chronic findings. No acute process. Electronically Signed: Georgina Ramsey MD  1/16/2024 11:09 AM EST  Workstation ID: [id number]     Results for orders placed during the hospital encounter of 01/16/24    Adult Transthoracic Echo Complete w/ Color, Spectral and Contrast if Necessary Per Protocol    Interpretation Summary    Left  ventricular systolic function is normal. Left ventricular ejection fraction appears to be 56 - 60%.    Left ventricular wall thickness is consistent with mild posterior asymmetric hypertrophy.    Left ventricular diastolic function is consistent with (grade Ia w/high LAP) impaired relaxation.    Mildly reduced right ventricular systolic function noted.    Systolic and diastolic flattening of the interventricular septum consistent with right ventricle pressure and volume overload.    The right ventricular cavity is moderately dilated.    The right atrial cavity is mild to moderately dilated.    There is mild calcification of the aortic valve.    Moderate aortic valve regurgitation is present.    Mild mitral valve regurgitation is present.    Moderate to severe tricuspid valve regurgitation is present.    Estimated right ventricular systolic pressure from tricuspid regurgitation is markedly elevated (>55 mmHg).      Current medications:  Scheduled Meds:amiodarone, 200 mg, Oral, Once   Followed by  [START ON 1/19/2024] amiodarone, 200 mg, Oral, Q8H   Followed by  [START ON 1/25/2024] amiodarone, 200 mg, Oral, Q12H   Followed by  [START ON 2/9/2024] amiodarone, 200 mg, Oral, Daily  aspirin, 81 mg, Oral, Daily  cefTRIAXone, 2,000 mg, Intravenous, Q24H  empagliflozin, 10 mg, Oral, Daily  ipratropium-albuterol, 3 mL, Nebulization, 4x Daily - RT  levothyroxine, 125 mcg, Oral, Q AM  Linezolid, 600 mg, Intravenous, Q12H  magnesium oxide, 400 mg, Oral, Daily  midodrine, 10 mg, Oral, TID AC  pantoprazole, 40 mg, Oral, Daily  pharmacy consult - [place], , Does not apply, Daily  pregabalin, 150 mg, Oral, Daily  propranolol, 20 mg, Oral, Q8H  rivaroxaban, 15 mg, Oral, Daily With Dinner  rivastigmine, 1 patch, Transdermal, Daily  senna-docusate sodium, 2 tablet, Oral, BID  sodium chloride, 10 mL, Intravenous, Q12H  tamsulosin, 0.4 mg, Oral, Daily      Continuous Infusions:amiodarone, 0.5 mg/min, Last Rate: 0.5 mg/min (01/18/24  0439)  nitroglycerin, 5-200 mcg/min, Last Rate: Stopped (01/16/24 1220)      PRN Meds:.  acetaminophen **OR** acetaminophen **OR** acetaminophen    senna-docusate sodium **AND** polyethylene glycol **AND** bisacodyl **AND** bisacodyl    calcium carbonate    hydrALAZINE    ondansetron ODT **OR** ondansetron    Potassium Replacement - Follow Nurse / BPA Driven Protocol    sodium chloride    sodium chloride    sodium chloride    Assessment & Plan   Assessment & Plan     Active Hospital Problems    Diagnosis  POA    **Acute on chronic heart failure with preserved ejection fraction (HFpEF) [I50.33]  Yes    Acute on chronic respiratory failure with hypoxia [J96.21]  Yes    Cellulitis of right lower extremity [L03.115]  Yes      Resolved Hospital Problems   No resolved problems to display.        Brief Hospital Course to date:  Zoila Nava is a 89 y.o. female  with past medical history significant for chronic diastolic heart failure, chronic respiratory failure on 2 L of nasal cannula, paroxysmal atrial fibrillation, valvular heart disease, mildly reduced right systolic function, dementia.  Patient was brought to the emergency room for worsening edema of lower extremities, wound and redness of right lower extremity, worsening shortness of breath.     **Acute on chronic DHF  VHD  Pulm HTN   **Atrial fibrillation with RVR and hypotension   --Cardiology follows    --ECHO with EF 56-60%, with diastolic dysfunction (grade la), mod AVR, mild MVR, mod TVR and elevated RVSP > 55 mmHg.   --diuresis per cardiology  -venous duplex negative for DVT or SVT   -went into afib with RVR overnight and started on Amio gtt. Received total 1.5 liters bolus as well. ProBNP up to 33k now, will defer to Cards for diuresis; now in NSR   --cont Xarelto   --CXR negative for any acute findings  --albumin IV once today for pitting edema and hypotension      **Cellulitis of right lower extremity   **Ecoli Bacteremia- pan sensitive   --   Patient was started on IV antibiotics at the emergency room and will continue that.    --ID consultation  --+ 2/2 BC's for Ecoli; ID changing to Ceftriaxone and Linezolid; rec at least 7 days IV Rocephin before oral transition   --labs stable/ improving   --Abd US due to findings of Ecoli bacteremia- negative         **Hypothyroidism, will continue home Synthroid     **HTN  --hypotensive due to above  -- Will continue midodrine TID   -- Holding propranolol       Expected Discharge Location and Transportation: TBD, likely return to snf   Expected Discharge   Expected Discharge Date: 1/22/2024; Expected Discharge Time:      DVT prophylaxis:  Medical DVT prophylaxis orders are present.     AM-PAC 6 Clicks Score (PT): 16 (01/17/24 7758)    CODE STATUS:   Code Status and Medical Interventions:   Ordered at: 01/16/24 1233     Medical Intervention Limits:    NO intubation (DNI)     Code Status (Patient has no pulse and is not breathing):    No CPR (Do Not Attempt to Resuscitate)     Medical Interventions (Patient has pulse or is breathing):    Limited Support       Theresa Moseley, APRN  01/18/24

## 2024-01-19 NOTE — PROGRESS NOTES
"  INFECTIOUS DISEASES  INPATIENT PROGRESS NOTE  2024      PATIENT NAME: Zoila Nava  :  1933  MRN:  [id number]  Date of Admission:  2024      Antimicrobials:  Day 4:  Linezolid  Ceftriaxone      MAR reviewed.    Chief Complaint   Patient presents with    Wound Check    Shortness of Breath       Reason for consultation: Sepsis, right leg cellulitis, E. coli bacteremia    Interval history: Patient doing well.  No fevers.  Tolerating abx.  Transitioned to oral amiodarone.  Leg feels fine; remains wrapped.    ROS:  No fevers/chills overnight.  No new rashes.  No nausea/vomiting/diarrhea.  Denies side effects from antimicrobials.      Objective:  Temp (24hrs), Av.6 °F (36.4 °C), Min:96.9 °F (36.1 °C), Max:98 °F (36.7 °C)    /59   Pulse 78   Temp 98 °F (36.7 °C)   Resp 16   Ht 152.4 cm (60\")   Wt 64 kg (141 lb)   LMP  (LMP Unknown)   SpO2 97%   BMI 27.54 kg/m²     Physical Examination:  GENERAL: Improved-appearing female.  Awake and alert.    HEENT: Normocephalic, atraumatic.    LUNGS: Normal respiratory effort.  ABDOMEN:  Soft, nontender, mildly distended.  EXT: 1+ bilateral lower extremity edema, improved.  SKIN: Right lower leg wrapped.  Right medial thigh erythema/warmth resolving.  NEURO: Awake and alert, hard of hearing.    Laboratory Data:    Results from last 7 days   Lab Units 24  0334 24  0120 24  0700   WBC 10*3/mm3 12.83* 20.77* 22.83*   HEMOGLOBIN g/dL 10.1* 11.2* 11.5*   HEMATOCRIT % 29.6* 33.2* 35.2   PLATELETS 10*3/mm3 168 185 170     Results from last 7 days   Lab Units 24  0334   SODIUM mmol/L 140   POTASSIUM mmol/L 3.2*   CHLORIDE mmol/L 103   CO2 mmol/L 24.0   BUN mg/dL 29*   CREATININE mg/dL 1.32*   GLUCOSE mg/dL 101*   CALCIUM mg/dL 8.1*     Results from last 7 days   Lab Units 24  0120   ALK PHOS U/L 129*   BILIRUBIN mg/dL 0.4   ALT (SGPT) U/L 36*   AST (SGOT) U/L 53*             Estimated Creatinine Clearance: 23.7 mL/min " (A) (by C-G formula based on SCr of 1.32 mg/dL (H)).  Results from last 7 days   Lab Units 01/16/24  1335   LACTATE mmol/L 2.0                   Microbiology:    Microbiology Results (last 10 days)       Procedure Component Value - Date/Time    COVID PRE-OP / PRE-PROCEDURE SCREENING ORDER (NO ISOLATION) - Swab, Nasopharynx [[id number]]  (Normal) Collected: 01/16/24 1040    Lab Status: Final result Specimen: Swab from Nasopharynx Updated: 01/16/24 1116    Narrative:      The following orders were created for panel order COVID PRE-OP / PRE-PROCEDURE SCREENING ORDER (NO ISOLATION) - Swab, Nasopharynx.  Procedure                               Abnormality         Status                     ---------                               -----------         ------                     COVID-19 and FLU A/B PCR...[[id number]]  Normal              Final result                 Please view results for these tests on the individual orders.    COVID-19 and FLU A/B PCR, 1 HR TAT - Swab, Nasopharynx [[id number]]  (Normal) Collected: 01/16/24 1040    Lab Status: Final result Specimen: Swab from Nasopharynx Updated: 01/16/24 1116     COVID19 Not Detected     Influenza A PCR Not Detected     Influenza B PCR Not Detected    Narrative:      Fact sheet for providers: https://www.fda.gov/media/864093/download    Fact sheet for patients: https://www.fda.gov/media/625242/download    Test performed by PCR.    Blood Culture - Blood, Arm, Right [[id number]]  (Abnormal)  (Susceptibility) Collected: 01/16/24 1025    Lab Status: Final result Specimen: Blood from Arm, Right Updated: 01/18/24 0618     Blood Culture Escherichia coli     Isolated from Aerobic and Anaerobic Bottles     Gram Stain Anaerobic Bottle Gram negative bacilli      Aerobic Bottle Gram negative bacilli    Susceptibility        Escherichia coli      VIANCA      Amoxicillin + Clavulanate Susceptible      Ampicillin Susceptible      Ampicillin + Sulbactam Susceptible      Cefepime Susceptible       Ceftazidime Susceptible      Ceftriaxone Susceptible      Gentamicin Susceptible      Levofloxacin Susceptible      Piperacillin + Tazobactam Susceptible      Trimethoprim + Sulfamethoxazole Susceptible                       Susceptibility Comments       Escherichia coli    Cefazolin sensitivity will not be reported for Enterobacteriaceae in non-urine isolates. If cefazolin is preferred, please call the microbiology lab to request an E-test.  With the exception of urinary-sourced infections, aminoglycosides should not be used as monotherapy.               Blood Culture ID, PCR - Blood, Arm, Right [[id number]]  (Abnormal) Collected: 01/16/24 1025    Lab Status: Final result Specimen: Blood from Arm, Right Updated: 01/16/24 2050     BCID, PCR Escherichia coli. Identification by BCID2 PCR.     BOTTLE TYPE Anaerobic Bottle    Narrative:      No resistance genes detected.    Blood Culture - Blood, Arm, Right [[id number]]  (Abnormal) Collected: 01/16/24 1020    Lab Status: Final result Specimen: Blood from Arm, Right Updated: 01/18/24 0618     Blood Culture Escherichia coli     Isolated from Aerobic and Anaerobic Bottles     Gram Stain Anaerobic Bottle Gram negative bacilli      Aerobic Bottle Gram negative bacilli    Narrative:      Less than seven (7) mL's of blood was collected.  Insufficient quantity may yield false negative results.    Refer to previous blood culture collected on 01/16/2024 1025 for MICs.              Radiology:  XR Chest 1 View    Result Date: 1/18/2024  Impression: Chronic findings. No acute process. Electronically Signed: Georgina Ramsey MD  1/18/2024 11:33 AM EST  Workstation ID: [id number]    US Abdomen Complete    Result Date: 1/18/2024  Small liver hemangioma. Small right renal cyst. No acute process. Electronically Signed: Georgina Ramsey MD  1/18/2024 7:15 AM EST  Workstation ID: [id number]       DISCUSSION:  89 y.o. female with history of heart failure and chronic leg edema admitted with  sepsis secondary to right lower extremity cellulitis.        PROBLEM LIST:   -- Sepsis, due to right lower extremity cellulitis and bacteremia.  Fever, tachycardia, hypotension, hypoxia, encephalopathy, lactic acidosis, leukocytosis, acute kidney injury.  Improving.  -- Nonpurulent right lower extremity cellulitis, portal of entry right anterior shin abrasion.  No purulence.  No crepitus.  Improving.  -- E. coli bacteremia, pan sensitive.  Unusual for cellulitis from E. coli but possible.  No urinalysis at presentation.  Denies abdominal complaints.  LFTs ok.  Abd U/S benign.  -- Chronic leg edema.  -- Acute on chronic heart failure with preserved ejection fraction.  -- Allergy to penicillin as a child, unknown reaction.  -- Afib/RVR, on amiodarone.    PLAN:  -- Discontinue linezolid   -- Ceftriaxone 2 g iv daily through 1/23/24   -- Continue compression and elevation of right leg    Plan discussed with patient and family at bedside.    I will see the patient again on Monday.  Please call my colleague on-call if issues in the interim.      Dwain Kaur MD  1/19/2024  14:54 EST

## 2024-01-19 NOTE — PROGRESS NOTES
[facility] Cardiology    Inpatient Progress Note      Chief Complaint/Reason for service:    Follow up congestive heart failure         Subjective:       Continues to feel better each day.  Alert, oriented this morning.  Denies shortness of breath on her baseline 2 L of oxygen at home.  Swelling continues to improve    Past medical, surgical, social and family history reviewed in the patient's electronic medical record.         Objective:      Infusions:          Medications:    Current Facility-Administered Medications:     acetaminophen (TYLENOL) tablet 650 mg, 650 mg, Oral, Q4H PRN, 650 mg at 24 2201 **OR** acetaminophen (TYLENOL) 160 MG/5ML oral solution 650 mg, 650 mg, Oral, Q4H PRN **OR** acetaminophen (TYLENOL) suppository 650 mg, 650 mg, Rectal, Q4H PRN, Cosme Lyons MD    [COMPLETED] amiodarone 150 mg in 100 mL D5W (loading dose), 150 mg, Intravenous, Once, 150 mg at 24 2305 **FOLLOWED BY** [] amiodarone 360 mg in 200 mL D5W infusion, 1 mg/min, Intravenous, Continuous, Stopped at 24 0437 **FOLLOWED BY** [] amiodarone 360 mg in 200 mL D5W infusion, 0.5 mg/min, Intravenous, Continuous, Stopped at 24 2237 **FOLLOWED BY** [COMPLETED] amiodarone (PACERONE) tablet 200 mg, 200 mg, Oral, Once, 200 mg at 24 2244 **FOLLOWED BY** amiodarone (PACERONE) tablet 200 mg, 200 mg, Oral, Q8H, 200 mg at 24 0641 **FOLLOWED BY** [START ON 2024] amiodarone (PACERONE) tablet 200 mg, 200 mg, Oral, Q12H **FOLLOWED BY** [START ON 2024] amiodarone (PACERONE) tablet 200 mg, 200 mg, Oral, Daily, Taylor Duque APRN    aspirin EC tablet 81 mg, 81 mg, Oral, Daily, Cosme Lyons MD, 81 mg at 24 0806    sennosides-docusate (PERICOLACE) 8.6-50 MG per tablet 2 tablet, 2 tablet, Oral, BID, 2 tablet at 24 0858 **AND** polyethylene glycol (MIRALAX) packet 17 g, 17 g, Oral, Daily PRN **AND** bisacodyl (DULCOLAX) EC tablet 5 mg, 5 mg, Oral, Daily  PRN **AND** bisacodyl (DULCOLAX) suppository 10 mg, 10 mg, Rectal, Daily PRN, Cosme Lyons MD    calcium carbonate (TUMS) chewable tablet 500 mg (200 mg elemental), 1 tablet, Oral, TID PRN, Cosme Lyons MD    cefTRIAXone (ROCEPHIN) 2,000 mg in sodium chloride 0.9 % 100 mL IVPB, 2,000 mg, Intravenous, Q24H, Dwain Kaur MD, Last Rate: 200 mL/hr at 01/19/24 0806, 2,000 mg at 01/19/24 0806    empagliflozin (JARDIANCE) tablet 10 mg, 10 mg, Oral, Daily, Basil Richter MD, 10 mg at 01/19/24 0806    hydrALAZINE (APRESOLINE) injection 10 mg, 10 mg, Intravenous, Q6H PRN, Cosme Lyons MD    ipratropium-albuterol (DUO-NEB) nebulizer solution 3 mL, 3 mL, Nebulization, 4x Daily - RT, Cosme Lyons MD, 3 mL at 01/19/24 0915    levothyroxine (SYNTHROID, LEVOTHROID) tablet 125 mcg, 125 mcg, Oral, Q AM, Cosme Lyons MD, 125 mcg at 01/19/24 0639    Linezolid (ZYVOX) 600 mg 300 mL, 600 mg, Intravenous, Q12H, Dwain Kaur MD, Stopped at 01/19/24 1017    magnesium oxide (MAG-OX) tablet 400 mg, 400 mg, Oral, Daily, Cosme Lyons MD, 400 mg at 01/19/24 0806    midodrine (PROAMATINE) tablet 10 mg, 10 mg, Oral, TID AC, Basil Richter MD, 10 mg at 01/19/24 0806    ondansetron ODT (ZOFRAN-ODT) disintegrating tablet 4 mg, 4 mg, Oral, Q6H PRN **OR** ondansetron (ZOFRAN) injection 4 mg, 4 mg, Intravenous, Q6H PRN, Cosem yLons MD    pantoprazole (PROTONIX) EC tablet 40 mg, 40 mg, Oral, Daily, Jun Fajardo MD, 40 mg at 01/19/24 0806    Pharmacy Consult - [facility], , Does not apply, Daily, Garfield Mejia IV, PharmD    potassium chloride (K-DUR,KLOR-CON) CR tablet 40 mEq, 40 mEq, Oral, Once, Jun Fajardo MD    Potassium Replacement - Follow Nurse / BPA Driven Protocol, , Does not apply, PRN, Basil Richter MD    pregabalin (LYRICA) capsule 150 mg, 150 mg, Oral, Daily, Gillian Hanson, PharmD, 150 mg at 01/19/24 0806    rivaroxaban (XARELTO) tablet 15 mg, 15 mg, Oral, Daily With Dinner, Eloy,  MD Cosme, 15 mg at 01/18/24 1841    rivastigmine (EXELON) 4.6 MG/24HR patch 1 patch, 1 patch, Transdermal, Daily, Cosme Lyons MD, 1 patch at 01/19/24 0816    sodium chloride 0.9 % flush 10 mL, 10 mL, Intravenous, PRN, Matt Cochran MD    sodium chloride 0.9 % flush 10 mL, 10 mL, Intravenous, Q12H, Cosme Lyons MD, 10 mL at 01/19/24 0807    sodium chloride 0.9 % flush 10 mL, 10 mL, Intravenous, PRN, Cosme Lyons MD    sodium chloride 0.9 % infusion 40 mL, 40 mL, Intravenous, PRN, Cosme Lyons MD    tamsulosin (FLOMAX) 24 hr capsule 0.4 mg, 0.4 mg, Oral, Daily, Cosme Lyons MD, 0.4 mg at 01/19/24 0806    Vital Sign Min/Max for last 24 hours  Temp  Min: 96.9 °F (36.1 °C)  Max: 98 °F (36.7 °C)   BP  Min: 96/60  Max: 144/85   Pulse  Min: 71  Max: 114   Resp  Min: 16  Max: 20   SpO2  Min: 85 %  Max: 99 %   No data recorded      Intake/Output Summary (Last 24 hours) at 1/19/2024 1151  Last data filed at 1/19/2024 0915  Gross per 24 hour   Intake 480 ml   Output 200 ml   Net 280 ml           CONSTITUTIONAL: No acute distress  RESPIRATORY: Normal effort. On 2L nasal cannula.  Mild left basilar rales  CARDIOVASCULAR: Regular rate and rhythm. There is improving lower extremity edema.  Now mild bilaterally.  Right LE wrapped.     Labs/studies:  Available lab and imaging results were reviewed by myself today    Results for orders placed during the hospital encounter of 01/16/24    Adult Transthoracic Echo Complete w/ Color, Spectral and Contrast if Necessary Per Protocol    Interpretation Summary    Left ventricular systolic function is normal. Left ventricular ejection fraction appears to be 56 - 60%.    Left ventricular wall thickness is consistent with mild posterior asymmetric hypertrophy.    Left ventricular diastolic function is consistent with (grade Ia w/high LAP) impaired relaxation.    Mildly reduced right ventricular systolic function noted.    Systolic and diastolic flattening of  the interventricular septum consistent with right ventricle pressure and volume overload.    The right ventricular cavity is moderately dilated.    The right atrial cavity is mild to moderately dilated.    There is mild calcification of the aortic valve.    Moderate aortic valve regurgitation is present.    Mild mitral valve regurgitation is present.    Moderate to severe tricuspid valve regurgitation is present.    Estimated right ventricular systolic pressure from tricuspid regurgitation is markedly elevated (>55 mmHg).      Tele:  NSR          Assessment/Plan:         Acute on chronic heart failure with preserved ejection fraction (HFpEF)    Acute on chronic respiratory failure with hypoxia    Cellulitis of right lower extremity       ASSESSMENT:  Acute on chronic HFpEF   Elevated proBNP of 10,000  IV Bumex 2 mg x 1 in the ED.   Most recent echo 4/2023 with EF 51-55%, mildly reduced RV systolc function, dilated LA an RA, mild to moderate AR, mild MR, mild TR.   Afib with RVR  Developed overnight 1/17.  Converted to sinus rhythm with amiodarone  Anticoagulated with Xarelto   On Amiodarone protocol.  Right LE cellulitis, Hypotension, possible sepsis  Acute on chronic respiratory failure   CKD stage III    PLAN:  Switching amiodarone to 200 mg once daily  Continue increased dose of midodrine 10 mg three times a day for hypotension.    Ordered potassium replacement per protocol  Tomorrow, scheduled to restart home Bumex regimen of 0.5 mg daily  Cardiology to revisit on Monday.  Please feel free to call with any questions or concerns in the interim.    If planned for discharge over the weekend, recommend follow-up in the heart valve clinic in 1 week.  Keep established follow-up in the cardiology clinic as currently scheduled    Basil Richter MD, MSc, FACC, [facility]  Interventional Cardiology  [facility]

## 2024-01-19 NOTE — THERAPY TREATMENT NOTE
Patient Name: Zoila Nava  : 1933    MRN: [id number]                              Today's Date: 2024       Admit Date: 2024    Visit Dx:     ICD-10-CM ICD-9-CM   1. Acute on chronic congestive heart failure, unspecified heart failure type  I50.9 428.0   2. Cellulitis of right lower extremity  L03.115 682.6   3. Elevated blood pressure reading with diagnosis of hypertension  I10 401.9   4. History of pulmonary hypertension  Z86.79 V12.59   5. Hypoxemia requiring supplemental oxygen  R09.02 799.02    Z99.81      Patient Active Problem List   Diagnosis    Allergic rhinitis    Hyperlipidemia    Hypertension    Hypocalcemia    Hypothyroidism    Neuropathy    NUD (nonulcer dyspepsia)    Painful knee    Pernicious anemia    Tremor    Vitamin B12 deficiency    Spondylolisthesis of cervical region    Graves' ophthalmopathy    Anemia    Memory impairment of gradual onset    Ascending aortic aneurysm    Stage 3 chronic kidney disease    Pulmonary hypertension    Chronic heart failure with preserved ejection fraction    CHF (congestive heart failure), NYHA class I, acute on chronic, diastolic    CHF (congestive heart failure)    Non-rheumatic aortic stenosis    Non-rheumatic aortic regurgitation    Hypokalemia    Carpal tunnel syndrome    Essential tremor    Gastroesophageal reflux disease    Nausea and vomiting    Skin sensation disturbance    Spinal cord disease    Urge incontinence of urine    Urinary urgency    Acute chest pain    (HFpEF) heart failure with preserved ejection fraction    Nocturnal hypoxia    Confusion    Concussion without loss of consciousness    Hypotension    AMS (altered mental status)    Multiple open wounds of lower extremity, unspecified laterality, subsequent encounter    Acute on chronic heart failure with preserved ejection fraction (HFpEF)    Acute on chronic respiratory failure with hypoxia    Cellulitis of right lower extremity     Past Medical History:   Diagnosis  Date    Anemia     Arthritis     Asthma     Cataract     Difficulty breathing     Chronic    GERD (gastroesophageal reflux disease)     Graves' ophthalmopathy     Hoarseness     Hypertension     Hypertensive heart disease with acute on chronic diastolic congestive heart failure 10/11/2021    Pulmonary hypertension 10/11/2021    Spondylolisthesis of cervical region     Vitamin B12 deficiency      Past Surgical History:   Procedure Laterality Date    CERVICAL LAMINECTOMY      CHOLECYSTECTOMY      OTHER SURGICAL HISTORY Right     Neuroplasty Median Nerve at Carpal Tunnel    THYROID SURGERY      TOTAL KNEE ARTHROPLASTY Left 09/29/2014    Dr Colon      General Information       Row Name 01/19/24 1421          Physical Therapy Time and Intention    Document Type therapy note (daily note)  -ND     Mode of Treatment physical therapy  -ND       Row Name 01/19/24 1421          General Information    Patient Profile Reviewed yes  -ND     Existing Precautions/Restrictions fall;oxygen therapy device and L/min  RLE wrapped  -ND     Barriers to Rehab medically complex;previous functional deficit;physical barrier  -ND       Row Name 01/19/24 1421          Cognition    Orientation Status (Cognition) oriented x 3  -ND       Row Name 01/19/24 1421          Safety Issues, Functional Mobility    Safety Issues Affecting Function (Mobility) safety precaution awareness;safety precautions follow-through/compliance;sequencing abilities  -ND     Impairments Affecting Function (Mobility) balance;endurance/activity tolerance;shortness of breath;strength  -ND               User Key  (r) = Recorded By, (t) = Taken By, (c) = Cosigned By      Initials Name Provider Type    ND Cassie Trejo PT Physical Therapist                   Mobility       Row Name 01/19/24 1422          Bed Mobility    Bed Mobility sit-supine  -ND     Sit-Supine [place] (Bed Mobility) maximum assist (25% patient effort);verbal cues;nonverbal cues  (demo/gesture);1 person assist  -ND     Comment, (Bed Mobility) Pt found sitting UIC and returned to bed for sponge bath. Pt requires increased time for task and VC for sequencing. Assist for LE with sit > supine.  -ND       Row Name 01/19/24 1422          Bed-Chair Transfer    Bed-Chair [place] (Transfers) moderate assist (50% patient effort);1 person assist;verbal cues;nonverbal cues (demo/gesture)  -ND     Assistive Device (Bed-Chair Transfers) other (see comments)  BUE support  -ND     Comment, (Bed-Chair Transfer) Stand pivot from chair > bed due to dizziness with mod-A. Increased time for task.  -ND       Row Name 01/19/24 1422          Sit-Stand Transfer    Sit-Stand [place] (Transfers) minimum assist (75% patient effort);1 person assist;verbal cues;nonverbal cues (demo/gesture)  -ND     Assistive Device (Sit-Stand Transfers) walker, front-wheeled  -ND     Comment, (Sit-Stand Transfer) STS from chair, pt endorsing dizziness that does not resolve with deep breathing and maintaining posture, BP taken and was 129/73, pt returned to sitting. STS from chair to perform SPT to bed with BUE support.  -ND       Row Name 01/19/24 1422          Gait/Stairs (Locomotion)    [place] Level (Gait) moderate assist (50% patient effort)  -ND     Assistive Device (Gait) other (see comments)  BUE support.  -ND     Distance in Feet (Gait) 2  -ND     Deviations/Abnormal Patterns (Gait) base of support, narrow  -ND     Bilateral Gait Deviations forward flexed posture  -ND     Comment, (Gait/Stairs) Pt taking side steps toward HOB.  -ND               User Key  (r) = Recorded By, (t) = Taken By, (c) = Cosigned By      Initials Name Provider Type    ND Cassie Trejo PT Physical Therapist                   Obj/Interventions       Row Name 01/19/24 1425          Motor Skills    Therapeutic Exercise hip;knee;ankle  -ND       Row Name 01/19/24 1425          Hip (Therapeutic Exercise)    Hip (Therapeutic Exercise)  strengthening exercise  -ND     Hip Strengthening (Therapeutic Exercise) bilateral;marching while seated;10 repetitions  -ND       Row Name 01/19/24 1425          Knee (Therapeutic Exercise)    Knee (Therapeutic Exercise) strengthening exercise  -ND     Knee Strengthening (Therapeutic Exercise) bilateral;LAQ (long arc quad);10 repetitions  -ND       Row Name 01/19/24 1425          Ankle (Therapeutic Exercise)    Ankle (Therapeutic Exercise) AROM (active range of motion)  -ND     Ankle AROM (Therapeutic Exercise) bilateral;dorsiflexion;plantarflexion;10 repetitions  -ND       Row Name 01/19/24 1425          Balance    Balance Assessment sitting static balance;sitting dynamic balance;sit to stand dynamic balance;standing static balance;standing dynamic balance  -ND     Static Sitting Balance standby assist  -ND     Dynamic Sitting Balance contact guard  -ND     Position, Sitting Balance unsupported;sitting in chair;sitting edge of bed  -ND     Sit to Stand Dynamic Balance minimal assist  -ND     Static Standing Balance contact guard  -ND     Dynamic Standing Balance moderate assist;verbal cues  -ND     Position/Device Used, Standing Balance supported;other (see comments);walker, front-wheeled  BUE support  -ND     Balance Interventions sitting;standing;sit to stand;supported;static;dynamic  -ND     Comment, Balance Pt with posterior lean upon initially achieving standing that pt corrects for with maintaining standing position.  -ND               User Key  (r) = Recorded By, (t) = Taken By, (c) = Cosigned By      Initials Name Provider Type    Cassie Reed PT Physical Therapist                   Goals/Plan    No documentation.                  Clinical Impression       Row Name 01/19/24 1427          Pain    Pretreatment Pain Rating 0/10 - no pain  -ND     Posttreatment Pain Rating 0/10 - no pain  -ND     Pain Intervention(s) Repositioned;Ambulation/increased activity  -ND       Row Name 01/19/24 1427           Plan of Care Review    Plan of Care Reviewed With patient;daughter  -ND     Progress no change  -ND     Outcome Evaluation Functional mobility limited this date by dizziness with standing position. Pt demonstrating decreased activity tolerance, strength, and balance warranting continued skilled therapy to facilitate increased independence with mobility. Recommend SNF following d/c.  -ND       Row Name 01/19/24 1427          Vital Signs    Pre Systolic BP Rehab 104  -ND     Pre Treatment Diastolic BP 69  -ND     Post Systolic BP Rehab 129  -ND     Post Treatment Diastolic BP 73  -ND     Pretreatment Heart Rate (beats/min) 88  -ND     Posttreatment Heart Rate (beats/min) 86  -ND     Pre SpO2 (%) 96  -ND     O2 Delivery Pre Treatment nasal cannula  -ND     O2 Delivery Intra Treatment nasal cannula  -ND     Post SpO2 (%) 98  -ND     O2 Delivery Post Treatment nasal cannula  -ND     Pre Patient Position Sitting  -ND     Intra Patient Position Standing  -ND     Post Patient Position Supine  -ND       Row Name 01/19/24 1427          Positioning and Restraints    Pre-Treatment Position sitting in chair/recliner  -ND     Post Treatment Position bed  -ND     In Bed with family/caregiver;fowlers;call light within reach;encouraged to call for assist;with nsg  -ND               User Key  (r) = Recorded By, (t) = Taken By, (c) = Cosigned By      Initials Name Provider Type    Cassie Reed, PT Physical Therapist                   Outcome Measures       Row Name 01/19/24 1429 01/19/24 0758       How much help from another person do you currently need...    Turning from your back to your side while in flat bed without using bedrails? 3  -ND 3  -LC    Moving from lying on back to sitting on the side of a flat bed without bedrails? 3  -ND 3  -LC    Moving to and from a bed to a chair (including a wheelchair)? 3  -ND 3  -LC    Standing up from a chair using your arms (e.g., wheelchair, bedside chair)? 3  -ND 2  -LC     Climbing 3-5 steps with a railing? 1  -ND 2  -    To walk in hospital room? 2  -ND 2  -LC    AM-PAC 6 Clicks Score (PT) 15  -ND 15  -    Highest Level of Mobility Goal 4 --> Transfer to chair/commode  -ND 4 --> Transfer to chair/commode  -      Row Name 01/19/24 1429          Functional Assessment    Outcome Measure Options AM-PAC 6 Clicks Basic Mobility (PT)  -ND               User Key  (r) = Recorded By, (t) = Taken By, (c) = Cosigned By      Initials Name Provider Type    LC Magi Rivers, RN Registered Nurse    Cassie Reed, PT Physical Therapist                                 Physical Therapy Education       Title: PT OT SLP Therapies (In Progress)       Topic: Physical Therapy (Done)       Point: Mobility training (Done)       Learning Progress Summary             Patient Acceptance, E, VU by ND at 1/19/2024 1430    Acceptance, E, VU by ND at 1/18/2024 0953   Family Acceptance, E, VU by ND at 1/18/2024 0953                         Point: Home exercise program (Done)       Learning Progress Summary             Patient Acceptance, E, VU by ND at 1/19/2024 1430                         Point: Body mechanics (Done)       Learning Progress Summary             Patient Acceptance, E, VU by ND at 1/19/2024 1430    Acceptance, E, VU by ND at 1/18/2024 0953   Family Acceptance, E, VU by ND at 1/18/2024 0953                         Point: Precautions (Done)       Learning Progress Summary             Patient Acceptance, E, VU by ND at 1/19/2024 1430    Acceptance, E, VU by ND at 1/18/2024 0953   Family Acceptance, E, VU by ND at 1/18/2024 0953                                         User Key       Initials Effective Dates Name Provider Type Discipline    ND 11/16/23 -  Cassie Trejo, FABRICE Physical Therapist PT                  PT Recommendation and Plan  Planned Therapy Interventions (PT): balance training, bed mobility training, gait training, home exercise program, transfer training, patient/family  education, strengthening, postural re-education  Plan of Care Reviewed With: patient, daughter  Progress: no change  Outcome Evaluation: Functional mobility limited this date by dizziness with standing position. Pt demonstrating decreased activity tolerance, strength, and balance warranting continued skilled therapy to facilitate increased independence with mobility. Recommend SNF following d/c.     Time Calculation:   PT Evaluation Complexity  History, PT Evaluation Complexity: 3 or more personal factors and/or comorbidities  Examination of Body Systems (PT Eval Complexity): total of 4 or more elements  Clinical Presentation (PT Evaluation Complexity): evolving  Clinical Decision Making (PT Evaluation Complexity): moderate complexity  Overall Complexity (PT Evaluation Complexity): moderate complexity     PT Charges       Row Name 01/19/24 1430             Time Calculation    Start Time 1311  -ND      PT Received On 01/19/24  -ND      PT Goal Re-Cert Due Date 01/28/24  -ND         Timed Charges    31155 - PT Therapeutic Activity Minutes 25  -ND         Total Minutes    Timed Charges Total Minutes 25  -ND       Total Minutes 25  -ND                User Key  (r) = Recorded By, (t) = Taken By, (c) = Cosigned By      Initials Name Provider Type    ND Cassie Trejo, PT Physical Therapist                  Therapy Charges for Today       Code Description Service Date Service Provider Modifiers Qty    96758164902 HC PT EVAL MOD COMPLEXITY 4 1/18/2024 Cassie Trejo, PT GP 1    71741701176 HC PT THERAPEUTIC ACT EA 15 MIN 1/19/2024 Cassie rTejo, PT GP 2            PT G-Codes  Outcome Measure Options: AM-PAC 6 Clicks Basic Mobility (PT)  AM-PAC 6 Clicks Score (PT): 15  AM-PAC 6 Clicks Score (OT): 16  PT Discharge Summary  Anticipated Discharge Disposition (PT): skilled nursing facility    Cassie Trejo PT  1/19/2024

## 2024-01-19 NOTE — PROGRESS NOTES
[facility] Medicine Services  PROGRESS NOTE    Patient Name: Zoila Nava  : 1933  MRN: [id number]    Date of Admission: 2024  Primary Care Physician: Arnoldo Oneil MD    Subjective   Subjective     CC:  SOA     HPI:  Up in chair. Smiling. Dtr in room, states patient slept well and is much better today. Breathing well, and down to 2LNC that is her baseline per dtr. BLE edema is improving as well.       Objective   Objective     Vital Signs:   Temp:  [97.2 °F (36.2 °C)-98 °F (36.7 °C)] 97.9 °F (36.6 °C)  Heart Rate:  [] 78  Resp:  [16-20] 18  BP: ()/(58-96) 127/83  Flow (L/min):  [2] 2     Physical Exam:  Constitutional: No acute distress, awake, alert, elderly/ frail up in chair   HENT: NCAT, mucous membranes moist  Respiratory: CTAB with decreased bases, respiratory effort normal on 2LNC satting 98%  Cardiovascular: RRR, no murmurs, rubs, or gallops  Gastrointestinal: Positive bowel sounds, soft, nontender, nondistended  Musculoskeletal: 1+ BLE with RLE compression wrap in place   Psychiatric: Appropriate affect, cooperative  Neurologic: Oriented x 3, RICE, speech clear  Skin: RLE with erythema/ increased warmth and edema from medial right thigh/ and circumferential knee down to foot without induration or drainage     Results Reviewed:  LAB RESULTS:      Lab 24  0334 24  0120 24  0700 24  1335 24  1024   WBC 12.83* 20.77* 22.83*  --  16.69*   HEMOGLOBIN 10.1* 11.2* 11.5*  --  13.2   HEMATOCRIT 29.6* 33.2* 35.2  --  40.6   PLATELETS 168 185 170  --  262   NEUTROS ABS 11.52* 18.99* 20.30*  --  15.63*   IMMATURE GRANS (ABS) 0.08* 0.20* 0.25*  --  0.08*   LYMPHS ABS 0.40* 0.59* 1.13  --  0.58*   MONOS ABS 0.57 0.79 1.08*  --  0.27   EOS ABS 0.24 0.17 0.02  --  0.11   MCV 89.2 90.5 92.4  --  93.3   PROCALCITONIN  --   --   --   --  0.12   LACTATE  --   --   --  2.0 4.3*         Lab 24  0334 24  0120 24  0932  01/16/24  1024   SODIUM 140 139 139 140   POTASSIUM 3.2* 3.3* 3.4* 4.4   CHLORIDE 103 102 101 100   CO2 24.0 24.0 25.0 26.0   ANION GAP 13.0 13.0 13.0 14.0   BUN 29* 34* 34* 33*   CREATININE 1.32* 1.44* 1.43* 1.36*   EGFR 38.4* 34.6* 34.9* 37.1*   GLUCOSE 101* 158* 151* 160*   CALCIUM 8.1* 7.9* 8.0* 9.0   MAGNESIUM  --   --   --  1.9   PHOSPHORUS  --   --   --  3.0         Lab 01/18/24  0120 01/16/24  1024   TOTAL PROTEIN 5.3* 6.8   ALBUMIN 2.9* 4.2   GLOBULIN 2.4 2.6   ALT (SGPT) 36* 24   AST (SGOT) 53* 34*   BILIRUBIN 0.4 0.8   ALK PHOS 129* 94         Lab 01/18/24  0120 01/16/24  1024   PROBNP 35,116.0* 10,191.0*   HSTROP T  --  51*         Lab 01/17/24  0932   CHOLESTEROL 110   LDL CHOL 46   HDL CHOL 48   TRIGLYCERIDES 78             Lab 01/16/24  1059   PH, ARTERIAL 7.523*   PCO2, ARTERIAL 28.4*   PO2 .0*   FIO2 50   HCO3 ART 23.3   BASE EXCESS ART 1.4   CARBOXYHEMOGLOBIN 1.2     Brief Urine Lab Results  (Last result in the past 365 days)        Color   Clarity   Blood   Leuk Est   Nitrite   Protein   CREAT   Urine HCG        10/28/23 2320 Yellow   Clear   Negative   Negative   Negative   Negative                   Microbiology Results Abnormal       Procedure Component Value - Date/Time    COVID PRE-OP / PRE-PROCEDURE SCREENING ORDER (NO ISOLATION) - Swab, Nasopharynx [[id number]]  (Normal) Collected: 01/16/24 1040    Lab Status: Final result Specimen: Swab from Nasopharynx Updated: 01/16/24 1116    Narrative:      The following orders were created for panel order COVID PRE-OP / PRE-PROCEDURE SCREENING ORDER (NO ISOLATION) - Swab, Nasopharynx.  Procedure                               Abnormality         Status                     ---------                               -----------         ------                     COVID-19 and FLU A/B PCR...[[id number]]  Normal              Final result                 Please view results for these tests on the individual orders.    COVID-19 and FLU A/B PCR, 1 HR TAT -  Swab, Nasopharynx [[id number]]  (Normal) Collected: 01/16/24 1040    Lab Status: Final result Specimen: Swab from Nasopharynx Updated: 01/16/24 1116     COVID19 Not Detected     Influenza A PCR Not Detected     Influenza B PCR Not Detected    Narrative:      Fact sheet for providers: https://www.fda.gov/media/805624/download    Fact sheet for patients: https://www.fda.gov/media/399592/download    Test performed by PCR.            XR Chest 1 View    Result Date: 1/18/2024  XR CHEST 1 VW Date of Exam: 1/18/2024 10:49 AM EST Indication: soa Comparison: 1/16/2024. Findings: There is stable moderately severe cardiomegaly. There are chronic lung infiltrates. These appear similar when compared to several old exams. There is mild chronic blunting of the left costophrenic angle. No definite new abnormalities are identified.     Impression: Impression: Chronic findings. No acute process. Electronically Signed: Georgina Ramsey MD  1/18/2024 11:33 AM EST  Workstation ID: [id number]    US Abdomen Complete    Result Date: 1/18/2024  US ABDOMEN COMPLETE Date of Exam: 1/17/2024 11:18 PM EST Indication: E coli bacteremia, acute kidney injury. Comparison: No comparisons available. Technique: Routine gray scale and color Doppler imaging of the complete abdomen was performed according to routine protocol. Findings: There is atherosclerotic calcific plaque of the abdominal aorta. The abdominal aorta is normal in size and demonstrates normal blood flow on the color flow Doppler images. The intrahepatic IVC is patent. The pancreas is normal in size and echogenicity. Liver is normal in size and shape. There is an 11 x 9 mm hyperechoic focus in the right hepatic lobe most compatible with an hemangioma. No other liver lesions are present. The gallbladder has been removed. There is no bile duct dilatation. The portal vein and hepatic veins are patent. Kidneys are normal in size and shape and demonstrate no hydronephrosis. There is a 7 mm right  renal cortical cyst. The spleen size is normal.     Impression: Small liver hemangioma. Small right renal cyst. No acute process. Electronically Signed: Georgina Ramsey MD  1/18/2024 7:15 AM EST  Workstation ID: [id number]     Results for orders placed during the hospital encounter of 01/16/24    Adult Transthoracic Echo Complete w/ Color, Spectral and Contrast if Necessary Per Protocol    Interpretation Summary    Left ventricular systolic function is normal. Left ventricular ejection fraction appears to be 56 - 60%.    Left ventricular wall thickness is consistent with mild posterior asymmetric hypertrophy.    Left ventricular diastolic function is consistent with (grade Ia w/high LAP) impaired relaxation.    Mildly reduced right ventricular systolic function noted.    Systolic and diastolic flattening of the interventricular septum consistent with right ventricle pressure and volume overload.    The right ventricular cavity is moderately dilated.    The right atrial cavity is mild to moderately dilated.    There is mild calcification of the aortic valve.    Moderate aortic valve regurgitation is present.    Mild mitral valve regurgitation is present.    Moderate to severe tricuspid valve regurgitation is present.    Estimated right ventricular systolic pressure from tricuspid regurgitation is markedly elevated (>55 mmHg).      Current medications:  Scheduled Meds:[START ON 1/20/2024] amiodarone, 200 mg, Oral, Q24H  aspirin, 81 mg, Oral, Daily  [START ON 1/20/2024] bumetanide, 0.5 mg, Oral, Daily  cefTRIAXone, 2,000 mg, Intravenous, Q24H  empagliflozin, 10 mg, Oral, Daily  ipratropium-albuterol, 3 mL, Nebulization, 4x Daily - RT  levothyroxine, 125 mcg, Oral, Q AM  magnesium oxide, 400 mg, Oral, Daily  midodrine, 10 mg, Oral, TID AC  pantoprazole, 40 mg, Oral, Daily  pharmacy consult - MTM, , Does not apply, Daily  pregabalin, 150 mg, Oral, Daily  rivaroxaban, 15 mg, Oral, Daily With Dinner  rivastigmine, 1 patch,  Transdermal, Daily  senna-docusate sodium, 2 tablet, Oral, BID  sodium chloride, 10 mL, Intravenous, Q12H  tamsulosin, 0.4 mg, Oral, Daily      Continuous Infusions:     PRN Meds:.  acetaminophen **OR** acetaminophen **OR** acetaminophen    senna-docusate sodium **AND** polyethylene glycol **AND** bisacodyl **AND** bisacodyl    calcium carbonate    hydrALAZINE    ondansetron ODT **OR** ondansetron    Potassium Replacement - Follow Nurse / BPA Driven Protocol    sodium chloride    sodium chloride    sodium chloride    Assessment & Plan   Assessment & Plan     Active Hospital Problems    Diagnosis  POA    **Acute on chronic heart failure with preserved ejection fraction (HFpEF) [I50.33]  Yes    Acute on chronic respiratory failure with hypoxia [J96.21]  Yes    Cellulitis of right lower extremity [L03.115]  Yes      Resolved Hospital Problems   No resolved problems to display.        Brief Hospital Course to date:  Zoila Nava is a 89 y.o. female  with past medical history significant for chronic diastolic heart failure, chronic respiratory failure on 2 L of nasal cannula, paroxysmal atrial fibrillation, valvular heart disease, mildly reduced right systolic function, dementia.  Patient was brought to the emergency room for worsening edema of lower extremities, wound and redness of right lower extremity, worsening shortness of breath.     **Acute on chronic DHF  VHD  Pulm HTN   **Atrial fibrillation with RVR and hypotension   --Cardiology follows    --ECHO with EF 56-60%, with diastolic dysfunction (grade la), mod AVR, mild MVR, mod TVR and elevated RVSP > 55 mmHg.   --diuresis per cardiology  -venous duplex negative for DVT or SVT   -went into afib with RVR overnight and started on Amio gtt. And received a few boluses.  ProBNP up to 33k now, will defer to Cards for diuresis; now in NSR   --cont Xarelto   --CXR negative for any acute findings  --albumin IV for pitting edema and hypotension - improvement  seen  --transitioning to oral Amio today and restarting gentle diuresis tomorrow per Cards  --will need Heart and Valve clinic follow up in 1 week after DC     **Cellulitis of right lower extremity   **Ecoli Bacteremia- pan sensitive   --  Patient was started on IV antibiotics at the emergency room and will continue that.    --ID consultation  --+ 2/2 BC's for Ecoli; ID changed to Ceftriaxone; rec at least 7 days IV Rocephin before oral transition   --labs stable/ improving   --Abd US due to findings of Ecoli bacteremia- negative         **Hypothyroidism, will continue home Synthroid     **HTN  --hypotensive due to above  -- Will continue midodrine TID   -- Holding propranolol   --improved       Expected Discharge Location and Transportation: [facility], would like to return to [facility]  Expected Discharge   Expected Discharge Date: 1/22/2024; Expected Discharge Time:      DVT prophylaxis:  Medical DVT prophylaxis orders are present.     AM-PAC 6 Clicks Score (PT): 15 (01/19/24 7845)    CODE STATUS:   Code Status and Medical Interventions:   Ordered at: 01/16/24 1233     Medical Intervention Limits:    NO intubation (DNI)     Code Status (Patient has no pulse and is not breathing):    No CPR (Do Not Attempt to Resuscitate)     Medical Interventions (Patient has pulse or is breathing):    Limited Support       Theresa Moseley, APRN  01/19/24

## 2024-01-19 NOTE — CASE MANAGEMENT/SOCIAL WORK
Continued Stay Note  [facility]     Patient Name: Zolia Nava  MRN: [id number]  Today's Date: 1/19/2024    Admit Date: 1/16/2024    Plan: Rehab   Discharge Plan       Row Name 01/19/24 1651       Plan    Plan Rehab    Patient/Family in Agreement with Plan yes    Plan Comments CM made referral to [facility] for rehab. CM will follow.    Final Discharge Disposition Code 62 - inpatient rehab facility                   Discharge Codes    No documentation.                 Expected Discharge Date and Time       Expected Discharge Date Expected Discharge Time    Jan 22, 2024               Yissel Oliver RN

## 2024-01-19 NOTE — PLAN OF CARE
Goal Outcome Evaluation:  Plan of Care Reviewed With: patient, daughter        Progress: no change  Outcome Evaluation: Functional mobility limited this date by dizziness with standing position. Pt demonstrating decreased activity tolerance, strength, and balance warranting continued skilled therapy to facilitate increased independence with mobility. Recommend SNF following d/c.      Anticipated Discharge Disposition (PT): skilled nursing facility

## 2024-01-19 NOTE — PLAN OF CARE
Goal Outcome Evaluation:              Outcome Evaluation: BP improved, now on 2LNC. Amiodarone transitioned from gtt to PO.

## 2024-01-19 NOTE — PLAN OF CARE
Goal Outcome Evaluation:              Outcome Evaluation: Patient weaned to 1L NC. Patient on room air in AM, shortness of breath after exertion requiring oxygen. Patient up in chair, up with therapy. PO intake encouraged, no BM this shift. Skin interventions in place (isotour mattress, heel boots, incontinence pads, z guard), patient turned and repositioned as tolerated. Patient reeducated about call light use, incentive spirometer use, deep breathing.

## 2024-01-19 NOTE — CASE MANAGEMENT/SOCIAL WORK
Continued Stay Note  [facility]     Patient Name: Zoila Nava  MRN: [id number]  Today's Date: 1/19/2024    Admit Date: 1/16/2024    Plan: Assisted Living [facility]   Discharge Plan       Row Name 01/19/24 1027       Plan    Plan Assisted Living [facility]    Patient/Family in Agreement with Plan yes    Plan Comments Spoke with patient and family member at bedside. Patient discharge Goal is back to [facility] when medically ready. However, patient is agreeable to go to [facility] if needed. Patient will need to a min assist x 1 to be able to go back to [facility]. CM will follow for any discharge needs.    Final Discharge Disposition Code 30 - still a patient                   Discharge Codes    No documentation.                 Expected Discharge Date and Time       Expected Discharge Date Expected Discharge Time    Jan 22, 2024               Yissel Oliver RN

## 2024-01-20 PROBLEM — R78.81 E COLI BACTEREMIA: Status: ACTIVE | Noted: 2024-01-01

## 2024-01-20 PROBLEM — B96.20 E COLI BACTEREMIA: Status: ACTIVE | Noted: 2024-01-01

## 2024-01-20 NOTE — PLAN OF CARE
Goal Outcome Evaluation:  Plan of Care Reviewed With: patient, family        Progress: improving  Outcome Evaluation: Pt with improving BLE redness, edema stable.  RLE with min erythema, faint erythema of LLE.  Small dry scab/eschar to medial prox RLE but no drainage.  PT removed unna boot RLE, washed BLE and applied light compression wrapping with size 4&5 compressogrips.  RN may remove wraps PRN if pt c/o discomfort or worsening SOA.  PT will change wraps in 2-3 days.    RLE:    LLE:

## 2024-01-20 NOTE — PLAN OF CARE
"Goal Outcome Evaluation:  Plan of Care Reviewed With: patient           Outcome Evaluation: Patient c/o  feeling a band around her lower ribs and having difficulty deep breathing. Patient lungs clear. On 1L NC. Oxygen saturation greater then 90%. Provider paged. RN called to room by staff. Patient soa and tachynea noted. Patient Oxygen increased to 4L NC by staff member reporting oxygen saturation 88% on 1L NC. Patient noted to have HR low 100's.   Patient repositioned to a 90 angle. Deep slow breathing encouraged. Patient lungs remain clear. Provider paged. Notified of Soa, HR, lung sounds, history and patient feeling of \"rubber band\" restricting breathing. New order chest xray and duoneb schedule changed. Patient RR decrease with calming techniques. Provider notified of CXR results. No new orders received. Patient titrated to 3L NC. Patient reported no Improvement in soa, maintaining oxygen saturation greater the 95%. Patient states more comfortable sitting up. Bilateral Lower lung crackles noted. Cardiology paged. Hospitalist notified of of continued soa, new order lasix.  Patient remains on 3L NC. Reported improvement in soa per patient.                               "

## 2024-01-20 NOTE — PROGRESS NOTES
[facility] Medicine Services  PROGRESS NOTE    Patient Name: Zoila Nava  : 1933  MRN: [id number]    Date of Admission: 2024  Primary Care Physician: Arnoldo Oneil MD    Subjective   Subjective     CC:  SOA     HPI:  Sitting up in chair, holding spouse's hand (who is visiting, also a pt) was not wearing NC when entered room satting 90%, placed NC on improved to 95% 3L.   Pt reports SOA is improving, better than a couple nights ago. Did not sleep well last night, too much on her mind but not due to SOA. Denies CP/palpations/abd pain. Notes very soft stools. Decreased appetite, likes chocolate flavored shakes.   Occasional cough, no fever. Not using IS much.   Spoke with nsg, due to ongoing SOA and Afib, getting IV bumex and amio today per Cards.     Objective   Objective     Vital Signs:   Temp:  [97.7 °F (36.5 °C)-98.2 °F (36.8 °C)] 97.7 °F (36.5 °C)  Heart Rate:  [] 101  Resp:  [16-22] 22  BP: (101-146)/(59-98) 140/84  Flow (L/min):  [1-3] 3     Physical Exam:  Constitutional: No acute distress, awake, alert, elderly/ frail up in chair   HENT: NCAT, mucous membranes moist  Respiratory: rales in bases bilat L>R, mild dyspnea with speaking, occasional cough, satting 95% on 3LNC   Cardiovascular: irregularly irregular (-130's at rest on tele) no murmurs, rubs, or gallops  Gastrointestinal: Positive bowel sounds, soft, round, nontender, nondistended  Musculoskeletal: 1+ BLE with RLE compression wrap in place   Psychiatric: Appropriate affect, cooperative  Neurologic: Oriented x 3, RICE, speech clear  Skin: RLE with erythema/ increased warmth and edema from medial right thigh/ and circumferential knee down to foot without induration or drainage; ecchymosis BUE    Results Reviewed:  LAB RESULTS:      Lab 24  0334 24  0120 24  0700 24  1335 24  1024   WBC 12.83* 20.77* 22.83*  --  16.69*   HEMOGLOBIN 10.1* 11.2* 11.5*  --  13.2    HEMATOCRIT 29.6* 33.2* 35.2  --  40.6   PLATELETS 168 185 170  --  262   NEUTROS ABS 11.52* 18.99* 20.30*  --  15.63*   IMMATURE GRANS (ABS) 0.08* 0.20* 0.25*  --  0.08*   LYMPHS ABS 0.40* 0.59* 1.13  --  0.58*   MONOS ABS 0.57 0.79 1.08*  --  0.27   EOS ABS 0.24 0.17 0.02  --  0.11   MCV 89.2 90.5 92.4  --  93.3   PROCALCITONIN  --   --   --   --  0.12   LACTATE  --   --   --  2.0 4.3*         Lab 01/19/24  0334 01/18/24  0120 01/17/24  0932 01/16/24  1024   SODIUM 140 139 139 140   POTASSIUM 3.2* 3.3* 3.4* 4.4   CHLORIDE 103 102 101 100   CO2 24.0 24.0 25.0 26.0   ANION GAP 13.0 13.0 13.0 14.0   BUN 29* 34* 34* 33*   CREATININE 1.32* 1.44* 1.43* 1.36*   EGFR 38.4* 34.6* 34.9* 37.1*   GLUCOSE 101* 158* 151* 160*   CALCIUM 8.1* 7.9* 8.0* 9.0   MAGNESIUM  --   --   --  1.9   PHOSPHORUS  --   --   --  3.0         Lab 01/18/24  0120 01/16/24  1024   TOTAL PROTEIN 5.3* 6.8   ALBUMIN 2.9* 4.2   GLOBULIN 2.4 2.6   ALT (SGPT) 36* 24   AST (SGOT) 53* 34*   BILIRUBIN 0.4 0.8   ALK PHOS 129* 94         Lab 01/18/24  0120 01/16/24  1024   PROBNP 35,116.0* 10,191.0*   HSTROP T  --  51*         Lab 01/17/24  0932   CHOLESTEROL 110   LDL CHOL 46   HDL CHOL 48   TRIGLYCERIDES 78             Lab 01/16/24  1059   PH, ARTERIAL 7.523*   PCO2, ARTERIAL 28.4*   PO2 .0*   FIO2 50   HCO3 ART 23.3   BASE EXCESS ART 1.4   CARBOXYHEMOGLOBIN 1.2     Brief Urine Lab Results  (Last result in the past 365 days)        Color   Clarity   Blood   Leuk Est   Nitrite   Protein   CREAT   Urine HCG        10/28/23 2320 Yellow   Clear   Negative   Negative   Negative   Negative                   Microbiology Results Abnormal       Procedure Component Value - Date/Time    COVID PRE-OP / PRE-PROCEDURE SCREENING ORDER (NO ISOLATION) - Swab, Nasopharynx [[id number]]  (Normal) Collected: 01/16/24 1040    Lab Status: Final result Specimen: Swab from Nasopharynx Updated: 01/16/24 1116    Narrative:      The following orders were created for panel order  COVID PRE-OP / PRE-PROCEDURE SCREENING ORDER (NO ISOLATION) - Swab, Nasopharynx.  Procedure                               Abnormality         Status                     ---------                               -----------         ------                     COVID-19 and FLU A/B PCR...[[id number]]  Normal              Final result                 Please view results for these tests on the individual orders.    COVID-19 and FLU A/B PCR, 1 HR TAT - Swab, Nasopharynx [[id number]]  (Normal) Collected: 01/16/24 1040    Lab Status: Final result Specimen: Swab from Nasopharynx Updated: 01/16/24 1116     COVID19 Not Detected     Influenza A PCR Not Detected     Influenza B PCR Not Detected    Narrative:      Fact sheet for providers: https://www.fda.gov/media/092751/download    Fact sheet for patients: https://www.fda.gov/media/119149/download    Test performed by PCR.            XR Chest 1 View    Result Date: 1/19/2024  XR CHEST 1 VW Date of Exam: 1/19/2024 9:53 PM EST Indication: SOB Comparison: Chest radiograph performed on January 18, 2024 Findings: No focal consolidation. Small bilateral pleural effusions. There is no evidence of pneumothorax. There is mild pulmonary vascular congestion. Atheromatous calcifications of the aortic arch are visualized. Cardiac silhouette is markedly enlarged. No acute  osseous abnormality identified.     Impression: Impression: Findings compatible with mild CHF. Small bilateral pleural effusions. Electronically Signed: Uriah Godwin MD  1/19/2024 9:57 PM EST  Workstation ID: [id number]    XR Chest 1 View    Result Date: 1/18/2024  XR CHEST 1 VW Date of Exam: 1/18/2024 10:49 AM EST Indication: soa Comparison: 1/16/2024. Findings: There is stable moderately severe cardiomegaly. There are chronic lung infiltrates. These appear similar when compared to several old exams. There is mild chronic blunting of the left costophrenic angle. No definite new abnormalities are identified.     Impression:  Impression: Chronic findings. No acute process. Electronically Signed: Georgina Ramsey MD  1/18/2024 11:33 AM EST  Workstation ID: [id number]     Results for orders placed during the hospital encounter of 01/16/24    Adult Transthoracic Echo Complete w/ Color, Spectral and Contrast if Necessary Per Protocol    Interpretation Summary    Left ventricular systolic function is normal. Left ventricular ejection fraction appears to be 56 - 60%.    Left ventricular wall thickness is consistent with mild posterior asymmetric hypertrophy.    Left ventricular diastolic function is consistent with (grade Ia w/high LAP) impaired relaxation.    Mildly reduced right ventricular systolic function noted.    Systolic and diastolic flattening of the interventricular septum consistent with right ventricle pressure and volume overload.    The right ventricular cavity is moderately dilated.    The right atrial cavity is mild to moderately dilated.    There is mild calcification of the aortic valve.    Moderate aortic valve regurgitation is present.    Mild mitral valve regurgitation is present.    Moderate to severe tricuspid valve regurgitation is present.    Estimated right ventricular systolic pressure from tricuspid regurgitation is markedly elevated (>55 mmHg).      Current medications:  Scheduled Meds:amiodarone, 200 mg, Oral, Q24H  aspirin, 81 mg, Oral, Daily  bumetanide, 0.5 mg, Oral, Daily  cefTRIAXone, 2,000 mg, Intravenous, Q24H  empagliflozin, 10 mg, Oral, Daily  ipratropium-albuterol, 3 mL, Nebulization, Q4H - RT  levothyroxine, 125 mcg, Oral, Q AM  magnesium oxide, 400 mg, Oral, Daily  midodrine, 10 mg, Oral, TID AC  pantoprazole, 40 mg, Oral, Daily  pharmacy consult - [place], , Does not apply, Daily  pregabalin, 150 mg, Oral, Daily  rivaroxaban, 15 mg, Oral, Daily With Dinner  rivastigmine, 1 patch, Transdermal, Daily  senna-docusate sodium, 2 tablet, Oral, BID  sodium chloride, 10 mL, Intravenous, Q12H  tamsulosin, 0.4 mg,  Oral, Daily      Continuous Infusions:     PRN Meds:.  acetaminophen **OR** acetaminophen **OR** acetaminophen    senna-docusate sodium **AND** polyethylene glycol **AND** bisacodyl **AND** bisacodyl    calcium carbonate    hydrALAZINE    melatonin    ondansetron ODT **OR** ondansetron    Potassium Replacement - Follow Nurse / BPA Driven Protocol    sodium chloride    sodium chloride    sodium chloride    Assessment & Plan   Assessment & Plan     Active Hospital Problems    Diagnosis  POA    **Acute on chronic heart failure with preserved ejection fraction (HFpEF) [I50.33]  Yes    Acute on chronic respiratory failure with hypoxia [J96.21]  Yes    Cellulitis of right lower extremity [L03.115]  Yes      Resolved Hospital Problems   No resolved problems to display.        Brief Hospital Course to date:  Zoila Nava is a 89 y.o. female  with past medical history significant for chronic diastolic heart failure, chronic respiratory failure on 2 L of nasal cannula, paroxysmal atrial fibrillation, valvular heart disease, mildly reduced right systolic function, dementia.  Patient was brought to the emergency room for worsening edema of lower extremities, wound and redness of right lower extremity, worsening shortness of breath.     Acute on chronic DHF  VHD  Pulm HTN   Atrial fibrillation with RVR and hypotension   Cardiology managing   ECHO with EF 56-60%, with diastolic dysfunction (grade la), mod AVR, mild MVR, mod TVR and elevated RVSP > 55 mmHg.   venous duplex negative for DVT or SVT   cont Xarelto   CXR negative for any acute findings  S/p albumin IV for pitting edema and hypotension - improvement seen  Afib w/RVR 1/17 , Converted to sinus rhythm with amiodarone, transition to oral Amiodarone 200mg daily 1/19 also increased midodrine to 10mg TID for hypotension  Cardiology resumed home dose bumex 0.5mg 1/20, received IV lasix 20mg this am.  Will check bmp, cbc, mag  Will order CXR   Encouraged IS  will need  Heart and Valve clinic follow up in 1 week after DC, Keep established follow-up in the cardiology clinic as currently scheduled      Cellulitis of right lower extremity   Ecoli Bacteremia- pan sensitive    ID follows   Ecoli bacteremia; per Dr. Kaur: discontinue linezolid Ceftriaxone 2 g iv daily through 1/23/24   continue compression and elevation of right leg   Leukocytosis improving  Abd US due to findings of Ecoli bacteremia- negative         Hypokalemia  On 40mEqKCL  SS K+  Hypothyroidism  continue home Synthroid    Insomnia  -will change melatonin to nightly    Add boost plus arcelia, fiber for soft stool (getting mag ox 400mg, likely contributing)     Disposition  CM has made referral to [facility] for inpt rehab    Expected Discharge Location and Transportation: TBD, would like to return to [facility]  Expected Discharge   Expected Discharge Date: 1/22/2024; Expected Discharge Time:      DVT prophylaxis:  Medical DVT prophylaxis orders are present.     AM-PAC 6 Clicks Score (PT): 15 (01/19/24 2035)    CODE STATUS:   Code Status and Medical Interventions:   Ordered at: 01/16/24 1233     Medical Intervention Limits:    NO intubation (DNI)     Code Status (Patient has no pulse and is not breathing):    No CPR (Do Not Attempt to Resuscitate)     Medical Interventions (Patient has pulse or is breathing):    Limited Support       Casie M Mayne, PA-C  01/20/24

## 2024-01-20 NOTE — PLAN OF CARE
Goal Outcome Evaluation:              Outcome Evaluation: Patient with afib RVR, heart rate up in 150s to 160s, cardiology and hospitalist provider notified. Patient with hypotension, 250ml normal saline infusion bolused. Patient on 3L NC, complaints of shortness of air despite diuresis, hospitalist notified. Amiodarone IV ongoing. Skin interventions in place (isotour mattress, leg wraps, z guard), patient turned, heels elevated as tolerated. Patient and family reeducated about incentive spirometer, deep breathing, positioning.

## 2024-01-20 NOTE — THERAPY WOUND CARE TREATMENT
Acute Care - Wound/Debridement Treatment Note  [facility]     Patient Name: Zoila Nava  : 1933  MRN: [id number]  Today's Date: 2024                Admit Date: 2024    Visit Dx:    ICD-10-CM ICD-9-CM   1. Cellulitis of right lower extremity  L03.115 682.6   2. Acute on chronic congestive heart failure, unspecified heart failure type  I50.9 428.0   3. Elevated blood pressure reading with diagnosis of hypertension  I10 401.9   4. History of pulmonary hypertension  Z86.79 V12.59   5. Hypoxemia requiring supplemental oxygen  R09.02 799.02    Z99.81        Patient Active Problem List   Diagnosis    Allergic rhinitis    Hyperlipidemia    Hypertension    Hypocalcemia    Hypothyroidism    Neuropathy    NUD (nonulcer dyspepsia)    Painful knee    Pernicious anemia    Tremor    Vitamin B12 deficiency    Spondylolisthesis of cervical region    Graves' ophthalmopathy    Anemia    Memory impairment of gradual onset    Ascending aortic aneurysm    Stage 3 chronic kidney disease    Pulmonary hypertension    Chronic heart failure with preserved ejection fraction    CHF (congestive heart failure), NYHA class I, acute on chronic, diastolic    CHF (congestive heart failure)    Non-rheumatic aortic stenosis    Non-rheumatic aortic regurgitation    Hypokalemia    Carpal tunnel syndrome    Essential tremor    Gastroesophageal reflux disease    Nausea and vomiting    Skin sensation disturbance    Spinal cord disease    Urge incontinence of urine    Urinary urgency    Acute chest pain    (HFpEF) heart failure with preserved ejection fraction    Nocturnal hypoxia    Confusion    Concussion without loss of consciousness    Hypotension    AMS (altered mental status)    Multiple open wounds of lower extremity, unspecified laterality, subsequent encounter    Acute on chronic heart failure with preserved ejection fraction (HFpEF)    Acute on chronic respiratory failure with hypoxia    Cellulitis of right lower  extremity    E coli bacteremia        Past Medical History:   Diagnosis Date    Anemia     Arthritis     Asthma     Cataract     Difficulty breathing     Chronic    GERD (gastroesophageal reflux disease)     Graves' ophthalmopathy     Hoarseness     Hypertension     Hypertensive heart disease with acute on chronic diastolic congestive heart failure 10/11/2021    Pulmonary hypertension 10/11/2021    Spondylolisthesis of cervical region     Vitamin B12 deficiency         Past Surgical History:   Procedure Laterality Date    CERVICAL LAMINECTOMY      CHOLECYSTECTOMY      OTHER SURGICAL HISTORY Right     Neuroplasty Median Nerve at Carpal Tunnel    THYROID SURGERY      TOTAL KNEE ARTHROPLASTY Left 09/29/2014    Dr Colon           Wound 10/24/23 2000 Right medial leg (Active)   Wound Image   01/20/24 1440   Dressing Appearance dry;intact;no drainage 01/20/24 1440   Closure Unable to assess 01/20/24 1348   Base dry;black eschar;scab 01/20/24 1440   Periwound intact;dry;redness;swelling;warm 01/20/24 1440   Periwound Temperature warm 01/20/24 1440   Periwound Skin Turgor soft 01/20/24 1440   Edges irregular 01/20/24 1440   Wound Length (cm) 0.5 cm 01/20/24 1440   Wound Width (cm) 1 cm 01/20/24 1440   Wound Surface Area (cm^2) 0.5 cm^2 01/20/24 1440   Drainage Amount none 01/20/24 1440   Care, Wound cleansed with;soap and water 01/20/24 1440   Dressing Care multi-layer wrap 01/20/24 1440   Periwound Care cleansed with pH balanced cleanser;dry periwound area maintained;moisturizer applied 01/20/24 1440      Lymphedema       Row Name 01/20/24 1440             Lymphedema Edema Assessment    Pitting Edema Mild  -JM         Skin Changes/Observations    Lower Extremity Conditions bilateral:;clean;dry;shiny;inflamed;scab(s)  -JM         Lymphedema Pulses/Capillary Refill    Dorsalis Pedis Pulse right:;left:;+1 diminished  -JM      Lower Extremity Capillary Refill right:;left:;less than 3 seconds  -JM         Lymphedema  Measurements    Measurement Type(s) Quick Girth  -      Quick Girth Areas Lower extremities  -         LLE Quick Girth (cm)    Mid foot 22.5 cm  -      Smallest ankle 23.5 cm  -JM      Largest calf 41.4 cm  -JM         RLE Quick Girth (cm)    Mid foot 22.5 cm  -JM      Smallest ankle 22.5 cm  -JM      Largest calf 37 cm  -JM      RLE Quick Girth Total 82  -JM         Compression/Skin Care    Compression/Skin Care skin care;wrapping location;bandaging  -      Skin Care washed/dried;lotion applied  -      Wrapping Location lower extremity  -      Wrapping Location LE bilateral:;foot to knee  -      Wrapping Comments size 4&5 compressogrips doubled distally and overlapping for gradient compression  -                User Key  (r) = Recorded By, (t) = Taken By, (c) = Cosigned By      Initials Name Provider Type    Gayla Moreno, PT Physical Therapist                      PT Assessment (last 12 hours)       PT Evaluation and Treatment       Row Name 01/20/24 1440          Physical Therapy Time and Intention    Subjective Information complains of;dyspnea  -     Document Type wound care;therapy note (daily note)  -     Patient Effort good  -       Row Name 01/20/24 1440          General Information    Patient Observations alert;cooperative;agree to therapy  -     Patient/Family/Caregiver Comments/Observations Family at bedside  -     Existing Precautions/Restrictions fall;oxygen therapy device and L/min  -     Risks Reviewed patient and family:;increased discomfort;change in vital signs  -     Benefits Reviewed patient and family:;improve skin integrity;decrease pain  -     Barriers to Rehab medically complex;previous functional deficit;hearing deficit  -       Row Name 01/20/24 1440          Pain    Pretreatment Pain Rating 0/10 - no pain  -     Posttreatment Pain Rating 0/10 - no pain  -       Row Name 01/20/24 1440          Cognition    Orientation Status (Cognition) oriented  x 3  -       Row Name 01/20/24 1440          Wound 10/24/23 2000 Right medial leg    Wound - Properties Group Placement Date: 10/24/23  -KG Placement Time: 2000 -KG Present on Original Admission: Y  -KG Side: Right  -KG Orientation: medial  -KG Location: leg  -KG    Wound Image Images linked: 1  -     Dressing Appearance dry;intact;no drainage  -     Base dry;black eschar;scab  -     Periwound intact;dry;redness;swelling;warm  -     Periwound Temperature warm  -     Periwound Skin Turgor soft  -     Edges irregular  -     Wound Length (cm) 0.5 cm  -     Wound Width (cm) 1 cm  -     Wound Depth (cm) --  obscured  -     Wound Surface Area (cm^2) 0.5 cm^2  -JM     Drainage Amount none  -     Care, Wound cleansed with;soap and water  -     Dressing Care multi-layer wrap  -     Periwound Care cleansed with pH balanced cleanser;dry periwound area maintained;moisturizer applied  -     Retired Wound - Properties Group Placement Date: 10/24/23  -KG Placement Time: 2000 -KG Present on Original Admission: Y  -KG Side: Right  -KG Orientation: medial  - Location: leg  -KG    Retired Wound - Properties Group Date first assessed: 10/24/23  -KG Time first assessed: 2000 -KG Present on Original Admission: Y  -KG Side: Right  -KG Location: leg  -KG      Row Name 01/20/24 1440          Plan of Care Review    Plan of Care Reviewed With patient;family  -     Progress improving  -     Outcome Evaluation Pt with improving BLE edema and redness.  RLE with min erythema, faint erythema of LLE.  Small dry scab/eschar to medial prox RLE but no drainage.  PT removed unna boot RLE, washed BLE and applied light compression wrapping with size 4&5 compressogrips.  RN may remove wraps PRN if pt c/o discomfort or worsening SOA.  PT will change wraps in 2-3 days.  -       Row Name 01/20/24 1440          Positioning and Restraints    Pre-Treatment Position sitting in chair/recliner  -     Post Treatment  Position chair  -JM     In Chair notified nsg;reclined;call light within reach;encouraged to call for assist;with family/caregiver;legs elevated  -THOMAS               User Key  (r) = Recorded By, (t) = Taken By, (c) = Cosigned By      Initials Name Provider Type    Gayla Moreno, PT Physical Therapist    Parth Lopez, RN Registered Nurse                  Physical Therapy Education       Title: PT OT SLP Therapies (In Progress)       Topic: Physical Therapy (Done)       Point: Mobility training (Done)       Learning Progress Summary             Patient Acceptance, E, VU by ND at 1/19/2024 1430    Acceptance, E, VU by ND at 1/18/2024 0953   Family Acceptance, E, VU by ND at 1/18/2024 0953                         Point: Home exercise program (Done)       Learning Progress Summary             Patient Acceptance, E, VU by ND at 1/19/2024 1430                         Point: Body mechanics (Done)       Learning Progress Summary             Patient Acceptance, E, VU by ND at 1/19/2024 1430    Acceptance, E, VU by ND at 1/18/2024 0953   Family Acceptance, E, VU by ND at 1/18/2024 0953                         Point: Precautions (Done)       Learning Progress Summary             Patient Acceptance, E, VU by ND at 1/19/2024 1430    Acceptance, E, VU by ND at 1/18/2024 0953   Family Acceptance, E, VU by ND at 1/18/2024 0953                                         User Key       Initials Effective Dates Name Provider Type Discipline    ND 11/16/23 -  Cassie Trejo, PT Physical Therapist PT                    Recommendation and Plan  Anticipated Discharge Disposition (PT): skilled nursing facility  Planned Therapy Interventions (PT): wound care, patient/family education  Therapy Frequency (PT): daily  Plan of Care Reviewed With: patient, family   Progress: improving       Progress: improving  Outcome Evaluation: Pt with improving BLE edema and redness.  RLE with min erythema, faint erythema of LLE.  Small dry  scab/eschar to medial prox RLE but no drainage.  PT removed unna boot RLE, washed BLE and applied light compression wrapping with size 4&5 compressogrips.  RN may remove wraps PRN if pt c/o discomfort or worsening SOA.  PT will change wraps in 2-3 days.  Plan of Care Reviewed With: patient, family            Time Calculation   PT Charges       Row Name 01/20/24 1539             Time Calculation    Start Time 1440  -JM      PT Goal Re-Cert Due Date 01/28/24  -JM         Untimed Charges    Wound Care 59108 Multilayer comp below knee  -JM      [id number] comp below knee 40  -JM         Total Minutes    Untimed Charges Total Minutes 40  -JM       Total Minutes 40  -JM                User Key  (r) = Recorded By, (t) = Taken By, (c) = Cosigned By      Initials Name Provider Type    Gayla Moreno, PT Physical Therapist                      Therapy Charges for Today       Code Description Service Date Service Provider Modifiers Qty    [id number] HC PT MULTI LAYER COMP SYS BELOW KNEE 1/20/2024 Gayla Moreno PT GP 1              PT G-Codes  Outcome Measure Options: AM-PAC 6 Clicks Basic Mobility (PT)  AM-PAC 6 Clicks Score (PT): 15  AM-PAC 6 Clicks Score (OT): 16       Gayla Moreno PT  1/20/2024

## 2024-01-20 NOTE — PROGRESS NOTES
Zoila Nava  [id number]  7/13/1933   LOS: 4 days   Patient Care Team:  Arnoldo Oneil MD as PCP - General (Internal Medicine)  Sourav Montes MD as Referring Physician (Neurology)  Richard Beltran MD as Consulting Physician (Gastroenterology)  Basil Richter MD as Consulting Physician (Cardiology)  Garcia Marte MD as Consulting Physician (Pain Medicine)    Chief Complaint: Follow-up on atrial fibrillation, CHF    Subjective Patient went into atrial fibrillation with RVR today and amiodarone protocol was restarted.  Also given additional IV Bumex this morning for increased shortness of breath and tachypnea and O2 was increased to 4 L. Patient had some improvement in shortness of breath.  No chest pain, excessive sputum production, or increased edema.    Objective     Vital Sign Min/Max for last 24 hours  Temp  Min: 97.7 °F (36.5 °C)  Max: 98.2 °F (36.8 °C)   BP  Min: 84/73  Max: 146/98   Pulse  Min: 81  Max: 159   Resp  Min: 16  Max: 22   SpO2  Min: 90 %  Max: 100 %   No data recorded   No data recorded         01/17/24  1000 01/17/24  1219   Weight: 64 kg (141 lb) 64 kg (141 lb)         Intake/Output Summary (Last 24 hours) at 1/20/2024 1240  Last data filed at 1/20/2024 0848  Gross per 24 hour   Intake 1137 ml   Output 800 ml   Net 337 ml       Physical Exam:     General Appearance:    Alert, cooperative, in no acute distress, ill appearing   Lungs:     Decreased breath sounds, faint bibasilar crackles to auscultation,respirations regular, even and                unlabored, 3 L O2 NC    Heart:  Atrial fibrillation with RVR, normal S1 and S2, grade 2/6            murmur, no gallop, no rub, no click   Abdomen:  Extremities:   Soft, nontender, bowel sounds audible x4    1+ edema, normal range of motion   Pulses:   Pulses palpable and equal bilaterally      Results Review:   Results from last 7 days   Lab Units 01/20/24  0949 01/19/24  0334 01/18/24  0120   SODIUM mmol/L 137 140 139    POTASSIUM mmol/L 3.8 3.2* 3.3*   CHLORIDE mmol/L 100 103 102   CO2 mmol/L 25.0 24.0 24.0   BUN mg/dL 23 29* 34*   CREATININE mg/dL 1.30* 1.32* 1.44*   GLUCOSE mg/dL 107* 101* 158*   CALCIUM mg/dL 8.8 8.1* 7.9*     Results from last 7 days   Lab Units 01/20/24  0949 01/19/24  0334 01/18/24  0120   WBC 10*3/mm3 12.61* 12.83* 20.77*   HEMOGLOBIN g/dL 11.2* 10.1* 11.2*   HEMATOCRIT % 32.7* 29.6* 33.2*   PLATELETS 10*3/mm3 203 168 185     Results from last 7 days   Lab Units 01/17/24  0932   CHOLESTEROL mg/dL 110   TRIGLYCERIDES mg/dL 78   HDL CHOL mg/dL 48   LDL CHOL mg/dL 46         Results from last 7 days   Lab Units 01/16/24  1024   HSTROP T ng/L 51*     Echocardiogram 1/16/2024:    Left ventricular systolic function is normal. Left ventricular ejection fraction appears to be 56 - 60%.    Left ventricular wall thickness is consistent with mild posterior asymmetric hypertrophy.    Left ventricular diastolic function is consistent with (grade Ia w/high LAP) impaired relaxation.    Mildly reduced right ventricular systolic function noted.    Systolic and diastolic flattening of the interventricular septum consistent with right ventricle pressure and volume overload.    The right ventricular cavity is moderately dilated.    The right atrial cavity is mild to moderately dilated.    There is mild calcification of the aortic valve.    Moderate aortic valve regurgitation is present.    Mild mitral valve regurgitation is present.    Moderate to severe tricuspid valve regurgitation is present.    Estimated right ventricular systolic pressure from tricuspid regurgitation is markedly elevated (>55 mmHg).    Chest x-ray 1/19/2024:  Findings compatible with mild CHF. Small bilateral pleural effusions.     Chest x-ray 1/20/2024:  Mild CHF features similar to prior exam.     Medication Review: Reviewed, amiodarone at 1 mg/min    Assessment & Plan   Acute on chronic HFpEF   Elevated proBNP of 10,000, IV Bumex 2 mg x 1 in the ED.    Echo 4/2023 with EF 51-55%, mildly reduced RV systolc function, dilated LA an RA, mild to moderate AR, mild MR, mild TR.   Increased shortness of breath/tachypnea 1/20/2024, improvement with IV Bumex, increase to 3-4 L O2 NC  Afib with RVR  Developed overnight 1/17.  Converted to sinus rhythm with amiodarone  Anticoagulated with Xarelto   Continue amiodarone protocol.  Right LE cellulitis, Hypotension, possible sepsis  Acute on chronic respiratory failure   CKD stage III  Severe pulmonary hypertension      Acute on chronic heart failure with preserved ejection fraction (HFpEF)    Hypothyroidism    Pulmonary hypertension    Hypokalemia    Acute on chronic respiratory failure with hypoxia    Cellulitis of right lower extremity    E coli bacteremia     Susan Florez, APRN. 1/20/2024  15:02 EST     01/20/24  12:40 EST

## 2024-01-21 PROBLEM — I48.0 PAF (PAROXYSMAL ATRIAL FIBRILLATION): Status: ACTIVE | Noted: 2024-01-01

## 2024-01-21 NOTE — PROGRESS NOTES
[facility] Cardiology Daily Note       LOS: 5 days   Patient Care Team:  Arnoldo Oneil MD as PCP - General (Internal Medicine)  Sourav Montes MD as Referring Physician (Neurology)  Richard Beltran MD as Consulting Physician (Gastroenterology)  Basil Richter MD as Consulting Physician (Cardiology)  Garcia Marte MD as Consulting Physician (Pain Medicine)    Chief Complaint: A-fib with RVR/acute on chronic HFpEF    Subjective     Subjective: A-fib with RVR again yesterday.  Responded to IV amiodarone.  Had a rough night last night.  Around 8:00 started wheezing and had difficulty breathing.  Per her daughter who is at her bedside she did not sleep well last night.  Had a nebulizer treatment just a few minutes ago and that seems to have helped some.  She has not slept well for the last 48 hours.  Heart rates near 100.  Blood pressures have been marginal.  98% on 3.5 L nasal cannula.  Intake and output has not been measured.    Review of Systems:   As above.    Medications:  aspirin, 81 mg, Oral, Daily  bumetanide, 0.5 mg, Oral, Daily  cefTRIAXone, 2,000 mg, Intravenous, Q24H  cetirizine, 5 mg, Oral, Nightly  empagliflozin, 10 mg, Oral, Daily  ipratropium-albuterol, 3 mL, Nebulization, Q4H - RT  levothyroxine, 125 mcg, Oral, Q AM  magnesium oxide, 400 mg, Oral, Daily  melatonin, 5 mg, Oral, Nightly  midodrine, 10 mg, Oral, TID AC  Nutrisource fiber, 1 packet, Oral, BID  pantoprazole, 40 mg, Oral, Daily  pharmacy consult - MTM, , Does not apply, Daily  pregabalin, 150 mg, Oral, Daily  rivaroxaban, 15 mg, Oral, Daily With Dinner  rivastigmine, 1 patch, Transdermal, Daily  senna-docusate sodium, 2 tablet, Oral, BID  sodium chloride, 10 mL, Intravenous, Q12H  tamsulosin, 0.4 mg, Oral, Daily        Objective     Vital Sign Min/Max for last 24 hours  Temp  Min: 97.3 °F (36.3 °C)  Max: 98 °F (36.7 °C)   BP  Min: 84/73  Max: 158/110   Pulse  Min: 93  Max: 168   Resp  Min: 17  Max: 24   SpO2   "Min: 90 %  Max: 100 %   Flow (L/min)  Min: 3  Max: 6   No data recorded      Intake/Output Summary (Last 24 hours) at 1/21/2024 0935  Last data filed at 1/20/2024 2147  Gross per 24 hour   Intake --   Output 350 ml   Net -350 ml        Flowsheet Rows      Flowsheet Row First Filed Value   Admission Height 152.4 cm (60\") Documented at 01/16/2024 1005   Admission Weight 64 kg (141 lb 3.2 oz) Documented at 01/16/2024 1005            Physical Exam:    General: Alert and oriented.   Cardiovascular: Heart has a nondisplaced focal PMI. Regular rate and rhythm without murmur, gallop or rub.  Frequent ectopy  Lungs: Wheezing bilaterally.. Equal expansion is noted.   Abdomen: Soft, nontender.  Extremities: Show no edema.   Skin: warm and dry.     Results Review:    I reviewed the patient's new clinical results.  EKG:  Tele: Sinus rhythm with PACs    Labs:    Results from last 7 days   Lab Units 01/21/24  0451 01/20/24  0949 01/19/24  0334 01/18/24  0120 01/17/24  0932 01/16/24  1024   SODIUM mmol/L 134* 137 140 139   < > 140   POTASSIUM mmol/L 4.0 3.8 3.2* 3.3*   < > 4.4   CHLORIDE mmol/L 93* 100 103 102   < > 100   CO2 mmol/L 22.0 25.0 24.0 24.0   < > 26.0   BUN mg/dL 24* 23 29* 34*   < > 33*   CREATININE mg/dL 1.43* 1.30* 1.32* 1.44*   < > 1.36*   CALCIUM mg/dL 9.4 8.8 8.1* 7.9*   < > 9.0   BILIRUBIN mg/dL  --   --   --  0.4  --  0.8   ALK PHOS U/L  --   --   --  129*  --  94   ALT (SGPT) U/L  --   --   --  36*  --  24   AST (SGOT) U/L  --   --   --  53*  --  34*   GLUCOSE mg/dL 185* 107* 101* 158*   < > 160*    < > = values in this interval not displayed.     Results from last 7 days   Lab Units 01/20/24  0949 01/19/24  0334 01/18/24  0120   WBC 10*3/mm3 12.61* 12.83* 20.77*   HEMOGLOBIN g/dL 11.2* 10.1* 11.2*   HEMATOCRIT % 32.7* 29.6* 33.2*   PLATELETS 10*3/mm3 203 168 185     Lab Results   Component Value Date    TROPONINI <0.006 02/07/2019    TROPONINT 51 (H) 01/16/2024    TROPONINT 63 (C) 10/24/2023    TROPONINT 67 " "(C) 10/24/2023     Lab Results   Component Value Date    CHOL 110 01/17/2024     Lab Results   Component Value Date    TRIG 78 01/17/2024    TRIG 51 04/04/2016     Lab Results   Component Value Date    HDL 48 01/17/2024    HDL 72 (H) 04/04/2016     No components found for: \"LDLCALC\"  Lab Results   Component Value Date    INR 1.01 04/06/2023    INR 0.98 09/16/2014    PROTIME 13.2 04/06/2023    PROTIME 10.5 09/16/2014     CXR shows no new changes 1/20/2024    Ejection Fraction: 51-55%    Assessment   Assessment:    Acute on chronic HFpEF   Elevated proBNP of 10,000  IV Bumex 2 mg x 1 in the ED.   Most recent echo 4/2023 with EF 51-55%, mildly reduced RV systolc function, dilated LA an RA, mild to moderate AR, mild MR, mild TR.   Afib with RVR  Developed overnight 1/17.  Converted to sinus rhythm with amiodarone  Anticoagulated with Xarelto   Recurrence of A-fib with RVR on 1/20/2024 now on IV amiodarone again.  Right LE cellulitis, Hypotension, possible sepsis  Acute on chronic respiratory failure   CKD stage III      Plan:    Continue IV amiodarone for PAF will change back to oral possibly tomorrow or after pulmonary issues improve  Wheezing per hospital medicine.  Received IV steroids last night and DuoNebs are being given every 4 hours.    Марина Figueredo MD  01/21/24  09:35 EST          "

## 2024-01-21 NOTE — PLAN OF CARE
Goal Outcome Evaluation:                                          Pt on 3.5L nasal cannula through morning, up in chair with occasional stridor noted. Stridor worsened in afternoon, pt intubated. Toth catheter started. Pt hypotensive after intubation; levo started. Current gtts: fentanyl @50, levophed @0.06, & NS @100mls/hr. Amiodarone gtt continued.

## 2024-01-21 NOTE — PLAN OF CARE
Goal Outcome Evaluation:  Plan of Care Reviewed With: patient, daughter        Progress: declining  Outcome Evaluation: RN called to room patient c/o soa. Tachypic on assessment. Patient lung coarse in all fields. Provider notified. New order bipap and bumex. RN discussed CT/ABG, Not indicated per provider. Chest xray and bumex ordered. Patient placed on bipap per order. Tolerated poorly. Repeatedly attemping to remove. Patinet refused bipap. Patient anxious and repeatedly sitting in tripod position r/t soa. Wheezing noted on reassessment after 350mL in UOP. Minimal improvement soa. Patient remains tachypnic and soa. Stridor appriciated on assessment with continues soa. Provider notified and at bed side. IV steroids and cough medicine given without improvement. Provider notified of anxiety and tachypia. prn morphine ordered and given. Some improvement noted in RR. Lungs sounds remain unchanged from stridor. MD paged. Patient became extremely axious and noted desaturation with anxiety. MD notified, highflow NC and atarax prn ordered. ABG obtained. Not place on highflow r/t improvement in oxygen saturation on 6L NC. Titrated oxygen back to 4L NC r/t ABG results. Oxygen saturation maintaining on 4L NC, at this time.  Several dose mophine given this shift, with some relief in symptoms. Sitter ordered for bedside r/t patient anxiety notably improved with staff in room. Patient awake most of shift. Slept for short periods of time after morphine before becoming anxious and returning to tripod position. BP elevated this shift. Some difficulty obtain BP related to anxiety and patient  moving. Sinus tachycardic in low 100's most this this shift. With intermittant A. Fib with rate occasionally increasing to over 130 bpm. patient noted to be anxious with elevated HR.

## 2024-01-21 NOTE — PROGRESS NOTES
Pulmonary critical care note     LOS: 5 days   Patient Care Team:  Arnoldo Oneil MD as PCP - General (Internal Medicine)  Sourav Montes MD as Referring Physician (Neurology)  Richard Beltran MD as Consulting Physician (Gastroenterology)  Basil Richter MD as Consulting Physician (Cardiology)  Garcia Marte MD as Consulting Physician (Pain Medicine)    Reason: Stridor and acute on chronic hypoxemic respiratory failure.        Subjective     Interval History:     Zoila Nava is a 89 yea-old female with HFpEF, chronic respiratory failure on 2 L NC, PAF, valvular heart disease, reduced right systolic function, and dementia who was admitted to [facility] on 1/16/24 for acute on chronic diastolic heart failure and cellulitis. Patient has been undergoing diuresis with improvement in respiratory status. Found to have pan-sensitive E. Coli bacteremia (unclear source whether from cellulitis which would be unusual versus UTI as UA was not done initially). ID following and patient receiving IV ceftriaxone through 1/23/24.     ECHO revealed LVVEF 56-60%, grade Ia diastolic dysfunction, moderate AR, mild MR, moderate TR with elevated RVSP > 55 mmHg. Developed Afib RVR 1/17/24 converting to NSR with amiodarone. Cardiology following.     On 1/21/24, patient developed stridor thought to be 2* URI. Respiratory panel positive for coronavirus. Steroids and nebulizers given but unfortunately patient worsened and was moved to ICU. Upon arrival to ICU, patient had developed severe respiratory distress and hypoxemic respiratory failure with oxygen saturations down in the low 80s. She and family agreed to intubation and she was successfully emergently intubated. Postprocedure, she developed some hypotension requiring norepinephrine drip.      Time spent: 7 minutes  Electronically signed by TONIE Garcia, 01/21/24, 5:51 PM EST.    I performed an independent history and physical examination. Portions of the history  were obtained by APRN and were modified by me according to my findings. The above note reflects my findings, assessment, and plan.    hTai Villavicencio MD    History taken from: Chart, staff, patient, family    PMH/FH/Social History were reviewed and updated appropriately in the electronic medical record.     Review of Systems:    Review of 14 systems was completed with positives and pertinent negatives noted in the subjective section.  All other systems reviewed and are negative.   Exceptions are noted below:    Unable to obtain complete review of systems secondary to respiratory distress.      Objective     Vital Signs  Temp:  [97.3 °F (36.3 °C)-98 °F (36.7 °C)] 97.9 °F (36.6 °C)  Heart Rate:  [] 69  Resp:  [17-24] 24  BP: ()/() 103/61  FiO2 (%):  [40 %-50 %] 40 %  01/20 0701 - 01/21 0700  In: 240 [P.O.:240]  Out: 350 [Urine:350]  Body mass index is 27.54 kg/m².  Mode: VC+/AC  FiO2 (%):  [40 %-50 %] 40 %  S RR:  [15] 15  S VT:  [300 mL-370 mL] 300 mL  PEEP/CPAP (cm H2O):  [5 cm H20] 5 cm H20  MAP (cm H2O):  [8.5-8.6] 8.6  Physical Exam:     Constitutional:    Alert, dyspneic and anxious appearing.   Head:    Normocephalic, without obvious abnormality, atraumatic   Eyes:            Lids and lashes normal, conjunctivae and sclerae normal, no   icterus, no pallor, corneas clear, PER   ENMT:   Ears appear intact with no abnormalities noted      No oral lesions, no thrush, oral mucosa dry   Neck:   No adenopathy, supple, trachea midline, no thyromegaly, no JVD       Lungs/Resp:   Increased effort with loud audible stridor, symmetric chest rise, no crepitus, clear to auscultation bilaterally, no chest wall tenderness                Heart/CV:  Mildly tachycardic and regular, normal S1 and S2, no            murmur   Abdomen/GI:   Soft, moderately distended, non-tender, bowel sounds present   :     Deferred   Extremities/MSK:   No clubbing or cyanosis.  Trace edema.  Normal tone.  No deformities.     Pulses:   Pulses palpable and equal bilaterally   Skin:   No bleeding, bruising or rash   Heme/Lymph:   No cervical or supraclavicular adenopathy.    Neurologic:    Psychiatric:       Moves all extremities with no obvious focal motor deficit.  Cranial nerves 2 - 12 grossly intact  Anxious affect.          The above findings are documentation my personal physical examination from today.  Electronically signed by:Thai Villavicencio MD 01/21/24 19:06 EST     Results Review:     I reviewed the patient's new clinical results.   Results from last 7 days   Lab Units 01/21/24  0451 01/20/24  0949 01/19/24  0334 01/18/24  0120 01/17/24  0932 01/16/24  1024   SODIUM mmol/L 134* 137 140 139   < > 140   POTASSIUM mmol/L 4.0 3.8 3.2* 3.3*   < > 4.4   CHLORIDE mmol/L 93* 100 103 102   < > 100   CO2 mmol/L 22.0 25.0 24.0 24.0   < > 26.0   BUN mg/dL 24* 23 29* 34*   < > 33*   CREATININE mg/dL 1.43* 1.30* 1.32* 1.44*   < > 1.36*   CALCIUM mg/dL 9.4 8.8 8.1* 7.9*   < > 9.0   BILIRUBIN mg/dL  --   --   --  0.4  --  0.8   ALK PHOS U/L  --   --   --  129*  --  94   ALT (SGPT) U/L  --   --   --  36*  --  24   AST (SGOT) U/L  --   --   --  53*  --  34*   GLUCOSE mg/dL 185* 107* 101* 158*   < > 160*    < > = values in this interval not displayed.     Results from last 7 days   Lab Units 01/20/24  0949 01/19/24  0334 01/18/24  0120   WBC 10*3/mm3 12.61* 12.83* 20.77*   HEMOGLOBIN g/dL 11.2* 10.1* 11.2*   HEMATOCRIT % 32.7* 29.6* 33.2*   PLATELETS 10*3/mm3 203 168 185     Results from last 7 days   Lab Units 01/21/24  1719   PH, ARTERIAL pH units 7.381   PO2 ART mm Hg 91.6   PCO2, ARTERIAL mm Hg 42.4   HCO3 ART mmol/L 25.1     Results from last 7 days   Lab Units 01/21/24  0451 01/20/24  0949 01/16/24  1024   MAGNESIUM mg/dL 2.0 1.8 1.9   PHOSPHORUS mg/dL  --   --  3.0       I reviewed the patient's new imaging including images and reports.    Postintubation chest x-ray shows endotracheal tube approximately 2 cm above the ric.  This is  good positioning for this patient.  She has some increased interstitial markings and cardiomegaly.    Medication Review:   aspirin, 81 mg, Oral, Daily  bumetanide, 0.5 mg, Oral, Daily  cefTRIAXone, 2,000 mg, Intravenous, Q24H  cetirizine, 5 mg, Oral, Nightly  chlorhexidine, 15 mL, Mouth/Throat, Q12H  dexAMETHasone, 4 mg, Intravenous, Q6H  empagliflozin, 10 mg, Oral, Daily  etomidate, , ,   fentaNYL 10 mcg/1 mL NS, , ,   ipratropium, 0.5 mg, Nebulization, 4x Daily - RT  levothyroxine, 125 mcg, Oral, Q AM  magnesium oxide, 400 mg, Oral, Daily  melatonin, 5 mg, Oral, Nightly  midazolam, , ,   midodrine, 10 mg, Oral, TID AC  Norepinephrine-Sodium Chloride, , ,   Nutrisource fiber, 1 packet, Oral, BID  [START ON 1/22/2024] pantoprazole, 40 mg, Intravenous, Q AM  pharmacy consult - MTM, , Does not apply, Daily  pregabalin, 150 mg, Oral, Daily  rivaroxaban, 15 mg, Oral, Daily With Dinner  rivastigmine, 1 patch, Transdermal, Daily  senna-docusate sodium, 2 tablet, Oral, BID  sodium chloride, 10 mL, Intravenous, Q12H  tamsulosin, 0.4 mg, Oral, Daily      amiodarone, 0.5 mg/min, Last Rate: 0.5 mg/min (01/21/24 1728)  fentanyl 10 mcg/mL,  mcg/hr, Last Rate: 50 mcg/hr (01/21/24 1608)  norepinephrine, 0.02-0.3 mcg/kg/min, Last Rate: 0.06 mcg/kg/min (01/21/24 1735)  sodium chloride, 100 mL/hr, Last Rate: 100 mL/hr (01/21/24 1752)        Assessment & Plan       Acute on chronic heart failure with preserved ejection fraction (HFpEF)    Hypothyroidism    Pulmonary hypertension    Hypokalemia    Acute on chronic respiratory failure with hypoxia    Cellulitis of right lower extremity    E coli bacteremia    PAF (paroxysmal atrial fibrillation)    89 y.o. female with history of HFpEF, PAF, valvular heart disease, chronic hypoxemic respiratory failure on 2 L nasal cannula baseline, dementia, admitted to [facility]/16/[facility]24 for acute on chronic diastolic heart failure and cellulitis.  She has been undergoing diuresis with some  improvement in respiratory status.  Blood cultures were positive for E. coli (unclear source whether from cellulitis which would be unusual versus UTI as UA was not done initially).  ID has been following and she has a plan to be continued on ceftriaxone through 1/23/2024.    Echocardiogram showed normal LVEF with moderate AR, mild MR, moderate TR with RVSP greater than 55 mmHg.  She developed A-fib with RVR in 1/17/2024 converting to sinus rhythm on amiodarone.  She remains on amiodarone.    Patient developed stridor on 1/20/2024 which progressed and became significantly worse on 1/21/2024 throughout the day.  Respiratory PCR panel was positive for coronavirus (non-COVID-19).  Due to worsening stridor we were consulted for evaluation and I moved her to the intensive care unit.  After brief discussion concerning goals of care decision was made to proceed with extubation due to progressive stridor and acute on chronic hypoxemic respiratory failure.    On intubation, the patient's epiglottis appeared to be quite floppy but was not swollen.  I was not able to see very far past the vocal cords but there were no vocal cord lesions.  Patient became hypotensive postintubation requiring norepinephrine.    Plan:  1.  For stridor: Unclear etiology.  The patient seemed to have a very large and floppy epiglottis which I am wondering if was causing obstruction on inspiration.  Could have a subglottic cause of stridor or paroxysmal vocal cord movement.  Patient intubated to maintain airway.  I will obtain CT of the neck and chest and consider ENT evaluation.  2.  For acute on chronic hypoxemic respiratory failure: Intubated 1/21/2024.  Check CT chest.  Continue mechanical ventilation.  3.  For E. coli bacteremia/cellulitis: Ceftriaxone per infectious disease.  Plan to continue through 1/23/2024.  4.  For diastolic heart failure exacerbation/secondary pulmonary hypertension likely due to chronic hypoxemia and cardiac: Fluid  management with diuretics as needed.  For now however I am going to give some gentle fluid in the setting of IV contrast for the next 6 hours.  5.  Nutrition: Will need feeding tube as I expect her to be on mechanical ventilation for several days.  Likely initiate tube feeding tomorrow.  6.  For hypotension: Likely secondary to sedation: Norepinephrine for now.  If remains hypotensive could consider central venous catheter placement or PICC line.  7.  For electrolyte abnormalities: Monitor and replace as needed.  8.  For hypothyroidism: Levothyroxine.  9.  For atrial fibrillation: Amiodarone per cardiology.  Cardiology following.  Patient on Xarelto.    Patient is critically ill secondary to tenuous respiratory status in the setting of stridor and heart failure and has high risk of life-threatening decline in condition.  As such, the patient requires continuous monitoring frequent reassessment for consideration of adjustment in management to minimize this risk.  I personally reassessed the patient multiple times today.    Critical care time : 50 minutes spent by me personally.(This excludes time spent performing separately reportable procedures and services). including high complexity decision making to assess, manipulate, and support vital organ system failure in this individual who has impairment of one or more vital organ systems such that there is a high probability of imminent or life threatening deterioration in the patient’s condition.    Electronically signed by:  Thai Villavicencio MD  01/21/24  19:06 EST        Please note that portions of this note were completed with a voice recognition program.

## 2024-01-21 NOTE — PROGRESS NOTES
[facility] Medicine Services  PROGRESS NOTE    Patient Name: Zoila Nava  : 1933  MRN: [id number]    Date of Admission: 2024  Primary Care Physician: Arnoldo Oneil MD    Subjective   Subjective     CC:  Follow-up for heart failure and bacteremia    HPI:  Patient seen and examined this morning.  Currently in sinus rhythm on amiodarone drip.  Developed stridor with shortness of breath last evening.  Able to get up and walk with PT today and appears to be in good spirit.  Daughter at bedside and updated    Objective   Objective     Vital Signs:   Temp:  [97.3 °F (36.3 °C)-98 °F (36.7 °C)] 97.8 °F (36.6 °C)  Heart Rate:  [] 98  Resp:  [17-24] 20  BP: ()/() 125/83  Flow (L/min):  [3-6] 3.5     Physical Exam:  General: Chronically ill looking, in mild distress.  Conversant.  Pleasant.  Stridorous  Head: Atraumatic and normocephalic  Eyes: No Icterus. No pallor  Ears:  Ears appear intact with no abnormalities noted  Throat: No oral lesions, no thrush  Neck: Supple, trachea midline  Lungs: Diminished air entry both lung fields.  Scattered coarse crackles.  Stridor appreciated on exam.    Heart:  Normal S1 and S2, no murmur, no gallop, No JVD, no lower extremity swelling  Abdomen:  Soft, no tenderness, no organomegaly, normal bowel sounds, no organomegaly  Extremities: 2+ lower extremity swelling.  Both legs are wrapped with Unna boots  Skin: No bleeding, bruising or rash, normal skin turgor and elasticity  Neurologic: Cranial nerves appear intact with no evidence of facial asymmetry, normal motor and sensory functions in all 4 extremities  Psych: Alert and oriented x 3, normal mood    Results Reviewed:  LAB RESULTS:      Lab 24  0649 24  0949 24  0334 24  0120 24  0700 24  1335 24  1024   WBC  --  12.61* 12.83* 20.77* 22.83*  --  16.69*   HEMOGLOBIN  --  11.2* 10.1* 11.2* 11.5*  --  13.2   HEMATOCRIT  --  32.7*  29.6* 33.2* 35.2  --  40.6   PLATELETS  --  203 168 185 170  --  262   NEUTROS ABS  --  11.18* 11.52* 18.99* 20.30*  --  15.63*   IMMATURE GRANS (ABS)  --  0.15* 0.08* 0.20* 0.25*  --  0.08*   LYMPHS ABS  --  0.40* 0.40* 0.59* 1.13  --  0.58*   MONOS ABS  --  0.70 0.57 0.79 1.08*  --  0.27   EOS ABS  --  0.15 0.24 0.17 0.02  --  0.11   MCV  --  90.1 89.2 90.5 92.4  --  93.3   PROCALCITONIN  --   --   --   --   --   --  0.12   LACTATE  --   --   --   --   --  2.0 4.3*   D DIMER QUANT 2.22*  --   --   --   --   --   --          Lab 01/21/24  0451 01/20/24  0949 01/19/24  0334 01/18/24  0120 01/17/24  0932 01/16/24  1024   SODIUM 134* 137 140 139 139 140   POTASSIUM 4.0 3.8 3.2* 3.3* 3.4* 4.4   CHLORIDE 93* 100 103 102 101 100   CO2 22.0 25.0 24.0 24.0 25.0 26.0   ANION GAP 19.0* 12.0 13.0 13.0 13.0 14.0   BUN 24* 23 29* 34* 34* 33*   CREATININE 1.43* 1.30* 1.32* 1.44* 1.43* 1.36*   EGFR 34.9* 39.1* 38.4* 34.6* 34.9* 37.1*   GLUCOSE 185* 107* 101* 158* 151* 160*   CALCIUM 9.4 8.8 8.1* 7.9* 8.0* 9.0   MAGNESIUM 2.0 1.8  --   --   --  1.9   PHOSPHORUS  --   --   --   --   --  3.0         Lab 01/18/24  0120 01/16/24  1024   TOTAL PROTEIN 5.3* 6.8   ALBUMIN 2.9* 4.2   GLOBULIN 2.4 2.6   ALT (SGPT) 36* 24   AST (SGOT) 53* 34*   BILIRUBIN 0.4 0.8   ALK PHOS 129* 94         Lab 01/18/24  0120 01/16/24  1024   PROBNP 35,116.0* 10,191.0*   HSTROP T  --  51*         Lab 01/17/24  0932   CHOLESTEROL 110   LDL CHOL 46   HDL CHOL 48   TRIGLYCERIDES 78             Lab 01/21/24  0247 01/16/24  1059   PH, ARTERIAL 7.385 7.523*   PCO2, ARTERIAL 39.2 28.4*   PO2 .0* 112.0*   FIO2 44 50   HCO3 ART 23.4 23.3   BASE EXCESS ART -1.5* 1.4   CARBOXYHEMOGLOBIN 1.1 1.2     Brief Urine Lab Results  (Last result in the past 365 days)        Color   Clarity   Blood   Leuk Est   Nitrite   Protein   CREAT   Urine HCG        10/28/23 2320 Yellow   Clear   Negative   Negative   Negative   Negative                   Microbiology Results Abnormal        Procedure Component Value - Date/Time    COVID PRE-OP / PRE-PROCEDURE SCREENING ORDER (NO ISOLATION) - Swab, Nasopharynx [[id number]]  (Normal) Collected: 01/16/24 1040    Lab Status: Final result Specimen: Swab from Nasopharynx Updated: 01/16/24 1116    Narrative:      The following orders were created for panel order COVID PRE-OP / PRE-PROCEDURE SCREENING ORDER (NO ISOLATION) - Swab, Nasopharynx.  Procedure                               Abnormality         Status                     ---------                               -----------         ------                     COVID-19 and FLU A/B PCR...[[id number]]  Normal              Final result                 Please view results for these tests on the individual orders.    COVID-19 and FLU A/B PCR, 1 HR TAT - Swab, Nasopharynx [[id number]]  (Normal) Collected: 01/16/24 1040    Lab Status: Final result Specimen: Swab from Nasopharynx Updated: 01/16/24 1116     COVID19 Not Detected     Influenza A PCR Not Detected     Influenza B PCR Not Detected    Narrative:      Fact sheet for providers: https://www.fda.gov/media/108889/download    Fact sheet for patients: https://www.fda.gov/media/760265/download    Test performed by PCR.            XR Chest 1 View    Result Date: 1/20/2024  XR CHEST 1 VW Date of Exam: 1/20/2024 10:10 PM EST Indication: increased work of breathing Comparison: 1/20/2024. Findings: There is mild linear scarring in both lungs. Stable chronic interstitial prominence. No pneumothorax. No pleural effusion. No new lung opacity. Cardiac silhouette is unchanged. There are aortic arch calcifications. There is mild spinal and shoulder DJD. No acute osseous abnormality.     Impression: Impression: Chronic changes in the lungs without superimposed acute process of the chest. Electronically Signed: Blayne Ellis MD  1/20/2024 10:37 PM EST  Workstation ID: [id number]    XR Chest 1 View    Result Date: 1/20/2024  XR CHEST 1 VW Date of Exam: 1/20/2024 10:05 AM  CST Indication: soa, CHF Comparison: 1/19/2024 Findings: Cardiomediastinal silhouette is enlarged, similar prior examination. There is persistent pulmonary vascular congestion with interstitial coarsening throughout. There are small pleural effusions. No superimposed airspace disease nor pneumothorax. No acute  osseous abnormality identified.     Impression: Impression: Mild CHF features similar to prior exam. Electronically Signed: Owen Flores MD  1/20/2024 10:28 AM CST  Workstation ID: [id number]    XR Chest 1 View    Result Date: 1/19/2024  XR CHEST 1 VW Date of Exam: 1/19/2024 9:53 PM EST Indication: SOB Comparison: Chest radiograph performed on January 18, 2024 Findings: No focal consolidation. Small bilateral pleural effusions. There is no evidence of pneumothorax. There is mild pulmonary vascular congestion. Atheromatous calcifications of the aortic arch are visualized. Cardiac silhouette is markedly enlarged. No acute  osseous abnormality identified.     Impression: Impression: Findings compatible with mild CHF. Small bilateral pleural effusions. Electronically Signed: Uriah Godwin MD  1/19/2024 9:57 PM EST  Workstation ID: [id number]     Results for orders placed during the hospital encounter of 01/16/24    Adult Transthoracic Echo Complete w/ Color, Spectral and Contrast if Necessary Per Protocol    Interpretation Summary    Left ventricular systolic function is normal. Left ventricular ejection fraction appears to be 56 - 60%.    Left ventricular wall thickness is consistent with mild posterior asymmetric hypertrophy.    Left ventricular diastolic function is consistent with (grade Ia w/high LAP) impaired relaxation.    Mildly reduced right ventricular systolic function noted.    Systolic and diastolic flattening of the interventricular septum consistent with right ventricle pressure and volume overload.    The right ventricular cavity is moderately dilated.    The right atrial cavity is mild to  moderately dilated.    There is mild calcification of the aortic valve.    Moderate aortic valve regurgitation is present.    Mild mitral valve regurgitation is present.    Moderate to severe tricuspid valve regurgitation is present.    Estimated right ventricular systolic pressure from tricuspid regurgitation is markedly elevated (>55 mmHg).      Current medications:  Scheduled Meds:aspirin, 81 mg, Oral, Daily  bumetanide, 0.5 mg, Oral, Daily  cefTRIAXone, 2,000 mg, Intravenous, Q24H  cetirizine, 5 mg, Oral, Nightly  dexAMETHasone, 4 mg, Intravenous, Q6H  empagliflozin, 10 mg, Oral, Daily  ipratropium, 0.5 mg, Nebulization, 4x Daily - RT  levothyroxine, 125 mcg, Oral, Q AM  magnesium oxide, 400 mg, Oral, Daily  melatonin, 5 mg, Oral, Nightly  midodrine, 10 mg, Oral, TID AC  Nutrisource fiber, 1 packet, Oral, BID  pantoprazole, 40 mg, Oral, Daily  pharmacy consult - MTM, , Does not apply, Daily  pregabalin, 150 mg, Oral, Daily  rivaroxaban, 15 mg, Oral, Daily With Dinner  rivastigmine, 1 patch, Transdermal, Daily  senna-docusate sodium, 2 tablet, Oral, BID  sodium chloride, 10 mL, Intravenous, Q12H  tamsulosin, 0.4 mg, Oral, Daily      Continuous Infusions:amiodarone, 0.5 mg/min, Last Rate: 0.5 mg/min (01/21/24 0452)        PRN Meds:.  acetaminophen **OR** acetaminophen **OR** acetaminophen    senna-docusate sodium **AND** polyethylene glycol **AND** bisacodyl **AND** bisacodyl    calcium carbonate    guaifenesin    hydrOXYzine    Morphine    ondansetron ODT **OR** ondansetron    Potassium Replacement - Follow Nurse / BPA Driven Protocol    sodium chloride    sodium chloride    Assessment & Plan   Assessment & Plan     Active Hospital Problems    Diagnosis  POA    **Acute on chronic heart failure with preserved ejection fraction (HFpEF) [I50.33]  Yes    E coli bacteremia [R78.81, B96.20]  Unknown    Acute on chronic respiratory failure with hypoxia [J96.21]  Yes    Cellulitis of right lower extremity [L03.115]  Yes     Hypokalemia [E87.6]  Yes    Pulmonary hypertension [I27.20]  Yes    Hypothyroidism [E03.9]  Yes      Resolved Hospital Problems   No resolved problems to display.        Brief Hospital Course to date:  Zoila Nava is a 89 y.o. female  with past medical history significant for chronic diastolic heart failure, chronic respiratory failure on 2 L of nasal cannula, paroxysmal atrial fibrillation, valvular heart disease, mildly reduced right systolic function, dementia who presented to the hospital from independent living facility with worsening lower extremity edema, and SOB     Acute on chronic DHF, improving  VHD  Pulmonary hypertension   Atrial fibrillation with RVR and hypotension, currently in sinus rhythm  Patient presented with pulmonary edema and worsening lower extremity swelling, and pulmonary edema resulting in severe shortness of breath and right lower extremity cellulitis  Improved with diuresis  ECHO with EF 56-60%, with diastolic dysfunction (grade la), mod AVR, mild MVR, mod TVR and elevated RVSP > 55 mmHg.   Venous duplex negative for DVT or SVT   Continue Xarelto   CXR negative for any acute findings  Afib w/RVR 1/17 , Converted to sinus rhythm with amiodarone.  Currently on maintenance dose amiodarone drip for another 24 hours per cards  Continue midodrine 10 mg 3 times daily for hypotension  Continue gentle diuresis with Lasix 20 mg daily   Cardiology following and managing diuretic therapy and amiodarone drip  will need Heart and Valve clinic follow up in 1 week after DC, Keep established follow-up in the cardiology clinic as currently scheduled      Cellulitis of right lower extremity   Ecoli Bacteremia- pan sensitive   Ecoli bacteremia  Continue ceftriaxone 2 g iv daily through 1/23/24   Continue compression and elevation of right leg   Leukocytosis improving  Abd US due to findings of Ecoli bacteremia- negative   Dr. Kaur/ID following.  Appreciate the help     Stridor  Likely  secondary to upper respiratory tract infection  New finding on exam 1/21/2024  Respiratory panel pending  Add Decadron continue Atrovent nebulizer treatment   Cannot use racemic because of her arrhythmias     Hypokalemia  Replace per protocol    Hypothyroidism  continue home Synthroid    Insomnia    Add boost plus arcelia, fiber for soft stool (getting mag ox 400mg, likely contributing)       Expected Discharge Location and Transportation: Currently held for acute rehab   expected Discharge   Expected Discharge Date: 1/24/2024; Expected Discharge Time:      DVT prophylaxis:  Medical DVT prophylaxis orders are present.     AM-PAC 6 Clicks Score (PT): 15 (01/20/24 1950)    CODE STATUS:   Code Status and Medical Interventions:   Ordered at: 01/16/24 1233     Medical Intervention Limits:    NO intubation (DNI)     Code Status (Patient has no pulse and is not breathing):    No CPR (Do Not Attempt to Resuscitate)     Medical Interventions (Patient has pulse or is breathing):    Limited Support     Copied text in this note has been reviewed and is accurate as of 01/21/24.     Jun Fajardo MD  01/21/24    Addendum 1/21/2024 at 3 PM  Patient with worsening stridor this afternoon .  Currently in severe respiratory distress and using accessory muscles of breathing.  Started becoming hypoxic.  Did not respond to IV Decadron or racemic nebulizer treatment    Discussed with Ms. Jacob/her daughter the need for urgent intubation for airway protection versus implementing comfort care measures since patient is DNR.  Ms. Jacob discussed with the rest of the family members and wants to proceed with intubation and mechanical ventilation and if patient is declining or unable to extubate in few days, they will consider withdrawing care    Discussed with Dr. Villavicencio/intensivist on-call who graciously accepted to transfer the patient to the intensive care unit    Critical Care Note  Authorized and Performed by: Dr Fajardo  Total  critical care time: Approximately 45 minutes  Due to a high probability of clinically significant, life threatening deterioration, the patient required my highest level of preparedness to intervene emergently and I personally spent this critical care time directly and personally managing the patient. This critical care time included obtaining a history; examining the patient; pulse oximetry; ordering and review of studies; arranging urgent treatment with development of a management plan; evaluation of patient's response to treatment; frequent reassessment; and, discussions with other providers.  This critical care time was performed to assess and manage the high probability of imminent, life-threatening deterioration that could result in multi-organ failure. It was exclusive of separately billable procedures and treating other patients and teaching time.

## 2024-01-21 NOTE — SIGNIFICANT NOTE
"Called @ 1935 w/ nurse reporting pt w/ increased labored respirations and crackles throughout, pt reporting increased dyspnea, nurse concerned with volume overload, pt had received IV lasix overnight and then IV bumex today. Orders for 1mg IV bumex x 1 and BIPAP if pt able to tolerate - had chest xray earlier in the day w/ mild CHF noted. Also given 1 gram IV mag sulfate.    @ 2200, nurse calls with concern of stridor and pt not tolerating BIPAP. On exam, lung bases clear but diminished. Non productive cough with upper airway and posterior upper lobe expiratory wheezing. Chest xray ordered. Pt continues to be on 3 liters oxygen nasal cannula and during my exam taking oxygen off to blow her nose with room air saturation noted to be 93%. She is noted to have visibly labored breathing. She has a-fib on the bedside monitor, new this admission, rate controlled. She was started on amiodarone and is on Xarelto. ECHO 1/16/24 w/ EF 56-60% w/ moderate aortic regurg / mild mitral regurg / moderate to severe tricuspid regurg noted. Chest xray w/ chronic changes w/o acute process. Pt requesting to get up in recliner, sleeps in recliner at her assisted living.    @ 0111, nurse calls w/ reports of restlessness and dyspnea. Sitting up in chair frequently to \"catch breath\" and not able to rest. Will trial low dose morphine for dyspnea, using caution as BP has previously been labile and patient required fluid bolus after diuresis on day shift per nurse report. Vitals have been stable tonight w/ most recent /97 at time of order. Recommended nurse to call on call MD if symptoms not improved after morphine.  "

## 2024-01-22 NOTE — PLAN OF CARE
Goal Outcome Evaluation:  Plan of Care Reviewed With: patient           Outcome Evaluation:   -Pt remains intubated/sedated. PT able to follow commands when sedation is lowered. Fi02:40% and PEEP:5 tolerating well. Fentanyl @300. PT becomes agitated and tremorous with stimulation. Restraints ordered.   -PT NSR. Levo @.08 and amio@ .5.   -UOP:650 no BM  -AM labs drawn, no replacement needed  -Daughter updated

## 2024-01-22 NOTE — PROGRESS NOTES
Clinical Nutrition     Nutrition Support Assessment  Reason for Visit: MDR, Identified at risk by screening criteria, EN      Patient Name: Zoila Nava  YOB: 1933  MRN: [id number]  Date of Encounter: 01/22/24 11:36 EST  Admission date: 1/16/2024    Start trophic feed via ngt: Peptamen AF @20ml/hr, free water @10ml/hr    If pt tolerates TF and remains hemodynamically stable, suggest gradually advance in 24 hours to goal rate @50ml/hr, free water @10ml/hr      Nutrition Assessment   Admission Diagnosis:  Acute on chronic heart failure with preserved ejection fraction (HFpEF) [I50.33]      Problem List:    Acute on chronic heart failure with preserved ejection fraction (HFpEF)    Hypothyroidism    Pulmonary hypertension    Hypokalemia    Acute on chronic respiratory failure with hypoxia    Cellulitis of right lower extremity    E coli bacteremia    PAF (paroxysmal atrial fibrillation)        PMH:   She  has a past medical history of Anemia, Arthritis, Asthma, Cataract, Difficulty breathing, GERD (gastroesophageal reflux disease), Graves' ophthalmopathy, Hoarseness, Hypertension, Hypertensive heart disease with acute on chronic diastolic congestive heart failure (10/11/2021), Pulmonary hypertension (10/11/2021), Spondylolisthesis of cervical region, and Vitamin B12 deficiency.    PSH:  She  has a past surgical history that includes Cholecystectomy; Total knee arthroplasty (Left, 09/29/2014); Cervical laminectomy; Other surgical history (Right); and Thyroid surgery.      Applicable Nutrition Concerns:   Skin:RLE cellulitis, wound  GI:abdomen firm, last bm 1-20      Applicable Interval History:     Stridor    ARF/VENT 1-21-24      Reported/Observed/Food/Nutrition Related History:     Pt intubated, sedated, daughter at bedside  + fentanyl, levophed 0.1mcg, amio    Per RN: pt does not follow commands    Per MD: ok to start trophic feed    Labs    Labs Reviewed: Yes     Results from  "last 7 days   Lab Units 01/22/24  0402 01/21/24  0451 01/20/24  0949 01/19/24  0334 01/18/24  0120 01/17/24  0932 01/16/24  1335 01/16/24  1024   GLUCOSE mg/dL 133* 185* 107*   < > 158*   < >  --  160*   BUN mg/dL 28* 24* 23   < > 34*   < >  --  33*   CREATININE mg/dL 1.46* 1.43* 1.30*   < > 1.44*   < >  --  1.36*   SODIUM mmol/L 135* 134* 137   < > 139   < >  --  140   CHLORIDE mmol/L 98 93* 100   < > 102   < >  --  100   POTASSIUM mmol/L 4.8 4.0 3.8   < > 3.3*   < >  --  4.4   PHOSPHORUS mg/dL 4.9*  --   --   --   --   --   --  3.0   MAGNESIUM mg/dL 2.0 2.0 1.8  --   --   --   --  1.9   ALT (SGPT) U/L  --   --   --   --  36*  --   --  24   LACTATE mmol/L  --   --   --   --   --   --  2.0 4.3*    < > = values in this interval not displayed.       Results from last 7 days   Lab Units 01/18/24  0120 01/17/24  0932 01/16/24  1024   ALBUMIN g/dL 2.9*  --  4.2   CHOLESTEROL mg/dL  --  110  --    TRIGLYCERIDES mg/dL  --  78  --        Results from last 7 days   Lab Units 01/22/24  0242 01/17/24  2210   GLUCOSE mg/dL 143* 125     Lab Results   Lab Value Date/Time    HGBA1C 5.20 04/05/2023 0214         Results from last 7 days   Lab Units 01/18/24  0120 01/16/24  1024   PROBNP pg/mL 35,116.0* 10,191.0*         Medications    Medications Reviewed: Yes  Pertinent: abx, bumex, decadron, jardiance, midodrine, protonix, xarelto, bowel regimen  Infusion:fentanyl, levophed, amio    Intake/Ouptut 24 hrs (0701 - 0700)   I&O's Reviewed: Yes     Intake & Output (last day)         01/21 0701  01/22 0700 01/22 0701  01/23 0700    P.O. 480     I.V. (mL/kg) 2524 (37.8)     Total Intake(mL/kg) 3004 (45)     Urine (mL/kg/hr) 1425 (0.9)     Total Output 1425     Net +1579           Urine Unmeasured Occurrence 1 x               Anthropometrics     Flowsheet Rows      Flowsheet Row First Filed Value   Admission Height 152.4 cm (60\") Documented at 01/16/2024 1005   Admission Weight 64 kg (141 lb 3.2 oz) Documented at 01/16/2024 1005      " "    Height: Height: 152.4 cm (60\")  Last Filed Weight: Weight: 66.8 kg (147 lb 4.3 oz) (24 0600)  Method: Weight Method: Bed scale  BMI: BMI (Calculated): 28.8  BMI classification: Overweight: 25.0-29.9kg/m2   IBW:  100lb    UBW: pt 140lb's at MD office 24    Weight change: 6lb wt gain in 10 days, suspect fluid     Weight       Weight (kg) Weight (lbs) Weight Method Visit Report   3/31/2023 67.858 kg  149 lb 9.6 oz   --    2023 66.679 kg  147 lb  Stated     2023 64.229 kg  141 lb 9.6 oz  Standing scale     2023 66.407 kg  146 lb 6.4 oz  Standing scale     2023 66.225 kg  146 lb      2023 66.2 kg  145 lb 15.1 oz      2023 65.681 kg  144 lb 12.8 oz   --    2023 63.957 kg  141 lb   --    2023 67.132 kg  148 lb   --     68.04 kg  150 lb   --        --        --    2023 66.282 kg  146 lb 2 oz   --    2023 65.59 kg  144 lb 9.6 oz   --    2023 68.55 kg  151 lb 2 oz   --    2023 70.308 kg  155 lb   --     70.58 kg  155 lb 9.6 oz   --        --        --    2023 68.947 kg  152 lb   --    2023 66.679 kg  147 lb   --    2023 65.942 kg  145 lb 6 oz   --    2023 67.268 kg  148 lb 4.8 oz   --    10/9/2023 68.901 kg  151 lb 14.4 oz   --    10/13/2023 68.493 kg  151 lb   --    10/23/2023 65.772 kg  145 lb  Stated     10/24/2023 65.772 kg  145 lb      2023 62.959 kg  138 lb 12.8 oz   --    1/10/2024 63 kg  138 lb 14.2 oz   --    2024 64.048 kg  141 lb 3.2 oz   --    2024 64.048 kg  141 lb 3.2 oz  Estimated     2024 63.957 kg  141 lb       63.957 kg  141 lb      2024 66.8 kg  147 lb 4.3 oz  Bed scale         Nutrition Focused Physical Exam     Date:     Unable to perform exam due to: Defer pending indication    Needs Assessment   Date:24    Height used:Height: 152.4 cm (60\")  Weights used: 141lb/ 64.1kg      Estimated Calorie needs: ~1200kcal  Method:  20Kcals/Kkcal  Method:  MSJ x 1.2: 1180kcal  Method:  PSU: " 1112kcal    Estimated Protein needs: ~77g protein  Method: 1-1.2g protein: 64-77g protein      Current Nutrition Prescription     PO: NPO Diet NPO Type: Strict NPO  Oral Nutrition Supplement:   Intake: N/A      Nutrition Diagnosis   Date: 1-22-24 Updated:   Problem Inadequate energy intake   Etiology ARF/VENT   Signs/Symptoms NPO   Status:     Goal:   General: Nutrition support treatment  PO: N/A  EN/PN: Initiate EN    Nutrition Intervention      Follow treatment progress, Care plan reviewed, Nutrition support order placed    Start trophic feed via ngt: Peptamen AF @20ml/hr, free water @10ml/hr    If pt tolerates TF and remains hemodynamically stable, suggest gradually advance in 24 hours to goal rate @50ml/hr, free water @10ml/hr    TF at goal volume will provide 1000ml, 1200kcal, 76g protein, 6g fiber, 1010ml free water     Suggest add MVI as TF volume too low to meet RDI's      Monitoring/Evaluation:   Per protocol, I&O, Pertinent labs, Weight, Skin status, GI status, Symptoms, Hemodynamic stability      Carine Dykes RD  Time Spent: 45min

## 2024-01-22 NOTE — CONSULTS
RUE accessed for PICC with no thread of catheter past shoulder. Multiple attempts made. Left UE assessed with no suitable vein to hold a 5FR TL.  Unfortunately patient is not a candidate for a PICC. Melvin I couldn't help with access.  Thanks for the consult.  Jinny Stein RN [facility]

## 2024-01-22 NOTE — PROGRESS NOTES
[facility] Cardiology    Inpatient Progress Note      Chief Complaint/Reason for service:    Follow up congestive heart failure         Subjective:       Respiratory distress yesterday.  Intubated and sedated.    Past medical, surgical, social and family history reviewed in the patient's electronic medical record.         Objective:      Infusions:  amiodarone, 0.5 mg/min  fentanyl 10 mcg/mL,  mcg/hr, Last Rate: 300 mcg/hr (01/22/24 0643)  norepinephrine, 0.02-0.3 mcg/kg/min, Last Rate: 0.08 mcg/kg/min (01/22/24 0540)           Medications:    Current Facility-Administered Medications:     acetaminophen (TYLENOL) tablet 650 mg, 650 mg, Oral, Q4H PRN, 650 mg at 01/16/24 2201 **OR** acetaminophen (TYLENOL) 160 MG/5ML oral solution 650 mg, 650 mg, Oral, Q4H PRN **OR** acetaminophen (TYLENOL) suppository 650 mg, 650 mg, Rectal, Q4H PRN, Cosme Lyons MD    amiodarone 360 mg in 200 mL D5W infusion, 0.5 mg/min, Intravenous, Continuous, Basil Richter MD    aspirin EC tablet 81 mg, 81 mg, Oral, Daily, Cosme Lyons MD, 81 mg at 01/21/24 0810    sennosides-docusate (PERICOLACE) 8.6-50 MG per tablet 2 tablet, 2 tablet, Oral, BID, 2 tablet at 01/22/24 0012 **AND** polyethylene glycol (MIRALAX) packet 17 g, 17 g, Oral, Daily PRN **AND** bisacodyl (DULCOLAX) EC tablet 5 mg, 5 mg, Oral, Daily PRN **AND** bisacodyl (DULCOLAX) suppository 10 mg, 10 mg, Rectal, Daily PRN, Cosme Lyons MD    bumetanide (BUMEX) tablet 0.5 mg, 0.5 mg, Oral, Daily, Basil Richter MD, 0.5 mg at 01/21/24 0811    calcium carbonate (TUMS) chewable tablet 500 mg (200 mg elemental), 1 tablet, Oral, TID PRN, Cosme Lyons MD    cefTRIAXone (ROCEPHIN) 2,000 mg in sodium chloride 0.9 % 100 mL IVPB, 2,000 mg, Intravenous, Q24H, Dwain Kaur MD, Last Rate: 200 mL/hr at 01/21/24 0812, 2,000 mg at 01/21/24 0812    cetirizine (zyrTEC) tablet 5 mg, 5 mg, Oral, Nightly, Mayne, Casie M, PA-C, 5 mg at 01/22/24 0013     chlorhexidine (PERIDEX) 0.12 % solution 15 mL, 15 mL, Mouth/Throat, Q12H, Maria M Jimenez, APRN, 15 mL at 01/21/24 2103    dexAMETHasone (DECADRON) injection 4 mg, 4 mg, Intravenous, Q6H, Jun Fajardo MD, 4 mg at 01/22/24 0533    empagliflozin (JARDIANCE) tablet 10 mg, 10 mg, Oral, Daily, Basil Richter MD, 10 mg at 01/21/24 0811    fentaNYL (SUBLIMAZE) bolus from bag 10 mcg/mL 50 mcg, 50 mcg, Intravenous, Q30 Min PRN, Thai Villavicencio MD, 50 mcg at 01/22/24 0847    fentaNYL 2500 mcg/250 mL NS infusion,  mcg/hr, Intravenous, Titrated, Thai Villavicencio MD, Last Rate: 30 mL/hr at 01/22/24 0643, 300 mcg/hr at 01/22/24 0643    guaifenesin (ROBITUSSIN) 100 MG/5ML liquid 200 mg, 200 mg, Oral, Q4H PRN, Sary Riddle, APRN, 200 mg at 01/21/24 0624    hydrOXYzine (ATARAX) tablet 25 mg, 25 mg, Oral, Q4H PRN, Mendoza Mejia III, DO, 25 mg at 01/21/24 1423    ipratropium (ATROVENT) nebulizer solution 0.5 mg, 0.5 mg, Nebulization, 4x Daily - RT, Jun Fajardo MD, 0.5 mg at 01/22/24 0726    levothyroxine (SYNTHROID, LEVOTHROID) tablet 125 mcg, 125 mcg, Oral, Q AM, Cosme Lyons MD, 125 mcg at 01/22/24 0534    magnesium oxide (MAG-OX) tablet 400 mg, 400 mg, Oral, Daily, Cosme Lyons MD, 400 mg at 01/21/24 0811    melatonin tablet 5 mg, 5 mg, Oral, Nightly, Mayne, Casie M, PA-C, 5 mg at 01/22/24 0013    midazolam (VERSED) injection 2 mg, 2 mg, Intravenous, Q1H PRN, Thai Villavicencio MD    midodrine (PROAMATINE) tablet 10 mg, 10 mg, Oral, TID AC, Basil Richter MD, 10 mg at 01/20/24 1722    morphine injection 1 mg, 1 mg, Intravenous, Q1H PRN, Gillian Sweeney DO, 1 mg at 01/21/24 1444    norepinephrine (LEVOPHED) 8 mg in 250 mL NS infusion (premix), 0.02-0.3 mcg/kg/min, Intravenous, Titrated, Maria M Jimenez, APRN, Last Rate: 9.6 mL/hr at 01/22/24 0540, 0.08 mcg/kg/min at 01/22/24 0540    Nutrisource fiber pack 1 packet, 1 packet, Oral, BID, Mayne, Casie M, PALucyC, 1  packet at 01/21/24 0813    ondansetron ODT (ZOFRAN-ODT) disintegrating tablet 4 mg, 4 mg, Oral, Q6H PRN **OR** ondansetron (ZOFRAN) injection 4 mg, 4 mg, Intravenous, Q6H PRN, Cosme Lyons MD    pantoprazole (PROTONIX) injection 40 mg, 40 mg, Intravenous, Q AM, Maria M Jimenez, APRN, 40 mg at 01/22/24 0533    Pharmacy Consult - [facility], , Does not apply, Daily, Garfield Mejia IV, PharmD    Potassium Replacement - Follow Nurse / BPA Driven Protocol, , Does not apply, PRN, Basil Richter MD    pregabalin (LYRICA) capsule 150 mg, 150 mg, Oral, Daily, Gillian Hanson, PharmD, 150 mg at 01/21/24 0811    racemic epinephrine (RACEPINEPHRINE) nebulizer solution 0.5 mL, 0.5 mL, Nebulization, Q3H PRN, Jun Fajardo MD, 0.5 mL at 01/21/24 1515    rivaroxaban (XARELTO) tablet 15 mg, 15 mg, Oral, Daily With Dinner, Cosme Lyons MD, 15 mg at 01/20/24 1722    rivastigmine (EXELON) 4.6 MG/24HR patch 1 patch, 1 patch, Transdermal, Daily, Cosme Lyons MD, 1 patch at 01/21/24 0829    sodium chloride 0.9 % flush 10 mL, 10 mL, Intravenous, Q12H, Cosme Lyons MD, 10 mL at 01/21/24 2038    sodium chloride 0.9 % flush 10 mL, 10 mL, Intravenous, PRN, Cosme Lynos MD    sodium chloride 0.9 % infusion 40 mL, 40 mL, Intravenous, PRN, Cosme Lyons MD    tamsulosin (FLOMAX) 24 hr capsule 0.4 mg, 0.4 mg, Oral, Daily, Cosme Lyons MD, 0.4 mg at 01/21/24 0812    Vital Sign Min/Max for last 24 hours  Temp  Min: 97.3 °F (36.3 °C)  Max: 99.3 °F (37.4 °C)   BP  Min: 72/46  Max: 170/111   Pulse  Min: 64  Max: 123   Resp  Min: 20  Max: 24   SpO2  Min: 81 %  Max: 100 %   No data recorded      Intake/Output Summary (Last 24 hours) at 1/22/2024 0848  Last data filed at 1/22/2024 0600  Gross per 24 hour   Intake 3004 ml   Output 1425 ml   Net 1579 ml           CONSTITUTIONAL: Intubated, sedated    Labs/studies:  Available lab and imaging results were reviewed by myself today    Results for orders placed during  the hospital encounter of 01/16/24    Adult Transthoracic Echo Complete w/ Color, Spectral and Contrast if Necessary Per Protocol    Interpretation Summary    Left ventricular systolic function is normal. Left ventricular ejection fraction appears to be 56 - 60%.    Left ventricular wall thickness is consistent with mild posterior asymmetric hypertrophy.    Left ventricular diastolic function is consistent with (grade Ia w/high LAP) impaired relaxation.    Mildly reduced right ventricular systolic function noted.    Systolic and diastolic flattening of the interventricular septum consistent with right ventricle pressure and volume overload.    The right ventricular cavity is moderately dilated.    The right atrial cavity is mild to moderately dilated.    There is mild calcification of the aortic valve.    Moderate aortic valve regurgitation is present.    Mild mitral valve regurgitation is present.    Moderate to severe tricuspid valve regurgitation is present.    Estimated right ventricular systolic pressure from tricuspid regurgitation is markedly elevated (>55 mmHg).      Tele:  NSR          Assessment/Plan:         Acute on chronic heart failure with preserved ejection fraction (HFpEF)    Hypothyroidism    Pulmonary hypertension    Hypokalemia    Acute on chronic respiratory failure with hypoxia    Cellulitis of right lower extremity    E coli bacteremia    PAF (paroxysmal atrial fibrillation)       ASSESSMENT:  Acute on chronic HFpEF   Elevated proBNP of 10,000  IV Bumex 2 mg x 1 in the ED.   Most recent echo 4/2023 with EF 51-55%, mildly reduced RV systolc function, dilated LA an RA, mild to moderate AR, mild MR, mild TR.   Afib with RVR  Developed overnight 1/17.  Converted to sinus rhythm with amiodarone  Anticoagulated with Xarelto   Converted on Amiodarone protocol.  Reverted back to A-fib 1/21  Right LE cellulitis, Hypotension, possible sepsis  Acute on chronic respiratory failure   Intubated 1/21  respiratory distress  CKD stage III    PLAN:  Continue amiodarone at 0.5 mg/min for another 24 hours.  Will consider switching to p.o. tomorrow  Continue increased dose of midodrine 10 mg three times a day for hypotension.    Bumex 0.5 mg IV push x 1 today  Potassium replacement per protocol    Basil Richter MD, MSc, FACC, [facility]  Interventional Cardiology  [facility]

## 2024-01-22 NOTE — PROGRESS NOTES
"INTENSIVIST   PROGRESS NOTE     Hospital:  LOS: 6 days     Chief Complaint: Stridor    Subjective   S     Interval History: No acute issues over the last 24 hours.  Patient remained stable on mechanical ventilation.  Afebrile.  Hemodynamically stable although continues to require vasopressor support.    The patient's relevant past medical, surgical and social history were reviewed and updated in Epic as appropriate.      ROS: Unable to obtain as patient intubated and sedated    Objective   O     Intake/Ouptut 24 hrs (7:00AM - 6:59 AM)  Intake & Output (last 3 days)         01/19 0701 01/20 0700 01/20 0701 01/21 0700 01/21 0701 01/22 0700 01/22 0701 01/23 0700    P.O. 240 240 480     I.V. (mL/kg) 897 (14)  2524 (37.8)     Total Intake(mL/kg) 1137 (17.8) 240 (3.8) 3004 (45)     Urine (mL/kg/hr) 800 (0.5) 350 (0.2) 1425 (0.9)     Stool 0       Total Output      Net +337 -110 +1579             Urine Unmeasured Occurrence 2 x 2 x 1 x     Stool Unmeasured Occurrence 2 x 2 x       Medications (drips):  amiodarone, Last Rate: 0.5 mg/min (01/22/24 0918)  fentanyl 10 mcg/mL, Last Rate: 300 mcg/hr (01/22/24 0643)  norepinephrine, Last Rate: 0.099 mcg/kg/min (01/22/24 0901)    Respiratory Support: Mechanical ventilation    Physical Examination:  Vital Signs: Blood pressure 93/52, pulse 98, temperature 99.3 °F (37.4 °C), temperature source Bladder, resp. rate 24, height 152.4 cm (60\"), weight 66.8 kg (147 lb 4.3 oz), SpO2 93%.    General: Critically ill-appearing.  Intubated, sedated.  Chest: Clear to auscultation bilaterally, No wheezing, rhonchi, or rales.  Appears comfortable on mechanical ventilation.  Appropriate oxygen saturations.  Cardiac: Normal rate, S1S2 auscultated. No murmurs, rubs or gallops.   Extremities: 1+ lower extremity edema. No clubbing or cyanosis.  Neuro: Intubated, sedated.    Lines, Drains & Airways       Active LDAs       Name Placement date Placement time Site Days    Peripheral IV " 01/19/24 1211 Anterior;Right Forearm 01/19/24  1211  Forearm  2    Peripheral IV 01/20/24 2136 Anterior;Distal;Left;Upper Arm 01/20/24 2136  Arm  1    NG/OG Tube Nasogastric 16 Fr Left nostril 01/21/24 2230  Left nostril  less than 1    Urethral Catheter 01/21/24  1630  -- less than 1    ETT  01/21/24  1600  -- less than 1             Results from last 7 days   Lab Units 01/22/24  0402 01/20/24  0949 01/19/24  0334   WBC 10*3/mm3 11.23* 12.61* 12.83*   HEMOGLOBIN g/dL 10.3* 11.2* 10.1*   MCV fL 91.7 90.1 89.2   PLATELETS 10*3/mm3 204 203 168     Results from last 7 days   Lab Units 01/22/24  0402 01/21/24  0451 01/20/24  0949 01/17/24  0932 01/16/24  1024   SODIUM mmol/L 135* 134* 137   < > 140   POTASSIUM mmol/L 4.8 4.0 3.8   < > 4.4   CO2 mmol/L 24.0 22.0 25.0   < > 26.0   CREATININE mg/dL 1.46* 1.43* 1.30*   < > 1.36*   GLUCOSE mg/dL 133* 185* 107*   < > 160*   MAGNESIUM mg/dL 2.0 2.0 1.8  --  1.9   PHOSPHORUS mg/dL 4.9*  --   --   --  3.0    < > = values in this interval not displayed.     Estimated Creatinine Clearance: 21.8 mL/min (A) (by C-G formula based on SCr of 1.46 mg/dL (H)).  Results from last 7 days   Lab Units 01/18/24  0120 01/16/24  1024   ALK PHOS U/L 129* 94   BILIRUBIN mg/dL 0.4 0.8   ALT (SGPT) U/L 36* 24   AST (SGOT) U/L 53* 34*     Results from last 7 days   Lab Units 01/22/24  0409 01/21/24  1719 01/21/24  0247 01/16/24  1059   PH, ARTERIAL pH units 7.412 7.381 7.385 7.523*   PCO2, ARTERIAL mm Hg 40.2 42.4 39.2 28.4*   PO2 ART mm Hg 114.0* 91.6 114.0* 112.0*   FIO2 % 40 40 44 50     Images:  XR Chest 1 View    Result Date: 1/22/2024  Impression: No significant interval change, with similar left greater than right mild bibasilar airspace disease superimposed upon chronic changes. Electronically Signed: Indra Olson MD  1/22/2024 8:03 AM EST  Workstation ID: [id number]    XR Abdomen KUB    Result Date: 1/21/2024  Impression: Gastric suction tube terminates in the gastric lumen. Electronically  Signed: Blayne Ellis MD  1/21/2024 11:14 PM EST  Workstation ID: [id number]    CT Soft Tissue Neck With Contrast    Result Date: 1/21/2024  Age-related changes of the brain as above, otherwise without evidence of acute intracranial abnormality. Evaluation of the aerodigestive tract structures is significantly limited with endotracheal tube in place. No obvious mass or other focal luminal abnormality is present and there is no significant surrounding cervical adenopathy. Chronic findings are present in the chest including a 4.6 cm ascending aortic aneurysm. There are bilateral lower lobe areas of parabronchial consolidation, potentially chronic sequela of aspiration with some degree of acute superimposed pneumonia not excluded. Electronically Signed: Steve Barrientos MD  1/21/2024 8:18 PM EST  Workstation ID: [id number]    CT Chest Without Contrast Diagnostic    Result Date: 1/21/2024  Age-related changes of the brain as above, otherwise without evidence of acute intracranial abnormality. Evaluation of the aerodigestive tract structures is significantly limited with endotracheal tube in place. No obvious mass or other focal luminal abnormality is present and there is no significant surrounding cervical adenopathy. Chronic findings are present in the chest including a 4.6 cm ascending aortic aneurysm. There are bilateral lower lobe areas of parabronchial consolidation, potentially chronic sequela of aspiration with some degree of acute superimposed pneumonia not excluded. Electronically Signed: Steve Barrientos MD  1/21/2024 8:18 PM EST  Workstation ID: [id number]    CT Head Without Contrast    Result Date: 1/21/2024  Age-related changes of the brain as above, otherwise without evidence of acute intracranial abnormality. Evaluation of the aerodigestive tract structures is significantly limited with endotracheal tube in place. No obvious mass or other focal luminal abnormality is present and there is no significant surrounding  cervical adenopathy. Chronic findings are present in the chest including a 4.6 cm ascending aortic aneurysm. There are bilateral lower lobe areas of parabronchial consolidation, potentially chronic sequela of aspiration with some degree of acute superimposed pneumonia not excluded. Electronically Signed: Steve Barrientos MD  1/21/2024 8:18 PM EST  Workstation ID: [id number]    XR Chest 1 View    Result Date: 1/21/2024  Impression: 1.ET tube tip is just above the ric and should be pulled back some for more optimal positioning. 2.Cardiomegaly Electronically Signed: Amarjit Vaughn MD  1/21/2024 4:33 PM EST  Workstation ID: [id number]    XR Chest 1 View    Result Date: 1/20/2024  Impression: Chronic changes in the lungs without superimposed acute process of the chest. Electronically Signed: Blayne Ellis MD  1/20/2024 10:37 PM EST  Workstation ID: [id number]     Results: Reviewed.  I reviewed the patient's new laboratory and imaging results.  I independently reviewed the patient's new images.    Medications: Reviewed.    Assessment & Plan    A / P     Active Hospital Problems    Diagnosis     **Acute on chronic heart failure with preserved ejection fraction (HFpEF)     PAF (paroxysmal atrial fibrillation)     E coli bacteremia     Acute on chronic respiratory failure with hypoxia     Cellulitis of right lower extremity     Hypokalemia     Pulmonary hypertension     Hypothyroidism      Patient Brandy is a 89 year old female with history of HFpEF, PAF, valvular heart disease, chronic hypoxemic respiratory failure on 2L nasal cannula baseline, dementia. She was admitted to [facility] on 1/16/2024 for acute on chronic diastolic heart failure and cellulitis.  She has been undergoing diuresis with some improvement in respiratory status.  Blood cultures were positive for E. coli (unclear source whether from cellulitis which would be unusual versus UTI as UA was not done initially).  ID has been following and she has a plan to be  continued on ceftriaxone through 1/23/2024.     Echocardiogram showed normal LVEF with moderate AR, mild MR, moderate TR with RVSP greater than 55 mmHg.  She developed A-fib with RVR in 1/17/2024 converting to sinus rhythm on amiodarone.  She remains on amiodarone.     Patient developed stridor on 1/20/2024 which progressed and became significantly worse on 1/21/2024 throughout the day.  Respiratory PCR panel was positive for coronavirus (NOT COVID-19).  Due to worsening stridor patient was moved to the ICU.  After brief discussion concerning goals of care decision was made to proceed with intubation due to progressive stridor and respiratory failure.     Per previous ICU provider who intubated patient, epiglottis appeared to be quite floppy but was not swollen. Emelyn-procedure she became hypotensive requiring norepinephrine.     #Acute on chronic hypoxemic respiratory failure  #Stridor  -Unclear etiology.  Per previous ICU provider, patient had a large // floppy epiglottis which could have led to obstruction on inspiration. Also on Ddx =  subglottic cause of stridor, paroxysmal vocal cord movement.  Patient intubated to maintain airway on 1/21/2024.  CT head, neck, chest without visible etiology of stridor but was consistent with left lower lobe greater than right lower lobe consolidative processes.  Will titrate mechanical ventilation as able.    #E. coli bacteremia/cellulitis: Antibiotics per infectious disease  #Diastolic heart failure exacerbation/secondary pulmonary hypertension likely due to chronic hypoxemia and cardiac: Fluid management with diuretics as needed  #Hypotension: Likely secondary to sedation vs shock from sepsis: Norepinephrine for now, unable to titrate much over the last 24 hours. Failed PICC placement on 1/22. Discussed obtaining CVC with patient's family and if unable to titrate vasopressors will likely need in the next 24 hours  #Electrolyte abnormalities: Monitor and replace as  needed.  #Hypothyroidism: Levothyroxine.  #Paroxysmal atrial fibrillation: Amiodarone per cardiology.  Cardiology following.  Patient on Xarelto.    F-Tube feeds per dietary recs [trophics started on 1/22/2024]  A-Fentynl   S-Versed PRN   T-Xarelto   H-Head of bed greater than 30 degrees  U-PPI  G-FSBS per unit protocol, correction dose insulin  S-SBT when able  B-Will monitor daily and provide regimen if indicated  I-PIV  D-Ceftriaxone     Advance Directives:   Code Status and Medical Interventions:   Ordered at: 01/16/24 1233     Medical Intervention Limits:    NO intubation (DNI)     Code Status (Patient has no pulse and is not breathing):    No CPR (Do Not Attempt to Resuscitate)     Medical Interventions (Patient has pulse or is breathing):    Limited Support     High level of risk due to severe exacerbation of chronic illness and illness with threat to life or bodily function.    I conducted multidisciplinary rounds in the plan of care was discussed with the multidisciplinary team at that time. In attendance at multidisciplinary rounds was clinical pharmacist, dietitian, nursing staff and case management.    I discussed the patient's findings and my recommendations with patient, family, nursing staff, and consulting provider    Critical Care Time: Critical Care time spent in direct patient care: 40 minutes (excluding procedure time, if applicable) including high complexity decision making to assess, manipulate, and support vital organ system failure in this individual who has impairment of one or more vital organ systems such that there is a high probability of imminent or life threatening deterioration in the patient’s condition.     -- Cal Dougherty MD  Pulmonary/Critical Care

## 2024-01-23 NOTE — PLAN OF CARE
Goal Outcome Evaluation:  Plan of Care Reviewed With: patient           Outcome Evaluation: Pt following simple commands.  Continues to experience tremors during times of light stimulation.  Remains mildly febrile.  Sluggish urinary output.

## 2024-01-23 NOTE — PLAN OF CARE
Problem: Restraint, Nonviolent  Goal: Absence of Harm or Injury  Outcome: Ongoing, Progressing  Intervention: Implement Least Restrictive Safety Strategies  Recent Flowsheet Documentation  Taken 1/22/2024 1800 by Adilene Benoit RN  Medical Device Protection:   IV pole/bag removed from visual field   torso covered   tubing secured  Less Restrictive Alternative:   appropriate expression promoted   bed alarm in use   calming techniques promoted   emotional support provided   sensory stimulation limited  De-Escalation Techniques:   appropriate behavior reinforced   increased round frequency   medication administered   quiet time facilitated   stimulation decreased   verbally redirected  Diversional Activities: (family at bedside) other (see comments)  Taken 1/22/2024 1600 by Adilene Benoit RN  Medical Device Protection:   IV pole/bag removed from visual field   torso covered   tubing secured  Less Restrictive Alternative:   appropriate expression promoted   bed alarm in use   calming techniques promoted   emotional support provided   sensory stimulation limited  De-Escalation Techniques:   appropriate behavior reinforced   increased round frequency   medication administered   quiet time facilitated   stimulation decreased   verbally redirected  Diversional Activities: (family at bedside) other (see comments)  Taken 1/22/2024 1400 by Adilene Benoit RN  Medical Device Protection:   IV pole/bag removed from visual field   torso covered   tubing secured  Less Restrictive Alternative:   appropriate expression promoted   bed alarm in use   calming techniques promoted   emotional support provided   sensory stimulation limited  De-Escalation Techniques:   appropriate behavior reinforced   increased round frequency   medication administered   quiet time facilitated   stimulation decreased   verbally redirected  Diversional Activities: (family at bedside) other (see comments)  Taken 1/22/2024 1200 by Adilene Benoit,  RN  Medical Device Protection:   IV pole/bag removed from visual field   torso covered   tubing secured  Less Restrictive Alternative:   appropriate expression promoted   bed alarm in use   calming techniques promoted   emotional support provided   sensory stimulation limited  De-Escalation Techniques:   appropriate behavior reinforced   increased round frequency   medication administered   quiet time facilitated   stimulation decreased   verbally redirected  Diversional Activities: (family at bedside) other (see comments)  Taken 1/22/2024 1000 by Adilene Benoit RN  Medical Device Protection:   IV pole/bag removed from visual field   torso covered   tubing secured  Less Restrictive Alternative:   appropriate expression promoted   bed alarm in use   calming techniques promoted   emotional support provided   sensory stimulation limited  De-Escalation Techniques:   appropriate behavior reinforced   increased round frequency   medication administered   quiet time facilitated   stimulation decreased   verbally redirected  Diversional Activities: (family at bedside) other (see comments)  Taken 1/22/2024 0800 by Adilene Benoit, RN  Medical Device Protection:   IV pole/bag removed from visual field   torso covered   tubing secured  Less Restrictive Alternative:   appropriate expression promoted   bed alarm in use   calming techniques promoted   emotional support provided   sensory stimulation limited  De-Escalation Techniques:   appropriate behavior reinforced   increased round frequency   medication administered   quiet time facilitated   stimulation decreased   verbally redirected  Diversional Activities: (family at bedside) other (see comments)  Intervention: Protect Dignity, Rights, and Personal Wellbeing  Recent Flowsheet Documentation  Taken 1/22/2024 1600 by Adilene Benoit, RN  Trust Relationship/Rapport:   care explained   reassurance provided  Taken 1/22/2024 1400 by Adilene Benoit, RN  Trust  Relationship/Rapport:   care explained   reassurance provided  Taken 1/22/2024 1200 by Adilene Benoit RN  Trust Relationship/Rapport:   care explained   reassurance provided  Taken 1/22/2024 1000 by Adilene Beonit RN  Trust Relationship/Rapport:   care explained   reassurance provided  Taken 1/22/2024 0800 by Adilene Benoit RN  Trust Relationship/Rapport:   care explained   reassurance provided  Intervention: Protect Skin and Joint Integrity  Recent Flowsheet Documentation  Taken 1/22/2024 1800 by Adilene Benoit RN  Body Position:   weight shifting   lower extremity elevated   neutral body alignment   neutral head position  Taken 1/22/2024 1600 by Adilene Benoit RN  Body Position:   turned   left   lower extremity elevated   neutral body alignment   neutral head position   tilted   weight shifting  Taken 1/22/2024 1400 by Adilene Benoit RN  Body Position:   weight shifting   supine   lower extremity elevated   neutral body alignment   neutral head position  Taken 1/22/2024 1200 by Adilene Benoit RN  Body Position:   turned   left   lower extremity elevated   neutral body alignment   neutral head position   tilted   weight shifting  Taken 1/22/2024 1000 by Adilene eBnoit RN  Body Position:   weight shifting   supine   lower extremity elevated   neutral body alignment   neutral head position  Taken 1/22/2024 0800 by Adilene Benoit RN  Body Position:   turned   right   lower extremity elevated   neutral body alignment   neutral head position   tilted   weight shifting   Goal Outcome Evaluation:           Progress: no change

## 2024-01-23 NOTE — PROGRESS NOTES
"INTENSIVIST   PROGRESS NOTE     Hospital:  LOS: 7 days     Chief Complaint: Stridor    Subjective   S     Interval History: No acute issue overnight.  Patient remains critically ill, on mechanical ventilation.  Also still in shock requiring ongoing vasopressor support.  Family at bedside.    The patient's relevant past medical, surgical and social history were reviewed and updated in Epic as appropriate.      ROS: Unable to obtain as patient intubated and sedated    Objective   O     Intake/Ouptut 24 hrs (7:00AM - 6:59 AM)  Intake & Output (last 3 days)         01/19 0701 01/20 0700 01/20 0701 01/21 0700 01/21 0701 01/22 0700 01/22 0701 01/23 0700    P.O. 240 240 480     I.V. (mL/kg) 897 (14)  2524 (37.8)     Total Intake(mL/kg) 1137 (17.8) 240 (3.8) 3004 (45)     Urine (mL/kg/hr) 800 (0.5) 350 (0.2) 1425 (0.9)     Stool 0       Total Output      Net +337 -110 +1579             Urine Unmeasured Occurrence 2 x 2 x 1 x     Stool Unmeasured Occurrence 2 x 2 x       Medications (drips):  fentanyl 10 mcg/mL, Last Rate: 300 mcg/hr (01/23/24 1033)  norepinephrine, Last Rate: 0.08 mcg/kg/min (01/22/24 2356)    Respiratory Support: Mechanical ventilation    Physical Examination:  Vital Signs: Blood pressure 117/66, pulse 73, temperature 100 °F (37.8 °C), temperature source Bladder, resp. rate 18, height 152.4 cm (60\"), weight 66.8 kg (147 lb 4.3 oz), SpO2 98%.    General: Critically ill-appearing.  Intubated, sedated.  Chest: Clear to auscultation bilaterally, No wheezing, rhonchi, or rales.  Appears comfortable on mechanical ventilation.  Appropriate oxygen saturations.  Cardiac: Normal rate, S1S2 auscultated. No murmurs, rubs or gallops.   Extremities: 1+ lower extremity edema. No clubbing or cyanosis.  Neuro: Intubated, sedated.    Lines, Drains & Airways       Active LDAs       Name Placement date Placement time Site Days    Peripheral IV 01/19/24 1211 Anterior;Right Forearm 01/19/24  1211  Forearm  2    " Peripheral IV 01/20/24 2136 Anterior;Distal;Left;Upper Arm 01/20/24 2136  Arm  1    NG/OG Tube Nasogastric 16 Fr Left nostril 01/21/24 2230  Left nostril  less than 1    Urethral Catheter 01/21/24  1630  -- less than 1    ETT  01/21/24  1600  -- less than 1             Results from last 7 days   Lab Units 01/23/24 0413 01/22/24  0402 01/20/24  0949   WBC 10*3/mm3 14.36* 11.23* 12.61*   HEMOGLOBIN g/dL 10.4* 10.3* 11.2*   MCV fL 90.9 91.7 90.1   PLATELETS 10*3/mm3 265 204 203     Results from last 7 days   Lab Units 01/23/24 0413 01/22/24 0402 01/21/24  0451 01/20/24  0949   SODIUM mmol/L 130* 135*  135* 134* 137   POTASSIUM mmol/L 4.3 4.8  4.8 4.0 3.8   CO2 mmol/L 21.0* 20.0*  24.0 22.0 25.0   CREATININE mg/dL 1.79* 1.35*  1.46* 1.43* 1.30*   GLUCOSE mg/dL 126* 135*  133* 185* 107*   MAGNESIUM mg/dL  --  2.0  2.0 2.0 1.8   PHOSPHORUS mg/dL  --  5.1*  4.9*  --   --      Estimated Creatinine Clearance: 17.8 mL/min (A) (by C-G formula based on SCr of 1.79 mg/dL (H)).  Results from last 7 days   Lab Units 01/22/24 0402 01/18/24  0120   ALK PHOS U/L 107 129*   BILIRUBIN mg/dL <0.2 0.4   ALT (SGPT) U/L 22 36*   AST (SGOT) U/L 19 53*     Results from last 7 days   Lab Units 01/23/24 0402 01/22/24  0409 01/21/24  1719 01/21/24  0247   PH, ARTERIAL pH units 7.300* 7.412 7.381 7.385   PCO2, ARTERIAL mm Hg 49.0* 40.2 42.4 39.2   PO2 ART mm Hg 77.2* 114.0* 91.6 114.0*   FIO2 % 40 40 40 44     Images:  XR Chest 1 View    Result Date: 1/23/2024  Impression: 1.Small left pleural effusion with left basilar atelectasis or infiltrate. Electronically Signed: Osvaldo Steele MD  1/23/2024 7:40 AM EST  Workstation ID: [id number]    XR Chest 1 View    Result Date: 1/22/2024  Impression: No significant interval change, with similar left greater than right mild bibasilar airspace disease superimposed upon chronic changes. Electronically Signed: Indra Olson MD  1/22/2024 8:03 AM EST  Workstation ID: [id number]    XR Abdomen  KUB    Result Date: 1/21/2024  Impression: Gastric suction tube terminates in the gastric lumen. Electronically Signed: Blayne Ellis MD  1/21/2024 11:14 PM EST  Workstation ID: [id number]    CT Soft Tissue Neck With Contrast    Result Date: 1/21/2024  Age-related changes of the brain as above, otherwise without evidence of acute intracranial abnormality. Evaluation of the aerodigestive tract structures is significantly limited with endotracheal tube in place. No obvious mass or other focal luminal abnormality is present and there is no significant surrounding cervical adenopathy. Chronic findings are present in the chest including a 4.6 cm ascending aortic aneurysm. There are bilateral lower lobe areas of parabronchial consolidation, potentially chronic sequela of aspiration with some degree of acute superimposed pneumonia not excluded. Electronically Signed: Steve Barrientos MD  1/21/2024 8:18 PM EST  Workstation ID: [id number]    CT Chest Without Contrast Diagnostic    Result Date: 1/21/2024  Age-related changes of the brain as above, otherwise without evidence of acute intracranial abnormality. Evaluation of the aerodigestive tract structures is significantly limited with endotracheal tube in place. No obvious mass or other focal luminal abnormality is present and there is no significant surrounding cervical adenopathy. Chronic findings are present in the chest including a 4.6 cm ascending aortic aneurysm. There are bilateral lower lobe areas of parabronchial consolidation, potentially chronic sequela of aspiration with some degree of acute superimposed pneumonia not excluded. Electronically Signed: Steve Barrientos MD  1/21/2024 8:18 PM EST  Workstation ID: [id number]    CT Head Without Contrast    Result Date: 1/21/2024  Age-related changes of the brain as above, otherwise without evidence of acute intracranial abnormality. Evaluation of the aerodigestive tract structures is significantly limited with endotracheal tube  in place. No obvious mass or other focal luminal abnormality is present and there is no significant surrounding cervical adenopathy. Chronic findings are present in the chest including a 4.6 cm ascending aortic aneurysm. There are bilateral lower lobe areas of parabronchial consolidation, potentially chronic sequela of aspiration with some degree of acute superimposed pneumonia not excluded. Electronically Signed: Steve Barrientos MD  1/21/2024 8:18 PM EST  Workstation ID: [id number]    XR Chest 1 View    Result Date: 1/21/2024  Impression: 1.ET tube tip is just above the ric and should be pulled back some for more optimal positioning. 2.Cardiomegaly Electronically Signed: Amarjit Vaughn MD  1/21/2024 4:33 PM EST  Workstation ID: [id number]     Results: Reviewed.  - I reviewed the patient's new laboratory and imaging results.  - I independently reviewed the patient's new images.    Medications: Reviewed.    Assessment & Plan    A / P     Active Hospital Problems    Diagnosis     **Acute on chronic heart failure with preserved ejection fraction (HFpEF)     PAF (paroxysmal atrial fibrillation)     E coli bacteremia     Acute on chronic respiratory failure with hypoxia     Cellulitis of right lower extremity     Hypokalemia     Pulmonary hypertension     Hypothyroidism      Patient Brandy is a 89 year old female with history of HFpEF, PAF, valvular heart disease, chronic hypoxemic respiratory failure on 2L nasal cannula baseline, dementia. She was admitted to [facility] on 1/16/2024 for acute on chronic diastolic heart failure and cellulitis.  She has been undergoing diuresis with some improvement in respiratory status.  Blood cultures were positive for E. coli (unclear source whether from cellulitis which would be unusual versus UTI as UA was not done initially).  ID has been following and she has a plan to be continued on ceftriaxone through 1/23/2024.     Echocardiogram showed normal LVEF with moderate AR, mild MR, moderate  TR with RVSP greater than 55 mmHg.  She developed A-fib with RVR in 1/17/2024 converting to sinus rhythm on amiodarone.  She remains on amiodarone.     Patient developed stridor on 1/20/2024 which progressed and became significantly worse on 1/21/2024 throughout the day.  Respiratory PCR panel was positive for coronavirus (NOT COVID-19).  Due to worsening stridor patient was moved to the ICU.  After brief discussion concerning goals of care decision was made to proceed with intubation due to progressive stridor and respiratory failure.     Per previous ICU provider who intubated patient, epiglottis appeared to be quite floppy but was not swollen. Emelyn-procedure she became hypotensive requiring norepinephrine.     #Acute on chronic hypoxemic respiratory failure  #Stridor  -Unclear etiology.  Per previous ICU provider, patient had a large // floppy epiglottis which could have led to obstruction on inspiration. Also on Ddx =  subglottic cause of stridor, paroxysmal vocal cord movement. Patient intubated to maintain airway on 1/21/2024. CT head, neck and chest without visible etiology of stridor but was consistent with left lower lobe greater than right lower lobe consolidative processes. Will titrate mechanical ventilation as able. Pending clinical stability-- tentative plan is for aggressive sedation weaning tomorrow (1/24), airleak evaluation and possibly SBT if able.     #E. coli bacteremia/cellulitis: Antibiotics per infectious disease  #Diastolic heart failure exacerbation/secondary pulmonary hypertension likely due to chronic hypoxemia and cardiac: Fluid management with diuretics as needed; Held on 1/23 give MICHAEL   #Hypotension: Likely secondary to sedation vs shock from sepsis: Norepinephrine for now, unable to titrate much over the last 24 hours. Failed PICC placement on 1/22. Discussed obtaining CVC with patient's family and if unable to titrate vasopressors will likely need in the next 24  hours  #Hypothyroidism: Levothyroxine  #Paroxysmal atrial fibrillation: Amiodarone per cardiology.  Cardiology following.  Patient on Xarelto  #MICHAEL: Will monitor creatinine, urine output and electrolytes while in the ICU.  Electrolyte replacement per unit protocol     Did speak with patient's daughter and son at length on 1/23 concerning goals. While patient would not want a prolonged ventilator course (was DNR/DNI prior to stridor)-- the decision was made to intubate her during decompensation (per family). Suggested that we continue ongoing care/support-- at least giving her a chance to improve. Family amendable to plan.      F-Tube feeds per dietary recs  A-Fentynl   S-Versed PRN    T-Xarelto   H-Head of bed greater than 30 degrees  U-PPI  G-FSBS per unit protocol, correction dose insulin  S-SBT when able  B-Will monitor daily and provide regimen if indicated  I-PIV  D-Ceftriaxone      Advance Directives:   Code Status and Medical Interventions:   Ordered at: 01/16/24 1233     Medical Intervention Limits:    NO intubation (DNI)     Code Status (Patient has no pulse and is not breathing):    No CPR (Do Not Attempt to Resuscitate)     Medical Interventions (Patient has pulse or is breathing):    Limited Support     High level of risk due to severe exacerbation of chronic illness and illness with threat to life or bodily function.    I conducted multidisciplinary rounds in the plan of care was discussed with the multidisciplinary team at that time. In attendance at multidisciplinary rounds was clinical pharmacist, dietitian, nursing staff and case management.    I discussed the patient's findings and my recommendations with patient, family, nursing staff, and consulting provider    Critical Care Time: Critical Care time spent in direct patient care: 35 minutes (excluding procedure time, if applicable) including high complexity decision making to assess, manipulate, and support vital organ system failure in this  individual who has impairment of one or more vital organ systems such that there is a high probability of imminent or life threatening deterioration in the patient’s condition.     -- Cal Dougherty MD  Pulmonary/Critical Care

## 2024-01-24 NOTE — PROGRESS NOTES
"INTENSIVIST   PROGRESS NOTE     Hospital:  LOS: 8 days     Chief Complaint: Stridor    Subjective   S     Interval History: No acute issue overnight. Patient remains critically ill, on mechanical ventilation. Also still in shock requiring ongoing vasopressor support though requirements have decreased.     The patient's relevant past medical, surgical and social history were reviewed and updated in Epic as appropriate.      ROS: Unable to obtain as patient intubated and sedated    Objective   O     Intake/Ouptut 24 hrs (7:00AM - 6:59 AM)  Intake & Output (last 3 days)         01/19 0701 01/20 0700 01/20 0701 01/21 0700 01/21 0701 01/22 0700 01/22 0701 01/23 0700    P.O. 240 240 480     I.V. (mL/kg) 897 (14)  2524 (37.8)     Total Intake(mL/kg) 1137 (17.8) 240 (3.8) 3004 (45)     Urine (mL/kg/hr) 800 (0.5) 350 (0.2) 1425 (0.9)     Stool 0       Total Output      Net +337 -110 +1579             Urine Unmeasured Occurrence 2 x 2 x 1 x     Stool Unmeasured Occurrence 2 x 2 x       Medications (drips):  dexmedetomidine, Last Rate: 0.4 mcg/kg/hr (01/24/24 1307)  fentanyl 10 mcg/mL, Last Rate: 250 mcg/hr (01/24/24 1317)    Respiratory Support: Mechanical ventilation    Physical Examination:  Vital Signs: Blood pressure 102/66, pulse 68, temperature 98.6 °F (37 °C), temperature source Bladder, resp. rate 15, height 152.4 cm (60\"), weight 66.8 kg (147 lb 4.3 oz), SpO2 94%.    General: Critically ill-appearing. Intubated, sedated.   Chest: Clear to auscultation bilaterally, No wheezing, rhonchi, or rales. Appears comfortable on mechanical ventilation. Appropriate oxygen saturations.   Cardiac: Normal rate, S1S2 auscultated. No murmurs, rubs or gallops.    Extremities: 1+ lower extremity edema. No clubbing or cyanosis.    Neuro: Intubated, sedated.      Lines, Drains & Airways       Active LDAs       Name Placement date Placement time Site Days    Peripheral IV 01/19/24 1211 Anterior;Right Forearm 01/19/24  1211 "  Forearm  2    Peripheral IV 01/20/24 2136 Anterior;Distal;Left;Upper Arm 01/20/24 2136  Arm  1    NG/OG Tube Nasogastric 16 Fr Left nostril 01/21/24 2230  Left nostril  less than 1    Urethral Catheter 01/21/24  1630  -- less than 1    ETT  01/21/24  1600  -- less than 1             Results from last 7 days   Lab Units 01/24/24  0519 01/23/24 0413 01/22/24  0402   WBC 10*3/mm3 10.89* 14.36* 11.23*   HEMOGLOBIN g/dL 10.6* 10.4* 10.3*   MCV fL 95.8 90.9 91.7   PLATELETS 10*3/mm3 259 265 204     Results from last 7 days   Lab Units 01/24/24  0519 01/23/24 0413 01/22/24  0402 01/21/24  0451 01/20/24  0949   SODIUM mmol/L 133* 130* 135*  135* 134* 137   POTASSIUM mmol/L 4.5 4.3 4.8  4.8 4.0 3.8   CO2 mmol/L 22.0 21.0* 20.0*  24.0 22.0 25.0   CREATININE mg/dL 1.98* 1.79* 1.35*  1.46* 1.43* 1.30*   GLUCOSE mg/dL 123* 126* 135*  133* 185* 107*   MAGNESIUM mg/dL  --   --  2.0  2.0 2.0 1.8   PHOSPHORUS mg/dL  --   --  5.1*  4.9*  --   --      Estimated Creatinine Clearance: 16.1 mL/min (A) (by C-G formula based on SCr of 1.98 mg/dL (H)).  Results from last 7 days   Lab Units 01/22/24  0402 01/18/24  0120   ALK PHOS U/L 107 129*   BILIRUBIN mg/dL <0.2 0.4   ALT (SGPT) U/L 22 36*   AST (SGOT) U/L 19 53*     Results from last 7 days   Lab Units 01/24/24  0318 01/23/24  0402 01/22/24  0409 01/21/24  1719 01/21/24  0247   PH, ARTERIAL pH units 7.330* 7.300* 7.412 7.381 7.385   PCO2, ARTERIAL mm Hg 44.8 49.0* 40.2 42.4 39.2   PO2 ART mm Hg 81.5* 77.2* 114.0* 91.6 114.0*   FIO2 % 40 40 40 40 44     Images:  XR Chest 1 View    Result Date: 1/23/2024  Impression: 1.Small left pleural effusion with left basilar atelectasis or infiltrate. Electronically Signed: Osvaldo Steele MD  1/23/2024 7:40 AM EST  Workstation ID: [id number]     Results: Reviewed.  - I reviewed the patient's new laboratory and imaging results.  - I independently reviewed the patient's new images.    Medications: Reviewed.    Assessment & Plan    A / P      Active Hospital Problems    Diagnosis     **Acute on chronic heart failure with preserved ejection fraction (HFpEF)     PAF (paroxysmal atrial fibrillation)     E coli bacteremia     Acute on chronic respiratory failure with hypoxia     Cellulitis of right lower extremity     Hypokalemia     Pulmonary hypertension     Hypothyroidism      Patient Brandy is a 89 year old female with history of HFpEF, PAF, valvular heart disease, chronic hypoxemic respiratory failure on 2L nasal cannula baseline, dementia. She was admitted to [facility] on 1/16/2024 for acute on chronic diastolic heart failure and cellulitis.  She has been undergoing diuresis with some improvement in respiratory status.  Blood cultures were positive for E. coli (unclear source whether from cellulitis which would be unusual versus UTI as UA was not done initially).  ID has been following and she has a plan to be continued on ceftriaxone through 1/23/2024.     Echocardiogram showed normal LVEF with moderate AR, mild MR, moderate TR with RVSP greater than 55 mmHg.  She developed A-fib with RVR in 1/17/2024 converting to sinus rhythm on amiodarone.  She remains on amiodarone.     Patient developed stridor on 1/20/2024 which progressed and became significantly worse on 1/21/2024 throughout the day.  Respiratory PCR panel was positive for coronavirus (NOT COVID-19).  Due to worsening stridor patient was moved to the ICU.  After brief discussion concerning goals of care decision was made to proceed with intubation due to progressive stridor and respiratory failure.     Per previous ICU provider who intubated patient, epiglottis appeared to be quite floppy but was not swollen. Emelyn-procedure she became hypotensive requiring norepinephrine.     #Acute on chronic hypoxemic respiratory failure  #Stridor  -Unclear etiology. Per previous ICU provider, patient had a large // floppy epiglottis which could have led to obstruction on inspiration. Also on Ddx =   subglottic cause of stridor, paroxysmal vocal cord movement. Patient intubated to maintain airway on 1/21/2024. CT head, neck and chest without visible etiology of stridor but was consistent with left lower lobe greater than right lower lobe consolidative processes.     #E. coli bacteremia/cellulitis: Antibiotics per infectious disease   #Diastolic heart failure exacerbation/secondary pulmonary hypertension likely due to chronic hypoxemia and cardiac: Fluid management with diuretics as needed; Holding given progressive renal dysfunction    #Hypotension: Likely secondary to sedation vs shock from sepsis: Norepinephrine for now. Failed PICC placement on 1/22. Discussed obtaining CVC with patient's family but held off given decreasing pressor requirements   #Hypothyroidism: Levothyroxine  #Paroxysmal atrial fibrillation: Amiodarone per cardiology.  Cardiology following.  Patient on Xarelto  #MICHAEL: Will monitor creatinine, urine output and electrolytes while in the ICU.  Electrolyte replacement per unit protocol     Had family meeting on 1/24/2024 with patient's , children and others concerning overall goals.  Approximately 30 minutes in length.     Expressed that there was still a chance that patient could recover and be extubated successfully with more time on mechanical ventilation for this to be done safely/appropriately. Detailed many ways in which patient had improved (ie shock) and labs that were trending in a less positive direction (ie renal function).     Family had struggled allowing intubation given her prior DNR/DNI status; however, did concede given significant respiratory distress prior to intubation and ICU admission.  All family members were in agreement that they do not wish for ongoing aggressive support and really want to transition to comfort measures only.  Part of this decision is due to underlying quality of life, frequent hospitalizations and patient's very adamant wish that she would not  want to be kept alive on machines or in/out of hospitals.  Quality of life appears to be much more significant to Mr. Nava compared to keke.      Stopped aggressive medical therapy and transitioned to comfort measures at family's request following this conversation on 1/24.  Tentatively planning on extubation later today.  Did explain to family that there was a potential (though low likelihood) she would survive extubation.  Regardless moving forward they wish for only treatment of pain, anxiety, air hunger.  CODE STATUS changed back to DNR/DNI. They understand that once therapy is stopped-- it is highly unadvisable (and harmful) to switch back to aggressive therapy, including re-intubation. They were very kind and expressed complete understanding of situation. Fortunately all family members were in agreement.     Precedex drip (low dose) started due to ongoing agitation. Other PRNs available as well.     Advance Directives:   Code Status and Medical Interventions:   Ordered at: 01/24/24 1334     Code Status (Patient has no pulse and is not breathing):    No CPR (Do Not Attempt to Resuscitate)     Medical Interventions (Patient has pulse or is breathing):    Comfort Measures     High level of risk due to severe exacerbation of chronic illness and illness with threat to life or bodily function.    I conducted multidisciplinary rounds in the plan of care was discussed with the multidisciplinary team at that time. In attendance at multidisciplinary rounds was clinical pharmacist, dietitian, nursing staff and case management.    I discussed the patient's findings and my recommendations with patient, family, nursing staff, and consulting provider.    -- Cal Dougherty MD  Pulmonary/Critical Care    Critical Care time spent in direct patient care: 45 minutes (excluding procedure time, if applicable) including high complexity decision making to assess, manipulate, and support vital organ system failure in this  individual who has impairment of one or more vital organ systems such that there is a high probability of imminent or life threatening deterioration in the patient’s condition.

## 2024-01-24 NOTE — PLAN OF CARE
Goal Outcome Evaluation:   - VSS  - Tmax: 100.8  - Levophed titrated down to 0.4mcg/kg/min  - Fentanyl continued for sedation. Restless with stimulation. Versed IVP given once and fentanyl bolus when restless.     - UOP: 525ml  - TF continued  - No BM  - Restraints continued   - Family at bedside

## 2024-01-24 NOTE — CASE MANAGEMENT/SOCIAL WORK
Continued Stay Note  [facility]     Patient Name: Zoila Nava  MRN: [id number]  Today's Date: 1/24/2024    Admit Date: 1/16/2024    Plan: ongoing   Discharge Plan       Row Name 01/24/24 1522       Plan    Plan ongoing    Patient/Family in Agreement with Plan other (see comments)    Plan Comments Per note, patient will be extubated today, family wishes comfort measures @ this time.    Final Discharge Disposition Code 30 - still a patient                   Discharge Codes    No documentation.                 Expected Discharge Date and Time       Expected Discharge Date Expected Discharge Time    Jan 27, 2024               Dwain Richter RN

## 2024-01-24 NOTE — PROGRESS NOTES
[facility] Cardiology    Inpatient Progress Note      Chief Complaint/Reason for service:    Follow up congestive heart failure         Subjective:       Patient remains intubated and sedated.  Family at bedside. Maintaining sinus rhythm on telemetry.     Past medical, surgical, social and family history reviewed in the patient's electronic medical record.         Objective:      Infusions:  fentanyl 10 mcg/mL,  mcg/hr, Last Rate: 300 mcg/hr (01/24/24 0906)  norepinephrine, 0.02-0.3 mcg/kg/min, Last Rate: 0.01 mcg/kg/min (01/24/24 0943)           Medications:    Current Facility-Administered Medications:     acetaminophen (TYLENOL) tablet 650 mg, 650 mg, Oral, Q4H PRN, 650 mg at 01/16/24 2201 **OR** acetaminophen (TYLENOL) 160 MG/5ML oral solution 650 mg, 650 mg, Oral, Q4H PRN **OR** acetaminophen (TYLENOL) suppository 650 mg, 650 mg, Rectal, Q4H PRN, Cosme Lyons MD    amiodarone (PACERONE) tablet 200 mg, 200 mg, Oral, Q12H, Amelia Lara, APRN, 200 mg at 01/24/24 0831    aspirin EC tablet 81 mg, 81 mg, Oral, Daily, Cosme Lyons MD, 81 mg at 01/24/24 0831    sennosides-docusate (PERICOLACE) 8.6-50 MG per tablet 2 tablet, 2 tablet, Oral, BID, 2 tablet at 01/24/24 0831 **AND** polyethylene glycol (MIRALAX) packet 17 g, 17 g, Oral, Daily PRN **AND** bisacodyl (DULCOLAX) EC tablet 5 mg, 5 mg, Oral, Daily PRN **AND** bisacodyl (DULCOLAX) suppository 10 mg, 10 mg, Rectal, Daily PRN, Cosme Lyons MD    calcium carbonate (TUMS) chewable tablet 500 mg (200 mg elemental), 1 tablet, Oral, TID PRN, Cosme Lyons MD    cefTRIAXone (ROCEPHIN) 2,000 mg in sodium chloride 0.9 % 100 mL IVPB, 2,000 mg, Intravenous, Q24H, Perry Dougherty MD, Last Rate: 200 mL/hr at 01/24/24 0831, 2,000 mg at 01/24/24 0831    cetirizine (zyrTEC) tablet 5 mg, 5 mg, Oral, Nightly, Mayne, Casie M, PA-C, 5 mg at 01/23/24 2025    chlorhexidine (PERIDEX) 0.12 % solution 15 mL, 15 mL, Mouth/Throat, Q12H, Humlong,  Maria M CEJA, APRN, 15 mL at 01/24/24 0831    dexAMETHasone (DECADRON) injection 4 mg, 4 mg, Intravenous, Q6H, Jun Fajardo MD, 4 mg at 01/24/24 0541    empagliflozin (JARDIANCE) tablet 10 mg, 10 mg, Oral, Daily, Basil Richter MD, 10 mg at 01/24/24 0831    fentaNYL (SUBLIMAZE) bolus from bag 10 mcg/mL 50 mcg, 50 mcg, Intravenous, Q30 Min PRN, Thai Villavicencio MD, 50 mcg at 01/24/24 0855    fentaNYL 2500 mcg/250 mL NS infusion,  mcg/hr, Intravenous, Titrated, Thai Villavicencio MD, Last Rate: 30 mL/hr at 01/24/24 0906, 300 mcg/hr at 01/24/24 0906    guaifenesin (ROBITUSSIN) 100 MG/5ML liquid 200 mg, 200 mg, Oral, Q4H PRN, Sary Riddle, APRN, 200 mg at 01/21/24 0624    hydrOXYzine (ATARAX) tablet 25 mg, 25 mg, Oral, Q4H PRN, Mendoza Mejia III, DO, 25 mg at 01/21/24 1423    ipratropium (ATROVENT) nebulizer solution 0.5 mg, 0.5 mg, Nebulization, 4x Daily - RT, Jun Fajardo MD, 0.5 mg at 01/24/24 0720    levothyroxine (SYNTHROID, LEVOTHROID) tablet 125 mcg, 125 mcg, Oral, Q AM, Cosme Lyons MD, 125 mcg at 01/24/24 0541    magnesium oxide (MAG-OX) tablet 400 mg, 400 mg, Oral, Daily, Cosme Lyons MD, 400 mg at 01/24/24 0834    melatonin tablet 5 mg, 5 mg, Oral, Nightly, Mayne, Casie M, PA-C, 5 mg at 01/23/24 2025    midazolam (VERSED) injection 2 mg, 2 mg, Intravenous, Q1H PRN, Thai Villavicencio MD, 2 mg at 01/24/24 0642    midodrine (PROAMATINE) tablet 10 mg, 10 mg, Oral, TID AC, Basil Richter MD, 10 mg at 01/24/24 0831    morphine injection 1 mg, 1 mg, Intravenous, Q1H PRN, Gillian Sweeney DO, 1 mg at 01/21/24 1444    norepinephrine (LEVOPHED) 8 mg in 250 mL NS infusion (premix), 0.02-0.3 mcg/kg/min, Intravenous, Titrated, Maria M Jimenez, APRN, Last Rate: 1.2 mL/hr at 01/24/24 0943, 0.01 mcg/kg/min at 01/24/24 0943    Nutrisource fiber pack 1 packet, 1 packet, Oral, BID, Mayne, Casie M, PA-C, 1 packet at 01/24/24 0906    ondansetron ODT (ZOFRAN-ODT)  disintegrating tablet 4 mg, 4 mg, Oral, Q6H PRN **OR** ondansetron (ZOFRAN) injection 4 mg, 4 mg, Intravenous, Q6H PRN, Cosme Lyons MD    pantoprazole (PROTONIX) injection 40 mg, 40 mg, Intravenous, Q AM, Maria M Jimenez, APRN, 40 mg at 01/24/24 0541    Pharmacy Consult - MTM, , Does not apply, Daily, Garfield Mejia IV, PharmD    Potassium Replacement - Follow Nurse / BPA Driven Protocol, , Does not apply, PRN, Basil Richter MD    pregabalin (LYRICA) capsule 150 mg, 150 mg, Oral, Daily, Gillian Hanson, PharmD, 150 mg at 01/24/24 0831    racemic epinephrine (RACEPINEPHRINE) nebulizer solution 0.5 mL, 0.5 mL, Nebulization, Q3H PRN, Jun Fajardo MD, 0.5 mL at 01/21/24 1515    rivaroxaban (XARELTO) tablet 15 mg, 15 mg, Oral, Daily With Dinner, Cosme Lyons MD, 15 mg at 01/23/24 1711    rivastigmine (EXELON) 4.6 MG/24HR patch 1 patch, 1 patch, Transdermal, Daily, Cosme Lyons MD, 1 patch at 01/24/24 0834    sodium chloride 0.9 % flush 10 mL, 10 mL, Intravenous, Q12H, Cosme Lyons MD, 10 mL at 01/24/24 0832    sodium chloride 0.9 % flush 10 mL, 10 mL, Intravenous, PRN, Cosme Lyons MD    sodium chloride 0.9 % infusion 40 mL, 40 mL, Intravenous, PRN, Cosme Lyons MD    tamsulosin (FLOMAX) 24 hr capsule 0.4 mg, 0.4 mg, Oral, Daily, Cosme Lyons MD, 0.4 mg at 01/21/24 0812    Vital Sign Min/Max for last 24 hours  Temp  Min: 98.6 °F (37 °C)  Max: 100.8 °F (38.2 °C)   BP  Min: 86/49  Max: 146/79   Pulse  Min: 53  Max: 104   Resp  Min: 15  Max: 20   SpO2  Min: 83 %  Max: 100 %   No data recorded      Intake/Output Summary (Last 24 hours) at 1/24/2024 1017  Last data filed at 1/24/2024 0550  Gross per 24 hour   Intake 1837.4 ml   Output 735 ml   Net 1102.4 ml           CONSTITUTIONAL: Intubated, sedated    Labs/studies:  Available lab and imaging results were reviewed by myself today    Results for orders placed during the hospital encounter of 01/16/24    Adult Transthoracic  Echo Complete w/ Color, Spectral and Contrast if Necessary Per Protocol    Interpretation Summary    Left ventricular systolic function is normal. Left ventricular ejection fraction appears to be 56 - 60%.    Left ventricular wall thickness is consistent with mild posterior asymmetric hypertrophy.    Left ventricular diastolic function is consistent with (grade Ia w/high LAP) impaired relaxation.    Mildly reduced right ventricular systolic function noted.    Systolic and diastolic flattening of the interventricular septum consistent with right ventricle pressure and volume overload.    The right ventricular cavity is moderately dilated.    The right atrial cavity is mild to moderately dilated.    There is mild calcification of the aortic valve.    Moderate aortic valve regurgitation is present.    Mild mitral valve regurgitation is present.    Moderate to severe tricuspid valve regurgitation is present.    Estimated right ventricular systolic pressure from tricuspid regurgitation is markedly elevated (>55 mmHg).      Tele:  NSR          Assessment/Plan:         Acute on chronic heart failure with preserved ejection fraction (HFpEF)    Hypothyroidism    Pulmonary hypertension    Hypokalemia    Acute on chronic respiratory failure with hypoxia    Cellulitis of right lower extremity    E coli bacteremia    PAF (paroxysmal atrial fibrillation)       ASSESSMENT:  Acute on chronic HFpEF   Elevated proBNP of 10,000  IV Bumex 2 mg x 1 in the ED.   Echo withLVEF 56-60%, moderate AR, moderate to severe TR, RVSP >55 mmHg.   Afib with RVR  Developed overnight 1/17.  Converted to sinus rhythm with amiodarone  Anticoagulated with Xarelto   Converted on Amiodarone protocol.  Reverted back to A-fib 1/21  Right LE cellulitis/E.Coli bacteremia  Hypotension   Acute on chronic respiratory failure   Intubated 1/21 respiratory distress  CKD stage III    PLAN:  Continue oral amiodarone at 200 mg twice daily for 7 days followed by 200 mg  daily thereafter  Continue increased dose of midodrine 10 mg three times a day for hypotension.  Also on norepinephrine  Holding off diuresis due to worsening renal function today.  Swelling worsening, patient appears to be third spacing with associated low BP  Ongoing goals of care discussion with family.

## 2024-01-24 NOTE — PROGRESS NOTES
"  INFECTIOUS DISEASES  INPATIENT PROGRESS NOTE  2024      PATIENT NAME: Zoila Nava  :  1933  MRN:  [id number]  Date of Admission:  2024      Antimicrobials:  Ceftriaxone      MAR reviewed.    Chief Complaint   Patient presents with    Wound Check    Shortness of Breath       Reason for consultation: Sepsis, right leg cellulitis, E. coli bacteremia    Interval history: Patient remains intubated and sedated on mechanical ventilation in the ICU.  Off pressor.  Family at bedside deciding on goals of care.  Moving towards comfort measures.    ROS:  Unable to obtain due to current medical condition, intubation, and altered mental status.      Objective:  Temp (24hrs), Av.9 °F (37.7 °C), Min:98.6 °F (37 °C), Max:100.8 °F (38.2 °C)    /66   Pulse 68   Temp 98.6 °F (37 °C) (Bladder)   Resp 15   Ht 152.4 cm (60\")   Wt 66.8 kg (147 lb 4.3 oz)   LMP  (LMP Unknown)   SpO2 94%   BMI 28.76 kg/m²     Physical Examination:  GENERAL:  Acutely ill appearing, intubated and sedated, on mechanical ventilation  HEENT: Normocephalic, atraumatic.  ETT in place  CV:  RRR  LUNGS: Normal respiratory effort.  Coarse BS B on vent.  ABDOMEN:  Soft, nontender, mildly distended.  EXT: 1+ bilateral lower extremity edema.  SKIN: Right lower leg erythema/warmth much improved.  NEURO: sedated    Laboratory Data:    Results from last 7 days   Lab Units 24  0519 24  0413 24  0402   WBC 10*3/mm3 10.89* 14.36* 11.23*   HEMOGLOBIN g/dL 10.6* 10.4* 10.3*   HEMATOCRIT % 34.2 31.9* 32.2*   PLATELETS 10*3/mm3 259 265 204     Results from last 7 days   Lab Units 24  0519   SODIUM mmol/L 133*   POTASSIUM mmol/L 4.5   CHLORIDE mmol/L 97*   CO2 mmol/L 22.0   BUN mg/dL 47*   CREATININE mg/dL 1.98*   GLUCOSE mg/dL 123*   CALCIUM mg/dL 8.4     Results from last 7 days   Lab Units 24  0402   ALK PHOS U/L 107   BILIRUBIN mg/dL <0.2   ALT (SGPT) U/L 22   AST (SGOT) U/L 19         Results from " last 7 days   Lab Units 01/22/24  0402   CRP mg/dL 4.70*     Estimated Creatinine Clearance: 16.1 mL/min (A) (by C-G formula based on SCr of 1.98 mg/dL (H)).  Results from last 7 days   Lab Units 01/23/24  1104   LACTATE mmol/L 1.2                   Microbiology:    Microbiology Results (last 10 days)       Procedure Component Value - Date/Time    Blood Culture - Blood, Arm, Left [[id number]]  (Normal) Collected: 01/23/24 1104    Lab Status: Preliminary result Specimen: Blood from Arm, Left Updated: 01/24/24 1202     Blood Culture No growth at 24 hours    Narrative:      Less than seven (7) mL's of blood was collected.  Insufficient quantity may yield false negative results.    Blood Culture - Blood, Hand, Left [[id number]]  (Normal) Collected: 01/23/24 1104    Lab Status: Preliminary result Specimen: Blood from Hand, Left Updated: 01/24/24 1202     Blood Culture No growth at 24 hours    Narrative:      Less than seven (7) mL's of blood was collected.  Insufficient quantity may yield false negative results.    Respiratory Panel PCR w/COVID-19(SARS-CoV-2) [facility] In-House, NP Swab in UTM/VTM, 2 HR TAT - Swab, Nasopharynx [[id number]]  (Abnormal) Collected: 01/21/24 1051    Lab Status: Final result Specimen: Swab from Nasopharynx Updated: 01/21/24 1224     ADENOVIRUS, PCR Not Detected     Coronavirus 229E Not Detected     Coronavirus HKU1 Not Detected     Coronavirus NL63 Not Detected     Coronavirus OC43 Detected     COVID19 Not Detected     Human Metapneumovirus Not Detected     Human Rhinovirus/Enterovirus Not Detected     Influenza A PCR Not Detected     Influenza B PCR Not Detected     Parainfluenza Virus 1 Not Detected     Parainfluenza Virus 2 Not Detected     Parainfluenza Virus 3 Not Detected     Parainfluenza Virus 4 Not Detected     RSV, PCR Not Detected     Bordetella pertussis pcr Not Detected     Bordetella parapertussis PCR Not Detected     Chlamydophila pneumoniae PCR Not Detected      Mycoplasma pneumo by PCR Not Detected    Narrative:      In the setting of a positive respiratory panel with a viral infection PLUS a negative procalcitonin without other underlying concern for bacterial infection, consider observing off antibiotics or discontinuation of antibiotics and continue supportive care. If the respiratory panel is positive for atypical bacterial infection (Bordetella pertussis, Chlamydophila pneumoniae, or Mycoplasma pneumoniae), consider antibiotic de-escalation to target atypical bacterial infection.    COVID PRE-OP / PRE-PROCEDURE SCREENING ORDER (NO ISOLATION) - Swab, Nasopharynx [[id number]]  (Normal) Collected: 01/16/24 1040    Lab Status: Final result Specimen: Swab from Nasopharynx Updated: 01/16/24 1116    Narrative:      The following orders were created for panel order COVID PRE-OP / PRE-PROCEDURE SCREENING ORDER (NO ISOLATION) - Swab, Nasopharynx.  Procedure                               Abnormality         Status                     ---------                               -----------         ------                     COVID-19 and FLU A/B PCR...[[id number]]  Normal              Final result                 Please view results for these tests on the individual orders.    COVID-19 and FLU A/B PCR, 1 HR TAT - Swab, Nasopharynx [[id number]]  (Normal) Collected: 01/16/24 1040    Lab Status: Final result Specimen: Swab from Nasopharynx Updated: 01/16/24 1116     COVID19 Not Detected     Influenza A PCR Not Detected     Influenza B PCR Not Detected    Narrative:      Fact sheet for providers: https://www.fda.gov/media/553806/download    Fact sheet for patients: https://www.fda.gov/media/000472/download    Test performed by PCR.    Blood Culture - Blood, Arm, Right [[id number]]  (Abnormal)  (Susceptibility) Collected: 01/16/24 1025    Lab Status: Final result Specimen: Blood from Arm, Right Updated: 01/18/24 0618     Blood Culture Escherichia coli     Isolated from Aerobic and Anaerobic  Bottles     Gram Stain Anaerobic Bottle Gram negative bacilli      Aerobic Bottle Gram negative bacilli    Susceptibility        Escherichia coli      VIANCA      Amoxicillin + Clavulanate Susceptible      Ampicillin Susceptible      Ampicillin + Sulbactam Susceptible      Cefepime Susceptible      Ceftazidime Susceptible      Ceftriaxone Susceptible      Gentamicin Susceptible      Levofloxacin Susceptible      Piperacillin + Tazobactam Susceptible      Trimethoprim + Sulfamethoxazole Susceptible                       Susceptibility Comments       Escherichia coli    Cefazolin sensitivity will not be reported for Enterobacteriaceae in non-urine isolates. If cefazolin is preferred, please call the microbiology lab to request an E-test.  With the exception of urinary-sourced infections, aminoglycosides should not be used as monotherapy.               Blood Culture ID, PCR - Blood, Arm, Right [[id number]]  (Abnormal) Collected: 01/16/24 1025    Lab Status: Final result Specimen: Blood from Arm, Right Updated: 01/16/24 2050     BCID, PCR Escherichia coli. Identification by BCID2 PCR.     BOTTLE TYPE Anaerobic Bottle    Narrative:      No resistance genes detected.    Blood Culture - Blood, Arm, Right [[id number]]  (Abnormal) Collected: 01/16/24 1020    Lab Status: Final result Specimen: Blood from Arm, Right Updated: 01/18/24 0618     Blood Culture Escherichia coli     Isolated from Aerobic and Anaerobic Bottles     Gram Stain Anaerobic Bottle Gram negative bacilli      Aerobic Bottle Gram negative bacilli    Narrative:      Less than seven (7) mL's of blood was collected.  Insufficient quantity may yield false negative results.    Refer to previous blood culture collected on 01/16/2024 1025 for MICs.              Radiology:  XR Chest 1 View    Result Date: 1/23/2024  Impression: 1.Small left pleural effusion with left basilar atelectasis or infiltrate. Electronically Signed: Osvaldo Steele MD  1/23/2024 7:40 AM EST   Workstation ID: [id number]       DISCUSSION:  89 y.o. female with history of heart failure and chronic leg edema admitted with sepsis secondary to right lower extremity cellulitis.        PROBLEM LIST:   -- Sepsis, due to right lower extremity cellulitis and bacteremia.  Fever, tachycardia, hypotension, hypoxia, encephalopathy, lactic acidosis, leukocytosis, acute kidney injury.  -- Nonpurulent right lower extremity cellulitis, portal of entry right anterior shin abrasion.  No purulence.  No crepitus.  Improved on ceftriaxone.  -- E. coli bacteremia, pan sensitive.  Unusual for cellulitis from E. coli but possible.  No urinalysis at presentation.  Denies abdominal complaints.  LFTs ok.  Abd U/S benign.  -- Chronic leg edema.  -- Acute on chronic heart failure with preserved ejection fraction.  -- Allergy to penicillin as a child, unknown reaction.  -- Afib/RVR, required amiodarone.  -- Acute on chronic respiratory failure with hypoxia, requiring intubation.    PLAN:  -- Noted plans to proceed with comfort measures, which is appropriate    Plan discussed with family and ICU staff.    Dwain Kaur MD  1/24/2024  13:36 EST

## 2024-01-24 NOTE — PROGRESS NOTES
Clinical Nutrition     Nutrition Support Assessment  Reason for Visit: MDR, Identified at risk by screening criteria, EN      Patient Name: Zoila Nava  YOB: 1933  MRN: [id number]  Date of Encounter: 01/24/24 11:49 EST  Admission date: 1/16/2024      Nutrition Assessment   Admission Diagnosis:  Acute on chronic heart failure with preserved ejection fraction (HFpEF) [I50.33]      Problem List:    Acute on chronic heart failure with preserved ejection fraction (HFpEF)    Hypothyroidism    Pulmonary hypertension    Hypokalemia    Acute on chronic respiratory failure with hypoxia    Cellulitis of right lower extremity    E coli bacteremia    PAF (paroxysmal atrial fibrillation)        PMH:   She  has a past medical history of Anemia, Arthritis, Asthma, Cataract, Difficulty breathing, GERD (gastroesophageal reflux disease), Graves' ophthalmopathy, Hoarseness, Hypertension, Hypertensive heart disease with acute on chronic diastolic congestive heart failure (10/11/2021), Pulmonary hypertension (10/11/2021), Spondylolisthesis of cervical region, and Vitamin B12 deficiency.    PSH:  She  has a past surgical history that includes Cholecystectomy; Total knee arthroplasty (Left, 09/29/2014); Cervical laminectomy; Other surgical history (Right); and Thyroid surgery.      Applicable Nutrition Concerns:   Skin:RLE cellulitis, wound  GI: last bm 1-20      Applicable Interval History:     Stridor    ARF/VENT 1-21-24      Reported/Observed/Food/Nutrition Related History:     1-24: pt intubated, sedated, family at bedside  + fentanyl, off levophed    Per RN: pt tolerating TF,  increased UOP, was wild when decreased sedation      1-22: Pt intubated, sedated, daughter at bedside  + fentanyl, levophed 0.1mcg, amio    Per RN: pt does not follow commands    Per MD: ok to start trophic feed    Labs    Labs Reviewed: Yes     Results from last 7 days   Lab Units 01/24/24  0519 01/23/24  7342  01/23/24  0413 01/22/24  0402 01/21/24  0451 01/20/24  0949 01/19/24  0334 01/18/24  0120   GLUCOSE mg/dL 123*  --  126* 135*  133* 185* 107*   < > 158*   BUN mg/dL 47*  --  39* 27*  28* 24* 23   < > 34*   CREATININE mg/dL 1.98*  --  1.79* 1.35*  1.46* 1.43* 1.30*   < > 1.44*   SODIUM mmol/L 133*  --  130* 135*  135* 134* 137   < > 139   CHLORIDE mmol/L 97*  --  96* 98  98 93* 100   < > 102   POTASSIUM mmol/L 4.5  --  4.3 4.8  4.8 4.0 3.8   < > 3.3*   PHOSPHORUS mg/dL  --   --   --  5.1*  4.9*  --   --   --   --    MAGNESIUM mg/dL  --   --   --  2.0  2.0 2.0 1.8  --   --    ALT (SGPT) U/L  --   --   --  22  --   --   --  36*   LACTATE mmol/L  --  1.2  --   --   --   --   --   --     < > = values in this interval not displayed.       Results from last 7 days   Lab Units 01/22/24  1234 01/22/24  0402 01/18/24  0120   ALBUMIN g/dL  --  3.5 2.9*   PREALBUMIN mg/dL 14.5*  --   --    CRP mg/dL  --  4.70*  --    CHOLESTEROL mg/dL  --  168  --    TRIGLYCERIDES mg/dL  --  65  --        Results from last 7 days   Lab Units 01/24/24  0529 01/24/24  0018 01/23/24  1614 01/23/24  1216 01/23/24  0530 01/22/24  2325   GLUCOSE mg/dL 130 132* 137* 154* 129 122     Lab Results   Lab Value Date/Time    HGBA1C 5.20 04/05/2023 0214         Results from last 7 days   Lab Units 01/23/24  0413 01/18/24  0120   PROBNP pg/mL 38,907.0* 35,116.0*         Medications    Medications Reviewed: Yes  Pertinent: abx, amio, decadron, jardiance, midodrine, protonix, xarelto, bowel regimen, fiber 2 packets per day    Infusion:fentanyl    Intake/Ouptut 24 hrs (0701 - 0700)   I&O's Reviewed: Yes     Intake & Output (last day)         01/23 0701 01/24 0700 01/24 0701 01/25 0700    I.V. (mL/kg) 1193 (17.9)     Other 375     NG/     IV Piggyback 99.6     Total Intake(mL/kg) 2425.6 (36.3)     Urine (mL/kg/hr) 735 (0.5) 250 (0.8)    Total Output 735 250    Net +1690.6 -250                    Anthropometrics     Flowsheet Rows      Flowsheet  "Row First Filed Value   Admission Height 152.4 cm (60\") Documented at 01/16/2024 1005   Admission Weight 64 kg (141 lb 3.2 oz) Documented at 01/16/2024 1005          Height: Height: 152.4 cm (60\")  Last Filed Weight: Weight: 66.8 kg (147 lb 4.3 oz) (01/22/24 0600)  Method: Weight Method: Bed scale  BMI: BMI (Calculated): 28.8  BMI classification: Overweight: 25.0-29.9kg/m2   IBW:  100lb    UBW: pt 140lb's at MD office 1-12-24    Weight change: 6lb wt gain in 10 days, suspect fluid     Weight       Weight (kg) Weight (lbs) Weight Method Visit Report   3/31/2023 67.858 kg  149 lb 9.6 oz   --    4/4/2023 66.679 kg  147 lb  Stated     4/5/2023 64.229 kg  141 lb 9.6 oz  Standing scale     4/6/2023 66.407 kg  146 lb 6.4 oz  Standing scale     4/7/2023 66.225 kg  146 lb      4/8/2023 66.2 kg  145 lb 15.1 oz      4/18/2023 65.681 kg  144 lb 12.8 oz   --    4/24/2023 63.957 kg  141 lb   --    5/4/2023 67.132 kg  148 lb   --     68.04 kg  150 lb   --        --        --    5/12/2023 66.282 kg  146 lb 2 oz   --    5/18/2023 65.59 kg  144 lb 9.6 oz   --    6/9/2023 68.55 kg  151 lb 2 oz   --    6/23/2023 70.308 kg  155 lb   --     70.58 kg  155 lb 9.6 oz   --        --        --    6/26/2023 68.947 kg  152 lb   --    7/5/2023 66.679 kg  147 lb   --    7/20/2023 65.942 kg  145 lb 6 oz   --    9/6/2023 67.268 kg  148 lb 4.8 oz   --    10/9/2023 68.901 kg  151 lb 14.4 oz   --    10/13/2023 68.493 kg  151 lb   --    10/23/2023 65.772 kg  145 lb  Stated     10/24/2023 65.772 kg  145 lb      11/29/2023 62.959 kg  138 lb 12.8 oz   --    1/10/2024 63 kg  138 lb 14.2 oz   --    1/12/2024 64.048 kg  141 lb 3.2 oz   --    1/16/2024 64.048 kg  141 lb 3.2 oz  Estimated     1/17/2024 63.957 kg  141 lb       63.957 kg  141 lb      1/22/2024 66.8 kg  147 lb 4.3 oz  Bed scale         Nutrition Focused Physical Exam     Date:     Unable to perform exam due to: Defer pending indication    Needs Assessment   Date:1-22-24    Height used:Height: " "152.4 cm (60\")  Weights used: 141lb/ 64.1kg      Estimated Calorie needs: ~1200kcal  Method:  20Kcals/Kkcal  Method:  MSJ x 1.2: 1180kcal  Method:  PSU: 1112kcal    Estimated Protein needs: ~77g protein  Method: 1-1.2g protein: 64-77g protein      Current Nutrition Prescription     PO: NPO Diet NPO Type: Strict NPO  Oral Nutrition Supplement:   Intake: N/A    EN: Peptamen AF  Goal Rate:20ml  Water Flushes: 10ml  Modular: Nutrisource Fiber 2/day  Route: NG  Tube: Large bore    At goal over: 20Hrs/day    Rx will supply:   Goal Volume 400  mL/day     Flush Volume 200 mL/day     Energy 510 Kcal/day 43 % Est Need   Protein 30 g/day 39 % Est Need   Fiber 8 g/day     Water in   mL     Total Water 524 mL     Meet DRI No        --------------------------------------------------------------------------  Product/Rate verified at bedside: Yes  Infusing Rate at time of visit: 20ml    Average Delivery from [facility] Day:  Volume 438 mL/day 110  % Goal Vol.   Flush Volume 695 mL/day     Energy  Kcal/day  % Est Need   Protein  g/day  % Est Need   Fiber  g/day     Water in  EN  mL     Total Water  mL     Meet DRI No            Nutrition Diagnosis   Date: 24 Updated:   Problem Inadequate energy intake   Etiology ARF/VENT   Signs/Symptoms NPO   Status: trophic feed started     Goal:   General: Nutrition support treatment  PO: N/A  EN/PN: Adjust EN    Nutrition Intervention      Follow treatment progress, Care plan reviewed, Nutrition support order placed    Gradually advance TF to goal rate @50ml/hr, fiber 2x/day, free water @10ml/hr    TF at goal volume will provide 1000ml, 1230kcal, 103% kcal needs, 76g protein, 99% protein needs,9g fiber, 1010ml free water     Suggest add MVI as TF volume too low to meet RDI's      Monitoring/Evaluation:   Per protocol, I&O, Pertinent labs, Weight, Skin status, GI status, Symptoms, Hemodynamic stability      Carine Dykes RD  Time Spent: 30min  "

## 2024-01-24 NOTE — SIGNIFICANT NOTE
Exam confirms with auscultation zero audible heart tones and zero audible respirations. Ms.Peggy Gabriela Nava was pronounced dead at 1610.  MD notified by Patient's RN.    Rickey Lobato RN  Clinical House Supervisor  1/24/2024 16:45 EST

## 2024-01-24 NOTE — PLAN OF CARE
Problem: Restraint, Nonviolent  Goal: Absence of Harm or Injury  Outcome: Ongoing, Progressing  Intervention: Implement Least Restrictive Safety Strategies  Recent Flowsheet Documentation  Taken 1/23/2024 1800 by Adilene Benoit RN  Medical Device Protection:   IV pole/bag removed from visual field   torso covered   tubing secured  Less Restrictive Alternative:   appropriate expression promoted   bed alarm in use   calming techniques promoted   emotional support provided   sensory stimulation limited  De-Escalation Techniques:   appropriate behavior reinforced   increased round frequency   medication administered   quiet time facilitated   stimulation decreased   verbally redirected  Diversional Activities: (family at bedside) other (see comments)  Taken 1/23/2024 1600 by Adilene Benoit RN  Medical Device Protection:   IV pole/bag removed from visual field   torso covered   tubing secured  Less Restrictive Alternative:   appropriate expression promoted   bed alarm in use   calming techniques promoted   emotional support provided   sensory stimulation limited  De-Escalation Techniques:   appropriate behavior reinforced   increased round frequency   medication administered   quiet time facilitated   stimulation decreased   verbally redirected  Diversional Activities: (family at bedside) other (see comments)  Taken 1/23/2024 1400 by Adilene Benoit RN  Medical Device Protection:   IV pole/bag removed from visual field   torso covered   tubing secured  Less Restrictive Alternative:   appropriate expression promoted   bed alarm in use   calming techniques promoted   emotional support provided   sensory stimulation limited  De-Escalation Techniques:   appropriate behavior reinforced   increased round frequency   medication administered   quiet time facilitated   stimulation decreased   verbally redirected  Diversional Activities: (family at bedside) other (see comments)  Taken 1/23/2024 1200 by Adilene Benoit,  RN  Medical Device Protection:   IV pole/bag removed from visual field   torso covered   tubing secured  Less Restrictive Alternative:   appropriate expression promoted   bed alarm in use   calming techniques promoted   emotional support provided   sensory stimulation limited  De-Escalation Techniques:   appropriate behavior reinforced   increased round frequency   medication administered   quiet time facilitated   stimulation decreased   verbally redirected  Diversional Activities: other (see comments)  Taken 1/23/2024 1000 by Adilene Benoit RN  Medical Device Protection:   IV pole/bag removed from visual field   torso covered   tubing secured  Less Restrictive Alternative:   appropriate expression promoted   bed alarm in use   calming techniques promoted   emotional support provided   sensory stimulation limited  De-Escalation Techniques:   appropriate behavior reinforced   increased round frequency   medication administered   quiet time facilitated   stimulation decreased   verbally redirected  Diversional Activities: other (see comments)  Taken 1/23/2024 0800 by Adilene Benoit, RN  Medical Device Protection:   IV pole/bag removed from visual field   torso covered   tubing secured  Less Restrictive Alternative:   appropriate expression promoted   bed alarm in use   calming techniques promoted   emotional support provided   sensory stimulation limited  De-Escalation Techniques:   appropriate behavior reinforced   increased round frequency   medication administered   quiet time facilitated   stimulation decreased   verbally redirected  Diversional Activities: (family at bedside) other (see comments)  Intervention: Protect Dignity, Rights, and Personal Wellbeing  Recent Flowsheet Documentation  Taken 1/23/2024 1800 by Adilene Benoit RN  Trust Relationship/Rapport:   care explained   reassurance provided  Taken 1/23/2024 1600 by Adilene Benoit, RN  Trust Relationship/Rapport:   care explained   reassurance  provided  Taken 1/23/2024 1400 by Adilene Benoit RN  Trust Relationship/Rapport:   care explained   reassurance provided  Taken 1/23/2024 1200 by Adilene Benoit RN  Trust Relationship/Rapport:   care explained   reassurance provided  Taken 1/23/2024 1000 by Adilene Benoit RN  Trust Relationship/Rapport:   care explained   reassurance provided  Taken 1/23/2024 0800 by Adilene Benoit RN  Trust Relationship/Rapport:   care explained   reassurance provided  Intervention: Protect Skin and Joint Integrity  Recent Flowsheet Documentation  Taken 1/23/2024 1800 by Adilene Benoit RN  Body Position:   lower extremity elevated   neutral body alignment   neutral head position   weight shifting  Taken 1/23/2024 1600 by Adilene Benoit RN  Body Position:   lower extremity elevated   neutral body alignment   neutral head position   weight shifting   tilted  Taken 1/23/2024 1400 by Adilene Benoit RN  Body Position:   lower extremity elevated   neutral body alignment   neutral head position   supine   weight shifting  Taken 1/23/2024 1200 by Adilene Benoit RN  Body Position:   turned   right   lower extremity elevated   neutral body alignment   neutral head position   tilted   weight shifting  Taken 1/23/2024 1000 by Adilene Benoit RN  Body Position:   turned   supine   lower extremity elevated   neutral body alignment   neutral head position   weight shifting  Taken 1/23/2024 0800 by Adilene Benoit RN  Body Position:   turned   left   lower extremity elevated   neutral body alignment   neutral head position   weight shifting   Goal Outcome Evaluation:  Plan of Care Reviewed With: patient, family        Progress: no change

## 2024-01-25 NOTE — DISCHARGE SUMMARY
Death Summary    Patient name: Zoila Nava  CSN: [id number]  MRN: [id number]  : 1933  Today's date: 2024     Date of Admission: 2024  Date and Time of Death:  2024    Admitting Physician: Cosme Lyons MD     Primary Care Provider: Arnoldo Oneil MD    Consultations:   Basil Richter MD Cardiology  KaurDwain MD ID    Admission Diagnosis:   Acute on chronic heart failure     Diagnoses at the Time of Death:     Acute on chronic heart failure with preserved ejection fraction (HFpEF)    Hypothyroidism    Pulmonary hypertension    Hypokalemia    Acute on chronic respiratory failure with hypoxia    Cellulitis of right lower extremity    E coli bacteremia    PAF (paroxysmal atrial fibrillation)    Procedures:  None       History of Present Illness:  Zoila Nava is a 89 y.o. female with past medical history significant for chronic diastolic heart failure, pulmonary hypertension, chronic kidney disease, hypothyroidism, paroxysmal atrial atrial fibrillation, dementia.  Patient lives in an assisted living independently but family members visit her daily.  Patient's daughter is present at the bedside and provides most of the history.  Evidently patient has had lower extremity wounds but last time that she was sent to rehab this problem was taking care of and after discharge from rehab she did not have lower extremity wounds.  The daughter tells me that up until about a week ago she was doing okay.  About a week ago they noticed that right lower extremity had developed a wound after patient hitting it against furniture.  Then following that the right lower extremity became red and redness and edema worsened gradually.  Patient's edema actually increased bilaterally and she started weeping.  Meanwhile her respiratory symptoms worsened and she became progressively short of breath.  The above symptoms prompted emergency room visit.  Here at the emergency room patient was  noticed to have severe edema of lower extremities.  Patient was diuresed and also was started on IV antibiotic for lower extremity cellulitis on the right side.  Patient denies fever or chills.  No chest pain or palpitation.  No nausea vomiting or diarrhea.   (End of copied text).     Hospital Course:  Patient admitted to the ICU 2* the above listed problems. She underwent diuresis with some improvement in respiratory status. Blood cultures positive for E. Coli (source unclear). ID followed and she was receiving treatment with ceftriaxone.     ECHO showed normal LVEF with moderate AR, mild MR, moderate TR, with RVSP > 55 mmHg. She developed Afib RVR on 24 and given amiodarone with successful conversion to NSR. Cardiology followed.    On 24, patient developed stridor which failed to respond to treatment with steroids and nebulizers. Respiratory panel positive for coronavirus (not covid-19). Due to worsening stridor, patient was moved to ICU. After brief discussion with family and patient, it was ultimately decided by family and patient to proceed with intubation due to immanent respiratory failure and death. During intubation, epiglottis was floppy but not swollen. Postprocedure she became hypotensive requiring vasopressors which was unable to be weaned. .     CT head, neck, and chest without visible etiology of stridor but was consistent with LLL > RLL consolidative process.     Following intubation, patient's renal function declined sharply. Diuretics held 2* this.     On 24, family decided to withdraw care on patient. They felt she would not want this aggressive treatment as she was a DNI prior to intubation. Provider did express that patient may improve, however, family agreed to keeping patient comfortable. Patient was palliatively extubated on 24. Zoila Nava  on 24 at 1610.     Maria M Jimenez, MSN, APRN, AGACNP-BC  Pulmonary and Critical Care Medicine   24 00:40  EST

## 2024-01-27 LAB
BACTERIA SPEC AEROBE CULT: ABNORMAL
GRAM STN SPEC: ABNORMAL
ISOLATED FROM: ABNORMAL

## 2024-01-28 LAB — BACTERIA SPEC AEROBE CULT: NORMAL

## 2024-02-09 LAB
QT INTERVAL: 400 MS
QTC INTERVAL: 450 MS

## 2024-06-07 NOTE — TELEPHONE ENCOUNTER
Jing at [facility] and patient notified.   08-Jun-2024 01:17

## 2024-12-10 NOTE — PROGRESS NOTES
----- Message from Physician Chepe Weathers D.O. sent at 12/9/2024 10:03 AM PST -----  Regarding: FW: Patient Concerns re: Continued Palpitations/Chest Pain  Please schedule in next 1-2 weeks for follow up.  ----- Message -----  From: Reece Pratt R.N.  Sent: 12/6/2024   4:56 PM PST  To: Chepe Weathers D.O.  Subject: Patient Concerns re: Continued Palpitations/#    Hi doc,    I spoke with this patient and her granddaughter today as part of the [facility] Care Management program, and they are still concerned about Lizzette's persistent, recurring palpitations and chest pain. They are wondering if there are any other workups or treatments you would recommend they pursue at this time. Please let me know if you would like me to reach back out to them with any recommendations. Thank you!    JEEVAN Silverman, RN  Care Coordinator RN  [facility]  [phone]   Patient is a 87 y.o. female who is here for a follow up of recent ER visit.  Chief Complaint   Patient presents with   • Hypertension         HPI:    Here for mgmt of HTN and hypothyroid and hyperlipidemia.  Patient yesterday am developed low bp in the 87/57 and felt whoozy.  Today feels fine.  No CP or palpitations.  Slept well the night before.  Compliant with meds.  No fever or chills.  Was given IVFs.    History:     Patient Active Problem List   Diagnosis   • Allergic rhinitis   • Hyperlipidemia   • Hypertension   • Hypocalcemia   • Hypothyroidism   • Neuropathy   • NUD (nonulcer dyspepsia)   • Painful knee   • Pernicious anemia   • Tremor   • Vitamin B12 deficiency   • Spondylolisthesis of cervical region   • Graves' ophthalmopathy   • Anemia   • Memory impairment of gradual onset   • Ascending aortic aneurysm (CMS/HCC)       Past Medical History:   Diagnosis Date   • Anemia    • Arthritis    • Asthma    • Cataract    • Difficulty breathing     Chronic   • GERD (gastroesophageal reflux disease)    • Graves' ophthalmopathy    • Hoarseness    • Hypertension    • Spondylolisthesis of cervical region    • Vitamin B12 deficiency        Past Surgical History:   Procedure Laterality Date   • CERVICAL LAMINECTOMY     • CHOLECYSTECTOMY     • OTHER SURGICAL HISTORY Right     Neuroplasty Median Nerve at Carpal Tunnel   • THYROID SURGERY     • TOTAL KNEE ARTHROPLASTY Left 09/29/2014    Dr Colon       Current Outpatient Medications on File Prior to Visit   Medication Sig   • amLODIPine (NORVASC) 5 MG tablet Take 1 tablet by mouth Every Morning.   • dicyclomine (BENTYL) 10 MG capsule 1 po TID PRN abdominal pain   • donepezil (ARICEPT) 10 MG tablet Take 1 tablet by mouth Every Night.   • estradiol (ESTRACE) 0.1 MG/GM vaginal cream    • famotidine (Pepcid) 20 MG tablet Take 1 tablet by mouth 2 (Two) Times a Day.   • levocetirizine (XYZAL) 5 MG tablet Take 1 tablet by mouth daily as needed.   • levothyroxine (SYNTHROID,  LEVOTHROID) 100 MCG tablet Take 1 tablet by mouth Every Morning.   • LYRICA 200 MG capsule 3 (three) times a day.   • meclizine (ANTIVERT) 25 MG tablet Take 1 tablet by mouth 3 (Three) Times a Day As Needed for dizziness.   • ondansetron (ZOFRAN) 4 MG tablet Take 1 tablet by mouth Every 8 (Eight) Hours As Needed for nausea or vomiting.   • propranolol (INDERAL) 40 MG tablet 2 (two) times a day.   • triamcinolone (KENALOG) 0.1 % cream Apply  topically 2 (two) times a day. Till healed     Current Facility-Administered Medications on File Prior to Visit   Medication   • [COMPLETED] propranolol (INDERAL) tablet 40 mg   • [COMPLETED] sodium chloride 0.9 % bolus 1,000 mL   • [DISCONTINUED] propranolol (INDERAL) tablet 40 mg   • [DISCONTINUED] sodium chloride 0.9 % bolus 1,000 mL       Family History   Problem Relation Age of Onset   • Hypertension Mother    • Other Father         cardiac disorder   • Diabetes Father    • Hypertension Sister        Social History     Socioeconomic History   • Marital status:      Spouse name: Not on file   • Number of children: Not on file   • Years of education: Not on file   • Highest education level: Not on file   Tobacco Use   • Smoking status: Never Smoker   • Smokeless tobacco: Never Used   Substance and Sexual Activity   • Alcohol use: No     Frequency: Never   • Drug use: No   • Sexual activity: Defer         Review of Systems   Constitutional: Negative for chills, diaphoresis, fever and unexpected weight change.   HENT: Positive for hearing loss. Negative for congestion, ear pain, nosebleeds, postnasal drip, sinus pressure and sore throat.    Eyes: Negative for pain, discharge and itching.   Respiratory: Negative for cough, chest tightness, shortness of breath and wheezing.    Cardiovascular: Negative for chest pain, palpitations and leg swelling.   Gastrointestinal: Negative for abdominal distention, abdominal pain, blood in stool, constipation, diarrhea, nausea and  "vomiting.   Endocrine: Positive for cold intolerance. Negative for polydipsia and polyuria.   Genitourinary: Negative for difficulty urinating, dysuria, frequency and hematuria.   Musculoskeletal: Positive for arthralgias and back pain. Negative for gait problem, joint swelling and myalgias.   Skin: Negative for rash and wound.   Neurological: Positive for dizziness, tremors, light-headedness and numbness. Negative for syncope, weakness and headaches.   Psychiatric/Behavioral: Positive for decreased concentration. Negative for dysphoric mood and sleep disturbance. The patient is not nervous/anxious.        /70   Pulse 55   Temp 96.8 °F (36 °C) (Infrared)   Ht 152.4 cm (60\")   Wt 64.9 kg (143 lb)   LMP  (LMP Unknown)   SpO2 94%   BMI 27.93 kg/m²       Physical Exam   Constitutional: She is oriented to person, place, and time. She appears well-developed and well-nourished.   HENT:   Head: Normocephalic and atraumatic.   Right Ear: External ear normal.   Left Ear: External ear normal.   Mouth/Throat: Oropharynx is clear and moist.   Eyes: Conjunctivae and EOM are normal.   Neck: Normal range of motion. Neck supple.   Cardiovascular: Normal rate, regular rhythm and normal heart sounds.   Pulmonary/Chest: Effort normal and breath sounds normal.   Abdominal: Soft. Bowel sounds are normal.   Musculoskeletal: Normal range of motion. She exhibits edema (trace).   Lymphadenopathy:     She has no cervical adenopathy.   Neurological: She is alert and oriented to person, place, and time.   10/17 MMSE 29/30   Skin: Skin is warm and dry.   Psychiatric: She has a normal mood and affect. Her behavior is normal. Thought content normal.       Procedure:      Discussion/Summary:    htn-stable on amlodipine and propranolol, counseled on how to check BP with home med  rhinitis-flonase and xyzal continuation, stable  tremor-cont propranolol, stable  hypothyroid- recheck on rtc  neuropathy-cont lyrica, stable  djd-cont prn " lortab, rare use, stable  b12 def-cont b12, level at goal per 6/18  hyperlipidemia-labs on rtc, cont diet control  Diverticulosis-advised citrucel qd, asymptomatic  Memory impairment-cont aricept, no better or worst  Ascending Aortic aneurysm-recheck 7/2 noted         Hospital labs noted and dw patient, advised NSAIDs avoidance and     increase fluids    Current Outpatient Medications:   •  amLODIPine (NORVASC) 5 MG tablet, Take 1 tablet by mouth Every Morning., Disp: 90 tablet, Rfl: 3  •  dicyclomine (BENTYL) 10 MG capsule, 1 po TID PRN abdominal pain, Disp: 21 capsule, Rfl: 1  •  donepezil (ARICEPT) 10 MG tablet, Take 1 tablet by mouth Every Night., Disp: 90 tablet, Rfl: 3  •  estradiol (ESTRACE) 0.1 MG/GM vaginal cream, , Disp: , Rfl:   •  famotidine (Pepcid) 20 MG tablet, Take 1 tablet by mouth 2 (Two) Times a Day., Disp: 180 tablet, Rfl: 1  •  levocetirizine (XYZAL) 5 MG tablet, Take 1 tablet by mouth daily as needed., Disp: , Rfl:   •  levothyroxine (SYNTHROID, LEVOTHROID) 100 MCG tablet, Take 1 tablet by mouth Every Morning., Disp: 90 tablet, Rfl: 1  •  LYRICA 200 MG capsule, 3 (three) times a day., Disp: , Rfl:   •  meclizine (ANTIVERT) 25 MG tablet, Take 1 tablet by mouth 3 (Three) Times a Day As Needed for dizziness., Disp: 15 tablet, Rfl: 0  •  ondansetron (ZOFRAN) 4 MG tablet, Take 1 tablet by mouth Every 8 (Eight) Hours As Needed for nausea or vomiting., Disp: 30 tablet, Rfl: 0  •  propranolol (INDERAL) 40 MG tablet, 2 (two) times a day., Disp: , Rfl:   •  triamcinolone (KENALOG) 0.1 % cream, Apply  topically 2 (two) times a day. Till healed, Disp: 45 g, Rfl: 1  No current facility-administered medications for this visit.         Zoila was seen today for hypertension.    Diagnoses and all orders for this visit:    Essential hypertension    Other hyperlipidemia    Ascending aortic aneurysm (CMS/HCC)    Vitamin B12 deficiency    NUD (nonulcer dyspepsia)    Other specified  hypothyroidism    Neuropathy    Pernicious anemia    Tremor    Memory impairment of gradual onset